# Patient Record
Sex: MALE | Race: WHITE | NOT HISPANIC OR LATINO | Employment: OTHER | URBAN - METROPOLITAN AREA
[De-identification: names, ages, dates, MRNs, and addresses within clinical notes are randomized per-mention and may not be internally consistent; named-entity substitution may affect disease eponyms.]

---

## 2017-03-13 ENCOUNTER — HOSPITAL ENCOUNTER (OUTPATIENT)
Dept: ULTRASOUND IMAGING | Facility: CLINIC | Age: 77
End: 2017-03-13
Attending: RADIOLOGY
Payer: MEDICARE

## 2017-03-13 ENCOUNTER — HOSPITAL ENCOUNTER (OUTPATIENT)
Dept: ULTRASOUND IMAGING | Facility: CLINIC | Age: 77
Discharge: HOME | End: 2017-03-13
Attending: RADIOLOGY | Admitting: RADIOLOGY
Payer: MEDICARE

## 2017-03-13 ENCOUNTER — OFFICE VISIT (OUTPATIENT)
Dept: OTHER | Facility: CLINIC | Age: 77
End: 2017-03-13
Attending: RADIOLOGY
Payer: MEDICARE

## 2017-03-13 VITALS — SYSTOLIC BLOOD PRESSURE: 152 MMHG | DIASTOLIC BLOOD PRESSURE: 79 MMHG

## 2017-03-13 DIAGNOSIS — I73.9 PAD (PERIPHERAL ARTERY DISEASE) (H): Primary | ICD-10-CM

## 2017-03-13 DIAGNOSIS — I73.9 PAD (PERIPHERAL ARTERY DISEASE) (H): ICD-10-CM

## 2017-03-13 PROCEDURE — 93926 LOWER EXTREMITY STUDY: CPT | Mod: RT

## 2017-03-13 PROCEDURE — 93924 LWR XTR VASC STDY BILAT: CPT

## 2017-03-13 PROCEDURE — 99211 OFF/OP EST MAY X REQ PHY/QHP: CPT

## 2017-03-13 RX ORDER — CYANOCOBALAMIN (VITAMIN B-12) 500 MCG
400 TABLET ORAL
Status: ON HOLD | COMMUNITY
Start: 2016-04-22 | End: 2018-07-21

## 2017-03-13 RX ORDER — MULTIVITAMIN,THER AND MINERALS
TABLET ORAL
Status: ON HOLD | COMMUNITY
End: 2018-07-21

## 2017-03-13 RX ORDER — PREDNISONE 5 MG/1
TABLET ORAL
Status: ON HOLD | COMMUNITY
Start: 2017-01-05 | End: 2018-07-21

## 2017-03-13 RX ORDER — TAMSULOSIN HYDROCHLORIDE 0.4 MG/1
CAPSULE ORAL
COMMUNITY
Start: 2017-01-25 | End: 2022-07-21

## 2017-03-13 NOTE — MR AVS SNAPSHOT
After Visit Summary   3/13/2017    Shamir Concepcion    MRN: [id number]           Patient Information     Date Of Birth          1940        Visit Information        Provider Department      3/13/2017 4:30 PM Rambo Benites MD [facility]      Today's Diagnoses     PAD (peripheral artery disease) (H)    -  1      Care Instructions    Will repeat imaging and see patient in 2 years.  Contact us sooner if questions or concerns.        Follow-ups after your visit        Follow-up notes from your care team     Return in about 2 years (around 3/11/2019).      Your next 10 appointments already scheduled     Apr 04, 2017   Procedure with Stevie Espinal MD   [facility] (--)    [address]  [facility] [address] [phone]            May 02, 2017   Procedure with Stevie Espinal MD   [facility] (--)    [address]  [facility] [address] [phone]              Who to contact     If you have questions or need follow up information about today's clinic visit or your schedule please contact [facility] directly at [phone].  Normal or non-critical lab and imaging results will be communicated to you by MyChart, letter or phone within 4 business days after the clinic has received the results. If you do not hear from us within 7 days, please contact the clinic through MyChart or phone. If you have a critical or abnormal lab result, we will notify you by phone as soon as possible.  Submit refill requests through Mochila or call your pharmacy and they will forward the refill request to us. Please allow 3 business days for your refill to be completed.          Additional Information About Your Visit        Plaxicahart Information     Mochila lets you send messages to your doctor, view your test results, renew your prescriptions,  "schedule appointments and more. To sign up, go to [email or web address] . Click on \"Log in\" on the left side of the screen, which will take you to the Welcome page. Then click on \"Sign up Now\" on the right side of the page.     You will be asked to enter the access code listed below, as well as some personal information. Please follow the directions to create your username and password.     Your access code is: [email or web address]  Expires: 2017  9:07 AM     Your access code will  in 90 days. If you need help or a new code, please call your [place] clinic or [phone].        Care EveryWhere ID     This is your Care EveryWhere ID. This could be used by other organizations to access your [place] medical records  [id number]         Blood Pressure from Last 3 Encounters:   17 152/79   12/14/15 148/70   14 142/74    Weight from Last 3 Encounters:   13 166 lb 11.2 oz (75.6 kg)   11 173 lb 3.2 oz (78.6 kg)               Primary Care Provider Office Phone # Fax #    Anthony Harding [phone]       [place] [address]        Thank you!     Thank you for choosing [facility] [place]  for your care. Our goal is always to provide you with excellent care. Hearing back from our patients is one way we can continue to improve our services. Please take a few minutes to complete the written survey that you may receive in the mail after your visit with us. Thank you!             Your Updated Medication List - Protect others around you: Learn how to safely use, store and throw away your medicines at www.disposemymeds.org.          This list is accurate as of: 3/13/17 11:59 PM.  Always use your most recent med list.                   Brand Name Dispense Instructions for use    aspirin 81 MG tablet      Take by mouth daily       clopidogrel 75 MG tablet    PLAVIX    90 tablet    Take 1 tablet (75 mg) by mouth daily Start " taking medication the day after the procedure. Take for six months total then stop.       folic acid 0.8 MG Caps      Take 800 mcg by mouth       * methotrexate 2.5 MG tablet CHEMO      Take 12.5 mg by mouth       * methotrexate 2.5 MG tablet CHEMO      Take 6 tablets by mouth once a week.       predniSONE 5 MG tablet    DELTASONE     Take one to two tablets once daily. Use for FLARE.       sildenafil 100 MG cap/tab    REVATIO/VIAGRA    5 tablet    Take 1 tablet by mouth as needed for erectile dysfunction. Take 30 min to 4 hours before intercourse.  Never use with nitroglycerin, terazosin or doxazosin.       simvastatin 40 MG tablet    ZOCOR    90 tablet    Take 1 tablet (40 mg) by mouth At Bedtime       tamsulosin 0.4 MG capsule    FLOMAX     1 tab twice daily       vitamin  s/Minerals Tabs          * Notice:  This list has 2 medication(s) that are the same as other medications prescribed for you. Read the directions carefully, and ask your doctor or other care provider to review them with you.

## 2017-03-13 NOTE — NURSING NOTE
"Chief Complaint   Patient presents with     Consult     (3:00 FSH, 4:30 BFD) History of left distal superficial femoral/above knee popliteal stent; 1 year follow up to 12/14/15 appointment with Dr. El       Initial /79 (BP Location: Left arm, Patient Position: Chair, Cuff Size: Adult Large) Estimated body mass index is 24.62 kg/(m^2) as calculated from the following:    Height as of 9/11/13: 5' 9\" (1.753 m).    Weight as of 9/11/13: 166 lb 11.2 oz (75.6 kg).  Medication Reconciliation: complete     Face to face nursing time: 8 minutes    Chiara Marlow MA     "

## 2017-03-14 NOTE — PROGRESS NOTES
March 3, 2017            Anthony Harding MD   [facility]    [address] [address]      Dear Dr. Harding:      I had the pleasure of seeing your patient in the clinic this afternoon.  As you recall, Mr. Shamir Concepcion has history of angiogram, dating back to 09/11/2013.  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  The patient has done well since that time and continues to do well.  The patient had an BHAVANI examination on today's visit demonstrating resting BHAVANI in the right leg at 1.04 and 1.0 in the left leg.  Post-exercise values of 1.04 on the right and 0.93 on the left.  Distal waveforms are triphasic/biphasic.  Overall, the patient has no claudication symptoms.  The patient does describe some hamstring tightness during exercise, though this is not felt to be vascular related.      PHYSICAL EXAMINATION:  The patient appears healthy and in no distress.  The patient has 2+ pulses in both femoral arteries, 1+ pulses in both posterior tibial and 2+ pulses in both dorsalis pedis arteries.  Both feet are normal in color and warm.  No open wounds.  The patient has blood pressure of 152/79 and a heart rate of 64.  The patient is currently taking aspirin.      Overall, the patient is doing quite well and has done quite well after right lower extremity intervention in 2013.  Therefore, our plan will be to give followup exam in 2 years with repeat BHAVANI examination with exercise and duplex of the right SFA/popliteal arterial stent.      I appreciate the opportunity in helping to care for your patient.  Please do not hesitate to contact me with any questions or concerns.      Please note that a total of 25 minutes was spent in face-to-face communication with the patient, greater than 50% of which was spent in counseling and coordination of care.      Sincerely,         NAYAN BURLESON MD              D: 2017 17:24   T: 2017 00:21   MT: YON      Name:     KATTY GRUBER   MRN:      [id number]        Account:      [id number]   :      1940           Visit Date:   2017      Document: [id number]       cc: Anthony Harding MD

## 2017-04-03 RX ORDER — FLUTICASONE PROPIONATE 50 MCG
2 SPRAY, SUSPENSION (ML) NASAL DAILY
COMMUNITY

## 2017-04-04 ENCOUNTER — ANESTHESIA (OUTPATIENT)
Dept: SURGERY | Facility: CLINIC | Age: 77
End: 2017-04-04
Payer: MEDICARE

## 2017-04-04 ENCOUNTER — HOSPITAL ENCOUNTER (OUTPATIENT)
Facility: CLINIC | Age: 77
Discharge: HOME | End: 2017-04-04
Attending: OPHTHALMOLOGY | Admitting: OPHTHALMOLOGY
Payer: MEDICARE

## 2017-04-04 ENCOUNTER — ANESTHESIA EVENT (OUTPATIENT)
Dept: SURGERY | Facility: CLINIC | Age: 77
End: 2017-04-04
Payer: MEDICARE

## 2017-04-04 ENCOUNTER — SURGERY (OUTPATIENT)
Age: 77
End: 2017-04-04

## 2017-04-04 VITALS
BODY MASS INDEX: 24.73 KG/M2 | TEMPERATURE: 96.9 F | OXYGEN SATURATION: 96 % | SYSTOLIC BLOOD PRESSURE: 160 MMHG | RESPIRATION RATE: 16 BRPM | WEIGHT: 167 LBS | DIASTOLIC BLOOD PRESSURE: 76 MMHG | HEIGHT: 69 IN

## 2017-04-04 PROCEDURE — 71000028 ZZH EYE RECOVERY PHASE 2 EACH 15 MINS: Performed by: OPHTHALMOLOGY

## 2017-04-04 PROCEDURE — 40000170 ZZH STATISTIC PRE-PROCEDURE ASSESSMENT II: Performed by: OPHTHALMOLOGY

## 2017-04-04 PROCEDURE — 25000125 ZZHC RX 250: Performed by: OPHTHALMOLOGY

## 2017-04-04 PROCEDURE — 25000125 ZZHC RX 250: Performed by: NURSE ANESTHETIST, CERTIFIED REGISTERED

## 2017-04-04 PROCEDURE — 25000132 ZZH RX MED GY IP 250 OP 250 PS 637: Mod: GY | Performed by: OPHTHALMOLOGY

## 2017-04-04 PROCEDURE — V2632 POST CHMBR INTRAOCULAR LENS: HCPCS | Performed by: OPHTHALMOLOGY

## 2017-04-04 PROCEDURE — 25000128 H RX IP 250 OP 636: Performed by: NURSE ANESTHETIST, CERTIFIED REGISTERED

## 2017-04-04 PROCEDURE — 25800025 ZZH RX 258: Performed by: ANESTHESIOLOGY

## 2017-04-04 PROCEDURE — 36000101 ZZH EYE SURGERY LEVEL 3 1ST 30 MIN: Performed by: OPHTHALMOLOGY

## 2017-04-04 PROCEDURE — 27210794 ZZH OR GENERAL SUPPLY STERILE: Performed by: OPHTHALMOLOGY

## 2017-04-04 PROCEDURE — 25000125 ZZHC RX 250: Performed by: ANESTHESIOLOGY

## 2017-04-04 PROCEDURE — 37000008 ZZH ANESTHESIA TECHNICAL FEE, 1ST 30 MIN: Performed by: OPHTHALMOLOGY

## 2017-04-04 PROCEDURE — 25000128 H RX IP 250 OP 636: Performed by: OPHTHALMOLOGY

## 2017-04-04 DEVICE — EYE IMP IOL AMO PCL TECNIS ZCB00 20.0: Type: IMPLANTABLE DEVICE | Site: EYE | Status: FUNCTIONAL

## 2017-04-04 RX ORDER — DICLOFENAC SODIUM 1 MG/ML
1 SOLUTION/ DROPS OPHTHALMIC
Status: COMPLETED | OUTPATIENT
Start: 2017-04-04 | End: 2017-04-04

## 2017-04-04 RX ORDER — LIDOCAINE HYDROCHLORIDE 10 MG/ML
INJECTION, SOLUTION EPIDURAL; INFILTRATION; INTRACAUDAL; PERINEURAL PRN
Status: DISCONTINUED | OUTPATIENT
Start: 2017-04-04 | End: 2017-04-04 | Stop reason: HOSPADM

## 2017-04-04 RX ORDER — EPHEDRINE SULFATE 50 MG/ML
INJECTION, SOLUTION INTRAMUSCULAR; INTRAVENOUS; SUBCUTANEOUS PRN
Status: DISCONTINUED | OUTPATIENT
Start: 2017-04-04 | End: 2017-04-04

## 2017-04-04 RX ORDER — MOXIFLOXACIN 5 MG/ML
1 SOLUTION/ DROPS OPHTHALMIC
Status: COMPLETED | OUTPATIENT
Start: 2017-04-04 | End: 2017-04-04

## 2017-04-04 RX ORDER — PREDNISOLONE ACETATE 1 %
SUSPENSION, DROPS(FINAL DOSAGE FORM)(ML) OPHTHALMIC (EYE) PRN
Status: DISCONTINUED | OUTPATIENT
Start: 2017-04-04 | End: 2017-04-04 | Stop reason: HOSPADM

## 2017-04-04 RX ORDER — TROPICAMIDE 10 MG/ML
1 SOLUTION/ DROPS OPHTHALMIC
Status: COMPLETED | OUTPATIENT
Start: 2017-04-04 | End: 2017-04-04

## 2017-04-04 RX ORDER — BALANCED SALT SOLUTION 6.4; .75; .48; .3; 3.9; 1.7 MG/ML; MG/ML; MG/ML; MG/ML; MG/ML; MG/ML
SOLUTION OPHTHALMIC PRN
Status: DISCONTINUED | OUTPATIENT
Start: 2017-04-04 | End: 2017-04-04 | Stop reason: HOSPADM

## 2017-04-04 RX ORDER — SODIUM CHLORIDE, SODIUM LACTATE, POTASSIUM CHLORIDE, CALCIUM CHLORIDE 600; 310; 30; 20 MG/100ML; MG/100ML; MG/100ML; MG/100ML
500 INJECTION, SOLUTION INTRAVENOUS CONTINUOUS
Status: DISCONTINUED | OUTPATIENT
Start: 2017-04-04 | End: 2017-04-04 | Stop reason: HOSPADM

## 2017-04-04 RX ORDER — PHENYLEPHRINE HYDROCHLORIDE 25 MG/ML
1 SOLUTION/ DROPS OPHTHALMIC
Status: COMPLETED | OUTPATIENT
Start: 2017-04-04 | End: 2017-04-04

## 2017-04-04 RX ADMIN — PHENYLEPHRINE HYDROCHLORIDE 1 DROP: 2.5 SOLUTION/ DROPS OPHTHALMIC at 06:47

## 2017-04-04 RX ADMIN — EPINEPHRINE 500 ML: 1 INJECTION, SOLUTION, CONCENTRATE INTRAVENOUS at 08:05

## 2017-04-04 RX ADMIN — MOXIFLOXACIN HYDROCHLORIDE 1 DROP: 5 SOLUTION/ DROPS OPHTHALMIC at 06:56

## 2017-04-04 RX ADMIN — DEXMEDETOMIDINE 8 MCG: 100 INJECTION, SOLUTION, CONCENTRATE INTRAVENOUS at 07:57

## 2017-04-04 RX ADMIN — TROPICAMIDE 1 DROP: 10 SOLUTION/ DROPS OPHTHALMIC at 06:56

## 2017-04-04 RX ADMIN — LIDOCAINE HYDROCHLORIDE 1 ML: 10 INJECTION, SOLUTION EPIDURAL; INFILTRATION; INTRACAUDAL; PERINEURAL at 06:56

## 2017-04-04 RX ADMIN — TROPICAMIDE 1 DROP: 10 SOLUTION/ DROPS OPHTHALMIC at 06:41

## 2017-04-04 RX ADMIN — BALANCED SALT SOLUTION 15 ML: 6.4; .75; .48; .3; 3.9; 1.7 SOLUTION OPHTHALMIC at 08:05

## 2017-04-04 RX ADMIN — SODIUM CHLORIDE, POTASSIUM CHLORIDE, SODIUM LACTATE AND CALCIUM CHLORIDE 500 ML: 600; 310; 30; 20 INJECTION, SOLUTION INTRAVENOUS at 06:57

## 2017-04-04 RX ADMIN — DICLOFENAC SODIUM 1 DROP: 1 SOLUTION/ DROPS OPHTHALMIC at 06:41

## 2017-04-04 RX ADMIN — TROPICAMIDE 1 DROP: 10 SOLUTION/ DROPS OPHTHALMIC at 06:47

## 2017-04-04 RX ADMIN — MIDAZOLAM HYDROCHLORIDE 2 MG: 1 INJECTION, SOLUTION INTRAMUSCULAR; INTRAVENOUS at 07:57

## 2017-04-04 RX ADMIN — Medication 5 MG: at 08:01

## 2017-04-04 RX ADMIN — Medication 5 MG: at 07:59

## 2017-04-04 RX ADMIN — MOXIFLOXACIN HYDROCHLORIDE 1 DROP: 5 SOLUTION/ DROPS OPHTHALMIC at 06:41

## 2017-04-04 RX ADMIN — PHENYLEPHRINE HYDROCHLORIDE 1 DROP: 2.5 SOLUTION/ DROPS OPHTHALMIC at 06:56

## 2017-04-04 RX ADMIN — LIDOCAINE HYDROCHLORIDE 0.5 ML: 35 GEL OPHTHALMIC at 08:06

## 2017-04-04 RX ADMIN — MOXIFLOXACIN HYDROCHLORIDE 1 DROP: 5 SOLUTION/ DROPS OPHTHALMIC at 06:47

## 2017-04-04 RX ADMIN — SODIUM CHONDROITIN SULFATE / SODIUM HYALURONATE 1 ML: 0.55-0.5 INJECTION INTRAOCULAR at 08:06

## 2017-04-04 RX ADMIN — Medication 1 DROP: at 08:15

## 2017-04-04 RX ADMIN — DICLOFENAC SODIUM 1 DROP: 1 SOLUTION/ DROPS OPHTHALMIC at 06:56

## 2017-04-04 RX ADMIN — DICLOFENAC SODIUM 1 DROP: 1 SOLUTION/ DROPS OPHTHALMIC at 06:47

## 2017-04-04 RX ADMIN — LIDOCAINE HYDROCHLORIDE 1 ML: 10 INJECTION, SOLUTION EPIDURAL; INFILTRATION; INTRACAUDAL; PERINEURAL at 08:06

## 2017-04-04 RX ADMIN — PHENYLEPHRINE HYDROCHLORIDE 1 DROP: 2.5 SOLUTION/ DROPS OPHTHALMIC at 06:41

## 2017-04-04 ASSESSMENT — LIFESTYLE VARIABLES: TOBACCO_USE: 0

## 2017-04-04 ASSESSMENT — ENCOUNTER SYMPTOMS
SEIZURES: 0
DYSRHYTHMIAS: 0

## 2017-04-04 ASSESSMENT — COPD QUESTIONNAIRES: COPD: 0

## 2017-04-04 NOTE — IP AVS SNAPSHOT
MRN:[id number]                      After Visit Summary   4/4/2017    Shamir Concepcion    MRN: [id number]           Thank you!     Thank you for choosing [facility] for your care. Our goal is always to provide you with excellent care. Hearing back from our patients is one way we can continue to improve our services. Please take a few minutes to complete the written survey that you may receive in the mail after you visit with us. Thank you!        Patient Information     Date Of Birth          1940        About your hospital stay     You were admitted on:  April 4, 2017 You last received care in the:  [facility]    You were discharged on:  April 4, 2017       Who to Call     For medical emergencies, please call 911.  For non-urgent questions about your medical care, please call your primary care provider or clinic, [phone]  For questions related to your surgery, please call your surgery clinic        Attending Provider     Provider Specialty    Stevie Espinal MD Ophthalmology       Primary Care Provider Office Phone # Fax #    Anthony Harding [phone]       [address] [address]        Your next 10 appointments already scheduled     May 02, 2017   Procedure with Stevie Espinal MD   [facility] (--)    [address]  [facility] [address] [phone]              Further instructions from your care team       [facility] Discharge  Instructions  Anesthesia ([facility])   Adult Discharge Instructions    For 24 hours after surgery    1. Get plenty of rest.  Make arrangements to have a responsible adult stay with you for at least 6 hours after you leave the hospital.  2. Do not drive or use heavy equipment for 24 hours.    3. Do not drink alcohol for 24 hours.  4. Do not sign legal documents or make important decisions for 24  "hours.  5. Avoid strenuous or risky activities. You may feel lightheaded.  If so, sit for a few minutes before standing.  Have someone help you get up.   6. Conscious sedation patients may resume a regular diet..  7. Any questions of medical nature, call your physician.    [facility]  Cataract Surgery Discharge Instructions  [facility] MD RENNY Delatorre MD J. Hasan, MD C. Nichols, MD S. Schaefer, MD J. Stephens, MD J. O'Neill, MD      Start using drops when you arrive at home today    Vigamox (Tan top) - one drop in surgical eye 3 times per day until gone.    Prednisolone acetate 1.0% (pink or white top) - one drop in surgical eye 3 times per day until gone.    Ketorolac (Grey top) - one drop in surgical eye 3 times per day until gone.      Place shield over surgical eye at bedtime for 3 nights.      The eye will feel itchy, scratchy, and vision will be blurred, you may take Tylenol for the scratchy feeling if this is bothersome.      No eye rubbing or swimming for I week.      You may resume all prescription medications as directed by your primary doctor.      Call if increasing pain, progressively worsening vision or worsening redness of surgical eye.    On-call doctor can be reached at [phone].    Pending Results     No orders found from 4/2/2017 to 4/5/2017.            Admission Information     Date & Time Provider Department Dept. Phone    4/4/2017 Stevie Espinal MD [facility] [phone]      Your Vitals Were     Blood Pressure Temperature Respirations Height Weight Pulse Oximetry    143/76 96.9  F (36.1  C) (Temporal) 16 1.753 m (5' 9\") 75.8 kg (167 lb) 97%    BMI (Body Mass Index)                   24.66 kg/m2           MyChart Information     ZQGamehart lets you send messages to your doctor, view your test results, renew your prescriptions, schedule appointments and more. To sign up, go to " "[email or web address] . Click on \"Log in\" on the left side of the screen, which will take you to the Welcome page. Then click on \"Sign up Now\" on the right side of the page.     You will be asked to enter the access code listed below, as well as some personal information. Please follow the directions to create your username and password.     Your access code is: [email or web address]  Expires: 2017  9:07 AM     Your access code will  in 90 days. If you need help or a new code, please call your [place] clinic or [phone].        Care EveryWhere ID     This is your Care EveryWhere ID. This could be used by other organizations to access your [place] medical records  [id number]           Review of your medicines      UNREVIEWED medicines. Ask your doctor about these medicines        Dose / Directions    clopidogrel 75 MG tablet   Commonly known as:  PLAVIX   Used for:  PAD (peripheral artery disease) (H)        Dose:  75 mg   Take 1 tablet (75 mg) by mouth daily Start taking medication the day after the procedure. Take for six months total then stop.   Quantity:  90 tablet   Refills:  1       ECOTRIN LOW STRENGTH PO        Dose:  1 tablet   Take 1 tablet by mouth daily   Refills:  0       FLONASE ALLERGY RELIEF 50 MCG/ACT spray   Generic drug:  fluticasone        Dose:  1 spray   Spray 1 spray into both nostrils daily   Refills:  0       folic acid 0.8 MG Caps        Dose:  400 mcg   Take 400 mcg by mouth   Refills:  0       * methotrexate 2.5 MG tablet CHEMO        Dose:  12.5 mg   Take 12.5 mg by mouth   Refills:  0       * methotrexate 2.5 MG tablet CHEMO        Dose:  6 tablet   Take 6 tablets by mouth once a week.   Refills:  0       predniSONE 5 MG tablet   Commonly known as:  DELTASONE        Take one to two tablets once daily. Use for FLARE.   Refills:  0       PROSCAR PO        Dose:  5 mg   Take 5 mg by mouth daily   Refills:  0       sildenafil 100 MG cap/tab   Commonly known as:  REVATIO/VIAGRA "   Used for:  Erectile dysfunction        Dose:  100 mg   Take 1 tablet by mouth as needed for erectile dysfunction. Take 30 min to 4 hours before intercourse.  Never use with nitroglycerin, terazosin or doxazosin.   Quantity:  5 tablet   Refills:  2       simvastatin 40 MG tablet   Commonly known as:  ZOCOR   Used for:  Hyperlipidemia LDL goal < 70        Dose:  40 mg   Take 1 tablet (40 mg) by mouth At Bedtime   Quantity:  90 tablet   Refills:  3       tamsulosin 0.4 MG capsule   Commonly known as:  FLOMAX        1 tab twice daily   Refills:  0       vitamin  s/Minerals Tabs        Refills:  0       * Notice:  This list has 2 medication(s) that are the same as other medications prescribed for you. Read the directions carefully, and ask your doctor or other care provider to review them with you.             Protect others around you: Learn how to safely use, store and throw away your medicines at www.disposemymeds.org.             Medication List: This is a list of all your medications and when to take them. Check marks below indicate your daily home schedule. Keep this list as a reference.      Medications           Morning Afternoon Evening Bedtime As Needed    clopidogrel 75 MG tablet   Commonly known as:  PLAVIX   Take 1 tablet (75 mg) by mouth daily Start taking medication the day after the procedure. Take for six months total then stop.                                ECOTRIN LOW STRENGTH PO   Take 1 tablet by mouth daily                                FLONASE ALLERGY RELIEF 50 MCG/ACT spray   Spray 1 spray into both nostrils daily   Generic drug:  fluticasone                                folic acid 0.8 MG Caps   Take 400 mcg by mouth                                * methotrexate 2.5 MG tablet CHEMO   Take 12.5 mg by mouth                                * methotrexate 2.5 MG tablet CHEMO   Take 6 tablets by mouth once a week.                                predniSONE 5 MG tablet   Commonly known as:   DELTASONE   Take one to two tablets once daily. Use for FLARE.                                PROSCAR PO   Take 5 mg by mouth daily                                sildenafil 100 MG cap/tab   Commonly known as:  REVATIO/VIAGRA   Take 1 tablet by mouth as needed for erectile dysfunction. Take 30 min to 4 hours before intercourse.  Never use with nitroglycerin, terazosin or doxazosin.                                simvastatin 40 MG tablet   Commonly known as:  ZOCOR   Take 1 tablet (40 mg) by mouth At Bedtime                                tamsulosin 0.4 MG capsule   Commonly known as:  FLOMAX   1 tab twice daily                                vitamin  s/Minerals Tabs                                * Notice:  This list has 2 medication(s) that are the same as other medications prescribed for you. Read the directions carefully, and ask your doctor or other care provider to review them with you.

## 2017-04-04 NOTE — ANESTHESIA PREPROCEDURE EVALUATION
Procedure: Procedure(s):  PHACOEMULSIFICATION CLEAR CORNEA WITH STANDARD INTRAOCULAR LENS IMPLANT  Preop diagnosis: LEFT EYE NUCLEAR CATARACT    No Known Allergies  Past Medical History:   Diagnosis Date     BPH (benign prostatic hyperplasia)      ED (erectile dysfunction)      Family history of colon cancer      PAD (peripheral artery disease) (H)      RA (rheumatoid arthritis) (H)      Past Surgical History:   Procedure Laterality Date     ANGIOGRAM       C MRA UPPER EXTREMITY WO&W CONT  stenting right SFA-AK pop     COLONOSCOPY      polyps     ENT SURGERY      T&A     LAMINECT/DISCECTOMY, LUMBAR       SINUS SURGERY       VASCULAR SURGERY  R SFA stenting  2012     Prior to Admission medications    Medication Sig Start Date End Date Taking? Authorizing Provider   Aspirin (ECOTRIN LOW STRENGTH PO) Take 1 tablet by mouth daily   Yes Reported, Patient   Finasteride (PROSCAR PO) Take 5 mg by mouth daily   Yes Reported, Patient   fluticasone (FLONASE ALLERGY RELIEF) 50 MCG/ACT spray Spray 1 spray into both nostrils daily   Yes Reported, Patient   tamsulosin (FLOMAX) 0.4 MG capsule 1 tab twice daily 1/25/17  Yes Reported, Patient   predniSONE (DELTASONE) 5 MG tablet Take one to two tablets once daily. Use for FLARE. 1/5/17  Yes Reported, Patient   vitamin  s/Minerals TABS    Yes Reported, Patient   folic acid 0.8 MG CAPS Take 400 mcg by mouth  4/22/16  Yes Reported, Patient   clopidogrel (PLAVIX) 75 MG tablet Take 1 tablet (75 mg) by mouth daily Start taking medication the day after the procedure. Take for six months total then stop. 9/11/13  Yes Tonny Michelle MD   METHOTREXate 2.5 MG tablet Take 6 tablets by mouth once a week.   Yes Reported, Patient   methotrexate 2.5 MG tablet CHEMO Take 12.5 mg by mouth 7/2/15   Reported, Patient   simvastatin (ZOCOR) 40 MG tablet Take 1 tablet (40 mg) by mouth At Bedtime  Patient not taking: Reported on 3/13/2017 9/11/13   Tonny Michelle MD   sildenafil  (VIAGRA) 100 MG tablet Take 1 tablet by mouth as needed for erectile dysfunction. Take 30 min to 4 hours before intercourse.  Never use with nitroglycerin, terazosin or doxazosin.  Patient not taking: Reported on 3/13/2017 12/5/11   Anthony Harding MD     Current Facility-Administered Medications Ordered in Epic   Medication Dose Route Frequency Last Rate Last Dose     lidocaine (AKTEN) ophthalmic gel 0.5 mL  0.5 mL Ophthalmic Once         povidone-iodine 5 % ophthalmic solution 1 drop  1 drop Ophthalmic Once         lidocaine 1 % 1 mL  1 mL Other Q1H PRN   1 mL at 04/04/17 0656     lactated ringers infusion  500 mL Intravenous Continuous 25 mL/hr at 04/04/17 0657 500 mL at 04/04/17 0657     No current [facility]-ordered outpatient prescriptions on file.     Wt Readings from Last 1 Encounters:   04/03/17 75.8 kg (167 lb)     Temp Readings from Last 1 Encounters:   04/04/17 36.1  C (96.9  F) (Temporal)     BP Readings from Last 6 Encounters:   04/04/17 153/74   03/13/17 152/79   12/14/15 148/70   12/08/14 142/74   03/03/14 149/74   09/11/13 176/86     Pulse Readings from Last 4 Encounters:   12/14/15 70   12/08/14 64   03/03/14 58   09/11/13 54     Resp Readings from Last 1 Encounters:   04/04/17 16     SpO2 Readings from Last 1 Encounters:   04/04/17 95%     Recent Labs   Lab Test  09/11/13   0759   CR  1.15     Recent Labs   Lab Test  09/11/13   0759  12/14/11   0838   WBC  5.7  5.2   HGB  13.9  14.0   PLT  252  340     Recent Labs   Lab Test  09/11/13   0759   INR  0.94        Anesthesia Evaluation     .             ROS/MED HX    ENT/Pulmonary:      (-) tobacco use, asthma, COPD and sleep apnea   Neurologic:      (-) seizures, CVA and migraines   Cardiovascular:     (+) Dyslipidemia, -Peripheral Vascular Disease---. : . . . :. .      (-) hypertension, [place], arrhythmias and valvular problems/murmurs   METS/Exercise Tolerance:  >4 METS   Hematologic:        (-) history of blood clots, anemia and other hematologic  disorder   Musculoskeletal: Comment: Rheumatoid arthritis  (+) arthritis, , , -       GI/Hepatic:        (-) GERD and liver disease   Renal/Genitourinary:     (+) BPH,    (-) renal disease   Endo:      (-) Type I DM, Type II DM and thyroid disease   Psychiatric:         Infectious Disease:        (-) Recent Fever   Malignancy:         Other:                     Physical Exam  Normal systems: cardiovascular, pulmonary and dental    Airway   Mallampati: III  TM distance: >3 FB  Neck ROM: full    Dental   (+) caps    Cardiovascular   Rhythm and rate: regular and normal  (-) no murmur    Pulmonary    breath sounds clear to auscultation                    Anesthesia Plan      History & Physical Review      ASA Status:  2 .    NPO Status:  > 8 hours    Plan for MAC Reason for MAC:  Deep or markedly invasive procedure (G8)  PONV prophylaxis:  Ondansetron (or other 5HT-3)  Fentanyl, precedex as needed.       Postoperative Care  Postoperative pain management:  IV analgesics and Oral pain medications.      Consents  Anesthetic plan, risks, benefits and alternatives discussed with:  Patient..                          .

## 2017-04-04 NOTE — ANESTHESIA CARE TRANSFER NOTE
Patient: Shamir WOODALL Concepcion    Procedure(s):  LEFT EYE PHACOEMULSIFICATION CLEAR CORNEA WITH STANDARD INTRAOCULAR LENS IMPLANT  - Wound Class: I-Clean    Diagnosis: LEFT EYE NUCLEAR CATARACT  Diagnosis Additional Information: No value filed.    Anesthesia Type:   MAC     Note:  Airway :Room Air  Patient transferred to:PACU  Comments: VSS      Vitals: (Last set prior to Anesthesia Care Transfer)    CRNA VITALS  4/4/2017 0751 - 4/4/2017 0825      4/4/2017             Pulse: 59    SpO2: 99 %    Resp Rate (set): 10                Electronically Signed By: CHANDRIKA Calzada CRNA  April 4, 2017  8:25 AM

## 2017-04-04 NOTE — OP NOTE
[facility]  Ophthalmology Operative Note    PREOPERATIVE DIAGNOSIS: Cataract, Left eye.   POSTOPERATIVE DIAGNOSIS: Cataract, Left eye.   OPERATION: Cataract extraction with placement of posterior chamber intraocular lens in the Left eye.   ANESTHESIA: Topical   INDICATIONS FOR PROCEDURE: Shamir Concepcion was seen in the [facility] for decreased visual acuity in the Left eye. The patient was found to have a cataract in the Left eye. The risks, benefits, alternatives and goals of cataract extraction were discussed with the patient, and after adequate discussion the patient understood and agreed to these, and a signed informed consent was obtained prior to the procedure.   DESCRIPTION OF PROCEDURE: After proper patient identification, topical anesthesia was applied to the Left eye. The patient was brought to the operating room and the Left eye was prepped and draped in the usual manner for intraocular surgery. A lid speculum was placed in the Left eye. A paracentesis was then created and the anterior chamber was filled with 1% non-preserved lidocaine followed by Viscoat. A clear corneal incision was then created, tunneling 2 mm into the cornea before entering the anterior chamber. A continuous curvilinear capsulorrhexis was then created using a cystotome and Utrata forceps. Hydrodissection was carried out with BSS on a blunt-tip cannula and the lens rotated freely within the capsular bag. Phacoemulsification was then carried out using the stop and chop technique. Residual cortical material was removed using the I&A handpiece. The capsular bag was then filled with Provisc and an injectable lens was injected into the capsular bag. The lens showed good centration and stability. Residual viscoelastic was removed using the I&A handpiece. The wound was then hydrated and the anterior chamber reformed through the paracentesis. The wound was checked and found to be sealed. Topical  drops of Vigamox and a Econopred were placed in the patient's Left eye followed by a Kennedy shield over the top of this. The patient tolerated the procedure well without complications and was told to follow up in the clinic in the next postoperative day.       Implant Name Type Inv. Item Serial No.  Lot No. LRB No. Used   EYE IMP IOL ANA MARIA PCL TECNIS ZCB00 20.0 Lens/Eye Implant EYE IMP IOL ANA MARIA PCL TECNIS ZCB00 20.0 [id number] [employer]   Left 1        NAIF SOUTH MD

## 2017-04-04 NOTE — DISCHARGE INSTRUCTIONS
[facility] Anesthesia Eye Care Center Discharge  Instructions  Anesthesia (Eye Care Center)   Adult Discharge Instructions    For 24 hours after surgery    1. Get plenty of rest.  Make arrangements to have a responsible adult stay with you for at least 6 hours after you leave the hospital.  2. Do not drive or use heavy equipment for 24 hours.    3. Do not drink alcohol for 24 hours.  4. Do not sign legal documents or make important decisions for 24 hours.  5. Avoid strenuous or risky activities. You may feel lightheaded.  If so, sit for a few minutes before standing.  Have someone help you get up.   6. Conscious sedation patients may resume a regular diet..  7. Any questions of medical nature, call your physician.    [facility]  Cataract Surgery Discharge Instructions  [facility] MD RENNY Delatorre MD J. Hasan, MD C. Nichols, MD S. Schaefer, MD J. Stephens, MD J. O'Neill, MD      Start using drops when you arrive at home today    Vigamox (Tan top) - one drop in surgical eye 3 times per day until gone.    Prednisolone acetate 1.0% (pink or white top) - one drop in surgical eye 3 times per day until gone.    Ketorolac (Grey top) - one drop in surgical eye 3 times per day until gone.      Place shield over surgical eye at bedtime for 3 nights.      The eye will feel itchy, scratchy, and vision will be blurred, you may take Tylenol for the scratchy feeling if this is bothersome.      No eye rubbing or swimming for I week.      You may resume all prescription medications as directed by your primary doctor.      Call if increasing pain, progressively worsening vision or worsening redness of surgical eye.    On-call doctor can be reached at [phone].

## 2017-04-04 NOTE — IP AVS SNAPSHOT
[facility]    [address]    [place] [address]    Phone:  [phone]    Fax:  [phone]                                       After Visit Summary   4/4/2017    Shamir Concepcion    MRN: [id number]           After Visit Summary Signature Page     I have received my discharge instructions, and my questions have been answered. I have discussed any challenges I see with this plan with the nurse or doctor.    ..........................................................................................................................................  Patient/Patient Representative Signature      ..........................................................................................................................................  Patient Representative Print Name and Relationship to Patient    ..................................................               ................................................  Date                                            Time    ..........................................................................................................................................  Reviewed by Signature/Title    ...................................................              ..............................................  Date                                                            Time

## 2017-05-02 ENCOUNTER — ANESTHESIA (OUTPATIENT)
Dept: SURGERY | Facility: CLINIC | Age: 77
End: 2017-05-02
Payer: MEDICARE

## 2017-05-02 ENCOUNTER — ANESTHESIA EVENT (OUTPATIENT)
Dept: SURGERY | Facility: CLINIC | Age: 77
End: 2017-05-02
Payer: MEDICARE

## 2017-05-02 ENCOUNTER — SURGERY (OUTPATIENT)
Age: 77
End: 2017-05-02

## 2017-05-02 ENCOUNTER — HOSPITAL ENCOUNTER (OUTPATIENT)
Facility: CLINIC | Age: 77
Discharge: HOME | End: 2017-05-02
Attending: OPHTHALMOLOGY | Admitting: OPHTHALMOLOGY
Payer: MEDICARE

## 2017-05-02 VITALS
OXYGEN SATURATION: 98 % | HEART RATE: 57 BPM | HEIGHT: 69 IN | RESPIRATION RATE: 16 BRPM | WEIGHT: 167.77 LBS | SYSTOLIC BLOOD PRESSURE: 125 MMHG | TEMPERATURE: 96.7 F | DIASTOLIC BLOOD PRESSURE: 71 MMHG | BODY MASS INDEX: 24.85 KG/M2

## 2017-05-02 PROCEDURE — 25000132 ZZH RX MED GY IP 250 OP 250 PS 637: Mod: GY | Performed by: OPHTHALMOLOGY

## 2017-05-02 PROCEDURE — V2632 POST CHMBR INTRAOCULAR LENS: HCPCS | Performed by: OPHTHALMOLOGY

## 2017-05-02 PROCEDURE — 25000128 H RX IP 250 OP 636: Performed by: OPHTHALMOLOGY

## 2017-05-02 PROCEDURE — 25000125 ZZHC RX 250: Performed by: OPHTHALMOLOGY

## 2017-05-02 PROCEDURE — 37000008 ZZH ANESTHESIA TECHNICAL FEE, 1ST 30 MIN: Performed by: OPHTHALMOLOGY

## 2017-05-02 PROCEDURE — 25000128 H RX IP 250 OP 636: Performed by: NURSE ANESTHETIST, CERTIFIED REGISTERED

## 2017-05-02 PROCEDURE — 40000170 ZZH STATISTIC PRE-PROCEDURE ASSESSMENT II: Performed by: OPHTHALMOLOGY

## 2017-05-02 PROCEDURE — 36000101 ZZH EYE SURGERY LEVEL 3 1ST 30 MIN: Performed by: OPHTHALMOLOGY

## 2017-05-02 PROCEDURE — 25000125 ZZHC RX 250: Performed by: ANESTHESIOLOGY

## 2017-05-02 PROCEDURE — 25000125 ZZHC RX 250: Performed by: NURSE ANESTHETIST, CERTIFIED REGISTERED

## 2017-05-02 PROCEDURE — 71000028 ZZH EYE RECOVERY PHASE 2 EACH 15 MINS: Performed by: OPHTHALMOLOGY

## 2017-05-02 PROCEDURE — 27210794 ZZH OR GENERAL SUPPLY STERILE: Performed by: OPHTHALMOLOGY

## 2017-05-02 PROCEDURE — 25800025 ZZH RX 258: Performed by: ANESTHESIOLOGY

## 2017-05-02 DEVICE — EYE IMP IOL AMO PCL TECNIS ZCB00 18.5: Type: IMPLANTABLE DEVICE | Site: EYE | Status: FUNCTIONAL

## 2017-05-02 RX ORDER — LIDOCAINE HYDROCHLORIDE 10 MG/ML
INJECTION, SOLUTION EPIDURAL; INFILTRATION; INTRACAUDAL; PERINEURAL PRN
Status: DISCONTINUED | OUTPATIENT
Start: 2017-05-02 | End: 2017-05-02 | Stop reason: HOSPADM

## 2017-05-02 RX ORDER — MOXIFLOXACIN 5 MG/ML
1 SOLUTION/ DROPS OPHTHALMIC
Status: COMPLETED | OUTPATIENT
Start: 2017-05-02 | End: 2017-05-02

## 2017-05-02 RX ORDER — PHENYLEPHRINE HYDROCHLORIDE 25 MG/ML
1 SOLUTION/ DROPS OPHTHALMIC
Status: COMPLETED | OUTPATIENT
Start: 2017-05-02 | End: 2017-05-02

## 2017-05-02 RX ORDER — BALANCED SALT SOLUTION 6.4; .75; .48; .3; 3.9; 1.7 MG/ML; MG/ML; MG/ML; MG/ML; MG/ML; MG/ML
SOLUTION OPHTHALMIC PRN
Status: DISCONTINUED | OUTPATIENT
Start: 2017-05-02 | End: 2017-05-02 | Stop reason: HOSPADM

## 2017-05-02 RX ORDER — ONDANSETRON 2 MG/ML
INJECTION INTRAMUSCULAR; INTRAVENOUS PRN
Status: DISCONTINUED | OUTPATIENT
Start: 2017-05-02 | End: 2017-05-02

## 2017-05-02 RX ORDER — PREDNISOLONE ACETATE 1 %
SUSPENSION, DROPS(FINAL DOSAGE FORM)(ML) OPHTHALMIC (EYE) PRN
Status: DISCONTINUED | OUTPATIENT
Start: 2017-05-02 | End: 2017-05-02 | Stop reason: HOSPADM

## 2017-05-02 RX ORDER — DICLOFENAC SODIUM 1 MG/ML
1 SOLUTION/ DROPS OPHTHALMIC
Status: COMPLETED | OUTPATIENT
Start: 2017-05-02 | End: 2017-05-02

## 2017-05-02 RX ORDER — TROPICAMIDE 10 MG/ML
1 SOLUTION/ DROPS OPHTHALMIC
Status: COMPLETED | OUTPATIENT
Start: 2017-05-02 | End: 2017-05-02

## 2017-05-02 RX ORDER — SODIUM CHLORIDE, SODIUM LACTATE, POTASSIUM CHLORIDE, CALCIUM CHLORIDE 600; 310; 30; 20 MG/100ML; MG/100ML; MG/100ML; MG/100ML
500 INJECTION, SOLUTION INTRAVENOUS CONTINUOUS
Status: DISCONTINUED | OUTPATIENT
Start: 2017-05-02 | End: 2017-05-02 | Stop reason: HOSPADM

## 2017-05-02 RX ADMIN — PHENYLEPHRINE HYDROCHLORIDE 1 DROP: 2.5 SOLUTION/ DROPS OPHTHALMIC at 07:19

## 2017-05-02 RX ADMIN — LIDOCAINE HYDROCHLORIDE 0.3 ML: 10 INJECTION, SOLUTION EPIDURAL; INFILTRATION; INTRACAUDAL; PERINEURAL at 07:27

## 2017-05-02 RX ADMIN — SODIUM CHONDROITIN SULFATE / SODIUM HYALURONATE 1 ML: 0.55-0.5 INJECTION INTRAOCULAR at 08:46

## 2017-05-02 RX ADMIN — DICLOFENAC SODIUM 1 DROP: 1 SOLUTION/ DROPS OPHTHALMIC at 07:18

## 2017-05-02 RX ADMIN — MOXIFLOXACIN HYDROCHLORIDE 1 DROP: 5 SOLUTION/ DROPS OPHTHALMIC at 07:09

## 2017-05-02 RX ADMIN — DICLOFENAC SODIUM 1 DROP: 1 SOLUTION/ DROPS OPHTHALMIC at 07:27

## 2017-05-02 RX ADMIN — TROPICAMIDE 1 DROP: 10 SOLUTION/ DROPS OPHTHALMIC at 07:27

## 2017-05-02 RX ADMIN — Medication 1 DROP: at 08:54

## 2017-05-02 RX ADMIN — EPINEPHRINE 500 ML: 1 INJECTION, SOLUTION, CONCENTRATE INTRAVENOUS at 08:45

## 2017-05-02 RX ADMIN — LIDOCAINE HYDROCHLORIDE 2 DROP: 35 GEL OPHTHALMIC at 08:46

## 2017-05-02 RX ADMIN — MOXIFLOXACIN HYDROCHLORIDE 1 DROP: 5 SOLUTION/ DROPS OPHTHALMIC at 07:19

## 2017-05-02 RX ADMIN — MIDAZOLAM HYDROCHLORIDE 0.5 MG: 1 INJECTION, SOLUTION INTRAMUSCULAR; INTRAVENOUS at 08:36

## 2017-05-02 RX ADMIN — DEXMEDETOMIDINE HYDROCHLORIDE 12 MCG: 100 INJECTION, SOLUTION INTRAVENOUS at 08:36

## 2017-05-02 RX ADMIN — LIDOCAINE HYDROCHLORIDE 1 ML: 10 INJECTION, SOLUTION EPIDURAL; INFILTRATION; INTRACAUDAL; PERINEURAL at 08:46

## 2017-05-02 RX ADMIN — BALANCED SALT SOLUTION 15 ML: 6.4; .75; .48; .3; 3.9; 1.7 SOLUTION OPHTHALMIC at 08:45

## 2017-05-02 RX ADMIN — ONDANSETRON 4 MG: 2 INJECTION INTRAMUSCULAR; INTRAVENOUS at 08:41

## 2017-05-02 RX ADMIN — PHENYLEPHRINE HYDROCHLORIDE 1 DROP: 2.5 SOLUTION/ DROPS OPHTHALMIC at 07:09

## 2017-05-02 RX ADMIN — PHENYLEPHRINE HYDROCHLORIDE 1 DROP: 2.5 SOLUTION/ DROPS OPHTHALMIC at 07:27

## 2017-05-02 RX ADMIN — DICLOFENAC SODIUM 1 DROP: 1 SOLUTION/ DROPS OPHTHALMIC at 07:09

## 2017-05-02 RX ADMIN — TROPICAMIDE 1 DROP: 10 SOLUTION/ DROPS OPHTHALMIC at 07:09

## 2017-05-02 RX ADMIN — SODIUM CHLORIDE, POTASSIUM CHLORIDE, SODIUM LACTATE AND CALCIUM CHLORIDE 500 ML: 600; 310; 30; 20 INJECTION, SOLUTION INTRAVENOUS at 07:27

## 2017-05-02 RX ADMIN — MOXIFLOXACIN HYDROCHLORIDE 1 DROP: 5 SOLUTION/ DROPS OPHTHALMIC at 07:27

## 2017-05-02 RX ADMIN — TROPICAMIDE 1 DROP: 10 SOLUTION/ DROPS OPHTHALMIC at 07:19

## 2017-05-02 ASSESSMENT — COPD QUESTIONNAIRES: COPD: 0

## 2017-05-02 ASSESSMENT — LIFESTYLE VARIABLES: TOBACCO_USE: 0

## 2017-05-02 ASSESSMENT — ENCOUNTER SYMPTOMS
DYSRHYTHMIAS: 0
SEIZURES: 0

## 2017-05-02 NOTE — IP AVS SNAPSHOT
[facility]    [address]    [place] [address]    Phone:  [phone]    Fax:  [phone]                                       After Visit Summary   5/2/2017    Shamir Concepcion    MRN: [id number]           After Visit Summary Signature Page     I have received my discharge instructions, and my questions have been answered. I have discussed any challenges I see with this plan with the nurse or doctor.    ..........................................................................................................................................  Patient/Patient Representative Signature      ..........................................................................................................................................  Patient Representative Print Name and Relationship to Patient    ..................................................               ................................................  Date                                            Time    ..........................................................................................................................................  Reviewed by Signature/Title    ...................................................              ..............................................  Date                                                            Time

## 2017-05-02 NOTE — ANESTHESIA CARE TRANSFER NOTE
Patient: Shamir Concepcion    Procedure(s):  RIGHT EYE PHACOEMULSIFICATION CLEAR CORNEA WITH STANDARD INTRAOCULAR LENS IMPLANT - Wound Class: I-Clean    Diagnosis: RIGHT EYE NUCLEAR CATARACT  Diagnosis Additional Information: No value filed.    Anesthesia Type:   MAC     Note:  Airway :Room Air  Patient transferred to:Phase II  Comments: Patient alert and following commands. VSS. Report given to RN.      Vitals: (Last set prior to Anesthesia Care Transfer)    CRNA VITALS  5/2/2017 0829 - 5/2/2017 0901      5/2/2017             Pulse: 53    Ht Rate: (!)  49    SpO2: 100 %    Resp Rate (set): 10                Electronically Signed By: CHANDRIKA Blackwood CRNA  May 2, 2017  9:01 AM

## 2017-05-02 NOTE — IP AVS SNAPSHOT
MRN:[id number]                      After Visit Summary   5/2/2017    Shamir Concepcion    MRN: [id number]           Thank you!     Thank you for choosing [facility] for your care. Our goal is always to provide you with excellent care. Hearing back from our patients is one way we can continue to improve our services. Please take a few minutes to complete the written survey that you may receive in the mail after you visit with us. Thank you!        Patient Information     Date Of Birth          1940        About your hospital stay     You were admitted on:  May 2, 2017 You last received care in the:  [facility]    You were discharged on:  May 2, 2017       Who to Call     For medical emergencies, please call 911.  For non-urgent questions about your medical care, please call your primary care provider or clinic, [phone]  For questions related to your surgery, please call your surgery clinic        Attending Provider     Provider Specialty    Stevie Espinal MD Ophthalmology       Primary Care Provider Office Phone # Fax #    Anthony Harding [phone]       [place] [address]        Further instructions from your care team       [facility]  Cataract Surgery Discharge Instructions  [facility] MD RENNY Delatorre MD J. Hasan, MD C. Nichols, MD S. Schaefer, MD J. Stephens, MD J. O'Neill, MD      Start using drops when you arrive at home today    Ofloxacin (Tan top) - one drop in surgical eye 3 times per day until gone.    Prednisolone acetate 1.0% (pink or white top) - one drop in surgical eye 3 times per day until gone.    Ketorolac (Grey top) - one drop in surgical eye 3 times per day until gone.      Place shield over surgical eye at bedtime for 3 nights.      The eye will feel itchy, scratchy, and vision will be blurred, you may take  "Tylenol for the scratchy feeling if this is bothersome.      No eye rubbing or swimming for I week.      You may resume all prescription medications as directed by your primary doctor.      Call if increasing pain, progressively worsening vision or worsening redness of surgical eye.    On-call doctor can be reached at [phone].    [facility] Anesthesia Eye Care Center Discharge  Instructions  Anesthesia (Eye Care Center)   Adult Discharge Instructions    For 24 hours after surgery    1. Get plenty of rest.  Make arrangements to have a responsible adult stay with you for at least 6 hours after you leave the hospital.  2. Do not drive or use heavy equipment for 24 hours.    3. Do not drink alcohol for 24 hours.  4. Do not sign legal documents or make important decisions for 24 hours.  5. Avoid strenuous or risky activities. You may feel lightheaded.  If so, sit for a few minutes before standing.  Have someone help you get up.   6. Conscious sedation patients may resume a regular diet..  7. Any questions of medical nature, call your physician.    Pending Results     No orders found from 4/30/2017 to 5/3/2017.            Admission Information     Date & Time Provider Department Dept. Phone    5/2/2017 Stevie Espinal MD [facility] [phone]      Your Vitals Were     Blood Pressure Pulse Temperature Respirations Height Weight    125/71 57 96.7  F (35.9  C) (Temporal) 16 1.753 m (5' 9\") 76.1 kg (167 lb 12.3 oz)    Pulse Oximetry BMI (Body Mass Index)                98% 24.78 kg/m2          [employer] Information     [employer] lets you send messages to your doctor, view your test results, renew your prescriptions, schedule appointments and more. To sign up, go to [email or web address] . Click on \"Log in\" on the left side of the screen, which will take you to the Welcome page. Then click on \"Sign up Now\" on the right side of the page.     You will be asked to enter the access code listed " below, as well as some personal information. Please follow the directions to create your username and password.     Your access code is: [email or web address]  Expires: 2017  9:07 AM     Your access code will  in 90 days. If you need help or a new code, please call your [facility] or [phone].        Care EveryWhere ID     This is your Care EveryWhere ID. This could be used by other organizations to access your [facility] medical records  [id number]           Review of your medicines      UNREVIEWED medicines. Ask your doctor about these medicines        Dose / Directions    ECOTRIN LOW STRENGTH PO        Dose:  1 tablet   Take 1 tablet by mouth daily 81 MG   Refills:  0       FLONASE ALLERGY RELIEF 50 MCG/ACT spray   Generic drug:  fluticasone        Dose:  1 spray   Spray 1 spray into both nostrils daily   Refills:  0       folic acid 0.8 MG Caps        Dose:  400 mcg   Take 400 mcg by mouth   Refills:  0       * methotrexate 2.5 MG tablet CHEMO        Dose:  12.5 mg   Take 12.5 mg by mouth   Refills:  0       * methotrexate 2.5 MG tablet CHEMO        Dose:  6 tablet   Take 6 tablets by mouth once a week.   Refills:  0       predniSONE 5 MG tablet   Commonly known as:  DELTASONE        Take one to two tablets once daily. Use for FLARE.   Refills:  0       PROSCAR PO        Dose:  5 mg   Take 5 mg by mouth daily   Refills:  0       tamsulosin 0.4 MG capsule   Commonly known as:  FLOMAX        1 tab twice daily   Refills:  0       vitamin  s/Minerals Tabs        Refills:  0       * Notice:  This list has 2 medication(s) that are the same as other medications prescribed for you. Read the directions carefully, and ask your doctor or other care provider to review them with you.             Protect others around you: Learn how to safely use, store and throw away your medicines at www.disposemymeds.org.             Medication List: This is a list of all your medications and when to take them. Check marks below  indicate your daily home schedule. Keep this list as a reference.      Medications           Morning Afternoon Evening Bedtime As Needed    ECOTRIN LOW STRENGTH PO   Take 1 tablet by mouth daily 81 MG                                FLONASE ALLERGY RELIEF 50 MCG/ACT spray   Spray 1 spray into both nostrils daily   Generic drug:  fluticasone                                folic acid 0.8 MG Caps   Take 400 mcg by mouth                                * methotrexate 2.5 MG tablet CHEMO   Take 12.5 mg by mouth                                * methotrexate 2.5 MG tablet CHEMO   Take 6 tablets by mouth once a week.                                predniSONE 5 MG tablet   Commonly known as:  DELTASONE   Take one to two tablets once daily. Use for FLARE.                                PROSCAR PO   Take 5 mg by mouth daily                                tamsulosin 0.4 MG capsule   Commonly known as:  FLOMAX   1 tab twice daily                                vitamin  s/Minerals Tabs                                * Notice:  This list has 2 medication(s) that are the same as other medications prescribed for you. Read the directions carefully, and ask your doctor or other care provider to review them with you.

## 2017-05-02 NOTE — ANESTHESIA PREPROCEDURE EVALUATION
Procedure: Procedure(s):  PHACOEMULSIFICATION CLEAR CORNEA WITH STANDARD INTRAOCULAR LENS IMPLANT  Preop diagnosis: LEFT EYE NUCLEAR CATARACT    No Known Allergies  Past Medical History:   Diagnosis Date     Allergic rhinitis      BPH (benign prostatic hyperplasia)      Colon polyps      ED (erectile dysfunction)      Family history of colon cancer      PAD (peripheral artery disease) (H)      RA (rheumatoid arthritis) (H)      Seronegative rheumatoid arthritis (H)      Past Surgical History:   Procedure Laterality Date     ANGIOGRAM       C MRA UPPER EXTREMITY WO&W CONT  stenting right SFA-AK pop     COLONOSCOPY      polyps     ENT SURGERY      T&A     LAMINECT/DISCECTOMY, LUMBAR       ORTHOPEDIC SURGERY       PHACOEMULSIFICATION CLEAR CORNEA WITH STANDARD INTRAOCULAR LENS IMPLANT Left 4/4/2017    Procedure: PHACOEMULSIFICATION CLEAR CORNEA WITH STANDARD INTRAOCULAR LENS IMPLANT;  Surgeon: Stevie Espinal MD;  Location: [facility]     SINUS SURGERY       VASCULAR SURGERY  R SFA stenting  2012     Prior to Admission medications    Medication Sig Start Date End Date Taking? Authorizing Provider   Aspirin (ECOTRIN LOW STRENGTH PO) Take 1 tablet by mouth daily   Yes Reported, Patient   Finasteride (PROSCAR PO) Take 5 mg by mouth daily   Yes Reported, Patient   fluticasone (FLONASE ALLERGY RELIEF) 50 MCG/ACT spray Spray 1 spray into both nostrils daily   Yes Reported, Patient   tamsulosin (FLOMAX) 0.4 MG capsule 1 tab twice daily 1/25/17  Yes Reported, Patient   predniSONE (DELTASONE) 5 MG tablet Take one to two tablets once daily. Use for FLARE. 1/5/17  Yes Reported, Patient   vitamin  s/Minerals TABS    Yes Reported, Patient   folic acid 0.8 MG CAPS Take 400 mcg by mouth  4/22/16  Yes Reported, Patient   clopidogrel (PLAVIX) 75 MG tablet Take 1 tablet (75 mg) by mouth daily Start taking medication the day after the procedure. Take for six months total then stop. 9/11/13  Yes Tonny Michelle MD    METHOTREXate 2.5 MG tablet Take 6 tablets by mouth once a week.   Yes Reported, Patient   methotrexate 2.5 MG tablet CHEMO Take 12.5 mg by mouth 7/2/15   Reported, Patient   simvastatin (ZOCOR) 40 MG tablet Take 1 tablet (40 mg) by mouth At Bedtime  Patient not taking: Reported on 3/13/2017 9/11/13   Tonny Michelle MD   sildenafil (VIAGRA) 100 MG tablet Take 1 tablet by mouth as needed for erectile dysfunction. Take 30 min to 4 hours before intercourse.  Never use with nitroglycerin, terazosin or doxazosin.  Patient not taking: Reported on 3/13/2017 12/5/11   Anthony Harding MD     Current Facility-Administered Medications Ordered in Epic   Medication Dose Route Frequency Last Rate Last Dose     lidocaine (AKTEN) ophthalmic gel 0.5 mL  0.5 mL Ophthalmic Once         povidone-iodine 5 % ophthalmic solution 1 drop  1 drop Ophthalmic Once         lidocaine 1 % 1 mL  1 mL Other Q1H PRN   0.3 mL at 05/02/17 0727     lactated ringers infusion  500 mL Intravenous Continuous 25 mL/hr at 05/02/17 0727 500 mL at 05/02/17 0727     No current [facility]-ordered outpatient prescriptions on file.     Wt Readings from Last 1 Encounters:   05/02/17 76.1 kg (167 lb 12.3 oz)     Temp Readings from Last 1 Encounters:   05/02/17 35.9  C (96.7  F) (Temporal)     BP Readings from Last 6 Encounters:   05/02/17 163/85   04/04/17 160/76   03/13/17 152/79   12/14/15 148/70   12/08/14 142/74   03/03/14 149/74     Pulse Readings from Last 4 Encounters:   05/02/17 57   12/14/15 70   12/08/14 64   03/03/14 58     Resp Readings from Last 1 Encounters:   05/02/17 16     SpO2 Readings from Last 1 Encounters:   05/02/17 99%     Recent Labs   Lab Test  09/11/13   0759   CR  1.15     Recent Labs   Lab Test  09/11/13   0759  12/14/11   0838   WBC  5.7  5.2   HGB  13.9  14.0   PLT  252  340     Recent Labs   Lab Test  09/11/13   0759   INR  0.94        Anesthesia Evaluation     . Pt has had prior anesthetic.     No history of anesthetic  complications          ROS/MED HX    ENT/Pulmonary:      (-) tobacco use, asthma, COPD and sleep apnea   Neurologic:      (-) seizures, CVA and migraines   Cardiovascular:     (+) Dyslipidemia, -Peripheral Vascular Disease---. : . . . :. .      (-) hypertension, [place], syncope, arrhythmias and valvular problems/murmurs   METS/Exercise Tolerance:  >4 METS   Hematologic:        (-) history of blood clots, anemia and other hematologic disorder   Musculoskeletal: Comment: Rheumatoid arthritis  (+) arthritis, , , -       GI/Hepatic:        (-) GERD and liver disease   Renal/Genitourinary:     (+) BPH,    (-) renal disease   Endo:      (-) Type I DM, Type II DM and thyroid disease   Psychiatric:         Infectious Disease:        (-) Recent Fever   Malignancy:         Other:                     Physical Exam  Normal systems: cardiovascular, pulmonary and dental    Airway   Mallampati: III  TM distance: >3 FB  Neck ROM: full    Dental   (+) caps    Cardiovascular   Rhythm and rate: regular and normal  (-) no murmur    Pulmonary    breath sounds clear to auscultation                        Anesthesia Plan      History & Physical Review  History and physical reviewed and following examination; no interval change.    ASA Status:  2 .    NPO Status:  > 8 hours    Plan for MAC   PONV prophylaxis:  Ondansetron (or other 5HT-3)       Postoperative Care  Postoperative pain management:  IV analgesics.      Consents  Anesthetic plan, risks, benefits and alternatives discussed with:  Patient..

## 2017-05-02 NOTE — OP NOTE
[facility]  Ophthalmology Operative Note    PREOPERATIVE DIAGNOSIS: Cataract, Right eye.   POSTOPERATIVE DIAGNOSIS: Cataract, Right eye.   OPERATION: Cataract extraction with placement of posterior chamber intraocular lens in the Right eye.   ANESTHESIA: Topical   INDICATIONS FOR PROCEDURE: Shamir Concepcion was seen in the [facility] for decreased visual acuity in the Right eye. The patient was found to have a cataract in the Right eye. The risks, benefits, alternatives and goals of cataract extraction were discussed with the patient, and after adequate discussion the patient understood and agreed to these, and a signed informed consent was obtained prior to the procedure.   DESCRIPTION OF PROCEDURE: After proper patient identification, topical anesthesia was applied to the Right eye. The patient was brought to the operating room and the Right eye was prepped and draped in the usual manner for intraocular surgery. A lid speculum was placed in the Right eye. A paracentesis was then created and the anterior chamber was filled with 1% non-preserved lidocaine followed by Viscoat. A clear corneal incision was then created, tunneling 2 mm into the cornea before entering the anterior chamber. A continuous curvilinear capsulorrhexis was then created using a cystotome and Utrata forceps. Hydrodissection was carried out with BSS on a blunt-tip cannula and the lens rotated freely within the capsular bag. Phacoemulsification was then carried out using the stop and chop technique. Residual cortical material was removed using the I&A handpiece. The capsular bag was then filled with Provisc and an injectable lens was injected into the capsular bag. The lens showed good centration and stability. Residual viscoelastic was removed using the I&A handpiece. The wound was then hydrated and the anterior chamber reformed through the paracentesis. The wound was checked and found to be sealed.  Topical drops of Vigamox and a Econopred were placed in the patient's Right eye followed by a Kennedy shield over the top of this. The patient tolerated the procedure well without complications and was told to follow up in the clinic in the next postoperative day.       Implant Name Type Inv. Item Serial No.  Lot No. LRB No. Used   EYE IMP IOL ANA MARIA PCL TECNIS ZCB00 18.5 Lens/Eye Implant EYE IMP IOL ANA MARIA PCL TECNIS ZCB00 18.5 [id number] [employer]   Right 1        NAIF SOUTH MD

## 2017-05-02 NOTE — ANESTHESIA POSTPROCEDURE EVALUATION
Patient: Shamir Concepcion    Procedure(s):  RIGHT EYE PHACOEMULSIFICATION CLEAR CORNEA WITH STANDARD INTRAOCULAR LENS IMPLANT - Wound Class: I-Clean    Diagnosis:RIGHT EYE NUCLEAR CATARACT  Diagnosis Additional Information: No value filed.    Anesthesia Type:  MAC    Note:  Anesthesia Post Evaluation    Patient location during evaluation: PACU  Patient participation: Able to fully participate in evaluation  Level of consciousness: awake  Pain management: adequate  Airway patency: patent  Cardiovascular status: acceptable  Respiratory status: acceptable  Hydration status: acceptable  PONV: none     Anesthetic complications: None          Last vitals:  Vitals:    05/02/17 0723 05/02/17 0900   BP: 163/85 125/71   Pulse: 57    Resp: 16 16   Temp: 35.9  C (96.7  F)    SpO2: 99% 98%         Electronically Signed By: Colton Mejia MD  May 2, 2017  3:51 PM

## 2017-05-02 NOTE — DISCHARGE INSTRUCTIONS
[facility]  Cataract Surgery Discharge Instructions  [facility] MD RENNY Delatorre MD J. Hasan, MD C. Nichols, MD S. Schaefer, MD J. Stephens, MD J. O'Neill, MD      Start using drops when you arrive at home today    Ofloxacin (Tan top) - one drop in surgical eye 3 times per day until gone.    Prednisolone acetate 1.0% (pink or white top) - one drop in surgical eye 3 times per day until gone.    Ketorolac (Grey top) - one drop in surgical eye 3 times per day until gone.      Place shield over surgical eye at bedtime for 3 nights.      The eye will feel itchy, scratchy, and vision will be blurred, you may take Tylenol for the scratchy feeling if this is bothersome.      No eye rubbing or swimming for I week.      You may resume all prescription medications as directed by your primary doctor.      Call if increasing pain, progressively worsening vision or worsening redness of surgical eye.    On-call doctor can be reached at [phone].    [facility] Discharge  Instructions  Anesthesia (Eye Care Center)   Adult Discharge Instructions    For 24 hours after surgery    1. Get plenty of rest.  Make arrangements to have a responsible adult stay with you for at least 6 hours after you leave the hospital.  2. Do not drive or use heavy equipment for 24 hours.    3. Do not drink alcohol for 24 hours.  4. Do not sign legal documents or make important decisions for 24 hours.  5. Avoid strenuous or risky activities. You may feel lightheaded.  If so, sit for a few minutes before standing.  Have someone help you get up.   6. Conscious sedation patients may resume a regular diet..  7. Any questions of medical nature, call your physician.

## 2018-07-02 ENCOUNTER — TRANSFERRED RECORDS (OUTPATIENT)
Dept: HEALTH INFORMATION MANAGEMENT | Facility: CLINIC | Age: 78
End: 2018-07-02

## 2018-07-20 ENCOUNTER — APPOINTMENT (OUTPATIENT)
Dept: CT IMAGING | Facility: CLINIC | Age: 78
DRG: 064 | End: 2018-07-20
Attending: EMERGENCY MEDICINE
Payer: COMMERCIAL

## 2018-07-20 ENCOUNTER — HOSPITAL ENCOUNTER (INPATIENT)
Facility: CLINIC | Age: 78
LOS: 7 days | Discharge: NURSING, REHAB OR LONG-TERM CARE | DRG: 064 | End: 2018-07-28
Attending: EMERGENCY MEDICINE | Admitting: INTERNAL MEDICINE
Payer: COMMERCIAL

## 2018-07-20 DIAGNOSIS — I10 BENIGN ESSENTIAL HYPERTENSION: ICD-10-CM

## 2018-07-20 DIAGNOSIS — K59.00 CONSTIPATION, UNSPECIFIED CONSTIPATION TYPE: ICD-10-CM

## 2018-07-20 DIAGNOSIS — N39.0 URINARY TRACT INFECTION WITHOUT HEMATURIA, SITE UNSPECIFIED: ICD-10-CM

## 2018-07-20 DIAGNOSIS — I63.9 ACUTE ISCHEMIC STROKE (H): Primary | ICD-10-CM

## 2018-07-20 DIAGNOSIS — I63.9 CEREBROVASCULAR ACCIDENT (CVA), UNSPECIFIED MECHANISM (H): ICD-10-CM

## 2018-07-20 DIAGNOSIS — M06.9 RHEUMATOID ARTHRITIS, INVOLVING UNSPECIFIED SITE, UNSPECIFIED RHEUMATOID FACTOR PRESENCE: ICD-10-CM

## 2018-07-20 LAB
ALBUMIN SERPL-MCNC: 3.3 G/DL (ref 3.4–5)
ALP SERPL-CCNC: 105 U/L (ref 40–150)
ALT SERPL W P-5'-P-CCNC: 15 U/L (ref 0–70)
ANION GAP SERPL CALCULATED.3IONS-SCNC: 8 MMOL/L (ref 3–14)
AST SERPL W P-5'-P-CCNC: 19 U/L (ref 0–45)
BASOPHILS # BLD AUTO: 0 10E9/L (ref 0–0.2)
BASOPHILS NFR BLD AUTO: 0.1 %
BILIRUB SERPL-MCNC: 0.8 MG/DL (ref 0.2–1.3)
BUN SERPL-MCNC: 14 MG/DL (ref 7–30)
CALCIUM SERPL-MCNC: 8.5 MG/DL (ref 8.5–10.1)
CHLORIDE SERPL-SCNC: 107 MMOL/L (ref 94–109)
CO2 SERPL-SCNC: 25 MMOL/L (ref 20–32)
CREAT SERPL-MCNC: 0.93 MG/DL (ref 0.66–1.25)
DIFFERENTIAL METHOD BLD: ABNORMAL
EOSINOPHIL # BLD AUTO: 0 10E9/L (ref 0–0.7)
EOSINOPHIL NFR BLD AUTO: 0 %
ERYTHROCYTE [DISTWIDTH] IN BLOOD BY AUTOMATED COUNT: 14.3 % (ref 10–15)
GFR SERPL CREATININE-BSD FRML MDRD: 79 ML/MIN/1.7M2
GLUCOSE SERPL-MCNC: 126 MG/DL (ref 70–99)
HCT VFR BLD AUTO: 38.8 % (ref 40–53)
HGB BLD-MCNC: 13.2 G/DL (ref 13.3–17.7)
IMM GRANULOCYTES # BLD: 0 10E9/L (ref 0–0.4)
IMM GRANULOCYTES NFR BLD: 0.1 %
INTERPRETATION ECG - MUSE: NORMAL
LIPASE SERPL-CCNC: 105 U/L (ref 73–393)
LYMPHOCYTES # BLD AUTO: 0.6 10E9/L (ref 0.8–5.3)
LYMPHOCYTES NFR BLD AUTO: 4.2 %
MCH RBC QN AUTO: 31.7 PG (ref 26.5–33)
MCHC RBC AUTO-ENTMCNC: 34 G/DL (ref 31.5–36.5)
MCV RBC AUTO: 93 FL (ref 78–100)
MONOCYTES # BLD AUTO: 0.6 10E9/L (ref 0–1.3)
MONOCYTES NFR BLD AUTO: 4.7 %
NEUTROPHILS # BLD AUTO: 12.4 10E9/L (ref 1.6–8.3)
NEUTROPHILS NFR BLD AUTO: 90.9 %
NRBC # BLD AUTO: 0 10*3/UL
NRBC BLD AUTO-RTO: 0 /100
PLATELET # BLD AUTO: 349 10E9/L (ref 150–450)
POTASSIUM SERPL-SCNC: 3.9 MMOL/L (ref 3.4–5.3)
PROT SERPL-MCNC: 7.3 G/DL (ref 6.8–8.8)
RBC # BLD AUTO: 4.16 10E12/L (ref 4.4–5.9)
SODIUM SERPL-SCNC: 140 MMOL/L (ref 133–144)
TROPONIN I SERPL-MCNC: <0.015 UG/L (ref 0–0.04)
WBC # BLD AUTO: 13.6 10E9/L (ref 4–11)

## 2018-07-20 PROCEDURE — 70498 CT ANGIOGRAPHY NECK: CPT

## 2018-07-20 PROCEDURE — 51798 US URINE CAPACITY MEASURE: CPT

## 2018-07-20 PROCEDURE — 70450 CT HEAD/BRAIN W/O DYE: CPT

## 2018-07-20 PROCEDURE — 25000128 H RX IP 250 OP 636: Performed by: EMERGENCY MEDICINE

## 2018-07-20 PROCEDURE — 85610 PROTHROMBIN TIME: CPT | Performed by: EMERGENCY MEDICINE

## 2018-07-20 PROCEDURE — 85025 COMPLETE CBC W/AUTO DIFF WBC: CPT | Performed by: EMERGENCY MEDICINE

## 2018-07-20 PROCEDURE — 96375 TX/PRO/DX INJ NEW DRUG ADDON: CPT

## 2018-07-20 PROCEDURE — 83690 ASSAY OF LIPASE: CPT | Performed by: EMERGENCY MEDICINE

## 2018-07-20 PROCEDURE — 96361 HYDRATE IV INFUSION ADD-ON: CPT

## 2018-07-20 PROCEDURE — 93005 ELECTROCARDIOGRAM TRACING: CPT

## 2018-07-20 PROCEDURE — 99285 EMERGENCY DEPT VISIT HI MDM: CPT | Mod: 25

## 2018-07-20 PROCEDURE — 85730 THROMBOPLASTIN TIME PARTIAL: CPT | Performed by: EMERGENCY MEDICINE

## 2018-07-20 PROCEDURE — 84484 ASSAY OF TROPONIN QUANT: CPT | Performed by: EMERGENCY MEDICINE

## 2018-07-20 PROCEDURE — 80053 COMPREHEN METABOLIC PANEL: CPT | Performed by: EMERGENCY MEDICINE

## 2018-07-20 RX ORDER — IOPAMIDOL 755 MG/ML
70 INJECTION, SOLUTION INTRAVASCULAR ONCE
Status: COMPLETED | OUTPATIENT
Start: 2018-07-20 | End: 2018-07-20

## 2018-07-20 RX ORDER — ONDANSETRON 2 MG/ML
4 INJECTION INTRAMUSCULAR; INTRAVENOUS ONCE
Status: COMPLETED | OUTPATIENT
Start: 2018-07-20 | End: 2018-07-20

## 2018-07-20 RX ADMIN — ONDANSETRON 4 MG: 2 INJECTION, SOLUTION INTRAMUSCULAR; INTRAVENOUS at 23:01

## 2018-07-20 RX ADMIN — SODIUM CHLORIDE 1000 ML: 9 INJECTION, SOLUTION INTRAVENOUS at 22:53

## 2018-07-20 RX ADMIN — IOPAMIDOL 70 ML: 755 INJECTION, SOLUTION INTRAVENOUS at 23:42

## 2018-07-20 ASSESSMENT — ENCOUNTER SYMPTOMS
CHEST TIGHTNESS: 0
VOMITING: 1
FEVER: 0
DIZZINESS: 1
COUGH: 0
CHILLS: 0
NAUSEA: 1
SHORTNESS OF BREATH: 0
FREQUENCY: 1
HEMATURIA: 0
APPETITE CHANGE: 1
FLANK PAIN: 0

## 2018-07-20 NOTE — IP AVS SNAPSHOT
"` `     [facility]: [phone]                                              INTERAGENCY TRANSFER FORM - NURSING   2018                    Hospital Admission Date: 2018  KATTY GRUBER   : 1940  Sex: Male        Attending Provider: Oumou Guthrie MD     Allergies:  No Known Allergies    Infection:  None   Service:  HOSPITALIST    Ht:  1.753 m (5' 9\")   Wt:  71 kg (156 lb 8.4 oz)   Admission Wt:  71.7 kg (158 lb 1.1 oz)    BMI:  23.11 kg/m 2   BSA:  1.86 m 2            Patient PCP Information     Provider PCP Type    Physician No Ref-Primary General      Current Code Status     Date Active Code Status Order ID Comments User Context       Prior      Code Status History     Date Active Date Inactive Code Status Order ID Comments User Context    2018  9:49 AM  DNR [id number]  Elijah Villatoro MD Outpatient    2018  6:53 PM 2018  9:49 AM DNR [id number]  Uma Golden RN Inpatient    2018  4:05 AM 2018  6:53 PM DNR/DNI [id number]  Oumou Guthrie MD Inpatient      Advance Directives        Scanned docmt in ACP Activity?           Yes, scanned ACP docmt        Hospital Problems as of 2018              Priority Class Noted POA    Acute ischemic stroke (H) Medium  2018 Yes      Non-Hospital Problems as of 2018              Priority Class Noted    Pure hypercholesterolemia Medium  2011    BPH (benign prostatic hyperplasia) Medium  2011    Rheumatoid arthritis (H) Medium  2011      Immunizations     Name Date      Zoster vaccine, live 01/12/10          END      ASSESSMENT     Discharge Profile Flowsheet     EXPECTED DISCHARGE     Full except areas not inspected   Scapula, left;Scapula, right;Spine;Buttock, left;Buttock, right;Sacrum;Coccyx 18    Expected Discharge Date  18 (aru) 18 09   Inspection under devices  Full 18    DISCHARGE NEEDS ASSESSMENT     Skin WDL  " "ex;characteristics 07/28/18 1216    Equipment Currently Used at Home  none 07/23/18 1422   Skin Integrity  bruise(s);skin tear(s);wound(s) 07/27/18 2059    GASTROINTESTINAL (ADULT,PEDIATRIC,OB)     Skin Color/Characteristics  bruised (ecchymotic) 07/28/18 1216    GI WDL  WDL 07/28/18 1216   Skin Temperature  warm 07/28/18 1216    Last Bowel Movement  07/25/18 07/27/18 2059   Skin Moisture  dry 07/28/18 1216    GI Signs/Symptoms  nausea 07/24/18 1514   Skin Elasticity  quick return to original state 07/28/18 0959    Passing flatus  yes 07/27/18 2059   SAFETY      COMMUNICATION ASSESSMENT     Safety WDL  WDL 07/28/18 1216    Patient's communication style  spoken language (English or Bilingual) 07/20/18 2153   All Alarms  alarm(s) activated and audible 07/28/18 1216    SKIN     Safety Equipment  oxygen flowmeter;suction equipment 07/28/18 1216    Inspection of bony prominences  Full 07/28/18 1216                      Assessment WDL (Within Defined Limits) Definitions           Safety WDL     Effective: 09/28/15    Row Information: <b>WDL Definition:</b> Bed in low position, wheels locked; call light in reach; upper side rails up x 2; ID band on<br> <font color=\"gray\"><i>Item=AS safety wdl>>List=AS safety wdl>>Version=F14</i></font>      Skin WDL     Effective: 09/28/15    Row Information: <b>WDL Definition:</b> Warm; dry; intact; elastic; without discoloration; pressure points without redness<br> <font color=\"gray\"><i>Item=AS skin wdl>>List=AS skin wdl>>Version=F14</i></font>      Vitals     Vital Signs Flowsheet     COMMENTS     Height Method  Stated 07/26/18 1445    Comments  MRI 07/21/18 2034   Weight  71 kg (156 lb 8.4 oz) 07/25/18 0715    VITAL SIGNS     Weight Method  Bed scale 07/25/18 0715    Temp  97.6  F (36.4  C) 07/28/18 1108   TERE COMA SCALE      Temp src  Oral 07/28/18 1108   Best Eye Response  4-->(E4) spontaneous 07/28/18 0959    Resp  16 07/28/18 1108   Best Motor Response  6-->(M6) obeys commands " "07/28/18 0959    Pulse  56 07/27/18 0409   Best Verbal Response  4-->(V4) confused 07/28/18 0959    Heart Rate  67 07/28/18 1108   Daniel Coma Scale Score  14 07/28/18 0959    Pulse/Heart Rate Source  Monitor 07/28/18 1108   DAILY CARE      BP  122/66 07/28/18 1108   Activity Management  up in chair 07/28/18 1216    BP Location  Left arm 07/28/18 1108   Activity Assistance Provided  assistance, 2 people 07/28/18 1216    OXYGEN THERAPY     Assistive Device Utilized  gait belt;walker 07/28/18 1216    SpO2  95 % 07/28/18 1108   Additional Documentation  Activity Device Assistance (Row) 07/23/18 0901    O2 Device  None (Room air) 07/28/18 1108   POSITIONING      PAIN/COMFORT     Body Position  turned;side-lying, right 07/28/18 1037    Patient Currently in Pain  denies 07/28/18 0404   Head of Bed (HOB)  HOB at 30-45 degrees 07/28/18 1037    Preferred Pain Scale  number (Numeric Rating Pain Scale) 07/24/18 0137   Positioning/Transfer Devices  pillows;in use 07/28/18 1216    Patient's Stated Pain Goal  No pain 07/24/18 0938   Chair  Upright in chair 07/28/18 1216    0-10 Pain Scale  0 07/24/18 0938   ECG      Pain Intervention(s)  Repositioned;Rest (dtr at bedside) 07/24/18 1448   ECG Rhythm  Sinus bradycardia 07/25/18 0933    ANALGESIA SIDE EFFECTS MONITORING     Ectopy  None 07/25/18 0933    Side Effects Monitoring: Respiratory Quality  R 07/27/18 0409   Ectopy Frequency  Occasional 07/25/18 0933    Side Effects Monitoring: Respiratory Depth  N 07/27/18 0409   Lead Monitored  Lead II;V 1 07/25/18 0933    Side Effects Monitoring: Sedation Level  2 07/27/18 0409   POINT OF CARE TESTING      HEIGHT AND WEIGHT     Puncture Site  fingertip 07/22/18 0823    Height  1.753 m (5' 9\") 07/26/18 1445   Bedside Glucose (mg/dl )   95 mg/dl 07/22/18 0823            Patient Lines/Drains/Airways Status    Active LINES/DRAINS/AIRWAYS     Name: Placement date: Placement time: Site: Days: Last dressing change:    Wound 07/21/18 " Left;Posterior Hand Abrasion(s) 07/21/18   1700   Hand   6     Wound 07/25/18 Midline;Upper Coccyx Other (comment) pink nonblanchable erythema with white spot 07/25/18   1500   Coccyx   2     Incision/Surgical Site 05/02/17 Right Eye 05/02/17   0845    452             Patient Lines/Drains/Airways Status    Active PICC/CVC     None            Intake/Output Detail Report     Date Intake     Output   Net    Shift P.O. I.V. IV Piggyback Total Urine Emesis/NG output Total       Noc 07/26/18 2300 - 07/27/18 0659 50 764 -- 814 280 -- 280 534    Day 07/27/18 0700 - 07/27/18 1459 1440 526.67 -- 1966.67 375 -- 375 1591.67    Елена 07/27/18 1500 - 07/27/18 2259 -- -- -- -- 275 -- 275 -275    Noc 07/27/18 2300 - 07/28/18 0659 300 -- -- 300 200 -- 200 100    Day 07/28/18 0700 - 07/28/18 1459 660 -- -- 660 -- -- -- 660      Last Void/BM       Most Recent Value    Urine Occurrence 1 at 07/28/2018 0129    Stool Occurrence       Case Management/Discharge Planning     Case Management/Discharge Planning Flowsheet     REFERRAL INFORMATION     FINAL RESOURCES      Reason For Consult  discharge planning 07/28/18 1022   Equipment Currently Used at Home  none 07/23/18 1422    Record Reviewed  medical record 07/28/18 1022   ABUSE RISK SCREEN      CTS Assigned to Case  Elton Perez 07/28/18 1022   QUESTION TO PATIENT:  Has a member of your family or a partner(now or in the past) intimidated, hurt, manipulated, or controlled you in any way?  patient declined to answer or is unable to answer 07/20/18 2204    LIVING ENVIRONMENT     QUESTION TO PATIENT: Do you feel safe going back to the place where you are living?  patient declined to answer or is unable to answer 07/20/18 2204    Lives With  alone 07/23/18 1422   OBSERVATION: Is there reason to believe there has been maltreatment of a vulnerable adult (ie. Physical/Sexual/Emotional abuse, self neglect, lack of adequate food, shelter, medical care, or financial exploitation)?  no 07/20/18  2204    Living Arrangements  apartment 18 1422   OTHER      COPING/STRESS     Are you depressed or being treated for depression?  No 18 0502    Major Change/Loss/Stressor  death of a loved one;hospitalization (wife  about 9mon ago') 18 0505   HOMICIDE RISK      EXPECTED DISCHARGE     Feels Like Hurting Others  no 18 220    Expected Discharge Date  18 (aru) 18 0985

## 2018-07-20 NOTE — IP AVS SNAPSHOT
[facility]    [address]    [place] [address]    Phone:  [phone]                                       After Visit Summary   7/20/2018    Shamir Concepcion    MRN: [id number]           After Visit Summary Signature Page     I have received my discharge instructions, and my questions have been answered. I have discussed any challenges I see with this plan with the nurse or doctor.    ..........................................................................................................................................  Patient/Patient Representative Signature      ..........................................................................................................................................  Patient Representative Print Name and Relationship to Patient    ..................................................               ................................................  Date                                            Time    ..........................................................................................................................................  Reviewed by Signature/Title    ...................................................              ..............................................  Date                                                            Time

## 2018-07-20 NOTE — IP AVS SNAPSHOT
MRN:[id number]                      After Visit Summary   7/20/2018    Shamir Concepcion    MRN: [id number]           Thank you!     Thank you for choosing [facility] for your care. Our goal is always to provide you with excellent care. Hearing back from our patients is one way we can continue to improve our services. Please take a few minutes to complete the written survey that you may receive in the mail after you visit with us. Thank you!        Patient Information     Date Of Birth          1940        Designated Caregiver       Most Recent Value    Caregiver    Will someone help with your care after discharge? yes    Name of designated caregiver Daughters,  see contact information.    Phone number of caregiver See contact information.    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                  Who to Call     For medical emergencies, please call 911.  For non-urgent questions about your medical care, please call your primary care provider or clinic, None          Attending Provider     Provider Specialty    Snehal Estrella MD Emergency Medicine    Ganesh, Tahir Cordoba MD Emergency Medicine    uOmou Guthrie MD Internal Medicine       Primary Care Provider Fax #    Physician No Ref-Primary [phone]      After Care Instructions     Activity - Up with assistive device           Advance Diet as Tolerated       Follow this diet upon discharge: Orders Placed This Encounter      Room Service      Combination Diet Regular Diet Adult; Thin Liquids (water, ice chips, juice, milk, gelatin, ice cream, etc)            Bladder scan       X 2 for post void residual            Fall  precautions           General info for SNF       Length of Stay Estimate: Short Term Care: Estimated # of Days <30  Condition at Discharge: Improving  Level of care:skilled   Rehabilitation Potential: Good  Admission H&P remains valid and up-to-date: Yes  Recent Chemotherapy: N/A  Use Nursing Home Standing Orders: Yes            Mantoux instructions       Give two-step Mantoux (PPD) Per Facility Policy Yes                  Follow-up Appointments     Follow Up and recommended labs and tests       Follow up with retirement physician.  The following labs/tests are recommended: INR and Creatinine on 7/30/2018, result to [facility]CU MD.            Follow-up and recommended labs and tests        [facility] in 4-6 weeks with head CT prior.  Please call the clinic at [phone] to schedule your appointment with Sofy Clifford CNP or Enma Gentile CNP.                  Additional Services     Occupational Therapy Adult Consult       Evaluate and treat as clinically indicated.    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            Physical Therapy Adult Consult       Evaluate and treat as clinically indicated.    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            Speech Language Path Adult Consult       Evaluate and treat as clinically indicated.    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                  Future tests that were ordered for you     CT Head w/o Contrast                 Further instructions from your care team       Pt to discharge to [facility]   [address] [place] [address] [place] [address] [phone] at 1300 via family.     Your risk factors for stroke or TIA (transient ischemic attack):    Your Risk Factors Your Results Normal Ranges   High blood pressure BP Readings from Last 1 Encounters:   07/28/18 122/66    Less than  120/80   Cholesterol              Total Lab Results   Component Value Date    CHOL 143 07/21/2018      Less than 150    Triglycerides   Lab Results   Component Value Date    TRIG 63 07/21/2018    Less than 150   LDL Lab Results   Component Value Date    LDL 88 07/21/2018       Less than 70   HDL Lab Results   Component Value Date    HDL 42 07/21/2018            Greater than 40 (men)  Greater than 50 (women)   Diabetes   Recent Labs  Lab 07/26/18  1113       Fasting blood glucose    Smoking/tobacco use  Quit smoking and tobacco   Overweight  Lose 1-2 pounds a week   Lack of exercise  30 minutes moderate activity each day   Other risk factors include carotid (neck) artery disease, atrial fibrillation and stress. You may be on new medicine to treat high blood pressure, cholesterol, diabetes or atrial fibrillation.    Understanding Stroke Booklet given to patient. Please refer to booklet for further information.    Stroke warning signs and symptoms - CALL 911 right away for:  - Sudden numbness or weakness in the face, arm or leg (often on one side of the body).  - Sudden confusion or trouble understanding what is going on.  - Sudden blurred or decreased vision in one or both eyes.  - Sudden trouble speaking, loss of balance, dizziness or problems with coordination.  - Sudden, severe headache for no reason.  - Fainting or seizures.  - Symptoms may go away then come back suddenly.      Warfarin Instruction     You have started taking a medicine called warfarin. This is a blood-thinning medicine (anticoagulant). It helps prevent and treat blood clots.      Before leaving the hospital, make sure you know how much to take and how long to take it.      You will need regular blood tests to make sure your blood is clotting safely. It is very important to see your doctor for regular blood tests.    Talk to your doctor before taking any new medicine (this includes over-the-counter drugs and herbal products). Many  "medicines can interact with warfarin. This may cause more bleeding or too much clotting.     Eating a lot of vitamin K--found in green, leafy vegetables--can change the way warfarin works in your body. Do NOT avoid these foods. Instead, try to eat the same amount each day.     Bleeding is the most common side-effect of warfarin. You may notice bleeding gums, a bloody nose, bruises and bleeding longer when you cut yourself. See a doctor at once if:   o You cough up blood  o You find blood in your stool (poop)  o You have a deep cut, or a cut that bleeds longer than 10 minutes   o You have a bad cut, hard fall, accident or hit your head (go to urgent care or the emergency room).    For women who can get pregnant: This medicine can harm an unborn baby. Be very careful not to get pregnant while taking this medicine. If you think you might be pregnant, call your doctor right away.    For more information, read \"Guide to Warfarin Therapy,  the booklet you received in the hospital.        Pending Results     Date and Time Order Name Status Description    7/21/2018 2243 Sodium In process             Statement of Approval     Ordered          07/28/18 1004  I have reviewed and agree with all the recommendations and orders detailed in this document.  EFFECTIVE NOW     Approved and electronically signed by:  Elijah Villatoro MD             Admission Information     Date & Time Provider Department Dept. Phone    7/20/2018 Oumou Guthrie MD [address] [facility] [phone]      Your Vitals Were     Blood Pressure Pulse Temperature Respirations Height Weight    122/66 (BP Location: Left arm) 56 97.6  F (36.4  C) (Oral) 16 1.753 m (5' 9\") 71 kg (156 lb 8.4 oz)    Pulse Oximetry BMI (Body Mass Index)                95% 23.11 kg/m2          MyChart Information     [employer] lets you send messages to your doctor, view your test results, renew your prescriptions, schedule appointments and more. To sign up, go " "to [email or web address] . Click on \"Log in\" on the left side of the screen, which will take you to the Welcome page. Then click on \"Sign up Now\" on the right side of the page.     You will be asked to enter the access code listed below, as well as some personal information. Please follow the directions to create your username and password.     Your access code is: [email or web address]  Expires: 10/26/2018 11:32 AM     Your access code will  in 90 days. If you need help or a new code, please call your [place] clinic or [phone].        Care EveryWhere ID     This is your Care EveryWhere ID. This could be used by other organizations to access your [facility] medical records  [id number]        Equal Access to Services     JESSICA MENCHACA : Mai Bonilla, debra huggins, eliana harrington, burke polanco. So [facility] [phone].    ATENCIÓN: Si habla español, tiene a woods disposición servicios gratuitos de asistencia lingüística. Baltazar al [phone].    We comply with applicable federal civil rights laws and [place] laws. We do not discriminate on the basis of race, color, national origin, age, disability, sex, sexual orientation, or gender identity.               Review of your medicines      START taking        Dose / Directions    amLODIPine 5 MG tablet   Commonly known as:  NORVASC   Used for:  Benign essential hypertension        Dose:  10 mg   Start taking on:  2018   Take 2 tablets (10 mg) by mouth daily   Quantity:  30 tablet   Refills:  0       bisacodyl 10 MG Suppository   Commonly known as:  DULCOLAX   Used for:  Constipation, unspecified constipation type        Dose:  10 mg   Place 1 suppository (10 mg) rectally daily as needed for constipation   Quantity:  30 suppository   Refills:  0       enoxaparin 80 MG/0.8ML injection   Commonly known as:  LOVENOX        Dose:  70 mg   Inject 0.7 mLs (70 mg) Subcutaneous every 12 hours   Quantity:  14 Syringe "   Refills:  0       losartan 25 MG tablet   Commonly known as:  COZAAR   Used for:  Benign essential hypertension        Dose:  25 mg   Start taking on:  7/29/2018   Take 1 tablet (25 mg) by mouth daily   Quantity:  30 tablet   Refills:  0       magnesium hydroxide 400 MG/5ML suspension   Commonly known as:  MILK OF MAGNESIA   Used for:  Constipation, unspecified constipation type        Dose:  30 mL   Take 30 mLs by mouth daily as needed for constipation   Refills:  0       predniSONE 1 MG EC tablet   Commonly known as:  OMAIRA   Used for:  Rheumatoid arthritis, involving unspecified site, unspecified rheumatoid factor presence (H)   Replaces:  PREDNISONE PO        Dose:  5 mg   Take 5 tablets (5 mg) by mouth daily   Refills:  0       rosuvastatin 20 MG tablet   Commonly known as:  CRESTOR        Dose:  20 mg   Start taking on:  7/29/2018   1 tablet (20 mg) by Oral or NG Tube route daily   Quantity:  30 tablet   Refills:  0       senna-docusate 8.6-50 MG per tablet   Commonly known as:  SENOKOT-S;PERICOLACE   Used for:  Constipation, unspecified constipation type        Dose:  2 tablet   Take 2 tablets by mouth 2 times daily as needed for constipation   Refills:  0       warfarin 5 MG tablet   Commonly known as:  COUMADIN        7.5 mg PO daily on 7/28 and 7/29, check INR 7/30 and subsequent doses from 7/30 per ROBERTA FAJARDO.   Refills:  0         CONTINUE these medicines which have NOT CHANGED        Dose / Directions    FLONASE ALLERGY RELIEF 50 MCG/ACT spray   Generic drug:  fluticasone        Dose:  1 spray   Spray 1 spray into both nostrils daily   Refills:  0       FOLIC ACID PO        Dose:  400 mcg   Take 400 mcg by mouth daily Patient gets this medication OTC.   Refills:  0       methotrexate 2.5 MG tablet CHEMO   Used for:  Rheumatoid arthritis, involving unspecified site, unspecified rheumatoid factor presence (H)        Dose:  10 mg   Start taking on:  8/4/2018   Take 4 tablets (10 mg) by mouth every 7 days  Patient takes on Saturdays. His instructions are to take 5 tablets per dose, but he normally only takes 4 tablets per dose.   Refills:  0       PROSCAR PO        Dose:  5 mg   Take 5 mg by mouth daily   Refills:  0       tamsulosin 0.4 MG capsule   Commonly known as:  FLOMAX        1 tab twice daily   Refills:  0         STOP taking     ECOTRIN LOW STRENGTH PO           PREDNISONE PO   Replaced by:  predniSONE 1 MG EC tablet                Where to get your medicines      Some of these will need a paper prescription and others can be bought over the counter. Ask your nurse if you have questions.     You don't need a prescription for these medications     amLODIPine 5 MG tablet    bisacodyl 10 MG Suppository    enoxaparin 80 MG/0.8ML injection    losartan 25 MG tablet    magnesium hydroxide 400 MG/5ML suspension    methotrexate 2.5 MG tablet CHEMO    predniSONE 1 MG EC tablet    rosuvastatin 20 MG tablet    senna-docusate 8.6-50 MG per tablet    warfarin 5 MG tablet                Protect others around you: Learn how to safely use, store and throw away your medicines at www.disposemymeds.org.             Medication List: This is a list of all your medications and when to take them. Check marks below indicate your daily home schedule. Keep this list as a reference.      Medications           Morning Afternoon Evening Bedtime As Needed    amLODIPine 5 MG tablet   Commonly known as:  NORVASC   Take 2 tablets (10 mg) by mouth daily   Start taking on:  7/29/2018   Last time this was given:  5 mg on 7/28/2018  8:37 AM                                   bisacodyl 10 MG Suppository   Commonly known as:  DULCOLAX   Place 1 suppository (10 mg) rectally daily as needed for constipation                                   enoxaparin 80 MG/0.8ML injection   Commonly known as:  LOVENOX   Inject 0.7 mLs (70 mg) Subcutaneous every 12 hours   Last time this was given:  70 mg on 7/28/2018  8:36 AM                                       FLONASE ALLERGY RELIEF 50 MCG/ACT spray   Spray 1 spray into both nostrils daily   Last time this was given:  1 spray on 7/28/2018 10:47 AM   Generic drug:  fluticasone                                   FOLIC ACID PO   Take 400 mcg by mouth daily Patient gets this medication OTC.                                   losartan 25 MG tablet   Commonly known as:  COZAAR   Take 1 tablet (25 mg) by mouth daily   Start taking on:  7/29/2018   Last time this was given:  25 mg on 7/28/2018  8:37 AM                                   magnesium hydroxide 400 MG/5ML suspension   Commonly known as:  MILK OF MAGNESIA   Take 30 mLs by mouth daily as needed for constipation                                   methotrexate 2.5 MG tablet CHEMO   Take 4 tablets (10 mg) by mouth every 7 days Patient takes on Saturdays. His instructions are to take 5 tablets per dose, but he normally only takes 4 tablets per dose.   Start taking on:  8/4/2018                                   predniSONE 1 MG EC tablet   Commonly known as:  OMAIRA   Take 5 tablets (5 mg) by mouth daily                                   PROSCAR PO   Take 5 mg by mouth daily   Last time this was given:  5 mg on 7/28/2018  8:37 AM                                   rosuvastatin 20 MG tablet   Commonly known as:  CRESTOR   1 tablet (20 mg) by Oral or NG Tube route daily   Start taking on:  7/29/2018   Last time this was given:  20 mg on 7/28/2018  8:37 AM                                   senna-docusate 8.6-50 MG per tablet   Commonly known as:  SENOKOT-S;PERICOLACE   Take 2 tablets by mouth 2 times daily as needed for constipation   Last time this was given:  1 tablet on 7/28/2018 12:27 PM                                   tamsulosin 0.4 MG capsule   Commonly known as:  FLOMAX   1 tab twice daily   Last time this was given:  0.4 mg on 7/28/2018  8:37 AM                                   warfarin 5 MG tablet   Commonly known as:  COUMADIN   7.5 mg PO daily on 7/28 and 7/29, check  INR 7/30 and subsequent doses from 7/30 per ROBERTA FAJARDO.   Last time this was given:  6 mg on 7/27/2018  6:41 PM                                             More Information        Warfarin tablets  Brand Names: Coumadin, Jantoven  What is this medicine?  WARFARIN (WAR far in) is an anticoagulant. It is used to treat or prevent clots in the veins, arteries, lungs, or heart.  How should I use this medicine?  Take this medicine by mouth with a glass of water. Follow the directions on the prescription label. You can take this medicine with or without food. Take your medicine at the same time each day. Do not take it more often than directed. Do not stop taking except on your doctor's advice. Stopping this medicine may increase your risk of a blood clot. Be sure to refill your prescription before you run out of medicine.  If your doctor or healthcare professional calls to change your dose, write down the dose and any other instructions. Always read the dose and instructions back to him or her to make sure you understand them. Tell your doctor or healthcare professional what strength of tablets you have on hand. Ask how many tablets you should take to equal your new dose. Write the date on the new instructions and keep them near your medicine. If you are told to stop taking your medicine until your next blood test, call your doctor or healthcare professional if you do not hear anything within 24 hours of the test to find out your new dose or when to restart your prior dose.  A special MedGuide will be given to you by the pharmacist with each prescription and refill. Be sure to read this information carefully each time.  Talk to your pediatrician regarding the use of this medicine in children. Special care may be needed.  What side effects may I notice from receiving this medicine?  Side effects that you should report to your doctor or health care professional as soon as possible:    allergic reactions like skin rash, itching  or hives, swelling of the face, lips, or tongue    breathing problems    chest pain    dizziness    headache    heavy menstrual bleeding or vaginal bleeding    pain in the lower back or side    painful, blue or purple toes    painful skin ulcers that do not go away    signs and symptoms of bleeding such as bloody or black, tarry stools; red or dark-brown urine; spitting up blood or brown material that looks like coffee grounds; red spots on the skin; unusual bruising or bleeding from the eye, gums, or nose    stomach pain    unusually weak or tired  Side effects that usually do not require medical attention (report to your doctor or health care professional if they continue or are bothersome):    diarrhea    hair loss  What may interact with this medicine?  Do not take this medicine with any of the following medications:    agents that prevent or dissolve blood clots    aspirin or other salicylates    danshen    dextrothyroxine    mifepristone    [place]'s Wort    red yeast rice  This medicine may also interact with the following medications:    acetaminophen    agents that lower cholesterol    alcohol    allopurinol    amiodarone    antibiotics or medicines for treating bacterial, fungal or viral infections    azathioprine    barbiturate medicines for inducing sleep or treating seizures    certain medicines for diabetes    certain medicines for heart rhythm problems    certain medicines for hepatitis C virus infections like daclatasvir, dasabuvir; ombitasvir; paritaprevir; ritonavir, elbasvir; grazoprevir, ledipasvir; sofosbuvir, simeprevir, sofosbuvir, sofosbuvir; velpatasvir, sofosbuvir; velpatasvir; voxilaprevir    certain medicines for high blood pressure    chloral hydrate    cisapride    disulfiram    female hormones, including contraceptive or birth control pills    general anesthetics    herbal or dietary products like garlic, ginkgo, ginseng, green tea, or kava kava    influenza virus vaccine    male  hormones    medicines for mental depression or psychosis    medicines for some types of cancer    medicines for stomach problems    methylphenidate    NSAIDs, medicines for pain and inflammation, like ibuprofen or naproxen    propoxyphene    quinidine, quinine    raloxifene    seizure or epilepsy medicine like carbamazepine, phenytoin, and valproic acid    steroids like cortisone and prednisone    tamoxifen    thyroid medicine    tramadol    vitamin c, vitamin e, and vitamin K    zafirlukast    zileuton  What if I miss a dose?  It is important not to miss a dose. If you miss a dose, call your healthcare provider. Take the dose as soon as possible on the same day. If it is almost time for your next dose, take only that dose. Do not take double or extra doses to make up for a missed dose.  Where should I keep my medicine?  Keep out of the reach of children.  Store at room temperature between 15 and 30 degrees C (59 and 86 degrees F). Protect from light. Throw away any unused medicine after the expiration date. Do not flush down the toilet.  What should I tell my health care provider before I take this medicine?  They need to know if you have any of these conditions:    alcoholism    anemia    bleeding disorders    cancer    diabetes    heart disease    high blood pressure    history of bleeding in the gastrointestinal tract    history of stroke or other brain injury or disease    kidney or liver disease    protein C deficiency    protein S deficiency    psychosis or dementia    recent injury, recent or planned surgery or procedure    an unusual or allergic reaction to warfarin, other medicines, foods, dyes, or preservatives    pregnant or trying to get pregnant    breast-feeding  What should I watch for while using this medicine?  Visit your doctor or health care professional for regular checks on your progress. You will need to have a blood test called a PT/INR regularly. The PT/INR blood test is done to make sure  you are getting the right dose of this medicine. It is important to not miss your appointment for the blood tests. When you first start taking this medicine, these tests are done often. Once the correct dose is determined and you take your medicine properly, these tests can be done less often.  Notify your doctor or health care professional and seek emergency treatment if you develop breathing problems; changes in vision; chest pain; severe, sudden headache; pain, swelling, warmth in the leg; trouble speaking; sudden numbness or weakness of the face, arm or leg. These can be signs that your condition has gotten worse.  While you are taking this medicine, carry an identification card with your name, the name and dose of medicine(s) being used, and the name and phone number of your doctor or health care professional or person to contact in an emergency.  Do not start taking or stop taking any medicines or over-the-counter medicines except on the advice of your doctor or health care professional.  You should discuss your diet with your doctor or health care professional. Do not make major changes in your diet. Vitamin K can affect how well this medicine works. Many foods contain vitamin K. It is important to eat a consistent amount of foods with vitamin K. Other foods with vitamin K that you should eat in consistent amounts are asparagus, basil, black eyed peas, broccoli, brussel sprouts, cabbage, green onions, green tea, parsley, green leafy vegetables like beet greens, marion greens, kale, spinach, turnip greens, or certain lettuces like green leaf or pina.  This medicine can cause birth defects or bleeding in an unborn child. Women of childbearing age should use effective birth control while taking this medicine. If a woman becomes pregnant while taking this medicine, she should discuss the potential risks and her options with her health care professional.  Avoid sports and activities that might cause injury  while you are using this medicine. Severe falls or injuries can cause unseen bleeding. Be careful when using sharp tools or knives. Consider using an electric razor. Take special care brushing or flossing your teeth. Report any injuries, bruising, or red spots on the skin to your doctor or health care professional.  If you have an illness that causes vomiting, diarrhea, or fever for more than a few days, contact your doctor. Also check with your doctor if you are unable to eat for several days. These problems can change the effect of this medicine.  Even after you stop taking this medicine, it takes several days before your body recovers its normal ability to clot blood. Ask your doctor or health care professional how long you need to be careful. If you are going to have surgery or dental work, tell your doctor or health care professional that you have been taking this medicine.  NOTE:This sheet is a summary. It may not cover all possible information. If you have questions about this medicine, talk to your doctor, pharmacist, or health care provider. Copyright  2018 [employer]              Diet Guidelines for Patients Taking Warfarin (Coumadin)  The foods you eat and drink can affect how your medicine works. Here is what you should know about warfarin and your diet.  Vitamin K in foods  While you are on warfarin, your intake of vitamin K should stay the same from day to day. Your warfarin dose is based on your daily intake of vitamin K foods. So it is important to get the same amount of vitamin K each day.  Warfarin helps to thin your blood. Vitamin K helps to clot your blood. When there is a sudden increase or decrease in vitamin K intake, the warfarin may not work as well.  If you ate vitamin K foods before starting warfarin, you should continue to eat them. If you plan to eat more or less of the vitamin K foods, you must see your doctor. Your doctor will change your dose to match your vitamin K intake.   Dietary  supplements  Some dietary supplements have vitamin K. We do not know how these interact with warfarin. To be safe, don't take dietary supplements (including your daily multi-vitamin) unless your doctor approves.  Vitamin E and fish oil are often taken by people with heart problems. Both of these have blood-thinning effects. If you take these, be sure to tell your doctor.  These foods are high in vitamin K   (serving size is   cup) These foods are medium-high in Vitamin K  (serving size is 1 cup, except brussels sprouts =   cup)   Collards (cooked) [place] sprouts (cooked)   Kale (cooked) Broccoli   Mustard greens (cooked) Endive (raw)   Parsley (raw) Green leaf lettuce   Spinach (cooked) Maikel lettuce   Swiss chard (cooked) Spinach (raw)   Turnip greens (cooked) Turnip greens (raw)   Alcohol   If you choose to drink, have no more than 1 to 2 drinks in 24 hours. One drink equals:     5 ounces of wine    12 ounces of beer    1 1/2 ounces of hard liquor  Drinking too much alcohol will increase your risk for bleeding. Ask your doctor how much alcohol is safe for you. Some doctors advise no alcohol while taking warfarin.  Cranberry juice  You may wish to limit how much cranberry juice you drink each day.  The makers of warfarin state that cranberry juice may increase your risk of bleeding. Studies do not support this. If you have questions about cranberry juice, please speak with your doctor or pharmacist.  Green tea  Green tea is often found on lists of foods that are high in Vitamin K. The tea leaves themselves are high in Vitamin K, but the tea provides only a small amount. You can drink a couple cups of green tea--just do not drink gallons of it.  Resources  American Dietetic Association. ADA Nutrition Care Manual. Available at http://nutritioncaremanual.org. Accessed 2005.  [employer] Patient's Guide to Using Coumadin, [place], [place]: 2003.  [employer]. Drug-nutrient interactions:  Coumadin (warfarin) and vitamin K. Last updated: December 2003. Accessed October 2006.  [employer] Human Nutrition Information Service. Provisional table on the vitamin K content of foods. HNIS/PT-104. Revised 1994.  For informational purposes only. Not to replace the advice of your health care provider.   Copyright   2006 [facility]. All rights reserved. [employer] 235817 - REV 09/15.            Using Blood Thinners (Anticoagulants)  Blood thinners or anticoagulants are medicines that help prevent blood clots from forming. They include warfarin, heparin, dabigatran, rivaroxaban, apixaban, and edoxaban. Your healthcare provider will help you decide which medicine is best for you.    Taking an anticoagulant safely  When you are taking a blood thinner, you will need to take certain steps to stay safe. Too much blood thinner puts you at risk for bleeding. Too little puts you at risk for stroke. Follow these guidelines. Also follow any others that your healthcare provider gives you.    You may be told you need regular lab testing while taking these medicines. Warfarin requires routine testing while the other medicines do not.    Tell your doctor about all medicines you take. This includes over-the-counter medicines, supplements, or herbal remedies. Don't take any medicines (including ones you buy over-the-counter) that your doctor doesn t know about. Some medicines can interact with blood thinners and cause serious problems.    Tell any healthcare provider that you see for care (such as doctors, dentists, chiropractors, home health nurses) that you take a blood thinner.    Carry a medical ID card or wear a medical-alert bracelet that says you take an anticoagulant.    Before taking aspirin, check with your doctor. Aspirin can significantly increase your risk of bleeding.    This medicine makes bleeding harder to stop. To protect yourself:  ? Don't do any activities that may cause injury. If you fall or are  injured, contact your healthcare provider right away. Blood thinners prevent clotting, so you could be bleeding inside without realizing it.  ? Use a soft-bristle toothbrush and waxed dental floss. Shave with an electric razor rather than a blade.  ? Don t go barefoot. Don t trim corns or calluses yourself.  Warfarin: Other important information  Several precautions are especially important when you are taking warfarin. Always keep these points in mind:    Be sure to follow your healthcare provider's instructions for taking warfarin.    Take this medicine at the same time each day. Take it with a full glass of water, with or without food. If you miss a dose, contact your doctor to find out how much to take. Don't take a double dose.    Warfarin is an effective medicine, but it can be dangerous if not taken correctly. It makes your blood less likely to form clots. If you take too much, it can cause serious internal or external bleeding.    You will need to have regular monitoring while you are taking warfarin. This includes blood tests to check your international normalized ratio (INR) and prothrombin time (PT). These tests show how quickly your blood clots. You will also have a complete blood count (CBC) once in a while. This looks at your blood and platelet levels. Both of these need to be followed while you're on warfarin. Talk with your healthcare provider about whether you need to visit the clinic every week, or if services are available for monitoring in your home.    Certain medicines can affect your INR and PT levels. Tell your healthcare provider if there are any changes in your medicines. This includes any over-the-counter medicines, supplements like vitamin K, or herbal remedies.    Your diet can also affect your INR and PT levels. Because of this, it's important to eat a consistent diet. It is especially important to eat a consistent amount of foods that are high in vitamin K. Talk with your healthcare  provider before making any big changes in your diet.    Remember that warfarin increases your risk of bleeding. Be careful not to injure yourself. If you have a significant injury, contact your healthcare provider right away. It's important to alert your provider if you've fallen or hurt yourself, even if you don't break your skin. You could be bleeding inside your body without realizing it.     Warfarin: Watch your INR/PT blood levels  Two tests are used to find out how your blood is clotting. One is prothrombin time (PT), the other is the international normalized ratio (INR).    Go for your blood tests (INR/PT) as often as directed. Your diet and the other medicines you take can affect your INR/PT levels.    My next INR/PT blood draw is due on _____________ (date) at ___________ (time) by ___________ (name of doctor or clinic).    The name of the doctor who is monitoring my anticoagulation therapy is _____________________ and the phone number is _________________.    Follow up with your doctor or as advised by his or her staff. It usually takes a few hours for your doctor to get the results of your clotting tests. Call to get your lab results to find out if your doctor needs to make further changes to your warfarin dose.    If your blood is drawn for these tests at a location other than your doctor's office, tell your doctor as soon as you get your lab results.   Warfarin: Watch what you eat  Vitamin K helps your blood clot. So you have to watch how much you eat of foods that contain vitamin K. These foods can affect the way warfarin works. They don't affect the other non-warfarin blood thinners. Here are some specific tips:    Try to keep your diet about the same each day. If you change your diet for any reason, such as for illness or to lose weight, tell your doctor.    Each day, eat the same amount of foods that are high in vitamin K. These include asparagus, avocado, broccoli, cabbage, kale, spinach, and some  other leafy green vegetables. Oils, such as soybean, canola, and olive oils, are also high in vitamin K.    Limit fats to 2 to 4 tablespoons a day.    Ask your healthcare provider if you should not drink alcohol while you are taking a blood thinner.    Avoid teas that contain sweet clover, sweet jagjit, or tonka beans. These can affect how your medicine works.    Talk with your healthcare provider and pharmacist about specific foods or special diets that can affect anticoagulant levels. These include grapefruit juice, cranberries and cranberry juice, fish oil supplements, garlic, rodger, licorice, turmeric, and herbal teas and supplements.  Talk with your healthcare provider if you have concerns about these or other food products and their effects on warfarin.     When to call your healthcare provider  Call your provider right away if you have any of these:    Bleeding that doesn t stop in 10 minutes    A heavier-than-normal menstrual period or bleeding between periods    Coughing or throwing up blood    Bloody diarrhea or bleeding hemorrhoids     Dark-colored urine or black stools    Red or black-and-blue marks on the skin that get larger    Dizziness or fatigue    Chest pain or trouble breathing  Allergic reactions:    Rash    Itching    Swelling    Trouble swallowing or breathing   Medical conditions and anticoagulants  Before starting a blood thinner, tell your healthcare provider if you have any of these conditions:    Stomach ulcer now or in the past    Vomited blood or had bloody stools (black or red color)    Aneurysm, pericarditis, or pericardial effusion    Blood disorder    Recent surgery, stroke, mini-stroke, or spinal puncture    Kidney or liver disease, uncontrolled high blood pressure, diabetes, vasculitis, heart failure, lupus, or other collagen-vascular disease, or high cholesterol    Pregnancy or breastfeeding    Younger than 18 years old    Recent or planned dental procedure  Medicine  interactions and anticoagulants  Many medicines interfere with the effect of blood thinners. Before starting these medicines, tell your healthcare provider about any prescription, over-the-counter, or herbal supplements you take. In particular, tell your provider about:    Antibiotics    Heart medicines    Cimetidine    Aspirin or other anti-inflammatory drugs such as ibuprofen, naproxen, ketoprofen, or other arthritis medicines    Medicines for depression, cancer, HIV (protease inhibitors), diabetes, seizures, gout, high cholesterol, or thyroid replacement    Vitamins containing vitamin K or herbal products such as ginkgo, Co-Q10, garlic, or Rad's wort     Note: This information topic may not include all directions, precautions, medical conditions, medicine/food interactions, and warnings for these medicines. Check with your doctor, nurse, or pharmacist for any questions you have.    Date Last Reviewed: 7/1/2017 2000-2017 [employer]. [address] [place] [address]. All rights reserved. This information is not intended as a substitute for professional medical care. Always follow your healthcare professional's instructions.                Effects of a Stroke on the Brain and Body  When blood supply is cut off from the brain, cells start to die from lack of oxygen. Within minutes, skills such as reasoning, speech, and some degree of arm, leg, or facial movement may be lost. The type of skills and the amount of loss depend on which part of the brain was affected, and how much tissue was damaged.      The brain is the body s control center, which handles communication, motor, sensory, and processing functions.    The cerebellum controls the body s coordination and balance.    The brain stem links the brain and the spinal cord. It also handles basic body functions.    The spinal cord carries messages between the brain and the body.  A stroke causes lost skills  Each part of the brain has  a role related to a function in the body. Damage to any part of the brain limits its ability to carry out its role. This results in lost skills or changes in function, and even in personality. Some parts of the brain and its correlating functions that may be affected by stroke are as follows:    The front of the brain houses reasoning and the ability to control emotions. Personality resides here.    The left side of the brain controls the right side of the body. It also handles speech, language, reading, and writing.    The right side of the brain controls the left side of the body.    The back of the brain controls vision.    The brain stem handles breathing and swallowing.  Date Last Reviewed: 6/1/2017 2000-2017 [email or web address]. [address] [place] [address], [place] [address]. All rights reserved. This information is not intended as a substitute for professional medical care. Always follow your healthcare professional's instructions.

## 2018-07-21 ENCOUNTER — APPOINTMENT (OUTPATIENT)
Dept: PHYSICAL THERAPY | Facility: CLINIC | Age: 78
DRG: 064 | End: 2018-07-21
Attending: INTERNAL MEDICINE
Payer: COMMERCIAL

## 2018-07-21 ENCOUNTER — APPOINTMENT (OUTPATIENT)
Dept: MRI IMAGING | Facility: CLINIC | Age: 78
DRG: 064 | End: 2018-07-21
Attending: PSYCHIATRY & NEUROLOGY
Payer: COMMERCIAL

## 2018-07-21 ENCOUNTER — APPOINTMENT (OUTPATIENT)
Dept: GENERAL RADIOLOGY | Facility: CLINIC | Age: 78
DRG: 064 | End: 2018-07-21
Attending: SURGERY
Payer: COMMERCIAL

## 2018-07-21 ENCOUNTER — APPOINTMENT (OUTPATIENT)
Dept: CT IMAGING | Facility: CLINIC | Age: 78
DRG: 064 | End: 2018-07-21
Attending: PSYCHIATRY & NEUROLOGY
Payer: COMMERCIAL

## 2018-07-21 ENCOUNTER — APPOINTMENT (OUTPATIENT)
Dept: CARDIOLOGY | Facility: CLINIC | Age: 78
DRG: 064 | End: 2018-07-21
Attending: INTERNAL MEDICINE
Payer: COMMERCIAL

## 2018-07-21 ENCOUNTER — APPOINTMENT (OUTPATIENT)
Dept: MRI IMAGING | Facility: CLINIC | Age: 78
DRG: 064 | End: 2018-07-21
Attending: INTERNAL MEDICINE
Payer: COMMERCIAL

## 2018-07-21 PROBLEM — I63.9 ACUTE ISCHEMIC STROKE (H): Status: ACTIVE | Noted: 2018-07-21

## 2018-07-21 LAB
ALBUMIN SERPL-MCNC: 3.2 G/DL (ref 3.4–5)
ALBUMIN UR-MCNC: 10 MG/DL
ALP SERPL-CCNC: 101 U/L (ref 40–150)
ALT SERPL W P-5'-P-CCNC: 13 U/L (ref 0–70)
APPEARANCE UR: CLEAR
APTT PPP: 30 SEC (ref 22–37)
AST SERPL W P-5'-P-CCNC: 21 U/L (ref 0–45)
BILIRUB DIRECT SERPL-MCNC: 0.1 MG/DL (ref 0–0.2)
BILIRUB SERPL-MCNC: 0.7 MG/DL (ref 0.2–1.3)
BILIRUB UR QL STRIP: NEGATIVE
CHOLEST SERPL-MCNC: 143 MG/DL
COLOR UR AUTO: YELLOW
ERYTHROCYTE [DISTWIDTH] IN BLOOD BY AUTOMATED COUNT: 14.6 % (ref 10–15)
GLUCOSE BLDC GLUCOMTR-MCNC: 104 MG/DL (ref 70–99)
GLUCOSE UR STRIP-MCNC: NEGATIVE MG/DL
HBA1C MFR BLD: 5.3 % (ref 0–5.6)
HCT VFR BLD AUTO: 39.8 % (ref 40–53)
HDLC SERPL-MCNC: 42 MG/DL
HGB BLD-MCNC: 13.6 G/DL (ref 13.3–17.7)
HGB UR QL STRIP: ABNORMAL
INR PPP: 1.11 (ref 0.86–1.14)
KETONES UR STRIP-MCNC: 10 MG/DL
LDLC SERPL CALC-MCNC: 88 MG/DL
LEUKOCYTE ESTERASE UR QL STRIP: ABNORMAL
MCH RBC QN AUTO: 32.2 PG (ref 26.5–33)
MCHC RBC AUTO-ENTMCNC: 34.2 G/DL (ref 31.5–36.5)
MCV RBC AUTO: 94 FL (ref 78–100)
MUCOUS THREADS #/AREA URNS LPF: PRESENT /LPF
NITRATE UR QL: NEGATIVE
NONHDLC SERPL-MCNC: 101 MG/DL
OSMOLALITY SERPL: 295 MMOL/KG (ref 280–301)
PH UR STRIP: 6.5 PH (ref 5–7)
PLATELET # BLD AUTO: 355 10E9/L (ref 150–450)
PLATELET # BLD AUTO: 359 10E9/L (ref 150–450)
PROT SERPL-MCNC: 6.9 G/DL (ref 6.8–8.8)
RADIOLOGIST FLAGS: ABNORMAL
RBC # BLD AUTO: 4.22 10E12/L (ref 4.4–5.9)
RBC #/AREA URNS AUTO: 82 /HPF (ref 0–2)
SODIUM SERPL-SCNC: 141 MMOL/L (ref 133–144)
SOURCE: ABNORMAL
SP GR UR STRIP: 1.03 (ref 1–1.03)
SQUAMOUS #/AREA URNS AUTO: <1 /HPF (ref 0–1)
TRIGL SERPL-MCNC: 63 MG/DL
UROBILINOGEN UR STRIP-MCNC: NORMAL MG/DL (ref 0–2)
WBC # BLD AUTO: 15.4 10E9/L (ref 4–11)
WBC #/AREA URNS AUTO: 33 /HPF (ref 0–5)

## 2018-07-21 PROCEDURE — 36556 INSERT NON-TUNNEL CV CATH: CPT | Performed by: SURGERY

## 2018-07-21 PROCEDURE — 93306 TTE W/DOPPLER COMPLETE: CPT | Mod: 26 | Performed by: INTERNAL MEDICINE

## 2018-07-21 PROCEDURE — 25000132 ZZH RX MED GY IP 250 OP 250 PS 637: Performed by: INTERNAL MEDICINE

## 2018-07-21 PROCEDURE — 93306 TTE W/DOPPLER COMPLETE: CPT

## 2018-07-21 PROCEDURE — 87086 URINE CULTURE/COLONY COUNT: CPT | Performed by: EMERGENCY MEDICINE

## 2018-07-21 PROCEDURE — 70553 MRI BRAIN STEM W/O & W/DYE: CPT

## 2018-07-21 PROCEDURE — 83930 ASSAY OF BLOOD OSMOLALITY: CPT | Performed by: PSYCHIATRY & NEUROLOGY

## 2018-07-21 PROCEDURE — 25000125 ZZHC RX 250: Performed by: EMERGENCY MEDICINE

## 2018-07-21 PROCEDURE — 25000132 ZZH RX MED GY IP 250 OP 250 PS 637: Performed by: EMERGENCY MEDICINE

## 2018-07-21 PROCEDURE — 80061 LIPID PANEL: CPT | Performed by: INTERNAL MEDICINE

## 2018-07-21 PROCEDURE — 85049 AUTOMATED PLATELET COUNT: CPT | Performed by: INTERNAL MEDICINE

## 2018-07-21 PROCEDURE — 00000146 ZZHCL STATISTIC GLUCOSE BY METER IP

## 2018-07-21 PROCEDURE — 84295 ASSAY OF SERUM SODIUM: CPT | Performed by: PSYCHIATRY & NEUROLOGY

## 2018-07-21 PROCEDURE — 20000003 ZZH R&B ICU

## 2018-07-21 PROCEDURE — 83036 HEMOGLOBIN GLYCOSYLATED A1C: CPT | Performed by: INTERNAL MEDICINE

## 2018-07-21 PROCEDURE — 70551 MRI BRAIN STEM W/O DYE: CPT

## 2018-07-21 PROCEDURE — 25000128 H RX IP 250 OP 636

## 2018-07-21 PROCEDURE — 25000128 H RX IP 250 OP 636: Performed by: INTERNAL MEDICINE

## 2018-07-21 PROCEDURE — 70496 CT ANGIOGRAPHY HEAD: CPT

## 2018-07-21 PROCEDURE — 99223 1ST HOSP IP/OBS HIGH 75: CPT | Mod: AI | Performed by: INTERNAL MEDICINE

## 2018-07-21 PROCEDURE — 96365 THER/PROPH/DIAG IV INF INIT: CPT | Mod: 59

## 2018-07-21 PROCEDURE — 25000128 H RX IP 250 OP 636: Performed by: EMERGENCY MEDICINE

## 2018-07-21 PROCEDURE — 40000193 ZZH STATISTIC PT WARD VISIT

## 2018-07-21 PROCEDURE — A9585 GADOBUTROL INJECTION: HCPCS | Performed by: INTERNAL MEDICINE

## 2018-07-21 PROCEDURE — 80076 HEPATIC FUNCTION PANEL: CPT | Performed by: INTERNAL MEDICINE

## 2018-07-21 PROCEDURE — 25000125 ZZHC RX 250: Performed by: INTERNAL MEDICINE

## 2018-07-21 PROCEDURE — 40000986 XR CHEST PORT 1 VW

## 2018-07-21 PROCEDURE — 97161 PT EVAL LOW COMPLEX 20 MIN: CPT | Mod: GP

## 2018-07-21 PROCEDURE — 36415 COLL VENOUS BLD VENIPUNCTURE: CPT | Performed by: INTERNAL MEDICINE

## 2018-07-21 PROCEDURE — 70450 CT HEAD/BRAIN W/O DYE: CPT

## 2018-07-21 PROCEDURE — 25000125 ZZHC RX 250: Performed by: PSYCHIATRY & NEUROLOGY

## 2018-07-21 PROCEDURE — 25000128 H RX IP 250 OP 636: Performed by: PSYCHIATRY & NEUROLOGY

## 2018-07-21 PROCEDURE — 36415 COLL VENOUS BLD VENIPUNCTURE: CPT | Performed by: PSYCHIATRY & NEUROLOGY

## 2018-07-21 PROCEDURE — 25000132 ZZH RX MED GY IP 250 OP 250 PS 637: Performed by: PSYCHIATRY & NEUROLOGY

## 2018-07-21 PROCEDURE — 85027 COMPLETE CBC AUTOMATED: CPT | Performed by: PSYCHIATRY & NEUROLOGY

## 2018-07-21 PROCEDURE — 25000128 H RX IP 250 OP 636: Performed by: SURGERY

## 2018-07-21 PROCEDURE — 81001 URINALYSIS AUTO W/SCOPE: CPT | Performed by: EMERGENCY MEDICINE

## 2018-07-21 RX ORDER — NALOXONE HYDROCHLORIDE 0.4 MG/ML
.1-.4 INJECTION, SOLUTION INTRAMUSCULAR; INTRAVENOUS; SUBCUTANEOUS
Status: DISCONTINUED | OUTPATIENT
Start: 2018-07-21 | End: 2018-07-28 | Stop reason: HOSPADM

## 2018-07-21 RX ORDER — ROSUVASTATIN CALCIUM 20 MG/1
20 TABLET, COATED ORAL DAILY
Status: DISCONTINUED | OUTPATIENT
Start: 2018-07-21 | End: 2018-07-28 | Stop reason: HOSPADM

## 2018-07-21 RX ORDER — POTASSIUM CHLORIDE 7.45 MG/ML
10 INJECTION INTRAVENOUS
Status: DISCONTINUED | OUTPATIENT
Start: 2018-07-21 | End: 2018-07-28 | Stop reason: HOSPADM

## 2018-07-21 RX ORDER — CEFTRIAXONE 1 G/1
1 INJECTION, POWDER, FOR SOLUTION INTRAMUSCULAR; INTRAVENOUS ONCE
Status: COMPLETED | OUTPATIENT
Start: 2018-07-21 | End: 2018-07-21

## 2018-07-21 RX ORDER — SODIUM CHLORIDE 234 MG/ML
30 SOLUTION, CONCENTRATE INTRAVENOUS ONCE
Status: DISCONTINUED | OUTPATIENT
Start: 2018-07-21 | End: 2018-07-21

## 2018-07-21 RX ORDER — CLOPIDOGREL 300 MG/1
300 TABLET, FILM COATED ORAL ONCE
Status: COMPLETED | OUTPATIENT
Start: 2018-07-21 | End: 2018-07-21

## 2018-07-21 RX ORDER — POTASSIUM CHLORIDE 29.8 MG/ML
20 INJECTION INTRAVENOUS
Status: DISCONTINUED | OUTPATIENT
Start: 2018-07-21 | End: 2018-07-28 | Stop reason: HOSPADM

## 2018-07-21 RX ORDER — LABETALOL HYDROCHLORIDE 5 MG/ML
10-40 INJECTION, SOLUTION INTRAVENOUS EVERY 10 MIN PRN
Status: DISCONTINUED | OUTPATIENT
Start: 2018-07-21 | End: 2018-07-28 | Stop reason: HOSPADM

## 2018-07-21 RX ORDER — PROCHLORPERAZINE 25 MG
12.5 SUPPOSITORY, RECTAL RECTAL EVERY 12 HOURS PRN
Status: DISCONTINUED | OUTPATIENT
Start: 2018-07-21 | End: 2018-07-28 | Stop reason: HOSPADM

## 2018-07-21 RX ORDER — POTASSIUM CL/LIDO/0.9 % NACL 10MEQ/0.1L
10 INTRAVENOUS SOLUTION, PIGGYBACK (ML) INTRAVENOUS
Status: DISCONTINUED | OUTPATIENT
Start: 2018-07-21 | End: 2018-07-28 | Stop reason: HOSPADM

## 2018-07-21 RX ORDER — IOPAMIDOL 755 MG/ML
70 INJECTION, SOLUTION INTRAVASCULAR ONCE
Status: COMPLETED | OUTPATIENT
Start: 2018-07-21 | End: 2018-07-21

## 2018-07-21 RX ORDER — PREDNISONE 5 MG/1
5 TABLET ORAL DAILY
Status: DISCONTINUED | OUTPATIENT
Start: 2018-07-21 | End: 2018-07-28 | Stop reason: HOSPADM

## 2018-07-21 RX ORDER — ACETAMINOPHEN 325 MG/1
650 TABLET ORAL EVERY 4 HOURS PRN
Status: DISCONTINUED | OUTPATIENT
Start: 2018-07-21 | End: 2018-07-28 | Stop reason: HOSPADM

## 2018-07-21 RX ORDER — FLUTICASONE PROPIONATE 50 MCG
1 SPRAY, SUSPENSION (ML) NASAL DAILY
Status: DISCONTINUED | OUTPATIENT
Start: 2018-07-21 | End: 2018-07-28 | Stop reason: HOSPADM

## 2018-07-21 RX ORDER — SODIUM CHLORIDE 9 MG/ML
INJECTION, SOLUTION INTRAVENOUS CONTINUOUS
Status: DISCONTINUED | OUTPATIENT
Start: 2018-07-21 | End: 2018-07-21

## 2018-07-21 RX ORDER — 3% SODIUM CHLORIDE 3 G/100ML
500 INJECTION, SOLUTION INTRAVENOUS CONTINUOUS
Status: DISCONTINUED | OUTPATIENT
Start: 2018-07-21 | End: 2018-07-24

## 2018-07-21 RX ORDER — GADOBUTROL 604.72 MG/ML
7 INJECTION INTRAVENOUS ONCE
Status: COMPLETED | OUTPATIENT
Start: 2018-07-21 | End: 2018-07-21

## 2018-07-21 RX ORDER — BISACODYL 10 MG
10 SUPPOSITORY, RECTAL RECTAL DAILY PRN
Status: DISCONTINUED | OUTPATIENT
Start: 2018-07-21 | End: 2018-07-28 | Stop reason: HOSPADM

## 2018-07-21 RX ORDER — POTASSIUM CHLORIDE 1500 MG/1
20-40 TABLET, EXTENDED RELEASE ORAL
Status: DISCONTINUED | OUTPATIENT
Start: 2018-07-21 | End: 2018-07-28 | Stop reason: HOSPADM

## 2018-07-21 RX ORDER — POTASSIUM CHLORIDE 1.5 G/1.58G
20-40 POWDER, FOR SOLUTION ORAL
Status: DISCONTINUED | OUTPATIENT
Start: 2018-07-21 | End: 2018-07-28 | Stop reason: HOSPADM

## 2018-07-21 RX ORDER — TAMSULOSIN HYDROCHLORIDE 0.4 MG/1
0.4 CAPSULE ORAL 2 TIMES DAILY
Status: DISCONTINUED | OUTPATIENT
Start: 2018-07-21 | End: 2018-07-28 | Stop reason: HOSPADM

## 2018-07-21 RX ORDER — AMOXICILLIN 250 MG
2 CAPSULE ORAL 2 TIMES DAILY PRN
Status: DISCONTINUED | OUTPATIENT
Start: 2018-07-21 | End: 2018-07-28 | Stop reason: HOSPADM

## 2018-07-21 RX ORDER — ONDANSETRON 4 MG/1
4 TABLET, ORALLY DISINTEGRATING ORAL EVERY 6 HOURS PRN
Status: DISCONTINUED | OUTPATIENT
Start: 2018-07-21 | End: 2018-07-28 | Stop reason: HOSPADM

## 2018-07-21 RX ORDER — AMOXICILLIN 250 MG
1 CAPSULE ORAL 2 TIMES DAILY PRN
Status: DISCONTINUED | OUTPATIENT
Start: 2018-07-21 | End: 2018-07-28 | Stop reason: HOSPADM

## 2018-07-21 RX ORDER — CLOPIDOGREL BISULFATE 75 MG/1
75 TABLET ORAL DAILY
Status: DISCONTINUED | OUTPATIENT
Start: 2018-07-21 | End: 2018-07-21

## 2018-07-21 RX ORDER — ACETAMINOPHEN 650 MG/1
650 SUPPOSITORY RECTAL EVERY 4 HOURS PRN
Status: DISCONTINUED | OUTPATIENT
Start: 2018-07-21 | End: 2018-07-28 | Stop reason: HOSPADM

## 2018-07-21 RX ORDER — FINASTERIDE 5 MG/1
5 TABLET, FILM COATED ORAL DAILY
Status: DISCONTINUED | OUTPATIENT
Start: 2018-07-21 | End: 2018-07-28 | Stop reason: HOSPADM

## 2018-07-21 RX ORDER — CEFTRIAXONE 1 G/1
1 INJECTION, POWDER, FOR SOLUTION INTRAMUSCULAR; INTRAVENOUS EVERY 24 HOURS
Status: DISCONTINUED | OUTPATIENT
Start: 2018-07-22 | End: 2018-07-26

## 2018-07-21 RX ORDER — HEPARIN SODIUM 5000 [USP'U]/.5ML
5000 INJECTION, SOLUTION INTRAVENOUS; SUBCUTANEOUS EVERY 12 HOURS
Status: DISCONTINUED | OUTPATIENT
Start: 2018-07-21 | End: 2018-07-21

## 2018-07-21 RX ORDER — ASPIRIN 300 MG/1
300 SUPPOSITORY RECTAL DAILY
Status: DISCONTINUED | OUTPATIENT
Start: 2018-07-21 | End: 2018-07-21

## 2018-07-21 RX ORDER — HYDRALAZINE HYDROCHLORIDE 20 MG/ML
10-20 INJECTION INTRAMUSCULAR; INTRAVENOUS
Status: DISCONTINUED | OUTPATIENT
Start: 2018-07-21 | End: 2018-07-28 | Stop reason: HOSPADM

## 2018-07-21 RX ORDER — ASPIRIN 81 MG/1
324 TABLET, CHEWABLE ORAL ONCE
Status: COMPLETED | OUTPATIENT
Start: 2018-07-21 | End: 2018-07-21

## 2018-07-21 RX ORDER — PROCHLORPERAZINE MALEATE 5 MG
5 TABLET ORAL EVERY 6 HOURS PRN
Status: DISCONTINUED | OUTPATIENT
Start: 2018-07-21 | End: 2018-07-28 | Stop reason: HOSPADM

## 2018-07-21 RX ORDER — ONDANSETRON 2 MG/ML
4 INJECTION INTRAMUSCULAR; INTRAVENOUS EVERY 6 HOURS PRN
Status: DISCONTINUED | OUTPATIENT
Start: 2018-07-21 | End: 2018-07-28 | Stop reason: HOSPADM

## 2018-07-21 RX ORDER — PROPOFOL 10 MG/ML
5-75 INJECTION, EMULSION INTRAVENOUS CONTINUOUS
Status: DISCONTINUED | OUTPATIENT
Start: 2018-07-21 | End: 2018-07-22 | Stop reason: CLARIF

## 2018-07-21 RX ADMIN — CEFTRIAXONE SODIUM 1 G: 1 INJECTION, POWDER, FOR SOLUTION INTRAMUSCULAR; INTRAVENOUS at 01:27

## 2018-07-21 RX ADMIN — TAMSULOSIN HYDROCHLORIDE 0.4 MG: 0.4 CAPSULE ORAL at 11:18

## 2018-07-21 RX ADMIN — IOPAMIDOL 70 ML: 755 INJECTION, SOLUTION INTRAVENOUS at 16:47

## 2018-07-21 RX ADMIN — HYDRALAZINE HYDROCHLORIDE 20 MG: 20 INJECTION INTRAMUSCULAR; INTRAVENOUS at 17:21

## 2018-07-21 RX ADMIN — FINASTERIDE 5 MG: 5 TABLET, FILM COATED ORAL at 11:17

## 2018-07-21 RX ADMIN — SODIUM CHLORIDE: 9 INJECTION, SOLUTION INTRAVENOUS at 04:40

## 2018-07-21 RX ADMIN — PREDNISONE 5 MG: 5 TABLET ORAL at 11:18

## 2018-07-21 RX ADMIN — SODIUM CHLORIDE 100 ML: 900 INJECTION, SOLUTION INTRAVENOUS at 16:47

## 2018-07-21 RX ADMIN — POTASSIUM CHLORIDE 40 MEQ: 1500 TABLET, EXTENDED RELEASE ORAL at 13:08

## 2018-07-21 RX ADMIN — HEPARIN SODIUM 850 UNITS/HR: 10000 INJECTION, SOLUTION INTRAVENOUS at 22:23

## 2018-07-21 RX ADMIN — ROSUVASTATIN CALCIUM 20 MG: 20 TABLET, FILM COATED ORAL at 11:17

## 2018-07-21 RX ADMIN — PROPOFOL 5 MCG/KG/MIN: 10 INJECTION, EMULSION INTRAVENOUS at 21:45

## 2018-07-21 RX ADMIN — ONDANSETRON 4 MG: 4 TABLET, ORALLY DISINTEGRATING ORAL at 14:45

## 2018-07-21 RX ADMIN — SODIUM CHLORIDE 500 ML: 3 INJECTION, SOLUTION INTRAVENOUS at 22:38

## 2018-07-21 RX ADMIN — SODIUM CHLORIDE 90 ML: 9 INJECTION, SOLUTION INTRAVENOUS at 00:27

## 2018-07-21 RX ADMIN — PROCHLORPERAZINE EDISYLATE 5 MG: 5 INJECTION INTRAMUSCULAR; INTRAVENOUS at 17:21

## 2018-07-21 RX ADMIN — ASPIRIN 325 MG: 325 TABLET, DELAYED RELEASE ORAL at 11:18

## 2018-07-21 RX ADMIN — CLOPIDOGREL 75 MG: 75 TABLET, FILM COATED ORAL at 13:08

## 2018-07-21 RX ADMIN — CLOPIDOGREL BISULFATE 300 MG: 300 TABLET, FILM COATED ORAL at 02:05

## 2018-07-21 RX ADMIN — SODIUM CHLORIDE: 9 INJECTION, SOLUTION INTRAVENOUS at 17:37

## 2018-07-21 RX ADMIN — HEPARIN SODIUM 5000 UNITS: 5000 INJECTION, SOLUTION INTRAVENOUS; SUBCUTANEOUS at 17:31

## 2018-07-21 RX ADMIN — ASPIRIN 81 MG 324 MG: 81 TABLET ORAL at 02:05

## 2018-07-21 RX ADMIN — GADOBUTROL 7 ML: 604.72 INJECTION INTRAVENOUS at 09:39

## 2018-07-21 ASSESSMENT — ACTIVITIES OF DAILY LIVING (ADL)
ADLS_ACUITY_SCORE: 10
ADLS_ACUITY_SCORE: 12
ADLS_ACUITY_SCORE: 12
ADLS_ACUITY_SCORE: 11

## 2018-07-21 ASSESSMENT — VISUAL ACUITY
OU: DOUBLE VISION/DIPLOPIA
OU: NORMAL ACUITY
OU: NORMAL ACUITY
OU: DOUBLE VISION/DIPLOPIA
OU: NORMAL ACUITY
OU: DOUBLE VISION/DIPLOPIA

## 2018-07-21 NOTE — ED PROVIDER NOTES
History     Chief Complaint:  Dizziness     HPI   Shamir Concepcion is a 78 year old male who presents with dizziness. The patient states that he fell and hit his nose when he fell earlier today. He denies any loss of consciousness. The patient states that he had a bout of emesis this morning and has had nausea all day. The patient has not been able to eat or drink much since he vomited. The patient notes he is thirsty. The patient also notes that he feels dizziness that he describes as spinning. The daughter states that the patient appears to be a little confused and slower than normal. The patient has no ringing in his ears. The patient has not been sick recently or had any recent falls. The patient denies any abdominal pain, chest pain or discomfort, change in breathing, change in vision, change in talking, no fevers, chills, cough, or body aches. The patient does note that he has an enlarged prostate that he had a recent operation for. He notes that he has increased frequency today, but no flank pain or hematuria. Of: note the patient notes that his right eye is normally asymmetric.     Allergies:  No Known Allergies     Medications:    Asprin   Finasteride  Flonase   Methotrexate  Prednisone   Flomax    Past Medical History:    Allergic rhinitis   benign prostatic hyperplasia   Colon polyps   erectile dysfunction    Peripheral artery disease    RA  Seronegative rheumatoid arthritis   Hypercholesteremia    Past Surgical History:    Upper extremity stent right   Tonsillectomy   Adenoidectomy   Laminectomy/diskectomy lumbar   Orthopedic surgery   Phacoemulsification clear cornea with standard intraocular lens implant, bilateral   Sinus Surgery   Vascular Surgery     Family History:    Mother: cancer   Father: cerebrovascular disease     Social History:  The patient was accompanied to the ED by daughter.  Smoking Status: Former Smoker  Smokeless Tobacco: Never Used  Alcohol Use: Positive  Marital Status:        Review of Systems   Constitutional: Positive for appetite change. Negative for chills and fever.   Eyes: Negative for visual disturbance.        No ear ringing    Respiratory: Negative for cough, chest tightness and shortness of breath.    Cardiovascular: Negative for chest pain.   Gastrointestinal: Positive for nausea and vomiting.   Genitourinary: Positive for frequency. Negative for flank pain and hematuria.   Neurological: Positive for dizziness.        No loss of consciousness   Psychiatric/Behavioral:        Noted confusion and slowness   All other systems reviewed and are negative.      Physical Exam     Patient Vitals for the past 24 hrs:   BP Temp Temp src Pulse Heart Rate Resp SpO2   07/21/18 0333 156/82 - - 60 - 18 97 %   07/21/18 0330 156/82 - Oral - 60 16 97 %   07/21/18 0130 170/78 - - - - - -   07/21/18 0127 - - - - 66 - 98 %   07/21/18 0100 159/73 - - - 70 19 97 %   07/21/18 0050 - - - - 68 - 95 %   07/21/18 0030 167/69 - - - - - -   07/20/18 2330 170/80 - - - 64 - 98 %   07/20/18 2153 182/81 97.8  F (36.6  C) Oral - 58 18 94 %       Physical Exam  Constitutional:  Appears well-developed and well-nourished. Cooperative. Answers questions appropriately but slowly  HENT:   Head:    Superficial and abrasion to his nose. Otherwise Atraumatic.   Mouth/Throat:   Oropharynx is without erythema or exudate and mucous     membranes are dry.  Eyes:    Conjunctivae normal and EOM are normal. Disconjugate gaze with mild right sided esotropia     Pupils are equal, round, and reactive to light.   Neck:    Normal range of motion. Neck supple. No midline tenderness or step off neck or back.   Cardiovascular:  Normal rate, regular rhythm, normal heart sounds and radial and dorsalis pedis pulses are 2+ and symmetric.    Pulmonary/Chest:  Effort normal and breath sounds normal.   Abdominal:   Soft. Bowel sounds are normal. Pelvis and chest wall stable and non tender.      No splenomegaly or hepatomegaly. No  tenderness. No rebound.   Musculoskeletal:  Normal range of motion. No edema and no tenderness.   Neurological:  Alert. Normal strength. No cranial nerve deficit, aside from gaze. GCS 15.  Skin:    Skin is warm and dry. Bruising and skin tear to left dorsal lateral hand  Psychiatric:   Normal mood and affect.     Emergency Department Course     ECG:  ECG taken at 2223, ECG read at 2230  Sinus bradycardia  Otherwise normal ECG  Rate 58 bpm. NY interval 204 ms. QRS duration 86 ms. QT/QTc 446/437 ms. P-R-T axes 55 -12 -18.    Imaging:  Radiology findings were communicated with the patient who voiced understanding of the findings.    Head CT w/o contrast  Abnormal  1. Moderate low-density changes in the left cerebellum are  indeterminate and may be due to evolving ischemic changes or possibly  neoplasm. Correlate clinically.  2. MRI may be helpful in further evaluation.    [Critical Result: Left cerebellar abnormality which may be evolving  ischemic changes or possibly neoplasm.]  Finding was identified on 7/20/2018 11:17 PM.     Dr. Andersen was contacted by me on 7/20/2018 11:19 PM and verbalized  understanding of the critical result.  JAYLA PACE MD  Reading per radiology    Head CT  1. Left vertebral artery is dominant being larger than the right vertebral artery, but both vertebral arteries connect up to the basilar artery. The fourth segment of the vertebral artery and the basilar artery appear within normal limits of density.   2. There does appear to be a vessel cutoff approximately a centimeter of the origin of the left posterior inferior cerebellar artery off of the distal left vertebral artery. This does extend to region of early infarct visualized involving the inferior left cerebellar hemisphere.   3. No other discrete aneurysm, vascular malformation, or intracranial stenosis noted involving the arteries at the base of the brain.     Neck CT  1. Normal configuration of the great vessels off the aortic  arch.   2. 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   3. Left vertebral artery most likely dominant vessel and has normal filling of 3rd and 4th segments. No contrast seen in it from its origin to mid neck region. There is prominent calcification at its origin. This is suggestive of dissection or occlusion with reflux of contrast.   4. Approximately 40% stenosis involving the origin of the right internal carotid artery by NASCET criteria.   5. Approximately 20% stenosis involving the origin of the left internal carotid artery by NASCET criteria.     Laboratory:  Laboratory findings were communicated with the patient who voiced understanding of the findings.    UA with Microscopic: ketone 10 (A), blood moderate (A), protein albumin 10 (A), leukocyte esterase moderate (A), WBC/HPF 33 (H), RBC/HPF 82 (H), mucous present (A) o/w WNL  Urine culture aerobic bacterial: pending   CBC: WBC 13.6(H), HGB 13.2(L),   CMP: glucose 126(H), albumin 3.3(L) o/w WNL (Creatinine 0.93)  Lipase: 105  Troponin (Collected 2312): <0.015  INR: 1.11  Partial thromboplastin time: 30    Interventions:  2253 NS 1000 mL IV  2301 Zofran 4 mg IV  2342 Iopamidol 70 mL IV  0027 Saline flush 90 mL IV  0127 Rocephin 1 g IV  0205 Asprin chewable 324 mg Oral   0205 Plavix 300 mg Oral      Emergency Department Course:    2156 Nursing notes and vitals reviewed.    2205 I performed an exam of the patient as documented above.     2205 IV was inserted and blood was drawn for laboratory testing, results above.    2312 The patient was sent for a Head CT while in the emergency department, results above.     2319  I spoke with Dr. West of the Radiology service from [facility] regarding patient's presentation, findings, and plan of care.    2319 A code stroke was called for the patient after speaking to Radiology.     2326 I spoke with Dr. Herrera of the Neurology service from [facility] regarding  patient's presentation, findings, and plan of care.    2330 I returned to update the patient.     0026 The patient was sent for a CTA Head Neck with Contrast while in the emergency department, results above.     0030 The patient provided a urine sample here in the emergency department. This was sent for laboratory testing, findings above.    0144  I spoke with Dr. Guthrie of the Hospitalist service from [facility] regarding patient's presentation, findings, and plan of care.    0147 I returned to update the patient and preformed a bedside swallow evaluation as Dr. Herrera requested that the patient take Plavix and Asprin.     0314 I personally reviewed the laboratory and imaging results with the patient and answered all related questions prior to admittance.    Impression & Plan      Medical Decision Making:  Shamir Concepcion is a 78 year old male who presents to the emergency department today for evaluation of nausea, dizziness, and generalized weakness he woke with this morning. This evening he had a fall and struck his nose which prompted a call to his family.     The patient's initial neurologic exam is non focal aside from being ataxic. His EKG shows sinus rhythm without ischemic looking changes. He remained in sinus rhythm on the monitor. White cell count is elevated at 13.6 and hemoglobin is stable at 13.2. Platelets look unremarkable. The patient has a left shift. He complained of dysuria, decreased urgency, and frequency. He reports he has a prostate problem. Urinalysis has red and white cells concerning for infection. He was given a does of Rocephin. Comprehensive metabolic panel and coags are normal.     With the patient's fall and nasal contusion along with his weakness and dizziness, I sent him for a CT scan of the brain. He was already outside the window for TPA, and I was concerned for traumatic injury. CT shows abnormality in his left cerebellum that was concerning for possible stroke. I  discussed with Dr. Herrera on call for Neurology, and he requested a CT angiogram of the Head and Neck, and this showed a cut off of the left posterior inferior cerebellar artery off of the distal left vertebral artery. The radiologist also noted arthrosclerotic change in the left vertebral artery with prominent calcification. She thought this could represent dissection or occlusion with reflux of contrast. This was reviewed by Dr. Herrera, and he thought the patient's stroke was most likely due to a rupture of the plaque in the area of the vertebral artery. He did not think there was a role for interventional neuro-radiology at this point. He also thought this was unlikely due to dissection. He recommended loading with oral Asprin and Plavix. After a bedside swallow exam completed by myself, the patient was given a loding dose.     Throughout the stay, I kept the patient and his daughters apprised of ongoing evaluation,. We discussed the CT Angiogram report and the plan for hospitalization and further evaluation and treatment of his stroke. They voiced understanding. Dr. Guthrie from the hospitalist service accepted for admission.     Diagnosis:    ICD-10-CM    1. Cerebrovascular accident (CVA), unspecified mechanism (H) I63.9 Urine Culture Aerobic Bacterial   2. Urinary tract infection without hematuria, site unspecified N39.0      Disposition:   The patient is admitted into the care of Dr. Guthrie.    CMS Diagnoses: The patient has stroke symptoms:           ED Stroke specific documentation           NIHSS PDF          Protocol PDF     Patient last known well time: bedtime 7/19  ED Provider first to bedside at: 2205  CT Results received at: 2319  Patient was not treated with TPA due to the following reason(s):  Out of the treatment time window, CVA already visible on plain head CT    National Institutes of Health Stroke Scale (Baseline)  Time Performed: 2205      Score    Level of consciousness: (0)   Alert, keenly  responsive    LOC questions: (0)   Answers both questions correctly    LOC commands: (0)   Performs both tasks correctly    Best gaze: (0)   Normal    Visual: (0)   No visual loss    Facial palsy: (0)   Normal symmetrical movements    Motor arm (left): (0)   No drift    Motor arm (right): (0)   No drift    Motor leg (left): (0)   No drift    Motor leg (right): (0)   No drift    Limb ataxia: (1)   Present in one limb    Sensory: (0)   Normal- no sensory loss    Best language: (0)   Normal- no aphasia    Dysarthria: (0)   Normal    Extinction and inattention: (0)   No abnormality        Total Score:  1        Stroke Mimics were considered (including migraine headache, seizure disorder, hypoglycemia (or hyperglycemia), head or spinal trauma, CNS infection, Toxin ingestion and shock state (e.g. sepsis) .        Scribe Disclosure:  I, Anushapema Torres, am serving as a scribe at 10:04 PM on 7/20/2018 to document services personally performed by Tahir Andersen MD based on my observations and the provider's statements to me.   EMERGENCY DEPARTMENT       Tahir Andersen MD  07/21/18 0616

## 2018-07-21 NOTE — PLAN OF CARE
Problem: Patient Care Overview  Goal: Plan of Care/Patient Progress Review  Outcome: No Change  Alert, disoriented to time. Neuros intact, except gait/balance issues upon standing. VSS. Tele SR w/ PVCs. Pt passed beside nurse swallow eval, advanced to Regular diet. Up with A2, but not ambulating until further work w/ therapies. Denies pain. K+ replaced for level of 3.2, first dose given at 1300, next dose due at 1500. Plan for continued stroke work up and now Q2hr neuros.

## 2018-07-21 NOTE — ED NOTES
Bed: ED01  Expected date:   Expected time:   Means of arrival:   Comments:  Sumaya 1 Dizzy 78 male

## 2018-07-21 NOTE — PROVIDER NOTIFICATION
"Paged Dr. Pretty, \"714 RP  Pt on Q2hr neuros, change in neuros: L facial droop, double vision, and nausea/vomiting.   5700 Kylee\"    ADDENDUM: Dr. Pretty called back, wants STAT head CT and pt to tx to ICU for Q1hr neuro checks  "

## 2018-07-21 NOTE — H&P
Admitted:     07/20/2018      PRIMARY CARE PHYSICIAN:  Lisbeth Biswas MD      CHIEF COMPLAINT:  Dizziness.      HISTORY OF PRESENT ILLNESS:  Mr. Shamir Concepcion is a delightful 78-year-old  gentleman, with a past medical history notable for peripheral artery disease, rheumatoid arthritis, osteoarthritis, BPH, allergic rhinitis and erectile dysfunction, who has presented to the Emergency Department for evaluation after a fall.  The patient states he woke up around 7:00 in the morning feeling dizzy.  While he was going to the kitchen, he felt nauseous, unsteady and had a bout of emesis, resulting in a fall.  He continued to have nausea as well as dizziness all day.  On direct questioning, he described dizziness as spinning.  He reports no headache, double vision, blurry vision or focal weakness.  He did not notify his family until 8 p.m. when the daughter decided to check on the patient and subsequently called the ambulance.  On direct questioning, he reports dysuria for 1 day.  He denies fever, chills, cough, sputum, chest pain, palpitation or other complaints.      In the Emergency Department, the patient has been evaluated by Dr. Tahir Andersen, the ER attending physician.  The electrocardiogram reveals normal sinus rhythm with a rate of 58 beats per minute.  There is no significant abnormality on chemistry profile.  Hematology profile shows elevated white count of 13.6, normal hemoglobin 13.2 and platelet count of 349,000.  Coagulation profile is essentially unremarkable.  Urinalysis shows clear urine, moderate blood, moderate leukocyte esterase, and 33 WBCs.  CT head without contrast reveals moderate low-intensity changes in left cerebellum, likely due to evolving ischemic changes or less likely neoplasm.  CT angiography of the head and neck reveals left vertebral artery cut off from its origin to mid neck region with prominent calcification at its origin suggestive of dissection or occlusion with reflux of  contrast.  There is also 40% stenosis of the origin of the right internal carotid artery and 20% stenosis of the origin of the left internal carotid artery.  The case has been communicated to on-call Neurologist and there is no concern for dissection.  He is being admitted to the hospital under inpatient status.  In the ER, the patient has been treated with aspirin 324 mg x1, and Plavix 300 mg p.o. x1.      PAST MEDICAL HISTORY:   1.  Peripheral artery disease, status post stent to R SFA in 2013.   2.  Colon polyps.   3.  Seronegative rheumatoid arthritis.   4.  Osteoarthritis.   5.  Benign prostatic hypertrophy.   6.  Allergic rhinitis.   7.  Erectile dysfunction.      PAST SURGICAL HISTORY:   1.  Tonsillectomy.   2.  Adenoidectomy.   3.  Lumber laminectomy.   4.  Cataract surgery.   5.  Sinus surgery.   6.  Vascular surgery.      FAMILY HISTORY:  Mother  of CVA at the age of  70.  Father  of myocardial infarction at the age of 74.      SOCIAL HISTORY:  The patient is  and lives alone is his condo.  He ambulates without any assistive devices.  He has a remote history of smoking, but quit approximately 25 years ago.  Prior to that he had smoked 1 pack a day for approximately 15 years.  He drinks a glass of wine daily.      MEDICATIONS:    Prior to Admission Medications   Prescriptions Last Dose Informant Patient Reported? Taking?   Aspirin (ECOTRIN LOW STRENGTH PO)   Yes No   Sig: Take 1 tablet by mouth daily 81 MG   Finasteride (PROSCAR PO)   Yes No   Sig: Take 5 mg by mouth daily   METHOTREXate 2.5 MG tablet   Yes No   Sig: Take 6 tablets by mouth once a week.   fluticasone (FLONASE ALLERGY RELIEF) 50 MCG/ACT spray   Yes No   Sig: Spray 1 spray into both nostrils daily   folic acid 0.8 MG CAPS   Yes No   Sig: Take 400 mcg by mouth    methotrexate 2.5 MG tablet CHEMO   Yes No   Sig: Take 12.5 mg by mouth   predniSONE (DELTASONE) 5 MG tablet   Yes No   Sig: Take one to two tablets once daily. Use  for FLARE.   tamsulosin (FLOMAX) 0.4 MG capsule   Yes No   Si tab twice daily   vitamin  s/Minerals TABS   Yes No      Facility-Administered Medications: None        ALLERGIES AND INTOLERANCES:  None.      REVIEW OF SYSTEMS:  A 10-point review of system was performed thoroughly and was negative except as stated in the history of present illness.      PHYSICAL EXAMINATION:   GENERAL:  This patient is a very pleasant elderly gentleman who is in no acute distress.   VITAL SIGNS:  Blood pressure 182/81, heart rate 58, respiration rate 18, temperature 97.8 degrees Fahrenheit, oxygen saturation 94% on room air.   HEENT:  The pupils are round, equal and reactive to light bilaterally.  There is no conjunctival pallor.  Sclerae are anicteric.   NECK:  Supple.  There is no elevated JVP.   LUNGS:  Clear to auscultation bilaterally.  No crackles, wheezing or rhonchi.   CARDIOVASCULAR:  There is normal S1 and S2 with regular rate and rhythm.   ABDOMEN:  Soft, nontender, nondistended.  Bowel sounds are present.   EXTREMITIES:  There is no calf tenderness or lower extremity edema.   SKIN:  There is no jaundice, cyanosis or acute rash.   NEUROLOGIC:  He is awake, alert and appears to be fully oriented.  On examination of cranial nerves II-XII, his speech is normal.  There is right eye medial gaze paralysis.  The patient also has right upper extremity dysmetria.  The gait was not tested.      LABORATORY DATA:   Chemistry profile:  Sodium 140, potassium 3.9, BUN 14, creatinine 0.93, calcium 8.5, total protein 7.3, total bilirubin 0.8, ALT 15, AST 19, lipase 105.  Troponin I less than 0.015.  Glucose 126.      Hematology profile:  White count 13.6, hemoglobin 13.2, platelet count 349,000.        CT angio of the head and neck and CT head were reviewed in history of present illness.      ASSESSMENT AND PLAN:  Mr. Shamir Concepcion is a delightful 78-year-old  gentleman, with a past medical history notable for peripheral  artery disease, rheumatoid arthritis, BPH, allergic rhinitis and colon polyps, who has presented with acute onset of dizziness, nausea and fall.  The workup in the Emergency Department has revealed acute left cerebellar stroke.   1.  Acute left cerebellar ischemic stroke: The patient has presented with acute onset of nausea, dizziness and unsteady gait.  CT scan of the head has revealed moderate low-intensity changes in the left cerebellum, likely due to evolving ischemic changes or possibly neoplasm.  CT angiography of the head and neck has revealed left vertebral artery cut off from its origin to mid neck region with prominent calcification at the origin of vertebral artery, suggestive of dissection or occlusion.  The case has been communicated to on-call Neurology, Dr. Herrera, and he is not concerned for dissection.  Electrocardiogram reveals sinus bradycardia with a heart rate of 58 and no atrial fibrillation.  He has normal hemoglobin of 13.2.  In the ER, the patient has been treated with 324 mg of aspirin and 300 mg of Plavix.  He will be admitted to Neuro Unit.  I will activate the standard acute stroke protocol for neuro check every 4 hours, n.p.o. until completion of swallow evaluation, PT, OT, echo with Doppler and MRI of the brain.  I will place a formal consult to Neurology.  He will continue with aspirin.  I will start Crestor 20 mg p.o. daily.  I will check a fasting lipid profile.  I will keep the patient hydrated with normal saline.   2.  Urinary tract infection:  The patient reports dysuria for 1 day.  Urinalysis is abnormal with moderate leukocyte esterase and 33 WBCs.  In the Emergency Department, the patient has been treated with 1 dose of ceftriaxone.  This will be continued pending results of the urine culture.   3.  Hyperglycemia:  Blood glucose is 126, slightly elevated, which is a nonfasting level.  I will check hemoglobin A1c to rule out the possibility of prediabetes.   4.  Hypertension:   The patient has no prior history of hypertension.  His blood pressure is 182/81 which is likely reactive to acute stroke.  We will pursue permissive hypertension with p.r.n. hydralazine and labetalol available per acute stroke protocol.  His blood pressure will be closely monitored.  If the blood pressure remains persistently elevated, will start the patient on oral antihypertensive regimen.   5.  BPH.  He will continue on prior to admission finasteride and tamsulosin.   6.  Rheumatoid arthritis:  He is on weekly methotrexate plus prednisone 5 mg p.o. daily.  I will hold his prior to admission methotrexate and continue his prednisone.   7.  Allergic rhinitis:  He will continue with prior to admission Flonase.   8.  Deep venous thrombosis prophylaxis:  Pneumatic compression device.   9.  CODE STATUS:  IT WAS DISCUSSED, AND THE PATIENT REQUESTED TO BE DNR/DNI, WHICH WAS CONFIRMED BY HIS DAUGHTERS.    10.  Disposition:  I anticipate at least 48 hours of hospital stay.         CANDACE TREJO MD             D: 2018   T: 2018   MT: MELI      Name:     KATTY GRUBER   MRN:      [id number]        Account:      [id number]   :      1940        Admitted:     2018                   Document: [id number]

## 2018-07-21 NOTE — PHARMACY-ADMISSION MEDICATION HISTORY
"Admission medication history interview status for the 7/20/2018  admission is complete. See EPIC admission navigator for prior to admission medications     Medication history source reliability:Moderate    Actions taken by pharmacist (provider contacted, etc):None     Additional medication history information not noted on PTA med list : Patient said the strength of his folic acid was \"around 5.\" Recorded strength on list as 400 mcg daily. Patient gets folic acid OTC.    Medication reconciliation/reorder completed by provider prior to medication history? Yes    Time spent in this activity: 20 minutes    Prior to Admission medications    Medication Sig Last Dose Taking? Auth Provider   Aspirin (ECOTRIN LOW STRENGTH PO) Take 1 tablet by mouth daily 81 MG 7/20/2018 at Unknown time Yes Reported, Patient   Finasteride (PROSCAR PO) Take 5 mg by mouth daily 7/20/2018 at Unknown time Yes Reported, Patient   fluticasone (FLONASE ALLERGY RELIEF) 50 MCG/ACT spray Spray 1 spray into both nostrils daily 7/20/2018 at Unknown time Yes Reported, Patient   FOLIC ACID PO Take 400 mcg by mouth daily Patient gets this medication OTC. 7/20/2018 at Unknown time Yes Unknown, Entered By History   methotrexate 2.5 MG tablet CHEMO Take 10 mg by mouth every 7 days Patient takes on Saturdays. His instructions are to take 5 tablets per dose, but he normally only takes 4 tablets per dose. 7/14/2018 Yes Reported, Patient   PREDNISONE PO Take 5-10 mg by mouth daily as needed For flares  at prn Yes Unknown, Entered By History   tamsulosin (FLOMAX) 0.4 MG capsule 1 tab twice daily 7/20/2018 at x1 Yes Reported, Patient       "

## 2018-07-21 NOTE — CONSULTS
Neurosurgery Brief Consult      Shamir Concepcion is a 78 year old male that presents to [facility] with an acute left cerebellar stroke. Pt has requested to be DNR/DNI and does not want any extreme life-prolonging measures since his wife recently passed. Apparently this was verified by his family.     CT head - left cerebellar ischemia  CTA - left PICA occlusion distal to the origin, occlusion of the left vertebral artery and left cerebellar infarct   MRI brain - left cerebellar acute infarct, small right pontine infarct and left occipital lobe    Rec:  Pt wishes to be DNR/DNI and have no extreme life-prolonging measures. Therefore, there is no role for neurosurgical intervention and I recommend medical management per Neurocritical care

## 2018-07-21 NOTE — PROVIDER NOTIFICATION
Discussed with Dr. Pretty - discontinue sodium chloride 23.4% one time bolus as patient does not have central line.  Continue with NaCl 2% infusion as ordered, I recommended every 6 hour Na level checks.    19:30 clarified with Dr. Pretty - Pharmacy consult heparin infusion per stroke protocol, goal Xa 0.15-0.35. No initial bolus or throughout. Received heparin subcutaneous 17:30. Continue Aspirin, discontinue plavix.

## 2018-07-21 NOTE — CONSULTS
[facility]      Neurology Stroke Consult    Patient Name: Shamir Concepcion  : 1940 MRN#: [id number]    STROKE DATA    Stroke Code:  Stroke code not activated.  Time patient seen:  2018 1245  Last known normal (pt's baseline):   evening    TPA treatment:  Not given due to outside the time window.     National Institutes of Health Stroke Scale (at presentation)  NIHSS done at:  time patient seen      Score    Level of consciousness:  (0)   Alert, keenly responsive     LOC questions:  (2)   Answers neither question correctly    LOC commands:  (0)   Performs both tasks correctly    Best gaze:  (0)   Normal    Visual:  (0)   No visual loss    Facial palsy:  (1)   Minor paralysis (flat nasolabial fold, smile asymmetry)    Motor arm (left):  (0)   No drift    Motor arm (right):  (1)   Drift    Motor leg (left):  (0)   No drift    Motor leg (right):  (0)   No drift    Limb ataxia:  (2)   Present in two limbs    Sensory:  (0)   Normal- no sensory loss    Best language:  (0)   Normal- no aphasia    Dysarthria:  (1)   Mild to moderate dysarthria    Extinction and inattention:  (0)   No abnormality        NIHSS Total Score:  7        Dysphagia Screen  Time of screenin2018 1245  Screening results: Dysarthria present - maintain NPO, consult SLP     ASSESSMENT & RECOMMENDATIONS     Impression:   Acute ischemic stroke, L cerebellum, R post nataly, L occipital lobe  At risk of brainstem compression from edema: will consult NSG so they are aware, he may need a decompression surgery. In discussion with the patient, he is not interested in extreme life-prolonging measures as his wife just recently passed away of cancer. We did discuss that the edema would be temporary, and the surgery is not a certainty. Would like NSG to at least be aware of the patient, should things progress quickly.     Recommendations:  Acute Ischemic Stroke (without tPA) Plan  - head CT tomorrow morning or sooner if  changes to exam   - Neurochecks q2hr  - Long-term BP goal <140/90, ok to give antihypertensives  - Euthermia, Euglycemia  - Daily aspirin for secondary stroke prevention and plavix  - Statin  - MRI Stroke Protocol done  - TTE with Bubble Study  - Telemetry, EKG  - Bedside Glucose Monitoring  - A1c, Lipid Panel, Troponin x 3  - PT/OT/SLP  - PM&R  - Stroke Education  - Depression Screen  - Apnea Screen    Update: the patient had changes in his exam and is now in the ICU. NSG has been consulted.    Prophylaxis          For VTE Prevention:  - heparin SQ     The patient will be managed by the hospitalist team and  followed by the Stroke Consult service.    HPI  Shamir Concepcion is a 78 year old male with w hx BPH, arthritis (on methotrexate), presented 2018 to the ED after waking up with nausea and vertigo. Fell at home. He was last normal in the evening on . CT and subsequent MRI reveal left cerebellar infarct with some extension on the left dorsal nataly and left PCA distribution as well. He noted dysarthria and had not eaten since onset of symptoms, so hadn't noticed dysphagia. He has no history of HTN, HLD, stroke, DM2, or heart disease. He frequents the [employer] daily. His wife recently  of cancer. He was on daily aspirin 81 mg prior to admission.     Pertinent Past Medical/Surgical History  Past Medical History:   Diagnosis Date     Allergic rhinitis      BPH (benign prostatic hyperplasia)      Colon polyps      ED (erectile dysfunction)      Family history of colon cancer      PAD (peripheral artery disease) (H)      RA (rheumatoid arthritis) (H)      Seronegative rheumatoid arthritis (H)        Past Surgical History:   Procedure Laterality Date     ANGIOGRAM       C MRA UPPER EXTREMITY WO&W CONT  stenting right SFA-AK pop     COLONOSCOPY      polyps     ENT SURGERY      T&A     LAMINECT/DISCECTOMY, LUMBAR       ORTHOPEDIC SURGERY       PHACOEMULSIFICATION CLEAR CORNEA WITH STANDARD INTRAOCULAR LENS  IMPLANT Left 4/4/2017    Procedure: PHACOEMULSIFICATION CLEAR CORNEA WITH STANDARD INTRAOCULAR LENS IMPLANT;  Surgeon: Stevie Espinal MD;  Location: [facility]     PHACOEMULSIFICATION CLEAR CORNEA WITH STANDARD INTRAOCULAR LENS IMPLANT Right 5/2/2017    Procedure: PHACOEMULSIFICATION CLEAR CORNEA WITH STANDARD INTRAOCULAR LENS IMPLANT;  RIGHT EYE PHACOEMULSIFICATION CLEAR CORNEA WITH STANDARD INTRAOCULAR LENS IMPLANT;  Surgeon: Stevie Espinal MD;  Location: [facility]     SINUS SURGERY       VASCULAR SURGERY  R SFA stenting  2012       Medications:   Prescriptions Prior to Admission   Medication Sig Dispense Refill Last Dose     Aspirin (ECOTRIN LOW STRENGTH PO) Take 1 tablet by mouth daily 81 MG   7/20/2018 at Unknown time     Finasteride (PROSCAR PO) Take 5 mg by mouth daily   7/20/2018 at Unknown time     fluticasone (FLONASE ALLERGY RELIEF) 50 MCG/ACT spray Spray 1 spray into both nostrils daily   7/20/2018 at Unknown time     FOLIC ACID PO Take 400 mcg by mouth daily Patient gets this medication OTC.   7/20/2018 at Unknown time     methotrexate 2.5 MG tablet CHEMO Take 10 mg by mouth every 7 days Patient takes on Saturdays. His instructions are to take 5 tablets per dose, but he normally only takes 4 tablets per dose.   7/14/2018     PREDNISONE PO Take 5-10 mg by mouth daily as needed For flares    at prn     tamsulosin (FLOMAX) 0.4 MG capsule 1 tab twice daily   7/20/2018 at x1   .    Allergies: No Known Allergies.    Family History:   Family History   Problem Relation Age of Onset     Cancer Mother      Cerebrovascular Disease Father    .  The patient has a family history significant for stroke.    Social History: [place] [facility] [place]. His wife recently passed away of cancer. He does have two daughters in the state who visit him weekly. He goes to the  almost every day. He does not smoke, smoked a long time ago.     ROS:  The 10 point Review of Systems is negative other than noted in  the HPI or here. Unremarkable.     PHYSICAL EXAMINATION  Vital Signs:  B/P: 146/65,  T: 97.6,  P: 60,  R: 18    General:  patient lying in bed without any acute distress    HEENT:  normocephalic/atraumatic  Cardio:  RRR  Pulmonary:  no respiratory distress  Abdomen:  non-distended  Extremities:  no edema  Skin:  intact     Neurologic  Mental Status:  Awake, alert. Answering questions. Not oriented to month (August) or age. Difficulty answering some questions. Follows simple commands.   Cranial Nerves:  Gaze slightly dysconjugate, left eye appears adducted. Hypometric  Saccades aleksandr w rightward gaze. Mild left facial droop. Hearing grossly intact to loud conversation. Sensation equal.   Motor:  Mild right pronator drift. Leg strength is without drift, strong dorsi and plantar flexion of the foot. Normal bulk.   Sensory:  Intact to LT, symmetric, no extinction  Coordination:  Significant dysmetria on FNF on the left and mild difficulty with the right. Heel-shin w mild difficulty w the left leg.       Labs  Labs and Imaging reviewed and used in developing the plan; pertinent results included.     Lab Results   Component Value Date     (H) 07/20/2018     LDL Cholesterol Calculated   Date Value Ref Range Status   07/21/2018 88 <100 mg/dL Final     Comment:     Desirable:       <100 mg/dl      Lab Results   Component Value Date    A1C 5.3 07/21/2018    A1C 5.3 09/11/2013        Lab Results   Component Value Date    WBC 13.6 07/20/2018     Lab Results   Component Value Date    RBC 4.16 07/20/2018     Lab Results   Component Value Date    HGB 13.2 07/20/2018     Lab Results   Component Value Date    HCT 38.8 07/20/2018     No components found for: MCT  Lab Results   Component Value Date    MCV 93 07/20/2018     Lab Results   Component Value Date    MCH 31.7 07/20/2018     Lab Results   Component Value Date    MCHC 34.0 07/20/2018     Lab Results   Component Value Date    RDW 14.3 07/20/2018     Lab Results   Component  Value Date     07/21/2018      Last Basic Metabolic Panel:  Lab Results   Component Value Date     07/20/2018      Lab Results   Component Value Date    POTASSIUM 3.9 07/20/2018     Lab Results   Component Value Date    CHLORIDE 107 07/20/2018     Lab Results   Component Value Date    RANJAN 8.5 07/20/2018     Lab Results   Component Value Date    CO2 25 07/20/2018     Lab Results   Component Value Date    BUN 14 07/20/2018     Lab Results   Component Value Date    CR 0.93 07/20/2018     Lab Results   Component Value Date     07/20/2018            Kim Gregory MD   Stroke neurology fellow

## 2018-07-21 NOTE — PROGRESS NOTES
RECEIVING UNIT ED HANDOFF REVIEW    ED Nurse Handoff Report was reviewed by: Elvie King on July 21, 2018 at 3:05 AM

## 2018-07-21 NOTE — PLAN OF CARE
Problem: Patient Care Overview  Goal: Plan of Care/Patient Progress Review  OT attempted.  Patient sleeping with light off, when aroused asked therapy to come back later, as he'd been with providers all morning.  Nursing agreed that he would benefit from some rest.

## 2018-07-21 NOTE — PROGRESS NOTES
No charge note.  Admitted this AM by Dr. Guthrie with acute L cerebellar stroke.  Seen by PT and speech.  Awaiting neurology eval.  Continue plan as outlined in Dr. Guthrie's note.  Valentina Wilde MD

## 2018-07-21 NOTE — ED NOTES
[facility]  ED Nurse Handoff Report    ED Chief complaint: Dizziness (dizziness today with a fall. hit face, no loc. also had n/v today)      ED Diagnosis:   Final diagnoses:   Cerebrovascular accident (CVA), unspecified mechanism (H)   Urinary tract infection without hematuria, site unspecified       Code Status: Full Code    Allergies: No Known Allergies    Activity level - Baseline/Home:  Independent    Activity Level - Current:   Needs 1 strong assist to stand as pt leans heavily to left while standing.     Needed?: No    Isolation: No  Infection: Not Applicable  Bariatric?: No    Vital Signs:   Vitals:    07/21/18 0050 07/21/18 0100 07/21/18 0127 07/21/18 0130   BP:  159/73  170/78   Resp:  19     Temp:       TempSrc:       SpO2: 95% 97% 98%        Cardiac Rhythm: ,        Pain level: 0-10 Pain Scale: 0    Is this patient confused?: No   [place] - Suicide Severity Rating Scale Completed?  Yes  If yes, what color did the patient score?  White    Patient Report: Initial Complaint: Pt presents with dizziness resulting in a fall 7/20, hit face, did not lose consciousness. Pt also complains of being nauseous today. Daughter reports he seems more confused. Pt struggles to urinate, and complains of burning on urination.  Focused Assessment: Pt alert & oriented X4, no complaints of headache. Neuro WDL except ataxic, and patient leans significantly to the left when standing- creating fall risk. Pt experiences urinary frequency. Some bruising on face, small laceration to left hand.   Tests Performed: Labs, UA, Bladder scan, EKG, CT, CTA   Abnormal Results: Head CT scan, UA WBC and RBC, Serum WBC  Treatments provided: ceftriaxone, aspirin, clopidogrel, fluids, zofran    Family Comments: Two daughters present.    OBS brochure/video discussed/provided to patient: N/A    ED Medications:   Medications   cefTRIAXone (ROCEPHIN) 1 g vial to attach to  mL bag for ADULTS or NS 50 mL bag for PEDS  (1 g Intravenous New Bag 7/21/18 0127)   aspirin chewable tablet 324 mg (not administered)   clopidogrel (PLAVIX) tablet 300 mg (not administered)   0.9% sodium chloride BOLUS (0 mLs Intravenous Stopped 7/21/18 0114)   ondansetron (ZOFRAN) injection 4 mg (4 mg Intravenous Given 7/20/18 2301)   iopamidol (ISOVUE-370) solution 70 mL (70 mLs Intravenous Given 7/20/18 2342)   Saline flush (90 mLs Intravenous Given 7/21/18 0027)       Drips infusing?:  No    For the majority of the shift this patient was Green.   Interventions performed were Quiet environment.    Severe Sepsis OR Septic Shock Diagnosis Present: No      ED NURSE PHONE NUMBER: [phone]

## 2018-07-21 NOTE — PROGRESS NOTES
[place]-Suicide Severity Rating scale completed; negative.  Screening tool for decision making/ guardianship completed; patient makes his own decisions.

## 2018-07-21 NOTE — PROGRESS NOTES
07/21/18 1100   Quick Adds   Type of Visit Initial PT Evaluation   Living Environment   Lives With alone   Living Arrangements apartment  (2nd floor apt, elevator access)   Home Accessibility no concerns   Number of Stairs to Enter Home 0   Number of Stairs Within Home 0   Stair Railings at Home none   Self-Care   Usual Activity Tolerance good   Current Activity Tolerance poor   Equipment Currently Used at Home none   Activity/Exercise/Self-Care Comment Patient states he previously lived at home independently, did not use an AD   Functional Level Prior   Ambulation 0-->independent   Transferring 0-->independent   Toileting 0-->independent   Bathing 0-->independent   Dressing 0-->independent   Eating 0-->independent   Communication 0-->understands/communicates without difficulty   Swallowing 0-->swallows foods/liquids without difficulty   Cognition 0 - no cognition issues reported   Fall history within last six months no   Number of times patient has fallen within last six months 0   Which of the above functional risks had a recent onset or change? ambulation;transferring   General Information   Onset of Illness/Injury or Date of Surgery - Date 07/20/18   Referring Physician Oumou Guthrie MD   Patient/Family Goals Statement Not stated   Pertinent History of Current Problem (include personal factors and/or comorbidities that impact the POC) Patient admitted on 7/20/18 for evaluation of nauseous, unsteadiness, and emesis resulting in a fall while at home. CT imaging showing moderate low-intensity changes in the left cerebellum, likely due to evolving ischemic changes or possibly neoplasm. Patient with PMH of peripheral artery disease, rheumatoid arthritis, and osteoarthritis.    Precautions/Limitations fall precautions   Weight-Bearing Status - LUE full weight-bearing   Weight-Bearing Status - RUE full weight-bearing   Weight-Bearing Status - LLE full weight-bearing   General Observations Patient sidelying in  bed upon arrival of therapist, RN present for beginning of session. Agreeable to working with therapy, but tired and wishing to rest after.   Cognitive Status Examination   Orientation person;place   Level of Consciousness alert;lethargic/somnolent  (sleepy at beginning, more alert once sitting EOB)   Follows Commands and Answers Questions able to follow multistep instructions;100% of the time   Personal Safety and Judgment intact   Pain Assessment   Patient Currently in Pain No   Integumentary/Edema   Integumentary/Edema no deficits were identifed   Posture    Posture Forward head position;Protracted shoulders   Range of Motion (ROM)   ROM Quick Adds No deficits were identified   Strength   Strength Comments With formal strength testing B LE 4+/5 throughout with B dorsiflexion being 4/5. No strength deficits noted compared side to side.    Bed Mobility   Bed Mobility Comments Patient able to complete supine to sit transfer with CGA and use of bed rails. Patient noting dizziness upon sitting at EOB, but states it decreases as he sits a little longer. Patient returned to supine in bed at end of session with bed alarm on and all needs within reach.   Transfer Skills   Transfer Comments Patient able to complete sit<>stand transfer with use of FWW and Min A. Patient with increased lean to the L upon standing requiring Min A to help correct.    Gait   Gait Comments Patient able to take a few side steps towards the HOB with Min to Mod A. Patient instructed to take side steps, but initially just leaning to the L; further instruction provided. Patient then able to take a few steps towards the L with use of FWW and Min to Mod A. Patient with increased L lean during side steps requiring Mod A to help correct before sitting down. Patient sleepy throughout session, so did not initiate gait further than gait at EOB this date    Balance   Balance Comments Patient able to sit at the EOB for approximately 3 minutes with varying UE  "support of double to no UE support. Patient with L trunk lean while sitting at EOB. Patient able to self correct at times, but requiring cues 50% of the time to correct. Patient with increased L lean during standing requiring mod A to help correct with use of FWW.    Coordination   Coordination Comments No R and L UE ataxia noted with finger to nose with RN assessment during session, able to complete alternating toe taps quickly with no deficits noted   Muscle Tone   Muscle Tone no deficits were identified   General Therapy Interventions   Planned Therapy Interventions balance training;bed mobility training;gait training;strengthening;transfer training   Clinical Impression   Criteria for Skilled Therapeutic Intervention yes, treatment indicated   PT Diagnosis Impaired mobility and gait   Influenced by the following impairments weakness, decreased balance, dizziness   Functional limitations due to impairments bed mobility, transfers, gait   Clinical Presentation Stable/Uncomplicated   Clinical Presentation Rationale Based on PMH, current presentation, and social support   Clinical Decision Making (Complexity) Low complexity   Therapy Frequency` daily   Predicted Duration of Therapy Intervention (days/wks) 4 days   Anticipated Equipment Needs at Discharge (FWW??)   Anticipated Discharge Disposition Acute Rehabilitation Facility   Risk & Benefits of therapy have been explained Yes   Patient, Family & other staff in agreement with plan of care Yes   Clinical Impression Comments Patient sleepy at end of session and wanting to rest, upon exiting room discussed d/c recommendations with daughter and spouse.    [facility] [employer] TM \"6 Clicks\"   2016, Trustees of [facility], under license to [employer].  All rights reserved.   6 Clicks Short Forms Basic Mobility Inpatient Short Form   [facility] AM-PAC  \"6 Clicks\" V.2 Basic Mobility Inpatient Short Form   1. Turning from your back to your side while in " a flat bed without using bedrails? 3 - A Little   2. Moving from lying on your back to sitting on the side of a flat bed without using bedrails? 3 - A Little   3. Moving to and from a bed to a chair (including a wheelchair)? 2 - A Lot   4. Standing up from a chair using your arms (e.g., wheelchair, or bedside chair)? 3 - A Little   5. To walk in hospital room? 2 - A Lot   6. Climbing 3-5 steps with a railing? 1 - Total   Basic Mobility Raw Score (Score out of 24.Lower scores equate to lower levels of function) 14   Total Evaluation Time   Total Evaluation Time (Minutes) 25

## 2018-07-21 NOTE — PLAN OF CARE
Problem: Patient Care Overview  Goal: Plan of Care/Patient Progress Review  SLP: Attempted to see pt x3 to complete swallow eval, however pt busy with testing upon first two attempts. Third attempt, pt declined participation d/t fatigue. RN reports pt passed nursing 3oz water screen and tolerated pills with water without difficulty. Will follow up tomorrow to complete swallow eval, if indicated.

## 2018-07-21 NOTE — PLAN OF CARE
Problem: Patient Care Overview  Goal: Plan of Care/Patient Progress Review  PT:  Discharge Planner PT   Patient plan for discharge: Not stated  Current status: Orders received, chart reviewed, and initial physical therapy evaluation completed. Patient admitted for evaluation of nausea, unsteadiness, and emesis resulting in a fall. CT imaging showing moderate low intensity changes in the L cerebellum, likely due to evolving ischemic changes or possible neoplasm. Patient previously resided in a 2nd floor apartment by himself with elevator access; patient states he did not use any assistive device previously. Patient seen for physical therapy evaluation this morning and sleepy upon arrival of therapist but agreeable to working with therapy; RN present for beginning of session. Patient able to complete bed mobility to sit at EOB with CGA and use of bed rail. Patient able to sit at EOB for approximately 5 minutes with varying UE support between no and double UE support. Increased L trunk lean noted with sitting, patient able to correct 50% of the time and requiring cues to correct the rest of the time. Patient able to complete sit to stand transfer with use of FWW and Min A. Patient with increased L lean upon standing, Min A required to correct. Patient then able to take a few steps towards the HOB with use of FWW; increased L lean noted requiring Min to Mod A to correct. Patient sleepy towards end of session so further gait not initiated this session. Patient supine in bed at end of session with bed alarm on and needs within reach. Able to discuss discharge recommendations with daughter as exiting room.   Barriers to return to prior living situation: current level of assistance, decreased balance   Recommendations for discharge: ARU  Rationale for recommendations: Patient was previously independent at baseline and is currently requiring and increased level of assistance for mobility and ambulation. Patient would benefit  from continued intensive therapy to further address functional limitations and improve strength and independence with mobility and ambulation.        Entered by: Alondra Ha 07/21/2018 12:07 PM

## 2018-07-21 NOTE — PROGRESS NOTES
Neurology Critical care Attending note:    I was called for Mr Concepcion that he is having worsening neurological symptoms of nausea/vomiting and double vision, in-addition to the left face weakness, left hemiparesis, dysarthria, left hemibody ataxia and dysphagia. A stat CT and CTA was done which showed that his Left cerebellar stroke edema is much larger with shift of middle posterior fossa structure and worsening compression of the 4th ventricle without hydrocephalus.  Patient was seen by Neurosurgery this afternoon and patient expressed his wishes to be DNI/DNR. He also expressed the same wishes with me in the morning. He doesn't want surgery and what us to let him go if his condition worsen. I discussed the surgery details and prognosis of the cerebellar infarct in length with him but he still choose to avoid any sort of surgery. He just wants us to let him go if he gets worse.     I discussed with the family the options of medical therapy. They understand that this is only temporary measures to prevent strokes and reduced the edema but edema might get worse and if he developed brainstem compression and acute hydrocephalus it will not be helpful. Initially the patient and family accepted to start medical therapy alone with hypertonic saline alone without heparin.     At around 18:45 the patient changed his mind and he told me that he accepts the surgery if required and wants to be a candidate. He wants to be intubated and have a breathing tube if he stops breathing. I contacted Dr. Bella from Neurosurgery and updated him and updated the neurocritical care attending covering over night.    Plan:  1) Start 2% hypertonic saline at 100cc/hr  2) He needs central line to start 3% hypertonic (I will contact ICU attending)  3) Start heparin drip due to new stroke symptoms (patient understand risks of bleeding)  4) q1h Neurochecks  5) Neurosurge to be notified with any change in exam  6) Reimage head if new neurological  symptoms  7) Stat brain MRI    Shania Pretty MD  Neuro critical care

## 2018-07-21 NOTE — PLAN OF CARE
Problem: Patient Care Overview  Goal: Plan of Care/Patient Progress Review  CR/Smoking cessation: Orders for smoking cessation received, per chart review pt with remote history of smoking, quit over 25 years ago. Smoking cessation orders completed at this time.

## 2018-07-21 NOTE — PROGRESS NOTES
Pt on cart and transferred to ER CT for stat scan. Pt will transfer with flying squad to ICU. Report called and given to Uma ICU RN. Writer updated MD. Dr. Pretty regarding pt status.

## 2018-07-21 NOTE — PLAN OF CARE
Problem: Patient Care Overview  Goal: Plan of Care/Patient Progress Review  A&O x3, disoriented to time. Lean to left side when ambulating, ataxia to RUE. Appeared to have nystagmus in both eyes. Other neuros intact. VSS. Passed bedside swallow test. Up with ext. Assist x2, pivot transfer from chair to bed. Denies pain. Plan for MRI this AM. Discharge plan pending, continue monitoring.

## 2018-07-22 ENCOUNTER — APPOINTMENT (OUTPATIENT)
Dept: OCCUPATIONAL THERAPY | Facility: CLINIC | Age: 78
DRG: 064 | End: 2018-07-22
Attending: PSYCHIATRY & NEUROLOGY
Payer: COMMERCIAL

## 2018-07-22 ENCOUNTER — APPOINTMENT (OUTPATIENT)
Dept: SPEECH THERAPY | Facility: CLINIC | Age: 78
DRG: 064 | End: 2018-07-22
Attending: INTERNAL MEDICINE
Payer: COMMERCIAL

## 2018-07-22 LAB
ANION GAP SERPL CALCULATED.3IONS-SCNC: 7 MMOL/L (ref 3–14)
BACTERIA SPEC CULT: NO GROWTH
BUN SERPL-MCNC: 12 MG/DL (ref 7–30)
CALCIUM SERPL-MCNC: 7.9 MG/DL (ref 8.5–10.1)
CHLORIDE SERPL-SCNC: 111 MMOL/L (ref 94–109)
CO2 SERPL-SCNC: 24 MMOL/L (ref 20–32)
CREAT SERPL-MCNC: 0.94 MG/DL (ref 0.66–1.25)
ERYTHROCYTE [DISTWIDTH] IN BLOOD BY AUTOMATED COUNT: 14.5 % (ref 10–15)
GFR SERPL CREATININE-BSD FRML MDRD: 77 ML/MIN/1.7M2
GLUCOSE BLDC GLUCOMTR-MCNC: 108 MG/DL (ref 70–99)
GLUCOSE BLDC GLUCOMTR-MCNC: 118 MG/DL (ref 70–99)
GLUCOSE BLDC GLUCOMTR-MCNC: 121 MG/DL (ref 70–99)
GLUCOSE BLDC GLUCOMTR-MCNC: 95 MG/DL (ref 70–99)
GLUCOSE SERPL-MCNC: 103 MG/DL (ref 70–99)
HCT VFR BLD AUTO: 38.7 % (ref 40–53)
HGB BLD-MCNC: 12.9 G/DL (ref 13.3–17.7)
LMWH PPP CHRO-ACNC: 0.26 IU/ML
LMWH PPP CHRO-ACNC: 0.32 IU/ML
Lab: NORMAL
MCH RBC QN AUTO: 31.5 PG (ref 26.5–33)
MCHC RBC AUTO-ENTMCNC: 33.3 G/DL (ref 31.5–36.5)
MCV RBC AUTO: 95 FL (ref 78–100)
OSMOLALITY SERPL: 297 MMOL/KG (ref 280–301)
OSMOLALITY SERPL: 297 MMOL/KG (ref 280–301)
OSMOLALITY SERPL: 301 MMOL/KG (ref 280–301)
OSMOLALITY SERPL: 304 MMOL/KG (ref 280–301)
PLATELET # BLD AUTO: 343 10E9/L (ref 150–450)
POTASSIUM SERPL-SCNC: 3.7 MMOL/L (ref 3.4–5.3)
RBC # BLD AUTO: 4.09 10E12/L (ref 4.4–5.9)
SODIUM SERPL-SCNC: 142 MMOL/L (ref 133–144)
SODIUM SERPL-SCNC: 143 MMOL/L (ref 133–144)
SODIUM SERPL-SCNC: 144 MMOL/L (ref 133–144)
SODIUM SERPL-SCNC: 146 MMOL/L (ref 133–144)
SPECIMEN SOURCE: NORMAL
WBC # BLD AUTO: 13.9 10E9/L (ref 4–11)

## 2018-07-22 PROCEDURE — 84295 ASSAY OF SERUM SODIUM: CPT | Performed by: PSYCHIATRY & NEUROLOGY

## 2018-07-22 PROCEDURE — 97166 OT EVAL MOD COMPLEX 45 MIN: CPT | Mod: GO | Performed by: OCCUPATIONAL THERAPIST

## 2018-07-22 PROCEDURE — 00000146 ZZHCL STATISTIC GLUCOSE BY METER IP

## 2018-07-22 PROCEDURE — 99232 SBSQ HOSP IP/OBS MODERATE 35: CPT | Performed by: INTERNAL MEDICINE

## 2018-07-22 PROCEDURE — 25000128 H RX IP 250 OP 636: Performed by: INTERNAL MEDICINE

## 2018-07-22 PROCEDURE — 92610 EVALUATE SWALLOWING FUNCTION: CPT | Mod: GN

## 2018-07-22 PROCEDURE — 97535 SELF CARE MNGMENT TRAINING: CPT | Mod: GO | Performed by: OCCUPATIONAL THERAPIST

## 2018-07-22 PROCEDURE — 85520 HEPARIN ASSAY: CPT | Performed by: PSYCHIATRY & NEUROLOGY

## 2018-07-22 PROCEDURE — 25000132 ZZH RX MED GY IP 250 OP 250 PS 637: Performed by: INTERNAL MEDICINE

## 2018-07-22 PROCEDURE — 40000225 ZZH STATISTIC SLP WARD VISIT

## 2018-07-22 PROCEDURE — 80048 BASIC METABOLIC PNL TOTAL CA: CPT | Performed by: PSYCHIATRY & NEUROLOGY

## 2018-07-22 PROCEDURE — 25000125 ZZHC RX 250: Performed by: INTERNAL MEDICINE

## 2018-07-22 PROCEDURE — 92526 ORAL FUNCTION THERAPY: CPT | Mod: GN

## 2018-07-22 PROCEDURE — 20000003 ZZH R&B ICU

## 2018-07-22 PROCEDURE — 25000125 ZZHC RX 250: Performed by: PSYCHIATRY & NEUROLOGY

## 2018-07-22 PROCEDURE — 40000133 ZZH STATISTIC OT WARD VISIT: Performed by: OCCUPATIONAL THERAPIST

## 2018-07-22 PROCEDURE — 83930 ASSAY OF BLOOD OSMOLALITY: CPT | Performed by: PSYCHIATRY & NEUROLOGY

## 2018-07-22 RX ADMIN — CEFTRIAXONE 1 G: 1 INJECTION, POWDER, FOR SOLUTION INTRAMUSCULAR; INTRAVENOUS at 02:32

## 2018-07-22 RX ADMIN — PREDNISONE 5 MG: 5 TABLET ORAL at 08:24

## 2018-07-22 RX ADMIN — TAMSULOSIN HYDROCHLORIDE 0.4 MG: 0.4 CAPSULE ORAL at 21:38

## 2018-07-22 RX ADMIN — SODIUM CHLORIDE 500 ML: 3 INJECTION, SOLUTION INTRAVENOUS at 19:32

## 2018-07-22 RX ADMIN — FINASTERIDE 5 MG: 5 TABLET, FILM COATED ORAL at 08:24

## 2018-07-22 RX ADMIN — SODIUM CHLORIDE 500 ML: 3 INJECTION, SOLUTION INTRAVENOUS at 08:30

## 2018-07-22 RX ADMIN — TAMSULOSIN HYDROCHLORIDE 0.4 MG: 0.4 CAPSULE ORAL at 08:24

## 2018-07-22 RX ADMIN — FLUTICASONE PROPIONATE 1 SPRAY: 50 SPRAY, METERED NASAL at 08:24

## 2018-07-22 RX ADMIN — ROSUVASTATIN CALCIUM 20 MG: 20 TABLET, FILM COATED ORAL at 08:24

## 2018-07-22 ASSESSMENT — VISUAL ACUITY
OU: NORMAL ACUITY

## 2018-07-22 ASSESSMENT — ACTIVITIES OF DAILY LIVING (ADL)
ADLS_ACUITY_SCORE: 13
ADLS_ACUITY_SCORE: 11
PREVIOUS_RESPONSIBILITIES: MEAL PREP;HOUSEKEEPING;LAUNDRY;MEDICATION MANAGEMENT;DRIVING
ADLS_ACUITY_SCORE: 11
ADLS_ACUITY_SCORE: 11
ADLS_ACUITY_SCORE: 10
ADLS_ACUITY_SCORE: 13

## 2018-07-22 NOTE — PROGRESS NOTES
07/22/18 0853   General Information   Onset Date 07/20/18   Start of Care Date 07/22/18   Referring Physician Shania Pretty MD   Patient Profile Review/OT: Additional Occupational Profile Info See Profile for full history and prior level of function   Patient/Family Goals Statement Pt did not state   Swallowing Evaluation Bedside swallow evaluation   Behaviorial Observations WFL (within functional limits)   Mode of current nutrition NPO   Respiratory Status Room air   Comments Mr. Shamir Concepcion is a delightful 78-year-old  gentleman, with a past medical history notable for peripheral artery disease, rheumatoid arthritis, BPH, allergic rhinitis and colon polyps, who has presented with acute onset of dizziness, nausea and fall.  The workup in the Emergency Department has revealed acute left cerebellar stroke. Pt initially passed nursing swallow screen and with no speech and language deficits. However, yesterday afternoon pt with change in neuro status with dysarthria and L sided facial droop noted. Per RN, pt with improved neuros and only minimal L sided weakness this date. Pts    Clinical Swallow Evaluation   Oral Musculature other (see comments)  (mild L sided facial droop)   Structural Abnormalities none present   Dentition present and adequate   Mucosal Quality adequate   Mandibular Strength and Mobility intact   Oral Labial Strength and Mobility WFL   Lingual Strength and Mobility WFL   Velar Elevation intact   Buccal Strength and Mobility intact   Laryngeal Function Cough;Throat clear;Swallow;Voicing initiated   Oral Musculature Comments Mild L sided droop    Additional Documentation Yes   Clinical Swallow Eval: Thin Liquid Texture Trial   Mode of Presentation, Thin Liquids cup;self-fed;spoon   Volume of Liquid or Food Presented 6 oz   Oral Phase of Swallow WFL   Pharyngeal Phase of Swallow intact   Diagnostic Statement Pt tolerated thin liquids via spoon and cup with no overt s/sx of  aspiration   Clinical Swallow Eval: Puree Solid Texture Trial   Mode of Presentation, Puree spoon;self-fed   Volume of Puree Presented 6 tbsp   Oral Phase, Puree WFL   Oral Residue, Puree left lip drooling   Pharyngeal Phase, Puree intact   Diagnostic Statement Pureed textures resulted in no overt s/sx of aspiration. Pt with mild L sided anterior spillage x1   Clinical Swallow Eval: Solid Food Texture Trial   Mode of Presentation, Solid self-fed   Volume of Solid Food Presented 4 tbsp   Oral Phase, Solid other (see comments)  (mildly prolonged but functional time for mastication)   Pharyngeal Phase, Solid intact   Diagnostic Statement Pt tolerated regular solid textures with no overt s/sx of aspiration. Pt required mildly prolonged but functional time for mastication   VFSS Evaluation   VFSS Additional Documentation No   FEES Evaluation   Additional Documentation No   Swallow Compensations   Swallow Compensations Alternate viscosity of consistencies;Effortful swallow;Pacing;Reduce amounts;Multiple swallow   Results Oral difficulties only   General Therapy Interventions   Planned Therapy Interventions Dysphagia Treatment   Dysphagia treatment Compensatory strategies for swallowing;Instruction of safe swallow strategies   Swallow Eval: Clinical Impressions   Skilled Criteria for Therapy Intervention Skilled criteria met.  Treatment indicated.   Functional Assessment Scale (FAS) 5   Treatment Diagnosis mild oropharyngeal dysphaiga   Diet texture recommendations Regular diet;Thin liquids   Recommended Feeding/Eating Techniques alternate between small bites and sips of food/liquid;check mouth frequently for oral residue/pocketing;hard swallow w/ each bite or sip;maintain upright posture during/after eating for 30 mins;small sips/bites   Demonstrates Need for Referral to Another Service occupational therapy;physical therapy   Therapy Frequency 5 times/wk   Predicted Duration of Therapy Intervention (days/wks) 1 week    Anticipated Discharge Disposition extended care facility   Risks and Benefits of Treatment have been explained. Yes   Patient, family and/or staff in agreement with Plan of Care Yes   Clinical Impression Comments SLP: Clinical swallow eval completed per MD orders. Pt presents with mild oropharyngeal dysphagia and  L sided facial droop. Pt tolerated thin liquids via spoon and cup, pureed textures, and regular solid textures with no overt s/sx of aspiration. Regular solid textures resulted in mildly prolonged but functional time for mastication. Pt with L sided anterior spillage x1 on pureed textures. Pt noted to slightly delayed to respond in conversation. Recommend pt initiate regular textures and thin liquids. Pt should be fully upright for all PO, take small single sips/bites, slow pacing, and alternate between consistencies. ST to continue to follow for diet tolerance and address speech/lang deficits as indicated. Anticipate pt will meet goals prior to discharge.    Total Evaluation Time   Total Evaluation Time (Minutes) 8

## 2018-07-22 NOTE — PLAN OF CARE
Problem: Patient Care Overview  Goal: Plan of Care/Patient Progress Review  Discharge Planner SLP   Patient plan for discharge: none stated  Current status: SLP: Clinical swallow eval completed per MD orders. Pt presents with mild oropharyngeal dysphagia and  L sided facial droop. Pt tolerated thin liquids via spoon and cup, pureed textures, and regular solid textures with no overt s/sx of aspiration. Regular solid textures resulted in mildly prolonged but functional time for mastication. Pt with L sided anterior spillage x1 on pureed textures. Pt noted to slightly delayed to respond in conversation. Recommend pt initiate regular textures and thin liquids. Pt should be fully upright for all PO, take small single sips/bites, slow pacing, and alternate between consistencies. ST to continue to follow for diet tolerance and address speech/lang deficits as indicated. Anticipate pt will meet goals prior to discharge.   Barriers to return to prior living situation: dysphagia  Recommendations for discharge: anticipate pt will meet goals prior to discharge  Rationale for recommendations: pt would benefit from ST follow up to assess diet tolerance and train safe swallow strategies       Entered by: Jenni King 07/22/2018 9:33 AM

## 2018-07-22 NOTE — PROGRESS NOTES
07/22/18 1200   Quick Adds   Type of Visit Initial Occupational Therapy Evaluation   Living Environment   Lives With alone   Living Arrangements apartment   Home Accessibility grab bars present (toilet);other (see comments)  (2nd floor, elevator access;has shower bench available)   Number of Stairs to Enter Home 0   Number of Stairs Within Home 0   Living Environment Comment Pt. reports standard toilets at home, possible grab bar(dtr. does not think there is one, pt. reports there is);has both a walk-in shower and a tub/shower combo.   Self-Care   Dominant Hand right   Usual Activity Tolerance good   Current Activity Tolerance fair   Regular Exercise yes   Activity/Exercise Type (exercises at [facility] daily, enjoys pickleball)   Activity/Exercise/Self-Care Comment Pt. previously indep. w/ I/ADL's, drives, manages meds., cooks(usually microwave), does own laundry and cleaning;Per dtr., pt. very independent   Functional Level Prior   Ambulation 0-->independent   Transferring 0-->independent   Toileting 0-->independent   Bathing 0-->independent   Dressing 0-->independent   Eating 0-->independent   Communication 0-->understands/communicates without difficulty   Swallowing 0-->swallows foods/liquids without difficulty   Cognition 0 - no cognition issues reported   Fall history within last six months yes   Number of times patient has fallen within last six months 1   Which of the above functional risks had a recent onset or change? ambulation;transferring;toileting;bathing;dressing   General Information   Onset of Illness/Injury or Date of Surgery - Date 07/20/18   Referring Physician    Patient/Family Goals Statement None stated   Additional Occupational Profile Info/Pertinent History of Current Problem Per H & P:Mr. Shamir Concepcion is a delightful 78-year-old  gentleman, with a past medical history notable for peripheral artery disease, rheumatoid arthritis, osteoarthritis, BPH, allergic rhinitis and  erectile dysfunction, who has presented to the Emergency Department for evaluation after a fall.  The patient states he woke up around 7:00 in the morning feeling dizzy.  While he was going to the kitchen, he felt nauseous, unsteady and had a bout of emesis, resulting in a fall. Dx.=L cerebellar stroke;Pt. had change in symptoms yesterday, pt. w/worsening neurological symptoms of nausea/vomiting and double vision, in-addition to the left face weakness, left hemiparesis, dysarthria, left hemibody ataxia and dysphagia. A stat CT and CTA was done which showed that his Left cerebellar stroke edema is much larger with shift of middle posterior fossa structure and worsening compression of the 4th ventricle without hydrocephalus.Per RN, pt with improved neuros and only minimal L sided weakness this date.    Precautions/Limitations fall precautions   General Observations Pt. sleeping, easily awakened, dtr.present;Pt. appeared mildly confused, following commands, inconsistent at times.   General Info Comments Pt. resides alone, very active, indep. at baseline   Cognitive Status Examination   Orientation orientation to person, place and time   Level of Consciousness alert   Able to Follow Commands mild impairment   Personal Safety (Cognitive) mild impairment;moderate impairment;at risk behaviors demonstrated;decreased awareness, need for assist;decreased awareness, need for safety;decreased insight to deficits;impulsive   Memory impaired  (STM recall 1/3 words after short delay)   Cognitive Comment Will plan to complete further cognitive testing;slow processing noted.   Visual Perception   Visual Perception (wears glasses for distance)   Visual Perception Comments some difficulty following directions for tracking, mainly to L, also difficulty w/ R upper quadrants  (will monitor vision,complete further testing as indicated)   Sensory Examination   Sensory Comments No numbness/tingling reported, light touch=intact   Pain  Assessment   Patient Currently in Pain No   Range of Motion (ROM)   ROM Comment WNL   Strength   Strength Comments RUE=5/5 LUE:4/5   Hand Strength   Hand Strength Comments WNL/WFL   Coordination   Upper Extremity Coordination Left UE impaired   Gross Motor Coordination impaired LUE GMC   Fine Motor Coordination (WNL--able to demo.finger-t-thumb opposition)   Coordination Comments impaired LUE   Mobility   Bed Mobility Comments SBA supine-sit   Transfer Skill: Sit to Stand   Level of [place]: Sit/Stand moderate assist (50% patients effort)   Physical Assist/Nonphysical Assist: Sit/Stand verbal cues;1 person assist   Toilet Transfer   Toilet Transfer Comments standard toilet, possible grab bar   Tub/Shower Transfer   Tub/Shower Transfer Comments both walk-in shower + tub/shower combo.   Balance   Balance Comments sitting balance=SBA-CGA;static standing =min. A   Lower Body Dressing   Level of [place]: Dress Lower Body contact guard   Toileting   Level of [place]: Toilet minimum assist (75% patients effort)   Instrumental Activities of Daily Living (IADL)   Previous Responsibilities meal prep;housekeeping;laundry;medication management;driving   Activities of Daily Living Analysis   Impairments Contributing to Impaired Activities of Daily Living balance impaired;cognition impaired;coordination impaired;strength decreased  (?vision impairment)   General Therapy Interventions   Planned Therapy Interventions ADL retraining;cognition;motor coordination training;neuromuscular re-education;strengthening;visual perception   Clinical Impression   Criteria for Skilled Therapeutic Interventions Met yes, treatment indicated   OT Diagnosis Decline in ADL performance   Influenced by the following impairments Weakness, L-sided ataxia, impaired cognition/safety, impaired balance   Assessment of Occupational Performance 3-5 Performance Deficits   Identified Performance Deficits Currently below baseline w/ ADL's,  "including dressing,bathing, grooming,toileting, fx. transfers   Clinical Decision Making (Complexity) Moderate complexity   Therapy Frequency daily  (increase to 2X/day as indicated)   Predicted Duration of Therapy Intervention (days/wks) 5-7 days   Anticipated Discharge Disposition Acute Rehabilitation Facility   Risks and Benefits of Treatment have been explained. Yes   Patient, Family & other staff in agreement with plan of care Yes   [facility] TM \"6 Clicks\"   2016, Trustees of [facility], under license to [employer].  All rights reserved.   6 Clicks Short Forms Daily Activity Inpatient Short Form   [facility]  \"6 Clicks\" Daily Activity Inpatient Short Form   1. Putting on and taking off regular lower body clothing? 3 - A Little   2. Bathing (including washing, rinsing, drying)? 2 - A Lot   3. Toileting, which includes using toilet, bedpan or urinal? 2 - A Lot   4. Putting on and taking off regular upper body clothing? 3 - A Little   5. Taking care of personal grooming such as brushing teeth? 3 - A Little   6. Eating meals? 4 - None   Daily Activity Raw Score (Score out of 24.Lower scores equate to lower levels of function) 17   Total Evaluation Time   Total Evaluation Time (Minutes) 15     "

## 2018-07-22 NOTE — PROGRESS NOTES
TeleICU Note    Mr. Concepcion is a 78 year old man with a left cerebellar stroke and symptoms from the edema.  He was admitted yesterday as a DNR/DNI and has been receiving supportive cares.  This evening, I was informed by nursing staff that there is a change in the management of Mr. Concepcion, to include hypertonic saline and later, potentially, decompressing craniectomy.  I have not heard from the neurologist who is facilitating this change and will wait for an update before placing a central venous line.  I spoke with Dr. Bella who had not planned on surgery, at least for now.    1945 - I spoke with Dr. Pretty, and agreed to place a CVL.  The plan is to try hypertonic saline in the hopes that this will reduce edema enough to avoid surgery.  I conveyed this to the family, and they understand the approach.  Surgery is still an option if indicated and agreed upon.  More to come.    Tra Vanegas MD, FACS  [facility], Surgical Critical Care  [phone] pager

## 2018-07-22 NOTE — PROGRESS NOTES
Stroke/Neurocritical care progress note    Interim:   Pt reported diplopia and n/v yesterday afternoon. Repeat CT and CTA revealed increased cerebellar edema w some compression of the 4th ventricle. He was transferred to the ICU. Now clinically stable.     Exam:  Gen: awake, laying in hospital bed   MS: awake, alert, interacting appropriately  CN: gaze somewhat dysconjugate. Interrupted saccades. Limited upward gaze. Mild R facial droop. Dysarthric, mild.   Motor: Minimal right pronator drift, left arm drifts upwards. No leg drift.   Sensory: intact to LT  Coordination: minor difficulties on the right and ataxic on the left with both FNF and heel-shin.    CT/CTA 7/21 pm  IMPRESSION:  1. Persistent left posterior inferior cerebellar artery occlusion with  occlusion now occurring slightly more proximally, nearly at the  origin.  2. Remainder of intracranial vessels are patent.   IMPRESSION:  1. Evolving left posterior inferior cerebellar artery territory  infarct with increasing mass effect but no hydrocephalus at this time.  2. Cerebral atrophy with nonspecific white matter changes again noted.    MRI 7/21 pm  IMPRESSION:  1. Multiple acute ischemic infarcts without significant change since  prior exam. There is distortion of the fourth ventricle but no  definite hydrocephalus at this time.  2. No evidence for any hemorrhagic transformation.  3. Cerebral atrophy with some chronic white matter changes.          cefTRIAXone  1 g Intravenous Q24H     finasteride (PROSCAR) tablet 5 mg  5 mg Oral Daily     fluticasone  1 spray Both Nostrils Daily     predniSONE  5 mg Oral Daily     rosuvastatin  20 mg Oral or NG Tube Daily     tamsulosin  0.4 mg Oral BID    acetaminophen, acetaminophen, bisacodyl, hydrALAZINE, labetalol, magnesium hydroxide, - MEDICATION INSTRUCTIONS -, naloxone, ondansetron **OR** ondansetron, potassium chloride, potassium chloride with lidocaine, potassium chloride, potassium chloride, potassium  chloride, prochlorperazine **OR** prochlorperazine **OR** prochlorperazine, senna-docusate **OR** senna-docusate       HEParin 850 Units/hr (07/22/18 0819)     - MEDICATION INSTRUCTIONS -       sodium chloride 3% 500 mL (07/22/18 0830)      Sodium 142 -->143      :  Impression:   Acute ischemic stroke, L cerebellum, R post nataly, L occipital lobe, as well as R cerebellum (minor). Admitted 7/20/2018. He is now 3rd day and has edema w some midline shift, as well as pressure on 4th V, but no hydrocephalus. He was started on hypertonic, stopped asa/plavix, and started on heparin gtt last night after changes in his exam. He is now clinically stable. We will continue to monitor him closely, as edema can continue to worsen up to 5-7 days post-stroke.      Recommendations:  Acute Ischemic Stroke (without tPA) Plan  - repeat CT tomorrow morning  - OK to continue 3% NaCl at 30 ml/hr, Na level is trending up, goal 145-150.  - neuro checks q1hr  - Long-term BP goal <140/90, ok to give antihypertensives prn. BP cap 180.  - Euthermia, Euglycemia  - heparin gtt, stop asa/plavix, until we are sure he will not need decompressive surgery and he does not have worsening symptoms.   - Statin  - Telemetry  - Bedside Glucose Monitoring  - PT/OT/SLP  - PM&R  - Stroke Education  - Depression Screen  - Apnea Screen      Kim Gregory MD  Stroke neurology fellow    Seen/discussed with Dr. Pretty

## 2018-07-22 NOTE — PROGRESS NOTES
SPIRITUAL HEALTH SERVICES Progress Note  [facility] ICU    Visited pt per request. Pt was sleeping and pt's daughter Clarice was at the bedside. Clarice stated that things are better today but yesterday was tough. Pt's wife  of cancer just last fall and was a pt here at [facility]. Clarice stated her death was difficult for everyone but that pt has been coping with support from his [facility], family, and friends. Pt's other daughter was going to inform pt's  of this recent hospitalization. Clarice requested that SH return tomorrow if available to pray with pt while pt is awake.    SH will follow up tomorrow as available.      Emilia Mackey  Chaplain Resident  Pager: [phone]  Office: [phone]

## 2018-07-22 NOTE — PROGRESS NOTES
Discharge Planner OT      OT:Evaluation completed and treatment initiated. Pt. resides alone in an apartment, very independent/active at baseline, drives, manages, medications(minimal), exercises daily.  Patient plan for discharge: None stated  Current status: Pt. easily awakened, able to follow commands, inconsistent at times;pt. has difficulty w/visual tracking, especially noted in upper quadrants, L>R, will monitor, complete further testing a indicated;Pt. alert and oriented, presents w/ impaired safety/insight, impulsiveness;Pt. had difficulty w/ STM recall(1/3), will plan to complete further cognitive testing. Pt. able to come supine-sit w/ SBA,moves quickly;pt. able to sit EOB for approx. 7 min. to participate in UE/ADL tasks, good sitting balance overall, mild lean to L, SBA-CGA for sitting balance;Pt. able to akil/doff sock, CGA for sitting balance only;Pt. Able to use urinal, seated, min. A for set-up/hyg.;Pt. completed sit-stand w/ mod. A, moderate lean to L, stand -sit w/ min. A;completed 2nd sit-stand w/ min. A X 2;pt. able to take 2 small side-steps up HOB w/ min. A X 2, stand-sit,min. A X 2, sit-supine w/ SBA. Pt. fatigued after activity;Ed. Dtr. in current DC recs.:ARU(versus TCU).  Barriers to return to prior living situation: Current level of assist, weakness,incoordination, impaired balance/falls risk, impaired cognition/safety/insight/impulsive, lives alone.  Recommendations for discharge: ARU  Rationale for recommendations: Pt. would benefit from intensive skilled OT intervention to assist pt. in achieving maximal safety/indep. w/ I/ADl's;pt. was very independent/active at baseline, motivated to participate, good family support.       Entered by: Katiuska Pitts 07/22/2018 3:10 PM

## 2018-07-22 NOTE — PLAN OF CARE
Problem: Patient Care Overview  Goal: Plan of Care/Patient Progress Review  Outcome: No Change  Pt is confused and impulsive at times. Follows commands and BURKETT. Ataxic in upper extremities. BP elevated. Tele SR with PAC's and PVC's. Central line placed for hypertonic saline infusion. Monitoring Na and Osmo q6. Heparin gtt started. External catheter placed for urinary frequency/urgency and incontinence. Neuro to assess pt this am for possible surgery, if indicated. Family at bedside and updated on plan of care.

## 2018-07-22 NOTE — PROCEDURES
[facility] CRITICAL CARE  Procedure Note      Pre-operative diagnosis: Cerebellar edema, plan to administer hypertonic saline   Post-operative diagnosis: Same   Procedure: Placement of Right internal jugular CVL   Surgeon: Tra Vanegas MD   Assistant(s): None   Estimated blood loss: Minimal   Specimens: None   Findings: Good placement and no pneumothorax per CXR    Procedure:  After sterile prep/drape/checklist review (including exp date of lidocaine) US guidance demonstrated right internal jugular, and the needle venotomy was made under US guidance, followed by guidewire, dilator and then the triple lumen catheter itself, all after first pass of the needle.  The lumens were flushed with NS.  Suture fixation and sterile dressing followed.  No complications.         Tra Vanegas MD, FACS  P, Surgical Critical Care  [phone] pager

## 2018-07-22 NOTE — PROGRESS NOTES
[facility]    Hospitalist Progress Note    Assessment & Plan   Mr. Shamir Concepcion is a delightful 78-year-old  gentleman, with a past medical history notable for peripheral artery disease, rheumatoid arthritis, BPH, allergic rhinitis and colon polyps, who has presented with acute onset of dizziness, nausea and fall.  The workup in the Emergency Department has revealed acute left cerebellar stroke.    Acute left cerebellar ischemic stroke:   Admitted 7/21, developed new diplopia that evening prompting reimaging showing progression of stroke with edema, prompting transfer to ICU for close monitoring and initiation of 3% saline.  Per neurology, further discussion regarding possible need for decompressive surgery should he deteriorate.    --No surgery currently planned, though patient would consider it should the need arise.  --Hypertonic saline per NCC  --  Int Jug central line placed by intensivist 7/21  --repeat head Ct 7/23 AM     Urinary tract infection:  The patient reports dysuria for 1 day.  Urinalysis is abnormal with moderate leukocyte esterase and 33 WBCs.    --Ceftriaxone started on admission. Cultures negative to date.     Hypertension:  No prior history  --prn hydralazine available   BPH.  Finasteride/tamsulosin    Rheumatoid arthritis:  He is on weekly methotrexate plus prednisone 5 mg p.o. daily.    --Methotrexate on hold while hospitalized, can take prednisone as able        DVT Prophylaxis: Pneumatic Compression Devices  Code Status: DNR    Disposition: Expected discharge TBD, 2+ days.    Valentina iWlde MD  Text Page (7am to 6pm)    Interval History   Patient sleeping, easily roused.      -Data reviewed today: I reviewed all new labs and imaging results over the last 24 hours. I personally reviewed the brain MRI image(s) showing multiple infarcts similar to previous study.    Physical Exam   Temp: 98.2  F (36.8  C) Temp src: Oral BP: 167/83   Heart Rate: 69 Resp: 17 SpO2:  100 % O2 Device: None (Room air)    Vitals:    07/21/18 1705 07/22/18 0400   Weight: 71.7 kg (158 lb 1.1 oz) 71 kg (156 lb 8.4 oz)     Vital Signs with Ranges  Temp:  [97.6  F (36.4  C)-98.5  F (36.9  C)] 98.2  F (36.8  C)  Heart Rate:  [60-90] 69  Resp:  [9-31] 17  BP: (146-182)/() 167/83  SpO2:  [87 %-100 %] 100 %  I/O last 3 completed shifts:  In: 1868.38 [I.V.:1868.38]  Out: 1270 [Urine:1170; Emesis/NG output:100]    Constitutional: Sleeping, easily roused  Respiratory: cta, no wheezing  Cardiovascular: rrr  GI:  soft, non-distended, non-tender  Skin/Integumen: , no edema  Other:  equal grp strength, PERRL, finger to nose intact.    Medications     HEParin 850 Units/hr (07/21/18 2223)     - MEDICATION INSTRUCTIONS -       sodium chloride 3% 500 mL (07/22/18 0436)       cefTRIAXone  1 g Intravenous Q24H     finasteride (PROSCAR) tablet 5 mg  5 mg Oral Daily     fluticasone  1 spray Both Nostrils Daily     predniSONE  5 mg Oral Daily     rosuvastatin  20 mg Oral or NG Tube Daily     tamsulosin  0.4 mg Oral BID       Data     Recent Labs  Lab 07/22/18  0354 07/21/18  1955 07/21/18  1133 07/20/18  2205   WBC 13.9* 15.4*  --  13.6*   HGB 12.9* 13.6  --  13.2*   MCV 95 94  --  93    355 359 349   INR  --   --   --  1.11    141  --  140   POTASSIUM 3.7  --   --  3.9   CHLORIDE 111*  --   --  107   CO2 24  --   --  25   BUN 12  --   --  14   CR 0.94  --   --  0.93   ANIONGAP 7  --   --  8   RANJAN 7.9*  --   --  8.5   *  --   --  126*   ALBUMIN  --   --  3.2* 3.3*   PROTTOTAL  --   --  6.9 7.3   BILITOTAL  --   --  0.7 0.8   ALKPHOS  --   --  101 105   ALT  --   --  13 15   AST  --   --  21 19   LIPASE  --   --   --  105   TROPI  --   --   --  <0.015       Imaging:   Recent Results (from the past 24 hour(s))   MRI Brain w & w/o contrast    Narrative    MRI BRAIN WITHOUT AND WITH CONTRAST  7/21/2018 9:46 AM    HISTORY:  Stroke with SWI Sequence. IF patient requires sedation for  the procedure,  please notify the Stroke Neurologist PRIOR TO sedation  to discuss delaying of MRI..;      TECHNIQUE:  Multiplanar, multisequence MRI of the brain without and  with 7 mL Gadavist    COMPARISON: CT scan dated 7/20/2018.    FINDINGS:  There is generalized atrophy of the brain.  White matter  changes are present in the cerebral hemispheres that are consistent  with small vessel ischemic disease in this age patient.  Diffusion-weighted images reveal an area of restricted diffusion in  the left inferior cerebellum. This corresponds to the area of  low-density seen on the CT scan. This area of restricted diffusion is  consistent with an acute infarct in the distribution of the left  posterior inferior cerebellar artery. It measures approximately 5.1 cm  in AP dimension by 5.3 cm in transverse dimension. There are also  areas of restricted diffusion in the medial aspect of the left  occipital lobe. A small area of restricted diffusion is also seen in  the posterior aspect of the right nataly. All of these areas of  restricted diffusion are consistent with recent infarcts.  There is no  evidence for any hemorrhage. No mass effect.  There are no gadolinium  enhancing lesions.   Mucosal thickening is present in right maxillary  sinus. The arteries at the base of the brain and the dural venous  sinuses appear patent.       Impression    IMPRESSION:  Areas of restricted diffusion in the left inferior  cerebellum, right posterior nataly, left occipital lobe. All of these  lesions are consistent with recent infarcts. No bleed or mass effect.      PRISCILA LOMELI MD   CT Head w/o Contrast    Narrative    CT SCAN OF THE HEAD WITHOUT CONTRAST   7/21/2018 4:55 PM     HISTORY: Cerebellar stroke, rule out hydrocephalus or new stroke or  hemorrhage.     TECHNIQUE:  Axial images of the head and coronal reformations without  IV contrast material.  Radiation dose for this scan was reduced using  automated exposure control, adjustment of the mA  and/or kV according  to patient size, or iterative reconstruction technique.    COMPARISON: 7/20/2018.     FINDINGS: The left posterior inferior cerebellar artery territory  acute ischemic infarct has become slightly more hypodense and there is  more associated soft tissue swelling and edema with this infarct.  There is slight compression of the fourth ventricle but there is no  evidence to suggest any hydrocephalus at this time. Moderate cerebral  atrophy is present. Patchy white matter changes are seen in both  hemispheres. There is no evidence for any new infarcts. There is no  evidence for any hemorrhagic transformation or any intracranial  hemorrhage. Osseous structures appear intact. There is some mucosal  thickening in several paranasal sinuses.      Impression    IMPRESSION:  1. Evolving left posterior inferior cerebellar artery territory  infarct with increasing mass effect but no hydrocephalus at this time.  2. Cerebral atrophy with nonspecific white matter changes again noted.       MACEY ALMARAZ MD   CTA Head with Contrast    Narrative    CTA HEAD WITH CONTRAST 7/21/2018 4:55 PM     HISTORY: Change in neuro exam: double vision and nausea and vomiting.       TECHNIQUE: Axial images were obtained through the head without with  contrast. 70 mL of Isovue-370 was given. Multiplanar reconstructions  were performed. 3-D reconstructions off a remote workstation for CT  angiography were also acquired. Radiation dose for this scan was  reduced using automated exposure control, adjustment of the mA and/or  kV according to patient size, or iterative reconstruction technique.    COMPARISON: 7/21/2018.    FINDINGS: There is a left posterior inferior cerebellar artery  occlusion which now begins just immediately after the takeoff. The  distal right vertebral artery remains patent. The basilar artery,  distal internal carotid arteries, and proximal anterior, middle, and  posterior cerebral arteries are patent. There is  no evidence for  aneurysm. Postcontrast images through the brain do not show any  abnormal areas of enhancement. Mucosal thickening is noted in several  paranasal sinuses and extensive vascular calcifications are noted in  the carotid siphon regions bilaterally but there is no stenosis.      Impression    IMPRESSION:  1. Persistent left posterior inferior cerebellar artery occlusion with  occlusion now occurring slightly more proximally, nearly at the  origin.  2. Remainder of intracranial vessels are patent.     MACEY ALMARAZ MD   MR Brain w/o Contrast    Narrative    MRI BRAIN WITHOUT CONTRAST  7/21/2018 8:51 PM    HISTORY:  New brainstem deficits, assess for new stroke.      TECHNIQUE:  Multiplanar, multisequence MRI of the brain without  gadolinium IV contrast material.    COMPARISON: 7/21/2018 at 0919.    FINDINGS: Diffusion-weighted images again show an acute ischemic  infarct primarily in the left posterior inferior cerebellar artery  distribution. There is also a tiny acute infarct in the right superior  cerebellar peduncle and some smaller acute tiny infarcts in the right  cerebellum. There are also small acute infarcts in the medial left  occipital lobe. The pattern has not appreciably changed with the  exception that the right cerebellar tiny infarcts are slightly more  prominent. There is some localized mass effect in the left cerebellum  with some distortion of the fourth ventricle but there is no  hydrocephalus. Again seen is a tiny amount of magnetic susceptibility  artifact along the right lateral nataly similar to that seen on the  prior exam which presumably is related to a small calcification seen  in this region on CT. No definite acute hemorrhage. There is some  motion artifact on this exam. Moderate cerebral atrophy is present.  Minimal patchy white matter changes are present. Vascular structures  are patent at the skull base.      Impression    IMPRESSION:  1. Multiple acute ischemic infarcts  without significant change since  prior exam. There is distortion of the fourth ventricle but no  definite hydrocephalus at this time.  2. No evidence for any hemorrhagic transformation.  3. Cerebral atrophy with some chronic white matter changes.      MACEY ALMARAZ MD   XR Chest Port 1 View    Narrative    CHEST ONE VIEW PORTABLE   7/21/2018 10:10 PM     HISTORY: Central line placement.    COMPARISON: None.      Impression    IMPRESSION: There is a right jugular central venous line with tip is  in the superior vena cava. No evidence for pneumothorax. Lungs appear  clear. Heart size is mildly prominent but this is portable technique.     MACEY ALMARAZ MD

## 2018-07-22 NOTE — CONSULTS
[facility]    Neurosurgery Consultation     Date of Admission:  7/20/2018  Date of Consult (When I saw the patient): 07/22/18    Assessment & Plan   Shamir Concepcion is a 78 year old male who was admitted on 7/20/2018. I was asked to see the patient for left cerebellar infarct.    Active Problems:    Acute ischemic stroke (H)    Assessment: Left cerebellar ischemia    Plan: Not recommending surgery per Dr. Bella.  -Hypertonic saline started in hopes to reduce edema.  -Management per NCC  -Will continue to monitor  -Patient DNR but NOT DNI      I have discussed the following assessment and plan with Dr. Bella who is in agreement with initial plan and will follow up with further consultation recommendations.    [facility]  [facility]  [place] [address]    Tel [phone]  Pager [phone]        Code Status    DNR    Reason for Consult   Reason for consult: I was asked by Dr. Gregory to evaluate this patient for left cerebellar infarct.    Primary Care Physician   Physician No Ref-Primary    Chief Complaint   Dizziness    History is obtained from the patient and patient's EMR.    History of Present Illness   Shamir Concepcion is a 78 year old male who presented to [facility] ED with dizziness on 7/20/18.  Apparently he fell yesterday and hit his nose, denies LOC.  He experienced emesis x1 following the fall and then ongoing nausea.  Per his daughter yesterday he seemed confused.  CT of head showed left cerebellar ischemia.  CTA showed left PICA occlusion distal to origin, occlusion of left vertebral artery and left cerebellar infarct.  MRI of brain with left cerebellar acute infarct, small right pontine infarct and left occipital lobe. Repeat MRI of brain last evening without significant changes and no definite hydrocephalus. Initially on arrival patient had wanted to be DNR/DNI, however, according to the RN that was  "changed after the patient discussed further with his daughter; currently pt DNR, but NOT DNI.  Patient denies pain this morning.  He denies nausea, \"I'm hungry.\" He denies HA, dizziness while in bed, chest pain, SOB.  He is able to perform neuro and muscle strength exam with really no signs of neuro-focal deficits other than mild left facial drop.  Per RN his exam this am was strong with minimal deficits noted as well.      Past Medical History   I have reviewed this patient's medical history and updated it with pertinent information if needed.   Past Medical History:   Diagnosis Date     Allergic rhinitis      BPH (benign prostatic hyperplasia)      Colon polyps      ED (erectile dysfunction)      Family history of colon cancer      PAD (peripheral artery disease) (H)      RA (rheumatoid arthritis) (H)      Seronegative rheumatoid arthritis (H)        Past Surgical History   I have reviewed this patient's surgical history and updated it with pertinent information if needed.  Past Surgical History:   Procedure Laterality Date     ANGIOGRAM       C MRA UPPER EXTREMITY WO&W CONT  stenting right SFA-AK pop     COLONOSCOPY      polyps     ENT SURGERY      T&A     LAMINECT/DISCECTOMY, LUMBAR       ORTHOPEDIC SURGERY       PHACOEMULSIFICATION CLEAR CORNEA WITH STANDARD INTRAOCULAR LENS IMPLANT Left 4/4/2017    Procedure: PHACOEMULSIFICATION CLEAR CORNEA WITH STANDARD INTRAOCULAR LENS IMPLANT;  Surgeon: Stevie Espinal MD;  Location: [facility]     PHACOEMULSIFICATION CLEAR CORNEA WITH STANDARD INTRAOCULAR LENS IMPLANT Right 5/2/2017    Procedure: PHACOEMULSIFICATION CLEAR CORNEA WITH STANDARD INTRAOCULAR LENS IMPLANT;  RIGHT EYE PHACOEMULSIFICATION CLEAR CORNEA WITH STANDARD INTRAOCULAR LENS IMPLANT;  Surgeon: Stevie Espinal MD;  Location: [facility]     SINUS SURGERY       VASCULAR SURGERY  R SFA stenting  2012       Prior to Admission Medications   Prior to Admission Medications   Prescriptions Last Dose Informant " Patient Reported? Taking?   Aspirin (ECOTRIN LOW STRENGTH PO) 2018 at Unknown time Self Yes Yes   Sig: Take 1 tablet by mouth daily 81 MG   FOLIC ACID PO 2018 at Unknown time Self Yes Yes   Sig: Take 400 mcg by mouth daily Patient gets this medication OTC.   Finasteride (PROSCAR PO) 2018 at Unknown time Self Yes Yes   Sig: Take 5 mg by mouth daily   PREDNISONE PO  at prn Self Yes Yes   Sig: Take 5-10 mg by mouth daily as needed For flares   fluticasone (FLONASE ALLERGY RELIEF) 50 MCG/ACT spray 2018 at Unknown time Self Yes Yes   Sig: Spray 1 spray into both nostrils daily   methotrexate 2.5 MG tablet CHEMO 2018 Self Yes Yes   Sig: Take 10 mg by mouth every 7 days Patient takes on . His instructions are to take 5 tablets per dose, but he normally only takes 4 tablets per dose.   tamsulosin (FLOMAX) 0.4 MG capsule 2018 at x1 Self Yes Yes   Si tab twice daily      Facility-Administered Medications: None     Allergies   No Known Allergies    Social History   I have reviewed this patient's social history and updated it with pertinent information if needed. Shamir Concepcion  reports that he quit smoking about 19 years ago. His smoking use included Cigarettes. He has a 15.00 pack-year smoking history. He has never used smokeless tobacco. He reports that he drinks alcohol. He reports that he does not use illicit drugs.    Family History   I have reviewed this patient's family history and updated it with pertinent information if needed.   Family History   Problem Relation Age of Onset     Cancer Mother      Cerebrovascular Disease Father        Review of Systems   Positives noted in HPI    Physical Exam   Temp: 98  F (36.7  C) Temp src: Oral BP: 167/83   Heart Rate: 69 Resp: 17 SpO2: 100 % O2 Device: None (Room air)    Vital Signs with Ranges  Temp:  [97.6  F (36.4  C)-98.5  F (36.9  C)] 98  F (36.7  C)  Heart Rate:  [60-90] 69  Resp:  [9-31] 17  BP: (146-182)/()  167/83  SpO2:  [87 %-100 %] 100 %  156 lbs 8.43 oz    Heart Rate: 69, Blood pressure 167/83, pulse 60, temperature 98  F (36.7  C), temperature source Oral, resp. rate 17, weight 156 lb 8.4 oz (71 kg), SpO2 100 %.  156 lbs 8.43 oz  HEENT:  Normocephalic, atraumatic.  PERRLA.  EOM s intact.   Neck:  Supple, non-tender, without lymphadenopathy.  Heart:  No peripheral edema  Lungs:  No SOB  Abdomen:  Soft, non-tender, non-distended.   Skin:  Warm and dry, good capillary refill.  Extremities:  Good radial and dorsalis pedis pulses bilaterally, no edema, cyanosis or clubbing.    NEUROLOGICAL EXAMINATION:     Mental status:  Awake and alert, speech is fluent. Answers most questions appropriately.   Cranial nerves:  II-XII intact. Mild left sided facial droop.  Motor:  Strength is 5/5 throughout the upper and lower extremities  Shoulder Abduction:  Right:  5/5   Left:  5/5  Biceps:                      Right:  5/5   Left:  5/5  Triceps:                     Right:  5/5   Left:  5/5  Wrist Extensors:       Right:  5/5   Left:  5/5  Wrist Flexors:           Right:  5/5   Left:  5/5  interosseus :            Right:  5/5   Left:  5/5  Hip Flexor:                Right: 5/5  Left:  5/5  Hip Adductor:             Right:  5/5  Left:  5/5  Hip Abductor:             Right:  5/5  Left:  5/5  Gastroc Soleus:        Right:  5/5  Left:  5/5  Tib/Ant:                      Right:  5/5  Left:  5/5  EHL:                     Right:  5/5  Left:  5/5  Sensation:  Intact  Reflexes:   Negative Babinski.  Negative Clonus.    Coordination:  Smooth finger to nose testing.   Negative pronator drift.    Gait:  Deferred, sitting up in bed    Data   CBC RESULTS:   Recent Labs   Lab Test  07/22/18   0354   WBC  13.9*   RBC  4.09*   HGB  12.9*   HCT  38.7*   MCV  95   MCH  31.5   MCHC  33.3   RDW  14.5   PLT  343     Basic Metabolic Panel:  Lab Results   Component Value Date     07/22/2018      Lab Results   Component Value Date    POTASSIUM 3.7  07/22/2018     Lab Results   Component Value Date    CHLORIDE 111 07/22/2018     Lab Results   Component Value Date    RANJAN 7.9 07/22/2018     Lab Results   Component Value Date    CO2 24 07/22/2018     Lab Results   Component Value Date    BUN 12 07/22/2018     Lab Results   Component Value Date    CR 0.94 07/22/2018     Lab Results   Component Value Date     07/22/2018     INR:  Lab Results   Component Value Date    INR 1.11 07/20/2018    INR 0.94 09/11/2013

## 2018-07-23 ENCOUNTER — APPOINTMENT (OUTPATIENT)
Dept: PHYSICAL THERAPY | Facility: CLINIC | Age: 78
DRG: 064 | End: 2018-07-23
Payer: COMMERCIAL

## 2018-07-23 ENCOUNTER — APPOINTMENT (OUTPATIENT)
Dept: SPEECH THERAPY | Facility: CLINIC | Age: 78
DRG: 064 | End: 2018-07-23
Payer: COMMERCIAL

## 2018-07-23 ENCOUNTER — APPOINTMENT (OUTPATIENT)
Dept: CT IMAGING | Facility: CLINIC | Age: 78
DRG: 064 | End: 2018-07-23
Payer: COMMERCIAL

## 2018-07-23 LAB
ANION GAP SERPL CALCULATED.3IONS-SCNC: 6 MMOL/L (ref 3–14)
BUN SERPL-MCNC: 10 MG/DL (ref 7–30)
CALCIUM SERPL-MCNC: 7.7 MG/DL (ref 8.5–10.1)
CHLORIDE SERPL-SCNC: 113 MMOL/L (ref 94–109)
CO2 SERPL-SCNC: 25 MMOL/L (ref 20–32)
CREAT SERPL-MCNC: 0.98 MG/DL (ref 0.66–1.25)
ERYTHROCYTE [DISTWIDTH] IN BLOOD BY AUTOMATED COUNT: 14.6 % (ref 10–15)
GFR SERPL CREATININE-BSD FRML MDRD: 74 ML/MIN/1.7M2
GLUCOSE SERPL-MCNC: 119 MG/DL (ref 70–99)
HCT VFR BLD AUTO: 35.8 % (ref 40–53)
HGB BLD-MCNC: 11.9 G/DL (ref 13.3–17.7)
LMWH PPP CHRO-ACNC: 0.3 IU/ML
MCH RBC QN AUTO: 31.6 PG (ref 26.5–33)
MCHC RBC AUTO-ENTMCNC: 33.2 G/DL (ref 31.5–36.5)
MCV RBC AUTO: 95 FL (ref 78–100)
OSMOLALITY SERPL: 300 MMOL/KG (ref 280–301)
OSMOLALITY SERPL: 301 MMOL/KG (ref 280–301)
OSMOLALITY SERPL: 307 MMOL/KG (ref 280–301)
PLATELET # BLD AUTO: 294 10E9/L (ref 150–450)
POTASSIUM SERPL-SCNC: 3.5 MMOL/L (ref 3.4–5.3)
RBC # BLD AUTO: 3.76 10E12/L (ref 4.4–5.9)
SODIUM SERPL-SCNC: 144 MMOL/L (ref 133–144)
SODIUM SERPL-SCNC: 144 MMOL/L (ref 133–144)
SODIUM SERPL-SCNC: 145 MMOL/L (ref 133–144)
WBC # BLD AUTO: 12.3 10E9/L (ref 4–11)

## 2018-07-23 PROCEDURE — 20000003 ZZH R&B ICU

## 2018-07-23 PROCEDURE — 99232 SBSQ HOSP IP/OBS MODERATE 35: CPT | Performed by: INTERNAL MEDICINE

## 2018-07-23 PROCEDURE — 85027 COMPLETE CBC AUTOMATED: CPT | Performed by: INTERNAL MEDICINE

## 2018-07-23 PROCEDURE — 80048 BASIC METABOLIC PNL TOTAL CA: CPT | Performed by: INTERNAL MEDICINE

## 2018-07-23 PROCEDURE — 97116 GAIT TRAINING THERAPY: CPT | Mod: GP | Performed by: PHYSICAL THERAPIST

## 2018-07-23 PROCEDURE — 70450 CT HEAD/BRAIN W/O DYE: CPT

## 2018-07-23 PROCEDURE — 85520 HEPARIN ASSAY: CPT | Performed by: INTERNAL MEDICINE

## 2018-07-23 PROCEDURE — 83930 ASSAY OF BLOOD OSMOLALITY: CPT | Performed by: INTERNAL MEDICINE

## 2018-07-23 PROCEDURE — 25000132 ZZH RX MED GY IP 250 OP 250 PS 637: Performed by: INTERNAL MEDICINE

## 2018-07-23 PROCEDURE — 97530 THERAPEUTIC ACTIVITIES: CPT | Mod: GP | Performed by: PHYSICAL THERAPIST

## 2018-07-23 PROCEDURE — 99291 CRITICAL CARE FIRST HOUR: CPT | Performed by: PSYCHIATRY & NEUROLOGY

## 2018-07-23 PROCEDURE — 83930 ASSAY OF BLOOD OSMOLALITY: CPT | Performed by: PSYCHIATRY & NEUROLOGY

## 2018-07-23 PROCEDURE — 40000915 ZZH STATISTIC SITTER, EVENING HOURS

## 2018-07-23 PROCEDURE — 40000193 ZZH STATISTIC PT WARD VISIT: Performed by: PHYSICAL THERAPIST

## 2018-07-23 PROCEDURE — 25000125 ZZHC RX 250: Performed by: PSYCHIATRY & NEUROLOGY

## 2018-07-23 PROCEDURE — 40000225 ZZH STATISTIC SLP WARD VISIT: Performed by: SPEECH-LANGUAGE PATHOLOGIST

## 2018-07-23 PROCEDURE — 84295 ASSAY OF SERUM SODIUM: CPT | Performed by: PSYCHIATRY & NEUROLOGY

## 2018-07-23 PROCEDURE — 25000125 ZZHC RX 250: Performed by: INTERNAL MEDICINE

## 2018-07-23 PROCEDURE — 25000128 H RX IP 250 OP 636: Performed by: INTERNAL MEDICINE

## 2018-07-23 PROCEDURE — 99232 SBSQ HOSP IP/OBS MODERATE 35: CPT | Performed by: PSYCHIATRY & NEUROLOGY

## 2018-07-23 PROCEDURE — 92526 ORAL FUNCTION THERAPY: CPT | Mod: GN | Performed by: SPEECH-LANGUAGE PATHOLOGIST

## 2018-07-23 RX ORDER — LOSARTAN POTASSIUM 25 MG/1
25 TABLET ORAL DAILY
Status: DISCONTINUED | OUTPATIENT
Start: 2018-07-23 | End: 2018-07-24

## 2018-07-23 RX ADMIN — HEPARIN SODIUM 850 UNITS/HR: 10000 INJECTION, SOLUTION INTRAVENOUS at 01:42

## 2018-07-23 RX ADMIN — CEFTRIAXONE 1 G: 1 INJECTION, POWDER, FOR SOLUTION INTRAMUSCULAR; INTRAVENOUS at 01:53

## 2018-07-23 RX ADMIN — FLUTICASONE PROPIONATE 1 SPRAY: 50 SPRAY, METERED NASAL at 08:21

## 2018-07-23 RX ADMIN — SODIUM CHLORIDE 500 ML: 3 INJECTION, SOLUTION INTRAVENOUS at 22:07

## 2018-07-23 RX ADMIN — TAMSULOSIN HYDROCHLORIDE 0.4 MG: 0.4 CAPSULE ORAL at 08:21

## 2018-07-23 RX ADMIN — SODIUM CHLORIDE 500 ML: 3 INJECTION, SOLUTION INTRAVENOUS at 09:22

## 2018-07-23 RX ADMIN — FINASTERIDE 5 MG: 5 TABLET, FILM COATED ORAL at 08:21

## 2018-07-23 RX ADMIN — ROSUVASTATIN CALCIUM 20 MG: 20 TABLET, FILM COATED ORAL at 08:21

## 2018-07-23 RX ADMIN — PREDNISONE 5 MG: 5 TABLET ORAL at 08:21

## 2018-07-23 RX ADMIN — LOSARTAN POTASSIUM 25 MG: 25 TABLET ORAL at 10:54

## 2018-07-23 RX ADMIN — TAMSULOSIN HYDROCHLORIDE 0.4 MG: 0.4 CAPSULE ORAL at 22:08

## 2018-07-23 ASSESSMENT — VISUAL ACUITY
OU: NORMAL ACUITY
OU: NORMAL ACUITY
OU: GLASSES
OU: NORMAL ACUITY
OU: GLASSES
OU: NORMAL ACUITY
OU: GLASSES
OU: GLASSES
OU: NORMAL ACUITY
OU: NORMAL ACUITY
OU: GLASSES
OU: NORMAL ACUITY
OU: GLASSES
OU: GLASSES

## 2018-07-23 ASSESSMENT — ACTIVITIES OF DAILY LIVING (ADL)
ADLS_ACUITY_SCORE: 15
ADLS_ACUITY_SCORE: 11
ADLS_ACUITY_SCORE: 14
ADLS_ACUITY_SCORE: 11
ADLS_ACUITY_SCORE: 11
ADLS_ACUITY_SCORE: 14

## 2018-07-23 NOTE — PROGRESS NOTES
[facility]    Hospitalist Progress Note    Assessment & Plan   Mr. Shamir Concepcion is a delightful 78-year-old  gentleman, with a past medical history notable for peripheral artery disease, rheumatoid arthritis, BPH, allergic rhinitis and colon polyps, who has presented with acute onset of dizziness, nausea and fall.  The workup in the Emergency Department has revealed acute left cerebellar stroke.    Acute left cerebellar ischemic stroke:   Admitted 7/21, developed new diplopia that evening prompting reimaging showing progression of stroke with edema, prompting transfer to ICU for close monitoring and initiation of 3% saline.  Per neurology, further discussion regarding possible need for decompressive surgery should he deteriorate.    --No surgery currently planned, though patient would consider it should the need arise.  --Hypertonic saline per [facility]  --  Int Jug central line placed by intensivist 7/21  --repeat head Ct this AM  --On heparin per [facility]     Urinary tract infection:  The patient reports dysuria for 1 day.  Urinalysis is abnormal with moderate leukocyte esterase and 33 WBCs.    --Ceftriaxone started on admission. Cultures negative to date.     Hypertension:  No prior history  --prn hydralazine available  --Will add valsartan (avoid B-blocker due to low HR)   BPH.  Finasteride/tamsulosin    Rheumatoid arthritis:  He is on weekly methotrexate plus prednisone 5 mg p.o. daily.    --Methotrexate on hold while hospitalized, can take prednisone as able        DVT Prophylaxis: Pneumatic Compression Devices  Code Status: DNR    Disposition: Probably transfer to floor today.    Valentina Wilde MD  Text Page (7am to 6pm)    Interval History   Uneventful night.  Alert, oriented x 3.  Denies pain, SOB, f/c.    -Data reviewed today: I reviewed all new labs and imaging results over the last 24 hours. I personally reviewed the brain MRI image(s) showing multiple infarcts similar to previous  study.    Physical Exam   Temp: 97.9  F (36.6  C) Temp src: Oral BP: 168/85   Heart Rate: 49 Resp: 11 SpO2: 99 % O2 Device: None (Room air)    Vitals:    07/21/18 1705 07/22/18 0400 07/23/18 0300   Weight: 71.7 kg (158 lb 1.1 oz) 71 kg (156 lb 8.4 oz) 72.1 kg (158 lb 15.2 oz)     Vital Signs with Ranges  Temp:  [97.5  F (36.4  C)-98.1  F (36.7  C)] 97.9  F (36.6  C)  Heart Rate:  [44-75] 49  Resp:  [9-32] 11  BP: (137-190)/(60-98) 168/85  SpO2:  [92 %-100 %] 99 %  I/O last 3 completed shifts:  In: 2062.41 [P.O.:690; I.V.:1372.41]  Out: 845 [Urine:845]    Constitutional: Sleeping, easily roused  Respiratory: cta, no wheezing  Cardiovascular: rrr  GI:  soft, non-distended, non-tender  Skin/Integumen: , no edema  Other:  equal grp strength, PERRL, finger to nose intact.    Medications     HEParin 850 Units/hr (07/23/18 0142)     - MEDICATION INSTRUCTIONS -       sodium chloride 3% 500 mL (07/23/18 0428)       cefTRIAXone  1 g Intravenous Q24H     finasteride (PROSCAR) tablet 5 mg  5 mg Oral Daily     fluticasone  1 spray Both Nostrils Daily     predniSONE  5 mg Oral Daily     rosuvastatin  20 mg Oral or NG Tube Daily     tamsulosin  0.4 mg Oral BID       Data     Recent Labs  Lab 07/23/18  0345 07/22/18  2203 07/22/18  1625  07/22/18  0354 07/21/18  1955 07/21/18  1133 07/20/18  2205   WBC 12.3*  --   --   --  13.9* 15.4*  --  13.6*   HGB 11.9*  --   --   --  12.9* 13.6  --  13.2*   MCV 95  --   --   --  95 94  --  93     --   --   --  343 355 359 349   INR  --   --   --   --   --   --   --  1.11    146* 144  < > 142 141  --  140   POTASSIUM 3.5  --   --   --  3.7  --   --  3.9   CHLORIDE 113*  --   --   --  111*  --   --  107   CO2 25  --   --   --  24  --   --  25   BUN 10  --   --   --  12  --   --  14   CR 0.98  --   --   --  0.94  --   --  0.93   ANIONGAP 6  --   --   --  7  --   --  8   RANJAN 7.7*  --   --   --  7.9*  --   --  8.5   *  --   --   --  103*  --   --  126*   ALBUMIN  --   --   --    --   --   --  3.2* 3.3*   PROTTOTAL  --   --   --   --   --   --  6.9 7.3   BILITOTAL  --   --   --   --   --   --  0.7 0.8   ALKPHOS  --   --   --   --   --   --  101 105   ALT  --   --   --   --   --   --  13 15   AST  --   --   --   --   --   --  21 19   LIPASE  --   --   --   --   --   --   --  105   TROPI  --   --   --   --   --   --   --  <0.015   < > = values in this interval not displayed.    Imaging:   Recent Results (from the past 24 hour(s))   CT Head w/o Contrast    Narrative    CT SCAN OF THE HEAD WITHOUT CONTRAST   7/23/2018 5:52 AM     HISTORY: cerebellar edema after stroke;     TECHNIQUE:  Axial images of the head and coronal reformations without  IV contrast material. Radiation dose for this scan was reduced using  automated exposure control, adjustment of the mA and/or kV according  to patient size, or iterative reconstruction technique.    COMPARISON: MR scan dated 7/21/2018    FINDINGS:  Evolving cerebellar infarcts are seen. There is increased  edema and mass effect associated with the areas of infarction. The  infarct in the right superior cerebellum appears larger than on the MR  scan of 7/21/2018. There is mild mass effect on the left side of the  fourth ventricle. No hydrocephalus. The right posterior pontine  infarct is faintly visible on these images. There is no mass effect or  hemorrhage. The left occipital infarct is also only faintly seen. No  hydrocephalus. Diffuse atrophy. No hemorrhage associated with the  cerebellar infarcts. The visualized portions of the sinuses and  mastoids appear normal. There is no evidence of trauma.      Impression    IMPRESSION: Evolving cerebellar, right dorsal pontine, and left  occipital infarcts with increased mass effect on the fourth ventricle.  No hemorrhage. No hydrocephalus.

## 2018-07-23 NOTE — PLAN OF CARE
Problem: Patient Care Overview  Goal: Plan of Care/Patient Progress Review  Outcome: No Change  Patient alert and oriented, can be confused when woken up. Disoriented to time x1 overnight. PERRL. Neuro's in tact except slight facial droop. Lung sounds diminished. Tele: Sinus oh with PACs and PVCs. Frequent urination, incontinent at times. No BM this shift. CT done. Dr. Cai saw patient this morning, considering another type of MRI to view vessels better. Impulsive with toileting. Heparin and 3% sodium infusing. Next sodium/osmolality check at 10:00. Will continue to monitor.

## 2018-07-23 NOTE — PROGRESS NOTES
[facility]  Neuroscience Intensive Care Progress Note  2018    Shamir Concepcion is a 78 year old year old male admitted on 2018 with acute onset of dizziness.  He was found to have a left PICA infarct with cerebral edema and brain compression.  He has been in the intensive care unit having hypertonic saline in hopes of mitigating the cerebral edema, not currently a surgical candidate.    Problem List  1.  Left PICA infarct  2.  Seronegative rheumatoid arthritis  3.  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    24 hour events:  No acute events overnight    24 Hour Vital Signs Summary:  Temperatures:  Current - Temp: 97  F (36.1  C); Max - Temp  Av.6  F (36.4  C)  Min: 96.7  F (35.9  C)  Max: 98.1  F (36.7  C)  Respiration range: Resp  Av.9  Min: 9  Max: 32  Pulse range: No Data Recorded  Blood pressure range: Systolic (24hrs), Av , Min:143 , Max:190   ; Diastolic (24hrs), Av, Min:70, Max:95    Pulse oximetry range: SpO2  Av.3 %  Min: 92 %  Max: 99 %    Ventilator Settings  Resp: 17      Intake/Output Summary (Last 24 hours) at 18 1204  Last data filed at 18 1133   Gross per 24 hour   Intake          1981.16 ml   Output              995 ml   Net           986.16 ml       Current Medications:    cefTRIAXone  1 g Intravenous Q24H     finasteride (PROSCAR) tablet 5 mg  5 mg Oral Daily     fluticasone  1 spray Both Nostrils Daily     losartan  25 mg Oral Daily     predniSONE  5 mg Oral Daily     rosuvastatin  20 mg Oral or NG Tube Daily     tamsulosin  0.4 mg Oral BID       PRN Medications:  acetaminophen, acetaminophen, bisacodyl, hydrALAZINE, labetalol, magnesium hydroxide, - MEDICATION INSTRUCTIONS -, naloxone, ondansetron **OR** ondansetron, potassium chloride, potassium chloride with lidocaine, potassium chloride, potassium chloride, potassium chloride, prochlorperazine **OR** prochlorperazine **OR** prochlorperazine, senna-docusate **OR**  senna-docusate    Infusions:    HEParin 850 Units/hr (07/23/18 9499)     - MEDICATION INSTRUCTIONS -       sodium chloride 3% 500 mL (07/23/18 0922)       No Known Allergies    Physical Examination:  /79  Pulse 60  Temp 97  F (36.1  C) (Oral)  Resp 17  Wt 72.1 kg (158 lb 15.2 oz)  SpO2 97%  BMI 23.47 kg/m2  GENERAL APPEARANCE ADULT: Alert, in no acute distress    Neurological Examination  MENTAL STATE:  Awake, alert, oriented     CRANIAL NERVES:  Cranial nerves were normal.  CN 2         Visual fields full to confrontation.  CN 3, 4, 6  Pupils round, 4 mm in diameter, equally reactive to light.  Lids symmetric; no ptosis.  CN 5  Facial sensation intact bilaterally.  CN 7  Normal and symmetric facial strength.  CN 8  Hearing intact to finger rub.  CN 9  Palate elevates symmetrically.  CN 11  Normal strength of shoulder shrug and neck turning.  CN 12  Tongue midline, with normal bulk and strength.    MOTOR:  Motor exam was normal.  Muscle bulk and tone were normal in both upper and lower extremities.  Muscle strength was 5/5 in distal and proximal muscles in both upper and lower extremities.    SENSORY:  Sensory exam was normal. In both upper and lower extremities, sensation was intact to light touch.     COORDINATION:   Coordination exam was abnormal.  Impaired finger to nose and heel to shin on the left, mild ataxia noted in right upper extremity     GAIT:  Gait was deferred.     HENT: Mouth and posterior oropharynx normal, moist mucous membranes  RESP: lungs clear to auscultation   CV: Regular rhythm, rate-normal  ABDOMEN: bowel sounds normal  MSK: 5/5 strength in upper and lower extremities bilaterally  SKIN: no suspicious lesions or rashes    Labs/Studies:  CMP  Recent Labs  Lab 07/23/18  0957 07/23/18  0345 07/22/18  2203 07/22/18  1625  07/22/18  0354  07/21/18  1133 07/20/18  2205    144 146* 144  < > 142  < >  --  140   POTASSIUM  --  3.5  --   --   --  3.7  --   --  3.9   CHLORIDE  --  113*   --   --   --  111*  --   --  107   CO2  --  25  --   --   --  24  --   --  25   ANIONGAP  --  6  --   --   --  7  --   --  8   GLC  --  119*  --   --   --  103*  --   --  126*   BUN  --  10  --   --   --  12  --   --  14   CR  --  0.98  --   --   --  0.94  --   --  0.93   GFRESTIMATED  --  74  --   --   --  77  --   --  79   GFRESTBLACK  --  89  --   --   --  >90  --   --  >90   RANJAN  --  7.7*  --   --   --  7.9*  --   --  8.5   PROTTOTAL  --   --   --   --   --   --   --  6.9 7.3   ALBUMIN  --   --   --   --   --   --   --  3.2* 3.3*   BILITOTAL  --   --   --   --   --   --   --  0.7 0.8   ALKPHOS  --   --   --   --   --   --   --  101 105   AST  --   --   --   --   --   --   --  21 19   ALT  --   --   --   --   --   --   --  13 15   < > = values in this interval not displayed.  CBC  Recent Labs  Lab 07/23/18  0345 07/22/18  0354 07/21/18  1955 07/21/18  1133 07/20/18 2205   WBC 12.3* 13.9* 15.4*  --  13.6*   RBC 3.76* 4.09* 4.22*  --  4.16*   HGB 11.9* 12.9* 13.6  --  13.2*   HCT 35.8* 38.7* 39.8*  --  38.8*   MCV 95 95 94  --  93   MCH 31.6 31.5 32.2  --  31.7   MCHC 33.2 33.3 34.2  --  34.0   RDW 14.6 14.5 14.6  --  14.3    343 355 359 349     INR  Recent Labs  Lab 07/20/18  2205   INR 1.11     Arterial Blood GasNo lab results found in last 7 days.    Imaging:   CT head from this morning with no acute increase in cerebral edema    Assessment/Plan  Neuro:  Left PICA infarct-in discussion with neuroradiology today regarding the left vertebral artery the possibility of dissection was brought up.  Due to features of the distal portion of the occlusion it is felt that this is likely a dissection of the left vertebral artery.  Currently on a heparin drip for this.  Would transition him to Coumadin for secondary stroke prevention on discharge, but would not start Coumadin at this point in time given he is in the window for possible deterioration secondary to increasing cerebral edema that may necessitate a  posterior decompression.    Cerebral edema with brain compression-continue with hypertonic saline for now, increasing goal sodium to 150-155 and osmolalities consistently above 300    CV:    Hypertension-could consider starting some oral antihypertensives to work to decrease blood pressure, would start low and proceed slowly with this decrease following his exam    ID:  Persistent leukocytosis-currently downtrending, afebrile, continue to follow    Code: DNR      Raffaele Cai MD  Vascular Neurology/Neurocritical Care  Text Page - [phone]      Neurocritical care time:  I spent 35 minutes bedside and on the inpatient unit today managing the neurocritical care of Shamir Concepcion in relation to the issues listed in this note. Patient is critically ill / has very high risk of deterioration

## 2018-07-23 NOTE — PROGRESS NOTES
[facility]  Spiritual Assessment Progress Note  [facility] ICU   had a brief conversation with pt, as pt stated that he was tired.  Pt requested a prayer.     provided prayer.      SH will follow up later in the week.                                                                                                                                             Suki Garcia M.Div., [facility]  Staff    Pager [phone]

## 2018-07-23 NOTE — PLAN OF CARE
Problem: Patient Care Overview  Goal: Plan of Care/Patient Progress Review  Discharge Planner PT   Patient plan for discharge: None stated.  Current status: Pt tx to ICU over the weekend for every day neuro checks as pt with vision changes, found to have had worsening cerebral edema (per notes). Pt presents today with continued impulsiveness, ataxic movements with gross motor tasks such as reaching and stepping.  L sided lean (able to correct with verbal and tactile cues). Mod A x 2 sit<>sand and Max to Mod A x 2 for bed.chair secondary to difficulty advancing the R LE, poor weight shift to the L and L sided lean. Responds well to short simple commands during mobility. BP remains 160s systolic pre/post activity; however diastolic number dropped a bit from 90 to 70 with activity. Other VSS. Up in chair upon PT exit, chair alarm on sitter and daughter at bedside.  Barriers to return to prior living situation: Fall risk, impulsive, decreased safety awareness, weakness, ataxia  Recommendations for discharge: Acute Rehab  Rationale for recommendations: Pt will benefit from acute rehab at discharge to continue to progress functional mobility. Pt is eager to return to independence. Has a good support system.        Entered by: Taryn Moran 07/23/2018 2:39 PM

## 2018-07-23 NOTE — PLAN OF CARE
Problem: Patient Care Overview  Goal: Plan of Care/Patient Progress Review  OT: Attempted however sitter reports patient fatigued from PT session earlier, now asleep.

## 2018-07-23 NOTE — PLAN OF CARE
Problem: Patient Care Overview  Goal: Plan of Care/Patient Progress Review      Discharge Planner SLP   Patient plan for discharge: Did not state  Current status: Swallow Tx was provided this pm.  Patient was sleeping initially, but did wake with verbal and tactile cues.  Patient demonstrated decreased oral coordination and awareness, impulsivity, oral residue, delayed swallows, and need for double swallows at times.  No overt signs of aspiration were observed with mod-max cues to use strategies/precautions.  Recommend 1:1 supervision/cues to use precautions with soft food selection from a regular diet and thin liquids.  Precautions include: sit up at 90 degrees, only when alert, slow rate, small bites/sips, alternate textures, hold po if alertness is poor and/or aspiration signs are noted.  Plan to continue Tx for 1-2 sessions to assess diet tolerance and train strategies.  Barriers to return to prior living situation: Level of assist, fall risk, weakness  Recommendations for discharge: ARU with SLP  Rationale for recommendations: SLP swallow Tx to maximize safety for a regular diet and maximize cognitive-linguistic function for daily needs       Entered by: Elana Viera 07/23/2018 4:44 PM

## 2018-07-23 NOTE — PROGRESS NOTES
[facility]    Neurosurgery Progress Note    Date of Service (when I saw the patient): 07/23/2018     Assessment & Plan   Shamir Concepcion is a 78 year old male who was admitted on 7/20/2018 with dizziness and a fall. Head CT revealed left cerebellar ischemia. CTA with PICA occlusion. MRI of brain with left cerebellar acute infarct and small right pontine infarct and left occipital lobe. Repeat MRI without significant changes and no definite hydrocephalus. Today, he is sitting semi upright in bed getting ready to work with therapy. States he is feeling well. Denies headache. Follows commands.     Active Problems:    Acute ischemic stroke (H)    Assessment: left cerebellar ischemia. MRI reviewed by Dr. Bella    Plan:   -Per Dr. Bella, not currently recommending surgery  -Continue hypertonic saline  -Management per NCC  -Will continue to monitor      I have discussed the following assessment and plan with Dr. Bella who is in agreement with initial plan and will follow up with further consultation recommendations.      Esther Strange PA-C  [facility]  [address] [address]    Tel [phone]  Pager [phone]      Interval History   Stable.    Physical Exam   Temp: 97  F (36.1  C) Temp src: Oral BP: (!) 179/92   Heart Rate: 56 Resp: 17 SpO2: 95 % O2 Device: None (Room air)    Vitals:    07/21/18 1705 07/22/18 0400 07/23/18 0300   Weight: 158 lb 1.1 oz (71.7 kg) 156 lb 8.4 oz (71 kg) 158 lb 15.2 oz (72.1 kg)     Vital Signs with Ranges  Temp:  [96.7  F (35.9  C)-98.1  F (36.7  C)] 97  F (36.1  C)  Heart Rate:  [44-73] 56  Resp:  [9-32] 17  BP: (143-190)/(70-95) 179/92  SpO2:  [92 %-99 %] 95 %  I/O last 3 completed shifts:  In: 2062.41 [P.O.:690; I.V.:1372.41]  Out: 845 [Urine:845]    Heart Rate: 56, Blood pressure (!) 179/92, pulse 60, temperature 97  F (36.1  C), temperature source Oral, resp. rate 17, weight 158 lb 15.2 oz (72.1  kg), SpO2 95 %.  158 lbs 15.23 oz  HEENT:  Normocephalic, atraumatic.  PERRL.  EOM s intact.    Neck:  Supple, non-tender, without lymphadenopathy.  Heart:  No peripheral edema  Lungs:  No SOB  Skin:  Warm and dry.  Extremities:  No edema, cyanosis or clubbing.    NEUROLOGICAL EXAMINATION:   Mental status:  Alert and Oriented x 3, speech is fluent.  Cranial nerves:  II-XII intact. Mild left facial droop.  Motor:  Strength is 5/5 throughout the upper and lower extremities  Sensation:  intact  Coordination:  Performs FNF, but mildly ataxic on left.   Negative pronator drift.       Medications     HEParin 850 Units/hr (07/23/18 0828)     - MEDICATION INSTRUCTIONS -       sodium chloride 3% 500 mL (07/23/18 0922)       cefTRIAXone  1 g Intravenous Q24H     finasteride (PROSCAR) tablet 5 mg  5 mg Oral Daily     fluticasone  1 spray Both Nostrils Daily     losartan  25 mg Oral Daily     predniSONE  5 mg Oral Daily     rosuvastatin  20 mg Oral or NG Tube Daily     tamsulosin  0.4 mg Oral BID       Data   CBC RESULTS:   Recent Labs   Lab Test  07/23/18   0345   WBC  12.3*   RBC  3.76*   HGB  11.9*   HCT  35.8*   MCV  95   MCH  31.6   MCHC  33.2   RDW  14.6   PLT  294     Basic Metabolic Panel:  Lab Results   Component Value Date     07/23/2018      Lab Results   Component Value Date    POTASSIUM 3.5 07/23/2018     Lab Results   Component Value Date    CHLORIDE 113 07/23/2018     Lab Results   Component Value Date    RANJAN 7.7 07/23/2018     Lab Results   Component Value Date    CO2 25 07/23/2018     Lab Results   Component Value Date    BUN 10 07/23/2018     Lab Results   Component Value Date    CR 0.98 07/23/2018     Lab Results   Component Value Date     07/23/2018     INR:  Lab Results   Component Value Date    INR 1.11 07/20/2018    INR 0.94 09/11/2013

## 2018-07-23 NOTE — PLAN OF CARE
Problem: Patient Care Overview  Goal: Plan of Care/Patient Progress Review  Outcome: No Change  No change in patient status.     Neuro: Alert, confused (disorientated to time and situation).  Very impulsive (a sitter was initiated on this shift).  Up to chair with 2 assist and gait belt.  Patient is unsteady and slumps on the left side.  However, with neuro assessment patient remains absent of ataxia.  Remains with left facial droop.  Purposeful and strong movements in all extremities (only changes with ambulation).    CV: SR with PVC and PACs.  K 3.5 and per protocol does not need replacement.  SBP goals met at less than 220 and DBP less than 110.  Good pulses.  PCDs on.  Heparin gtt remains at 850 units/hr with Xa check in AM.  Good CMS. Denies neuropathy.    Pulm: Clear lung sounds.  Remains on RA with O2Sats >95%.      GI: On Regular diet with poor appetite.  Some nausea that was resolved with cold washcloth and dimming lights.      : UTI.  On abx. Afebrile.  Frequency, urgency, and dribbling.    Skin: Bruising and scab on bridge of nose.      IV: Right internal jugular.  On 3% NA gtt with Na/Osmolality checks every 6hrs.      Code: DNR.  Living will on file and in chart.     Education: Medications, safety and fall precautions, activity goals.

## 2018-07-24 ENCOUNTER — APPOINTMENT (OUTPATIENT)
Dept: OCCUPATIONAL THERAPY | Facility: CLINIC | Age: 78
DRG: 064 | End: 2018-07-24
Payer: COMMERCIAL

## 2018-07-24 ENCOUNTER — APPOINTMENT (OUTPATIENT)
Dept: SPEECH THERAPY | Facility: CLINIC | Age: 78
DRG: 064 | End: 2018-07-24
Payer: COMMERCIAL

## 2018-07-24 ENCOUNTER — APPOINTMENT (OUTPATIENT)
Dept: PHYSICAL THERAPY | Facility: CLINIC | Age: 78
DRG: 064 | End: 2018-07-24
Payer: COMMERCIAL

## 2018-07-24 LAB
ANION GAP SERPL CALCULATED.3IONS-SCNC: 8 MMOL/L (ref 3–14)
BUN SERPL-MCNC: 10 MG/DL (ref 7–30)
CALCIUM SERPL-MCNC: 7.6 MG/DL (ref 8.5–10.1)
CHLORIDE SERPL-SCNC: 111 MMOL/L (ref 94–109)
CO2 SERPL-SCNC: 25 MMOL/L (ref 20–32)
CREAT SERPL-MCNC: 0.84 MG/DL (ref 0.66–1.25)
ERYTHROCYTE [DISTWIDTH] IN BLOOD BY AUTOMATED COUNT: 14.3 % (ref 10–15)
GFR SERPL CREATININE-BSD FRML MDRD: 88 ML/MIN/1.7M2
GLUCOSE SERPL-MCNC: 107 MG/DL (ref 70–99)
HCT VFR BLD AUTO: 35.7 % (ref 40–53)
HGB BLD-MCNC: 12.1 G/DL (ref 13.3–17.7)
LMWH PPP CHRO-ACNC: 0.18 IU/ML
MCH RBC QN AUTO: 31.8 PG (ref 26.5–33)
MCHC RBC AUTO-ENTMCNC: 33.9 G/DL (ref 31.5–36.5)
MCV RBC AUTO: 94 FL (ref 78–100)
OSMOLALITY SERPL: 289 MMOL/KG (ref 280–301)
OSMOLALITY SERPL: 294 MMOL/KG (ref 280–301)
OSMOLALITY SERPL: 294 MMOL/KG (ref 280–301)
OSMOLALITY SERPL: 296 MMOL/KG (ref 280–301)
OSMOLALITY SERPL: 296 MMOL/KG (ref 280–301)
PLATELET # BLD AUTO: 314 10E9/L (ref 150–450)
POTASSIUM SERPL-SCNC: 3.2 MMOL/L (ref 3.4–5.3)
POTASSIUM SERPL-SCNC: 3.8 MMOL/L (ref 3.4–5.3)
RBC # BLD AUTO: 3.81 10E12/L (ref 4.4–5.9)
SODIUM SERPL-SCNC: 142 MMOL/L (ref 133–144)
SODIUM SERPL-SCNC: 142 MMOL/L (ref 133–144)
SODIUM SERPL-SCNC: 143 MMOL/L (ref 133–144)
SODIUM SERPL-SCNC: 143 MMOL/L (ref 133–144)
SODIUM SERPL-SCNC: 144 MMOL/L (ref 133–144)
WBC # BLD AUTO: 9.5 10E9/L (ref 4–11)

## 2018-07-24 PROCEDURE — 20000003 ZZH R&B ICU

## 2018-07-24 PROCEDURE — 25000125 ZZHC RX 250: Performed by: INTERNAL MEDICINE

## 2018-07-24 PROCEDURE — 25000125 ZZHC RX 250: Performed by: PSYCHIATRY & NEUROLOGY

## 2018-07-24 PROCEDURE — 83930 ASSAY OF BLOOD OSMOLALITY: CPT | Performed by: PSYCHIATRY & NEUROLOGY

## 2018-07-24 PROCEDURE — 85520 HEPARIN ASSAY: CPT | Performed by: INTERNAL MEDICINE

## 2018-07-24 PROCEDURE — 97161 PT EVAL LOW COMPLEX 20 MIN: CPT | Mod: GP

## 2018-07-24 PROCEDURE — 80048 BASIC METABOLIC PNL TOTAL CA: CPT | Performed by: INTERNAL MEDICINE

## 2018-07-24 PROCEDURE — 25000132 ZZH RX MED GY IP 250 OP 250 PS 637: Performed by: INTERNAL MEDICINE

## 2018-07-24 PROCEDURE — 97535 SELF CARE MNGMENT TRAINING: CPT | Mod: GO | Performed by: OCCUPATIONAL THERAPIST

## 2018-07-24 PROCEDURE — 97530 THERAPEUTIC ACTIVITIES: CPT | Mod: GP

## 2018-07-24 PROCEDURE — 40000133 ZZH STATISTIC OT WARD VISIT: Performed by: OCCUPATIONAL THERAPIST

## 2018-07-24 PROCEDURE — 99232 SBSQ HOSP IP/OBS MODERATE 35: CPT | Performed by: PHYSICIAN ASSISTANT

## 2018-07-24 PROCEDURE — 85049 AUTOMATED PLATELET COUNT: CPT | Performed by: PSYCHIATRY & NEUROLOGY

## 2018-07-24 PROCEDURE — 97112 NEUROMUSCULAR REEDUCATION: CPT | Mod: GO | Performed by: OCCUPATIONAL THERAPIST

## 2018-07-24 PROCEDURE — 40000225 ZZH STATISTIC SLP WARD VISIT: Performed by: SPEECH-LANGUAGE PATHOLOGIST

## 2018-07-24 PROCEDURE — 84295 ASSAY OF SERUM SODIUM: CPT | Performed by: PSYCHIATRY & NEUROLOGY

## 2018-07-24 PROCEDURE — 84132 ASSAY OF SERUM POTASSIUM: CPT | Performed by: PSYCHIATRY & NEUROLOGY

## 2018-07-24 PROCEDURE — 40000916 ZZH STATISTIC SITTER, NIGHT HOURS

## 2018-07-24 PROCEDURE — 40000193 ZZH STATISTIC PT WARD VISIT

## 2018-07-24 PROCEDURE — 83930 ASSAY OF BLOOD OSMOLALITY: CPT | Performed by: INTERNAL MEDICINE

## 2018-07-24 PROCEDURE — 25000128 H RX IP 250 OP 636: Performed by: INTERNAL MEDICINE

## 2018-07-24 PROCEDURE — 97112 NEUROMUSCULAR REEDUCATION: CPT | Mod: GP

## 2018-07-24 PROCEDURE — 85027 COMPLETE CBC AUTOMATED: CPT | Performed by: INTERNAL MEDICINE

## 2018-07-24 PROCEDURE — 92526 ORAL FUNCTION THERAPY: CPT | Mod: GN | Performed by: SPEECH-LANGUAGE PATHOLOGIST

## 2018-07-24 PROCEDURE — 99232 SBSQ HOSP IP/OBS MODERATE 35: CPT | Performed by: INTERNAL MEDICINE

## 2018-07-24 PROCEDURE — 99291 CRITICAL CARE FIRST HOUR: CPT | Performed by: PSYCHIATRY & NEUROLOGY

## 2018-07-24 RX ORDER — AMLODIPINE BESYLATE 5 MG/1
5 TABLET ORAL DAILY
Status: DISCONTINUED | OUTPATIENT
Start: 2018-07-24 | End: 2018-07-26

## 2018-07-24 RX ORDER — LOSARTAN POTASSIUM 50 MG/1
50 TABLET ORAL DAILY
Status: DISCONTINUED | OUTPATIENT
Start: 2018-07-24 | End: 2018-07-26

## 2018-07-24 RX ADMIN — PREDNISONE 5 MG: 5 TABLET ORAL at 08:53

## 2018-07-24 RX ADMIN — LOSARTAN POTASSIUM 50 MG: 25 TABLET ORAL at 08:50

## 2018-07-24 RX ADMIN — SODIUM CHLORIDE 500 ML: 3 INJECTION, SOLUTION INTRAVENOUS at 10:23

## 2018-07-24 RX ADMIN — POTASSIUM CHLORIDE 40 MEQ: 1500 TABLET, EXTENDED RELEASE ORAL at 08:50

## 2018-07-24 RX ADMIN — CEFTRIAXONE 1 G: 1 INJECTION, POWDER, FOR SOLUTION INTRAMUSCULAR; INTRAVENOUS at 03:07

## 2018-07-24 RX ADMIN — FLUTICASONE PROPIONATE 1 SPRAY: 50 SPRAY, METERED NASAL at 08:54

## 2018-07-24 RX ADMIN — ONDANSETRON 4 MG: 2 INJECTION INTRAMUSCULAR; INTRAVENOUS at 12:55

## 2018-07-24 RX ADMIN — TAMSULOSIN HYDROCHLORIDE 0.4 MG: 0.4 CAPSULE ORAL at 22:12

## 2018-07-24 RX ADMIN — POTASSIUM CHLORIDE 20 MEQ: 1500 TABLET, EXTENDED RELEASE ORAL at 11:12

## 2018-07-24 RX ADMIN — HEPARIN SODIUM 850 UNITS/HR: 10000 INJECTION, SOLUTION INTRAVENOUS at 09:07

## 2018-07-24 RX ADMIN — ROSUVASTATIN CALCIUM 20 MG: 20 TABLET, FILM COATED ORAL at 08:53

## 2018-07-24 RX ADMIN — FINASTERIDE 5 MG: 5 TABLET, FILM COATED ORAL at 08:53

## 2018-07-24 RX ADMIN — TAMSULOSIN HYDROCHLORIDE 0.4 MG: 0.4 CAPSULE ORAL at 08:53

## 2018-07-24 RX ADMIN — AMLODIPINE BESYLATE 5 MG: 5 TABLET ORAL at 08:50

## 2018-07-24 ASSESSMENT — ACTIVITIES OF DAILY LIVING (ADL)
ADLS_ACUITY_SCORE: 16
ADLS_ACUITY_SCORE: 16
ADLS_ACUITY_SCORE: 11

## 2018-07-24 ASSESSMENT — VISUAL ACUITY
OU: GLASSES

## 2018-07-24 NOTE — PROGRESS NOTES
Discharge Planner OT      Patient plan for discharge: None stated  Current status:Pt. needed overall SBA-CGA for supine-sit, vc's to slow down, impulsive;Once seated. leaning to L, able to come to midline w/ vc's;Pt. w/ moderate lean to L w/attempts at scooting forward to get feet on floor, needing mod. A, completed X 3;;pt. sat for approx. 18 min., good sitting balance overall SBA, needs vc's for hand placment/safety at times;Pt. w/ mild lean to L w/sit, able to correct w/ vc's;Pt. able to wash face w/ min. A + vc's needed for impulsiveness/safety w/ lines;Pt. declined teeth-brushing;Worked on dynamic sitting/reaching for target --able to cross midline B'ly, min-mod. difficulty overall, needed vc's to look to L to find target;Ed. on safe sit-stand transfer--able to completed w/ min. A X 2, moderate lean to L w/initial stand, able to stand upright w/vc's, needing overall CGA-min. A for static standing balance;Compelted 2 standing trials X 1 min. each;Completed 3rd sit-stand w/ min. A X 2, able to take 3 small steps to chair w/ overall mod. A X 2/vc's, + vc for reaching for armrest for safe sit;QM=232/87, O2 sats=96%, HR=76.   Barriers to return to prior living situation: Current level of assist, weakness,incoordination, impaired balance/falls risk, impaired cognition/safety/insight/impulsive, ? Vision impairment, lives alone.  Recommendations for discharge: ARU  Rationale for recommendations: Pt. would benefit from intensive skilled OT intervention to assist pt. in achieving maximal safety/indep. w/ I/ADl's;pt. was very independent/active at baseline, motivated to participate, good family support.       Entered by: Katiuska Pitts 07/24/2018 10:57 AM

## 2018-07-24 NOTE — PROGRESS NOTES
[facility]  Neuroscience Intensive Care Progress Note  2018    Shamir Concepcion is a 78 year old year old male admitted on 2018 with acute onset of dizziness.  He was found to have a left PICA infarct with cerebral edema and brain compression.  He has been in the intensive care unit having hypertonic saline in hopes of mitigating the cerebral edema, not currently a surgical candidate.     Problem List  1.  Left PICA infarct  2.  Seronegative rheumatoid arthritis  3.  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    24 hour events:  []No acute issues overnight    24 Hour Vital Signs Summary:  Temperatures:  Current - Temp: 97.8  F (36.6  C); Max - Temp  Av.8  F (36.6  C)  Min: 97  F (36.1  C)  Max: 98.3  F (36.8  C)  Respiration range: Resp  Av.8  Min: 7  Max: 28  Pulse range: No Data Recorded  Blood pressure range: Systolic (24hrs), Av , Min:151 , Max:191   ; Diastolic (24hrs), Av, Min:77, Max:100    Pulse oximetry range: SpO2  Av.9 %  Min: 90 %  Max: 100 %    Ventilator Settings  Resp: 18      Intake/Output Summary (Last 24 hours) at 18 1029  Last data filed at 18 0900   Gross per 24 hour   Intake          1626.92 ml   Output             1075 ml   Net           551.92 ml       Current Medications:    amLODIPine  5 mg Oral Daily     cefTRIAXone  1 g Intravenous Q24H     finasteride (PROSCAR) tablet 5 mg  5 mg Oral Daily     fluticasone  1 spray Both Nostrils Daily     losartan  50 mg Oral Daily     predniSONE  5 mg Oral Daily     rosuvastatin  20 mg Oral or NG Tube Daily     tamsulosin  0.4 mg Oral BID       PRN Medications:  acetaminophen, acetaminophen, bisacodyl, hydrALAZINE, labetalol, magnesium hydroxide, - MEDICATION INSTRUCTIONS -, naloxone, ondansetron **OR** ondansetron, potassium chloride, potassium chloride with lidocaine, potassium chloride, potassium chloride, potassium chloride, prochlorperazine **OR** prochlorperazine **OR**  prochlorperazine, senna-docusate **OR** senna-docusate    Infusions:    HEParin 850 Units/hr (07/24/18 0907)     - MEDICATION INSTRUCTIONS -       sodium chloride 3% 500 mL (07/24/18 1023)       No Known Allergies    Physical Examination:  /85  Pulse 60  Temp 97.8  F (36.6  C) (Oral)  Resp 18  Wt 71.2 kg (156 lb 15.5 oz)  SpO2 96%  BMI 23.18 kg/m2     GENERAL APPEARANCE ADULT: Alert, in no acute distress     Neurological Examination  MENTAL STATE:  Awake, alert, oriented      CRANIAL NERVES:  Cranial nerves were normal.  CN 2         Visual fields full to confrontation.  CN 3, 4, 6  Pupils round, 4 mm in diameter, equally reactive to light.  Lids symmetric; no ptosis.  CN 5  Facial sensation intact bilaterally.  CN 7  Normal and symmetric facial strength.  CN 8  Hearing intact to finger rub.  CN 9  Palate elevates symmetrically.  CN 11  Normal strength of shoulder shrug and neck turning.  CN 12  Tongue midline, with normal bulk and strength.     MOTOR:  Motor exam was normal.  Muscle bulk and tone were normal in both upper and lower extremities.  Muscle strength was 5/5 in distal and proximal muscles in both upper and lower extremities.     SENSORY:  Sensory exam was normal. In both upper and lower extremities, sensation was intact to light touch.      COORDINATION:   Coordination exam was abnormal.  Impaired finger to nose and heel to shin on the left, mild ataxia noted in right upper extremity      GAIT:  Gait was deferred.      HENT: Mouth and posterior oropharynx normal, moist mucous membranes  RESP: lungs clear to auscultation   CV: Regular rhythm, rate-normal  ABDOMEN: bowel sounds normal    Labs/Studies:  CMP  Recent Labs  Lab 07/24/18  0530 07/23/18  2345 07/23/18  1559 07/23/18  0957 07/23/18  0345  07/22/18  0354  07/21/18  1133 07/20/18  2205    143 145* 144 144  < > 142  < >  --  140   POTASSIUM 3.2*  --   --   --  3.5  --  3.7  --   --  3.9   CHLORIDE 111*  --   --   --  113*  --  111*   --   --  107   CO2 25  --   --   --  25  --  24  --   --  25   ANIONGAP 8  --   --   --  6  --  7  --   --  8   *  --   --   --  119*  --  103*  --   --  126*   BUN 10  --   --   --  10  --  12  --   --  14   CR 0.84  --   --   --  0.98  --  0.94  --   --  0.93   GFRESTIMATED 88  --   --   --  74  --  77  --   --  79   GFRESTBLACK >90  --   --   --  89  --  >90  --   --  >90   RANJAN 7.6*  --   --   --  7.7*  --  7.9*  --   --  8.5   PROTTOTAL  --   --   --   --   --   --   --   --  6.9 7.3   ALBUMIN  --   --   --   --   --   --   --   --  3.2* 3.3*   BILITOTAL  --   --   --   --   --   --   --   --  0.7 0.8   ALKPHOS  --   --   --   --   --   --   --   --  101 105   AST  --   --   --   --   --   --   --   --  21 19   ALT  --   --   --   --   --   --   --   --  13 15   < > = values in this interval not displayed.  CBC  Recent Labs  Lab 07/24/18  0530 07/23/18  0345 07/22/18  0354 07/21/18  1955   WBC 9.5 12.3* 13.9* 15.4*   RBC 3.81* 3.76* 4.09* 4.22*   HGB 12.1* 11.9* 12.9* 13.6   HCT 35.7* 35.8* 38.7* 39.8*   MCV 94 95 95 94   MCH 31.8 31.6 31.5 32.2   MCHC 33.9 33.2 33.3 34.2   RDW 14.3 14.6 14.5 14.6    294 343 355     INR  Recent Labs  Lab 07/20/18  2205   INR 1.11     Arterial Blood GasNo lab results found in last 7 days.      Assessment/Plan  Neuro:  Left PICA infarct- likely secondary to dissection of the left vertebral artery.  Currently on a heparin drip for this. Will transition him to Coumadin for secondary stroke prevention on discharge, holding at this point in time given he is in the window for possible deterioration secondary to increasing cerebral edema that may necessitate a posterior decompression.     Cerebral edema with brain compression- will stop the hypertonic saline today and follow the sodium and examination for possible deterioration, keep in ICU overnight and address transfer to floor in the AM     CV:    Hypertension- oral antihypertensives being titrated by hospitalist, goal  130/80 to be achieved slowly     ID:  Persistent leukocytosis- resolved     Code: DNR    Raffaele Cai MD  Vascular Neurology/Neurocritical Care  Text Page - [phone]      Neurocritical care time:  I spent 35 minutes bedside and on the inpatient unit today managing the neurocritical care of Shamir TALISHA Jamey in relation to the issues listed in this note. Patient is critically ill / has very high risk of deterioration

## 2018-07-24 NOTE — PROGRESS NOTES
ICU Multi-Disciplinary Note  Patient condition reviewed and discussed while on multidisciplinary rounds today.  Had been in ICU for left PICA infarct requiring hypertonic saline and close neuro monitoring.  Now off 3% saline. Will stay for monitoring overnight. Neurologically stable. Anticipate transfer tomorrow   The Critical Care service will continue to follow peripherally while patient is within the ICU. We are readily available should issues arise.  Please feel free to contact us for critical care issues with which we may be of assistance. For all other concerns, please contact primary service first.     Kristine Pan

## 2018-07-24 NOTE — PROGRESS NOTES
[facility]    Neurosurgery Progress Note    Date of Service (when I saw the patient): 07/24/2018     Assessment & Plan   Shamir Concepcion is a 78 year old male who was admitted on 7/20/2018 with dizziness and a fall. Head CT revealed left cerebellar ischemia. CTA with PICA occlusion. MRI of brain with left cerebellar acute infarct and small right pontine infarct and left occipital lobe. Repeat MRI without significant changes and no definite hydrocephalus. Today, he is sitting upright in bed eating. States he is feeling well and denies headache. Remains on 3% per NCC. Neuro status remains unchanged. NSG will sign off at this time, please contact if further needed.      Active Problems:    Acute ischemic stroke (H)    Assessment: left cerebellar ischemia    Plan:   -Management per NCC  -NSG will sign off at this time, please contact if further needed. Follow up in 4-6 weeks with head CT prior.      I have discussed the following assessment and plan with Dr. Hogan who is in agreement with initial plan and will follow up with further consultation recommendations.      Esther Strange PA-C  [facility]  [place] [address]    Tel [phone]  Pager [phone]      Interval History   Stable.    Physical Exam   Temp: 97.8  F (36.6  C) Temp src: Oral BP: 181/85   Heart Rate: 53 Resp: 18 SpO2: 96 % O2 Device: None (Room air)    Vitals:    07/22/18 0400 07/23/18 0300 07/24/18 0600   Weight: 156 lb 8.4 oz (71 kg) 158 lb 15.2 oz (72.1 kg) 156 lb 15.5 oz (71.2 kg)     Vital Signs with Ranges  Temp:  [97  F (36.1  C)-98.3  F (36.8  C)] 97.8  F (36.6  C)  Heart Rate:  [46-80] 53  Resp:  [7-28] 18  BP: (146-191)/() 181/85  SpO2:  [90 %-100 %] 96 %  I/O last 3 completed shifts:  In: 1694.17 [P.O.:630; I.V.:1064.17]  Out: 925 [Urine:925]    Heart Rate: 53, Blood pressure 181/85, pulse 60, temperature 97.8  F (36.6  C), temperature source  Oral, resp. rate 18, weight 156 lb 15.5 oz (71.2 kg), SpO2 96 %.  156 lbs 15.48 oz  HEENT:  Normocephalic, atraumatic.  PERRL.  EOM s intact.    Heart:  No peripheral edema  Lungs:  No SOB  Skin:  Warm and dry.  Extremities:  Good radial and dorsalis pedis pulses bilaterally, no edema, cyanosis or clubbing.    NEUROLOGICAL EXAMINATION:   Mental status:  Awake and alert, mild confusion.  Cranial nerves:  II-XII intact.   Motor:  Strength is 5/5 throughout the upper and lower extremities  Sensation:  intact      Medications     HEParin 850 Units/hr (07/24/18 0907)     - MEDICATION INSTRUCTIONS -       sodium chloride 3% 500 mL (07/24/18 0100)       amLODIPine  5 mg Oral Daily     cefTRIAXone  1 g Intravenous Q24H     finasteride (PROSCAR) tablet 5 mg  5 mg Oral Daily     fluticasone  1 spray Both Nostrils Daily     losartan  50 mg Oral Daily     predniSONE  5 mg Oral Daily     rosuvastatin  20 mg Oral or NG Tube Daily     tamsulosin  0.4 mg Oral BID       Data   CBC RESULTS:   Recent Labs   Lab Test  07/24/18   0530   WBC  9.5   RBC  3.81*   HGB  12.1*   HCT  35.7*   MCV  94   MCH  31.8   MCHC  33.9   RDW  14.3   PLT  314     Basic Metabolic Panel:  Lab Results   Component Value Date     07/24/2018      Lab Results   Component Value Date    POTASSIUM 3.2 07/24/2018     Lab Results   Component Value Date    CHLORIDE 111 07/24/2018     Lab Results   Component Value Date    RANJAN 7.6 07/24/2018     Lab Results   Component Value Date    CO2 25 07/24/2018     Lab Results   Component Value Date    BUN 10 07/24/2018     Lab Results   Component Value Date    CR 0.84 07/24/2018     Lab Results   Component Value Date     07/24/2018     INR:  Lab Results   Component Value Date    INR 1.11 07/20/2018    INR 0.94 09/11/2013

## 2018-07-24 NOTE — PLAN OF CARE
Problem: Patient Care Overview  Goal: Plan of Care/Patient Progress Review  PT:  Discharge Planner PT   Patient plan for discharge: not stated  Current status: Pt able to transfer supine to EOB with Shamir, transfer to chair with modA of 2. Standing weight shifting and marching performed with modA of 2 and cuing for lean.   Barriers to return to prior living situation: falls risk  Recommendations for discharge: ARU  Rationale for recommendations: Pt will continue to benefit from skilled therapy services to improve strength, activity tolerance, functional mobility with transfers and ambulation, and safety with all mobility.        Entered by: Alanis Aly 07/24/2018 4:57 PM

## 2018-07-24 NOTE — PROGRESS NOTES
[facility]    Hospitalist Progress Note    Assessment & Plan   Mr. Shamir Concepcion is a delightful 78-year-old  gentleman, with a past medical history notable for peripheral artery disease, rheumatoid arthritis, BPH, allergic rhinitis and colon polyps, who has presented with acute onset of dizziness, nausea and fall.  The workup in the Emergency Department has revealed acute left cerebellar stroke.    Acute left cerebellar ischemic stroke:   Admitted 7/21, developed new diplopia that evening prompting reimaging showing progression of stroke with edema, prompting transfer to ICU for close monitoring and initiation of 3% saline.  Per neurology, further discussion regarding possible need for decompressive surgery should he deteriorate.    --No surgery currently planned, though patient would consider it should the need arise.  --Hypertonic saline per [facility]  --  Int Jug central line placed by intensivist 7/21  --Thought to have left vertebral artery occlusion with 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 On heparin per [facility], warfarin not yet started in case of deterioration requiring decompression.  Defer starting warfarin to [facility].     Urinary tract infection:  The patient reports dysuria for 1 day.  Urinalysis is abnormal with moderate leukocyte esterase and 33 WBCs.    --Ceftriaxone started on admission. Cultures negative to date. Day 4/7     Hypertension:  No prior history  --prn hydralazine available  -- cozaar added 7/23, will increase dose today, add norvasc 5mg daily  (Avoid B-blocker due to low HR.)   BPH.  Finasteride/tamsulosin    Rheumatoid arthritis:  He is on weekly methotrexate plus prednisone 5 mg p.o. daily.    --Methotrexate on hold while hospitalized, can take prednisone as able        DVT Prophylaxis: Pneumatic Compression Devices  Code Status: DNR    Disposition: Ok to transfer to neuro floor from my perspective.    Valentina Wilde MD  Text Page (7am to 6pm)    Interval History   Uneventful night.   Complains of frequent urination. (is currently on 3% saline)  No pain, sob, n/v, f/c.    -Data reviewed today: I reviewed all new labs and imaging results over the last 24 hours. I personally reviewed no images or EKG's today.    Physical Exam   Temp: 98.3  F (36.8  C) Temp src: Oral BP: 181/79   Heart Rate: 57 Resp: 20 SpO2: 97 % O2 Device: None (Room air)    Vitals:    07/21/18 1705 07/22/18 0400 07/23/18 0300   Weight: 71.7 kg (158 lb 1.1 oz) 71 kg (156 lb 8.4 oz) 72.1 kg (158 lb 15.2 oz)     Vital Signs with Ranges  Temp:  [96.7  F (35.9  C)-98.3  F (36.8  C)] 98.3  F (36.8  C)  Heart Rate:  [46-80] 57  Resp:  [7-28] 20  BP: (146-191)/() 181/79  SpO2:  [90 %-100 %] 97 %  I/O last 3 completed shifts:  In: 1506.67 [P.O.:580; I.V.:926.67]  Out: 925 [Urine:925]    Constitutional: Alert, oriented x 2.5 (took 2 tries to get the day)  Respiratory: cta, no wheezing  Cardiovascular: rrr  GI:  soft, non-distended, non-tender  Skin/Integumen: , no edema  Other:  equal grp strength, PERRL, finger to nose intact. Dorsiflexion 5/5 b/l    Medications     HEParin 850 Units/hr (07/23/18 0828)     - MEDICATION INSTRUCTIONS -       sodium chloride 3% 500 mL (07/24/18 0100)       cefTRIAXone  1 g Intravenous Q24H     finasteride (PROSCAR) tablet 5 mg  5 mg Oral Daily     fluticasone  1 spray Both Nostrils Daily     losartan  25 mg Oral Daily     predniSONE  5 mg Oral Daily     rosuvastatin  20 mg Oral or NG Tube Daily     tamsulosin  0.4 mg Oral BID       Data     Recent Labs  Lab 07/24/18  0530 07/23/18  2345 07/23/18  1559  07/23/18  0345  07/22/18  0354  07/21/18  1133 07/20/18  2205   WBC 9.5  --   --   --  12.3*  --  13.9*  < >  --  13.6*   HGB 12.1*  --   --   --  11.9*  --  12.9*  < >  --  13.2*   MCV 94  --   --   --  95  --  95  < >  --  93     --   --   --  294  --  343  < > 359 349   INR  --   --   --   --   --   --   --   --   --  1.11    143 145*  < > 144  < > 142  < >  --  140   POTASSIUM 3.2*  --    --   --  3.5  --  3.7  --   --  3.9   CHLORIDE 111*  --   --   --  113*  --  111*  --   --  107   CO2 25  --   --   --  25  --  24  --   --  25   BUN 10  --   --   --  10  --  12  --   --  14   CR 0.84  --   --   --  0.98  --  0.94  --   --  0.93   ANIONGAP 8  --   --   --  6  --  7  --   --  8   RANJAN 7.6*  --   --   --  7.7*  --  7.9*  --   --  8.5   *  --   --   --  119*  --  103*  --   --  126*   ALBUMIN  --   --   --   --   --   --   --   --  3.2* 3.3*   PROTTOTAL  --   --   --   --   --   --   --   --  6.9 7.3   BILITOTAL  --   --   --   --   --   --   --   --  0.7 0.8   ALKPHOS  --   --   --   --   --   --   --   --  101 105   ALT  --   --   --   --   --   --   --   --  13 15   AST  --   --   --   --   --   --   --   --  21 19   LIPASE  --   --   --   --   --   --   --   --   --  105   TROPI  --   --   --   --   --   --   --   --   --  <0.015   < > = values in this interval not displayed.    Imaging:   No results found for this or any previous visit (from the past 24 hour(s)).

## 2018-07-24 NOTE — PLAN OF CARE
Problem: Patient Care Overview  Goal: Plan of Care/Patient Progress Review    Discharge Planner SLP   Patient plan for discharge: Did not state  Current status: Swallow Tx was provided this pm.  Patient was alert, but demonstrated decreased awareness of wet vocal quality.  Patient used swallow strategies/precautions with min-mod cues during a snack observation.  No overt coughing was noted during trials; however, wet voice and delayed throat clear were observed in conversation after trials.  Gradual improvement toward goal noted. Recommend continued 1:1 supervision/assist, use of precautions, and monitoring of regular diet textures and thin liquids.  Hold po if increased aspiration signs are observed. Plan to provide additional swallow Tx to assess diet tolerance.  Decreased accuracy of orientation question responses were also noted today.  Will compete a cognitive-linguistic eval as IP vs defer to ARU pending timing of discharge.  Barriers to return to prior living situation: Level of assist, cognitive-linguistic deficits  Recommendations for discharge: ARU with SLP  Rationale for recommendations: SLP to maximize safety for a regular diet and maximize cognitive-linguistic function for daily needs       Entered by: Elana Viera 07/24/2018 4:00 PM

## 2018-07-24 NOTE — PLAN OF CARE
Problem: Patient Care Overview  Goal: Plan of Care/Patient Progress Review  Pt rested for longer periods of time during the night then previous night, 3% saline remains was increased to 50cc/hr for a Na of 140 and remains at 50cc/hr for Na of 144 this am. Pt a little less inpulsive this shift only attempted to get out of bed to go to the BR X2 though conts with large amt of urine out conts to dribble have frequency and incontinent in brief was able to be aware when he needed to urinate at times. Neuro status remains the same, pt only gets confused of the day of the week occasionally

## 2018-07-25 ENCOUNTER — APPOINTMENT (OUTPATIENT)
Dept: OCCUPATIONAL THERAPY | Facility: CLINIC | Age: 78
DRG: 064 | End: 2018-07-25
Payer: COMMERCIAL

## 2018-07-25 ENCOUNTER — APPOINTMENT (OUTPATIENT)
Dept: PHYSICAL THERAPY | Facility: CLINIC | Age: 78
DRG: 064 | End: 2018-07-25
Payer: COMMERCIAL

## 2018-07-25 LAB
ANION GAP SERPL CALCULATED.3IONS-SCNC: 7 MMOL/L (ref 3–14)
BUN SERPL-MCNC: 11 MG/DL (ref 7–30)
CALCIUM SERPL-MCNC: 8 MG/DL (ref 8.5–10.1)
CHLORIDE SERPL-SCNC: 107 MMOL/L (ref 94–109)
CO2 SERPL-SCNC: 27 MMOL/L (ref 20–32)
CREAT SERPL-MCNC: 0.99 MG/DL (ref 0.66–1.25)
ERYTHROCYTE [DISTWIDTH] IN BLOOD BY AUTOMATED COUNT: 14.3 % (ref 10–15)
GFR SERPL CREATININE-BSD FRML MDRD: 73 ML/MIN/1.7M2
GLUCOSE SERPL-MCNC: 107 MG/DL (ref 70–99)
HCT VFR BLD AUTO: 36.9 % (ref 40–53)
HGB BLD-MCNC: 12.6 G/DL (ref 13.3–17.7)
LMWH PPP CHRO-ACNC: 0.16 IU/ML
MCH RBC QN AUTO: 31.9 PG (ref 26.5–33)
MCHC RBC AUTO-ENTMCNC: 34.1 G/DL (ref 31.5–36.5)
MCV RBC AUTO: 93 FL (ref 78–100)
PLATELET # BLD AUTO: 326 10E9/L (ref 150–450)
POTASSIUM SERPL-SCNC: 3.5 MMOL/L (ref 3.4–5.3)
RBC # BLD AUTO: 3.95 10E12/L (ref 4.4–5.9)
SODIUM SERPL-SCNC: 141 MMOL/L (ref 133–144)
WBC # BLD AUTO: 10.3 10E9/L (ref 4–11)

## 2018-07-25 PROCEDURE — 40000133 ZZH STATISTIC OT WARD VISIT

## 2018-07-25 PROCEDURE — 99232 SBSQ HOSP IP/OBS MODERATE 35: CPT | Performed by: HOSPITALIST

## 2018-07-25 PROCEDURE — 12000007 ZZH R&B INTERMEDIATE

## 2018-07-25 PROCEDURE — 97530 THERAPEUTIC ACTIVITIES: CPT | Mod: GO

## 2018-07-25 PROCEDURE — 97530 THERAPEUTIC ACTIVITIES: CPT | Mod: GP

## 2018-07-25 PROCEDURE — 25000125 ZZHC RX 250: Performed by: INTERNAL MEDICINE

## 2018-07-25 PROCEDURE — 99232 SBSQ HOSP IP/OBS MODERATE 35: CPT | Performed by: PSYCHIATRY & NEUROLOGY

## 2018-07-25 PROCEDURE — 25000132 ZZH RX MED GY IP 250 OP 250 PS 637: Performed by: INTERNAL MEDICINE

## 2018-07-25 PROCEDURE — 80048 BASIC METABOLIC PNL TOTAL CA: CPT | Performed by: INTERNAL MEDICINE

## 2018-07-25 PROCEDURE — 85520 HEPARIN ASSAY: CPT | Performed by: INTERNAL MEDICINE

## 2018-07-25 PROCEDURE — 97112 NEUROMUSCULAR REEDUCATION: CPT | Mod: GP

## 2018-07-25 PROCEDURE — 40000193 ZZH STATISTIC PT WARD VISIT

## 2018-07-25 PROCEDURE — 25000128 H RX IP 250 OP 636: Performed by: INTERNAL MEDICINE

## 2018-07-25 PROCEDURE — 85027 COMPLETE CBC AUTOMATED: CPT | Performed by: INTERNAL MEDICINE

## 2018-07-25 PROCEDURE — 97535 SELF CARE MNGMENT TRAINING: CPT | Mod: GO

## 2018-07-25 RX ADMIN — FINASTERIDE 5 MG: 5 TABLET, FILM COATED ORAL at 09:01

## 2018-07-25 RX ADMIN — HEPARIN SODIUM 850 UNITS/HR: 10000 INJECTION, SOLUTION INTRAVENOUS at 16:53

## 2018-07-25 RX ADMIN — FLUTICASONE PROPIONATE 1 SPRAY: 50 SPRAY, METERED NASAL at 09:04

## 2018-07-25 RX ADMIN — ROSUVASTATIN CALCIUM 20 MG: 20 TABLET, FILM COATED ORAL at 09:01

## 2018-07-25 RX ADMIN — TAMSULOSIN HYDROCHLORIDE 0.4 MG: 0.4 CAPSULE ORAL at 09:01

## 2018-07-25 RX ADMIN — PREDNISONE 5 MG: 5 TABLET ORAL at 09:01

## 2018-07-25 RX ADMIN — CEFTRIAXONE 1 G: 1 INJECTION, POWDER, FOR SOLUTION INTRAMUSCULAR; INTRAVENOUS at 02:00

## 2018-07-25 RX ADMIN — TAMSULOSIN HYDROCHLORIDE 0.4 MG: 0.4 CAPSULE ORAL at 20:44

## 2018-07-25 RX ADMIN — AMLODIPINE BESYLATE 5 MG: 5 TABLET ORAL at 09:01

## 2018-07-25 RX ADMIN — LOSARTAN POTASSIUM 50 MG: 25 TABLET ORAL at 09:01

## 2018-07-25 ASSESSMENT — VISUAL ACUITY
OU: GLASSES

## 2018-07-25 ASSESSMENT — ACTIVITIES OF DAILY LIVING (ADL)
ADLS_ACUITY_SCORE: 15
ADLS_ACUITY_SCORE: 15
ADLS_ACUITY_SCORE: 16
ADLS_ACUITY_SCORE: 15
ADLS_ACUITY_SCORE: 15
ADLS_ACUITY_SCORE: 16

## 2018-07-25 NOTE — PLAN OF CARE
Problem: Patient Care Overview  Goal: Plan of Care/Patient Progress Review  Outcome: Improving  A&Ox 3-4, d/o time, forgetful. Neuros intact ex slight R facial droop, sluggish pupils, ataxic BUE- R worse than L, difficulty tracking finger to L . VSS ex bradycardia. Tele SR PAC. regular diet. Up with A2 pivot. Denies pain.  Heparin drip 8.5 units/hr through IJ. Skin- R hip bruise, skin tear L hand CDI, red nonblanchable upper midline cocyx wound CDI. Incontinent of urine. Plan for potential coumadin tomorrow pending CT results

## 2018-07-25 NOTE — PLAN OF CARE
Problem: Patient Care Overview  Goal: Plan of Care/Patient Progress Review  Outcome: Improving  Neuro:  Alert and oriented, see neurology note for full neuro assessment.   RUE ataxia noted, with some tendency to lean to left side.    Pulm:  WDL  Cardiac: SR with occasional PAC's, BP within ordered perameters   GI: Regular diet with thin liquids ordered, poor appetite.   : Voiding in urinal and occasionally incontinent.   Skin: Bruise on R hip, scab on bridge of nose r/t fall during stroke event.  Skin tear on L hand also.      Report called to RN on neuro floor, patient transferred from ICU at 1445.

## 2018-07-25 NOTE — PLAN OF CARE
Problem: Patient Care Overview  Goal: Plan of Care/Patient Progress Review  Discharge Planner OT   Patient plan for discharge: None stated today.  Current status: Able to sit EOB with MIN A-CG A for grooming tasks. Cues to attend to L side throughout.   Barriers to return to prior living situation: A needed with ADL and mobility  Recommendations for discharge: ARC  Rationale for recommendations: Patient participatory in therapy, making small gains daily.        Entered by: Gabrielle Garcia 07/25/2018 10:48 AM

## 2018-07-25 NOTE — PLAN OF CARE
Problem: Patient Care Overview  Goal: Plan of Care/Patient Progress Review  Outcome: Improving  Pt is alert and oriented x 4. Pt is forgetful at times and intermittently impulsive. Bed alarm/chair alarms on and audible. Neuro checks q2.. Left sided weakness noted.   3% saline d/nolan continue to monitor sodium and osmols Q6  VSS. Tele sinus oh  Pt remains on heparin gtt. Next 10a in AM    Tolerated regular diet today. Up in chair x 2 with max 2 person assist.   Family at bedside and updated.

## 2018-07-25 NOTE — PLAN OF CARE
Problem: Patient Care Overview  Goal: Plan of Care/Patient Progress Review  PT:  Discharge Planner PT   Patient plan for discharge: none stated  Current status: Pt able to complete bed mobility with SBA-Shamir. Pt stood and completed balance and pre-gait activities with modA x2.   Barriers to return to prior living situation: falls risk  Recommendations for discharge: ARU  Rationale for recommendations: Pt will continue to benefit from skilled therapy services to improve strength, activity tolerance, balance, and safety with ambulation and transfers.        Entered by: Alanis Aly 07/25/2018 4:01 PM

## 2018-07-25 NOTE — PLAN OF CARE
Problem: Patient Care Overview  Goal: Plan of Care/Patient Progress Review  SLP: Pt alert in bed, however, reported lack of appetite and did not want to eat/drink at this time. RN also reported limited intake and plan to continue to encourage pt throughout the day. Will check back at a meal.     Re-attempted to see pt for dysphagia f/u, however pt busy transferring to station 73 and working with another discipline upon x2 attempts this afternoon. Will follow up tomorrow as appropriate.

## 2018-07-25 NOTE — PLAN OF CARE
Problem: Stroke (Ischemic) (Adult)  Goal: Signs and Symptoms of Listed Potential Problems Will be Absent, Minimized or Managed (Stroke)  Signs and symptoms of listed potential problems will be absent, minimized or managed by discharge/transition of care (reference Stroke (Ischemic) (Adult) CPG).   Pt rested and sleep much better through the night, more oriented then the previous night, was able to remember the day of the week better. Pt no longer impulsive or attempting to get out of bed to urine but would use the urinal though still conts to incontinent of urine also. Neuro check remain the same

## 2018-07-25 NOTE — PROGRESS NOTES
[facility]    Hospitalist Progress Note  When I evaluated patient: 07/25/2018     Assessment & Plan   Mr. Shamir Concepcion is a delightful 78-year-old  gentleman, with a past medical history notable for peripheral artery disease, rheumatoid arthritis, BPH, allergic rhinitis and colon polyps, who presented with acute onset of dizziness, nausea and fall.  The workup in the Emergency Department revealed acute left cerebellar stroke.     Acute left cerebellar ischemic stroke:   Admitted 7/21, developed new diplopia that evening prompting reimaging showing progression of stroke with edema, prompting transfer to ICU for close monitoring and initiation of 3% saline. Per neurology, further discussion regarding possible need for decompressive surgery should he deteriorate.  --Evaluated by neurosurgery, no surgery currently planned, though patient would consider it should the need arise.  --IJ central line placed by intensivist 7/21, started on hypertonic saline per [facility], off since 7/24.  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 On heparin per [facility], warfarin not yet started in case of deterioration requiring decompression. Defer starting warfarin to [facility].  --Evaluated by PT, OT, speech, on regular diet.  --Blood pressure at goal, eventually needs strict blood pressure control with a target less than 130/80  --Continue statin.  --Continue to monitor on telemetry.    UTI: The patient reported dysuria. Urinalysis is abnormal with moderate leukocyte esterase and 33 WBCs.    --Ceftriaxone started on admission. Culture negative. Will discontinue after today's dose.     Hypertension:  No prior history  -- prn hydralazine available  -- cozaar added 7/23, dose increased, and norvasc 5mg daily added 7/24 (Avoid B-blocker due to low HR.)     BPH.  Finasteride/tamsulosin      Rheumatoid arthritis:  He is on weekly methotrexate plus prednisone 5 mg p.o. daily.    --Methotrexate on  hold while hospitalized,   --PTA prednisone 5 mg p.o. daily resumed    DVT Prophylaxis: Pneumatic Compression Devices  Code Status: DNR    Disposition: Ok to transfer to neuro floor per neurology.  Would like to discontinue central line in the next 24 hours,discussed with patient and his brother and daughter in the room.  Anticipate discharge in 2-3 days, possibly to [facility] based on follow-up therapy evaluation and recommendations.    Elijah Villatoro MD  Hospitalist    Interval History    Patient was seen and evaluated, family including his brother and daughter at bedside, no acute events overnight, denies headache blurry vision diplopia or focal weakness.  Denies dysphagia.  -No chest pain or shortness of breath  -No nausea, abdominal pain or diarrhea.  -Afebrile, did not verbalize any acute symptoms.     -Data reviewed today: I reviewed all new labs results over the last 24 hours. I personally reviewed the EKG tracing showing Sinus rhythm, rate controlled on telemetry.    Physical Exam   Temp: 97.9  F (36.6  C) Temp src: Oral BP: 152/84   Heart Rate: 55 Resp: 15 SpO2: 97 % O2 Device: Nasal cannula    Vitals:    07/23/18 0300 07/24/18 0600 07/25/18 0600   Weight: 72.1 kg (158 lb 15.2 oz) 71.2 kg (156 lb 15.5 oz) 71 kg (156 lb 8.4 oz)     Vital Signs with Ranges  Temp:  [97.8  F (36.6  C)-98.8  F (37.1  C)] 97.9  F (36.6  C)  Heart Rate:  [50-76] 55  Resp:  [0-29] 15  BP: (116-180)/(65-99) 152/84  SpO2:  [92 %-100 %] 97 %  I/O last 3 completed shifts:  In: 1290.5 [P.O.:890; I.V.:400.5]  Out: 950 [Urine:950]    Constitutional: Alert and awake, oriented to place, self, recognizes family, says it's July 3rd 2018  HEENT: LESLIE EOMI.  Right IJ line in place.  Respiratory: Bilateral equal air entry, clear to auscultation, normal work of breathing, remains on room air.  Cardiovascular: Regular S1-S2, no murmur, no tachycardia.  GI:  soft, non-distended, non-tender  Skin/Integumen: , no edema  Neuro: Alert, oriented to  self, place and date partially, radial nerves II through XII intact, motor strength normal and symmetrical, slight dysmetria on right finger-to-nose test.    Medications     HEParin 850 Units/hr (07/25/18 4437)     - MEDICATION INSTRUCTIONS -         amLODIPine  5 mg Oral Daily     cefTRIAXone  1 g Intravenous Q24H     finasteride (PROSCAR) tablet 5 mg  5 mg Oral Daily     fluticasone  1 spray Both Nostrils Daily     losartan  50 mg Oral Daily     predniSONE  5 mg Oral Daily     rosuvastatin  20 mg Oral or NG Tube Daily     tamsulosin  0.4 mg Oral BID       Data     Recent Labs  Lab 07/25/18  0635 07/24/18  2220 07/24/18  1620 07/24/18  1434  07/24/18  0530  07/23/18  0345  07/21/18  1133 07/20/18  2205   WBC 10.3  --   --   --   --  9.5  --  12.3*  < >  --  13.6*   HGB 12.6*  --   --   --   --  12.1*  --  11.9*  < >  --  13.2*   MCV 93  --   --   --   --  94  --  95  < >  --  93     --   --   --   --  314  --  294  < > 359 349   INR  --   --   --   --   --   --   --   --   --   --  1.11    142 142  --   < > 144  < > 144  < >  --  140   POTASSIUM 3.5  --   --  3.8  --  3.2*  --  3.5  < >  --  3.9   CHLORIDE 107  --   --   --   --  111*  --  113*  < >  --  107   CO2 27  --   --   --   --  25  --  25  < >  --  25   BUN 11  --   --   --   --  10  --  10  < >  --  14   CR 0.99  --   --   --   --  0.84  --  0.98  < >  --  0.93   ANIONGAP 7  --   --   --   --  8  --  6  < >  --  8   RANJAN 8.0*  --   --   --   --  7.6*  --  7.7*  < >  --  8.5   *  --   --   --   --  107*  --  119*  < >  --  126*   ALBUMIN  --   --   --   --   --   --   --   --   --  3.2* 3.3*   PROTTOTAL  --   --   --   --   --   --   --   --   --  6.9 7.3   BILITOTAL  --   --   --   --   --   --   --   --   --  0.7 0.8   ALKPHOS  --   --   --   --   --   --   --   --   --  101 105   ALT  --   --   --   --   --   --   --   --   --  13 15   AST  --   --   --   --   --   --   --   --   --  21 19   LIPASE  --   --   --   --   --   --   --    --   --   --  105   TROPI  --   --   --   --   --   --   --   --   --   --  <0.015   < > = values in this interval not displayed.    Imaging:   No results found for this or any previous visit (from the past 24 hour(s)).

## 2018-07-25 NOTE — PROGRESS NOTES
[facility]  Neuroscience Intensive Care Progress Note  2018    Shamir Concepcion is a 78 year old year old male admitted on 2018 with acute onset of dizziness.  He was found to have a left PICA infarct with cerebral edema and brain compression.  He has been in the intensive care unit having hypertonic saline in hopes of mitigating the cerebral edema, not currently a surgical candidate.      Problem List  1.  Left PICA infarct  2.  Seronegative rheumatoid arthritis  3.  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 Hypertension    24 hour events:  No acute events overnight    24 Hour Vital Signs Summary:  Temperatures:  Current - Temp: 97.8  F (36.6  C); Max - Temp  Av.2  F (36.8  C)  Min: 97.8  F (36.6  C)  Max: 98.8  F (37.1  C)  Respiration range: Resp  Av.3  Min: 0  Max: 29  Pulse range: No Data Recorded  Blood pressure range: Systolic (24hrs), Av , Min:116 , Max:180   ; Diastolic (24hrs), Av, Min:65, Max:99    Pulse oximetry range: SpO2  Av.3 %  Min: 92 %  Max: 100 %    Ventilator Settings  Resp: 16      Intake/Output Summary (Last 24 hours) at 18 1249  Last data filed at 18 1200   Gross per 24 hour   Intake              854 ml   Output              500 ml   Net              354 ml       Current Medications:    amLODIPine  5 mg Oral Daily     cefTRIAXone  1 g Intravenous Q24H     finasteride (PROSCAR) tablet 5 mg  5 mg Oral Daily     fluticasone  1 spray Both Nostrils Daily     losartan  50 mg Oral Daily     predniSONE  5 mg Oral Daily     rosuvastatin  20 mg Oral or NG Tube Daily     tamsulosin  0.4 mg Oral BID       PRN Medications:  acetaminophen, acetaminophen, bisacodyl, hydrALAZINE, labetalol, magnesium hydroxide, - MEDICATION INSTRUCTIONS -, naloxone, ondansetron **OR** ondansetron, potassium chloride, potassium chloride with lidocaine, potassium chloride, potassium chloride, potassium chloride, prochlorperazine **OR**  prochlorperazine **OR** prochlorperazine, senna-docusate **OR** senna-docusate    Infusions:    HEParin 850 Units/hr (07/25/18 0756)     - MEDICATION INSTRUCTIONS -         No Known Allergies    Physical Examination:  /80  Pulse 60  Temp 97.8  F (36.6  C) (Oral)  Resp 16  Wt 71 kg (156 lb 8.4 oz)  SpO2 96%  BMI 23.11 kg/m2  GENERAL APPEARANCE ADULT: Alert, in no acute distress    Neurological Examination  MENTAL STATE:  Awake, alert, oriented      CRANIAL NERVES:  Cranial nerves were normal.  CN 2         Visual fields full to confrontation.  CN 3, 4, 6  Pupils round, 4 mm in diameter, equally reactive to light.  Lids symmetric; no ptosis.  CN 5  Facial sensation intact bilaterally.  CN 7  Normal and symmetric facial strength.  CN 8  Hearing intact to finger rub.  CN 9  Palate elevates symmetrically.  CN 11  Normal strength of shoulder shrug and neck turning.  CN 12  Tongue midline, with normal bulk and strength.      MOTOR:  Motor exam was normal.  Muscle bulk and tone were normal in both upper and lower extremities.  Muscle strength was 5/5 in distal and proximal muscles in both upper and lower extremities.      SENSORY:  Sensory exam was normal. In both upper and lower extremities, sensation was intact to light touch.       COORDINATION:   Coordination exam was abnormal.  Impaired finger to nose and heel to shin on the left, ataxia noted in right upper extremity       GAIT:  Gait was deferred due to need for multiple people to assist with movement.     HENT: Mouth and posterior oropharynx normal, moist mucous membranes  RESP: lungs clear to auscultation   CV: regular rate and rhythm, no murmur, rub, or gallop  ABDOMEN: bowel sounds normal, soft, non-tender  MSK: 5/5 strength in upper and lower extremities bilaterally  SKIN: no suspicious lesions or rashes    Labs/Studies:  CMP  Recent Labs  Lab 07/25/18  0635 07/24/18  2220 07/24/18  1620 07/24/18  1434 07/24/18  1222 07/24/18  0530  07/23/18  0345   07/22/18  0354  07/21/18  1133 07/20/18  2205    142 142  --  143 144  < > 144  < > 142  < >  --  140   POTASSIUM 3.5  --   --  3.8  --  3.2*  --  3.5  --  3.7  --   --  3.9   CHLORIDE 107  --   --   --   --  111*  --  113*  --  111*  --   --  107   CO2 27  --   --   --   --  25  --  25  --  24  --   --  25   ANIONGAP 7  --   --   --   --  8  --  6  --  7  --   --  8   *  --   --   --   --  107*  --  119*  --  103*  --   --  126*   BUN 11  --   --   --   --  10  --  10  --  12  --   --  14   CR 0.99  --   --   --   --  0.84  --  0.98  --  0.94  --   --  0.93   GFRESTIMATED 73  --   --   --   --  88  --  74  --  77  --   --  79   GFRESTBLACK 89  --   --   --   --  >90  --  89  --  >90  --   --  >90   RANJAN 8.0*  --   --   --   --  7.6*  --  7.7*  --  7.9*  --   --  8.5   PROTTOTAL  --   --   --   --   --   --   --   --   --   --   --  6.9 7.3   ALBUMIN  --   --   --   --   --   --   --   --   --   --   --  3.2* 3.3*   BILITOTAL  --   --   --   --   --   --   --   --   --   --   --  0.7 0.8   ALKPHOS  --   --   --   --   --   --   --   --   --   --   --  101 105   AST  --   --   --   --   --   --   --   --   --   --   --  21 19   ALT  --   --   --   --   --   --   --   --   --   --   --  13 15   < > = values in this interval not displayed.  CBC  Recent Labs  Lab 07/25/18  0635 07/24/18  0527 07/23/18  0345 07/22/18  0354   WBC 10.3 9.5 12.3* 13.9*   RBC 3.95* 3.81* 3.76* 4.09*   HGB 12.6* 12.1* 11.9* 12.9*   HCT 36.9* 35.7* 35.8* 38.7*   MCV 93 94 95 95   MCH 31.9 31.8 31.6 31.5   MCHC 34.1 33.9 33.2 33.3   RDW 14.3 14.3 14.6 14.5    314 294 343       Assessment/Plan  Neuro:  Left PICA infarct- likely secondary to dissection of the left vertebral artery.  Currently on a heparin drip for this. Will transition him to Coumadin for secondary stroke prevention tomorrow if repeat head CT is stable      Cerebral edema with brain compression-off of hypertonic saline, remains stable, will order head CT to be  performed in the morning.      CV:    Hypertension- oral antihypertensives being titrated by hospitalist, goal 130/80 to be achieved slowly     Code: DNR    Raffaele Cai MD  Vascular Neurology/Neurocritical Care  Text Page - [phone]    35 minutes spent in counseling and coordination of care regarding his future plans and probabilities of rehabilitation

## 2018-07-26 ENCOUNTER — APPOINTMENT (OUTPATIENT)
Dept: SPEECH THERAPY | Facility: CLINIC | Age: 78
DRG: 064 | End: 2018-07-26
Payer: COMMERCIAL

## 2018-07-26 ENCOUNTER — APPOINTMENT (OUTPATIENT)
Dept: CT IMAGING | Facility: CLINIC | Age: 78
DRG: 064 | End: 2018-07-26
Attending: PSYCHIATRY & NEUROLOGY
Payer: COMMERCIAL

## 2018-07-26 ENCOUNTER — APPOINTMENT (OUTPATIENT)
Dept: PHYSICAL THERAPY | Facility: CLINIC | Age: 78
DRG: 064 | End: 2018-07-26
Payer: COMMERCIAL

## 2018-07-26 ENCOUNTER — APPOINTMENT (OUTPATIENT)
Dept: OCCUPATIONAL THERAPY | Facility: CLINIC | Age: 78
DRG: 064 | End: 2018-07-26
Payer: COMMERCIAL

## 2018-07-26 LAB
ANION GAP SERPL CALCULATED.3IONS-SCNC: 10 MMOL/L (ref 3–14)
BUN SERPL-MCNC: 19 MG/DL (ref 7–30)
CALCIUM SERPL-MCNC: 8.2 MG/DL (ref 8.5–10.1)
CHLORIDE SERPL-SCNC: 103 MMOL/L (ref 94–109)
CO2 SERPL-SCNC: 26 MMOL/L (ref 20–32)
CREAT SERPL-MCNC: 1.21 MG/DL (ref 0.66–1.25)
GFR SERPL CREATININE-BSD FRML MDRD: 58 ML/MIN/1.7M2
GLUCOSE SERPL-MCNC: 138 MG/DL (ref 70–99)
INR PPP: 1.04 (ref 0.86–1.14)
LMWH PPP CHRO-ACNC: 0.26 IU/ML
POTASSIUM SERPL-SCNC: 3.2 MMOL/L (ref 3.4–5.3)
POTASSIUM SERPL-SCNC: 4 MMOL/L (ref 3.4–5.3)
POTASSIUM SERPL-SCNC: NORMAL MMOL/L (ref 3.4–5.3)
SODIUM SERPL-SCNC: 139 MMOL/L (ref 133–144)

## 2018-07-26 PROCEDURE — G0463 HOSPITAL OUTPT CLINIC VISIT: HCPCS

## 2018-07-26 PROCEDURE — 36415 COLL VENOUS BLD VENIPUNCTURE: CPT | Performed by: INTERNAL MEDICINE

## 2018-07-26 PROCEDURE — 40000257 ZZH STATISTIC CONSULT NO CHARGE VASC ACCESS

## 2018-07-26 PROCEDURE — 85520 HEPARIN ASSAY: CPT | Performed by: HOSPITALIST

## 2018-07-26 PROCEDURE — 25000128 H RX IP 250 OP 636

## 2018-07-26 PROCEDURE — 85610 PROTHROMBIN TIME: CPT | Performed by: INTERNAL MEDICINE

## 2018-07-26 PROCEDURE — 97530 THERAPEUTIC ACTIVITIES: CPT | Mod: GP | Performed by: PHYSICAL THERAPIST

## 2018-07-26 PROCEDURE — 99232 SBSQ HOSP IP/OBS MODERATE 35: CPT | Performed by: PSYCHIATRY & NEUROLOGY

## 2018-07-26 PROCEDURE — 85610 PROTHROMBIN TIME: CPT | Performed by: HOSPITALIST

## 2018-07-26 PROCEDURE — 92526 ORAL FUNCTION THERAPY: CPT | Mod: GN | Performed by: SPEECH-LANGUAGE PATHOLOGIST

## 2018-07-26 PROCEDURE — 25000132 ZZH RX MED GY IP 250 OP 250 PS 637: Performed by: INTERNAL MEDICINE

## 2018-07-26 PROCEDURE — 40000193 ZZH STATISTIC PT WARD VISIT: Performed by: PHYSICAL THERAPIST

## 2018-07-26 PROCEDURE — 97112 NEUROMUSCULAR REEDUCATION: CPT | Mod: GP | Performed by: PHYSICAL THERAPIST

## 2018-07-26 PROCEDURE — 70450 CT HEAD/BRAIN W/O DYE: CPT

## 2018-07-26 PROCEDURE — 97535 SELF CARE MNGMENT TRAINING: CPT | Mod: GO

## 2018-07-26 PROCEDURE — 40000225 ZZH STATISTIC SLP WARD VISIT: Performed by: SPEECH-LANGUAGE PATHOLOGIST

## 2018-07-26 PROCEDURE — 36415 COLL VENOUS BLD VENIPUNCTURE: CPT | Performed by: HOSPITALIST

## 2018-07-26 PROCEDURE — 12000000 ZZH R&B MED SURG/OB

## 2018-07-26 PROCEDURE — 25000132 ZZH RX MED GY IP 250 OP 250 PS 637

## 2018-07-26 PROCEDURE — 84132 ASSAY OF SERUM POTASSIUM: CPT | Performed by: INTERNAL MEDICINE

## 2018-07-26 PROCEDURE — 25000128 H RX IP 250 OP 636: Performed by: INTERNAL MEDICINE

## 2018-07-26 PROCEDURE — 25000125 ZZHC RX 250: Performed by: INTERNAL MEDICINE

## 2018-07-26 PROCEDURE — 25000128 H RX IP 250 OP 636: Performed by: HOSPITALIST

## 2018-07-26 PROCEDURE — 99232 SBSQ HOSP IP/OBS MODERATE 35: CPT | Performed by: HOSPITALIST

## 2018-07-26 PROCEDURE — 97116 GAIT TRAINING THERAPY: CPT | Mod: GP | Performed by: PHYSICAL THERAPIST

## 2018-07-26 PROCEDURE — 40000133 ZZH STATISTIC OT WARD VISIT

## 2018-07-26 PROCEDURE — 80048 BASIC METABOLIC PNL TOTAL CA: CPT | Performed by: HOSPITALIST

## 2018-07-26 PROCEDURE — 97530 THERAPEUTIC ACTIVITIES: CPT | Mod: GO

## 2018-07-26 RX ORDER — SODIUM CHLORIDE 9 MG/ML
INJECTION, SOLUTION INTRAVENOUS CONTINUOUS
Status: DISCONTINUED | OUTPATIENT
Start: 2018-07-26 | End: 2018-07-27

## 2018-07-26 RX ORDER — AMLODIPINE BESYLATE 10 MG/1
10 TABLET ORAL DAILY
Status: DISCONTINUED | OUTPATIENT
Start: 2018-07-27 | End: 2018-07-26

## 2018-07-26 RX ORDER — WARFARIN SODIUM 6 MG/1
6 TABLET ORAL
Status: COMPLETED | OUTPATIENT
Start: 2018-07-26 | End: 2018-07-26

## 2018-07-26 RX ORDER — AMLODIPINE BESYLATE 5 MG/1
5 TABLET ORAL DAILY
Status: DISCONTINUED | OUTPATIENT
Start: 2018-07-27 | End: 2018-07-28 | Stop reason: HOSPADM

## 2018-07-26 RX ADMIN — LOSARTAN POTASSIUM 50 MG: 25 TABLET ORAL at 08:40

## 2018-07-26 RX ADMIN — WARFARIN SODIUM 6 MG: 6 TABLET ORAL at 18:15

## 2018-07-26 RX ADMIN — TAMSULOSIN HYDROCHLORIDE 0.4 MG: 0.4 CAPSULE ORAL at 08:45

## 2018-07-26 RX ADMIN — FINASTERIDE 5 MG: 5 TABLET, FILM COATED ORAL at 08:44

## 2018-07-26 RX ADMIN — FLUTICASONE PROPIONATE 1 SPRAY: 50 SPRAY, METERED NASAL at 08:45

## 2018-07-26 RX ADMIN — POTASSIUM CHLORIDE 40 MEQ: 1500 TABLET, EXTENDED RELEASE ORAL at 14:20

## 2018-07-26 RX ADMIN — ENOXAPARIN SODIUM 70 MG: 80 INJECTION SUBCUTANEOUS at 20:13

## 2018-07-26 RX ADMIN — AMLODIPINE BESYLATE 5 MG: 5 TABLET ORAL at 08:41

## 2018-07-26 RX ADMIN — SENNOSIDES AND DOCUSATE SODIUM 1 TABLET: 8.6; 5 TABLET ORAL at 14:18

## 2018-07-26 RX ADMIN — PREDNISONE 5 MG: 5 TABLET ORAL at 08:41

## 2018-07-26 RX ADMIN — CEFTRIAXONE 1 G: 1 INJECTION, POWDER, FOR SOLUTION INTRAMUSCULAR; INTRAVENOUS at 02:04

## 2018-07-26 RX ADMIN — TAMSULOSIN HYDROCHLORIDE 0.4 MG: 0.4 CAPSULE ORAL at 20:11

## 2018-07-26 RX ADMIN — SODIUM CHLORIDE: 9 INJECTION, SOLUTION INTRAVENOUS at 21:58

## 2018-07-26 RX ADMIN — ROSUVASTATIN CALCIUM 20 MG: 20 TABLET, FILM COATED ORAL at 08:40

## 2018-07-26 RX ADMIN — POTASSIUM CHLORIDE 20 MEQ: 1500 TABLET, EXTENDED RELEASE ORAL at 16:16

## 2018-07-26 ASSESSMENT — VISUAL ACUITY
OU: GLASSES

## 2018-07-26 ASSESSMENT — ACTIVITIES OF DAILY LIVING (ADL)
ADLS_ACUITY_SCORE: 15
ADLS_ACUITY_SCORE: 14
ADLS_ACUITY_SCORE: 14

## 2018-07-26 NOTE — PLAN OF CARE
Problem: Patient Care Overview  Goal: Plan of Care/Patient Progress Review  Outcome: Improving  A&Ox3-4, d/o to time. Neuros intact ex L droop, ataxic with movement . VSS ex bradycardia. Tele SR with PAC. regular diet. incontinent of urine. Up with A2 GB/W. Denies pain. Heparin stopped at 1900. Coumadin started. Possible discharge tomorrow to ARU.

## 2018-07-26 NOTE — PROGRESS NOTES
[facility]  Neuroscience Intensive Care Progress Note  2018    Shamir Concepcion is a 78 year old year old male admitted on 2018 with acute onset of dizziness.  He was found to have a left PICA infarct with cerebral edema and brain compression.  He has been in the intensive care unit having hypertonic saline in hopes of mitigating the cerebral edema, not currently a surgical candidate.      Problem List  1.  Left PICA infarct  2.  Seronegative rheumatoid arthritis  3.  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 Hypertension    24 hour events:  []No acute events overnight, moved up to 73    24 Hour Vital Signs Summary:  Temperatures:  Current - Temp: 97.5  F (36.4  C); Max - Temp  Av  F (36.7  C)  Min: 97.5  F (36.4  C)  Max: 98.4  F (36.9  C)  Respiration range: Resp  Av.8  Min: 16  Max: 22  Pulse range: Pulse  Av  Min: 60  Max: 60  Blood pressure range: Systolic (24hrs), Av , Min:121 , Max:156   ; Diastolic (24hrs), Av, Min:59, Max:85    Pulse oximetry range: SpO2  Av.4 %  Min: 94 %  Max: 99 %    Ventilator Settings  Resp: 16      Intake/Output Summary (Last 24 hours) at 18 1026  Last data filed at 18 0211   Gross per 24 hour   Intake              457 ml   Output              130 ml   Net              327 ml       Current Medications:    amLODIPine  5 mg Oral Daily     cefTRIAXone  1 g Intravenous Q24H     finasteride (PROSCAR) tablet 5 mg  5 mg Oral Daily     fluticasone  1 spray Both Nostrils Daily     losartan  50 mg Oral Daily     predniSONE  5 mg Oral Daily     rosuvastatin  20 mg Oral or NG Tube Daily     tamsulosin  0.4 mg Oral BID       PRN Medications:  acetaminophen, acetaminophen, bisacodyl, hydrALAZINE, labetalol, magnesium hydroxide, - MEDICATION INSTRUCTIONS -, naloxone, ondansetron **OR** ondansetron, potassium chloride, potassium chloride with lidocaine, potassium chloride, potassium chloride, potassium chloride,  prochlorperazine **OR** prochlorperazine **OR** prochlorperazine, senna-docusate **OR** senna-docusate    Infusions:    HEParin 850 Units/hr (07/26/18 0846)     - MEDICATION INSTRUCTIONS -         No Known Allergies    Physical Examination:  /72 (BP Location: Left arm)  Pulse 60  Temp 97.5  F (36.4  C) (Oral)  Resp 16  Wt 71 kg (156 lb 8.4 oz)  SpO2 97%  BMI 23.11 kg/m2   GENERAL APPEARANCE ADULT: Alert, in no acute distress, up to a chair  MENTAL STATE:  Awake, alert, oriented      CRANIAL NERVES:  Cranial nerves were normal.  CN 2         Visual fields full to confrontation.  CN 3, 4, 6  Pupils round, 4 mm in diameter, equally reactive to light.  Lids symmetric; no ptosis.  CN 5  Facial sensation intact bilaterally.  CN 7  Normal and symmetric facial strength.  CN 8  Hearing intact to finger rub.  CN 9  Palate elevates symmetrically.  CN 11  Normal strength of shoulder shrug and neck turning.  CN 12  Tongue midline, with normal bulk and strength.      MOTOR:  Motor exam was normal.  Muscle bulk and tone were normal in both upper and lower extremities.  Muscle strength was 5/5 in distal and proximal muscles in both upper and lower extremities.      SENSORY:  Sensory exam was normal. In both upper and lower extremities, sensation was intact to light touch.       COORDINATION:   Coordination exam was abnormal.  Impaired finger to nose and heel to shin on the left, ataxia noted in right upper extremity       GAIT:  Gait was deferred due to need for multiple people to assist with movement.      HENT: Mouth and posterior oropharynx normal, moist mucous membranes  RESP: lungs clear to auscultation   CV: regular rate and rhythm, no murmur, rub, or gallop  ABDOMEN: bowel sounds normal, soft, non-tender  MSK: 5/5 strength in upper and lower extremities bilaterally  SKIN: no suspicious lesions or rashes    Labs/Studies:  CMP  Recent Labs  Lab 07/25/18  0635 07/24/18  2220 07/24/18  1620 07/24/18  1434  07/24/18  1222 07/24/18  0530  07/23/18  0345  07/22/18  0354  07/21/18  1133 07/20/18  2205    142 142  --  143 144  < > 144  < > 142  < >  --  140   POTASSIUM 3.5  --   --  3.8  --  3.2*  --  3.5  --  3.7  --   --  3.9   CHLORIDE 107  --   --   --   --  111*  --  113*  --  111*  --   --  107   CO2 27  --   --   --   --  25  --  25  --  24  --   --  25   ANIONGAP 7  --   --   --   --  8  --  6  --  7  --   --  8   *  --   --   --   --  107*  --  119*  --  103*  --   --  126*   BUN 11  --   --   --   --  10  --  10  --  12  --   --  14   CR 0.99  --   --   --   --  0.84  --  0.98  --  0.94  --   --  0.93   GFRESTIMATED 73  --   --   --   --  88  --  74  --  77  --   --  79   GFRESTBLACK 89  --   --   --   --  >90  --  89  --  >90  --   --  >90   RANJAN 8.0*  --   --   --   --  7.6*  --  7.7*  --  7.9*  --   --  8.5   PROTTOTAL  --   --   --   --   --   --   --   --   --   --   --  6.9 7.3   ALBUMIN  --   --   --   --   --   --   --   --   --   --   --  3.2* 3.3*   BILITOTAL  --   --   --   --   --   --   --   --   --   --   --  0.7 0.8   ALKPHOS  --   --   --   --   --   --   --   --   --   --   --  101 105   AST  --   --   --   --   --   --   --   --   --   --   --  21 19   ALT  --   --   --   --   --   --   --   --   --   --   --  13 15   < > = values in this interval not displayed.  CBC  Recent Labs  Lab 07/25/18  0635 07/24/18  0530 07/23/18  0345 07/22/18  0354   WBC 10.3 9.5 12.3* 13.9*   RBC 3.95* 3.81* 3.76* 4.09*   HGB 12.6* 12.1* 11.9* 12.9*   HCT 36.9* 35.7* 35.8* 38.7*   MCV 93 94 95 95   MCH 31.9 31.8 31.6 31.5   MCHC 34.1 33.9 33.2 33.3   RDW 14.3 14.3 14.6 14.5    314 294 343       Assessment/Plan  Neuro:  Left PICA infarct- likely secondary to dissection of the left vertebral artery.  Currently on a heparin drip for this.  May transition him to Coumadin for secondary stroke prevention today as his repeat head CT is stable.  He will need a Lovenox bridge until he is therapeutic should  he be discharged.      Cerebral edema with brain compression-off of hypertonic saline, remains stable, repeat head CT this morning stable with no increased mass-effect or development of hydrocephalus.    Will need a repeat CT angiogram in 6 months to evaluate the vasculature within his neck. Follow up with stroke neurology after having the scan to determine continuation or discontinuation of his anticoagulation.  I will arrange for this follow-up appointment, schedulers will be in contact to arrange this with him.      CV:    Hypertension- oral antihypertensives being titrated by hospitalist, goal 130/80 to be achieved slowly      Code: DNR    Raffaele Cai MD  Vascular Neurology/Neurocritical Care  Text Page - [phone]    No further neurologic interventions or investigations from our perspective at this time. Neurology will sign off. Please call with any further question.

## 2018-07-26 NOTE — PLAN OF CARE
Problem: Patient Care Overview  Goal: Plan of Care/Patient Progress Review  Outcome: Improving  A&O X4. Slight R droop, RUE ataxia. VSS. Tele NSR.  Regular diet. Assist of 2, Pivot to commode. Denies pain. Repositioned Q2. Incontinent of urine at times. Heparin drip infusing @ 8.5U/hr. CT ordered for this AM. Will CTM.

## 2018-07-26 NOTE — PROGRESS NOTES
[facility]    Hospitalist Progress Note  When I evaluated patient: 07/26/2018     Assessment & Plan   Mr. Shamir Concepcion is a delightful 78-year-old  gentleman, with a past medical history notable for peripheral artery disease, rheumatoid arthritis, BPH, allergic rhinitis and colon polyps, who presented with acute onset of dizziness, nausea and fall. Workup in the ED revealed acute left cerebellar stroke and occlusion of left PICA.     Acute left cerebellar ischemic stroke:   Admitted 7/21, developed new diplopia that evening prompting reimaging showing progression of stroke with edema, prompting transfer to ICU for close monitoring and initiation of 3% saline and to evaluate for possible need for decompressive surgery should he deteriorate.  Followed up with subsequent imaging studies including MRI brain and CT head, revealed evolving cerebellar infarcts but no complications including hydrocephalus or hemorrhagic transformation.  --Evaluated by neurosurgery, no surgery indicated. IJ central line placed by intensivist 7/21, started on hypertonic saline per neuro critical care, off since 7/24, CT head 7/26 AM without any complication.  --Was maintained on heparin, transitioning to warfarin p.o. With bridging therapeutic Lovenox.  --Evaluated by PT, OT, speech, on regular diet.  Plan is to discharge to acute rehab.  --Blood pressure at goal, eventually needs strict blood pressure control with a target less than 130/80.  --Continue statin.  --DC central line and telemetry.    UTI: The patient reported dysuria. Urinalysis is abnormal with moderate leukocyte esterase and 33 WBCs.    --Ceftriaxone started on admission. Culture negative. Completed 5 days.     Hypertension: No prior history  --prn hydralazine available  --Cozaar added 7/23, dose increased, and norvasc 5mg daily added 7/24 (Avoid B-blocker due to low HR.)  --Blood pressure better controlled.  Increase amlodipine if trend up.      BPH:  Finasteride/tamsulosin      Rheumatoid arthritis:  He is on weekly methotrexate plus prednisone 5 mg p.o. daily.    --Methotrexate on hold while hospitalized,   --PTA prednisone 5 mg p.o. daily resumed    DVT Prophylaxis: Pneumatic Compression Devices  Code Status: DNR    Disposition: Ok to transfer to acute rehab when bed available.  Discussed with patient and his wife in the room.  Discussed with RN and .    Elijah Villatoro MD  Hospitalist    Interval History    No acute issues, denies headache diplopia or blurry vision or focal weakness.  No dizziness.    -Data reviewed today: I reviewed all new labs results over the last 24 hours. I personally reviewed the EKG tracing showing Sinus rhythm, rate controlled on telemetry.    Physical Exam   Temp: 97.8  F (36.6  C) Temp src: Oral BP: 106/47 Pulse: 60 Heart Rate: 62 Resp: 16 SpO2: 96 % O2 Device: None (Room air)    Vitals:    07/23/18 0300 07/24/18 0600 07/25/18 0600   Weight: 72.1 kg (158 lb 15.2 oz) 71.2 kg (156 lb 15.5 oz) 71 kg (156 lb 8.4 oz)     Vital Signs with Ranges  Temp:  [97.5  F (36.4  C)-98.4  F (36.9  C)] 97.8  F (36.6  C)  Pulse:  [60] 60  Heart Rate:  [51-65] 62  Resp:  [16-22] 16  BP: (106-155)/(47-85) 106/47  SpO2:  [95 %-99 %] 96 %  I/O last 3 completed shifts:  In: 608 [P.O.:540; I.V.:68]  Out: 130 [Urine:130]    Constitutional: Alert and awake, oriented to place, self, recognizes family, says it's July 3rd 2018  HEENT: CHICHI NELSON.  Right IJ line in place.  Respiratory: Bilateral equal air entry, clear to auscultation, normal work of breathing, remains on room air.  Cardiovascular: Regular S1-S2, no murmur, no tachycardia.  GI:  soft, non-distended, non-tender  Skin/Integumen: , no edema  Neuro: Alert, oriented to self, place and date, cranial nerves II through XII intact, motor strength normal and symmetrical, slight dysmetria on right finger-to-nose test persists.    Medications     HEParin 850 Units/hr (07/26/18 0846)      - MEDICATION INSTRUCTIONS -       Warfarin Therapy Reminder         [START ON 7/27/2018] amLODIPine  5 mg Oral Daily     enoxaparin  70 mg Subcutaneous Q12H     finasteride (PROSCAR) tablet 5 mg  5 mg Oral Daily     fluticasone  1 spray Both Nostrils Daily     losartan  50 mg Oral Daily     predniSONE  5 mg Oral Daily     rosuvastatin  20 mg Oral or NG Tube Daily     tamsulosin  0.4 mg Oral BID     warfarin  6 mg Oral ONCE at 18:00       Data     Recent Labs  Lab 07/26/18  1113 07/25/18  0635 07/24/18  2220  07/24/18  1434  07/24/18  0530  07/23/18  0345  07/21/18  1133 07/20/18  2205   WBC  --  10.3  --   --   --   --  9.5  --  12.3*  < >  --  13.6*   HGB  --  12.6*  --   --   --   --  12.1*  --  11.9*  < >  --  13.2*   MCV  --  93  --   --   --   --  94  --  95  < >  --  93   PLT  --  326  --   --   --   --  314  --  294  < > 359 349   INR 1.04  --   --   --   --   --   --   --   --   --   --  1.11    141 142  < >  --   < > 144  < > 144  < >  --  140   POTASSIUM 3.2* 3.5  --   --  3.8  --  3.2*  --  3.5  < >  --  3.9   CHLORIDE 103 107  --   --   --   --  111*  --  113*  < >  --  107   CO2 26 27  --   --   --   --  25  --  25  < >  --  25   BUN 19 11  --   --   --   --  10  --  10  < >  --  14   CR 1.21 0.99  --   --   --   --  0.84  --  0.98  < >  --  0.93   ANIONGAP 10 7  --   --   --   --  8  --  6  < >  --  8   RANJAN 8.2* 8.0*  --   --   --   --  7.6*  --  7.7*  < >  --  8.5    107*  --   --   --   --  107*  --  119*  < >  --  126*   ALBUMIN  --   --   --   --   --   --   --   --   --   --  3.2* 3.3*   PROTTOTAL  --   --   --   --   --   --   --   --   --   --  6.9 7.3   BILITOTAL  --   --   --   --   --   --   --   --   --   --  0.7 0.8   ALKPHOS  --   --   --   --   --   --   --   --   --   --  101 105   ALT  --   --   --   --   --   --   --   --   --   --  13 15   AST  --   --   --   --   --   --   --   --   --   --  21 19   LIPASE  --   --   --   --   --   --   --   --   --   --   --  105    TROPI  --   --   --   --   --   --   --   --   --   --   --  <0.015   < > = values in this interval not displayed.    Imaging:   Recent Results (from the past 24 hour(s))   CT Head w/o Contrast    Narrative    CT SCAN OF THE HEAD WITHOUT CONTRAST   7/26/2018 7:58 AM     HISTORY:  Evaluation of ischemic stroke for edema progression.     TECHNIQUE:  Axial images of the head and coronal reformations without  IV contrast material.  Radiation dose for this scan was reduced using  automated exposure control, adjustment of the mA and/or kV according  to patient size, or iterative reconstruction technique.    COMPARISON: 7/23/2018.    FINDINGS: Moderate cerebral atrophy is present. There are evolving  infarcts in the left cerebellum and right cerebellar vermis. The size  of these infarcts has not appreciably changed. There is some minimal  compression of the fourth ventricle, but there is no evidence for any  developing hydrocephalus. There is no evidence for hemorrhagic  transformation. Minimal nonspecific white matter changes are present.  There is no evidence for any new infarct, midline shift, or skull  fracture. Mucosal thickening is noted in the right maxillary sinus.  Vascular calcifications are also noted.      Impression    IMPRESSION: Evolving cerebellar infarcts with minimal distortion of  the fourth ventricle but no hydrocephalus. There is no evidence for  any hemorrhagic transformation.    MACEY ALMARAZ MD

## 2018-07-26 NOTE — PROGRESS NOTES
[facility] Nurse Inpatient Adult Pressure Injury (PI) Assessment     Initial Assessment of PI(s) on pt's:   Midline coccyx    Data:   Patient History:      per MD note(s): 78 year old year old male admitted on 2018 with acute onset of dizziness.  He was found to have a left PICA infarct with cerebral edema and brain compression.  He has been in the intensive care unit having hypertonic saline in hopes of mitigating the cerebral edema, not currently a surgical candidate.  Problem List  1.  Left PICA infarct  2.  Seronegative rheumatoid arthritis  3.  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 Hypertension     Tee Assessment and sub scores:   Tee Score  Av.3  Min: 13  Max: 18    Positioning: Pillows,     Mattress:  Standard , Atmos Air mattress    Moisture Management:  Diaper    Current Diet / Nutrition:           Active Diet Order      Combination Diet Regular Diet Adult; Thin Liquids (water, ice chips, juice, milk, gelatin, ice cream, etc)    Labs:   Recent Labs   Lab Test  18   0635   18   1133  18   2205   ALBUMIN   --    --   3.2*  3.3*   HGB  12.6*   < >   --   13.2*   INR   --    --    --   1.11   WBC  10.3   < >   --   13.6*   A1C   --    --   5.3   --     < > = values in this interval not displayed.                                                                                                                          Pressure Injury Assessment  (location):   Midline coccyx    Wound History:   Pt with decreased activity in the hospital, he states at home he is normally independent with his own cares and mobilty    Midline coccyx with intact epidermis, very base, distal aspect, of coccyx is moist white maceration, I was able to scrape of a loose flap of white, macerated tissue, intact epidermis beneath this.  Just superior to this, along midline is a non blancable line of intact epidermis, non blanchable pink-red erythema, 1.4cm x 0.3cm x  "0.0cm.  No pain, no drainage, good tissue turgor.            Intervention:     Patient's chart evaluated.      Tee Interventions:  Current Tee Interventions and Care Plan reviewed and updated, appropriate at this time.    Coccyx assessed with RN as noted above    Orders  Written    Supplies  reviewed, gathered, placed at the bedside, discussed with RN, discussed with family and discussed with patient    Discussed plan of care with Patient, Family and Nurse    All patient / family questions answered:  YES           Assessment:     Pressure Injury (PI) located on midline coccyx: 1.  Will recommend PIP measures including use of chair cushion and repositioning measures- avoid direct, supine positioning, etc, see plan below and use of Mepilex Sacral Dressings         Plan:     Nursing to notify the Provider(s) and re-consult the [facility] Nurse if wound(s) deteriorate(s)or if the wound care plan needs reevaluation.    If pt is refusing to turn or reposition they must be educated on the  potential injury from not off loading pressure.  Then this \"educated refusal\" needs to be documented as an \"educated refusal to turn/ reposition\" and document if alert, etc.    Plan for wound care to wound located on midline coccyx: every 3 days and prn  1. Clean wound with MicroKlenz Spray, pat dry  2. Paint with No Sting Skin Prep (#[id number]) and allow to dry thoroughly  3. Press a Mepilex  Sacral Dressing (PS#[id number])  to the area, making sure to conform nicely to skin curvatures (begin placing the Mepilex at the most distal aspect first, smooth into place in an upward direction, then smooth side to side)   4. Time and date dressing change and kesha with a \"T\" for treatment of a wound    5. Reposition pt every 1 to 2 hours when in bed and hourly when up to the chair to relieve pressure and promote perfusion to tissue    -use a chair cushion when up to the chair- send home with pt    Pressure INJURY Prevention Measures (PIP):  If pt is " "refusing to turn or reposition they must be educated on the  potential injury from not off loading pressure.  Then this \"educated refusal\" needs to be documented as an \"educated refusal to turn/ reposition\" and document if alert, etc.  1. Repositioning:  Pt must be repositioned for good skin health.  If pt refusing or this is not being done then the charge nurse, nurse manager and md need to be notified to help intervene and to know that there is an issue    Bed:  Reposition pt MINIMALLY every 1-2 hours in bed to relieve pressure and promote perfusion to tissue. Also try to position to get airflow to pt s backside, etc. If necessary consider use of Fluidized Positioner Pads ((182038 small/ [id number] med/ [id number] large) to help maintain pt in good offloading-position.               Use pillows in the lower back and upper thighs to support postioning but keep pillows off butt to minimize any pressure.    Chair:  When up to chair pt should not sit for longer than one hour total before either standing or returning to bed for at least 10 minutes, again to relieve pressure and promote perfusion to tissue.     o Pt should also sit on a chair cushion (#[id number]) when up to the chair.  o Do NOT use a donut to sit on.  This concentrates pressure in a smaller area and creates a higher potential for injury where the cushion is.   o When pt returns to bed he/she should be positioned on side  o Do not sit on a Z-Flow Pad.  This is not a chair cushion, it is for positioning  If pt is refusing to turn or reposition they must be educated on the  potential injury from not off loading pressure.  Then this \"educated refusal\" needs to be documented as an \"educated refusal to turn/ reposition\" and document if alert, etc.  2.  Patient's skin must be kept clean and dry- the areas are only clean when you see the base of the skin fold, especially the perineal area.  Moist, macerated tissue is more susceptible to injury.           Follow " Incontinence Protocol found in [employer].  Dust with baby powder BID to help with chaffing, decrease warmth from friction and increase comfort.         NO BRIEFS WITH CATHETERS  3.  Follow the bed algorithm to consider a specialty mattress  4.  If able keep head of bed below 30 degrees.   5.  Follow Tee Risk recommendations  6.  Be aware of the bony prominences!    Elevate heels at all times, consider using heel lift boots, Marcel or Rooke    Apply skin prep to bony prominences such as elbows, heels, hips- and consider wearing long sleeves and/or pants at al times  7.  Remember to perform full skin inspection 2 x / day- unless ordered NOT to order an area skin must be assessed.  If not able to do a full skin inspection then document full inspection along with the exception of what was not assessed.         WOC Nurse will return: weekly and prn

## 2018-07-26 NOTE — PLAN OF CARE
Problem: Patient Care Overview  Goal: Plan of Care/Patient Progress Review  Discharge Planner SLP   Patient plan for discharge: not stated  Current status: Pt on regular diet.  Pt observed sitting in chair with breakfast tray.  Quizzed pt when his voice was clear or moist after swallowing, pt able to identify moistness with 70% acc today.  When asked what he should do he clears his throat and he is no longer moist sounding.  Recommend continue with regular/thin liquid diet with supervision.  If any other s/s aspiration hold tray.  Cue pt to clear throat after swallows.  SLP to follow to train strategy of throat clearing.  Barriers to return to prior living situation: Level of assistance  Recommendations for discharge: ARU  Rationale for recommendations: SLP to maximize safety for a regular diet and maximize cognitive-linguistic function for daily needs       Entered by: Ayana Corral 07/26/2018 9:40 AM

## 2018-07-26 NOTE — PHARMACY-ANTICOAGULATION SERVICE
Clinical Pharmacy - Warfarin Dosing Consult     Pharmacy has been consulted to manage this patient s warfarin therapy.  Indication: Other - specify in comments  Provider/Team:  Lt vertebral artery occlusion and stroke.  OP Anticoag Clinic:  Lt vertebral artery occlusion and stroke.  Warfarin Prior to Admission: No  Recent documented change in oral intake/nutrition: No    INR   Date Value Ref Range Status   07/26/2018 1.04 0.86 - 1.14 Final   07/20/2018 1.11 0.86 - 1.14 Final       Recommend warfarin 6 mg today.  Pharmacy will monitor Shamir Concepcion daily and order warfarin doses to achieve specified goal.      Please contact pharmacy as soon as possible if the warfarin needs to be held for a procedure or if the warfarin goals change.

## 2018-07-26 NOTE — PROGRESS NOTES
CM    I:  SW received update therapies are recommending ARC.  Per protocol, SW placed referral thru DOD.    P:  Continue to assist as needed.    KATY Stoll    UPDATE@[id number]: SW attempted to meet with patient to discuss discharge plan, however, staff was working with patient. SW will plan to meet w/patient tomorrow.

## 2018-07-26 NOTE — PLAN OF CARE
Problem: Patient Care Overview  Goal: Plan of Care/Patient Progress Review  Discharge Planner PT   Patient plan for discharge: none stated  Current status: Pt with decent strength in B LE but impaired coordination/motor planning, min A for bed mob, able to maintain sitting balance with UE support and cues, tends to lean ant, able to move LE in sitting with cues with min balance disturbance, transfer sit to stand with mod A x 1 and FWW, amb requires mod A x 2 with axatic movements, leans to L but correct with cues but reverts quickly, can lift R LE with high knee flexion and hip flexion when asked but when attempting to amb keeps R LE stiff and drags, has difficulty taking swing through step on R. Pt amb about 14 ' total around foot of bed and back. Pt needing constant cues for upright balance and A for navigation in very tight space in room with A to manage FWW for pt and attempt to clear furniture and equipment.  Room situation with 2 pt and all furnishings is not a safe environment to perform therapy, for pt or therapist,  would also not be easy to get WC in room to bring pt out as he is in bed 2.  Rec pt be in private room setting.    Barriers to return to prior living situation: level of A for all mob, significant fall risk, decreased cata and distance for functional mob/amb  Recommendations for discharge: ARU  Rationale for recommendations: PT previously I, motivated to participate, requires multiple discipline intensive therapy to improve strength, balance and act to progress functional mob I and safety.       Entered by: ERAN SAINI 07/26/2018 12:34 PM

## 2018-07-26 NOTE — PLAN OF CARE
Problem: Patient Care Overview  Goal: Plan of Care/Patient Progress Review  Discharge Planner OT   Patient plan for discharge: Not stated   Current status: Pt seen in PM for treatment. Seated in chair upon arrival, SLUMs was administered and pt scoring 19/30, scoring in dementia range (1-20) with cog impairment. To perform sit<>stand with FWW needing Max X2 and VCs for walker safety and demonstrating ataxic movements and leaning towards L side. Able to complete sit<>supine with SBA. OT frequency changed to twice daily.  Barriers to return to prior living situation: Decreased balance, decreased cognition, decreased coordination, level of A   Recommendations for discharge: ARC  Rationale for recommendations: Pt not at baseline. Would benefit from further skilled therapy to work on independence in ADLS/IADLS.        Entered by: Sophy Baez 07/26/2018 4:18 PM

## 2018-07-27 ENCOUNTER — APPOINTMENT (OUTPATIENT)
Dept: OCCUPATIONAL THERAPY | Facility: CLINIC | Age: 78
DRG: 064 | End: 2018-07-27
Payer: COMMERCIAL

## 2018-07-27 ENCOUNTER — APPOINTMENT (OUTPATIENT)
Dept: PHYSICAL THERAPY | Facility: CLINIC | Age: 78
DRG: 064 | End: 2018-07-27
Payer: COMMERCIAL

## 2018-07-27 ENCOUNTER — APPOINTMENT (OUTPATIENT)
Dept: SPEECH THERAPY | Facility: CLINIC | Age: 78
DRG: 064 | End: 2018-07-27
Payer: COMMERCIAL

## 2018-07-27 LAB
CREAT SERPL-MCNC: 0.97 MG/DL (ref 0.66–1.25)
GFR SERPL CREATININE-BSD FRML MDRD: 74 ML/MIN/1.7M2
INR PPP: 1.15 (ref 0.86–1.14)
PLATELET # BLD AUTO: 327 10E9/L (ref 150–450)
POTASSIUM SERPL-SCNC: 3.5 MMOL/L (ref 3.4–5.3)

## 2018-07-27 PROCEDURE — 82565 ASSAY OF CREATININE: CPT | Performed by: HOSPITALIST

## 2018-07-27 PROCEDURE — 25000132 ZZH RX MED GY IP 250 OP 250 PS 637: Performed by: INTERNAL MEDICINE

## 2018-07-27 PROCEDURE — 97530 THERAPEUTIC ACTIVITIES: CPT | Mod: GO | Performed by: OCCUPATIONAL THERAPY ASSISTANT

## 2018-07-27 PROCEDURE — 40000225 ZZH STATISTIC SLP WARD VISIT: Performed by: SPEECH-LANGUAGE PATHOLOGIST

## 2018-07-27 PROCEDURE — 99231 SBSQ HOSP IP/OBS SF/LOW 25: CPT | Performed by: HOSPITALIST

## 2018-07-27 PROCEDURE — 25000132 ZZH RX MED GY IP 250 OP 250 PS 637

## 2018-07-27 PROCEDURE — 40000133 ZZH STATISTIC OT WARD VISIT: Performed by: OCCUPATIONAL THERAPY ASSISTANT

## 2018-07-27 PROCEDURE — 84132 ASSAY OF SERUM POTASSIUM: CPT | Performed by: HOSPITALIST

## 2018-07-27 PROCEDURE — 92526 ORAL FUNCTION THERAPY: CPT | Mod: GN | Performed by: SPEECH-LANGUAGE PATHOLOGIST

## 2018-07-27 PROCEDURE — 97530 THERAPEUTIC ACTIVITIES: CPT | Mod: GP

## 2018-07-27 PROCEDURE — 36415 COLL VENOUS BLD VENIPUNCTURE: CPT | Performed by: HOSPITALIST

## 2018-07-27 PROCEDURE — 25000128 H RX IP 250 OP 636

## 2018-07-27 PROCEDURE — 25000132 ZZH RX MED GY IP 250 OP 250 PS 637: Performed by: HOSPITALIST

## 2018-07-27 PROCEDURE — 97116 GAIT TRAINING THERAPY: CPT | Mod: GP

## 2018-07-27 PROCEDURE — 97535 SELF CARE MNGMENT TRAINING: CPT | Mod: GO | Performed by: OCCUPATIONAL THERAPY ASSISTANT

## 2018-07-27 PROCEDURE — 25000125 ZZHC RX 250: Performed by: INTERNAL MEDICINE

## 2018-07-27 PROCEDURE — 40000193 ZZH STATISTIC PT WARD VISIT

## 2018-07-27 PROCEDURE — 12000000 ZZH R&B MED SURG/OB

## 2018-07-27 PROCEDURE — 85049 AUTOMATED PLATELET COUNT: CPT | Performed by: HOSPITALIST

## 2018-07-27 PROCEDURE — 85610 PROTHROMBIN TIME: CPT | Performed by: HOSPITALIST

## 2018-07-27 RX ORDER — LOSARTAN POTASSIUM 25 MG/1
25 TABLET ORAL DAILY
Status: DISCONTINUED | OUTPATIENT
Start: 2018-07-27 | End: 2018-07-28 | Stop reason: HOSPADM

## 2018-07-27 RX ORDER — WARFARIN SODIUM 6 MG/1
6 TABLET ORAL
Status: COMPLETED | OUTPATIENT
Start: 2018-07-27 | End: 2018-07-27

## 2018-07-27 RX ADMIN — FINASTERIDE 5 MG: 5 TABLET, FILM COATED ORAL at 08:16

## 2018-07-27 RX ADMIN — TAMSULOSIN HYDROCHLORIDE 0.4 MG: 0.4 CAPSULE ORAL at 20:29

## 2018-07-27 RX ADMIN — FLUTICASONE PROPIONATE 1 SPRAY: 50 SPRAY, METERED NASAL at 11:05

## 2018-07-27 RX ADMIN — LOSARTAN POTASSIUM 25 MG: 25 TABLET ORAL at 11:05

## 2018-07-27 RX ADMIN — ENOXAPARIN SODIUM 70 MG: 80 INJECTION SUBCUTANEOUS at 08:10

## 2018-07-27 RX ADMIN — WARFARIN SODIUM 6 MG: 6 TABLET ORAL at 18:41

## 2018-07-27 RX ADMIN — AMLODIPINE BESYLATE 5 MG: 5 TABLET ORAL at 08:16

## 2018-07-27 RX ADMIN — TAMSULOSIN HYDROCHLORIDE 0.4 MG: 0.4 CAPSULE ORAL at 08:17

## 2018-07-27 RX ADMIN — ROSUVASTATIN CALCIUM 20 MG: 20 TABLET, FILM COATED ORAL at 08:17

## 2018-07-27 RX ADMIN — PREDNISONE 5 MG: 5 TABLET ORAL at 08:17

## 2018-07-27 RX ADMIN — ENOXAPARIN SODIUM 70 MG: 80 INJECTION SUBCUTANEOUS at 20:29

## 2018-07-27 ASSESSMENT — VISUAL ACUITY
OU: GLASSES

## 2018-07-27 ASSESSMENT — ACTIVITIES OF DAILY LIVING (ADL)
ADLS_ACUITY_SCORE: 15

## 2018-07-27 NOTE — PLAN OF CARE
Problem: Patient Care Overview  Goal: Plan of Care/Patient Progress Review  Outcome: No Change  A&OX4.  R facial droop. Slow rate of speech.  RUE ataxia. Denies N/T. VSS, within stroke parameters. Reg diet with thin liquids, good appetite. Incontinent. Up with A1/GB. Denies pain. Bowel sounds active in all quadrants. Turn and repo q2h. Sacral erythema, prophylactic mepilex changed, CDI. Skin tear L hand, mepilex changed, CDI. Plan dc to ARU pending progress.      Addendum: No changes noted from [id number], plan to dc pending open bed at ARU.

## 2018-07-27 NOTE — PLAN OF CARE
Problem: Patient Care Overview  Goal: Plan of Care/Patient Progress Review  PT: Attempted PT session. Pt was in bed upon PT arrival and had an episode of incontinence requiring nursing cares.

## 2018-07-27 NOTE — PLAN OF CARE
Problem: Patient Care Overview  Goal: Plan of Care/Patient Progress Review  Speech Language Therapy Discharge Summary    Reason for therapy discharge:    All goals and outcomes met, no further needs identified.    Progress towards therapy goal(s). See goals on Care Plan in [facility] electronic health record for goal details.  Goals met    Therapy recommendation(s):    SLP: Pt finishing breakfast as SLP arrived. Vocal quality clear and pt recalled throat clear discussed yesterday. Pt noted to be reclined in bed and pt reported feeling it was difficult to eat breakfast reclined. Pt shown how to adjust the bed or call the nurse for assistance. No overt s/sx of aspiration with thin liquids with pt assisted to upright position. Pt verbalized agreement to be upright in a chair for all meals with continued regular diet/thin liquids. No further IP SLP needs identified. Will defer full cognitive-linguistic evaluation to the next level of care.

## 2018-07-27 NOTE — PROGRESS NOTES
CM  I:  JERRICA received update from [facility] liaison they anticipated having an available bed tomorrow for patient.  Liaison states they have put in for authorization thru [facility].  JERRICA was asked to set up a noon ride for Saturday, if auth is received.  SW will meet with patient to discuss transport options.    P:  Continue to assist as needed.    KATY Stoll    UPDATE@1526:  [facility] Liaison reports they have received authorization for patient but are not sure if they will be able to admit patient tomorrow.  JERRICA is to call the [facility] office ([place]) directly on 7/28 after 9 AM, for an update on potential admission Saturday or Sunday.

## 2018-07-27 NOTE — PLAN OF CARE
Problem: Patient Care Overview  Goal: Plan of Care/Patient Progress Review  Outcome: Improving  A&O X3-4. Neuros intact ex R droop, R sided ataxia. VSS ex bradycardia. Tele discontinued per order. Regular diet. Up with assist of 2, gb, walker. Denies pain. Incontinent of urine at times t/out night. Uses call light appropriately. Reposition Q2. Plan for ARU at discharge.

## 2018-07-27 NOTE — PLAN OF CARE
Problem: Patient Care Overview  Goal: Plan of Care/Patient Progress Review  Discharge Planner PT   Patient plan for discharge: rehab  Current status: Pt requires Shamir for supine>sit and sit<>stand from EOB to FWW with cues for technique and midline posture once standing. Gait training with FWW and modA for balance and walker management followed closely by a w/c. Continues to lean to his L and demonstrates LE ataxia with gait but improved gait distance when able to ambulate along a straight path.  Barriers to return to prior living situation: level of assist required, fall risk, ataxia, impaired balance  Recommendations for discharge: ARU  Rationale for recommendations: Pt would benefit from intensive PT at ARU to progress balance and mobility so he can return home       Entered by: Shaunna Gtuierrez 07/27/2018 5:19 PM

## 2018-07-27 NOTE — PROGRESS NOTES
"BRIEF NUTRITION ASSESSMENT      REASON FOR ASSESSMENT:  LOS    CURRENT DIET AND INTAKE:  Diet:  Regular with thin liquids  Per flow sheets, has been eating mostly 100%.                 ANTHROPOMETRICS:  Height: 5' 9\"  Weight: 71 kg  BMI: 23 kg/m2  IBW:  72.7 kg +/- 10%  Weight Status: Normal BMI  %IBW: 98%  Weight History:  No recent wt hx.  Wt Readings from Last 10 Encounters:   07/25/18 71 kg (156 lb 8.4 oz)   05/02/17 76.1 kg (167 lb 12.3 oz)   04/03/17 75.8 kg (167 lb)   09/11/13 75.6 kg (166 lb 11.2 oz)   12/21/11 78.6 kg (173 lb 3.2 oz)         LABS:  Labs noted      NUTRITION INTERVENTION:  Nutrition Diagnosis:  No nutrition diagnosis at this time.    Implementation:  Nutrition Education:  Per Provider order if indicated    FOLLOW UP/MONITORING:   Will re-evaluate in 7 - 10 days, or sooner, if re-consulted.    Angie Frost RD  Pager [phone] (M-F)            [phone] (W/E & Hol)            "

## 2018-07-27 NOTE — PROGRESS NOTES
[facility]    Hospitalist Progress Note  When I evaluated patient: 07/27/2018     Assessment & Plan   Mr. Shamir Concepcion is a delightful 78-year-old  gentleman, with a past medical history notable for peripheral artery disease, rheumatoid arthritis, BPH, allergic rhinitis and colon polyps, who presented with acute onset of dizziness, nausea and fall. Workup in the ED revealed acute left cerebellar stroke and occlusion of left PICA.     Acute left cerebellar ischemic stroke due to Lt PICA occlusion/dissection.   Admitted 7/21, developed new diplopia that evening prompting reimaging showing progression of stroke with edema, prompting transfer to ICU for close monitoring and initiation of 3% saline and to evaluate for possible need for decompressive surgery should he deteriorate. Followed up with subsequent imaging studies including MRI brain and CT head, revealed evolving cerebellar infarcts but no complications including hydrocephalus or hemorrhagic transformation.  --Evaluated by neurosurgery, no surgery indicated. IJ central line placed by intensivist 7/21, started on hypertonic saline per neuro critical care which was discontinued 7/24, CT head 7/26 AM without any complication.  --Was maintained on heparin, transitioned to warfarin p.o. with bridging therapeutic Lovenox.  --Evaluated by PT, OT, speech, on regular diet.  Plan is to discharge to acute rehab.  --Blood pressure at goal, eventually needs strict blood pressure control with a target less than 130/80.  --Continue statin.    UTI: The patient reported dysuria. Urinalysis is abnormal with moderate leukocyte esterase and 33 WBCs.    --Ceftriaxone started on admission. Culture negative. Completed 5 days.     Hypertension: No prior history  --prn hydralazine available  --Cozaar added 7/23, dose increased but had slightly increased Cr so now on 25 mg daily with norvasc 5mg daily  7/24 (Avoid B-blocker due to low HR.)  --Blood pressure  better controlled.  Increase amlodipine if trend up. Long term goal <130/80.     BPH:  Finasteride/tamsulosin      Rheumatoid arthritis:  He is on weekly methotrexate plus prednisone 5 mg p.o. daily.    --Methotrexate on hold while hospitalized  --PTA prednisone 5 mg p.o. daily resumed    Mild TAYLOR: Baseline Cr 0.8-0.9, was upto 1.2  - Improved with IVF, 0.97 today, needs repeat BMP in 4-5 days while in ARB.    DVT Prophylaxis: Pneumatic Compression Devices    Code Status: DNR    Disposition: Ok to transfer to acute rehab when bed available.  Discussed with patient. Discussed with RN and .    Elijah Villatoro MD  Hospitalist    Interval History    Denies any complaints including headache, diplopia or blurry vision or focal weakness, dizziness, palpitation.  Constipation (+), no abd discomfort or nausea.    -Data reviewed today: I reviewed all new labs results over the last 24 hours. I personally reviewed no images or EKG's today    Physical Exam   Temp: 98.1  F (36.7  C) Temp src: Oral BP: 134/78 Pulse: 56 Heart Rate: 72 Resp: 18 SpO2: 96 % O2 Device: None (Room air)    Vitals:    07/23/18 0300 07/24/18 0600 07/25/18 0600   Weight: 72.1 kg (158 lb 15.2 oz) 71.2 kg (156 lb 15.5 oz) 71 kg (156 lb 8.4 oz)     Vital Signs with Ranges  Temp:  [97.4  F (36.3  C)-98.5  F (36.9  C)] 98.1  F (36.7  C)  Pulse:  [56] 56  Heart Rate:  [57-72] 72  Resp:  [18-20] 18  BP: (128-161)/(64-78) 134/78  SpO2:  [93 %-96 %] 96 %  I/O last 3 completed shifts:  In: 1264 [P.O.:500; I.V.:764]  Out: 480 [Urine:480]    Constitutional: Alert and awake, oriented to place, self, recognizes family, says it's July 3rd 2018  HEENT: LESLIE EOMI. Dressing Rt neck at Rt IJ site.   Respiratory: Bilateral equal air entry, clear to auscultation, normal work of breathing, remains on room air.  Cardiovascular: Regular S1-S2, no murmur, no tachycardia.  GI:  soft, non-distended, non-tender  Skin/Integumen: no edema  Neuro: Alert, oriented to  self, place and date, cranial nerves II through XII intact, motor strength normal and symmetrical, slight dysmetria on right finger-to-nose test persists.    Medications     - MEDICATION INSTRUCTIONS -       Warfarin Therapy Reminder         amLODIPine  5 mg Oral Daily     enoxaparin  70 mg Subcutaneous Q12H     finasteride (PROSCAR) tablet 5 mg  5 mg Oral Daily     fluticasone  1 spray Both Nostrils Daily     losartan  25 mg Oral Daily     predniSONE  5 mg Oral Daily     rosuvastatin  20 mg Oral or NG Tube Daily     tamsulosin  0.4 mg Oral BID     warfarin  6 mg Oral ONCE at 18:00       Data     Recent Labs  Lab 07/27/18  0830 07/26/18  2130 07/26/18  2035 07/26/18  1113 07/25/18  0635 07/24/18  2220  07/24/18  0530  07/23/18  0345  07/21/18  1133 07/20/18  2205   WBC  --   --   --   --  10.3  --   --  9.5  --  12.3*  < >  --  13.6*   HGB  --   --   --   --  12.6*  --   --  12.1*  --  11.9*  < >  --  13.2*   MCV  --   --   --   --  93  --   --  94  --  95  < >  --  93     --   --   --  326  --   --  314  --  294  < > 359 349   INR 1.15*  --   --  1.04  --   --   --   --   --   --   --   --  1.11   NA  --   --   --  139 141 142  < > 144  < > 144  < >  --  140   POTASSIUM 3.5 4.0 Canceled, Test credited 3.2* 3.5  --   < > 3.2*  --  3.5  < >  --  3.9   CHLORIDE  --   --   --  103 107  --   --  111*  --  113*  < >  --  107   CO2  --   --   --  26 27  --   --  25  --  25  < >  --  25   BUN  --   --   --  19 11  --   --  10  --  10  < >  --  14   CR 0.97  --   --  1.21 0.99  --   --  0.84  --  0.98  < >  --  0.93   ANIONGAP  --   --   --  10 7  --   --  8  --  6  < >  --  8   RANJAN  --   --   --  8.2* 8.0*  --   --  7.6*  --  7.7*  < >  --  8.5   GLC  --   --   --  138 107*  --   --  107*  --  119*  < >  --  126*   ALBUMIN  --   --   --   --   --   --   --   --   --   --   --  3.2* 3.3*   PROTTOTAL  --   --   --   --   --   --   --   --   --   --   --  6.9 7.3   BILITOTAL  --   --   --   --   --   --   --   --   --    --   --  0.7 0.8   ALKPHOS  --   --   --   --   --   --   --   --   --   --   --  101 105   ALT  --   --   --   --   --   --   --   --   --   --   --  13 15   AST  --   --   --   --   --   --   --   --   --   --   --  21 19   LIPASE  --   --   --   --   --   --   --   --   --   --   --   --  105   TROPI  --   --   --   --   --   --   --   --   --   --   --   --  <0.015   < > = values in this interval not displayed.    Imaging:   No results found for this or any previous visit (from the past 24 hour(s)).

## 2018-07-27 NOTE — PLAN OF CARE
Problem: Patient Care Overview  Goal: Plan of Care/Patient Progress Review  Discharge Planner OT   Patient plan for discharge: Not stated   Current status:  pt completed supine to sit EOB SBA, Shamir of 2 sit to stand, with use of FWW pt completed amb forward ~ 5' Shamir of 2, pt stood x ~ 5 mins while looking outside the window to enjoy the view. Pt required cues to keep walker on floor and push FWW vs picking up to advance. Pt min/mod A to turn and amb ~ 2' toward EOB in prep for mobility to bathroom, pt c/o increase fatigue and LE weakness, max A to guide pt to sit safely at EOB, after rest pt completed sit to stand and amb over to chair with FWW min A of 2. /71  Barriers to return to prior living situation: Decreased balance, decreased cognition, decreased coordination, level of A   Recommendations for discharge: ARC per plan established by the Occupational Therapist  Rationale for recommendations: Pt not at baseline. Would benefit from further skilled therapy to work on independence in ADLS/IADLS.        Entered by: Lana Beckford 07/27/2018 11:36 AM

## 2018-07-28 ENCOUNTER — HOSPITAL ENCOUNTER (INPATIENT)
Facility: CLINIC | Age: 78
LOS: 12 days | Discharge: NURSING, REHAB OR LONG-TERM CARE | DRG: 057 | End: 2018-08-09
Attending: PHYSICAL MEDICINE & REHABILITATION | Admitting: PHYSICAL MEDICINE & REHABILITATION
Payer: COMMERCIAL

## 2018-07-28 VITALS
DIASTOLIC BLOOD PRESSURE: 66 MMHG | RESPIRATION RATE: 16 BRPM | HEART RATE: 56 BPM | SYSTOLIC BLOOD PRESSURE: 122 MMHG | WEIGHT: 156.53 LBS | OXYGEN SATURATION: 95 % | HEIGHT: 69 IN | BODY MASS INDEX: 23.18 KG/M2 | TEMPERATURE: 97.6 F

## 2018-07-28 DIAGNOSIS — M06.9 RHEUMATOID ARTHRITIS, INVOLVING UNSPECIFIED SITE, UNSPECIFIED RHEUMATOID FACTOR PRESENCE: ICD-10-CM

## 2018-07-28 DIAGNOSIS — M06.9 RHEUMATOID ARTHRITIS INVOLVING BOTH HANDS, UNSPECIFIED RHEUMATOID FACTOR PRESENCE: ICD-10-CM

## 2018-07-28 DIAGNOSIS — B35.6 FUNGAL INFECTION OF THE GROIN: ICD-10-CM

## 2018-07-28 DIAGNOSIS — R52 PAIN: Primary | ICD-10-CM

## 2018-07-28 DIAGNOSIS — I63.9 ACUTE ISCHEMIC STROKE (H): ICD-10-CM

## 2018-07-28 DIAGNOSIS — I10 BENIGN ESSENTIAL HYPERTENSION: ICD-10-CM

## 2018-07-28 DIAGNOSIS — I77.74 VERTEBRAL ARTERY DISSECTION (H): ICD-10-CM

## 2018-07-28 DIAGNOSIS — I63.219 CEREBROVASCULAR ACCIDENT (CVA) DUE TO OCCLUSION OF VERTEBRAL ARTERY, UNSPECIFIED BLOOD VESSEL LATERALITY (H): ICD-10-CM

## 2018-07-28 LAB — INR PPP: 1.15 (ref 0.86–1.14)

## 2018-07-28 PROCEDURE — 25000132 ZZH RX MED GY IP 250 OP 250 PS 637: Performed by: PHYSICAL MEDICINE & REHABILITATION

## 2018-07-28 PROCEDURE — 25000132 ZZH RX MED GY IP 250 OP 250 PS 637: Performed by: INTERNAL MEDICINE

## 2018-07-28 PROCEDURE — 25000128 H RX IP 250 OP 636

## 2018-07-28 PROCEDURE — 12800006 ZZH R&B REHAB

## 2018-07-28 PROCEDURE — 85610 PROTHROMBIN TIME: CPT | Performed by: HOSPITALIST

## 2018-07-28 PROCEDURE — 36415 COLL VENOUS BLD VENIPUNCTURE: CPT | Performed by: HOSPITALIST

## 2018-07-28 PROCEDURE — 25000125 ZZHC RX 250: Performed by: INTERNAL MEDICINE

## 2018-07-28 PROCEDURE — 25000128 H RX IP 250 OP 636: Performed by: PHYSICAL MEDICINE & REHABILITATION

## 2018-07-28 PROCEDURE — 25000132 ZZH RX MED GY IP 250 OP 250 PS 637: Performed by: HOSPITALIST

## 2018-07-28 PROCEDURE — 25000132 ZZH RX MED GY IP 250 OP 250 PS 637

## 2018-07-28 PROCEDURE — 99239 HOSP IP/OBS DSCHRG MGMT >30: CPT | Performed by: HOSPITALIST

## 2018-07-28 RX ORDER — LOSARTAN POTASSIUM 25 MG/1
25 TABLET ORAL DAILY
Qty: 30 TABLET | Status: ON HOLD | DISCHARGE
Start: 2018-07-29 | End: 2018-08-09

## 2018-07-28 RX ORDER — WARFARIN SODIUM 5 MG/1
TABLET ORAL
Status: ON HOLD | DISCHARGE
Start: 2018-07-28 | End: 2018-08-09

## 2018-07-28 RX ORDER — AMLODIPINE BESYLATE 10 MG/1
10 TABLET ORAL DAILY
Status: DISCONTINUED | OUTPATIENT
Start: 2018-07-29 | End: 2018-07-31

## 2018-07-28 RX ORDER — AMLODIPINE BESYLATE 5 MG/1
10 TABLET ORAL DAILY
Qty: 30 TABLET | Status: ON HOLD | DISCHARGE
Start: 2018-07-29 | End: 2018-08-09

## 2018-07-28 RX ORDER — BISACODYL 10 MG
10 SUPPOSITORY, RECTAL RECTAL DAILY PRN
Qty: 30 SUPPOSITORY | Status: ON HOLD | DISCHARGE
Start: 2018-07-28 | End: 2018-08-09

## 2018-07-28 RX ORDER — FINASTERIDE 5 MG/1
5 TABLET, FILM COATED ORAL DAILY
Status: DISCONTINUED | OUTPATIENT
Start: 2018-07-29 | End: 2018-08-09 | Stop reason: HOSPADM

## 2018-07-28 RX ORDER — ROSUVASTATIN CALCIUM 20 MG/1
20 TABLET, COATED ORAL DAILY
Qty: 30 TABLET | Status: ON HOLD | DISCHARGE
Start: 2018-07-29 | End: 2018-08-09

## 2018-07-28 RX ORDER — LOSARTAN POTASSIUM 25 MG/1
25 TABLET ORAL DAILY
Status: DISCONTINUED | OUTPATIENT
Start: 2018-07-29 | End: 2018-07-31

## 2018-07-28 RX ORDER — ROSUVASTATIN CALCIUM 20 MG/1
20 TABLET, COATED ORAL DAILY
Status: DISCONTINUED | OUTPATIENT
Start: 2018-07-29 | End: 2018-08-09 | Stop reason: HOSPADM

## 2018-07-28 RX ORDER — AMOXICILLIN 250 MG
2 CAPSULE ORAL 2 TIMES DAILY PRN
DISCHARGE
Start: 2018-07-28 | End: 2022-07-21

## 2018-07-28 RX ORDER — AMOXICILLIN 250 MG
2 CAPSULE ORAL 2 TIMES DAILY PRN
Status: DISCONTINUED | OUTPATIENT
Start: 2018-07-28 | End: 2018-08-09 | Stop reason: HOSPADM

## 2018-07-28 RX ORDER — PANTOPRAZOLE SODIUM 40 MG/1
40 TABLET, DELAYED RELEASE ORAL
Status: DISCONTINUED | OUTPATIENT
Start: 2018-07-29 | End: 2018-08-09 | Stop reason: HOSPADM

## 2018-07-28 RX ORDER — POLYETHYLENE GLYCOL 3350 17 G/17G
17 POWDER, FOR SOLUTION ORAL DAILY
Status: DISCONTINUED | OUTPATIENT
Start: 2018-07-29 | End: 2018-08-03

## 2018-07-28 RX ORDER — WARFARIN SODIUM 7.5 MG/1
7.5 TABLET ORAL
Status: DISCONTINUED | OUTPATIENT
Start: 2018-07-28 | End: 2018-07-28 | Stop reason: HOSPADM

## 2018-07-28 RX ORDER — PREDNISONE 5 MG/1
5 TABLET ORAL DAILY
Status: DISCONTINUED | OUTPATIENT
Start: 2018-07-29 | End: 2018-08-09 | Stop reason: HOSPADM

## 2018-07-28 RX ORDER — WARFARIN SODIUM 7.5 MG/1
7.5 TABLET ORAL
Status: COMPLETED | OUTPATIENT
Start: 2018-07-28 | End: 2018-07-28

## 2018-07-28 RX ORDER — FLUTICASONE PROPIONATE 50 MCG
1 SPRAY, SUSPENSION (ML) NASAL DAILY
Status: DISCONTINUED | OUTPATIENT
Start: 2018-07-29 | End: 2018-08-09 | Stop reason: HOSPADM

## 2018-07-28 RX ORDER — ACETAMINOPHEN 325 MG/1
325-975 TABLET ORAL EVERY 6 HOURS PRN
Status: DISCONTINUED | OUTPATIENT
Start: 2018-07-28 | End: 2018-08-09 | Stop reason: HOSPADM

## 2018-07-28 RX ORDER — TAMSULOSIN HYDROCHLORIDE 0.4 MG/1
0.4 CAPSULE ORAL 2 TIMES DAILY
Status: DISCONTINUED | OUTPATIENT
Start: 2018-07-28 | End: 2018-08-01

## 2018-07-28 RX ORDER — BISACODYL 10 MG
10 SUPPOSITORY, RECTAL RECTAL DAILY PRN
Status: DISCONTINUED | OUTPATIENT
Start: 2018-07-28 | End: 2018-08-09 | Stop reason: HOSPADM

## 2018-07-28 RX ADMIN — ENOXAPARIN SODIUM 70 MG: 80 INJECTION SUBCUTANEOUS at 08:36

## 2018-07-28 RX ADMIN — FINASTERIDE 5 MG: 5 TABLET, FILM COATED ORAL at 08:37

## 2018-07-28 RX ADMIN — TAMSULOSIN HYDROCHLORIDE 0.4 MG: 0.4 CAPSULE ORAL at 20:49

## 2018-07-28 RX ADMIN — ACETAMINOPHEN 650 MG: 325 TABLET, FILM COATED ORAL at 12:27

## 2018-07-28 RX ADMIN — FLUTICASONE PROPIONATE 1 SPRAY: 50 SPRAY, METERED NASAL at 10:47

## 2018-07-28 RX ADMIN — SENNOSIDES AND DOCUSATE SODIUM 1 TABLET: 8.6; 5 TABLET ORAL at 12:27

## 2018-07-28 RX ADMIN — PREDNISONE 5 MG: 5 TABLET ORAL at 08:37

## 2018-07-28 RX ADMIN — WARFARIN SODIUM 7.5 MG: 7.5 TABLET ORAL at 17:34

## 2018-07-28 RX ADMIN — ENOXAPARIN SODIUM 70 MG: 80 INJECTION SUBCUTANEOUS at 20:49

## 2018-07-28 RX ADMIN — LOSARTAN POTASSIUM 25 MG: 25 TABLET ORAL at 08:37

## 2018-07-28 RX ADMIN — ROSUVASTATIN CALCIUM 20 MG: 20 TABLET, FILM COATED ORAL at 08:37

## 2018-07-28 RX ADMIN — AMLODIPINE BESYLATE 5 MG: 5 TABLET ORAL at 08:37

## 2018-07-28 RX ADMIN — TAMSULOSIN HYDROCHLORIDE 0.4 MG: 0.4 CAPSULE ORAL at 08:37

## 2018-07-28 ASSESSMENT — ACTIVITIES OF DAILY LIVING (ADL)
EATING: 0 - INDEPENDENT
AMBULATION: 3 - ASSISTIVE EQUIPMENT AND PERSON
COGNITION: 1 - ATTENTION OR MEMORY DEFICITS
TOILETING: 3 - ASSISTIVE EQUIPMENT AND PERSON
CHANGE_IN_FUNCTIONAL_STATUS_SINCE_ONSET_OF_CURRENT_ILLNESS/INJURY: YES
ADLS_ACUITY_SCORE: 15
COMMUNICATION: 0 - UNDERSTANDS/COMMUNICATES WITHOUT DIFFICULTY
TRANSFERRING: 3 - ASSISTIVE EQUIPMENT AND PERSON
ADLS_ACUITY_SCORE: 15
ADLS_ACUITY_SCORE: 15
BATHING: 3 - ASSISTIVE EQUIPMENT AND PERSON
DRESS: 3 - ASSISTIVE EQUIPMENT AND PERSON
ADLS_ACUITY_SCORE: 16
SWALLOWING: 0 - SWALLOWS FOODS/LIQUIDS WITHOUT DIFFICULTY

## 2018-07-28 ASSESSMENT — VISUAL ACUITY
OU: GLASSES

## 2018-07-28 NOTE — IP AVS SNAPSHOT
` ` Patient Information     Patient Name Sex     Shamir Concepcion ([id number]) Male 1940       Room Bed    R503 R503-01      Patient Demographics     Address Phone E-mail Address    [address]  [place] [address] [phone] (Home)  [phone] (Mobile) *Preferred* [email or web address]      Patient Ethnicity & Race     Ethnic Group Patient Race    American White      PCP and Center    Primary Care Provider  Phone Center     No Ref-Primary, Physician None         Emergency Contact(s)     Name Relation Home Work Mobile    Dorcas Rutledge Daughter [phone]    Clarice Belcher Daughter [phone]      Documents on File        Status Date Received Description       Documents for the Patient    Consent for Services - CIM Received () 11     Privacy Notice - CIM Received 11     Insurance Card Received 11 MEDICA    Consent for Services - Hospital/Clinic  ()      Consent for EHR Access Received 13     Privacy Notice - [place] Received 13     External Medication Information Consent Accepted 12/14/15     Patient ID  13     [facility] Specified Other       Insurance Card Received 13 Medica    Consent for Services - Hospital/Clinic Received () 13     Consent for Services - Hospital/Clinic  ()      Insurance Card Received 14 Medica -     Consent to Communicate Received 14     Patient ID Received 14 DL - 2016    Consent for Services - Hospital/Clinic Received () 14     Consent for Services - Hospital/Clinic   Page 1 only    Consent for Services - Geriatrics Received 11/03/15     Insurance Card Received 12/14/15 Medica-     Consent for Services - Hospital/Clinic Received () 12/14/15     Consent for Services/Privacy Notice - Hospital/Clinic Received () 17     Insurance Card Received 17 BCBS - Plat Blue     Patient ID Received 17 DL -      Consent for Services - Hospital and Clinic Received 07/20/18     HIE Auth Received 07/20/18     Insurance Card Received 07/20/18     Patient ID Received 07/20/18     Advance Directives and Living Will Not Received  Validation of AD 03/13/2014    Advance Directives and Living Will Received 07/25/18 Health Care Directive 03/13/2014    Insurance Card Received (Deleted) 12/14/15 Delete       Documents for the Encounter    CMS IM for Patient Signature       Assessment/Questionnaire   Preadmission Screen      Admission Information     Attending Provider Admitting Provider Admission Type Admission Date/Time    Champ Kapadia MD Salehi, Parisa, MD Urgent 07/28/18  1356    Discharge Date Hospital Service Auth/Cert Status Service Area     Acute IP Rehab Incomplete [facility] SERVICES    Unit Room/Bed Admission Status       [facility] R503/R503-01 Admission (Confirmed)       Admission     Complaint    01.1 Left body, right brainstroke; Acute left cerebellar ischemic stroke, Stroke (cerebrum) (H)      Hospital Account     Name Acct ID Class Status Primary Coverage    Shamir Concepcion [id number] Acute Inpatient Rehab Open UCARE - UCARE SENIORS NON FPA            Guarantor Account (for Hospital Account #[id number])     Name Relation to Pt Service Area Active? Acct Type    Shamir Concepcion Self FCS Yes Personal/Family    Address Phone          [address] [place] [address] [phone](H)  [phone](O)              Coverage Information (for Hospital Account #[id number])     F/O Payor/Plan Precert #    UCARE/UCARE SENIORS NON FPA     Subscriber Subscriber #    Shamir Concepcion [id number]    Address Phone    [address]  [place] [address] [phone]

## 2018-07-28 NOTE — PLAN OF CARE
Problem: Patient Care Overview  Goal: Plan of Care/Patient Progress Review  Outcome: Adequate for Discharge Date Met: 07/28/18  Disoriented to place. R facial droop at rest. RUE/LUE ataxia. [place]. Glasses on. NIH score 2. VSS. Reg diet with thin liquids, good appetite. Up with A2/GB/W pivot to commode. Incontinent at times, uses urinal. Denies pain. Giving senna due to pt. Not having BM in 4 days and tylenol for the transition of settings. Plan DC to [facility] at 1300 via w/c with daughter. IV removed. Education given and all questions answered.

## 2018-07-28 NOTE — PLAN OF CARE
Problem: Patient Care Overview  Goal: Plan of Care/Patient Progress Review  Occupational Therapy Discharge Summary    Reason for therapy discharge:    Discharged to acute rehabilitation facility.    Progress towards therapy goal(s). See goals on Care Plan in [facility] electronic health record for goal details.  Goals partially met.  Barriers to achieving goals:   discharge from facility.    Therapy recommendation(s):    Continued therapy is recommended.  Rationale/Recommendations:  Pt to be d/c to acute rehab at 1pm.  MD d/c note in chart.  Will defer tx to Acute rehab..

## 2018-07-28 NOTE — PLAN OF CARE
Problem: Patient Care Overview  Goal: Plan of Care/Patient Progress Review  PT: Noting discharge orders in for pt with noted plans to leave with dtr at 1300 to [facility]. Will defer further PT to ARU at this time.    Physical Therapy Discharge Summary    Reason for therapy discharge:    Discharged to acute rehabilitation facility.    Progress towards therapy goal(s). See goals on Care Plan in [facility] electronic health record for goal details.  Goals not met.  Barriers to achieving goals:   discharge from facility.    Therapy recommendation(s):    Continued therapy is recommended.  Rationale/Recommendations:  Pt would benefit from intensive skilled PT at ARU to progress balance, safety and IND with functional mobility..

## 2018-07-28 NOTE — IP AVS SNAPSHOT
"` `           [facility]: [id number]                                              INTERAGENCY TRANSFER FORM - NURSING   2018                    Hospital Admission Date: 2018  KATTY GRUBER   : 1940  Sex: Male        Attending Provider: Champ Kapadia MD     Allergies:  No Known Allergies    Infection:  None   Service:  ACUTE IP ANTONIO    Ht:  1.651 m (5' 5\")   Wt:  51.9 kg (114 lb 8 oz)   Admission Wt:  75.3 kg (166 lb)    BMI:  19.05 kg/m 2   BSA:  1.54 m 2            Patient PCP Information     Provider PCP Type    Physician No Ref-Primary General      Current Code Status     Date Active Code Status Order ID Comments User Context       Prior      Code Status History     Date Active Date Inactive Code Status Order ID Comments User Context    2018 11:45 AM  DNR/DNI [id number]  Champ Kapadia MD Outpatient    2018  3:17 PM 2018 11:45 AM DNR [id number]  Lana Pavon MD Inpatient    2018  2:57 PM 2018  3:17 PM Full Code [id number]  Lana Pavon MD Inpatient    2018  9:49 AM 2018  2:57 PM DNR [id number]  Elijah Villatoro MD Outpatient    2018  6:53 PM 2018  9:49 AM DNR [id number]  Uma Golden RN Inpatient    2018  4:05 AM 2018  6:53 PM DNR/DNI [id number]  Oumou Guthrie MD Inpatient      Advance Directives        Scanned docmt in ACP Activity?           Yes, scanned ACP docmt        Hospital Problems as of 2018              Priority Class Noted POA    Stroke (cerebrum) (H) Medium  2018 Yes      Non-Hospital Problems as of 2018              Priority Class Noted    Pure hypercholesterolemia Medium  2011    BPH (benign prostatic hyperplasia) Medium  2011    Rheumatoid arthritis (H) Medium  2011    Acute ischemic stroke (H) Medium  2018      Immunizations     Name Date      Zoster vaccine, live 01/12/10          END      ASSESSMENT     Discharge Profile Flowsheet     EXPECTED DISCHARGE     " GASTROINTESTINAL (ADULT,PEDIATRIC,OB)      Expected Discharge Date  08/10/18 (approx, TCU referrals starting) 08/08/18 1029   GI WDL  WDL 08/09/18 0944    DISCHARGE NEEDS ASSESSMENT     Last Bowel Movement  08/09/18 08/09/18 0944    Patient/family verbalizes understanding of discharge plan recommendations?  Yes 07/30/18 0937   GI Signs/Symptoms  fecal incontinence (on dayshift no incontinence on evening shift) 08/04/18 0055    Equipment Currently Used at Home  none 07/29/18 0942   COMMUNICATION ASSESSMENT      Transportation Available  car;family or friend will provide 07/30/18 0937   Patient's communication style  spoken language (English or Bilingual) 07/28/18 1416    FUNCTIONAL LEVEL CURRENT     SKIN      Ambulation  3 - assistive equipment and person 08/04/18 1134   Inspection of bony prominences  Full 08/09/18 0944    Transferring  3 - assistive equipment and person 08/04/18 1134   Full except areas not inspected   Occiput;Scapula, left;Scapula, right;Spine;Hip, left;Buttock, left;Hip, right;Buttock, right;Sacrum;Coccyx;Knee, left;Knee, right;Ankle, left;Ankle, right;Foot, left;Heel, right;Heel, left;Foot, right 08/09/18 0341    Toileting  3 - assistive equipment and person 08/04/18 1134   Inspection under devices  Full 08/08/18 1026    Bathing  3 - assistive equipment and person 08/04/18 1134   Skin WDL  ex 08/09/18 0944    Dressing  3 - assistive equipment and person 08/04/18 1134   Skin Integrity  rash(es);scab(s) 08/09/18 0944    Eating  0 - independent 08/04/18 1134   SAFETY      Communication  0 - understands/communicates without difficulty 08/04/18 1134   Safety WDL  WDL 08/09/18 0944    Swallowing  0 - swallows foods/liquids without difficulty 08/04/18 1134   All Alarms  alarm(s) activated and audible 08/09/18 0944    Change in Functional Status Since Onset of Current Illness/Injury  yes 07/28/18 1430                      Assessment WDL (Within Defined Limits) Definitions           Safety WDL      "Effective: 09/28/15    Row Information: <b>WDL Definition:</b> Bed in low position, wheels locked; call light in reach; upper side rails up x 2; ID band on<br> <font color=\"gray\"><i>Item=AS safety wdl>>List=AS safety wdl>>Version=F14</i></font>      Skin WDL     Effective: 09/28/15    Row Information: <b>WDL Definition:</b> Warm; dry; intact; elastic; without discoloration; pressure points without redness<br> <font color=\"gray\"><i>Item=AS skin wdl>>List=AS skin wdl>>Version=F14</i></font>      Vitals     Vital Signs Flowsheet     VITAL SIGNS     Weight  51.9 kg (114 lb 8 oz) 08/07/18 1430    Temp  96.9  F (36.1  C) 08/09/18 0704   Weight Method  Standing scale 08/04/18 0627    Temp src  Oral 08/09/18 0704   BSA (Calculated - sq m)  1.54 08/07/18 1430    Resp  16 08/09/18 0704   BMI (Calculated)  19.09 08/07/18 1430    Pulse  67 08/09/18 1241   POSITIONING      Pulse/Heart Rate Source  Monitor;Brachial 08/09/18 1241   Body Position  independently positioning 08/09/18 0944    BP  122/57 08/09/18 1241   Head of Bed (HOB)  HOB at 20-30 degrees 08/09/18 0658    BP Location  Right arm 08/09/18 1241   Positioning/Transfer Devices  pillows;in use 08/09/18 0658    OXYGEN THERAPY     Chair  Upright in chair 08/09/18 0944    SpO2  97 % 08/09/18 0704   DAILY CARE      O2 Device  None (Room air) 08/09/18 0704   Activity Management  activity adjusted per tolerance;up in chair 08/09/18 0944    PAIN/COMFORT     Activity Assistance Provided  assistance, 1 person 08/09/18 0944    Patient Currently in Pain  denies 08/09/18 1241   Elimination Assistance  up to toilet 08/09/18 0944    Preferred Pain Scale  CAPA (Clinically Aligned Pain Assessment) ([facility], [facility] Adults Only) 08/07/18 1708   Assistive Device Utilized  gait belt;walker 08/09/18 0944    HEIGHT AND WEIGHT     Symptoms Noted During/After Activity  none 08/09/18 0944    Height  1.651 m (5' 5\") 08/07/18 1430   COMMENTS      Height Method  Stated 08/07/18 1430   " Comments  pt w/ c/o dizziness 08/01/18 1138            Patient Lines/Drains/Airways Status    Active LINES/DRAINS/AIRWAYS     Name: Placement date: Placement time: Site: Days: Last dressing change:    Wound 07/21/18 Left;Posterior Hand Abrasion(s) 07/21/18   1700   Hand   18     Wound 07/25/18 Midline;Upper Coccyx Other (comment) pink nonblanchable erythema with white spot 07/25/18   1500   Coccyx   14     Rash 07/29/18 2112 Bilateral groin macular;papule 07/29/18   2112    10     Incision/Surgical Site 05/02/17 Right Eye 05/02/17   0845    464             Patient Lines/Drains/Airways Status    Active PICC/CVC     None            Intake/Output Detail Report     Date Intake Output Net    Shift P.O. Total Urine Total       Noc 08/07/18 2300 - 08/08/18 0659 -- -- -- -- 0    Day 08/08/18 0700 - 08/08/18 1459 200 200 200 200 0    Елена 08/08/18 1500 - 08/08/18 2259 240 240 -- -- 240    Noc 08/08/18 2300 - 08/09/18 0659 -- -- -- -- 0    Day 08/09/18 0700 - 08/09/18 1459 -- -- -- -- 0      Last Void/BM       Most Recent Value    Urine Occurrence 1 at 08/09/2018 0654    Stool Occurrence 1 at 08/08/2018 1915      Case Management/Discharge Planning     Case Management/Discharge Planning Flowsheet     LIVING ENVIRONMENT     Transportation Available  car;family or friend will provide 07/30/18 0937    Lives With  alone 07/30/18 0937   FINAL RESOURCES      Living Arrangements  house 07/30/18 0937   Equipment Currently Used at Home  none 07/29/18 0942    Able to Return to Prior Living Arrangements  yes 07/30/18 0937   ABUSE RISK SCREEN      HOME SAFETY     QUESTION TO PATIENT:  Has a member of your family or a partner(now or in the past) intimidated, hurt, manipulated, or controlled you in any way?  no 07/30/18 0937    Patient Feels Safe Living in Home?  yes 07/30/18 0937   QUESTION TO PATIENT: Do you feel safe going back to the place where you are living?  yes 07/30/18 0937    COPING/STRESS     OBSERVATION: Is there reason to  believe there has been maltreatment of a vulnerable adult (ie. Physical/Sexual/Emotional abuse, self neglect, lack of adequate food, shelter, medical care, or financial exploitation)?  no 07/30/18 0937    Major Change/Loss/Stressor  death (wife) 07/28/18 1429   OTHER      EXPECTED DISCHARGE     Are you depressed or being treated for depression?  No 07/28/18 1431    Expected Discharge Date  08/10/18 (approx, TCU referrals starting) 08/08/18 1029   HOMICIDE RISK      DISCHARGE PLANNING     Feels Like Hurting Others  no 07/30/18 0937    Patient/family verbalizes understanding of discharge plan recommendations?  Yes 07/30/18 0978

## 2018-07-28 NOTE — IP AVS SNAPSHOT
"          [facility]: [id number]                                              INTERAGENCY TRANSFER FORM - LAB / IMAGING / EKG / EMG RESULTS   2018                    Hospital Admission Date: 2018  KATTY GRUBER   : 1940  Sex: Male        Attending Provider: Champ Kapadia MD     Allergies:  No Known Allergies    Infection:  None   Service:  ACUTE IP ANTONIO    Ht:  1.651 m (5' 5\")   Wt:  51.9 kg (114 lb 8 oz)   Admission Wt:  75.3 kg (166 lb)    BMI:  19.05 kg/m 2   BSA:  1.54 m 2            Patient PCP Information     Provider PCP Type    Physician No Ref-Primary General         Lab Results - 3 Days      INR [[id number]] (Abnormal)  Resulted: 18 0631, Result status: Final result    Ordering provider: Lana Pavon MD  18 0000 Resulting lab: [facility]    Specimen Information    Type Source Collected On   Blood  18 0615          Components       Value Reference Range Flag Lab   INR 2.57 0.86 - 1.14 H 13            Basic metabolic panel [[id number]] (Abnormal)  Resulted: 18 0614, Result status: Final result    Ordering provider: May Celeste PA  18 0000 Resulting lab: [facility]    Specimen Information    Type Source Collected On   Blood  18 0538          Components       Value Reference Range Flag Lab   Sodium 143 133 - 144 mmol/L  13   Potassium 4.0 3.4 - 5.3 mmol/L  13   Chloride 107 94 - 109 mmol/L  13   Carbon Dioxide 26 20 - 32 mmol/L  13   Anion Gap 10 3 - 14 mmol/L  13   Glucose 91 70 - 99 mg/dL  13   Urea Nitrogen 22 7 - 30 mg/dL  13   Creatinine 1.19 0.66 - 1.25 mg/dL  13   GFR Estimate 59 >60 mL/min/1.7m2 L 13   Comment:  Non  GFR Calc   GFR Estimate If Black 71 >60 mL/min/1.7m2  13   Comment:  African American GFR Calc   Calcium 8.0 8.5 - 10.1 mg/dL L 13            INR [[id number]] (Abnormal)  Resulted: 18 0605, Result status: Final result    " Ordering provider: Lana Pavon MD  08/08/18 0000 Resulting lab: [facility]    Specimen Information    Type Source Collected On   Blood  08/08/18 0538          Components       Value Reference Range Flag Lab   INR 2.36 0.86 - 1.14 H 13            CBC with platelets [[id number]] (Abnormal)  Resulted: 08/08/18 0556, Result status: Final result    Ordering provider: May Celeste PA  08/08/18 0000 Resulting lab: [facility]    Specimen Information    Type Source Collected On   Blood  08/08/18 0538          Components       Value Reference Range Flag Lab   WBC 8.9 4.0 - 11.0 10e9/L  13   RBC Count 4.32 4.4 - 5.9 10e12/L L 13   Hemoglobin 13.4 13.3 - 17.7 g/dL  13   Hematocrit 41.9 40.0 - 53.0 %  13   MCV 97 78 - 100 fl  13   MCH 31.0 26.5 - 33.0 pg  13   MCHC 32.0 31.5 - 36.5 g/dL  13   RDW 14.1 10.0 - 15.0 %  13   Platelet Count 331 150 - 450 10e9/L  13            INR [[id number]] (Abnormal)  Resulted: 08/07/18 0752, Result status: Final result    Ordering provider: Lana Pavon MD  08/07/18 0000 Resulting lab: [facility]    Specimen Information    Type Source Collected On   Blood  08/07/18 0731          Components       Value Reference Range Flag Lab   INR 2.34 0.86 - 1.14 H 153            INR [[id number]] (Abnormal)  Resulted: 08/06/18 0622, Result status: Final result    Ordering provider: Lana Pavon MD  08/06/18 0000 Resulting lab: [facility]    Specimen Information    Type Source Collected On   Blood  08/06/18 0540          Components       Value Reference Range Flag Lab   INR 2.84 0.86 - 1.14 H 13            Testing Performed By     Lab - Abbreviation Name Director Address Valid Date Range    13 - Unknown [facility] Unknown [id number] [facility] [id number] 01/15/15 0916 - Present    153 - Unknown [facility] Unknown  Unknown 04/28/11 1033 - Present            Unresulted Labs (24h ago through future)    Start       Ordered    07/29/18 0600  INR  (warfarin (COUMADIN) Pharmacy Consult-INITIAL ORDER)  DAILY,   Routine      07/28/18 1457      Encounter-Level Documents:     There are no encounter-level documents.      Order-Level Documents:     There are no order-level documents.

## 2018-07-28 NOTE — DISCHARGE INSTRUCTIONS
Pt to discharge to [facility]   [facility] [address] [place] [address]  [phone] at 1300 via family.     Your risk factors for stroke or TIA (transient ischemic attack):    Your Risk Factors Your Results Normal Ranges   High blood pressure BP Readings from Last 1 Encounters:   07/28/18 122/66    Less than 120/80   Cholesterol              Total Lab Results   Component Value Date    CHOL 143 07/21/2018      Less than 150    Triglycerides   Lab Results   Component Value Date    TRIG 63 07/21/2018    Less than 150   LDL Lab Results   Component Value Date    LDL 88 07/21/2018       Less than 70   HDL Lab Results   Component Value Date    HDL 42 07/21/2018            Greater than 40 (men)  Greater than 50 (women)   Diabetes   Recent Labs  Lab 07/26/18  1113       Fasting blood glucose    Smoking/tobacco use  Quit smoking and tobacco   Overweight  Lose 1-2 pounds a week   Lack of exercise  30 minutes moderate activity each day   Other risk factors include carotid (neck) artery disease, atrial fibrillation and stress. You may be on new medicine to treat high blood pressure, cholesterol, diabetes or atrial fibrillation.    Understanding Stroke Booklet given to patient. Please refer to booklet for further information.    Stroke warning signs and symptoms - CALL 911 right away for:  - Sudden numbness or weakness in the face, arm or leg (often on one side of the body).  - Sudden confusion or trouble understanding what is going on.  - Sudden blurred or decreased vision in one or both eyes.  - Sudden trouble speaking, loss of balance, dizziness or problems with coordination.  - Sudden, severe headache for no reason.  - Fainting or seizures.  - Symptoms may go away then come back suddenly.

## 2018-07-28 NOTE — DISCHARGE SUMMARY
[facility]    Discharge Summary  Hospitalist    Date of Admission:  7/20/2018  Date of Discharge:  7/28/2018  Discharging Provider: Elijah Villatoro MD  Date of Service (when I saw the patient): 07/28/18    Discharge Diagnoses     Acute left cerebellar ischemic stroke due to Lt PICA occlusion/dissection    Urinary tract infection, culture negative    Benign essential hypertension    BPH    Rheumatoid arthritis    Mild acute kidney injury    History of Present Illness   Mr. Shamir Concepcion is a delightful 78-year-old  gentleman, with a past medical history notable for peripheral artery disease, rheumatoid arthritis, BPH, allergic rhinitis and colon polyps, who presented with acute onset of dizziness, nausea and fall. Workup in the ED revealed acute left cerebellar stroke and occlusion of left PICA.  He was admitted for the further management.     Hospital Course   Shamir Concepcion was admitted on 7/20/2018.  The following problems were addressed during his hospitalization:    Acute left cerebellar ischemic stroke due to Lt PICA occlusion/dissection.   Admitted 7/21, developed new diplopia that evening prompting reimaging showing progression of stroke with edema, prompting transfer to ICU for close monitoring and initiation of 3% saline and to evaluate for possible need for decompressive surgery should he deteriorate. Followed up with subsequent imaging studies including MRI brain and CT head, revealed evolving cerebellar infarcts but no complications including hydrocephalus or hemorrhagic transformation.  --Evaluated by neurosurgery, no surgery indicated. IJ central line placed by intensivist 7/21, started on hypertonic saline per neuro critical care which was discontinued 7/24, CT head 7/26 AM without any complication.  --Was maintained on heparin, transitioned to warfarin p.o. with bridging therapeutic Lovenox.  Patient will continue therapeutic Lovenox at acute rehab until INR is  greater than 2.  Warfarin 7.5 mg p.o. today and tomorrow, to check INR on 7/30/2018, further warfarin dosing per ARU MD.  --Evaluated by PT, OT, speech, on regular diet and tolerating.  Plan is to discharge to acute rehab.  --Blood pressure at goal, eventually needs strict blood pressure control with a target less than 130/80.  --Started on rosuvastatin, continue.    Per neurology, repeat CT angiogram in 6 months to evaluate the vasculature within his neck, follow up with the stroke neurology after having the CT scan to determine duration of his anticoagulation.  Neurology arranging this follow-up.     UTI: The patient reported dysuria. Urinalysis is abnormal with moderate leukocyte esterase and 33 WBCs.    --Ceftriaxone started on admission. Culture negative. Completed 5 days.     Hypertension: No prior history.   --Cozaar added 7/23, dose increased to 50 mg p.o. daily but had slightly increased Cr up to 1.21, so now on 25 mg daily.  Also norvasc 5mg daily added and is being discharged on 10 mg p.o. daily.  (Avoid B-blocker due to low HR.)  --Blood pressure better controlled. Long term goal <130/80.      BPH:  Finasteride/tamsulosin.      Rheumatoid arthritis:  He is on weekly methotrexate plus prednisone 5 mg p.o. daily.    --Methotrexate on hold while hospitalized  --PTA prednisone 5 mg p.o. daily resumed, continue at discharge.     Mild TAYLOR: Baseline Cr 0.8-0.9, was upto 1.2  - Improved with IVF, 0.97, needs repeat BMP in 4-5 days while in ARB.    Elijah Villatoro MD  Hospitalist    Significant Results and Procedures   Multiple imaging studies including MRI brain, serial CT head, and other labs report attached  2D echo    Pending Results   These results will be followed up by none.      Code Status   DNR       Primary Care Physician   Physician No Ref-Primary    Constitutional:   Alert and awake, oriented to place, self, recognizes family, says it's July 3rd 2018  HEENT: CHICHI NELSON. Dressing Rt neck at Rt IJ  site.   Respiratory: Bilateral equal air entry, clear to auscultation, normal work of breathing, remains on room air.  Cardiovascular: Regular S1-S2, no murmur, no tachycardia.  GI:  soft, non-distended, non-tender  Skin/Integumen: no edema  Neuro:  Alert, oriented to self, place and date, cranial nerves II through XII intact, motor strength normal and symmetrical, slight dysmetria on right finger-to-nose test persists.    Discharge Disposition   Discharged to acute inpatient rehab  Condition at discharge: Stable    Consultations This Hospital Stay   NEUROLOGY IP CONSULT  PHYSICAL THERAPY ADULT IP CONSULT  OCCUPATIONAL THERAPY ADULT IP CONSULT  SPEECH LANGUAGE PATH ADULT IP CONSULT  SWALLOW EVAL SPEECH PATH AT BEDSIDE IP CONSULT  SMOKING CESSATION PROGRAM IP CONSULT  NEUROSURGERY IP CONSULT  PHARMACY TO DOSE HEPARIN  PHARMACY TO DOSE HEPARIN  WOUND OSTOMY CONTINENCE NURSE  IP CONSULT  PHARMACY IP CONSULT  PHARMACY TO DOSE WARFARIN  PHYSICAL THERAPY ADULT IP CONSULT  OCCUPATIONAL THERAPY ADULT IP CONSULT  SPEECH LANGUAGE PATH ADULT IP CONSULT    Time Spent on this Encounter   IElijah, personally saw the patient today and spent greater than 30 minutes discharging this patient.    Discharge Orders     CT Head w/o Contrast     Follow-up and recommended labs and tests    [facility] in 4-6 weeks with head CT prior.  Please call the clinic at [phone] to schedule your appointment with Sofy Clifford CNP or Enma Gentile CNP.     General info for SNF   Length of Stay Estimate: Short Term Care: Estimated # of Days <30  Condition at Discharge: Improving  Level of care:skilled   Rehabilitation Potential: Good  Admission H&P remains valid and up-to-date: Yes  Recent Chemotherapy: N/A  Use Nursing Home Standing Orders: Yes     Mantoux instructions   Give two-step Mantoux (PPD) Per Facility Policy Yes     Reason for your hospital stay   Acute left cerebellar ischemic stroke due  to Lt PICA occlusion/dissection.     Bladder scan   X 2 for post void residual     Activity - Up with assistive device     Follow Up and recommended labs and tests   Follow up with custodial physician.  The following labs/tests are recommended: INR and Creatinine on 7/30/2018, result to TCU MD.     DNR (Do Not Resuscitate)     Physical Therapy Adult Consult   Evaluate and treat as clinically indicated.    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     Occupational Therapy Adult Consult   Evaluate and treat as clinically indicated.    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     Speech Language Path Adult Consult   Evaluate and treat as clinically indicated.    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     Fall precautions     Advance Diet as Tolerated   Follow this diet upon discharge: Orders Placed This Encounter     Room Service     Combination Diet Regular Diet Adult; Thin Liquids (water, ice chips, juice, milk, gelatin, ice cream, etc)       Discharge Medications   Current Discharge Medication List      START taking these medications    Details   amLODIPine (NORVASC) 5 MG tablet Take 2 tablets (10 mg) by mouth daily  Qty: 30 tablet    Associated Diagnoses: Acute ischemic stroke (H); Benign essential hypertension      bisacodyl (DULCOLAX) 10 MG Suppository Place 1 suppository (10 mg) rectally daily as needed for constipation  Qty: 30 suppository    Associated Diagnoses: Constipation, unspecified constipation type      enoxaparin (LOVENOX) 80 MG/0.8ML injection Inject 0.7 mLs (70 mg) Subcutaneous every 12 hours  Qty: 14 Syringe    Comments: Until INR>2.  Associated Diagnoses: Acute ischemic stroke (H)      losartan (COZAAR) 25 MG tablet Take 1 tablet (25 mg) by mouth daily  Qty: 30 tablet    Associated Diagnoses: Benign essential hypertension      magnesium hydroxide (MILK OF MAGNESIA) 400  MG/5ML suspension Take 30 mLs by mouth daily as needed for constipation    Associated Diagnoses: Constipation, unspecified constipation type      rosuvastatin (CRESTOR) 20 MG tablet 1 tablet (20 mg) by Oral or NG Tube route daily  Qty: 30 tablet    Associated Diagnoses: Acute ischemic stroke (H)      senna-docusate (SENOKOT-S;PERICOLACE) 8.6-50 MG per tablet Take 2 tablets by mouth 2 times daily as needed for constipation    Associated Diagnoses: Constipation, unspecified constipation type      warfarin (COUMADIN) 5 MG tablet 7.5 mg PO daily on 7/28 and 7/29, check INR 7/30 and subsequent doses from 7/30 per ARU MD.    Associated Diagnoses: Acute ischemic stroke (H)         CONTINUE these medications which have CHANGED    Details   methotrexate 2.5 MG tablet CHEMO Take 4 tablets (10 mg) by mouth every 7 days Patient takes on Saturdays. His instructions are to take 5 tablets per dose, but he normally only takes 4 tablets per dose.    Associated Diagnoses: Rheumatoid arthritis, involving unspecified site, unspecified rheumatoid factor presence (H)      predniSONE (OMAIRA) 1 MG EC tablet Take 5 tablets (5 mg) by mouth daily    Associated Diagnoses: Rheumatoid arthritis, involving unspecified site, unspecified rheumatoid factor presence (H)         CONTINUE these medications which have NOT CHANGED    Details   Finasteride (PROSCAR PO) Take 5 mg by mouth daily      fluticasone (FLONASE ALLERGY RELIEF) 50 MCG/ACT spray Spray 1 spray into both nostrils daily      FOLIC ACID PO Take 400 mcg by mouth daily Patient gets this medication OTC.      tamsulosin (FLOMAX) 0.4 MG capsule 1 tab twice daily         STOP taking these medications       Aspirin (ECOTRIN LOW STRENGTH PO) Comments:   Reason for Stopping:         predniSONE (DELTASONE) 5 MG tablet Comments:   Reason for Stopping:             Allergies   No Known Allergies  Data   Most Recent 3 CBC's:  Recent Labs   Lab Test  07/27/18   0830  07/25/18   0635  07/24/18   0530   07/23/18   0345   WBC   --   10.3  9.5  12.3*   HGB   --   12.6*  12.1*  11.9*   MCV   --   93  94  95   PLT  327  326  314  294      Most Recent 3 BMP's:  Recent Labs   Lab Test  07/27/18   0830  07/26/18   2130  07/26/18   2035  07/26/18   1113  07/25/18   0635  07/24/18   2220   07/24/18   0530   NA   --    --    --   139  141  142   < >  144   POTASSIUM  3.5  4.0  Canceled, Test credited  3.2*  3.5   --    < >  3.2*   CHLORIDE   --    --    --   103  107   --    --   111*   CO2   --    --    --   26  27   --    --   25   BUN   --    --    --   19  11   --    --   10   CR  0.97   --    --   1.21  0.99   --    --   0.84   ANIONGAP   --    --    --   10  7   --    --   8   RANJAN   --    --    --   8.2*  8.0*   --    --   7.6*   GLC   --    --    --   138  107*   --    --   107*    < > = values in this interval not displayed.     Most Recent 2 LFT's:  Recent Labs   Lab Test  07/21/18   1133  07/20/18   2205   AST  21  19   ALT  13  15   ALKPHOS  101  105   BILITOTAL  0.7  0.8     Most Recent INR's and Anticoagulation Dosing History:  Anticoagulation Dose History     Recent Dosing and Labs Latest Ref Rng & Units 9/11/2013 7/20/2018 7/26/2018 7/27/2018 7/28/2018    Warfarin 6 mg - - - 6 mg 6 mg -    INR 0.86 - 1.14 0.94 1.11 1.04 1.15(H) 1.15(H)        Most Recent 3 Troponin's:  Recent Labs   Lab Test  07/20/18   2205   TROPI  <0.015     Most Recent Cholesterol Panel:  Recent Labs   Lab Test  07/21/18   1133   CHOL  143   LDL  88   HDL  42   TRIG  63     Most Recent 6 Bacteria Isolates From Any Culture (See EPIC Reports for Culture Details):  Recent Labs   Lab Test  07/21/18   0030   CULT  No growth     Most Recent TSH, T4 and A1c Labs:  Recent Labs   Lab Test  07/21/18   1133   A1C  5.3     Results for orders placed or performed during the hospital encounter of 07/20/18   Head CT w/o contrast     Value    Radiologist flags Left cerebellar abnormality which may be evolving (AA)    Narrative    CT HEAD W/O CONTRAST   7/20/2018 11:12 PM     HISTORY: Fall, AMS.    TECHNIQUE: Axial images of the head and coronal reformations without  IV contrast material. Radiation dose for this scan was reduced using  automated exposure control, adjustment of the mA and/or kV according  to patient size, or iterative reconstruction technique.    COMPARISON: None.    FINDINGS: No intracranial hemorrhage or mass. Vague low attenuation  throughout much of the left cerebellum which may represent evolving  subacute ischemic changes. Underlying neoplasm could not be excluded.  Correlate clinically. MRI may be helpful in further evaluation.  Cortical atrophy. No shift of midline structures. Moderate mucosal  thickening in paranasal sinuses, most prominently in the ethmoid air  cells and right maxillary sinus.      Impression    IMPRESSION:  1. Moderate low-density changes in the left cerebellum are  indeterminant and may be due to evolving ischemic changes or possibly  neoplasm. Correlate clinically.  2. MRI may be helpful in further evaluation.    [Critical Result: Left cerebellar abnormality which may be evolving  ischemic changes or possibly neoplasm.]    Finding was identified on 7/20/2018 11:17 PM.     Dr. Andersen was contacted by me on 7/20/2018 11:19 PM and verbalized  understanding of the critical result.    JAYLA PACE MD   CTA Head Neck with Contrast    Narrative    CTA HEAD NECK WITH CONTRAST  7/21/2018 12:26 AM     HISTORY: left cerebellar abnormality, likely infarct on CT head,  dizziness confusion;     TECHNIQUE:  Precontrast localizing scans were followed by CT  angiography with an injection of 70mL Isovue-370 IV contrast with  scans through the head and neck.  Images were transferred to a  separate 3-D workstation where multiplanar reformations and 3-D images  were created.  Estimates of carotid stenoses are made relative to the  distal internal carotid artery diameters except as noted. Radiation  dose for this scan was reduced using  automated exposure control,  adjustment of the mA and/or kV according to patient size, or iterative  reconstruction technique.    COMPARISON: CT head dated 7/20/2018.    FINDINGS: Estimates of stenosis at the carotid bifurcations are  relative to the distal internal carotids.    Arch: [ Normal Anatomy]    Neck:  Right Carotid:  The right common carotid artery is normal.  Calcified  atherosclerotic plaque is seen at the right carotid bifurcation. The  stenosis was calculated at 24%.  The right internal carotid artery is  negative.     Left Carotid:  The left common carotid artery is unremarkable.   Atherosclerotic plaque is seen at the left carotid bifurcation. The  stenosis was calculated at 32%.  The left internal carotid is  negative.      Vertebrals:  Atherosclerotic plaque is seen at the origin of the left  vertebral artery. There is occlusion of the V1 segment of the left  vertebral artery. This extends up to the C5 level. There is collateral  filling of the the V2, V3, and V4 segments of the left vertebral  artery..   The right vertebral artery is a small vessel with segmental  areas of stenosis in the the V1 and V2 segments of the right vertebral  artery. The vessel is patent to the level of the basilar artery. There  is occlusion of the left posterior inferior cerebellar artery  approximately 1 cm distal to the origin off the V4 segment of the left  vertebral. This corresponds to the area of infarction seen on the  recent CT scan.    Head:  Right Carotid:No occluded vessels are seen. .    Left Carotid:  No occluded vessels are identified. .    Basilar:  The basilar artery and its branches appear normal.     Miscellaneous: Emphysematous changes are seen in both lungs.. Ethmoid  and right maxillary sinus disease.      Impression    IMPRESSION:   1. There is occlusion of the left posterior inferior cerebellar artery  approximately 1 cm distal to its origin off the left vertebral artery.  This corresponds to the  area of infarction seen on the recent head CT.  2. Occlusion of the V1 segment of the left vertebral artery.  Collateral filling of the more distal left vertebral artery. The  occlusion is likely secondary to atherosclerotic disease. There is a  dense calcification at the origin of the left vertebral artery.  3. Small right vertebral artery with several segmental areas of  stenosis. The right vertebral artery is patent up to the level of the  basilar artery.  4. Anterior circulation is unremarkable.  5. No significant stenosis at either carotid bifurcation.    I agree with the preliminary report that was communicated to the  referring physician.    RPISCILA LOMELI MD   MRI Brain w & w/o contrast    Narrative    MRI BRAIN WITHOUT AND WITH CONTRAST  7/21/2018 9:46 AM    HISTORY:  Stroke with SWI Sequence. IF patient requires sedation for  the procedure, please notify the Stroke Neurologist PRIOR TO sedation  to discuss delaying of MRI..;      TECHNIQUE:  Multiplanar, multisequence MRI of the brain without and  with 7 mL Gadavist    COMPARISON: CT scan dated 7/20/2018.    FINDINGS:  There is generalized atrophy of the brain.  White matter  changes are present in the cerebral hemispheres that are consistent  with small vessel ischemic disease in this age patient.  Diffusion-weighted images reveal an area of restricted diffusion in  the left inferior cerebellum. This corresponds to the area of  low-density seen on the CT scan. This area of restricted diffusion is  consistent with an acute infarct in the distribution of the left  posterior inferior cerebellar artery. It measures approximately 5.1 cm  in AP dimension by 5.3 cm in transverse dimension. There are also  areas of restricted diffusion in the medial aspect of the left  occipital lobe. A small area of restricted diffusion is also seen in  the posterior aspect of the right nataly. All of these areas of  restricted diffusion are consistent with recent infarcts.  There is  no  evidence for any hemorrhage. No mass effect.  There are no gadolinium  enhancing lesions.   Mucosal thickening is present in right maxillary  sinus. The arteries at the base of the brain and the dural venous  sinuses appear patent.       Impression    IMPRESSION:  Areas of restricted diffusion in the left inferior  cerebellum, right posterior nataly, left occipital lobe. All of these  lesions are consistent with recent infarcts. No bleed or mass effect.      PRISCILA LOMELI MD   CT Head w/o Contrast    Narrative    CT SCAN OF THE HEAD WITHOUT CONTRAST   7/21/2018 4:55 PM     HISTORY: Cerebellar stroke, rule out hydrocephalus or new stroke or  hemorrhage.     TECHNIQUE:  Axial images of the head and coronal reformations without  IV contrast material.  Radiation dose for this scan was reduced using  automated exposure control, adjustment of the mA and/or kV according  to patient size, or iterative reconstruction technique.    COMPARISON: 7/20/2018.     FINDINGS: The left posterior inferior cerebellar artery territory  acute ischemic infarct has become slightly more hypodense and there is  more associated soft tissue swelling and edema with this infarct.  There is slight compression of the fourth ventricle but there is no  evidence to suggest any hydrocephalus at this time. Moderate cerebral  atrophy is present. Patchy white matter changes are seen in both  hemispheres. There is no evidence for any new infarcts. There is no  evidence for any hemorrhagic transformation or any intracranial  hemorrhage. Osseous structures appear intact. There is some mucosal  thickening in several paranasal sinuses.      Impression    IMPRESSION:  1. Evolving left posterior inferior cerebellar artery territory  infarct with increasing mass effect but no hydrocephalus at this time.  2. Cerebral atrophy with nonspecific white matter changes again noted.       MACEY ALMARAZ MD   CTA Head with Contrast    Narrative    CTA HEAD WITH CONTRAST  7/21/2018 4:55 PM     HISTORY: Change in neuro exam: double vision and nausea and vomiting.       TECHNIQUE: Axial images were obtained through the head without with  contrast. 70 mL of Isovue-370 was given. Multiplanar reconstructions  were performed. 3-D reconstructions off a remote workstation for CT  angiography were also acquired. Radiation dose for this scan was  reduced using automated exposure control, adjustment of the mA and/or  kV according to patient size, or iterative reconstruction technique.    COMPARISON: 7/21/2018.    FINDINGS: There is a left posterior inferior cerebellar artery  occlusion which now begins just immediately after the takeoff. The  distal right vertebral artery remains patent. The basilar artery,  distal internal carotid arteries, and proximal anterior, middle, and  posterior cerebral arteries are patent. There is no evidence for  aneurysm. Postcontrast images through the brain do not show any  abnormal areas of enhancement. Mucosal thickening is noted in several  paranasal sinuses and extensive vascular calcifications are noted in  the carotid siphon regions bilaterally but there is no stenosis.      Impression    IMPRESSION:  1. Persistent left posterior inferior cerebellar artery occlusion with  occlusion now occurring slightly more proximally, nearly at the  origin.  2. Remainder of intracranial vessels are patent.     MACEY ALMARAZ MD   MR Brain w/o Contrast    Narrative    MRI BRAIN WITHOUT CONTRAST  7/21/2018 8:51 PM    HISTORY:  New brainstem deficits, assess for new stroke.      TECHNIQUE:  Multiplanar, multisequence MRI of the brain without  gadolinium IV contrast material.    COMPARISON: 7/21/2018 at 0919.    FINDINGS: Diffusion-weighted images again show an acute ischemic  infarct primarily in the left posterior inferior cerebellar artery  distribution. There is also a tiny acute infarct in the right superior  cerebellar peduncle and some smaller acute tiny infarcts in the  right  cerebellum. There are also small acute infarcts in the medial left  occipital lobe. The pattern has not appreciably changed with the  exception that the right cerebellar tiny infarcts are slightly more  prominent. There is some localized mass effect in the left cerebellum  with some distortion of the fourth ventricle but there is no  hydrocephalus. Again seen is a tiny amount of magnetic susceptibility  artifact along the right lateral nataly similar to that seen on the  prior exam which presumably is related to a small calcification seen  in this region on CT. No definite acute hemorrhage. There is some  motion artifact on this exam. Moderate cerebral atrophy is present.  Minimal patchy white matter changes are present. Vascular structures  are patent at the skull base.      Impression    IMPRESSION:  1. Multiple acute ischemic infarcts without significant change since  prior exam. There is distortion of the fourth ventricle but no  definite hydrocephalus at this time.  2. No evidence for any hemorrhagic transformation.  3. Cerebral atrophy with some chronic white matter changes.      MACEY ALMARAZ MD   XR Chest Port 1 View    Narrative    CHEST ONE VIEW PORTABLE   7/21/2018 10:10 PM     HISTORY: Central line placement.    COMPARISON: None.      Impression    IMPRESSION: There is a right jugular central venous line with tip is  in the superior vena cava. No evidence for pneumothorax. Lungs appear  clear. Heart size is mildly prominent but this is portable technique.     MACEY ALMARAZ MD   CT Head w/o Contrast    Narrative    CT SCAN OF THE HEAD WITHOUT CONTRAST   7/23/2018 5:52 AM     HISTORY: cerebellar edema after stroke;     TECHNIQUE:  Axial images of the head and coronal reformations without  IV contrast material. Radiation dose for this scan was reduced using  automated exposure control, adjustment of the mA and/or kV according  to patient size, or iterative reconstruction technique.    COMPARISON: MR scan  dated 7/21/2018    FINDINGS:  Evolving cerebellar infarcts are seen. There is increased  edema and mass effect associated with the areas of infarction. The  infarct in the right superior cerebellum appears larger than on the MR  scan of 7/21/2018. There is mild mass effect on the left side of the  fourth ventricle. No hydrocephalus. The right posterior pontine  infarct is faintly visible on these images. There is no mass effect or  hemorrhage. The left occipital infarct is also only faintly seen. No  hydrocephalus. Diffuse atrophy. No hemorrhage associated with the  cerebellar infarcts. The visualized portions of the sinuses and  mastoids appear normal. There is no evidence of trauma.      Impression    IMPRESSION: Evolving cerebellar, right dorsal pontine, and left  occipital infarcts with increased mass effect on the fourth ventricle.  No hemorrhage. No hydrocephalus.      PRISCILA LOMEIL MD   CT Head w/o Contrast    Narrative    CT SCAN OF THE HEAD WITHOUT CONTRAST   7/26/2018 7:58 AM     HISTORY:  Evaluation of ischemic stroke for edema progression.     TECHNIQUE:  Axial images of the head and coronal reformations without  IV contrast material.  Radiation dose for this scan was reduced using  automated exposure control, adjustment of the mA and/or kV according  to patient size, or iterative reconstruction technique.    COMPARISON: 7/23/2018.    FINDINGS: Moderate cerebral atrophy is present. There are evolving  infarcts in the left cerebellum and right cerebellar vermis. The size  of these infarcts has not appreciably changed. There is some minimal  compression of the fourth ventricle, but there is no evidence for any  developing hydrocephalus. There is no evidence for hemorrhagic  transformation. Minimal nonspecific white matter changes are present.  There is no evidence for any new infarct, midline shift, or skull  fracture. Mucosal thickening is noted in the right maxillary sinus.  Vascular calcifications are also  noted.      Impression    IMPRESSION: Evolving cerebellar infarcts with minimal distortion of  the fourth ventricle but no hydrocephalus. There is no evidence for  any hemorrhagic transformation.    MACEY ALMARAZ MD

## 2018-07-28 NOTE — IP AVS SNAPSHOT
` `     [facility]: [phone]            Medication Administration Report for Shamir Concepcion as of 08/09/18 1315   Legend:    Given Hold Not Given Due Canceled Entry Other Actions    Time Time (Time) Time  Time-Action       Inactive    Active    Linked        Medications 08/03/18 08/04/18 08/05/18 08/06/18 08/07/18 08/08/18 08/09/18    acetaminophen (TYLENOL) tablet 325-975 mg  Dose: 325-975 mg  Freq: EVERY 6 HOURS PRN Route: PO  PRN Reasons: mild pain,fever  PRN Comment: > 101 F  Start: 07/28/18 1830   Admin Instructions: Maximum acetaminophen dose from all sources = 75 mg/kg/day not to exceed 4 grams/day.    Admin. Amount: 1-3 tablet (1-3 × 325 mg tablet)  Dispense Loc: [facility]               bisacodyl (DULCOLAX) Suppository 10 mg  Dose: 10 mg  Freq: DAILY PRN Route: RE  PRN Reason: constipation  Start: 07/28/18 1457   Admin. Amount: 1 suppository (1 × 10 mg suppository)  Dispense Loc: [facility]               finasteride (PROSCAR) tablet 5 mg  Dose: 5 mg  Freq: DAILY Route: PO  Start: 07/29/18 0800   Admin Instructions: *Do not handle tablets if you are pregnant*    Admin. Amount: 1 tablet (1 × 5 mg tablet)  Last Admin: 08/09/18 0808  Dispense Loc: [facility]     0754 (5 mg)-Given        0734 (5 mg)-Given        0758 (5 mg)-Given        0754 (5 mg)-Given        0814 (5 mg)-Given        0827 (5 mg)-Given        0808 (5 mg)-Given           fluticasone (FLONASE) 50 MCG/ACT spray 1 spray  Dose: 1 spray  Freq: DAILY Route: BOTH NOSTRIL  Start: 07/29/18 0800   Admin. Amount: 1 spray  Last Admin: 08/09/18 0809  Dispense Loc: [facility]     0804 (1 spray)-Given        0737 (1 spray)-Given        0759 (1 spray)-Given        0750 (1 spray)-Given        0816 (1 spray)-Given        0827 (1 spray)-Given        0809 (1 spray)-Given           losartan (COZAAR) tablet 50 mg  Dose: 50 mg  Freq: DAILY Route: PO  Start: 08/01/18 0800   Admin Instructions: Please hold for SBP  < 110    Admin. Amount: 1 tablet (1 × 50 mg tablet)  Last Admin: 08/09/18 0808  Dispense Loc: [facility]     0758 (50 mg)-Given        0734 (50 mg)-Given        0757 (50 mg)-Given        0750 (50 mg)-Given        0815 (50 mg)-Given        0826 (50 mg)-Given        0808 (50 mg)-Given           miconazole (MICATIN; MICRO GUARD) 2 % powder  Freq: 2 TIMES DAILY Route: Top  Start: 07/30/18 1145   Admin Instructions: Apply to right groin region    Last Admin: 08/09/18 0809  Dispense Loc: [facility]     0758 ( )-Given       2037 ( )-Given        0737 ( )-Given       2052 ( )-Given        0759 ( )-Given       (2101)-Not Given        0751 ( )-Given       (2131)-Not Given        0815 ( )-Given       (2203)-Not Given        0827 ( )-Given       2016 ( )-Given        0809 ( )-Given       [ ] 2100           pantoprazole (PROTONIX) EC tablet 40 mg  Dose: 40 mg  Freq: EVERY MORNING BEFORE BREAKFAST Route: PO  Start: 07/29/18 0700   Admin Instructions: DO NOT CRUSH.    Admin. Amount: 1 tablet (1 × 40 mg tablet)  Last Admin: 08/09/18 0625  Dispense Loc: [facility]     0615 (40 mg)-Given        0623 (40 mg)-Given        0615 (40 mg)-Given        0612 (40 mg)-Given        0638 (40 mg)-Given        0613 (40 mg)-Given        0625 (40 mg)-Given           Patient is already receiving anticoagulation with heparin, enoxaparin (LOVENOX), warfarin (COUMADIN)  or other anticoagulant medication  Freq: CONTINUOUS PRN Route: XX  Start: 07/28/18 1457   Dispense Loc: [facility]               predniSONE (DELTASONE) tablet 5 mg  Dose: 5 mg  Freq: DAILY Route: PO  Start: 07/29/18 0800   Admin Instructions: Take with food    Admin. Amount: 1 tablet (1 × 5 mg tablet)  Last Admin: 08/09/18 0808  Dispense Loc: [facility]     0754 (5 mg)-Given        0734 (5 mg)-Given        0758 (5 mg)-Given        0750 (5 mg)-Given        0815 (5 mg)-Given        0827 (5 mg)-Given        0808 (5 mg)-Given            rosuvastatin (CRESTOR) tablet 20 mg  Dose: 20 mg  Freq: DAILY Route: ORAL OR NG T  Start: 07/29/18 0800   Admin. Amount: 1 tablet (1 × 20 mg tablet)  Last Admin: 08/09/18 0809  Dispense Loc: [facility]     0754 (20 mg)-Given        0734 (20 mg)-Given        0757 (20 mg)-Given        0750 (20 mg)-Given        0814 (20 mg)-Given        0827 (20 mg)-Given        0809 (20 mg)-Given           senna-docusate (SENOKOT-S;PERICOLACE) 8.6-50 MG per tablet 2 tablet  Dose: 2 tablet  Freq: 2 TIMES DAILY PRN Route: PO  PRN Reason: constipation  Start: 07/28/18 1457   Admin Instructions: If the patient has multiple PO bowel stimulant agents ordered PRN, offer in the following order per policy.  Move to the next available step if the earlier step is ineffective.  Step 1 - senna; Step 2 - bisacodyl; Step 3 - milk of magnesia; Step 4 - polyethylene glycol; Step 5 - magnesium citrate.    Admin. Amount: 2 tablet  Last Admin: 07/29/18 0804  Dispense Loc: [facility]               tamsulosin (FLOMAX) capsule 0.4 mg  Dose: 0.4 mg  Freq: DAILY Route: PO  Start: 08/02/18 0800   Admin Instructions: Administer 30 minutes after the same meal each day.  Capsules should be swallowed whole; do not crush chew or open.    Admin. Amount: 1 capsule (1 × 0.4 mg capsule)  Last Admin: 08/09/18 0808  Dispense Loc: [facility]     0754 (0.4 mg)-Given        0734 (0.4 mg)-Given        0757 (0.4 mg)-Given        0750 (0.4 mg)-Given        0814 (0.4 mg)-Given        0826 (0.4 mg)-Given        0808 (0.4 mg)-Given           Warfarin Therapy Reminder (Check START DATE - warfarin may be starting in the FUTURE)  Dose: 1 each  Freq: CONTINUOUS PRN Route: XX  Start: 07/28/18 1457   Admin Instructions: *Note to reorder warfarin daily*  Pharmacy Warfarin Dosing Service  Patient is on Warfarin Therapy - check for daily order    Admin. Amount: 1 each  Dispense Loc: [facility]              Future Medications  Medications 08/03/18  18       warfarin (COUMADIN) tablet 4 mg  Dose: 4 mg  Freq: ONCE AT 6PM Route: PO  Start: 18 1800   Admin. Amount: 1 tablet (1 × 4 mg tablet)  Dispense Loc: [facility]  Administrations Remainin           [ ] 1800          Completed Medications  Medications 18         Dose: 2 mg  Freq: ONCE AT 6PM Route: PO  Indications Comment: inr=2.84  Start: 18 1800   End: 18   Admin. Amount: 2 tablet (2 × 1 mg tablet)  Last Admin: 18  Dispense Loc: [facility]  Administrations Remainin        1821 (2 mg)-Given                Dose: 4 mg  Freq: ONCE AT 6PM Route: PO  Start: 18 1800   End: 18 174   Admin. Amount: 1 tablet (1 × 4 mg tablet)  Last Admin: 18  Dispense Loc: [facility]  Administrations Remainin          1744 (4 mg)-Given              Dose: 4 mg  Freq: ONCE AT 6PM Route: PO  Start: 18 1800   End: 18   Admin. Amount: 1 tablet (1 × 4 mg tablet)  Last Admin: 18  Dispense Loc: [facility]  Administrations Remainin         1817 (4 mg)-Given            Discontinued Medications  Medications 18         Freq: SEE ADMIN INSTRUCTIONS Route: XX  Start: 1834   End: 18   Admin Instructions: Patient is on the Rehab Services Medication Assessment Program - See policy/procedure for details.    Dispense Loc: [facility]          2221-Med Discontinued          Dose: 10 mL  Freq: ONCE PRN Route: UR  PRN Reason: moderate pain  PRN Comment: Pain with cath  Start: 18   End: 18 1047   Admin Instructions: Use prior to catheterization    Admin. Amount: 10 mL  Dispense Loc: [facility]  Administrations Remainin  Volume: 10 mL           1047-Med Discontinued         Dose: 30 mL  Freq:  DAILY PRN Route: PO  PRN Reason: constipation  Start: 07/28/18 1457   End: 08/09/18 1047   Admin Instructions: Shake well.  If the patient has multiple PO bowel stimulant agents ordered PRN, offer in the following order per policy.  Move to the next available step if the earlier step is ineffective.  Step 1 - senna; Step 2 - bisacodyl; Step 3 - milk of magnesia; Step 4 - polyethylene glycol; Step 5 - magnesium citrate.    Admin. Amount: 30 mL  Dispense Loc: [facility]  Volume: 30 mL           1047-Med Discontinued         Dose: 17 g  Freq: DAILY PRN Route: PO  PRN Reason: constipation  Start: 08/03/18 1415   End: 08/09/18 1047   Admin Instructions: If the patient has multiple PO bowel stimulant agents ordered PRN, offer in the following order per policy.  Move to the next available step if the earlier step is ineffective.  Step 1 - senna; Step 2 - bisacodyl; Step 3 - milk of magnesia; Step 4 - polyethylene glycol; Step 5 - magnesium citrate.  1 Packet = 17 grams. Mixed prescribed dose in 8 ounces of water. Follow with 8 oz. of water.    Admin. Amount: 17 g  Dispense Loc: [facility]           1047-Med Discontinued         Dose: 1 packet  Freq: DAILY Route: PO  Start: 08/03/18 1230   End: 08/09/18 1047   Admin Instructions: Powder should be diluted with fluid before given.    Admin. Amount: 1 packet  Dispense Loc: [facility]     (1416)-Not Given        (0735)-Not Given        (0759)-Not Given        (0750)-Not Given        (0818)-Not Given        (0827)-Not Given        (0808)-Not Given       1047-Med Discontinued    Medications 08/03/18 08/04/18 08/05/18 08/06/18 08/07/18 08/08/18 08/09/18

## 2018-07-28 NOTE — PLAN OF CARE
Problem: Patient Care Overview  Goal: Plan of Care/Patient Progress Review  Outcome: No Change  A&Ox4, forgetful, [place]. Neuros slow speech, R droop, RUE ataxia. VSS. Regular diet. Up with assist x2, pivot to commode. Denies pain. Turn and Repo q2. Incontinent of urine.Plan DC to ARU if bed available.

## 2018-07-28 NOTE — IP AVS SNAPSHOT
MRN:[id number]                      After Visit Summary   7/28/2018    Shamir Concepcion    MRN: [id number]           Thank you!     Thank you for choosing [facility] for your care. Our goal is always to provide you with excellent care. Hearing back from our patients is one way we can continue to improve our services. Please take a few minutes to complete the written survey that you may receive in the mail after you visit with us. Thank you!        Patient Information     Date Of Birth          1940        Designated Caregiver       Most Recent Value    Caregiver    Will someone help with your care after discharge? yes    Name of designated caregiver Dorcas daughter    Phone number of caregiver [phone]    Caregiver address NA      About your hospital stay     You were admitted on:  July 28, 2018 You last received care in the:   [facility]    You were discharged on:  August 9, 2018        Reason for your hospital stay       Ray admitted to inpatient rehab following stroke and initial hospitalization at [facility].  Has shown some definite functional improvements however still needing assistance and longer course of recovery therefore discharging to a transitional care unit for ongoing therapies and nursing care.                  Who to Call     For medical emergencies, please call 911.  For non-urgent questions about your medical care, please call your primary care provider or clinic, None          Attending Provider     Provider Specialty    Lana Pavon MD Physical Medicine and Rehab    Champ Kapadia MD Physical Medicine and Rehab       Primary Care Provider Fax #    Physician No Ref-Primary [phone]      After Care Instructions     Activity - Up with nursing assistance           Advance Diet as Tolerated       Follow this diet upon discharge: Regular Diet Adult, Thin Liquids            Fall precautions           General info for SNF       Length of Stay Estimate:  Short Term Care: Estimated # of Days <30  Condition at Discharge: Improving  Level of care:skilled   Rehabilitation Potential: Good  Admission H&P remains valid and up-to-date: Yes  Recent Chemotherapy: N/A  Use Nursing Home Standing Orders: Yes            Mantoux instructions       Give two-step Mantoux (PPD) Per Facility Policy Yes                  Your next 10 appointments already scheduled     Aug 23, 2018  2:00 PM CDT   CT HEAD W/O CONTRAST with SHCT1   [facility] ([facility])    [address] [address] [phone]           Please bring any scans or X-rays taken at other hospitals, if similar tests were done. Also bring a list of your medicines, including vitamins, minerals and over-the-counter drugs. It is safest to leave personal items at home.  Be sure to tell your doctor:   If you have any allergies.   If there s any chance you are pregnant.   If you are breastfeeding.  You do not need to do anything special to prepare for this exam.  Please wear loose clothing, such as a sweat suit or jogging clothes. Avoid snaps, zippers and other metal. We may ask you to undress and put on a hospital gown.            Aug 23, 2018  2:30 PM CDT   New Visit with Saman Lucas PA-C   [facility] ([facility])    [address] [id number] [phone]              Additional Services     Occupational Therapy Adult Consult       Evaluate and treat as clinically indicated.            Physical Therapy Adult Consult       Evaluate and treat as clinically indicated.            Speech Language Path Adult Consult       Evaluate and treat as clinically indicated.                  Further instructions from your care team       Follow up appointments:     -- [facility] as scheduled on August 23rd with head CT prior.      -- While at TCU, providers there will manage your medical  "issues. Should see your primary provider Dr. Anthony Harding within a few weeks after TCU discharge.    -- Rheumotologist reportedly scheduled 10/31/2018  Champ Noonan MD    [address] [place] [address]    [phone] (Fax)      To reduce the risk of subsequent stroke there are several important factors including optimal management of anticoagulants, blood pressure, cholesterol, diabetes and smoking abstinence.    Anticoagulation:  You are on Warfarin (Coumadin) Target INR 2-3. Previously on aspirin 81 mg but that's held now that you are on warfarin.    Blood Pressure:  Keeping your blood pressures less than 130/80 has been shown to reduce risk of recurrent stroke. Recording your blood pressure and heart rate once daily in a log book can help you and your providers make decisions on optimal management. You are encouraged to bring your log book with you to your primary physician and/or cardiology doctor visits.    You are currently started on losartan to help control your blood pressure. Several lifestyle modifications have been associated with blood pressure reduction and are an important part of a comprehensive plan. These include: weight loss (if over-weight); a diet low in salt and cholesterol and rich in fruits and vegetables; regular aerobic physical activity and limited alcohol consumption.    Diabetes:  You do not have diabetes though it is important to continue monitoring for this in the future with your primary provider.    Cholesterol:  Traditional target levels for LDL cholesterol or \"bad cholesterol\" is less than 130 however once you have had a stroke, your target LDL level is now less than 70. Additional recommendations such as increasing your HDL or \"good\" cholesterol and lowering your triglyceride level can also be important.    Your most recent lipid panel was as below. Started on rosuvastatin.     Lab Results   Component Value Date    CHOL 143 07/21/2018 "     Lab Results   Component Value Date    HDL 42 07/21/2018     Lab Results   Component Value Date    LDL 88 07/21/2018     Lab Results   Component Value Date    TRIG 63 07/21/2018     Lab Results   Component Value Date    EDWIN 5.1 09/11/2013       This should be followed up in 2-3 months with your primary provider.    Smoking:  You quit long ago. Don't start back now Ray! Finally one of the most important modifiable risk factors is to not smoke. This includes cigarettes, pipes, cigars, chewing tobacco and second hand smoke. Support through counseling, nicotine replacement, and oral smoking-cessation medications may all be helpful. Often people have been able to quit during their hospitalization but once returning to their familiar environment, the urges can be stronger. If this is the case, we encourage you to get support. There are numerous options, start by talking with your doctor.        Warfarin Instruction     You have started taking a medicine called warfarin. This is a blood-thinning medicine (anticoagulant). It helps prevent and treat blood clots.      Before leaving the hospital, make sure you know how much to take and how long to take it.      You will need regular blood tests to make sure your blood is clotting safely. It is very important to see your doctor for regular blood tests.    Talk to your doctor before taking any new medicine (this includes over-the-counter drugs and herbal products). Many medicines can interact with warfarin. This may cause more bleeding or too much clotting.     Eating a lot of vitamin K--found in green, leafy vegetables--can change the way warfarin works in your body. Do NOT avoid these foods. Instead, try to eat the same amount each day.     Bleeding is the most common side-effect of warfarin. You may notice bleeding gums, a bloody nose, bruises and bleeding longer when you cut yourself. See a doctor at once if:   o You cough up blood  o You find blood in your  "stool (poop)  o You have a deep cut, or a cut that bleeds longer than 10 minutes   o You have a bad cut, hard fall, accident or hit your head (go to urgent care or the emergency room).    For women who can get pregnant: This medicine can harm an unborn baby. Be very careful not to get pregnant while taking this medicine. If you think you might be pregnant, call your doctor right away.    For more information, read \"Guide to Warfarin Therapy,  the booklet you received in the hospital.        Pending Results     No orders found from 2018 to 2018.            Statement of Approval     Ordered          18 1250  I have reviewed and agree with all the recommendations and orders detailed in this document.  EFFECTIVE NOW     Approved and electronically signed by:  Champ Kapadia MD             Admission Information     Date & Time Provider Department Dept. Phone    2018 Champ Kapadia MD  [facility] -[phone]      Your Vitals Were     Blood Pressure Pulse Temperature Respirations Height Weight    122/57 (BP Location: Right arm) 67 96.9  F (36.1  C) (Oral) 16 1.651 m (5' 5\") 51.9 kg (114 lb 8 oz)    Pulse Oximetry BMI (Body Mass Index)                97% 19.05 kg/m2          [employer] Information     [employer] lets you send messages to your doctor, view your test results, renew your prescriptions, schedule appointments and more. To sign up, go to [email or web address] . Click on \"Log in\" on the left side of the screen, which will take you to the Welcome page. Then click on \"Sign up Now\" on the right side of the page.     You will be asked to enter the access code listed below, as well as some personal information. Please follow the directions to create your username and password.     Your access code is: [email or web address]  Expires: 10/26/2018 11:32 AM     Your access code will  in 90 days. If you need help or a new code, please call your [facility] [place] clinic or [phone].        Care EveryWhere " ID     This is your Care EveryWhere ID. This could be used by other organizations to access your [place] medical records  [id number]        Equal Access to Services     JESSICA MENCHACA : Mai Bonilla, debra huggins, gracealy richsherry harrington, burke berniein hayaan jorge arenetta [facility] [phone].    ATENCIÓN: Si habla español, tiene a woods disposición servicios gratuitos de asistencia lingüística. Llame al [phone].    We comply with applicable federal civil rights laws and [place] laws. We do not discriminate on the basis of race, color, national origin, age, disability, sex, sexual orientation, or gender identity.               Review of your medicines      START taking        Dose / Directions    acetaminophen 325 MG tablet   Commonly known as:  TYLENOL   Used for:  Pain, Rheumatoid arthritis involving both hands, unspecified rheumatoid factor presence (H)        Dose:  650 mg   Take 2 tablets (650 mg) by mouth every 4 hours as needed for mild pain   Refills:  0       miconazole 2 % powder   Commonly known as:  MICATIN; MICRO GUARD   Used for:  Fungal infection of the groin        Apply topically 2 times daily as needed (groin rash)   Refills:  0       predniSONE 5 MG tablet   Commonly known as:  DELTASONE   Used for:  Rheumatoid arthritis involving both hands, unspecified rheumatoid factor presence (H)   Replaces:  predniSONE 1 MG EC tablet        Dose:  5 mg   Take 1 tablet (5 mg) by mouth daily as needed for rheumatoid arthritis flare.   Refills:  0         CONTINUE these medicines which may have CHANGED, or have new prescriptions. If we are uncertain of the size of tablets/capsules you have at home, strength may be listed as something that might have changed.        Dose / Directions    folic acid 800 MCG Tabs   This may have changed:    - medication strength  - how much to take  - additional instructions   Used for:  Rheumatoid arthritis involving both hands, unspecified  rheumatoid factor presence (H)        Dose:  800 mcg   Take 800 mcg by mouth daily   Refills:  0       losartan 50 MG tablet   Commonly known as:  COZAAR   This may have changed:    - medication strength  - how much to take   Used for:  Benign essential hypertension        Dose:  50 mg   Take 1 tablet (50 mg) by mouth daily   Refills:  0       methotrexate 2.5 MG tablet CHEMO   This may have changed:    - how much to take  - additional instructions   Used for:  Rheumatoid arthritis involving both hands, unspecified rheumatoid factor presence (H)        Dose:  12.5 mg   Start taking on:  8/11/2018   Take 5 tablets (12.5 mg) by mouth every 7 days Saturdays   Refills:  0       rosuvastatin 20 MG tablet   Commonly known as:  CRESTOR   This may have changed:  how to take this   Used for:  Acute ischemic stroke (H)        Dose:  20 mg   Take 1 tablet (20 mg) by mouth daily   Refills:  0       warfarin 2 MG tablet   Commonly known as:  COUMADIN   This may have changed:    - medication strength  - additional instructions   Used for:  Vertebral artery dissection (H)        Adjust for target INR 2-3. 4 mg 8/9, 2 mg 8/10, 4 mg on 8/11 and 8/12/18 - check INR 8/13 or sooner   Refills:  0         CONTINUE these medicines which have NOT CHANGED        Dose / Directions    FLONASE ALLERGY RELIEF 50 MCG/ACT spray   Generic drug:  fluticasone        Dose:  1 spray   Spray 1 spray into both nostrils daily   Refills:  0       PROSCAR PO        Dose:  5 mg   Take 5 mg by mouth daily   Refills:  0       senna-docusate 8.6-50 MG per tablet   Commonly known as:  SENOKOT-S;PERICOLACE   Used for:  Constipation, unspecified constipation type        Dose:  2 tablet   Take 2 tablets by mouth 2 times daily as needed for constipation   Refills:  0       tamsulosin 0.4 MG capsule   Commonly known as:  FLOMAX        1 tab twice daily   Refills:  0         STOP taking     amLODIPine 5 MG tablet   Commonly known as:  NORVASC           bisacodyl 10  MG Suppository   Commonly known as:  DULCOLAX           enoxaparin 80 MG/0.8ML injection   Commonly known as:  LOVENOX           magnesium hydroxide 400 MG/5ML suspension   Commonly known as:  MILK OF MAGNESIA           predniSONE 1 MG EC tablet   Commonly known as:  OMAIRA   Replaced by:  predniSONE 5 MG tablet                Where to get your medicines      Some of these will need a paper prescription and others can be bought over the counter. Ask your nurse if you have questions.     You don't need a prescription for these medications     acetaminophen 325 MG tablet    folic acid 800 MCG Tabs    losartan 50 MG tablet    methotrexate 2.5 MG tablet CHEMO    miconazole 2 % powder    predniSONE 5 MG tablet    rosuvastatin 20 MG tablet    warfarin 2 MG tablet                Protect others around you: Learn how to safely use, store and throw away your medicines at www.disposemymeds.org.             Medication List: This is a list of all your medications and when to take them. Check marks below indicate your daily home schedule. Keep this list as a reference.      Medications           Morning Afternoon Evening Bedtime As Needed    acetaminophen 325 MG tablet   Commonly known as:  TYLENOL   Take 2 tablets (650 mg) by mouth every 4 hours as needed for mild pain                                FLONASE ALLERGY RELIEF 50 MCG/ACT spray   Spray 1 spray into both nostrils daily   Last time this was given:  1 spray on 8/9/2018  8:09 AM   Generic drug:  fluticasone                                folic acid 800 MCG Tabs   Take 800 mcg by mouth daily                                losartan 50 MG tablet   Commonly known as:  COZAAR   Take 1 tablet (50 mg) by mouth daily   Last time this was given:  50 mg on 8/9/2018  8:08 AM                                methotrexate 2.5 MG tablet CHEMO   Take 5 tablets (12.5 mg) by mouth every 7 days Saturdays   Start taking on:  8/11/2018                                miconazole 2 % powder    Commonly known as:  MICATIN; MICRO GUARD   Apply topically 2 times daily as needed (groin rash)   Last time this was given:  8/9/2018  8:09 AM                                predniSONE 5 MG tablet   Commonly known as:  DELTASONE   Take 1 tablet (5 mg) by mouth daily as needed for rheumatoid arthritis flare.   Last time this was given:  5 mg on 8/9/2018  8:08 AM                                PROSCAR PO   Take 5 mg by mouth daily   Last time this was given:  5 mg on 8/9/2018  8:08 AM                                rosuvastatin 20 MG tablet   Commonly known as:  CRESTOR   Take 1 tablet (20 mg) by mouth daily   Last time this was given:  20 mg on 8/9/2018  8:09 AM                                senna-docusate 8.6-50 MG per tablet   Commonly known as:  SENOKOT-S;PERICOLACE   Take 2 tablets by mouth 2 times daily as needed for constipation   Last time this was given:  2 tablets on 7/29/2018  8:04 AM                                tamsulosin 0.4 MG capsule   Commonly known as:  FLOMAX   1 tab twice daily   Last time this was given:  0.4 mg on 8/9/2018  8:08 AM                                warfarin 2 MG tablet   Commonly known as:  COUMADIN   Adjust for target INR 2-3. 4 mg 8/9, 2 mg 8/10, 4 mg on 8/11 and 8/12/18 - check INR 8/13 or sooner   Last time this was given:  4 mg on 8/8/2018  5:44 PM

## 2018-07-28 NOTE — IP AVS SNAPSHOT
` `            ACUTE REHAB CTR: [phone]                 INTERAGENCY TRANSFER FORM - NOTES (H&P, Discharge Summary, Consults, Procedures, Therapies)   2018                    Hospital Admission Date: 2018  KATTY GRUBER   : 1940  Sex: Male        Patient PCP Information     Provider PCP Type    Physician No Ref-Primary General         History & Physicals      H&P by Lana Pavon MD at 2018  3:45 PM     Author:  Lana Pavon MD Service:  Physical Medicine and Rehabilitation Author Type:  Physician    Filed:  2018 11:04 AM Date of Service:  2018  3:45 PM Creation Time:  2018 12:42 PM    Status:  Addendum :  Lana Pavon MD (Physician)             [facility]   Acute Rehabilitation Unit  Admission History and Physical    chief complaint[PS1.1]   Stroke[PS1.2]     History of Present illness  Katty Gruber is a 78 year old[PS1.1] right[PS1.2] hand dominant male[PS1.1] who was admitted on  after a fall[PS1.2] associated with nausea, dizziness and imbalance[PS1.3]. Work-up showed left cerebellar ischemic stroke.[PS1.2] He received aspirin 324 mg x1, and Plavix 300 mg p.o. x1. Then was started on warfarin for secondary prevention. Hospita[PS1.3]l course was complicated by cerebral edema requiring ICU stay and hypertonic saline, TAYLOR, UTI and coccygeal pressure injury.[PS1.2]     During his acute hospitalization, patient was seen and evaluated by PT, OT, SLP and PM&R consult service.  All specialties collectively recommended that patient would benefit from ongoing therapies in the acute inpatient rehabilitation setting.      In review of the therapy notes,[PS1.1]   PT: Pt requires Shamir for supine>sit and sit<>stand from EOB to FWW with cues for technique and midline posture once standing. Gait training with FWW and modA for balance and walker management followed closely by a w/c. Continues to lean to his L and  demonstrates LE ataxia with gait but improved gait distance when able to ambulate along a straight path.    OT: pt completed supine to sit EOB SBA, Shamir of 2 sit to stand, with use of FWW pt completed amb forward ~ 5' Shamir of 2, pt stood x ~ 5 mins while looking outside the window to enjoy the view. Pt required cues to keep walker on floor and push FWW vs picking up to advance. Pt min/mod A to turn and amb ~ 2' toward EOB in prep for mobility to bathroom, pt c/o increase fatigue and LE weakness, max A to guide pt to sit safely at EOB, after rest pt completed sit to stand and amb over to chair with FWW min A of 2.     SLP: Vocal quality clear and pt recalled throat clear discussed yesterday. Pt noted to be reclined in bed and pt reported feeling it was difficult to eat breakfast reclined. Pt shown how to adjust the bed or call the nurse for assistance. No overt s/sx of aspiration with thin liquids with pt assisted to upright position. Pt verbalized agreement to be upright in a chair for all meals with continued regular diet/thin liquids. No further IP SLP needs identified. Will defer full cognitive-linguistic evaluation to the next level of care.[PS1.3]     Currently, the patient is medically appropriate and is assessed to have needs and will benefit from an inpatient acute rehabilitation comprehensive program working with PT, OT and SLP, and will also benefit from supervision and management of Rehab Nursing and Rehab MD.       PAST Medical History  Past Medical History:   Diagnosis Date     Allergic rhinitis      BPH (benign prostatic hyperplasia)      Colon polyps      ED (erectile dysfunction)      Family history of colon cancer      PAD (peripheral artery disease) (H)      RA (rheumatoid arthritis) (H)      Seronegative rheumatoid arthritis (H)        Surgical History  Past Surgical History:   Procedure Laterality Date     ANGIOGRAM       C MRA UPPER EXTREMITY WO&W CONT  stenting right SFA-AK pop     COLONOSCOPY       polyps     ENT SURGERY      T&A     LAMINECT/DISCECTOMY, LUMBAR       ORTHOPEDIC SURGERY       PHACOEMULSIFICATION CLEAR CORNEA WITH STANDARD INTRAOCULAR LENS IMPLANT Left 4/4/2017    Procedure: PHACOEMULSIFICATION CLEAR CORNEA WITH STANDARD INTRAOCULAR LENS IMPLANT;  Surgeon: Stevie Espinal MD;  Location: [facility]     PHACOEMULSIFICATION CLEAR CORNEA WITH STANDARD INTRAOCULAR LENS IMPLANT Right 5/2/2017    Procedure: PHACOEMULSIFICATION CLEAR CORNEA WITH STANDARD INTRAOCULAR LENS IMPLANT;  RIGHT EYE PHACOEMULSIFICATION CLEAR CORNEA WITH STANDARD INTRAOCULAR LENS IMPLANT;  Surgeon: Stevie Espinal MD;  Location: [facility]     SINUS SURGERY       VASCULAR SURGERY  R SFA stenting  2012       SOCIAL HISTORY  Marital Status:[PS1.1] [PS1.3]   Living situation:[PS1.1] lives in an apartment with elevator access[PS1.3]   Family support:[PS1.1] two daughters live close and are supportive[PS1.3]   Vocational History:[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 Managed meds and finances all.[PS1.3]     Medications  Prescriptions Prior to Admission   Medication Sig Dispense Refill Last Dose     Finasteride (PROSCAR PO) Take 5 mg by mouth daily   7/20/2018 at Unknown time     fluticasone (FLONASE ALLERGY RELIEF) 50 MCG/ACT spray Spray 1 spray into both nostrils daily   7/20/2018 at Unknown time     FOLIC ACID PO Take 400 mcg by mouth daily Patient gets this medication OTC.   7/20/2018 at Unknown time     tamsulosin (FLOMAX) 0.4 MG capsule 1 tab twice daily   7/20/2018 at x1     [DISCONTINUED] Aspirin (ECOTRIN LOW STRENGTH PO) Take 1 tablet by mouth  "daily 81 MG   7/20/2018 at Unknown time       ALLERGIES   No Known Allergies      Review of Systems  A 10 point ROS was performed and negative unless otherwise noted in HPI.        Physical Exam  VITAL SIGNS:  There were no vitals taken for this visit.  BMI:  Estimated body mass index is 23.11 kg/(m^2) as calculated from the following:    Height as of 5/2/17: 1.753 m (5' 9\").    Weight as of 7/25/18: 71 kg (156 lb 8.4 oz).     General: NAD, pleasant and cooperative   HEENT: ATNC, oropharynx clear with MMM, EOMI, PERRL[PS1.1] -- dressing on right internal jugular IV access[PS1.3]   Pulmonary: clear breath sounds b/l, no rales/wheezing    Cardiovascular: RRR, no murmur   Abdominal: soft, non-tender, non-distended   Extremities: warm, well perfused, no edema in bilateral lower extremities, no tenderness in calves   MSK/neuro:   Mental Status:  alert and oriented x3    Cranial Nerves: grossly normal    Sensory: Normal to light touch in bilateral upper and lower extremities[PS1.1]; no sensory neglect[PS1.3]    Strength: 5/5 in all muscle groups of the upper and lower extremities    Reflexes:[PS1.1] 3+ at b/l biceps and brachioradialis. 2+ at b/l patellar and 1+ Achilles[PS1.3]   Abnormal movements: None    Coordination:[PS1.1] obvious[PS1.3] dysmetria on finger to nose[PS1.1] and HTS b/l[PS1.3]   Speech:[PS1.1] slightly dysarthric, communicates well[PS1.3]    Cognition:[PS1.1] able to recall his meds but not completely able to given detailed medical history[PS1.3]       Labs  Pertinent labs     Lab Results   Component Value Date    WBC 10.3 07/25/2018    HGB 12.6 (L) 07/25/2018    HCT 36.9 (L) 07/25/2018    MCV 93 07/25/2018     07/27/2018     Lab Results   Component Value Date     07/26/2018    POTASSIUM 3.5 07/27/2018    CHLORIDE 103 07/26/2018    CO2 26 07/26/2018     07/26/2018     Lab Results   Component Value Date    GFRESTIMATED 74 07/27/2018    GFRESTBLACK 90 07/27/2018     Lab Results "   Component Value Date    AST 21 07/21/2018    ALT 13 07/21/2018    ALKPHOS 101 07/21/2018    BILITOTAL 0.7 07/21/2018     Lab Results   Component Value Date    INR 1.15 (H) 07/28/2018     Lab Results   Component Value Date    BUN 19 07/26/2018    CR 0.97 07/27/2018         ASSESSMENT/PLAN:  Shamir Concepcion is a 78 year old[PS1.1] right[PS1.2] hand dominant male[PS1.1] who was admitted on 7/20/18 with 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  Hospital course was complicated by cerebral edema requiring ICU stay and hypertonic saline, TAYLOR, UTI and coccygeal pressure injury.[PS1.2] PMH significant for PAD s/p stenting, RA, OA, and BPH: HTN is a new diagnosis.[PS1.3]       Admission to acute inpatient rehab[PS1.1] 07/28/18[PS1.4] .    Impairment group code:[PS1.1] 01.1[PS1.2]      1. PT, OT and SLP[PS1.1] 60[PS1.3] minutes of each on a daily basis, in addition to rehab nursing and close management of physiatrist.      2. Impairment of ADL's: OT for[PS1.1] 60[PS1.3] minutes daily to work on ADL re-training such as grooming, self cares and bathing.      3. Impairment of mobility:  PT for[PS1.1] 60[PS1.3] minutes daily to work on neuromuscular re-education focusing on strength, balance, coordination, and endurance.      4. Impairment of cognition/language:  SLP for[PS1.1] 60[PS1.3] minutes daily to work on cognitive and linguistic deficits.    5. Rehab RN for med administration,[PS1.1] VS monitoring,[PS1.3] bowel regimen,[PS1.1] patient education.[PS1.3]    6. Medical Conditions[PS1.1]  # Acute left cerebellar ischemic stroke due to left PICA occlusion/dissection: functional deficits include ataxia, incoordination and imbalance.[PS1.2] Dysphagia improved. Possible cognitive deficits.[PS1.3]     --continue warfarin with lovenox bridge; INR goal 2-3  --continue HTN and HLD control as below for secondary prevention  --f/u in neurology clinic as outpatient with repeat CT angiogram in 6  months to evaluate the vasculature within his neck    # HTN: new diagnosis per his report. Currently on amlodipine 10 and losartan 25 daily. Will monitor and adjust accordingly; consider to increase losartan and sop amlodipine to simplify his regimen. Will monitor Cr as he had slight increase in Cr with higher dose of ACEi. Long term goal <130/80    # HLD: LDL 88. Continue crestor.     # Seronegative RA: well controlled and is followed by a rheumatologist. On methotrexate and prednisone prior to admission. Methotrexate on hold; continue prednisone 5mg daily.     # H/o BPH s/p TURP 6/2018: nocturia and frequency during the day. Currently on flomax 0.4 bid and finasteride.     # Allergic rhinitis: on Flonase prior to admission which was continued.[PS1.2]     # H/o peripheral artery disease, status post stent to R SFA in 09/2013[PS1.3]    # Pressure Injury (PI) located on midline coccyx:[PS1.2] was seen by [facility] who r[PS1.3]ecommend[PS1.2]ed[PS1.3] PIP measures including use of chair cushion and repositioning measures- avoid direct, supine positioning, etc[PS1.2]. Continues to[PS1.3] use Mepilex Sacral Dressings[PS1.2]. Healed upon admission to ARU.[PS1.5]      7. FEN:[PS1.1] regular diet[PS1.2]   8. Bowel:[PS1.1] constipated upon admission; prn and scheduled bowel meds and supp/[PS1.2]   9. Bladder:[PS1.1] continent/incontinenet; was treated for UTI in the acute hospital (completed 5 day course of ceftriaxone). Will check PVRs and start timed toileting.[PS1.2]  10. DVT Prophylaxis:[PS1.1] on warfarin[PS1.2]   11. GI Prophylaxis:[PS1.1] will add protonix given his age and warfarin[PS1.2]   12. Code:[PS1.1] DNR[PS1.2]  13. Disposition:[PS1.1] home[PS1.2]   14. ELOS:[PS1.1]  10-14 days[PS1.2] .  15. Follow up Appointments on Discharge:[PS1.1] PCP, neurology, and PM&R.[PS1.2]    Lana Pavon MD  Physical Medicine & Rehabilitation[PS1.1]    95[PS1.2] minutes spent with admission, more than 50% in direct patient  interaction and education, the remainder in coordination of care.  [PS1.1]         Revision History        User Key Date/Time User Provider Type Action    > PS1.5 2018 11:04 AM Lana Pavon MD Physician Addend     [N/A] 2018  3:45 PM Lana Pavon MD Physician Addend     [N/A] 2018  3:45 PM Lana Pavon MD Physician Addend     PS1.3 2018  3:45 PM Lana Pavon MD Physician Sign     PS1.4 2018  3:16 PM Lana Pavon MD Physician      PS1.2 2018  2:56 PM Lana Pavon MD Physician      PS1.1 2018 12:42 PM Lana Pavon MD Physician             H&P by Lana Pavon MD at 2018  3:45 PM     Author:  Lana Pavon MD Service:  Physical Medicine and Rehabilitation Author Type:  Physician    Filed:  2018  3:48 PM Date of Service:  2018  3:45 PM Creation Time:  2018  3:45 PM    Status:  Signed :  Lana Pavon MD (Physician)         Post Admission Physician Evaluation:    I have compared Shamir Concepcion's condition on admission to acute rehabilitation to that outlined in the preadmission screen. History and physical exam performed by me. No significant differences are identified and the patient remains appropriate for an inpatient rehabilitation facility level of care to manage medical issues and address functional impairments due to (rehabilitation diagnosis) left PICA ischemic stroke.    Comorbid medical conditions being managed: HTN, HLD, RA, h/o BPH    Prior functional level: I with all aspects of his life. Drives.     Present function: min to mod A with transfers and mobility. Full cognitive assessment if pending. Passed swallowing goals.    Anticipated rehabilitation course: improvement to mod I level for mobility and ADLs. No driving and will also help with other iADLs like med management and finances.     Will benefit from intensive rehabilitation includin minutes each of PT, OT and SLP  Rehabilitation nursing  Close management  by physiatry    Prognosis: good medical and rehab prognosis     Estimated length of stay: 10-14 days     Lana Pavon MD  Physical Medicine & Rehabilitation[PS1.1]        Revision History        User Key Date/Time User Provider Type Action    > PS1.1 7/28/2018  3:48 PM Lana Pavon MD Physician Sign                     Discharge Summaries      Discharge Summaries by Champ Kapadia MD at 8/9/2018 11:47 AM     Author:  Champ Kapadia MD Service:  Physical Medicine and Rehabilitation Author Type:  Physician    Filed:  8/9/2018 12:54 PM Date of Service:  8/9/2018 11:47 AM Creation Time:  8/9/2018 11:47 AM    Status:  Signed :  Champ Kapadia MD (Physician)         [facility]   Discharge Summary     Date Admitted: 7/28/2018  Date Discharge: 8/9/2018  Discharge Disposition: [facility]    Discharge Diagnosis:   1. 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 Impaired mobility, activities of daily living and cognition  3. Hypertension  4. Benign prostatic hypertrophy  5. Rheumatoid arthritis    Brief History of Presenting Illness and Hospital Course:  This is a 78 year old male admitted initially to [facility] 7/20 with dizziness nausea and imbalance.  Was identified with left cerebellar artery occlusion felt possibly linked with vertebral artery dissection.  Started on anticoagulation heparin bridge to warfarin target INR 2-3.  After initial medical workup and stabilization, he transferred to acute rehabilitation unit on 7/28/2018 for comprehensive cares.  Ray has shown some definite functional improvements however he does persist with some deficits both with balance as well as cognition with some impulsivity and slight impaired insight and judgment.  Because of this he is needing a bit longer course for recovery especially in the context that she was living home alone recently (wife passed away in  November with cancer).  Supportive daughters but not able to provide the 24-hour support he is currently needing.  He is therefore discharging to transitional care unit for ongoing therapies and nursing care.  Medically, Ray has remained stable while on acute rehab.  His INR is now therapeutic and bridging enoxaparin was discontinued.  For hypertension, we were to simplify his medications, discontinue amlodipine and increased his Cozaar to 50 mg.  Renal function remains stable.[SC1.1]  Rheumatoid arthritis has remained quiet.  Prior to admission was on methotrexate 12.5 mg every Saturday.  That medication was held at [facility], I have confirmed with his outpatient rheumatologist and we will resume methotrexate now starting 8/11.  Historically used prednisone as needed for RA flares though not on the ongoing basis.  While in acute rehab, he has been maintained on 5 mg prednisone daily understanding that was his routine W clarification we will shift that back to just as needed.  His prior to admission tamsulosin and finasteride are continued for BPH.  Had some bladder scans with mild elevations but not at the threshold of needing catheterization.  For stroke risk reduction, as above he is on warfarin.  Started on rosuvastatin for cholesterol lowering and losartan for blood pressure.  He is a non-smoker.  Does not have diabetes.[SC1.2]  Upon discharge it is recommended to continue with PT / OT / SLP[SC1.1] at TCU.[SC1.2]    Discharge Medications:[SC1.1]   Shamir Concepcoin   Home Medication Instructions DARNELL:[id number]    Printed on:08/09/18 7571   Medication Information                      acetaminophen (TYLENOL) 325 MG tablet  Take 2 tablets (650 mg) by mouth every 4 hours as needed for mild pain             Finasteride (PROSCAR PO)  Take 5 mg by mouth daily             fluticasone (FLONASE ALLERGY RELIEF) 50 MCG/ACT spray  Spray 1 spray into both nostrils daily             folic acid 800 MCG TABS  Take 800 mcg by  "mouth daily             losartan (COZAAR) 50 MG tablet  Take 1 tablet (50 mg) by mouth daily             methotrexate 2.5 MG tablet CHEMO  Take 5 tablets (12.5 mg) by mouth every 7 days Saturdays             miconazole (MICATIN; MICRO GUARD) 2 % powder  Apply topically 2 times daily as needed (groin rash)             predniSONE (DELTASONE) 5 MG tablet  Take 1 tablet (5 mg) by mouth daily as needed for rheumatoid arthritis flare.             rosuvastatin (CRESTOR) 20 MG tablet  Take 1 tablet (20 mg) by mouth daily             senna-docusate (SENOKOT-S;PERICOLACE) 8.6-50 MG per tablet  Take 2 tablets by mouth 2 times daily as needed for constipation             tamsulosin (FLOMAX) 0.4 MG capsule  1 tab twice daily             warfarin (COUMADIN) 2 MG tablet  Adjust for target INR 2-3. 4 mg 8/9, 2 mg 8/10, 4 mg on 8/11 and 8/12/18 - check INR 8/13 or sooner[SC1.3]               Physical Examination:   /60 (BP Location: Right arm)  Pulse 64  Temp 96.9  F (36.1  C) (Oral)  Resp 16  Ht 1.651 m (5' 5\")  Wt 51.9 kg (114 lb 8 oz)  SpO2 97%  BMI 19.05 kg/m2[SC1.1]  Alert pleasant, bright affect.  Fluent speech and language though impaired memory noting that he is asking some questions that we have reviewed in the past.  Even driving which we have reviewed several times in the past.  Heart regular rate and rhythm though slightly distant.  Lungs are clear bilaterally.  Abdomen soft  Extremities warm well perfused, some arthritis in his hands though not severe RA pattern and no active tenosynovitis.  Strength exam actually fairly well-preserved 5/5 and symmetric.  There remains some mild coordination deficits especially finger-nose-finger will clearly improved from rehab admission.  Balance still has some unsteadiness and slight ataxic pattern with walking still with a walker and contact-guard assistance.[SC1.4]    Discharge Instructions:    Active Diet Order      Combination Diet Regular Diet Adult; Thin Liquids " (water, ice chips, juice, milk, gelatin, ice cream, etc)      Advance Diet as Tolerated   Activity:[SC1.1] Up with a walker and assistance only[SC1.4]    Follow up Appointments:[SC1.1]  With transitional care unit provider while there then with his primary provider Anthony Harding.  [facility] scheduled August 23 head CT 2 PM then appointment with physician assistant Saman Lucas.  Rheumatologist Dr. Champ Noonan October 31 through [facility].    Total Discharge time spent is > 30 minutes.[SC1.4]      Revision History        User Key Date/Time User Provider Type Action    > SC1.4 8/9/2018 12:54 PM Champ Kapadia MD Physician Sign     SC1.3 8/9/2018 12:47 PM Champ Kapadia MD Physician      SC1.2 8/9/2018 12:40 PM Champ Kapadia MD Physician      SC1.1 8/9/2018 11:47 AM Champ Kapadia MD Physician                      Consult Notes      Consults signed by Marti Sam, PhD LP at 8/3/2018 11:47 AM      Author:  Marti Sam, PhD LP Service:  Health Psychology Author Type:  Psychologist    Filed:  8/3/2018 11:47 AM Date of Service:  8/1/2018 11:10 AM Creation Time:  8/1/2018 11:40 AM    Status:  Signed :  Marti Sam, PhD LP (Psychologist)     Consult Orders:    1. Psychology Adult IP Consult [[id number]] ordered by Lana Pavon MD at 07/29/18 1523                  Health Psychology                  Clinic    Department of Medicine  Marti Sam, Ph.D., L.P. [phone]                          Clinics and Surgery Center  [facility] Syeda Cyr, Ph.D.,  L.P. [phone]                 3rd Floor  [place] [address]   Savanna Novoa, Ph.D., L.P. [phone] [address] Tosha Oconnor, Ph.D., L.P. [phone]            [place]   [address]  [place] [id number]          Bogdan Hutchinson, Ph.D., A.B.P.P., L.P. [phone]      Diana De Leon, Ph.D., L.P. [phone]      [facility]  Psychology Consultation*    DOS:  08/01/2018      REFERRAL SOURCE:  Lana Pavon MD, Acute Rehabilitation Center, [facility], [place].      REASON FOR REFERRAL:  Shamir Concepcion is a 78-year-old man currently in the Acute Rehabilitation Center.  Mr. Concepcion experienced a cerebellar ischemic stroke on 07/20.  He has expressed some statements concerning for depression.  This evaluation was requested to assess his current emotional status and to make treatment recommendations.      HISTORY OF PRESENT ILLNESS:  Mr. Concepcion reports no significant history of depression, anxiety, or other emotional issues that have significantly affected his functioning.  He does note that following his wife's death in the fall of 2017 after a short illness with cancer, that he did see a grief counselor and found that helpful.  He acknowledges that he has made statements while in the ARC reflecting his belief that following his death he would be reunited with his wife, and that he looks forward to that.  However, he adamantly denies any suicidal ideation or desire to hasten his death.  He states that he experiences pleasure and shashank in his life because he has daughters and grandchildren that he loves.  He is also very involved in his [employer], [employer] in [place], and volunteers through some programs there.  Mr. Concepcion also has a group of male friends with whom he typically has breakfast on a daily basis, and started that habit prior to his wife's illness and has continued meeting with them daily following her death.      Mr. Concepcion tells me that he is sleeping more than usual while here in the ARC, and we talked about the physical changes related to stroke that may underlie the need for more sleep.  In addition, he does acknowledge that he is aware that he is using a great deal of energy on a daily basis in rehabilitation therapies.  At the same time, he notes that he was typically very  "physically active, and worked out at the  near [facility] several times a week.  He does observe the small steps of positive progress that he is making in rehabilitation therapies, but does get sometimes frustrated that it is not going more quickly.  However, in conversation today he was able to gain perspective on the fact that physical change and growth at many stages of life, including most learning processes, is gradual and progressive.  He is able to clearly state functional abilities that he observes himself improving on compared to even several days ago.      Mr. Concepcion also tells me that he believes he is eating more while hospitalized than he has been at home.  He acknowledges that he has not been focused on feeding himself appropriately since his wife became ill and , stating that \"She did all the cooking.\"  He tells me that his daughters bring him food, but he is concerned that this may be putting a burden on them as they both work full-time and have families.      Mr. Concepcion was appreciative of this contact with Health Psychology.  However, he does not believe that he requires any additional emotional support, and focuses on the excellent support he gets from his family as well as his friends, one of whom is visiting later today.      SOCIAL HISTORY:  Mr. Concepcion grew up in [place] and graduated from [employer].  He has 1 brother who now lives in [place].  Mr. Concepcion now lives in [place], in a condo that he shared with his wife until her death.  He is a retired salesman.  As noted above, he is very involved in the [place] [employer] community, and his tami is important in his life.  He is close with his 2 daughters and 4 grandchildren, 2 of whom have graduated from college and 2 of whom are currently college students.  He has a good supportive friendship [place] of other men whom he sees frequently.      PAST MEDICAL HISTORY:  Please see Mr. Concepcion's [facility] " electronic medical record for more complete information on his medical history, current admission and status, and all medications.      PSYCHIATRIC HISTORY:  As above in the HPI.  Mr. Concepcion did see a grief counselor following the death of his wife, and found that helpful.  He does not feel that he needs ongoing emotional or psychological support.  No other additional information from a psychological perspective was available at the time of this evaluation.      BEHAVIORAL IMPRESSIONS:  Mr. Concepcion presented for this evaluation sitting up in a chair in his room in the [facility] between scheduled rehabilitation therapies.  He was alert and oriented.  He reported his mood as somewhat frustrated because he is so used to being independent and doing everything for himself.  He acknowledged some ongoing grieving related to his wife's recent death.  He exhibited an affect that was euthymic to bright.  Speech was clear, coherent, and goal oriented.  Insight and judgment appear to be intact.  He exhibited no evidence of distortions of thought or perception.      IMPRESSIONS AND PLAN:  Shamir Concepcion is a 78-year-old man who experienced a left cerebellar ischemic stroke on 07/20.  Mr. Concepcion is doing well in terms of recovery of function within his rehabilitation therapies.  He is typically an active man who attended the  several days a week to work out.  He notes that he finds rehabilitation therapies easy to motivate himself for because it is similar to his typical habits of working out regularly.  He looks forward to regaining a greater level of independence and being able to return home and to his usual activities.  Although he continues to experience some grief reactions related to his wife's death last fall, including decreased focus and interest in eating, he appears to be generally doing well from an emotional perspective.  I will speak with our [facility] , Sue Scott, about the possibility of speaking  with Mr. Gruber and his family about a home delivery meal service.       DIAGNOSES:   1.  Death of a family member/bereavement (Z63.4).   2.  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         BISHNU SAM, PHD, LP           *In accordance with the Rules of the [place] Board of [employer], it is noted that psychological descriptions and scientific procedures underlying psychological evaluations have limitations.  Absolute predictions cannot be made based on information in this report.     D: 2018   T: 2018   MT: CG      Name:     KATTY GRUBER   MRN:      [id number]        Account:       [id number]   :      1940           Consult Date:  2018      Document: [id number][DB1.1]         Revision History        User Key Date/Time User Provider Type Action    > DB1.1 8/3/2018 11:47 AM Bishnu Sam, PhD LILI Psychologist Sign     [N/A] 2018 11:40 AM Bishnu Sam, PhD LP Psychologist Edit                     Progress Notes - Physician (Notes from 18 through 18)      Progress Notes by Kely Brown PT at 2018  9:12 AM     Author:  Kely Brown PT Service:  (none) Author Type:  Physical Therapist    Filed:  2018  9:14 AM Date of Service:  2018  9:12 AM Creation Time:  2018  9:12 AM    Status:  Signed :  Kely Brown PT (Physical Therapist)         Postural Assessment Scale for Stroke    Maintaining a posture  1. Sitting without support: 3  2. Standing with support: 3  3. Standing without support: 2  4. Standing on nonparetic le  5. Standing on paretic le    Changing posture  6. Supine to affected side lateral: 3  7. Supine to nonaffected side lateral: 3  8. Supine to sitting up on the edge of the table: 3  9. Sitting on the edge of the table to supine: 3  10. Sitting to standing up: 3  11. Standing up to sitting down:3  12. Standing, picking up a pencil from the floor: 1    Total score:    28  /36  Statistical improvement from 22/36 on 7/29/18[KT1.1]         Revision History        User Key Date/Time User Provider Type Action    > KT1.1 8/9/2018  9:14 AM Kely Brown, PT Physical Therapist Sign            Progress Notes by Champ Kapadia MD at 8/8/2018 10:15 AM     Author:  Champ Kapadia MD Service:  Physical Medicine and Rehabilitation Author Type:  Physician    Filed:  8/8/2018 10:30 AM Date of Service:  8/8/2018 10:15 AM Creation Time:  8/8/2018 10:15 AM    Status:  Signed :  Champ Kapadia MD (Physician)         PM&R Daily Note:    78 with 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  After workup and stabilization he transferred to acute rehabilitation 7/28.    Interval history:  Formal care conference held today. please see separate document in Plan of Care tab for full details. Briefly Ray has made some functional progress though persists with deficits especially in the cognitive domain. Some difficulties with reading, attention to details, memory and problem solving. Balance with walker and still needing cues for safety and some contact to min assistance still though able to walk hallway distances for this. Looking at needing initial 24 hour support. Rate of progress on slower side. For sure has further rehab / recovery potential but will need bit longer course so will start referrals to TCU setting. Will begin with referrals.     Medically:  Blood pressure is reasonably well-controlled, continue with losartan 50 mg daily.  Anticoagulation with warfarin, . today, target 2-3.  Benign prostatic hypertrophy, continues with finasteride and tamsulosin.  Post void residuals occasionally have been over 200 but usually low enough and not requiring intermittent catheterization.  We can discontinue bladder scans unless his symptoms change.      Vitals:    08/07/18 0810 08/07/18 1330 08/07/18 1531 08/08/18 0741   BP: 117/59 115/49  "117/63 125/52   BP Location: Right arm Left arm Right arm Right arm   Pulse: 61 103 98 61   Resp:  16 14 16   Temp:  97  F (36.1  C) 98.1  F (36.7  C) 97  F (36.1  C)   TempSrc:  Oral Oral Oral   SpO2:  98% 92% 98%   Weight:  51.9 kg (114 lb 8 oz)     Height:  1.651 m (5' 5\")       Fully alert pleasant  Fluent speech and language at simple conversational level  Affect is euthymic even in some discussion with his wife who passed away with lung cancer last November  Heart regular  Lungs clear      - Medication Assessment Program - Rehab Services   Does not apply See Admin Instructions     finasteride (PROSCAR) tablet 5 mg  5 mg Oral Daily     fluticasone  1 spray Both Nostrils Daily     losartan  50 mg Oral Daily     miconazole   Topical BID     pantoprazole  40 mg Oral QAM AC     predniSONE  5 mg Oral Daily     psyllium  1 packet Oral Daily     rosuvastatin  20 mg Oral or NG Tube Daily     tamsulosin  0.4 mg Oral Daily[SC1.1]     40 minutes spent today, 30 in direct patient interaction with care conference, remainder in coordination of care.[SC1.2]     Revision History        User Key Date/Time User Provider Type Action    > SC1.2 8/8/2018 10:30 AM Champ Kapadia MD Physician Sign     SC1.1 8/8/2018 10:15 AM Champ Kapadia MD Physician             Progress Notes by Champ Kapadia MD at 8/6/2018  5:26 PM     Author:  Champ Kapadia MD Service:  Physical Medicine and Rehabilitation Author Type:  Physician    Filed:  8/8/2018 10:20 AM Date of Service:  8/6/2018  5:26 PM Creation Time:  8/6/2018  5:26 PM    Status:  Addendum :  Champ Kapadia MD (Physician)         PM&R Daily Note:    78 with left PICA stroke associated with[SC1.1] vertebral[SC1.2] artery occlusion[SC1.1] admitted to [facility] 7/21.  After workup and stabilization he transferred to acute rehabilitation 7/28.    Interval history:  Ray continues to participate well showing some nice functional improvements day by day.  Cognitively and " it simple conversation well he is doing fairly well.  Needing slight increase time for reading and looking for details and attention.  Mild difficulties with memory as well.  Do anticipate he will need some additional support, specifics to be determined.  Balance continues to demonstrate leftward lean.  He needs some cues and reminders for walker proximity.  Definitely ambulation with assistance only still.    Medically:  Blood pressure is reasonably well-controlled, continue with losartan 50 mg daily.  Anticoagulation with warfarin, INR 2.84 today, target 2-3.  Benign prostatic hypertrophy, continues with finasteride and tamsulosin.  Post void residuals occasionally have been over 200 but usually low enough and not requiring intermittent catheterization.  We can discontinue bladder scans unless his symptoms change.[SC1.3]      Vitals:    08/05/18 1747 08/06/18 0749 08/06/18 1152 08/06/18 1524   BP: 151/63 124/60 118/57 120/59   BP Location: Right arm Right arm Right arm Right arm   Pulse: 76 65 68 65   Resp: 16 18  16   Temp: 97.4  F (36.3  C) 98  F (36.7  C)  96.4  F (35.8  C)   TempSrc: Oral Oral  Oral   SpO2: 96% 97%  99%   Weight:       Height:[SC1.1]         Fully alert pleasant  Fluent speech and language at simple conversational level  Affect is euthymic even in some discussion with his wife who passed away with lung cancer last November  Heart regular  Lungs clear[SC1.3]      finasteride (PROSCAR) tablet 5 mg  5 mg Oral Daily     fluticasone  1 spray Both Nostrils Daily     losartan  50 mg Oral Daily     miconazole   Topical BID     pantoprazole  40 mg Oral QAM AC     predniSONE  5 mg Oral Daily     psyllium  1 packet Oral Daily     rosuvastatin  20 mg Oral or NG Tube Daily     tamsulosin  0.4 mg Oral Daily     warfarin  2 mg Oral ONCE at 18:00[SC1.4]          Revision History        User Key Date/Time User Provider Type Action    > [N/A] 8/8/2018 10:20 AM Champ Kapadia MD Physician Addend     SC1.2  "8/8/2018 10:20 AM Champ Kapadia MD Physician Incomplete Revision     SC1.4 8/6/2018  6:36 PM Champ Kapadia MD Physician Sign     SC1.3 8/6/2018  6:29 PM Champ Kapadia MD Physician      SC1.1 8/6/2018  5:26 PM Champ Kapadia MD Physician             Progress Notes by May Celeste PA at 8/7/2018 11:51 AM     Author:  May Celeste PA Service:  Acute IP Rehab Author Type:  Physician Assistant    Filed:  8/7/2018 12:52 PM Date of Service:  8/7/2018 11:51 AM Creation Time:  8/7/2018 11:51 AM    Status:  Signed :  May Celeste PA (Physician Assistant)           [facility]   Acute Rehabilitation Unit  Daily progress note    interval history  Shamir Concepcion \"Ray\" seen resting in bed, reports he is physically doing well.  Denies n/v/d, sob, fevers, headaches, and dizziness.  Appetite is not great but is eating ok.  Discussed cognitive impairments \"My hands wont write the words in my head\", impaired balance and coordination discussed current level of assistance he is needing, showing more insight states he may need to hire help, and able to list things he currently would need assist with, dressing, showering, etc.  Ray was asked about help from daughters, he states they are both coming to care conference tomorrow, but feels he is \"independent and private\" and not sure he wants to ask for their help.  Also discussed stroke prevention and current anticoagulation, Ray was able to ask appropriate questions about the medication.     SLP: Progressively improving balance and consistently places R hand back for sit<>stand, but needs reminders for walker proximity, especially when turning. Will continue to work on L heel strike during gait and leftward lean in standing, ambulating.  Pt responds well to 1-2 cues at a time, especially visual cues and appreciates learning reasons for interventions.    medications  Scheduled meds    - Medication " "Assessment Program - Rehab Services   Does not apply See Admin Instructions     finasteride (PROSCAR) tablet 5 mg  5 mg Oral Daily     fluticasone  1 spray Both Nostrils Daily     losartan  50 mg Oral Daily     miconazole   Topical BID     pantoprazole  40 mg Oral QAM AC     predniSONE  5 mg Oral Daily     psyllium  1 packet Oral Daily     rosuvastatin  20 mg Oral or NG Tube Daily     tamsulosin  0.4 mg Oral Daily     warfarin  4 mg Oral ONCE at 18:00       PRN meds:  acetaminophen, bisacodyl, lidocaine 2 %, magnesium hydroxide, - MEDICATION INSTRUCTIONS -, polyethylene glycol, senna-docusate, Warfarin Therapy Reminder      physical exam  /59 (BP Location: Right arm)  Pulse 61  Temp 97.7  F (36.5  C) (Oral)  Resp 16  Ht 1.753 m (5' 9\")  Wt 71 kg (156 lb 9.6 oz)  SpO2 95%  BMI 23.13 kg/m2     Gen: NAD sitting up edge of bed.   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  Hospital course was complicated by cerebral edema requiring ICU stay and hypertonic saline, TAYLOR, UTI and coccygeal pressure injury. PMH significant for PAD s/p stenting, RA, OA, and BPH: HTN is a new diagnosis. Admission to acute inpatient rehab 07/28/18.       Rehabilitation - continue comprehensive acute inpatient rehabilitation program with multidisciplinary approach including therapies, rehab nursing, and physiatry following. See interval history for updates.        Medical Conditions  # Acute left cerebellar ischemic stroke due to left PICA occlusion/dissection: functional deficits include ataxia, incoordination and " imbalance. Dysphagia improved.  cognitive deficits.      --continue warfarin; INR goal 2-3.  --continue HTN and HLD control as below for secondary prevention  --f/u in neurology clinic as outpatient with repeat CT angiogram in 6 months to evaluate the vasculature within his neck     # HTN: new diagnosis per his report.  Long term goal <130/80. -130s  - Continue losartan 50 mg  Daily, flomax g daily.      # HLD: LDL 88. Continue crestor.      # Seronegative RA: well controlled and is followed by a rheumatologist. On methotrexate and prednisone prior to admission. Methotrexate on hold; -continue prednisone 5mg daily.      # H/o BPH s/p Rezum 6/29/2018:   - now doing well without straight cath or hematuria  -continue flomax and finasteride     # Allergic rhinitis: on Flonase prior to admission which was continued.      # H/o peripheral artery disease, status post stent to R SFA in 09/2013     # Pressure Injury (PI) located on midline coccyx: was seen by [facility] who recommended PIP measures including use of chair cushion and repositioning measures- avoid direct, supine positioning, etc. Continues to use Mepilex Sacral Dressings       FEN: regular diet   Bowel: prn and scheduled bowel meds and supp/   Bladder: continent   DVT Prophylaxis: on warfarin   GI Prophylaxis: PPI  Code: DNR  Disposition: home   ELOS:  goal ~8/14  Follow up Appointments on Discharge: PCP, neurology, and PM&R.    May Celeste PA-C  Physical Medicine & Rehabilitation    Case discussed with Dr. Kapadia.[LB1.1]               Revision History        User Key Date/Time User Provider Type Action    > LB1.1 8/7/2018 12:52 PM May Celeste PA Physician Assistant Sign            Progress Notes by Omayra Bassett RD at 8/6/2018  2:56 PM     Author:  Omayra Bassett RD Service:  Nutrition Author Type:  Registered Dietitian    Filed:  8/6/2018  4:20 PM Date of Service:  8/6/2018  2:56 PM Creation Time:  8/6/2018  2:56 PM    Status:   "Signed :  Omayra Bassett RD (Registered Dietitian)         CLINICAL NUTRITION SERVICES - ASSESSMENT NOTE     Nutrition Prescription    RECOMMENDATIONS FOR MDs/PROVIDERS TO ORDER:[KR1.1]  -Consider daily MVI with minerals to ensure micronutrient needs met.[KR1.2]    Malnutrition Status:[KR1.1]    Pt does not meet 2 criteria.[KR1.3]    Recommendations already ordered by Registered Dietitian (RD):[KR1.1]  None today[KR1.3]    Future/Additional Recommendations:[KR1.1]  -Monitor po intakes and weight trends.[KR1.2]   -If po intakes decline and/or weight loss consider addition of snacks or supplements.  -Consider review of  heart healthy diet guidelines.[KR1.3]       REASON FOR ASSESSMENT  Shamir Concepcion is a/an 78 year old male assessed by the dietitian for[KR1.1] LOS[KR1.2]    NUTRITION HISTORY[KR1.1]  Per chart review, pt was assessed by hospital RD for LOS on 7/27.  Noted to be on a Regular diet and eating mostly 100%.[KR1.4]    Pt was seen by WOCN on 7/26:  \"Pressure Injury (PI) located on midline coccyx:\"[KR1.5]     Per pt he usually eats 3 meals per day at home-  Breakfast: oatmeal and sometimes an English muffin with peanut butter  Lunch:  fast food hamburger  Supper:  frozen meal  He denies any recent changes in appetite or weight.  Not a big eater at baseline.    CURRENT NUTRITION ORDERS  Diet:[KR1.3] Regular with Thin Liquids  Room Service with Assist[KR1.4]    Intake/Tolerance:[KR1.1] PO intakes have been % of recorded meals.  Per review of selections in [facility], pt is ordering[KR1.4] 3 smaller meals per day (yesterday only 2 meals).[KR1.2]   He receives set up assistance at meals.[KR1.6]  Per pt he occasionally gets some outside food brought in.[KR1.3]      LABS  Labs reviewed[KR1.1]  (7/30) Vitamin D deficiency screen 21 (low end normal range)[KR1.7]    MEDICATIONS  Medications reviewed[KR1.1]  Prednisone daily[KR1.7]    ANTHROPOMETRICS  Height: 175.3 cm (5' 9\")  Most Recent Weight: " 71 kg (156 lb 9.6 oz)    [facility] Admission Weight (7/28)  75.3 kg/166 lbs  IBW: 160 lbs  BMI: Normal BMI  Weight History:  Per chart review, hospital admission weight[KR1.1] (7/21)  71.7 kg/158 lbs 1.1 oz which is consistent with last available weight.  ? Accuracy of [facility] admission weight.  UBW:[KR1.4]  160-165 lbs per pt.[KR1.3]   Current weight trends appear decreased about 10 lbs compared to 15 months ago per hx below.[KR1.2]    Wt Readings from Last 10 Encounters:   08/04/18 71 kg (156 lb 9.6 oz)   07/25/18 71 kg (156 lb 8.4 oz)   05/02/17 76.1 kg (167 lb 12.3 oz)   04/03/17 75.8 kg (167 lb)   09/11/13 75.6 kg (166 lb 11.2 oz)   12/21/11 78.6 kg (173 lb 3.2 oz)[KR1.8]       Dosing Weight:[KR1.1] 71[KR1.4] kg    ASSESSED NUTRITION NEEDS  Estimated Energy Needs:[KR1.1] 8378-7466[KR1.2] kcals/day ([KR1.1]25 - 30 kcals/kg[KR1.4])  Justification:[KR1.1] Maintenance[KR1.4]  Estimated Protein Needs:[KR1.1] >/=85[KR1.2] grams protein/day ([KR1.1] >/=[KR1.2]1.2 grams of pro/kg[KR1.4])  Justification:[KR1.1] Maintenance[KR1.4],[KR1.2] skin integrity[KR1.9]  Estimated Fluid Needs: ([KR1.1]1 mL/kcal[KR1.4])   Justification:[KR1.1] Per provider pending fluid status[KR1.4]    PHYSICAL FINDINGS  See malnutrition section below.[KR1.1]  Per RN note on admission to [facility] (7/28):  Mepilex on coccyx for protection, skin intact and no pinkness noted. Mepilex on L hand covering skin tear.[KR1.9]     MALNUTRITION  % Intake:[KR1.1] Decreased intake does not meet criteria[KR1.2]  % Weight Loss:[KR1.1] Weight loss does not meet criteria[KR1.3]  Subcutaneous Fat Loss:[KR1.1] None observed[KR1.3]  Muscle Loss:[KR1.1] None observed[KR1.3]  Fluid Accumulation/Edema:[KR1.1] None noted[KR1.3]  Malnutrition Diagnosis:[KR1.1] Patient does not meet two of the above criteria necessary for diagnosing malnutrition[KR1.3]    NUTRITION DIAGNOSIS[KR1.1]  Predicted suboptimal nutrient intakes[KR1.3] related to[KR1.1] decreased appetite and LOS[KR1.3] as  evidenced by[KR1.1] ordering smaller meals.[KR1.3]       INTERVENTIONS  Implementation  Nutrition Education:[KR1.1]  Encouraged intakes of tid meals.  Briefly discussed sodium content of fast food and frozen meals.[KR1.3]       Goals[KR1.1]  Patient to consume % of nutritionally adequate meal trays TID, or the equivalent with supplements/snacks.[KR1.3]     Monitoring/Evaluation  Progress toward goals will be monitored and evaluated per protocol.[KR1.1]    Omayra Bassett RD, LD[KR1.4]     Revision History        User Key Date/Time User Provider Type Action    > KR1.3 2018  4:20 PM Omayra Bassett RD Registered Dietitian Sign     KR1.9 2018  3:38 PM Omayra Bassett, JASPER Registered Dietitian      KR1.2 2018  3:26 PM Omayra Bassett, JASPER Registered Dietitian      KR1.6 2018  3:21 PM Omayra Bassett, JASPER Registered Dietitian      KR1.5 2018  3:19 PM Omayra Bassett, RD Registered Dietitian      KR1.7 2018  3:08 PM Omayra Bassett, JASPER Registered Dietitian      KR1.4 2018  3:03 PM Omayra Bassett, RD Registered Dietitian      KR1.8 2018  2:59 PM Omayra Bassett, RD Registered Dietitian      KR1.1 2018  2:56 PM Omayra Bassett, RD Registered Dietitian                   Procedure Notes     No notes of this type exist for this encounter.         Progress Notes - Therapies (Notes from 18 through 18)      Progress Notes by Kely Brown PT at 2018  9:12 AM     Author:  Kely Brown PT Service:  (none) Author Type:  Physical Therapist    Filed:  2018  9:14 AM Date of Service:  2018  9:12 AM Creation Time:  2018  9:12 AM    Status:  Signed :  Kely Brown PT (Physical Therapist)         Postural Assessment Scale for Stroke    Maintaining a posture  1. Sitting without support: 3  2. Standing with support: 3  3. Standing without support: 2  4. Standing on nonparetic le  5. Standing on paretic  le    Changing posture  6. Supine to affected side lateral: 3  7. Supine to nonaffected side lateral: 3  8. Supine to sitting up on the edge of the table: 3  9. Sitting on the edge of the table to supine: 3  10. Sitting to standing up: 3  11. Standing up to sitting down:3  12. Standing, picking up a pencil from the floor: 1    Total score:      Statistical improvement from  on 18[KT1.1]         Revision History        User Key Date/Time User Provider Type Action    > KT1.1 2018  9:14 AM Kely Brown, PT Physical Therapist Sign

## 2018-07-28 NOTE — PROGRESS NOTES
"SPIRITUAL HEALTH SERVICES  SPIRITUAL ASSESSMENT Progress Note  [facility] [place]   ON-CALL VISIT    REFERRAL SOURCE: Hospital  request by patient/family at time of admission.     Patient Ray just transferred from [facility], his daughter Dorcas was in the room helping him get settled. Ray said that he is looking forward to returning home but that he'll \"be here for a while, I guess\". He spoke about his support from his [employer] community at [facility] and from his two daughters.    PLAN: Notify unit  for follow up visits.    Ruchi Osborne   Intern  Pager [phone]    "

## 2018-07-28 NOTE — PROGRESS NOTES
Social Work Progress Note  Pt chart reviewed. Pt discussed in interdisciplinary rounds.     Intervention: Writer spoke to Sofy at [facility], and she confirmed that there is a bed available for pt today. Sofy requested a 1300 transport time. Writer met with pt and she shared that family will provide him transportation at 1300.     Team members notified: Bedside RN, CC, & HUC     Plan:  Anticipated Discharge Placement: [facility]   [address] [place] [address]  [phone]  Barriers: None identified at this time.   Follow-up plan:  No further plan to follow. Sw available should a need arise.     IVORY Long, [facility]  [phone]  10:20 AM

## 2018-07-28 NOTE — PHARMACY-ANTICOAGULATION SERVICE
Clinical Pharmacy - Warfarin Dosing Consult     Pharmacy has been consulted to manage this patient s warfarin therapy.  Indication: Other - specify in comments (Lt vertebral artery occlusion and stroke)  Therapy Goal: INR 2-3  Warfarin Prior to Admission: No, however, received 2 doses of warfarin 6 mg at [facility].   Significant drug interactions: Lovenox-Increased risk of bleeding.     INR   Date Value Ref Range Status   07/28/2018 1.15 (H) 0.86 - 1.14 Final   07/27/2018 1.15 (H) 0.86 - 1.14 Final       Recommend warfarin 7.5 mg today.  Pharmacy will monitor Shamir Concepcion daily and order warfarin doses to achieve specified goal.      Please contact pharmacy as soon as possible if the warfarin needs to be held for a procedure or if the warfarin goals change.      Shukri Hill, PharmD, BCPS

## 2018-07-28 NOTE — PLAN OF CARE
Problem: General Rehab Plan of Care  Goal: Rehab Nursing Goal  The patient and/or their representative will achieve their patient-specific goals related to the plan of care.  The patient-specific goals include:   FOCUS/GOAL  Bladder management    ASSESSMENT, INTERVENTIONS AND CONTINUING PLAN FOR GOAL:  Admitted to ARU via w/c et daughter. Orientation to room et call l;ight system. Able to demonstrate call light use. Transfers with two staff. Denies pain.

## 2018-07-28 NOTE — H&P
[facility]   Acute Rehabilitation Unit  Admission History and Physical    chief complaint   Stroke     History of Present illness  Shamir Concepcion is a 78 year old right hand dominant male who was admitted on 7/20 after a fall associated with nausea, dizziness and imbalance. Work-up showed left cerebellar ischemic stroke. He received aspirin 324 mg x1, and Plavix 300 mg p.o. x1. Then was started on warfarin for secondary prevention. Hospital course was complicated by cerebral edema requiring ICU stay and hypertonic saline, TAYLOR, UTI and coccygeal pressure injury.     During his acute hospitalization, patient was seen and evaluated by PT, OT, SLP and PM&R consult service.  All specialties collectively recommended that patient would benefit from ongoing therapies in the acute inpatient rehabilitation setting.      In review of the therapy notes,   PT: Pt requires Shamir for supine>sit and sit<>stand from EOB to FWW with cues for technique and midline posture once standing. Gait training with FWW and modA for balance and walker management followed closely by a w/c. Continues to lean to his L and demonstrates LE ataxia with gait but improved gait distance when able to ambulate along a straight path.    OT: pt completed supine to sit EOB SBA, Shamir of 2 sit to stand, with use of FWW pt completed amb forward ~ 5' Shamir of 2, pt stood x ~ 5 mins while looking outside the window to enjoy the view. Pt required cues to keep walker on floor and push FWW vs picking up to advance. Pt min/mod A to turn and amb ~ 2' toward EOB in prep for mobility to bathroom, pt c/o increase fatigue and LE weakness, max A to guide pt to sit safely at EOB, after rest pt completed sit to stand and amb over to chair with FWW min A of 2.     SLP: Vocal quality clear and pt recalled throat clear discussed yesterday. Pt noted to be reclined in bed and pt reported feeling it was difficult to eat breakfast reclined.  Pt shown how to adjust the bed or call the nurse for assistance. No overt s/sx of aspiration with thin liquids with pt assisted to upright position. Pt verbalized agreement to be upright in a chair for all meals with continued regular diet/thin liquids. No further IP SLP needs identified. Will defer full cognitive-linguistic evaluation to the next level of care.     Currently, the patient is medically appropriate and is assessed to have needs and will benefit from an inpatient acute rehabilitation comprehensive program working with PT, OT and SLP, and will also benefit from supervision and management of Rehab Nursing and Rehab MD.       PAST Medical History  Past Medical History:   Diagnosis Date     Allergic rhinitis      BPH (benign prostatic hyperplasia)      Colon polyps      ED (erectile dysfunction)      Family history of colon cancer      PAD (peripheral artery disease) (H)      RA (rheumatoid arthritis) (H)      Seronegative rheumatoid arthritis (H)        Surgical History  Past Surgical History:   Procedure Laterality Date     ANGIOGRAM       C MRA UPPER EXTREMITY WO&W CONT  stenting right SFA-AK pop     COLONOSCOPY      polyps     ENT SURGERY      T&A     LAMINECT/DISCECTOMY, LUMBAR       ORTHOPEDIC SURGERY       PHACOEMULSIFICATION CLEAR CORNEA WITH STANDARD INTRAOCULAR LENS IMPLANT Left 4/4/2017    Procedure: PHACOEMULSIFICATION CLEAR CORNEA WITH STANDARD INTRAOCULAR LENS IMPLANT;  Surgeon: Stevie Espinal MD;  Location: [facility]     PHACOEMULSIFICATION CLEAR CORNEA WITH STANDARD INTRAOCULAR LENS IMPLANT Right 5/2/2017    Procedure: PHACOEMULSIFICATION CLEAR CORNEA WITH STANDARD INTRAOCULAR LENS IMPLANT;  RIGHT EYE PHACOEMULSIFICATION CLEAR CORNEA WITH STANDARD INTRAOCULAR LENS IMPLANT;  Surgeon: Stevie Espinal MD;  Location:  [facility]     SINUS SURGERY       VASCULAR SURGERY  R SFA stenting  2012       SOCIAL HISTORY  Marital Status:    Living situation: lives in an apartment with elevator  "access   Family support: two daughters live close and are supportive   Vocational History: retired; worked in sales   Tobacco use: quit 1999  Alcohol use: wine  Illicit drug use: none       FAMILY HISTORY  Family History   Problem Relation Age of Onset     Cancer Mother      Cerebrovascular Disease Father          Prior Functional history   Pt was independent with all ADLs/IADLs, transfers, mobility and gait. Right hand-dominant; drives. Managed meds and finances all.     Medications  Prescriptions Prior to Admission   Medication Sig Dispense Refill Last Dose     Finasteride (PROSCAR PO) Take 5 mg by mouth daily   7/20/2018 at Unknown time     fluticasone (FLONASE ALLERGY RELIEF) 50 MCG/ACT spray Spray 1 spray into both nostrils daily   7/20/2018 at Unknown time     FOLIC ACID PO Take 400 mcg by mouth daily Patient gets this medication OTC.   7/20/2018 at Unknown time     tamsulosin (FLOMAX) 0.4 MG capsule 1 tab twice daily   7/20/2018 at x1     [DISCONTINUED] Aspirin (ECOTRIN LOW STRENGTH PO) Take 1 tablet by mouth daily 81 MG   7/20/2018 at Unknown time       ALLERGIES   No Known Allergies      Review of Systems  A 10 point ROS was performed and negative unless otherwise noted in HPI.        Physical Exam  VITAL SIGNS:  There were no vitals taken for this visit.  BMI:  Estimated body mass index is 23.11 kg/(m^2) as calculated from the following:    Height as of 5/2/17: 1.753 m (5' 9\").    Weight as of 7/25/18: 71 kg (156 lb 8.4 oz).     General: NAD, pleasant and cooperative   HEENT: ATNC, oropharynx clear with MMM, EOMI, PERRL -- dressing on right internal jugular IV access   Pulmonary: clear breath sounds b/l, no rales/wheezing    Cardiovascular: RRR, no murmur   Abdominal: soft, non-tender, non-distended   Extremities: warm, well perfused, no edema in bilateral lower extremities, no tenderness in calves   MSK/neuro:   Mental Status:  alert and oriented x3    Cranial Nerves: grossly normal    Sensory: Normal to " light touch in bilateral upper and lower extremities; no sensory neglect    Strength: 5/5 in all muscle groups of the upper and lower extremities    Reflexes: 3+ at b/l biceps and brachioradialis. 2+ at b/l patellar and 1+ Achilles   Abnormal movements: None    Coordination: obvious dysmetria on finger to nose and HTS b/l   Speech: slightly dysarthric, communicates well    Cognition: able to recall his meds but not completely able to given detailed medical history       Labs  Pertinent labs     Lab Results   Component Value Date    WBC 10.3 07/25/2018    HGB 12.6 (L) 07/25/2018    HCT 36.9 (L) 07/25/2018    MCV 93 07/25/2018     07/27/2018     Lab Results   Component Value Date     07/26/2018    POTASSIUM 3.5 07/27/2018    CHLORIDE 103 07/26/2018    CO2 26 07/26/2018     07/26/2018     Lab Results   Component Value Date    GFRESTIMATED 74 07/27/2018    GFRESTBLACK 90 07/27/2018     Lab Results   Component Value Date    AST 21 07/21/2018    ALT 13 07/21/2018    ALKPHOS 101 07/21/2018    BILITOTAL 0.7 07/21/2018     Lab Results   Component Value Date    INR 1.15 (H) 07/28/2018     Lab Results   Component Value Date    BUN 19 07/26/2018    CR 0.97 07/27/2018         ASSESSMENT/PLAN:  Shamir Concepcion is a 78 year old right hand dominant male who was admitted on 7/20/18 with 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  Hospital course was complicated by cerebral edema requiring ICU stay and hypertonic saline, TAYLOR, UTI and coccygeal pressure injury. PMH significant for PAD s/p stenting, RA, OA, and BPH: HTN is a new diagnosis.       Admission to acute inpatient rehab 07/28/18 .    Impairment group code: 01.1      1. PT, OT and SLP 60 minutes of each on a daily basis, in addition to rehab nursing and close management of physiatrist.      2. Impairment of ADL's: OT for 60 minutes daily to work on ADL re-training such as grooming, self cares and bathing.       3. Impairment of mobility:  PT for 60 minutes daily to work on neuromuscular re-education focusing on strength, balance, coordination, and endurance.      4. Impairment of cognition/language:  SLP for 60 minutes daily to work on cognitive and linguistic deficits.    5. Rehab RN for med administration, VS monitoring, bowel regimen, patient education.    6. 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 Dysphagia improved. Possible cognitive deficits.     --continue warfarin with lovenox bridge; INR goal 2-3  --continue HTN and HLD control as below for secondary prevention  --f/u in neurology clinic as outpatient with repeat CT angiogram in 6 months to evaluate the vasculature within his neck    # HTN: new diagnosis per his report. Currently on amlodipine 10 and losartan 25 daily. Will monitor and adjust accordingly; consider to increase losartan and sop amlodipine to simplify his regimen. Will monitor Cr as he had slight increase in Cr with higher dose of ACEi. Long term goal <130/80    # HLD: LDL 88. Continue crestor.     # Seronegative RA: well controlled and is followed by a rheumatologist. On methotrexate and prednisone prior to admission. Methotrexate on hold; continue prednisone 5mg daily.     # H/o BPH s/p TURP 6/2018: nocturia and frequency during the day. Currently on flomax 0.4 bid and finasteride.     # Allergic rhinitis: on Flonase prior to admission which was continued.     # H/o peripheral artery disease, status post stent to R SFA in 09/2013    # Pressure Injury (PI) located on midline coccyx: was seen by WOCN who recommended PIP measures including use of chair cushion and repositioning measures- avoid direct, supine positioning, etc. Continues to use Mepilex Sacral Dressings. Healed upon admission to ARU.      7. FEN: regular diet   8. Bowel: constipated upon admission; prn and  scheduled bowel meds and supp/   9. Bladder: continent/incontinenet; was treated for UTI in the acute hospital (completed 5 day course of ceftriaxone). Will check PVRs and start timed toileting.  10. DVT Prophylaxis: on warfarin   11. GI Prophylaxis: will add protonix given his age and warfarin   12. Code: DNR  13. Disposition: home   14. ELOS:  10-14 days .  15. Follow up Appointments on Discharge: PCP, neurology, and PM&R.    Lana Pavon MD  Physical Medicine & Rehabilitation    95 minutes spent with admission, more than 50% in direct patient interaction and education, the remainder in coordination of care.

## 2018-07-28 NOTE — PROVIDER NOTIFICATION
MD Notification    Notified Person: MD    Notified Person Name: Shauna    Notification Date/Time: 7/28/18 9:28 a.m.    Notification Interaction: FYI page per SW we have an ARU bed available for this patient today if medically stable    Purpose of Notification: Bed available for today asking for discharge orders with discharge summary as ARU would like the patient to leave [facility] by ~13:00  Orders Received: MD to work on discharge orders updated SW    Comments:

## 2018-07-28 NOTE — H&P
Post Admission Physician Evaluation:    I have compared Shamir Concepcion's condition on admission to acute rehabilitation to that outlined in the preadmission screen. History and physical exam performed by me. No significant differences are identified and the patient remains appropriate for an inpatient rehabilitation facility level of care to manage medical issues and address functional impairments due to (rehabilitation diagnosis) left PICA ischemic stroke.    Comorbid medical conditions being managed: HTN, HLD, RA, h/o BPH    Prior functional level: I with all aspects of his life. Drives.     Present function: min to mod A with transfers and mobility. Full cognitive assessment if pending. Passed swallowing goals.    Anticipated rehabilitation course: improvement to mod I level for mobility and ADLs. No driving and will also help with other iADLs like med management and finances.     Will benefit from intensive rehabilitation includin minutes each of PT, OT and SLP  Rehabilitation nursing  Close management by physiatry    Prognosis: good medical and rehab prognosis     Estimated length of stay: 10-14 days     Lana Pavon MD  Physical Medicine & Rehabilitation

## 2018-07-29 LAB — INR PPP: 1.4 (ref 0.86–1.14)

## 2018-07-29 PROCEDURE — 25000132 ZZH RX MED GY IP 250 OP 250 PS 637

## 2018-07-29 PROCEDURE — 97535 SELF CARE MNGMENT TRAINING: CPT | Mod: GO | Performed by: OCCUPATIONAL THERAPIST

## 2018-07-29 PROCEDURE — 97166 OT EVAL MOD COMPLEX 45 MIN: CPT | Mod: GO | Performed by: OCCUPATIONAL THERAPIST

## 2018-07-29 PROCEDURE — 12800006 ZZH R&B REHAB

## 2018-07-29 PROCEDURE — 97112 NEUROMUSCULAR REEDUCATION: CPT | Mod: GP | Performed by: PHYSICAL THERAPIST

## 2018-07-29 PROCEDURE — 25000128 H RX IP 250 OP 636: Performed by: PHYSICAL MEDICINE & REHABILITATION

## 2018-07-29 PROCEDURE — 40000133 ZZH STATISTIC OT WARD VISIT: Performed by: OCCUPATIONAL THERAPIST

## 2018-07-29 PROCEDURE — 85610 PROTHROMBIN TIME: CPT | Performed by: PHYSICAL MEDICINE & REHABILITATION

## 2018-07-29 PROCEDURE — 40000193 ZZH STATISTIC PT WARD VISIT: Performed by: PHYSICAL THERAPIST

## 2018-07-29 PROCEDURE — 97530 THERAPEUTIC ACTIVITIES: CPT | Mod: GP | Performed by: PHYSICAL THERAPIST

## 2018-07-29 PROCEDURE — 40000225 ZZH STATISTIC SLP WARD VISIT: Performed by: SPEECH-LANGUAGE PATHOLOGIST

## 2018-07-29 PROCEDURE — 97116 GAIT TRAINING THERAPY: CPT | Mod: GP | Performed by: PHYSICAL THERAPIST

## 2018-07-29 PROCEDURE — 25000125 ZZHC RX 250: Performed by: PHYSICAL MEDICINE & REHABILITATION

## 2018-07-29 PROCEDURE — 92523 SPEECH SOUND LANG COMPREHEN: CPT | Mod: GN | Performed by: SPEECH-LANGUAGE PATHOLOGIST

## 2018-07-29 PROCEDURE — 25000132 ZZH RX MED GY IP 250 OP 250 PS 637: Performed by: PHYSICAL MEDICINE & REHABILITATION

## 2018-07-29 PROCEDURE — 97162 PT EVAL MOD COMPLEX 30 MIN: CPT | Mod: GP | Performed by: PHYSICAL THERAPIST

## 2018-07-29 PROCEDURE — 36415 COLL VENOUS BLD VENIPUNCTURE: CPT | Performed by: PHYSICAL MEDICINE & REHABILITATION

## 2018-07-29 RX ORDER — WARFARIN SODIUM 7.5 MG/1
7.5 TABLET ORAL
Status: COMPLETED | OUTPATIENT
Start: 2018-07-29 | End: 2018-07-29

## 2018-07-29 RX ADMIN — ROSUVASTATIN CALCIUM 20 MG: 20 TABLET ORAL at 08:03

## 2018-07-29 RX ADMIN — ENOXAPARIN SODIUM 70 MG: 80 INJECTION SUBCUTANEOUS at 07:49

## 2018-07-29 RX ADMIN — TAMSULOSIN HYDROCHLORIDE 0.4 MG: 0.4 CAPSULE ORAL at 20:21

## 2018-07-29 RX ADMIN — FLUTICASONE PROPIONATE 1 SPRAY: 50 SPRAY, METERED NASAL at 07:52

## 2018-07-29 RX ADMIN — WARFARIN SODIUM 7.5 MG: 7.5 TABLET ORAL at 17:42

## 2018-07-29 RX ADMIN — FINASTERIDE 5 MG: 5 TABLET, FILM COATED ORAL at 07:51

## 2018-07-29 RX ADMIN — PREDNISONE 5 MG: 5 TABLET ORAL at 08:03

## 2018-07-29 RX ADMIN — PANTOPRAZOLE SODIUM 40 MG: 40 TABLET, DELAYED RELEASE ORAL at 06:26

## 2018-07-29 RX ADMIN — AMLODIPINE BESYLATE 10 MG: 10 TABLET ORAL at 07:50

## 2018-07-29 RX ADMIN — TAMSULOSIN HYDROCHLORIDE 0.4 MG: 0.4 CAPSULE ORAL at 08:03

## 2018-07-29 RX ADMIN — SENNOSIDES AND DOCUSATE SODIUM 2 TABLET: 8.6; 5 TABLET ORAL at 08:04

## 2018-07-29 RX ADMIN — POLYETHYLENE GLYCOL 3350 17 G: 17 POWDER, FOR SOLUTION ORAL at 08:03

## 2018-07-29 RX ADMIN — LOSARTAN POTASSIUM 25 MG: 25 TABLET, FILM COATED ORAL at 07:49

## 2018-07-29 RX ADMIN — ENOXAPARIN SODIUM 70 MG: 80 INJECTION SUBCUTANEOUS at 20:22

## 2018-07-29 ASSESSMENT — ACTIVITIES OF DAILY LIVING (ADL): PREVIOUS_RESPONSIBILITIES: MEAL PREP;HOUSEKEEPING;LAUNDRY;SHOPPING;MEDICATION MANAGEMENT;FINANCES;DRIVING

## 2018-07-29 NOTE — PLAN OF CARE
Problem: Goal/Outcome  Goal: Goal Outcome Summary  Outcome: Therapy, progress toward functional goals as expected  OT evaluation completed with limits to independence with ADL due to decreased balance, changes in cognition, and visual attention/field deficits.  Pt will benefit from skilled OT to DC home in approximately 7 days with support for all IADL including cleaning, meal prep, household management, and transportation and further OT.    OF note, pt also making frequent depressive statements regarding wishing he was dead with his wife though when questioned reports no plans of suicide etc.  Request a health psych evaluation.

## 2018-07-29 NOTE — PROGRESS NOTES
07/29/18 0819   Quick Adds   Type of Visit Initial Occupational Therapy Evaluation   Living Environment   Lives With alone   Living Arrangements condominium   Home Accessibility no concerns   Number of Stairs to Enter Home 0   Number of Stairs Within Home 0   Transportation Available car   Living Environment Comment Has both walk in shower and tub/shower available and shower chair.  Pt was however only doing showers at the [facility] daily after exercise prior to this.  [place]   Self-Care   Dominant Hand right   Regular Exercise yes   Activity/Exercise Type biking;walking;strength training   Exercise Amount/Frequency 1 hr;daily   Activity/Exercise/Self-Care Comment Pt went daily to the [facility] using a variety of the machine available, bike, eliptical, treadmill, and sometimes weights.   Functional Level Prior   Ambulation 0-->independent   Transferring 0-->independent   Toileting 0-->independent   Bathing 0-->independent   Dressing 0-->independent   Eating 0-->independent   Communication 0-->understands/communicates without difficulty   Swallowing 0-->swallows foods/liquids without difficulty   Cognition 0 - no cognition issues reported   Fall history within last six months yes   Number of times patient has fallen within last six months 1   Which of the above functional risks had a recent onset or change? ambulation;transferring;toileting;bathing;dressing;cognition;fall history   General Information   Referring Physician Savanah   Patient/Family Goals Statement TO get back home taking care of myself   Additional Occupational Profile Info/Pertinent History of Current Problem OT: Pt with left cerebellar ischemic stroke  (PICA d/t left vertebral artery occlusion) and resulting fall.  Pt's hospital course was complicated by cerebral edema, TAYLOR, UTI, an dcoccygeal pressure injury.  Prior to this hospitalization pt was daily going to the [facility], active in [employer], and caring for himself and  his condo indepenently.  Pt currently limited by continued dizziness, incoordination, cognition changes, and possible visual changes.  Pt will benefit from skilled OT to address deficits for return home with likely IADL support from family.   Precautions/Limitations fall precautions  (coccygeal pressure ulcer)   Cognitive Status Examination   Orientation orientation to person, place and time   Level of Consciousness alert   Personal Safety (Cognitive) mild impairment;decreased awareness, need for safety;decreased insight to deficits   Cognitive Comment 19/30 SLUMS   Visual Perception   Visual Perception Wears glasses   Visual Field upper right quardrant deficits   Visual Perception Comments Pt demonstrates upper right quadrant errors with red dot confrontation test but more diffuse errors with crowded word search.  Plan to further assess functionally and with BiVABA   Range of Motion (ROM)   ROM Comment AROM intact throughout UE   Strength   Strength Comments Great strength throughout left and right though right is less than left which likely represents a weakness for him.   Hand Strength   Left hand  (pounds) 55 pounds   Right hand  (pounds) 50 pounds   Coordination   Left hand, nine hole peg test (seconds) 48   Right hand, nine hole peg test (seconds) 65   ARC Assessment Only   Acute Rehab FIM See FIM scores for Mobility/ADL Assessment   Instrumental Activities of Daily Living (IADL)   Previous Responsibilities meal prep;housekeeping;laundry;shopping;medication management;finances;driving   IADL Comments OT: Pt was fully independent and active   Activities of Daily Living Analysis   Impairments Contributing to Impaired Activities of Daily Living balance impaired;cognition impaired;coordination impaired;fear and anxiety   General Therapy Interventions   Planned Therapy Interventions ADL retraining;IADL retraining;balance training;cognition;fine motor coordination training;groups;motor coordination  training;neuromuscular re-education;transfer training;visual perception;home program guidelines;progressive activity/exercise   Clinical Impression   Criteria for Skilled Therapeutic Interventions Met yes, treatment indicated   OT Diagnosis below baseline ADL function   Influenced by the following impairments decreased coordination, dizziness, change in cogition, possible visual changes.   Assessment of Occupational Performance 3-5 Performance Deficits   Identified Performance Deficits Pt requires assistance with all fucntional mobility. dressing, toileting, bathing, and all IADL   Clinical Decision Making (Complexity) Moderate complexity   Therapy Frequency daily   Predicted Duration of Therapy Intervention (days/wks) 7 days   Anticipated Equipment Needs at Discharge bathing equipment   Anticipated Discharge Disposition Home with Assist;Home with Home Therapy   Risks and Benefits of Treatment have been explained. Yes   Patient, Family & other staff in agreement with plan of care Yes   Total Evaluation Time   Total Evaluation Time (Minutes) 30

## 2018-07-29 NOTE — PLAN OF CARE
Problem: General Rehab Plan of Care  Goal: Rehab Nursing Goal  The patient and/or their representative will achieve their patient-specific goals related to the plan of care.  The patient-specific goals include: Voiding Pattern.  Patient will be able to void spontaneously, and have Post Void Residuals of <100ml.    Outcome: No Change  Patient had required straight-cathing during night shift.  This morning, following breakfast and first therapy session, he tried to empty his bladder at nursing request.  He was unable, (he did not feel that he needed to go).  Bladder aaun=586. Attempted straight cath with #16 Coude.  He was uncomfortable during insertion, and so attempt was stopped.  Planned to attempt with #14, but up with PT before RN returned to room. He did void during his session, per therapist.  PVR after return from DT=913 ml.  Will plan to have him dangle bedside next time he needs to void, or attempt void.  If that is not successful, will have him stand at bedside with walker and assist to attempt void.     1530:  Patient was able to void in PT following above note.  He has since been able to spontaneously void standing at bedside with walker, urinal, and assist of one. Have reviewed safety with him, and that he needs to call staff to assist him when he needs to void, to have assist getting to standing position.He verbalized understanding needing to use the call light.  Bed Alarm is on.   He has also been up to commode for soft BM.

## 2018-07-29 NOTE — PLAN OF CARE
Problem: Goal/Outcome  Goal: Goal Outcome Summary  Postural Assessment Scale for Stroke    Maintaining a posture  1. Sitting without support:3  2. Standing with support:3  3. Standing without support:0  4. Standing on nonparetic le  5. Standing on paretic le    Changing posture  6. Supine to affected side lateral:3  7. Supine to nonaffected side lateral:3  8. Supine to sitting up on the edge of the table:3  9. Sitting on the edge of the table to supine:3  10. Sitting to standing up:2  11. Standing up to sitting down:2  12. Standing, picking up a pencil from the floor:0    Total score:     /36

## 2018-07-29 NOTE — PROGRESS NOTES
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  Hospital course was complicated by cerebral edema requiring ICU stay and hypertonic saline, TAYLOR, UTI and coccygeal pressure injury. PMH significant for PAD s/p stenting, RA, OA, and BPH: HTN is a new diagnosis. \"   Precautions/Limitations fall precautions   General Observations alert and oriented   General Info Comments Pt seen for language evaluation s/p stroke per MD order.  Pt was evaluated by SLT for swallowing on 7/22/18 and cleared with regular diet and thin liquids.  Cognitive linguistic recommendations to follow.    Speech   Deficits in Speech Respiration None   Deficits in Articulation None   Language: Auditory Comprehension (understanding of spoken language)   Tests were administered at the following levels Moderate (routine daily activities);Complex (vocation/community/social activities)   Yes/No Sentence and Simple Paragraph; Herrick Diagnostic Aphasia Exam 3 short (out of 6 total) 6   Paragraph; Discourse Comprehension Test (out of 8 total; less than 7 is below mean) 8   Functional Assessment Scale (Auditory Comprehension) Minimal Impairment   Comments (Auditory Comprehension) Pt did well with formal tasks however had difficulty following specific directions on other tasks particulalry verbal expression.     Language: Verbal Expression (use of spoken language to express information)   Tests were administered at the following levels Moderate (routine daily activities);Complex (vocation/community/social activities)   Generate Sentences; [place] Test " "for Differential Diagnosis Of Aphasia (out of 6 total) 6   Generative Naming Score; Cognitive Linguistic Quick Test 2   Generative Naming; Cognitive Linguistic Quick Test Result Below mean   Functional Assessment Scale (Verbal Expression) Moderate Impairment   Comments (Verbal Expression) Pt conversational which may hide more significant word-finding deficits.  Pt generated complete sentences with accurate grammar however tended to perseverate on the word \"Coat\" usng the word or reference in all subsequent sentences.  Pt generate 7 animals and 1 \"m\" word during generative naming tasks.  Pt generated more proper noun \"m\" words not fully understanding initial instructions.     Reading Comprehension (understanding of written language)   Tests were administered at the following levels Complex (vocation/community/social activities)   Practical Reading (out of 7 total) 3   Functional Assessment Scale (Reading Comprehension) Moderate to Severe Impairment   Comments (Reading Comprehension) Significant difficulty reading and interpreting information on medicine bottle.  Unable to distinguish between pt identified on the bottle and physician.  Visual deficits are suspected although pt denies stating that all words were clear.  Will investigate further during informal cognitive assesment     Written Expression (use of writing to express information)   Tests were administered at the following levels Moderate (routine daily activities)   Comments (Written Expression) Writing assessed during reading comprehension task. Deficits in both handwriting and generating written thoughts were noted.     Pragmatics (the social or functional use of a language)   Deficits noted in Nonverbal None   Cognitive Status Examination   Attention impaired   Behavioral Observations distractible   Orientation time, date, place   Visual Impairments Include visual tracking;localization of objects in the visual field;visual scanning  (pt denies)   Short " Term Memory impaired   Long Term Memory impaired   Reasoning impaired   Additional cognitive-linguistic evaluation indicated  yes;recommend;RBANS   General Therapy Interventions   Planned Therapy Interventions Cognitive Treatment;Language   Cognitive treatment Internal memory strategy training;External memory strategy training;Progressive attention training   Language Reading comprehension;Verbal expression;Written expression   Clinical Impression, SLP Eval   Criteria for Skilled Therapeutic Interventions Met Yes;Treatment indicated   SLP Diagnosis moderate cognitive linguitic deficits   Influenced by the following factors/impairments stroke   Functional limitations due to impairments ADLs   Rehab Potential Good, to achieve stated therapy goals   Rehab potential affected by ongoing medical issues   Therapy Frequency Daily   Predicted Duration of Therapy Intervention (days/wks) 7 days   Anticipated Discharge Disposition Home with Outpatient Therapy   Risks and Benefits of Treatment have been explained. Yes   Patient, Family & other staff in agreement with plan of care Yes   Clinical Impression Comments Pt presents with moderate level cognitive linguistic deficits in areas of language (reading, writing, speaking) as well as suspected cognitive areas of attention, memory, and problem solving.  Additional cognitive testing is approrpiate following language tx.  Visual deficits also suspected although pt denies difficulty.  Further investigation is needed in this regard.  Collaboration with OT is recommended.     Total Evaluation Time      Total Evaluation Time (Minutes) 60

## 2018-07-29 NOTE — PLAN OF CARE
Problem: Goal/Outcome  Goal: Goal Outcome Summary  Outcome: No Change  FOCUS/GOAL  Bowel management, Bladder management, Nutrition/Feeding/Swallowing precautions, Medication management and Pain management    ASSESSMENT, INTERVENTIONS AND CONTINUING PLAN FOR GOAL:  Pt a&ox4. Denied pain. Ao1 with bed mobility. Uses urinal ind in bed and staff empty. Per report pt is ao1-2 transfer with a walker. Pt remained in bed this shift Continent of urine using the urinal ind. Pt reports eating 50% of dinner, took medications whole with water. Mepilex on coccyx for protection, skin intact and no pinkness noted. Mepilex on L hand covering skin tear. Alarms on for safety.

## 2018-07-29 NOTE — PROGRESS NOTES
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  Hospital course was complicated by cerebral edema requiring ICU stay and hypertonic saline, TAYLOR, UTI and coccygeal pressure injury. PMH significant for PAD s/p stenting, RA, OA, and BPH: HTN is a new diagnosis. Admission to acute inpatient rehab 07/28/18 .     --Vitals stable. INR 1.4    --Continue ongoing medical management. Discussed with his daughter to get contact information for urology group where he had the procedure done in June. Should confirm the plan regarding his current urinary retention; SIC vs yeh. For now will continue bladder program with time toileting, double voiding and SIC as needed.     --Continue therapies and plan of care. Started his course today and doing well.              Interval history:   Doing well. Slept ok. Denies fever, chills, CP, SOB, N/V, abdominal pain, new pain or weakness/numbness/tingling. Unable to give detailed history regarding his urinary symptoms after his surgery and before hospitalization for stroke.             Physical Exam:     Vitals:    07/28/18 1544 07/28/18 2049 07/28/18 2326 07/29/18 0713   BP: 119/58 133/63 116/48 153/61   BP Location: Right arm Right arm Left arm Right arm   Pulse: 60 77 62 59   Resp: 16 16 18 16   Temp: 96.2  F (35.7  C) 98.1  F (36.7  C) 97.8  F (36.6  C) 97.8  F (36.6  C)   TempSrc: Oral Oral Oral Oral   SpO2: 96% 94% 93% 95%   Weight:       Height:         Gen: NAD, sitting in chair   Lungs: non-labored in room air   Abd: soft and non-tender  Ext: wwp, no edema in BLE, no tenderness in calves  MSK/neuro: alert and partially oriented. speech slow but intelligible. moves BUE and BLE volitionally.          Data:   Scheduled meds     amLODIPine  10 mg Oral Daily     enoxaparin  70 mg Subcutaneous Q12H     finasteride (PROSCAR) tablet 5 mg  5 mg Oral Daily     fluticasone  1 spray Both Nostrils Daily     losartan  25 mg Oral Daily     pantoprazole  40 mg Oral QAM AC     polyethylene glycol  17 g Oral Daily     predniSONE  5 mg Oral Daily     rosuvastatin  20 mg Oral or NG Tube Daily     tamsulosin  0.4 mg Oral BID     warfarin  7.5 mg Oral ONCE at 18:00       PRN meds:  acetaminophen, bisacodyl, magnesium hydroxide, - MEDICATION INSTRUCTIONS -, senna-docusate, Warfarin Therapy Reminder      Lana Pavon MD  Physical Medicine and Rehabilitation

## 2018-07-29 NOTE — PLAN OF CARE
Problem: Patient Care Overview  Goal: Plan of Care/Patient Progress Review  Pt's chart reviewed, pt evaluated and POC established.  Pt presents to ARU following L cerebellar stroke limiting overall balance and coordination.  Pt has mid-line shift to L requiring increased A to prevent fall.  Pt does demonstrate good strength and ROM.    Home:  ELS is 10 days with DC to home with OP PT.  Pt lives alone, but does have 2 daughters nearby for assist.    Barrier:  Balance and mid-line position    DME:  FWW or appropriate AD

## 2018-07-29 NOTE — PLAN OF CARE
Problem: Goal/Outcome  Goal: Goal Outcome Summary  Outcome: Improving  FOCUS/GOAL  Medical management    ASSESSMENT, INTERVENTIONS AND CONTINUING PLAN FOR GOAL:  Patient was admitted yesterday, he is alert and oriented but disoriented to time.  He used call light around 0423 and asked what time it was. Saw him 10 minutes later, he was awake, asking if it was time to eat. He was reoriented. Bed alarm is on. No c/o pain.   At 0200, offered him a urinal, he was unable to void bladder scan for 411. At 0215, he was incontinent of a small amount of urine bladder scan for  468 ml and straight cath for 450 ml. Par evening staff, patient transfers with assist of 1 and a walker.

## 2018-07-29 NOTE — PROGRESS NOTES
07/29/18 0937   Quick Adds   Type of Visit Initial PT Evaluation   Living Environment   Lives With alone   Living Arrangements condominium   Home Accessibility no concerns   Number of Stairs to Enter Home 0   Number of Stairs Within Home 0   Stair Railings at Home none   Transportation Available family or friend will provide;car   Living Environment Comment PT:  2 daughters in area for support   Self-Care   Dominant Hand right   Usual Activity Tolerance good   Current Activity Tolerance poor   Regular Exercise yes   Activity/Exercise Type biking;walking;strength training   Exercise Amount/Frequency 1 hr;daily   Equipment Currently Used at Home none   Activity/Exercise/Self-Care Comment PT:  Pt went to the [facility] daily with 30 mins of treadmill amb and weights for strength training.   Functional Level Prior   Ambulation 0-->independent   Transferring 0-->independent   Toileting 0-->independent   Fall history within last six months yes   Number of times patient has fallen within last six months 1   Which of the above functional risks had a recent onset or change? ambulation;transferring;toileting;fall history   Prior Functional Level Comment PT:  Pt Ind with all aspects of ADLs prior to admission.  1 fall 1 week ago falling fwd when bending over.   General Information   Onset of Illness/Injury or Date of Surgery - Date 07/20/18   Referring Physician Lana Pavon MD   Patient/Family Goals Statement PT:  Get back to where I was at befor all of this happened.   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  Hospital course was complicated by cerebral edema requiring ICU stay and hypertonic saline, TAYLOR, UTI and coccygeal pressure injury. PMH significant for PAD s/p stenting, RA, OA, and BPH: HTN is a new diagnosis.   Precautions/Limitations fall precautions    General Observations PT:  Pt very pleasent and motivated to work in therapy.   Cognitive Status Examination   Orientation orientation to person, place and time   Level of Consciousness alert   Follows Commands and Answers Questions 100% of the time;able to follow single-step instructions;75% of the time;able to follow multistep instructions   Personal Safety and Judgment intact   Memory intact   Cognitive Comment PT:  Pt is grossly cognitivly intact, but does have mild deficits underlaying.  PT will defer to OT and SLP for further exploration.   Pain Assessment   Patient Currently in Pain No   Integumentary/Edema   Integumentary/Edema no deficits were identifed   Posture    Posture Comments PT:  Pt stands with slight flexed posture w/ anterior lean.  Pt has mid-line shift to L sided that increases with fatigue.   Range of Motion (ROM)   ROM Comment PT:  Pt has very mild tightness equal in BLE, but WFL for all activities.   Strength   Strength Comments PT:  Pt demonstrtaes 5/5 strength equal on both sides.  Pt does demonstrate decreased functional endurance and eccentric control during mobility tasks.   ARC Assessment Only   Acute Rehab FIM See FIM scores for Mobility/ADL Assessment   Balance   Balance Comments PT:  Pt able to demonstrate fair sitting balance both static and dynamic.  Pt able to demonstrate fair static standing balance with min A.  Pt needed mod A for any dynamic standing balance due to mid-line shift.   Sensory Examination   Sensory Perception no deficits were identified   Coordination   Coordination Comments PT:  Pt has mild coordination deficits for both UE and LE equal B.   Muscle Tone   Muscle Tone no deficits were identified   Modality Interventions   Planned Modality Interventions TENS;Cryotherapy   Planned Modality Interventions Comments PRN for pain management   General Therapy Interventions   Planned Therapy Interventions balance training;bed mobility training;gait training;manual  therapy;neuromuscular re-education;ROM;strengthening;stretching;transfer training;wheelchair management/propulsion training;risk factor education;visual perception;home program guidelines;progressive activity/exercise   Clinical Impression   Criteria for Skilled Therapeutic Intervention yes, treatment indicated   PT Diagnosis Abnormality of gait and mobility   Influenced by the following impairments Decreased coordination, mid-line shift, decreased balance, decreased functional endurance, decreased eccentric control   Functional limitations due to impairments Decreased bed mobility, decreased transfers, decreased gait, decreased stairs   Clinical Presentation Evolving/Changing   Clinical Presentation Rationale Pt will continue to porgress as brain heals needing continued evaluation to mobility and skills.   Clinical Decision Making (Complexity) Moderate complexity   Therapy Frequency` 2 times/day   Predicted Duration of Therapy Intervention (days/wks) 10 days   Anticipated Equipment Needs at Discharge front wheeled walker   Anticipated Discharge Disposition Home with Outpatient Therapy   Risk & Benefits of therapy have been explained Yes   Patient, Family & other staff in agreement with plan of care Yes   Clinical Impression Comments Pt would benefit from skilled PT intervention in order to address aforementioned deficits.   Total Evaluation Time   Total Evaluation Time (Minutes) 35

## 2018-07-29 NOTE — PLAN OF CARE
Problem: Goal/Outcome  Goal: Goal Outcome Summary  SLP--Pt seen for cognitive linguistic testing.  Pt presents with moderate level cognitive linguistic deficits in areas of language (reading, writing, speaking) as well as suspected cognitive areas of attention, memory, and problem solving.  Additional cognitive testing is approrpiate following language tx.  Visual deficits also suspected although pt denies difficulty.  Further investigation is needed in this regard.  Collaboration with OT is recommended.      Pt cleared for regular diet with thin liquid at hospital (swallow evaluation completed 7/22/18).  SLT will not address at this time however will monitor should medical status change.    Pt was educated as to the role of the SLP and POC.  Pt verbally agreed with plan.  Pt will likely need re-education in this regard.

## 2018-07-30 LAB
ALBUMIN SERPL-MCNC: 2.8 G/DL (ref 3.4–5)
ANION GAP SERPL CALCULATED.3IONS-SCNC: 5 MMOL/L (ref 3–14)
BUN SERPL-MCNC: 12 MG/DL (ref 7–30)
CALCIUM SERPL-MCNC: 8.1 MG/DL (ref 8.5–10.1)
CHLORIDE SERPL-SCNC: 106 MMOL/L (ref 94–109)
CO2 SERPL-SCNC: 29 MMOL/L (ref 20–32)
CREAT SERPL-MCNC: 1 MG/DL (ref 0.66–1.25)
DEPRECATED CALCIDIOL+CALCIFEROL SERPL-MC: 21 UG/L (ref 20–75)
ERYTHROCYTE [DISTWIDTH] IN BLOOD BY AUTOMATED COUNT: 14.4 % (ref 10–15)
GFR SERPL CREATININE-BSD FRML MDRD: 72 ML/MIN/1.7M2
GLUCOSE SERPL-MCNC: 88 MG/DL (ref 70–99)
HCT VFR BLD AUTO: 38.9 % (ref 40–53)
HGB BLD-MCNC: 12.8 G/DL (ref 13.3–17.7)
INR PPP: 1.78 (ref 0.86–1.14)
MCH RBC QN AUTO: 31.1 PG (ref 26.5–33)
MCHC RBC AUTO-ENTMCNC: 32.9 G/DL (ref 31.5–36.5)
MCV RBC AUTO: 95 FL (ref 78–100)
PLATELET # BLD AUTO: 341 10E9/L (ref 150–450)
POTASSIUM SERPL-SCNC: 3.7 MMOL/L (ref 3.4–5.3)
RBC # BLD AUTO: 4.11 10E12/L (ref 4.4–5.9)
SODIUM SERPL-SCNC: 140 MMOL/L (ref 133–144)
WBC # BLD AUTO: 9.6 10E9/L (ref 4–11)

## 2018-07-30 PROCEDURE — 25000132 ZZH RX MED GY IP 250 OP 250 PS 637

## 2018-07-30 PROCEDURE — 25000132 ZZH RX MED GY IP 250 OP 250 PS 637: Performed by: PHYSICAL MEDICINE & REHABILITATION

## 2018-07-30 PROCEDURE — 40000193 ZZH STATISTIC PT WARD VISIT

## 2018-07-30 PROCEDURE — 82040 ASSAY OF SERUM ALBUMIN: CPT | Performed by: PHYSICAL MEDICINE & REHABILITATION

## 2018-07-30 PROCEDURE — 85610 PROTHROMBIN TIME: CPT | Performed by: PHYSICAL MEDICINE & REHABILITATION

## 2018-07-30 PROCEDURE — 97112 NEUROMUSCULAR REEDUCATION: CPT | Mod: GP | Performed by: STUDENT IN AN ORGANIZED HEALTH CARE EDUCATION/TRAINING PROGRAM

## 2018-07-30 PROCEDURE — 40000225 ZZH STATISTIC SLP WARD VISIT: Performed by: SPEECH-LANGUAGE PATHOLOGIST

## 2018-07-30 PROCEDURE — 80048 BASIC METABOLIC PNL TOTAL CA: CPT | Performed by: PHYSICAL MEDICINE & REHABILITATION

## 2018-07-30 PROCEDURE — 92507 TX SP LANG VOICE COMM INDIV: CPT | Mod: GN | Performed by: SPEECH-LANGUAGE PATHOLOGIST

## 2018-07-30 PROCEDURE — 12800006 ZZH R&B REHAB

## 2018-07-30 PROCEDURE — 82306 VITAMIN D 25 HYDROXY: CPT | Performed by: PHYSICAL MEDICINE & REHABILITATION

## 2018-07-30 PROCEDURE — 25000125 ZZHC RX 250: Performed by: PHYSICAL MEDICINE & REHABILITATION

## 2018-07-30 PROCEDURE — 97116 GAIT TRAINING THERAPY: CPT | Mod: GP

## 2018-07-30 PROCEDURE — 40000133 ZZH STATISTIC OT WARD VISIT: Performed by: OCCUPATIONAL THERAPIST

## 2018-07-30 PROCEDURE — 97112 NEUROMUSCULAR REEDUCATION: CPT | Mod: GP

## 2018-07-30 PROCEDURE — 36415 COLL VENOUS BLD VENIPUNCTURE: CPT | Performed by: PHYSICAL MEDICINE & REHABILITATION

## 2018-07-30 PROCEDURE — 85027 COMPLETE CBC AUTOMATED: CPT | Performed by: PHYSICAL MEDICINE & REHABILITATION

## 2018-07-30 PROCEDURE — 97530 THERAPEUTIC ACTIVITIES: CPT | Mod: GP

## 2018-07-30 PROCEDURE — 97535 SELF CARE MNGMENT TRAINING: CPT | Mod: GO | Performed by: OCCUPATIONAL THERAPIST

## 2018-07-30 PROCEDURE — 40000193 ZZH STATISTIC PT WARD VISIT: Performed by: STUDENT IN AN ORGANIZED HEALTH CARE EDUCATION/TRAINING PROGRAM

## 2018-07-30 PROCEDURE — 97530 THERAPEUTIC ACTIVITIES: CPT | Mod: GP | Performed by: STUDENT IN AN ORGANIZED HEALTH CARE EDUCATION/TRAINING PROGRAM

## 2018-07-30 PROCEDURE — 97110 THERAPEUTIC EXERCISES: CPT | Mod: GP | Performed by: STUDENT IN AN ORGANIZED HEALTH CARE EDUCATION/TRAINING PROGRAM

## 2018-07-30 PROCEDURE — 25000128 H RX IP 250 OP 636: Performed by: PHYSICAL MEDICINE & REHABILITATION

## 2018-07-30 RX ORDER — WARFARIN SODIUM 5 MG/1
5 TABLET ORAL
Status: COMPLETED | OUTPATIENT
Start: 2018-07-30 | End: 2018-07-30

## 2018-07-30 RX ADMIN — FLUTICASONE PROPIONATE 1 SPRAY: 50 SPRAY, METERED NASAL at 08:38

## 2018-07-30 RX ADMIN — ROSUVASTATIN CALCIUM 20 MG: 20 TABLET ORAL at 08:38

## 2018-07-30 RX ADMIN — MICONAZOLE NITRATE: 2 POWDER TOPICAL at 20:05

## 2018-07-30 RX ADMIN — PANTOPRAZOLE SODIUM 40 MG: 40 TABLET, DELAYED RELEASE ORAL at 06:19

## 2018-07-30 RX ADMIN — FINASTERIDE 5 MG: 5 TABLET, FILM COATED ORAL at 08:37

## 2018-07-30 RX ADMIN — TAMSULOSIN HYDROCHLORIDE 0.4 MG: 0.4 CAPSULE ORAL at 20:05

## 2018-07-30 RX ADMIN — TAMSULOSIN HYDROCHLORIDE 0.4 MG: 0.4 CAPSULE ORAL at 08:43

## 2018-07-30 RX ADMIN — POLYETHYLENE GLYCOL 3350 17 G: 17 POWDER, FOR SOLUTION ORAL at 08:37

## 2018-07-30 RX ADMIN — WARFARIN SODIUM 5 MG: 5 TABLET ORAL at 17:43

## 2018-07-30 RX ADMIN — ENOXAPARIN SODIUM 70 MG: 80 INJECTION SUBCUTANEOUS at 08:36

## 2018-07-30 RX ADMIN — MICONAZOLE NITRATE: 2 POWDER TOPICAL at 14:33

## 2018-07-30 RX ADMIN — ENOXAPARIN SODIUM 70 MG: 80 INJECTION SUBCUTANEOUS at 20:11

## 2018-07-30 RX ADMIN — PREDNISONE 5 MG: 5 TABLET ORAL at 08:36

## 2018-07-30 NOTE — PLAN OF CARE
Problem: Patient Care Overview  Goal: Plan of Care/Patient Progress Review  SLP present for language assessment. Pt completed a writing task in which he was required to reorder out of order numbers from smallest to largest and then write them down independently with ~70% accuracy. Given min verbal cues to order from smallest to largest, increased to 100% accuracy. SLP gave cue to move hand further down the pencil, which increased overall legibility. In two tasks in which the pt was required to read descriptions and then match them to described items, he did so independently with 85% accuracy. Increased to 100% given min cues for re-reading. SLP provided clinical counseling re grief, depression, skill level changes. Provided education re neuro plasticity.

## 2018-07-30 NOTE — PLAN OF CARE
Problem: Patient Care Overview  Goal: Plan of Care/Patient Progress Review  OT: Completed full shower and ADLs. Pt req'd min A with transferring on/off extended tub bench with FWW. Pt req'd assistance to wash/rinse/dry 3/10 body parts while standing with grab bars and SBA while seated on extended tub bench. Pt req'd CGA with toilet transfer with FWW. Pt continues to demonstrated Right inattention/neglect and leaning to the left while ambulating with FWW.

## 2018-07-30 NOTE — PLAN OF CARE
Problem: Goal/Outcome  Goal: Goal Outcome Summary  Patient is alert and oriented x 3. Denied any pain or discomfort. Mod assist of 1 for tranfers and ambulation with walker and gaitbelt. Still with high pvrs, ( not needed to be straight cathed this shift ) voiding utilizing a urinal standing at edge of bed with assistance. Very  dark urine, encouraged fluids. Nystatin powder to groin. Offers no care concerns at this time.

## 2018-07-30 NOTE — PLAN OF CARE
Problem: Individualization  Goal: Patient Individualization  Outcome: Improving  Alert and oriented to self, place and time. Incontinent of bladder contained in his brif x 1, bladder scanned for 175. At 0500 stood with assist and voided in the urinal 175, bladder scanned for 202. Patient slept most of the night, will continue POC

## 2018-07-30 NOTE — PLAN OF CARE
Problem: Patient Care Overview  Goal: Plan of Care/Patient Progress Review  Pt with significant L lean during transfers and especially gait. R neglect and some pushing as well.     For consistency during sit<>stands Ray is to use R hand to push up to stand, and reach back to sit. L hand on FWW

## 2018-07-30 NOTE — PROGRESS NOTES
Social Work: Initial Assessment with Discharge Plan    Patient Name: Shamir Concepcion  : 1940  Age: 78 year old  MRN: [id number]  Completed assessment with: Pt  Admitted to ARU: 2018    Presenting Information   Date of SW assessment: 2018  Health Care Directive: Copy in Chart  Primary Health Care Agent: Diana- doesn't list who this family member is.   Secondary Health Care Agent: Judith PERALTA   Living Situation: Pt lives alone in [facility]  Previous Functional Status: Pt was independent   DME available: See therapy notes   Patient and family understanding of hospitalization: Pt stated that he is here due to having a stroke. Pt stated that he is now walking with a walker and is hopeful rehab will make him better.   Cultural/Language/Spiritual Considerations: English speaking.       Physical Health  Reason for admission: Stroke (cerebrum) (H)    Provider Information   Primary Care Physician:Gia Ref-Primary, Physician- Pt's doctor retired and he was going to an [facility] clinic previously.   : JORDAN    Mental Health/Chemical Dependency:   Diagnosis: No concerns presented to writer   Alcohol/Tobacco/Narcotis: No concerns presented   Support/Services in Place: NA  Services Needed/Recommended: NA  Sexuality/Intimacy: Not discussed     Support System  Marital Status:    Family support: Pt has the support of his two DTR's Clarice and Dorcas.   Other support available: Friends in the community for verbal support.     Community Resources  Current in home services: NA  Previous services: NA    Financial/Employment/Education  Employment Status: Retired   Income Source: SSI  Education: Unknown   Financial Concerns:  No concerns presented   Insurance:  [employer].       Discharge Plan   Patient and family discharge goal: Pt's goal is to return home with continued support.   Provided Education on discharge plan: YES  Patient agreeable to discharge plan:  YES  Provided education and attained  signature for Medicare IM and IRF Patient Rights and Privacy Information provided to patient : YES  Provided patient with [place] [facility] [place] Resources: YES  Barriers to discharge: medical clearance, safe discharge plan, complete therapy goals.     Discharge Recommendations   Disposition: TBD based on progress  Transportation Needs: Family will provide.   Name of Transportation Company and Phone: NA    Additional comments   Pt is a 78 year old male, admitted to ARU following a stroke. Pt has good support with his DTR's Dorcas and Clarice. Pt's goal is to return home, and team working towards a safe discharge plan.     Please invite to Care Conference:  Dorcas DTR: [phone]      KATY Lee  Covering Acute Rehab  Phone: [phone]  Pager: [phone]       07/30/18 0900   Living Arrangements   Lives With alone   Living Arrangements house   Able to Return to Prior Living Arrangements yes   Home Safety   Patient Feels Safe Living in Home? yes   Discharge Planning   Expected Discharge Date (TBD)   Anticipated Discharge Disposition home with home health   Discharge Needs Assessment   Patient/family verbalizes understanding of discharge plan recommendations? Yes   Transportation Available car;family or friend will provide   Abuse Risk Screen   QUESTION TO PATIENT:  Has a member of your family or a partner(now or in the past) intimidated, hurt, manipulated, or controlled you in any way? no   QUESTION TO PATIENT: Do you feel safe going back to the place where you are living? yes   OBSERVATION: Is there reason to believe there has been maltreatment of a vulnerable adult (ie. Physical/Sexual/Emotional abuse, self neglect, lack of adequate food, shelter, medical care, or financial exploitation)? no   Mental Health Suicide Risk   Have you ever thought about hurting yourself now or in the past? no   Homicide Risk   Feels Like Hurting Others no

## 2018-07-30 NOTE — PLAN OF CARE
Problem: Goal/Outcome  Goal: Goal Outcome Summary  Pt a&ox3-some intermittent forgetfulness noted. Ao1 with a walker-does well with cuing. Pt has bladder retention, incontinent at times. Pt was unable to void and bladder scan was >500mL, when set up to st cath pt started voiding and was incontinent of urine and needed a full bed change then was able to void 150 in urinal. PVR at this time was 246mL. Will continue to monitor the bladder. Encouraging double voiding standing at bedside to use urinal. Pt had BM on day shift and declined PRN Senna tonight.

## 2018-07-30 NOTE — PROGRESS NOTES
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  Hospital course was complicated by cerebral edema requiring ICU stay and hypertonic saline, TAYLOR, UTI and coccygeal pressure injury. PMH significant for PAD s/p stenting, RA, OA, and BPH: HTN is a new diagnosis. Admission to acute inpatient rehab 07/28/18 .     --Vitals stable. INR 1.78    --Added miconazole twice daily for rash noted of the right anterior groin region.    --Continue therapies and plan of care.           Interval history:   Patient seen at bedside today.  Denies any pain.  Denies any problems sleeping.  Notes that his last bowel movement was on 7/29/2018.  Denies any urinary problems.  Denies any fevers or chills.  Denies any redness or warmth of the right hip.  Denies a rash of the right groin region.            Physical Exam:     Vitals:    07/29/18 0713 07/29/18 1622 07/30/18 0630 07/30/18 0834   BP: 153/61 132/64 151/65 101/55   BP Location: Right arm Right arm Right arm Right arm   Pulse: 59 71 56 61   Resp: 16 16 16    Temp: 97.8  F (36.6  C) 97.9  F (36.6  C) 96  F (35.6  C)    TempSrc: Oral Oral Oral    SpO2: 95% 96% 97%    Weight:       Height:         Gen: NAD, sitting in bid   Lungs: non-labored in room air   Skin; mildly erythematous rash noted of the anterior/inferior portion of the right groin         Data:   Scheduled meds    amLODIPine  10 mg Oral Daily     enoxaparin  70 mg Subcutaneous Q12H     finasteride (PROSCAR) tablet 5 mg  5 mg Oral Daily     fluticasone  1 spray Both Nostrils Daily     losartan  25 mg Oral Daily     pantoprazole  40 mg Oral QAM AC     polyethylene glycol  17 g Oral Daily     predniSONE  5 mg Oral Daily     rosuvastatin  20 mg Oral or NG Tube Daily      tamsulosin  0.4 mg Oral BID       PRN meds:  acetaminophen, bisacodyl, magnesium hydroxide, - MEDICATION INSTRUCTIONS -, senna-docusate, Warfarin Therapy Reminder    Colton Cruz DO, CAQSM    Department of Rehabilitation Medicine    I spent a total of 15 minutes bedside and on the inpatient unit today managing the care of the above patient.  Over 50% of my time on the unit was spent counseling the patient and/or coordinating care.  See note for details.

## 2018-07-31 LAB
ALBUMIN UR-MCNC: 30 MG/DL
APPEARANCE UR: CLEAR
BACTERIA #/AREA URNS HPF: ABNORMAL /HPF
BASOPHILS # BLD AUTO: 0.1 10E9/L (ref 0–0.2)
BASOPHILS NFR BLD AUTO: 0.9 %
BILIRUB UR QL STRIP: NEGATIVE
COLOR UR AUTO: YELLOW
DIFFERENTIAL METHOD BLD: ABNORMAL
EOSINOPHIL # BLD AUTO: 0 10E9/L (ref 0–0.7)
EOSINOPHIL NFR BLD AUTO: 0 %
ERYTHROCYTE [DISTWIDTH] IN BLOOD BY AUTOMATED COUNT: 14.3 % (ref 10–15)
GLUCOSE UR STRIP-MCNC: NEGATIVE MG/DL
HCT VFR BLD AUTO: 39.9 % (ref 40–53)
HGB BLD-MCNC: 13.2 G/DL (ref 13.3–17.7)
HGB UR QL STRIP: ABNORMAL
IMM GRANULOCYTES # BLD: 0.1 10E9/L (ref 0–0.4)
IMM GRANULOCYTES NFR BLD: 0.5 %
INR PPP: 1.98 (ref 0.86–1.14)
KETONES UR STRIP-MCNC: NEGATIVE MG/DL
LEUKOCYTE ESTERASE UR QL STRIP: NEGATIVE
LYMPHOCYTES # BLD AUTO: 1.9 10E9/L (ref 0.8–5.3)
LYMPHOCYTES NFR BLD AUTO: 20.5 %
MCH RBC QN AUTO: 31.4 PG (ref 26.5–33)
MCHC RBC AUTO-ENTMCNC: 33.1 G/DL (ref 31.5–36.5)
MCV RBC AUTO: 95 FL (ref 78–100)
MONOCYTES # BLD AUTO: 0.8 10E9/L (ref 0–1.3)
MONOCYTES NFR BLD AUTO: 8.8 %
NEUTROPHILS # BLD AUTO: 6.3 10E9/L (ref 1.6–8.3)
NEUTROPHILS NFR BLD AUTO: 69.3 %
NITRATE UR QL: NEGATIVE
NRBC # BLD AUTO: 0 10*3/UL
NRBC BLD AUTO-RTO: 0 /100
PH UR STRIP: 6 PH (ref 5–7)
PLATELET # BLD AUTO: 401 10E9/L (ref 150–450)
RBC # BLD AUTO: 4.21 10E12/L (ref 4.4–5.9)
RBC #/AREA URNS AUTO: >182 /HPF (ref 0–2)
SOURCE: ABNORMAL
SP GR UR STRIP: 1.01 (ref 1–1.03)
UROBILINOGEN UR STRIP-MCNC: NORMAL MG/DL (ref 0–2)
WBC # BLD AUTO: 9.2 10E9/L (ref 4–11)
WBC #/AREA URNS AUTO: 65 /HPF (ref 0–5)

## 2018-07-31 PROCEDURE — 25000128 H RX IP 250 OP 636: Performed by: PHYSICAL MEDICINE & REHABILITATION

## 2018-07-31 PROCEDURE — 97127 ZZHC SP THERAPEUTIC INTERVENTION W/FOCUS ON COGNITIVE FUNCTION,EA 15 MIN: CPT | Mod: GN | Performed by: SPEECH-LANGUAGE PATHOLOGIST

## 2018-07-31 PROCEDURE — 12800006 ZZH R&B REHAB

## 2018-07-31 PROCEDURE — 97530 THERAPEUTIC ACTIVITIES: CPT | Mod: GP

## 2018-07-31 PROCEDURE — 40000133 ZZH STATISTIC OT WARD VISIT: Performed by: OCCUPATIONAL THERAPIST

## 2018-07-31 PROCEDURE — 85610 PROTHROMBIN TIME: CPT | Performed by: PHYSICAL MEDICINE & REHABILITATION

## 2018-07-31 PROCEDURE — 81001 URINALYSIS AUTO W/SCOPE: CPT | Performed by: STUDENT IN AN ORGANIZED HEALTH CARE EDUCATION/TRAINING PROGRAM

## 2018-07-31 PROCEDURE — 87086 URINE CULTURE/COLONY COUNT: CPT | Performed by: PHYSICAL MEDICINE & REHABILITATION

## 2018-07-31 PROCEDURE — 97535 SELF CARE MNGMENT TRAINING: CPT | Mod: GO | Performed by: OCCUPATIONAL THERAPIST

## 2018-07-31 PROCEDURE — 25000132 ZZH RX MED GY IP 250 OP 250 PS 637: Performed by: PHYSICAL MEDICINE & REHABILITATION

## 2018-07-31 PROCEDURE — 40000193 ZZH STATISTIC PT WARD VISIT

## 2018-07-31 PROCEDURE — 97127 ZZHC SP THERAPEUTIC INTERVENTION W/FOCUS ON COGNITIVE FUNCTION,EA 15 MIN: CPT | Mod: GN

## 2018-07-31 PROCEDURE — 40000225 ZZH STATISTIC SLP WARD VISIT

## 2018-07-31 PROCEDURE — 36415 COLL VENOUS BLD VENIPUNCTURE: CPT | Performed by: PHYSICAL MEDICINE & REHABILITATION

## 2018-07-31 PROCEDURE — 97112 NEUROMUSCULAR REEDUCATION: CPT | Mod: GP

## 2018-07-31 PROCEDURE — 25000132 ZZH RX MED GY IP 250 OP 250 PS 637

## 2018-07-31 PROCEDURE — 85025 COMPLETE CBC W/AUTO DIFF WBC: CPT | Performed by: PHYSICAL MEDICINE & REHABILITATION

## 2018-07-31 PROCEDURE — 40000225 ZZH STATISTIC SLP WARD VISIT: Performed by: SPEECH-LANGUAGE PATHOLOGIST

## 2018-07-31 PROCEDURE — 97530 THERAPEUTIC ACTIVITIES: CPT | Mod: GO | Performed by: OCCUPATIONAL THERAPIST

## 2018-07-31 PROCEDURE — 25000125 ZZHC RX 250: Performed by: PHYSICAL MEDICINE & REHABILITATION

## 2018-07-31 RX ORDER — WARFARIN SODIUM 5 MG/1
5 TABLET ORAL
Status: COMPLETED | OUTPATIENT
Start: 2018-07-31 | End: 2018-07-31

## 2018-07-31 RX ORDER — LOSARTAN POTASSIUM 50 MG/1
50 TABLET ORAL DAILY
Status: DISCONTINUED | OUTPATIENT
Start: 2018-08-01 | End: 2018-08-09 | Stop reason: HOSPADM

## 2018-07-31 RX ADMIN — FLUTICASONE PROPIONATE 1 SPRAY: 50 SPRAY, METERED NASAL at 08:14

## 2018-07-31 RX ADMIN — MICONAZOLE NITRATE: 2 POWDER TOPICAL at 08:14

## 2018-07-31 RX ADMIN — ENOXAPARIN SODIUM 70 MG: 80 INJECTION SUBCUTANEOUS at 20:41

## 2018-07-31 RX ADMIN — FINASTERIDE 5 MG: 5 TABLET, FILM COATED ORAL at 08:14

## 2018-07-31 RX ADMIN — ENOXAPARIN SODIUM 70 MG: 80 INJECTION SUBCUTANEOUS at 08:14

## 2018-07-31 RX ADMIN — TAMSULOSIN HYDROCHLORIDE 0.4 MG: 0.4 CAPSULE ORAL at 08:13

## 2018-07-31 RX ADMIN — PREDNISONE 5 MG: 5 TABLET ORAL at 08:13

## 2018-07-31 RX ADMIN — PANTOPRAZOLE SODIUM 40 MG: 40 TABLET, DELAYED RELEASE ORAL at 06:25

## 2018-07-31 RX ADMIN — LOSARTAN POTASSIUM 25 MG: 25 TABLET, FILM COATED ORAL at 08:13

## 2018-07-31 RX ADMIN — WARFARIN SODIUM 5 MG: 5 TABLET ORAL at 17:41

## 2018-07-31 RX ADMIN — POLYETHYLENE GLYCOL 3350 17 G: 17 POWDER, FOR SOLUTION ORAL at 08:13

## 2018-07-31 RX ADMIN — ROSUVASTATIN CALCIUM 20 MG: 20 TABLET ORAL at 08:13

## 2018-07-31 RX ADMIN — TAMSULOSIN HYDROCHLORIDE 0.4 MG: 0.4 CAPSULE ORAL at 20:41

## 2018-07-31 RX ADMIN — AMLODIPINE BESYLATE 10 MG: 10 TABLET ORAL at 08:13

## 2018-07-31 NOTE — PLAN OF CARE
Problem: Goal/Outcome  Goal: Goal Outcome Summary  Patient is alert and oriented x 3 with forgetfulness. Denied any pain or discomfort. Moderate assist of 1 for tranfers and ambulation with walker and gaitbelt.Voided a couple times. First time, urine was very dark, but next time writer observed while pt voided ai bathroom and urine only bloody at start of stream, then cleared as he continued to void, encouraged fluids. Orders to insert a yeh catheter, patient declining to have it put in at this time. Last pvr at 1300 was 116 ml. Miconazole powder to groin rash. Offers no care concerns at this time.

## 2018-07-31 NOTE — PROGRESS NOTES
Paged at [phone] regarding patient with PVR of 426 and unsuccessful double void and unsuccessful SIC. Patient as per notes and nursing has unknown Urologic procedure within past year and chart review still without answer. Patient had some blood upon removal of SIC attempt and light red urine throughout day. Last INR today 1.78 and being bridged to warfarin with lovenox. Hgb stable. Will re-check Hgb in AM, check UA when possible. Instructed nursing to have patient attempt void in about 3 hours as patient not uncomfortable when no SIC being attempted. Check PVR after void attempts and if still needing cath suggest trying to pass smallest catheter yeh or SIC. Suggest using PRN uro-jet prior to cath attempt as pain may be making cath harder.

## 2018-07-31 NOTE — PLAN OF CARE
Problem: Goal/Outcome  Goal: Goal Outcome Summary  FOCUS/GOAL  Bladder management, Medication management and Medical management    ASSESSMENT, INTERVENTIONS AND CONTINUING PLAN FOR GOAL:  Patient is alert and oriented x3 with occasional forgetfulness . Modified assist of 1 transfesr with walker and gait belt.  Fluids were encouraged.  Pt had light red urine throughout the shift. high PVR at the end of the shift - results in flow sheet. Pt was unsucessful to void. Straight cath was performed- no urine released. Some blood upon catheter removal. PMR was notified. Waiting on standby from MD for next steps. One BM during this shift. Nystatin was applied to redness site of groin. Pt denies any pain or discomfort at this momment. call light within reach. Will continue to monitor bladder.

## 2018-07-31 NOTE — PROGRESS NOTES
07/31/18 0858   Signing Clinician's Name / Credentials   Signing clinician's name / credentials Diana Cai Ms/CCC-SLP   Quick Adds   Rehab Discipline SLP   Additional Documentation   SLP Plan SLP; d/c verbalexpression goal- pt met goal; addded goals formemory and complex reasoning - also continue with reading comp and written expression   Total Session Time   Total Session Time (minutes) 40 minutes   ARC or TCU Only   What unit is patient on? Acute Rehab   SLP - Acute Rehab Center Time   Individual Time (minutes) - enter zero if not applicable - SLP 40  (40 cognitive linguistic skills)   Group Time (minutes) - enter zero if not applicable  - SLP 0   Concurrent Time (minutes) - enter zero if not applicable  - SLP 0   Co-Treatment Time (minutes) - enter zero if not applicable  - SLP 0   ARC Total Session Time (minutes) - SLP 40   Comprehension (FIM)   Functional Performance Complete independence comprehending complex/abstract ideas   FIM Score 7- Complete independence   Expression (FIM)   Functional Performance Complete independence expressing complex/abstract ideas   Expression Comment Completed a complex verbal expression task- naming similarities and differences between 2 words- pt at 19/20; also engaged in conversation- not noting word fidning difficulties in conversation- pt states that he does not feel he has difficulty with this   FIM Score 7- Complete independence   Social Interaction (FIM)   Functional Performance Complete independence-socially appropriate in all situations   FIM Score 7- Complete independence   Problem Solving (FIM)   Functional Performance Needs increased time to make decisions and solve complex problems   Problem Solving Comment Completed WJ-R Verbal Analogies- socred in the 72nd percentile , raw score of 16- WFL but noting increased time to descern the relationship between words and also some difficulty with flexible thinking   FIM Score 6- Modified independence   Memory (FIM)    Functional Performance Moderate prompting-recognizes and remembers 50-74% of the time   Memory Comment Completed Wj-R Visual auditory learning- pt scored a raw score of 27 and this equates to a percdentile rank of 31-- this is in the low average range -- pt states he feels his memory is worse since the stroke   FIM Score 3- Moderate assistance: patient expends between 50 and 74% of effort

## 2018-07-31 NOTE — PROGRESS NOTES
FOCUS/GOAL  Bladder management and Pain management    ASSESSMENT, INTERVENTIONS AND CONTINUING PLAN FOR GOAL:  Pt alert with some confusion noted during this shift, pt was able to void a few times in the urinal, small amounts, continues to have high PVRs, UA/UC collected, urine is red/bloody in color, pt denies pain or discomfort. Uses the call light approprietly, NSG to continue monitoring.

## 2018-07-31 NOTE — PROGRESS NOTES
"  [facility]   Acute Rehabilitation Unit  Daily progress note    interval history  Shamir Concepcion was seen and examined at bedside. Doing well. Reported mild dysuria with voiding. No fever, chills and suprapubic pain.     INR 1.98.    BP in good range; increased losartan to 50 and stopped amlodipine to simplify his regimen.    Has had hematuria and bleeding with SIC. Spoke with Dr. Dave from urology associate; faxed information about his procedue (zu 6/29). Discussed with urology team at the ; recommended to place a yeh catheter for 4-5 days until his hematuria resolves. Placed the order but Ray refused. Per nursing report, voided spontaneously and urine was clear at the end with PVR in 100ml range. UA unremarkable for UTI. Will continue bladder program and SIC as needed.     Making depressive statements; spoke with Dr. Sam and will see him tomorrow.     Ataxic gait; working well in therapies. Ambulating with FWW but needing cues as well as CGA/min A for safety, and sequencing his steps.      medications  Scheduled meds    amLODIPine  10 mg Oral Daily     enoxaparin  70 mg Subcutaneous Q12H     finasteride (PROSCAR) tablet 5 mg  5 mg Oral Daily     fluticasone  1 spray Both Nostrils Daily     losartan  25 mg Oral Daily     miconazole   Topical BID     pantoprazole  40 mg Oral QAM AC     polyethylene glycol  17 g Oral Daily     predniSONE  5 mg Oral Daily     rosuvastatin  20 mg Oral or NG Tube Daily     tamsulosin  0.4 mg Oral BID       PRN meds:  acetaminophen, bisacodyl, lidocaine 2 %, magnesium hydroxide, - MEDICATION INSTRUCTIONS -, senna-docusate, Warfarin Therapy Reminder      physical exam  /51 (BP Location: Right arm)  Pulse 73  Temp 97.3  F (36.3  C) (Oral)  Resp 16  Ht 1.753 m (5' 9\")  Wt 75.3 kg (166 lb)  SpO2 96%  BMI 24.51 kg/m2  Gen: NAD, sitting in chair   HEENT: has glasses, scab on nose has improved   Pulm: non-labored in room air "   Abd: soft and non-tender   Ext: wwp, no edema in bilateral lower extremities    Neuro/MSK: alert and oriented, speech soft, but clear, no focal weakness    labs  Reviewed     assessment and plan    Shamir Concepcion is a 78 year old right hand dominant male who was admitted on 7/20/18 with 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  Hospital course was complicated by cerebral edema requiring ICU stay and hypertonic saline, TAYLOR, UTI and coccygeal pressure injury. PMH significant for PAD s/p stenting, RA, OA, and BPH: HTN is a new diagnosis. Admission to acute inpatient rehab 07/28/18.       Rehabilitation - continue comprehensive acute inpatient rehabilitation program with multidisciplinary approach including therapies, rehab nursing, and physiatry following. See interval history for updates.        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 Dysphagia improved. Possible cognitive deficits.      --continue warfarin with lovenox bridge; INR goal 2-3  --continue HTN and HLD control as below for secondary prevention  --f/u in neurology clinic as outpatient with repeat CT angiogram in 6 months to evaluate the vasculature within his neck     # HTN: new diagnosis per his report. Currently on amlodipine 10 and losartan 25 daily. Will monitor and adjust accordingly; consider to increase losartan and stop amlodipine to simplify his regimen. Will monitor Cr as he had slight increase in Cr with higher dose of ACEi. Long term goal <130/80. 07/31/18 BP in good range; increased losartan to 50 and stopped amlodipine to simplify his regimen.      # HLD: LDL 88. Continue crestor.      # Seronegative RA: well controlled and is followed by a rheumatologist. On methotrexate and prednisone prior to admission. Methotrexate on hold; continue prednisone 5mg daily.      # H/o BPH s/p  Anamariam 6/29/2018: nocturia and frequency during the day. Currently on flomax 0.4 bid and finasteride. 07/31/18 Has had hematuria and bleeding with SIC. Spoke with Dr. Dave from urology associate; faxed information about his procedue (Rezum 6/29). Discussed with urology team at the ; recommended to place a yeh catheter for 4-5 days until his hematuria resolves. Placed the order but Ray refused. Per nursing report, voided spontaneously and urine was clear at the end with PVR in 100ml range. UA unremarkable for UTI. Will continue bladder program and SIC as needed.      # Allergic rhinitis: on Flonase prior to admission which was continued.      # H/o peripheral artery disease, status post stent to R SFA in 09/2013     # Pressure Injury (PI) located on midline coccyx: was seen by WOCN who recommended PIP measures including use of chair cushion and repositioning measures- avoid direct, supine positioning, etc. Continues to use Mepilex Sacral Dressings       FEN: regular diet   Bowel: constipated upon admission; prn and scheduled bowel meds and supp/   Bladder: continent/incontinenet; was treated for UTI in the acute hospital (completed 5 day course of ceftriaxone). Will check PVRs and start timed toileting. See above.  DVT Prophylaxis: on warfarin   GI Prophylaxis: will add protonix given his age and warfarin   Code: DNR  Disposition: home   ELOS:  10-14 days .  Follow up Appointments on Discharge: PCP, neurology, and PM&R.    Lana Pavon MD  Physical Medicine & Rehabilitation    I spent a total of 35 minutes face-to-face or managing the care of Shamir Concepcion. Over 50% of my time on the unit was spent counseling the patient and coordinating care. See note for details.

## 2018-08-01 LAB
BACTERIA SPEC CULT: NORMAL
INR PPP: 2.07 (ref 0.86–1.14)
Lab: NORMAL
SPECIMEN SOURCE: NORMAL

## 2018-08-01 PROCEDURE — 40000193 ZZH STATISTIC PT WARD VISIT: Performed by: STUDENT IN AN ORGANIZED HEALTH CARE EDUCATION/TRAINING PROGRAM

## 2018-08-01 PROCEDURE — 97535 SELF CARE MNGMENT TRAINING: CPT | Mod: GO | Performed by: OCCUPATIONAL THERAPIST

## 2018-08-01 PROCEDURE — 97530 THERAPEUTIC ACTIVITIES: CPT | Mod: GO | Performed by: OCCUPATIONAL THERAPIST

## 2018-08-01 PROCEDURE — 97110 THERAPEUTIC EXERCISES: CPT | Mod: GO | Performed by: OCCUPATIONAL THERAPIST

## 2018-08-01 PROCEDURE — 40000133 ZZH STATISTIC OT WARD VISIT: Performed by: OCCUPATIONAL THERAPIST

## 2018-08-01 PROCEDURE — 97110 THERAPEUTIC EXERCISES: CPT | Mod: GP | Performed by: STUDENT IN AN ORGANIZED HEALTH CARE EDUCATION/TRAINING PROGRAM

## 2018-08-01 PROCEDURE — 85610 PROTHROMBIN TIME: CPT | Performed by: PHYSICAL MEDICINE & REHABILITATION

## 2018-08-01 PROCEDURE — 25000132 ZZH RX MED GY IP 250 OP 250 PS 637

## 2018-08-01 PROCEDURE — 36415 COLL VENOUS BLD VENIPUNCTURE: CPT | Performed by: PHYSICAL MEDICINE & REHABILITATION

## 2018-08-01 PROCEDURE — 25000132 ZZH RX MED GY IP 250 OP 250 PS 637: Performed by: PHYSICAL MEDICINE & REHABILITATION

## 2018-08-01 PROCEDURE — 12800006 ZZH R&B REHAB

## 2018-08-01 PROCEDURE — 97116 GAIT TRAINING THERAPY: CPT | Mod: GP | Performed by: STUDENT IN AN ORGANIZED HEALTH CARE EDUCATION/TRAINING PROGRAM

## 2018-08-01 PROCEDURE — 97127 ZZHC SP THERAPEUTIC INTERVENTION W/FOCUS ON COGNITIVE FUNCTION,EA 15 MIN: CPT | Mod: GN | Performed by: SPEECH-LANGUAGE PATHOLOGIST

## 2018-08-01 PROCEDURE — 25000125 ZZHC RX 250: Performed by: PHYSICAL MEDICINE & REHABILITATION

## 2018-08-01 PROCEDURE — 40000225 ZZH STATISTIC SLP WARD VISIT: Performed by: SPEECH-LANGUAGE PATHOLOGIST

## 2018-08-01 PROCEDURE — 97112 NEUROMUSCULAR REEDUCATION: CPT | Mod: GP | Performed by: STUDENT IN AN ORGANIZED HEALTH CARE EDUCATION/TRAINING PROGRAM

## 2018-08-01 PROCEDURE — 97530 THERAPEUTIC ACTIVITIES: CPT | Mod: GP | Performed by: STUDENT IN AN ORGANIZED HEALTH CARE EDUCATION/TRAINING PROGRAM

## 2018-08-01 RX ORDER — TAMSULOSIN HYDROCHLORIDE 0.4 MG/1
0.4 CAPSULE ORAL DAILY
Status: DISCONTINUED | OUTPATIENT
Start: 2018-08-02 | End: 2018-08-09 | Stop reason: HOSPADM

## 2018-08-01 RX ORDER — WARFARIN SODIUM 5 MG/1
5 TABLET ORAL
Status: COMPLETED | OUTPATIENT
Start: 2018-08-01 | End: 2018-08-01

## 2018-08-01 RX ADMIN — LOSARTAN POTASSIUM 50 MG: 50 TABLET ORAL at 08:22

## 2018-08-01 RX ADMIN — FLUTICASONE PROPIONATE 1 SPRAY: 50 SPRAY, METERED NASAL at 08:22

## 2018-08-01 RX ADMIN — MICONAZOLE NITRATE: 2 POWDER TOPICAL at 08:22

## 2018-08-01 RX ADMIN — ROSUVASTATIN CALCIUM 20 MG: 20 TABLET ORAL at 08:22

## 2018-08-01 RX ADMIN — FINASTERIDE 5 MG: 5 TABLET, FILM COATED ORAL at 08:22

## 2018-08-01 RX ADMIN — POLYETHYLENE GLYCOL 3350 17 G: 17 POWDER, FOR SOLUTION ORAL at 08:22

## 2018-08-01 RX ADMIN — WARFARIN SODIUM 5 MG: 5 TABLET ORAL at 17:25

## 2018-08-01 RX ADMIN — PANTOPRAZOLE SODIUM 40 MG: 40 TABLET, DELAYED RELEASE ORAL at 06:31

## 2018-08-01 RX ADMIN — PREDNISONE 5 MG: 5 TABLET ORAL at 08:22

## 2018-08-01 RX ADMIN — TAMSULOSIN HYDROCHLORIDE 0.4 MG: 0.4 CAPSULE ORAL at 08:22

## 2018-08-01 NOTE — PLAN OF CARE
"Problem: Goal/Outcome  Goal: Goal Outcome Summary  Patient is alert and oriented x 3 with forgetfulness. Denied any pain or discomfort. Miconazole powder applied to groin rash. Moderate assist of 1 for tranfers and ambulation with walker and gaitbelt.Voided a couple times, one time he spill urine while trying to use urinal. Again today stream of urine clears after a couple seconds into voiding. Does not want to have any more bladder scans because \" the dr told me it is improved, so I don't need that \" Will continue with poc.           "

## 2018-08-01 NOTE — PROGRESS NOTES
"  [facility]   Acute Rehabilitation Unit  Daily progress note    interval history  Shamir Concepcion was seen and examined at bedside. Doing well; sleeping ok. Was orthostatic today; encouraged fluids. Daughter was visiting today; reviewed the plan with her as outlined below.     --INR 2.07 today; will stop lovenox in the setting of ongoing hematuria  --hematuria has improved; urinary retention has improved as well. No other associated s/s. Will continue bladder program with SIC as needed. Will check Hgb tomorrow.  --Orthostatic today; encouraged fluids and changed flomax from bid to daily dosing.       Participating well in therapies;     PT: Focused on gait, standing balance, chair<>chair transfers, and endurance. Pt continues to need multiple reminders for sit<>stand transfer sequence. Provided handout enumerating stand>sit steps. Pt experienced orthostatic hypotension this PM upon standing up from chair, reported significant dizziness. Educated pt in drinking more fluids. Will continue to work on functional transfers and keeping COM over THUY.        medications  Scheduled meds    finasteride (PROSCAR) tablet 5 mg  5 mg Oral Daily     fluticasone  1 spray Both Nostrils Daily     losartan  50 mg Oral Daily     miconazole   Topical BID     pantoprazole  40 mg Oral QAM AC     polyethylene glycol  17 g Oral Daily     predniSONE  5 mg Oral Daily     rosuvastatin  20 mg Oral or NG Tube Daily     tamsulosin  0.4 mg Oral BID     warfarin  5 mg Oral ONCE at 18:00       PRN meds:  acetaminophen, bisacodyl, lidocaine 2 %, magnesium hydroxide, - MEDICATION INSTRUCTIONS -, senna-docusate, Warfarin Therapy Reminder      physical exam  /49  Pulse 64  Temp 96.2  F (35.7  C) (Oral)  Resp 17  Ht 1.753 m (5' 9\")  Wt 75.3 kg (166 lb)  SpO2 96%  BMI 24.51 kg/m2     Gen: NAD, sitting in chair   HEENT: has glasses, scab on nose has improved   Pulm: non-labored in room air   Abd: soft " and non-tender   Ext: wwp, no edema in bilateral lower extremities    Neuro/MSK: alert and oriented, speech soft, but clear, no focal weakness, ataxic with RUE and RLE movements     labs  Reviewed     assessment and plan    Shamir Concepcion is a 78 year old right hand dominant male who was admitted on 7/20/18 with 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  Hospital course was complicated by cerebral edema requiring ICU stay and hypertonic saline, TAYLOR, UTI and coccygeal pressure injury. PMH significant for PAD s/p stenting, RA, OA, and BPH: HTN is a new diagnosis. Admission to acute inpatient rehab 07/28/18.       Rehabilitation - continue comprehensive acute inpatient rehabilitation program with multidisciplinary approach including therapies, rehab nursing, and physiatry following. See interval history for updates.        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 Dysphagia improved. Possible cognitive deficits.      --continue warfarin with lovenox bridge; INR goal 2-3. 08/01/18 INR 2.07 today; will stop lovenox in the setting of ongoing hematuria  --continue HTN and HLD control as below for secondary prevention  --f/u in neurology clinic as outpatient with repeat CT angiogram in 6 months to evaluate the vasculature within his neck     # HTN: new diagnosis per his report. Currently on amlodipine 10 and losartan 25 daily. Will monitor and adjust accordingly; consider to increase losartan and stop amlodipine to simplify his regimen. Will monitor Cr as he had slight increase in Cr with higher dose of ACEi. Long term goal <130/80. 07/31/18 BP in good range; increased losartan to 50 and stopped amlodipine to simplify his regimen. 08/01/18 Orthostatic today; encouraged fluids and changed flomax from bid to daily dosing.         # HLD: LDL 88. Continue crestor.       # Seronegative RA: well controlled and is followed by a rheumatologist. On methotrexate and prednisone prior to admission. Methotrexate on hold; continue prednisone 5mg daily.      # H/o BPH s/p Rezum 6/29/2018: nocturia and frequency during the day. Currently on flomax 0.4 bid and finasteride. 07/31/18 Has had hematuria and bleeding with SIC. Spoke with Dr. Dave from urology associate; faxed information about his procedue (Rezum 6/29). Discussed with urology team at the ; recommended to place a yeh catheter for 4-5 days until his hematuria resolves. Placed the order but Ray refused. Per nursing report, voided spontaneously and urine was clear at the end with PVR in 100ml range. UA unremarkable for UTI. Will continue bladder program and SIC as needed.     08/01/18 Orthostatic today; encouraged fluids and changed flomax from bid to daily dosing.        # Allergic rhinitis: on Flonase prior to admission which was continued.      # H/o peripheral artery disease, status post stent to R SFA in 09/2013     # Pressure Injury (PI) located on midline coccyx: was seen by WOCN who recommended PIP measures including use of chair cushion and repositioning measures- avoid direct, supine positioning, etc. Continues to use Mepilex Sacral Dressings       FEN: regular diet   Bowel: constipated upon admission; prn and scheduled bowel meds and supp/   Bladder: continent/incontinenet; was treated for UTI in the acute hospital (completed 5 day course of ceftriaxone). Will check PVRs and start timed toileting. See above.  DVT Prophylaxis: on warfarin   GI Prophylaxis: will add protonix given his age and warfarin   Code: DNR  Disposition: home   ELOS:  10-14 days .  Follow up Appointments on Discharge: PCP, neurology, and PM&R.    Lana Pavon MD  Physical Medicine & Rehabilitation    I spent a total of 30 minutes face-to-face or managing the care of Shamir Concepcion. Over 50% of my time on the unit was spent counseling the  patient and coordinating care. See note for details.

## 2018-08-01 NOTE — PROGRESS NOTES
FOCUS/GOAL  Bladder management and Pain management    ASSESSMENT, INTERVENTIONS AND CONTINUING PLAN FOR GOAL:  Pt slept well all NOC, denies pain or discomfort. Alarms on during the NOC, no impulsive episodes. Pt is voiding small amounts spontaneously. Urine continues to be a dark red color and some blood noted inside his brief. Pt uses urinal at bedside, staff assist with emptying. NSG to continue with POC.

## 2018-08-01 NOTE — PLAN OF CARE
Pt up with assist,bed alarm on.Alert and orient.VSS.Urine still pink tinged.Pt voiding without any problem..Pt continent of B and B.Continue with POC.

## 2018-08-01 NOTE — PLAN OF CARE
Problem: Patient Care Overview  Goal: Plan of Care/Patient Progress Review  SLP: pt demonstrating difficulty with verbal reasoning - very concrete thinking, needing cues for determining how 3 words related/describing .  Worked on memory task (using strategies to recall list words - associations, writing, rehearsing) able to recall 75% after short delay

## 2018-08-01 NOTE — PLAN OF CARE
Problem: Patient Care Overview  Goal: Plan of Care/Patient Progress Review  Focused on gait, standing balance, chair<>chair transfers, and endurance. Pt continues to need multiple reminders for sit<>stand transfer sequence. Provided handout enumerating stand>sit steps. Pt experienced orthostatic hypotension this PM upon standing up from chair, reported significant dizziness. Educated pt in drinking more fluids. Will continue to work on functional transfers and keeping COM over THUY.

## 2018-08-01 NOTE — CONSULTS
Health Psychology                  Clinic    Department of Medicine  Marti Sam, Ph.D., L.P. [phone]                          Clinics and [facility] Center  [facility] Syeda Cyr, Ph.D.,  L.P. [phone]                 3rd Floor  [place] Mail Code [address]89   Savanna Novoa, Ph.D., L.P. [phone]     [address] Tosha Oconnor, Ph.D., L.P. [phone]            [place] [address]          Bogdan Hutchinson, Ph.D., A.SERAFIN.BRITTANY., L.P. [phone]      Diana De Leon, Ph.D., L.P. [phone]      Inpatient Health Psychology Consultation*    DOS:  08/01/2018      REFERRAL SOURCE:  Lana Pavon MD, [facility], [facility].      REASON FOR REFERRAL:  Shamir Concepcion is a 78-year-old man currently in the [facility].  Mr. Concepcion experienced a cerebellar ischemic stroke on 07/20.  He has expressed some statements concerning for depression.  This evaluation was requested to assess his current emotional status and to make treatment recommendations.      HISTORY OF PRESENT ILLNESS:  Mr. Concepcion reports no significant history of depression, anxiety, or other emotional issues that have significantly affected his functioning.  He does note that following his wife's death in the fall of 2017 after a short illness with cancer, that he did see a grief counselor and found that helpful.  He acknowledges that he has made statements while in the [facility] reflecting his belief that following his death he would be reunited with his wife, and that he looks forward to that.  However, he adamantly denies any suicidal ideation or desire to hasten his death.  He states that he experiences pleasure and shashank in his life because he has daughters and grandchildren that he loves.  He is also very involved in his [employer], [employer] in [place], and volunteers through some programs  "there.  Mr. Concepcion also has a group of male friends with whom he typically has breakfast on a daily basis, and started that habit prior to his wife's illness and has continued meeting with them daily following her death.      Mr. Concepcion tells me that he is sleeping more than usual while here in the [facility], and we talked about the physical changes related to stroke that may underlie the need for more sleep.  In addition, he does acknowledge that he is aware that he is using a great deal of energy on a daily basis in rehabilitation therapies.  At the same time, he notes that he was typically very physically active, and worked out at the  near [facility] several times a week.  He does observe the small steps of positive progress that he is making in rehabilitation therapies, but does get sometimes frustrated that it is not going more quickly.  However, in conversation today he was able to gain perspective on the fact that physical change and growth at many stages of life, including most learning processes, is gradual and progressive.  He is able to clearly state functional abilities that he observes himself improving on compared to even several days ago.      Mr. Concepcion also tells me that he believes he is eating more while hospitalized than he has been at home.  He acknowledges that he has not been focused on feeding himself appropriately since his wife became ill and , stating that \"She did all the cooking.\"  He tells me that his daughters bring him food, but he is concerned that this may be putting a burden on them as they both work full-time and have families.      Mr. Concepcion was appreciative of this contact with Health Psychology.  However, he does not believe that he requires any additional emotional support, and focuses on the excellent support he gets from his family as well as his friends, one of whom is visiting later today.      SOCIAL HISTORY:  Mr. Concepcion grew up in [place] and " graduated from [employer].  He has 1 brother who now lives in [place].  Mr. Concepcion now lives in [place], in a condo that he shared with his wife until her death.  He is a retired salesman.  As noted above, he is very involved in the [employer] community, and his tami is important in his life.  He is close with his 2 daughters and 4 grandchildren, 2 of whom have graduated from college and 2 of whom are currently college students.  He has a good supportive friendship [place] of other men whom he sees frequently.      PAST MEDICAL HISTORY:  Please see Mr. Concepcion's [facility] electronic medical record for more complete information on his medical history, current admission and status, and all medications.      PSYCHIATRIC HISTORY:  As above in the HPI.  Mr. Concepcion did see a grief counselor following the death of his wife, and found that helpful.  He does not feel that he needs ongoing emotional or psychological support.  No other additional information from a psychological perspective was available at the time of this evaluation.      BEHAVIORAL IMPRESSIONS:  Mr. Concepcion presented for this evaluation sitting up in a chair in his room in the [facility] between scheduled rehabilitation therapies.  He was alert and oriented.  He reported his mood as somewhat frustrated because he is so used to being independent and doing everything for himself.  He acknowledged some ongoing grieving related to his wife's recent death.  He exhibited an affect that was euthymic to bright.  Speech was clear, coherent, and goal oriented.  Insight and judgment appear to be intact.  He exhibited no evidence of distortions of thought or perception.      IMPRESSIONS AND PLAN:  Shamir Concepcion is a 78-year-old man who experienced a left cerebellar ischemic stroke on 07/20.  Mr. Concepcion is doing well in terms of recovery of function within his rehabilitation therapies.  He is typically an active man who attended the  several  days a week to work out.  He notes that he finds rehabilitation therapies easy to motivate himself for because it is similar to his typical habits of working out regularly.  He looks forward to regaining a greater level of independence and being able to return home and to his usual activities.  Although he continues to experience some grief reactions related to his wife's death last fall, including decreased focus and interest in eating, he appears to be generally doing well from an emotional perspective.  I will speak with our [facility] , Sue Scott, about the possibility of speaking with Mr. Gruber and his family about a home delivery meal service.       DIAGNOSES:   1.  Death of a family member/bereavement (Z63.4).   2.  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         BISHNU CHRISTIE, PHD, LP           *In accordance with the Rules of the [place] Board of [employer], it is noted that psychological descriptions and scientific procedures underlying psychological evaluations have limitations.  Absolute predictions cannot be made based on information in this report.     D: 2018   T: 2018   MT: CG      Name:     KATTY GRUBER   MRN:      [id number]        Account:       [id number]   :      1940           Consult Date:  2018      Document: [id number]

## 2018-08-02 LAB
HGB BLD-MCNC: 13.5 G/DL (ref 13.3–17.7)
INR PPP: 1.88 (ref 0.86–1.14)

## 2018-08-02 PROCEDURE — 36415 COLL VENOUS BLD VENIPUNCTURE: CPT | Performed by: PHYSICAL MEDICINE & REHABILITATION

## 2018-08-02 PROCEDURE — 97535 SELF CARE MNGMENT TRAINING: CPT | Mod: GO | Performed by: OCCUPATIONAL THERAPIST

## 2018-08-02 PROCEDURE — 97530 THERAPEUTIC ACTIVITIES: CPT | Mod: GO | Performed by: OCCUPATIONAL THERAPIST

## 2018-08-02 PROCEDURE — 12800006 ZZH R&B REHAB

## 2018-08-02 PROCEDURE — 85018 HEMOGLOBIN: CPT | Performed by: PHYSICAL MEDICINE & REHABILITATION

## 2018-08-02 PROCEDURE — 97112 NEUROMUSCULAR REEDUCATION: CPT | Mod: GP | Performed by: STUDENT IN AN ORGANIZED HEALTH CARE EDUCATION/TRAINING PROGRAM

## 2018-08-02 PROCEDURE — 97116 GAIT TRAINING THERAPY: CPT | Mod: GP | Performed by: STUDENT IN AN ORGANIZED HEALTH CARE EDUCATION/TRAINING PROGRAM

## 2018-08-02 PROCEDURE — 40000225 ZZH STATISTIC SLP WARD VISIT: Performed by: SPEECH-LANGUAGE PATHOLOGIST

## 2018-08-02 PROCEDURE — 97530 THERAPEUTIC ACTIVITIES: CPT | Mod: GP | Performed by: STUDENT IN AN ORGANIZED HEALTH CARE EDUCATION/TRAINING PROGRAM

## 2018-08-02 PROCEDURE — 40000133 ZZH STATISTIC OT WARD VISIT: Performed by: OCCUPATIONAL THERAPIST

## 2018-08-02 PROCEDURE — 97127 ZZHC SP THERAPEUTIC INTERVENTION W/FOCUS ON COGNITIVE FUNCTION,EA 15 MIN: CPT | Mod: GN | Performed by: SPEECH-LANGUAGE PATHOLOGIST

## 2018-08-02 PROCEDURE — 97110 THERAPEUTIC EXERCISES: CPT | Mod: GP | Performed by: STUDENT IN AN ORGANIZED HEALTH CARE EDUCATION/TRAINING PROGRAM

## 2018-08-02 PROCEDURE — 25000125 ZZHC RX 250: Performed by: PHYSICAL MEDICINE & REHABILITATION

## 2018-08-02 PROCEDURE — 25000132 ZZH RX MED GY IP 250 OP 250 PS 637: Performed by: PHYSICAL MEDICINE & REHABILITATION

## 2018-08-02 PROCEDURE — 85610 PROTHROMBIN TIME: CPT | Performed by: PHYSICAL MEDICINE & REHABILITATION

## 2018-08-02 PROCEDURE — 40000193 ZZH STATISTIC PT WARD VISIT: Performed by: STUDENT IN AN ORGANIZED HEALTH CARE EDUCATION/TRAINING PROGRAM

## 2018-08-02 RX ORDER — WARFARIN SODIUM 7.5 MG/1
7.5 TABLET ORAL
Status: COMPLETED | OUTPATIENT
Start: 2018-08-02 | End: 2018-08-02

## 2018-08-02 RX ADMIN — PREDNISONE 5 MG: 5 TABLET ORAL at 08:09

## 2018-08-02 RX ADMIN — FLUTICASONE PROPIONATE 1 SPRAY: 50 SPRAY, METERED NASAL at 08:09

## 2018-08-02 RX ADMIN — TAMSULOSIN HYDROCHLORIDE 0.4 MG: 0.4 CAPSULE ORAL at 08:09

## 2018-08-02 RX ADMIN — ROSUVASTATIN CALCIUM 20 MG: 20 TABLET ORAL at 08:09

## 2018-08-02 RX ADMIN — POLYETHYLENE GLYCOL 3350 17 G: 17 POWDER, FOR SOLUTION ORAL at 08:09

## 2018-08-02 RX ADMIN — MICONAZOLE NITRATE: 2 POWDER TOPICAL at 18:02

## 2018-08-02 RX ADMIN — MICONAZOLE NITRATE: 2 POWDER TOPICAL at 08:08

## 2018-08-02 RX ADMIN — WARFARIN SODIUM 7.5 MG: 7.5 TABLET ORAL at 18:01

## 2018-08-02 RX ADMIN — PANTOPRAZOLE SODIUM 40 MG: 40 TABLET, DELAYED RELEASE ORAL at 06:47

## 2018-08-02 RX ADMIN — FINASTERIDE 5 MG: 5 TABLET, FILM COATED ORAL at 08:09

## 2018-08-02 NOTE — PLAN OF CARE
Acute Rehab Care Conference/Team Rounds      Type: Team Rounds    Present: Dr Lana Pavon, May Celeste PA, Sue Scott LICSW, Xi Judge OT, Kely Brown PT, Sophy Stewart SLP, Raiza Lozano RN, Snehal Alex Dietician, Katiuska Bai , Bere Lindquist      Discharge Barriers/Treatment/Education    Rehab Diagnosis: stroke     Active Medical Co-morbidities/Prognosis: HTN, HLD, RA, BPH s/p Rezum, PAD    Safety: Ax1/walker alert and oriented/ intermediate confusion    Pain: Denies pain     Medications, Skin, Tubes/Lines: Assess for medication education need, Skin ok, no lines or tubes    Swallowing/Nutrition: on regular diet     Bowel/Bladder: Continent of bowel and bladder LBM 7/31/18    Psychosocial: Pt resides alone. Pt reported additional support from adult children. Goal to return home with continued support.     ADLs/IADLs: Pt is motivated and is making steady progress with basic self-cares. Pt continues to demonstrate leaning to left side while seated and/or standing and difficulty with approach to surfaces with FWW. Pt requires set-up with UBD seated and SBA with LBD tasks. Pt requires close SBA with G/H tasks standing at EOS with FWW. Pt requires up to min A with toilet transfers and toilet hygiene d/t impaired safety awareness with approach to toilet and impaired standing balance. Pt requires min A with shower transfers with extended tub bench with FWW, and CGA to wash/rinse/dry 3/10 body parts while standing with grab bar. Anticipate pt to meet POC goals ~2 weeks with HC OT vs OP OT pending progress. Pt will likely need assistance with IADLs upon discharge d/t cognitive deficits and impaired vision (i.e.right visual field cut).     Mobility: Pt is motivated, participates well. Pt continues to demonstrate leaning to left side while seated and/or standing and difficulty with approach to surfaces with FWW. Gait with CGA-MOD A at times d/t lean lean. Anticipate  pt to meet POC goals ~2 weeks with HC PT vs TCU pending progress and family support. Pt will likely need assistance with IADLs upon discharge d/t cognitive deficits and impaired vision (i.e.right visual field cut).     Cognition/Language:SLP: pt demonstrates mild difficulty with reading comprehension and writing (reduced legibility with dominant hand). Noting moderate deficits for cognitive linguistic skills for flexible attention, memory, and reasoning.  At this time insight is at least mildly reduced.  Anticipate pt will need some assistance with more complex IADLS (ie medications, finances) at discharge and probable further SLP tx,outpatient. Level of supervision yet to be determined    Community Re-Entry: not yet ready     Transportation: will need assist, transfer training    Decision maker: self    Plan of Care and goals reviewed and updated.    Discharge Plan/Recommendations    Fall Precautions: continue    Overall plan for the patient: he had significant functional deficits along with poor safety awareness and memory impairment. Might need 24/7 supervision after discharge due to above. Making gains by therapies but has limited carry over of techniques. Will plan for a care conference next week and discuss with his family regarding the level of support.       Utilization Review and Continued Stay Justification    Medical Necessity Criteria:    For any criteria that is not met, please document reason and plan for discharge, transfer, or modification of plan of care to address.    Requires intensive rehabilitation program to treat functional deficits?: Yes    Requires 3x per week or greater involvement of rehabilitation physician to oversee rehabilitation program?: Yes    Requires rehabilitation nursing interventions?: Yes    Patient is making functional progress?: Yes    There is a potential for additional functional progress? Yes    Patient is participating in therapy 3 hours per day a minimum of 5 days per  week or 15 hours per week in 7 day period?:Yes    Has discharge needs that require coordinated discharge planning approach?:Yes      Final Physician Sign off    Statement of Approval: I agree with all the recommendations detailed in this document.      Patient Goals     1. Pt will dc home/community setting upon completion of therapy/RN goals.  2. Pt and family will be involved in dc planning and made aware of services available to meet pts needs      OT goal: hygiene/grooming: independent, while standing  OT goal: upper body dressing: Independent, including set-up/clothing retrieval  OT goal: lower body dressing: Independent, including set-up/clothing retrieval  OT goal: upper body bathing: Modified independent, Supervision/stand-by assist  OT goal: lower body bathing: Modified independent, Supervision/stand-by assist  OT goal: bed mobility: Independent  OT Goal: transfer: Modified independent, Independent  OT goal: toilet transfer/toileting: Independent, cleaning and garment management, toilet transfer  OT goal: meal preparation: Independent, with simple meal preparation, ambulatory level  OT goal: home management: Independent, with light demand household tasks, ambulatory level  OT goal: cognitive: Patient/caregiver will verbalize understanding of cognitive assessment results/recommendations as needed for safe discharge planning    PT goal: bed mobility: Independent  PT goal: transfers: Modified independent, Assistive device  PT goal: gait: Modified independent, 150 feet, Rolling walker  PT goal: stairs: Independent, Greater than 10 stairs, Rail on left (For FIM as pt has no stairs at home)  PT goal 1: Pt will be able to perform static stance in mid-line at Ind level for >60 secs to perform functional daily tasks  PT Goal 2: Pt will demonstrate gait speed >.8m/s in order to demonstrate decreased falls risk at home  PT Goal 3: Pt will attend falls group and identify 3 ways to decrease risk of a fall in home  PT  Goal 4: Pt will demonstrate car transfer at SBA with fww for safe community mobility  PT Goal 5: Pt  will be Ind with HEP for coordination and balance for progress of ability between therapies.    SLP Frequency: Daily for 60 minutes per day  SLP goal 1: goalmet 7/31/18  SLP goal 2: Pt will complete reading comprehension tasks with 90% accuracy in order to support both ADLs and medical intervention  SLP goal 3: Pt will complete visual scanning and basic writing tasks with 90% accuracy and minimal prompts and cues.   SLP goal 4: Pt will complete moderately complex tasks of attention, problem solving and reasoning with 90% accuracy and minimal prompts and cues.   SLP goal 5: Pt will utilize external memory strategies to recall 3 bits of information after 5 minutes lapsed time with minimal prompts and cues.      Nursing goals   Patient/Family Goal: Bowel: PT will regain bowel function to what it was prior to admission by discharge.      Patient/Family Goal: Medication Management: Pt will participate in MAP prior to dc to show understanding of medication regimen if applicable.      Goal: Skin Integrity: Pt will show understanding of repositioning and prevent further skin break down throughout stay.      Individualized Goal 1: Pt will participate in a stroke PLC class to learn the s/s and risk factors of a stroke prior to dc.

## 2018-08-02 NOTE — PLAN OF CARE
Problem: Goal/Outcome  Goal: Goal Outcome Summary  Patient is alert and oriented x 3 with forgetfulness. Denied any pain or discomfort. Miconazole powder applied to groin rash. Moderate assist of 1 for tranfers and ambulation with walker and gaitbelt. Appetite is good. Voiding utilizing a urinal, independently. No hematuria noted. Will continue with plan of care.

## 2018-08-02 NOTE — PLAN OF CARE
Pt alert and orient,VSS.Voiding without problem.No blood in urine reported to Nurse.Pt states has bee 2 days since a BM.Ate well.Up with stand pivot,gaitbelt and walker.Left hand skin tear healing and scabbing.had shower this brien.Continue with POC.

## 2018-08-02 NOTE — PROGRESS NOTES
"  [facility]   Acute Rehabilitation Unit  Daily progress note    interval history  Shamir Concepcion \"Ray\" was seen resting in bed reports he is very tired today, asked if he slept poorly last night states \"well I must have\", denies headache, dizziness, pain, n/v/d, sob, and bowel and bladder concerns.  Feels he is eating ok.  Denies other concerns.     See rounds note by Dr. Pavon for more details, ELOS another 10+ days, given impairments and anticipated supervision needed at [facility] will have care conference next week.         medications  Scheduled meds    finasteride (PROSCAR) tablet 5 mg  5 mg Oral Daily     fluticasone  1 spray Both Nostrils Daily     losartan  50 mg Oral Daily     miconazole   Topical BID     pantoprazole  40 mg Oral QAM AC     polyethylene glycol  17 g Oral Daily     predniSONE  5 mg Oral Daily     rosuvastatin  20 mg Oral or NG Tube Daily     tamsulosin  0.4 mg Oral Daily     warfarin  7.5 mg Oral ONCE at 18:00       PRN meds:  acetaminophen, bisacodyl, lidocaine 2 %, magnesium hydroxide, - MEDICATION INSTRUCTIONS -, senna-docusate, Warfarin Therapy Reminder      physical exam  /49 (BP Location: Left arm)  Pulse 67  Temp 97  F (36.1  C) (Oral)  Resp 16  Ht 1.753 m (5' 9\")  Wt 75.3 kg (166 lb)  SpO2 94%  BMI 24.51 kg/m2     Gen: NAD, fatigued resting in bed.   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  Hospital course was complicated by cerebral edema requiring ICU stay and hypertonic saline, TAYLOR, UTI and " coccygeal pressure injury. PMH significant for PAD s/p stenting, RA, OA, and BPH: HTN is a new diagnosis. Admission to acute inpatient rehab 07/28/18.       Rehabilitation - continue comprehensive acute inpatient rehabilitation program with multidisciplinary approach including therapies, rehab nursing, and physiatry following. See interval history for updates.        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 Dysphagia improved.  cognitive deficits.      --continue warfarin; INR goal 2-3. 08/01/18 INR 1.88- will hold on bridge follow up INR 8/3.   --continue HTN and HLD control as below for secondary prevention  --f/u in neurology clinic as outpatient with repeat CT angiogram in 6 months to evaluate the vasculature within his neck     # HTN: new diagnosis per his report.  Long term goal <130/80. -130s  - Continue losartan 50 mg  Daily, flomax 04. Mg daily.      # HLD: LDL 88. Continue crestor.      # Seronegative RA: well controlled and is followed by a rheumatologist. On methotrexate and prednisone prior to admission. Methotrexate on hold; -continue prednisone 5mg daily.      # H/o BPH s/p Rezum 6/29/2018: nocturia and frequency during the day. Currently on flomax 0.4 bid and finasteride. 07/31/18  hematuria discussed with Dr. Dave from urology associate;  urology teamrecommended to place a yeh catheter for 4-5 days until his hematuria resolves. Ray declined Per nursing report, voiding spontaneously and urine clear   - continue bladder program and SIC as needed.        # Allergic rhinitis: on Flonase prior to admission which was continued.      # H/o peripheral artery disease, status post stent to R SFA in 09/2013     # Pressure Injury (PI) located on midline coccyx: was seen by WOCN who recommended PIP measures including use of chair cushion and repositioning measures- avoid direct,  supine positioning, etc. Continues to use Mepilex Sacral Dressings       FEN: regular diet   Bowel: prn and scheduled bowel meds and supp/   Bladder: continent   DVT Prophylaxis: on warfarin   GI Prophylaxis: PPI  Code: DNR  Disposition: home   ELOS:  goal ~8/14  Follow up Appointments on Discharge: PCP, neurology, and PM&R.    May Celeste PA-C  Physical Medicine & Rehabilitation

## 2018-08-02 NOTE — PLAN OF CARE
Problem: Patient Care Overview  Goal: Plan of Care/Patient Progress Review  SLP: see rounds note.  Pt appeared sleepy am session.  Noting some decreased insight into cognitive challenges, but highly participatory.  Difficulty recalling details from information read to him (short delay), 60% accuracy.  Mild difficulty with verbal reasoning task, but improved accuracy with cues.

## 2018-08-03 LAB — INR PPP: 2.19 (ref 0.86–1.14)

## 2018-08-03 PROCEDURE — 40000225 ZZH STATISTIC SLP WARD VISIT: Performed by: SPEECH-LANGUAGE PATHOLOGIST

## 2018-08-03 PROCEDURE — 25000132 ZZH RX MED GY IP 250 OP 250 PS 637: Performed by: PHYSICAL MEDICINE & REHABILITATION

## 2018-08-03 PROCEDURE — 97116 GAIT TRAINING THERAPY: CPT | Mod: GP | Performed by: STUDENT IN AN ORGANIZED HEALTH CARE EDUCATION/TRAINING PROGRAM

## 2018-08-03 PROCEDURE — 36415 COLL VENOUS BLD VENIPUNCTURE: CPT | Performed by: PHYSICAL MEDICINE & REHABILITATION

## 2018-08-03 PROCEDURE — 97530 THERAPEUTIC ACTIVITIES: CPT | Mod: GP | Performed by: STUDENT IN AN ORGANIZED HEALTH CARE EDUCATION/TRAINING PROGRAM

## 2018-08-03 PROCEDURE — 97110 THERAPEUTIC EXERCISES: CPT | Mod: GP | Performed by: STUDENT IN AN ORGANIZED HEALTH CARE EDUCATION/TRAINING PROGRAM

## 2018-08-03 PROCEDURE — 25000125 ZZHC RX 250: Performed by: PHYSICAL MEDICINE & REHABILITATION

## 2018-08-03 PROCEDURE — 92507 TX SP LANG VOICE COMM INDIV: CPT | Mod: GN | Performed by: SPEECH-LANGUAGE PATHOLOGIST

## 2018-08-03 PROCEDURE — 97535 SELF CARE MNGMENT TRAINING: CPT | Mod: GO

## 2018-08-03 PROCEDURE — 85610 PROTHROMBIN TIME: CPT | Performed by: PHYSICAL MEDICINE & REHABILITATION

## 2018-08-03 PROCEDURE — 97112 NEUROMUSCULAR REEDUCATION: CPT | Mod: GP | Performed by: STUDENT IN AN ORGANIZED HEALTH CARE EDUCATION/TRAINING PROGRAM

## 2018-08-03 PROCEDURE — 40000133 ZZH STATISTIC OT WARD VISIT

## 2018-08-03 PROCEDURE — 97127 ZZHC SP THERAPEUTIC INTERVENTION W/FOCUS ON COGNITIVE FUNCTION,EA 15 MIN: CPT | Mod: GN | Performed by: SPEECH-LANGUAGE PATHOLOGIST

## 2018-08-03 PROCEDURE — 12800006 ZZH R&B REHAB

## 2018-08-03 PROCEDURE — 40000193 ZZH STATISTIC PT WARD VISIT: Performed by: STUDENT IN AN ORGANIZED HEALTH CARE EDUCATION/TRAINING PROGRAM

## 2018-08-03 RX ORDER — WARFARIN SODIUM 7.5 MG/1
7.5 TABLET ORAL
Status: DISCONTINUED | OUTPATIENT
Start: 2018-08-03 | End: 2018-08-03

## 2018-08-03 RX ORDER — POLYETHYLENE GLYCOL 3350 17 G/17G
17 POWDER, FOR SOLUTION ORAL DAILY PRN
Status: DISCONTINUED | OUTPATIENT
Start: 2018-08-03 | End: 2018-08-09

## 2018-08-03 RX ORDER — WARFARIN SODIUM 7.5 MG/1
7.5 TABLET ORAL
Status: COMPLETED | OUTPATIENT
Start: 2018-08-03 | End: 2018-08-03

## 2018-08-03 RX ADMIN — ROSUVASTATIN CALCIUM 20 MG: 20 TABLET ORAL at 07:54

## 2018-08-03 RX ADMIN — FINASTERIDE 5 MG: 5 TABLET, FILM COATED ORAL at 07:54

## 2018-08-03 RX ADMIN — PREDNISONE 5 MG: 5 TABLET ORAL at 07:54

## 2018-08-03 RX ADMIN — WARFARIN SODIUM 7.5 MG: 7.5 TABLET ORAL at 19:00

## 2018-08-03 RX ADMIN — FLUTICASONE PROPIONATE 1 SPRAY: 50 SPRAY, METERED NASAL at 08:04

## 2018-08-03 RX ADMIN — PANTOPRAZOLE SODIUM 40 MG: 40 TABLET, DELAYED RELEASE ORAL at 06:15

## 2018-08-03 RX ADMIN — MICONAZOLE NITRATE: 2 POWDER TOPICAL at 07:58

## 2018-08-03 RX ADMIN — TAMSULOSIN HYDROCHLORIDE 0.4 MG: 0.4 CAPSULE ORAL at 07:54

## 2018-08-03 RX ADMIN — POLYETHYLENE GLYCOL 3350 17 G: 17 POWDER, FOR SOLUTION ORAL at 07:56

## 2018-08-03 RX ADMIN — LOSARTAN POTASSIUM 50 MG: 50 TABLET ORAL at 07:58

## 2018-08-03 RX ADMIN — MICONAZOLE NITRATE: 2 POWDER TOPICAL at 20:37

## 2018-08-03 NOTE — PLAN OF CARE
Problem: Patient Care Overview  Goal: Plan of Care/Patient Progress Review  Focused on chair<>chair transfers, continues to need frequent reminders to have one hand on chair to stand, sit.  Performed 1 round of TUG (sit<>stand with 3m walk) in 1min, 1s. (>14s indicates increased risk of falls.) Will continue to drill transfers, focus on reducing L lean.

## 2018-08-03 NOTE — PLAN OF CARE
FOCUS/GOAL  Bowel management, Bladder management, Mobility and Safety management    ASSESSMENT, INTERVENTIONS AND CONTINUING PLAN FOR GOAL:  Transfer with CGA using walker from bed to w/c , used w/c for mobility going to the toilet need CGA pivot transfer from w/c to toilet pt hold on to the grab bar had 2 continent bms this evening , pt isable to pull up and down pull up with steadying from staff but needed assist of 1 for pericare aft having a BM  PVR was checked after he voided in the toilet result was 205 ,

## 2018-08-03 NOTE — PLAN OF CARE
Problem: Goal/Outcome  Goal: Goal Outcome Summary  Outcome: No Change  FOCUS/GOAL  Bowel management, Bladder management, Medical management, Mobility and Psychosocial needs    ASSESSMENT, INTERVENTIONS AND CONTINUING PLAN FOR GOAL:  Pt's vitals stable. Pt needs SBA to CGA with transfers using the walker. Pt continent of bladder and voiding using the urinal and also the toilet this morning. Pt incontinent of loose bm this morning. On scheduled miralax. Pt doesn't want to take it anymore. TRAE Celeste had discontinued miralax and ordered metamucil daily.  Pt expressed that he feels down because he has lost his independent and now he is so dependent on people to help him after the stroke. He doesn't think he is making improvement. Asked if he is feeling suicidal or has any plans for suicide and he denied it. He said he just feels discouraged. Encouraged to look at the small progress that he is making daily and on this the pt said, that he thinks his progress is too slow. TRAE Celeste notified on this. Pt's toenails very long so trimmed this morning.

## 2018-08-03 NOTE — PROGRESS NOTES
"  [facility]   Acute Rehabilitation Unit  Daily progress note    interval history  Shamir Concepcion \"Ray\" seen sitting up edge of bed, reports he is feeling ok, notes impaired balance and memory.  Denies n/v/d, sob, fevers, headaches, and dizziness, slept ok.  Eating ok, though would like to order his own food.  Offers no additional complaints.     OT: Pt participates well in therapy today.  Distressed by loose BM and incontinence of stool in brief.  Pt also states, \"would I have  from my stroke?  I think I would have preferred it that way.  Then I wouldn't have to deal with this.\"  Informed RN, psych consult already in place.  Pt continues with L lean when ambulating/standing, intermittently corrects.     SLP:  Noting some decreased insight into cognitive challenges, but highly participatory.  Difficulty recalling details from information read to him (short delay), 60% accuracy.  Mild difficulty with verbal reasoning task, but improved accuracy with cues.    medications  Scheduled meds    finasteride (PROSCAR) tablet 5 mg  5 mg Oral Daily     fluticasone  1 spray Both Nostrils Daily     losartan  50 mg Oral Daily     miconazole   Topical BID     pantoprazole  40 mg Oral QAM AC     polyethylene glycol  17 g Oral Daily     predniSONE  5 mg Oral Daily     psyllium  1 packet Oral Daily     rosuvastatin  20 mg Oral or NG Tube Daily     tamsulosin  0.4 mg Oral Daily     warfarin  7.5 mg Oral ONCE at 18:00       PRN meds:  acetaminophen, bisacodyl, lidocaine 2 %, magnesium hydroxide, - MEDICATION INSTRUCTIONS -, senna-docusate, Warfarin Therapy Reminder      physical exam  /54 (BP Location: Left arm)  Pulse 78  Temp 97.9  F (36.6  C) (Oral)  Resp 16  Ht 1.753 m (5' 9\")  Wt 75.3 kg (166 lb)  SpO2 93%  BMI 24.51 kg/m2     Gen: NAD sitting up edge of bed.   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  Hospital course was complicated by cerebral edema requiring ICU stay and hypertonic saline, TAYLOR, UTI and coccygeal pressure injury. PMH significant for PAD s/p stenting, RA, OA, and BPH: HTN is a new diagnosis. Admission to acute inpatient rehab 07/28/18.       Rehabilitation - continue comprehensive acute inpatient rehabilitation program with multidisciplinary approach including therapies, rehab nursing, and physiatry following. See interval history for updates.        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 Dysphagia improved.  cognitive deficits.      --continue warfarin; INR goal 2-3. 08/01/18 INR 1.88- will hold on bridge follow up INR 8/3.   --continue HTN and HLD control as below for secondary prevention  --f/u in neurology clinic as outpatient with repeat CT angiogram in 6 months to evaluate the vasculature within his neck     # HTN: new diagnosis per his report.  Long term goal <130/80. -130s  - Continue losartan 50 mg  Daily, flomax 04. Mg daily.      # HLD: LDL 88. Continue crestor.      # Seronegative RA: well controlled and is followed by a rheumatologist. On methotrexate and prednisone prior to admission. Methotrexate on hold; -continue prednisone 5mg daily.      # H/o BPH s/p Rezum 6/29/2018: nocturia and frequency during the day. Currently on flomax 0.4 bid and finasteride. 07/31/18  hematuria discussed with Dr. Dave from urology associate;  urology teamrecommended to place a yeh catheter for 4-5 days until his hematuria resolves. Ray declined Per  nursing report, voiding spontaneously and urine clear   - continue bladder program and SIC as needed.        # Allergic rhinitis: on Flonase prior to admission which was continued.      # H/o peripheral artery disease, status post stent to R SFA in 09/2013     # Pressure Injury (PI) located on midline coccyx: was seen by WOCN who recommended PIP measures including use of chair cushion and repositioning measures- avoid direct, supine positioning, etc. Continues to use Mepilex Sacral Dressings       FEN: regular diet   Bowel: prn and scheduled bowel meds and supp/   Bladder: continent   DVT Prophylaxis: on warfarin   GI Prophylaxis: PPI  Code: DNR  Disposition: home   ELOS:  goal ~8/14  Follow up Appointments on Discharge: PCP, neurology, and PM&R.    May Celeste PA-C  Physical Medicine & Rehabilitation

## 2018-08-03 NOTE — PLAN OF CARE
Problem: Goal/Outcome  Goal: Goal Outcome Summary  Outcome: No Change  A&O x4. Denied pain, nausea, SOB or other concerns overnight. Pt voiding spontaneously with assist x 1 in urinal standing at bedside. No BM overnight. Pt slept majority of shift. Call light within reach and pt able to make needs known. Bed alarm on for safety given previous impulsivity.

## 2018-08-03 NOTE — PLAN OF CARE
Problem: Patient Care Overview  Goal: Plan of Care/Patient Progress Review  SLP: pt benefited from education/discussion regarding therapy needs/goals.  When pt errs in common tasks (ie numerical reasoning, reading), he can better acknowledge changes.  Encouraged pt to start taking notes in notebook .  Pt states he sometimes forgot his medications at home, and realizes now he needs better system.

## 2018-08-04 LAB — INR PPP: 2.44 (ref 0.86–1.14)

## 2018-08-04 PROCEDURE — 12800006 ZZH R&B REHAB

## 2018-08-04 PROCEDURE — 40000225 ZZH STATISTIC SLP WARD VISIT: Performed by: SPEECH-LANGUAGE PATHOLOGIST

## 2018-08-04 PROCEDURE — 40000133 ZZH STATISTIC OT WARD VISIT

## 2018-08-04 PROCEDURE — 40000193 ZZH STATISTIC PT WARD VISIT

## 2018-08-04 PROCEDURE — 92507 TX SP LANG VOICE COMM INDIV: CPT | Mod: GN | Performed by: SPEECH-LANGUAGE PATHOLOGIST

## 2018-08-04 PROCEDURE — 97116 GAIT TRAINING THERAPY: CPT | Mod: GP | Performed by: STUDENT IN AN ORGANIZED HEALTH CARE EDUCATION/TRAINING PROGRAM

## 2018-08-04 PROCEDURE — 25000125 ZZHC RX 250: Performed by: PHYSICAL MEDICINE & REHABILITATION

## 2018-08-04 PROCEDURE — 97110 THERAPEUTIC EXERCISES: CPT | Mod: GP

## 2018-08-04 PROCEDURE — 40000193 ZZH STATISTIC PT WARD VISIT: Performed by: STUDENT IN AN ORGANIZED HEALTH CARE EDUCATION/TRAINING PROGRAM

## 2018-08-04 PROCEDURE — 97150 GROUP THERAPEUTIC PROCEDURES: CPT | Mod: GO

## 2018-08-04 PROCEDURE — 85610 PROTHROMBIN TIME: CPT | Performed by: PHYSICAL MEDICINE & REHABILITATION

## 2018-08-04 PROCEDURE — 25000132 ZZH RX MED GY IP 250 OP 250 PS 637: Performed by: PHYSICAL MEDICINE & REHABILITATION

## 2018-08-04 PROCEDURE — 36415 COLL VENOUS BLD VENIPUNCTURE: CPT | Performed by: PHYSICAL MEDICINE & REHABILITATION

## 2018-08-04 PROCEDURE — 97116 GAIT TRAINING THERAPY: CPT | Mod: GP

## 2018-08-04 PROCEDURE — 97112 NEUROMUSCULAR REEDUCATION: CPT | Mod: GP | Performed by: STUDENT IN AN ORGANIZED HEALTH CARE EDUCATION/TRAINING PROGRAM

## 2018-08-04 RX ORDER — WARFARIN SODIUM 5 MG/1
5 TABLET ORAL
Status: COMPLETED | OUTPATIENT
Start: 2018-08-04 | End: 2018-08-04

## 2018-08-04 RX ADMIN — ROSUVASTATIN CALCIUM 20 MG: 20 TABLET ORAL at 07:34

## 2018-08-04 RX ADMIN — TAMSULOSIN HYDROCHLORIDE 0.4 MG: 0.4 CAPSULE ORAL at 07:34

## 2018-08-04 RX ADMIN — MICONAZOLE NITRATE: 2 POWDER TOPICAL at 07:37

## 2018-08-04 RX ADMIN — FLUTICASONE PROPIONATE 1 SPRAY: 50 SPRAY, METERED NASAL at 07:37

## 2018-08-04 RX ADMIN — LOSARTAN POTASSIUM 50 MG: 50 TABLET ORAL at 07:34

## 2018-08-04 RX ADMIN — WARFARIN SODIUM 5 MG: 5 TABLET ORAL at 17:58

## 2018-08-04 RX ADMIN — PANTOPRAZOLE SODIUM 40 MG: 40 TABLET, DELAYED RELEASE ORAL at 06:23

## 2018-08-04 RX ADMIN — MICONAZOLE NITRATE: 2 POWDER TOPICAL at 20:52

## 2018-08-04 RX ADMIN — PREDNISONE 5 MG: 5 TABLET ORAL at 07:34

## 2018-08-04 RX ADMIN — FINASTERIDE 5 MG: 5 TABLET, FILM COATED ORAL at 07:34

## 2018-08-04 NOTE — PROGRESS NOTES
FOCUS/GOAL  Bladder management, Mobility and Safety management    ASSESSMENT, INTERVENTIONS AND CONTINUING PLAN FOR GOAL:  Pt is alert and oriented with some forgetfulness used call light appropriately for assistance   No incontinence of bowel and bladder , voiding spontaneously in the toilet   CGA with transfer requested that he used the w/c for mobility in the toilet with staff pushing him hold on to the grab bar during transfer with steadying assist from staff , pt is able to do his pericare with set up and steadying assist from staff .,

## 2018-08-04 NOTE — PLAN OF CARE
Problem: Patient Care Overview  Goal: Plan of Care/Patient Progress Review  SLP: pt continues to benefit from encouragement and discussion about progress. Uncertain yet if pt will need 24/7 supervision at home, or frequent check ins/help with medications.  Pt pleased with improvement in writing legibility.  Noting difficulty with formulating thoughts given sentences to complete, and occasional missing words.  Also mild difficulty with reading comprehension task - missing details.       normal...

## 2018-08-04 NOTE — PLAN OF CARE
Problem: Goal/Outcome  Goal: Goal Outcome Summary  PT: pt continues to demo L lean needing varying amount of assistance during transfers, especially when approaching to chair/bed/wc on the L needing mod to max A due to lean, pt also continues to require verbal cuing for hand placement. Cont with POC.

## 2018-08-04 NOTE — PLAN OF CARE
Problem: Goal/Outcome  Goal: Goal Outcome Summary  Outcome: Improving  FOCUS/GOAL  Medical management    ASSESSMENT, INTERVENTIONS AND CONTINUING PLAN FOR GOAL:  Patient slept well. He called for assistance once to the bathroom. He transferred out of bed, ambulated to and from the bathroom with minimal assistance and a walker. No c/o pain.

## 2018-08-04 NOTE — PROGRESS NOTES
"OT: Pt participated in the established \"Transitions Group\" for stroke pts. Highlighting topics such as return to meaningful activities, health/wellness, fatigue, depression/anxiety, bowel/bladder considerations w/ community re-integration and caregiver wellness/support. Pt receptive to class. Pt reports personal goals after discharge are to return home independently and return to driving.      "

## 2018-08-04 NOTE — PROGRESS NOTES
[facility] Acute Rehabilitation Unit Progress Note    Patient Name: Shamir Concepcion   YOB: 1940  MRN: [id number]    Age / Sex: 78 year old male    ASSESSMENT/PLAN:  Shamir Concepcion is a 78 year old gentleman in acute rehab for storke, ataxia, and impaired cognition. Patient progressing through therapies, with current sustaining cares meeting needs. See primary team note for details.  - EMR reviewed, no acute overnight events, vital stable, elevated BP of 149/66 this am, cont to monitor  - labs, imaging none new: INR 2.44 this AM  - continue multidisciplinary therapies and plan of care.    1. 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 HTN: no change  3. HLD: no change  4. Seornegative RA: on prednisone 5 mg daily  5. BPH: Is voiding spontaneously and urine clear still  6. Allergic Rhinits: no change  7. H/O PAD: s/p stent to R SFA in 2013  8. Coccyx wound: mepilex drsg  9. MH: previous reports of slightly depressive symptoms, feeling ok today    SUBJECTIVE/INTERVAL HX:    Patient was seen and examined at bedside rounds. Reports feeling well. Slept well without any complaints. Denies fevers/chills, shortness of breath, chest pain, bowel/bladder changes, or new pain.     CURRENT INTERVENTIONS:  Current Facility-Administered Medications   Medication     acetaminophen (TYLENOL) tablet 325-975 mg     bisacodyl (DULCOLAX) Suppository 10 mg     finasteride (PROSCAR) tablet 5 mg     fluticasone (FLONASE) 50 MCG/ACT spray 1 spray     lidocaine 2 % (URO-JET) jelly 10 mL     losartan (COZAAR) tablet 50 mg     magnesium hydroxide (MILK OF MAGNESIA) suspension 30 mL     miconazole (MICATIN; MICRO GUARD) 2 % powder     pantoprazole (PROTONIX) EC tablet 40 mg     Patient is already receiving anticoagulation with heparin, enoxaparin (LOVENOX), warfarin (COUMADIN)  or other  anticoagulant medication     polyethylene glycol (MIRALAX/GLYCOLAX) Packet 17 g     predniSONE (DELTASONE) tablet 5 mg     psyllium (METAMUCIL/KONSYL) Packet 1 packet     rosuvastatin (CRESTOR) tablet 20 mg     senna-docusate (SENOKOT-S;PERICOLACE) 8.6-50 MG per tablet 2 tablet     tamsulosin (FLOMAX) capsule 0.4 mg     Warfarin Therapy Reminder (Check START DATE - warfarin may be starting in the FUTURE)       PHYSICAL EXAM:  Vitals: Temp: 96.8  F (36  C) Temp src: Oral BP: 149/66 Pulse: 61   Resp: 16 SpO2: 93 % O2 Device: None (Room air)    Gen: No acute distress, pleasant  HEENT: hearing present to speaking voice  CVS: nl HR, no extremity edema noted  Resp: breathing unlabored at rest on room air, CTAB  MSK: moving all limbs spontaneously  Neuro: AOx3, communicative  Psych: nl affect, reserved          Kain Vega DO  PGY-2 PM&R Resident  [facility] Acute Rehab  Pager: [phone]

## 2018-08-04 NOTE — PLAN OF CARE
Problem: Patient Care: Nursing Focus  Goal: Bowel Management  Outcome: Improving  FOCUS/GOAL  Bowel management, Bladder management, Medical management, Skin integrity and Safety management    ASSESSMENT, INTERVENTIONS AND CONTINUING PLAN FOR GOAL:  Pt is A&Ox4 this shift and is denying any pain. Pt continues to have minimal L side weakness especially in leg. Pt remains a 1 staff assist w/ walker and is able to use toilet in room w/ staff assistance. Pt has been continent of bowel and bladder this shift. Pt had a PVR of 71 mL and reports feeling that bladder is empty after voiding. No redness or irritation was observed on Pt's groin or coccyx and Pt denies concern in these areas.

## 2018-08-05 LAB — INR PPP: 2.69 (ref 0.86–1.14)

## 2018-08-05 PROCEDURE — 97110 THERAPEUTIC EXERCISES: CPT | Mod: GP | Performed by: STUDENT IN AN ORGANIZED HEALTH CARE EDUCATION/TRAINING PROGRAM

## 2018-08-05 PROCEDURE — 97112 NEUROMUSCULAR REEDUCATION: CPT | Mod: GP

## 2018-08-05 PROCEDURE — 97535 SELF CARE MNGMENT TRAINING: CPT | Mod: GO | Performed by: OCCUPATIONAL THERAPIST

## 2018-08-05 PROCEDURE — 40000193 ZZH STATISTIC PT WARD VISIT

## 2018-08-05 PROCEDURE — 36415 COLL VENOUS BLD VENIPUNCTURE: CPT | Performed by: PHYSICAL MEDICINE & REHABILITATION

## 2018-08-05 PROCEDURE — 25000132 ZZH RX MED GY IP 250 OP 250 PS 637: Performed by: PHYSICAL MEDICINE & REHABILITATION

## 2018-08-05 PROCEDURE — 12800006 ZZH R&B REHAB

## 2018-08-05 PROCEDURE — 40000193 ZZH STATISTIC PT WARD VISIT: Performed by: STUDENT IN AN ORGANIZED HEALTH CARE EDUCATION/TRAINING PROGRAM

## 2018-08-05 PROCEDURE — 40000225 ZZH STATISTIC SLP WARD VISIT: Performed by: SPEECH-LANGUAGE PATHOLOGIST

## 2018-08-05 PROCEDURE — 97116 GAIT TRAINING THERAPY: CPT | Mod: GP | Performed by: STUDENT IN AN ORGANIZED HEALTH CARE EDUCATION/TRAINING PROGRAM

## 2018-08-05 PROCEDURE — 40000133 ZZH STATISTIC OT WARD VISIT: Performed by: OCCUPATIONAL THERAPIST

## 2018-08-05 PROCEDURE — 85610 PROTHROMBIN TIME: CPT | Performed by: PHYSICAL MEDICINE & REHABILITATION

## 2018-08-05 PROCEDURE — 25000125 ZZHC RX 250: Performed by: PHYSICAL MEDICINE & REHABILITATION

## 2018-08-05 PROCEDURE — 97530 THERAPEUTIC ACTIVITIES: CPT | Mod: GP

## 2018-08-05 PROCEDURE — 97127 ZZHC SP THERAPEUTIC INTERVENTION W/FOCUS ON COGNITIVE FUNCTION,EA 15 MIN: CPT | Mod: GN | Performed by: SPEECH-LANGUAGE PATHOLOGIST

## 2018-08-05 RX ORDER — WARFARIN SODIUM 4 MG/1
4 TABLET ORAL
Status: COMPLETED | OUTPATIENT
Start: 2018-08-05 | End: 2018-08-05

## 2018-08-05 RX ADMIN — FLUTICASONE PROPIONATE 1 SPRAY: 50 SPRAY, METERED NASAL at 07:59

## 2018-08-05 RX ADMIN — LOSARTAN POTASSIUM 50 MG: 50 TABLET ORAL at 07:57

## 2018-08-05 RX ADMIN — TAMSULOSIN HYDROCHLORIDE 0.4 MG: 0.4 CAPSULE ORAL at 07:57

## 2018-08-05 RX ADMIN — PANTOPRAZOLE SODIUM 40 MG: 40 TABLET, DELAYED RELEASE ORAL at 06:15

## 2018-08-05 RX ADMIN — ROSUVASTATIN CALCIUM 20 MG: 20 TABLET ORAL at 07:57

## 2018-08-05 RX ADMIN — FINASTERIDE 5 MG: 5 TABLET, FILM COATED ORAL at 07:58

## 2018-08-05 RX ADMIN — PREDNISONE 5 MG: 5 TABLET ORAL at 07:58

## 2018-08-05 RX ADMIN — WARFARIN SODIUM 4 MG: 4 TABLET ORAL at 17:08

## 2018-08-05 RX ADMIN — MICONAZOLE NITRATE: 2 POWDER TOPICAL at 07:59

## 2018-08-05 NOTE — PLAN OF CARE
Problem: Goal/Outcome  Goal: Goal Outcome Summary  Patient A&O x4, denied CP, lightheadedness, dizziness, numbness, tingling and SOB. Continent of bowel and bladder, walked to the bathroom, last PVR 70 ml,  LBM yesterday per pt. report, AS1 with walker and gait belt, no complain of pain, bed and chair alarm ON for safety, self repositioned and turned in bed, able to use call light appropriately and make needs known, will continue to monitor patient as POC.

## 2018-08-05 NOTE — PROGRESS NOTES
[facility] Acute Rehabilitation Unit Progress Note    Patient Name: Shamir Concepcion   YOB: 1940  MRN: [id number]    Age / Sex: 78 year old male    ASSESSMENT/PLAN:  Shamir Concepcion is a 78 year old gentleman in acute rehab for storke, ataxia, and impaired cognition. Patient progressing through therapies, with current sustaining cares meeting needs. See primary team note for details.  - EMR reviewed, no acute overnight events, vital stable,   - labs, imaging none new: INR 2.69 this AM  - continue multidisciplinary therapies and plan of care.    1. 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  Care conference 8 Aug  2. HTN: losartan 50 mg/daily  3. HLD: crestor 20 mg/daily  4. Seornegative RA: on prednisone 5 mg daily  5. BPH: finasteride 5 mg/daily and tamsulosin 0.4 mg/daily, urine still clear with PVR under 200  6. Allergic Rhinits: fluticasone 50mcg/daily  7. H/O PAD: s/p stent to R SFA in 2013  8. Coccyx wound: mepilex drsg, miconazole powder to groin BID  9. MH: previous reports of slightly depressive symptoms, feeling ok today    SUBJECTIVE/INTERVAL HX:    Patient was seen and examined at bedside rounds. Reports feeling well. Slept well without any complaints. Denies fevers/chills, shortness of breath, chest pain, bowel/bladder changes, or new pain.     CURRENT INTERVENTIONS:  Current Facility-Administered Medications   Medication     acetaminophen (TYLENOL) tablet 325-975 mg     bisacodyl (DULCOLAX) Suppository 10 mg     finasteride (PROSCAR) tablet 5 mg     fluticasone (FLONASE) 50 MCG/ACT spray 1 spray     lidocaine 2 % (URO-JET) jelly 10 mL     losartan (COZAAR) tablet 50 mg     magnesium hydroxide (MILK OF MAGNESIA) suspension 30 mL     miconazole (MICATIN; MICRO GUARD) 2 % powder     pantoprazole (PROTONIX) EC tablet 40 mg     Patient is already receiving  anticoagulation with heparin, enoxaparin (LOVENOX), warfarin (COUMADIN)  or other anticoagulant medication     polyethylene glycol (MIRALAX/GLYCOLAX) Packet 17 g     predniSONE (DELTASONE) tablet 5 mg     psyllium (METAMUCIL/KONSYL) Packet 1 packet     rosuvastatin (CRESTOR) tablet 20 mg     senna-docusate (SENOKOT-S;PERICOLACE) 8.6-50 MG per tablet 2 tablet     tamsulosin (FLOMAX) capsule 0.4 mg     Warfarin Therapy Reminder (Check START DATE - warfarin may be starting in the FUTURE)       PHYSICAL EXAM:  Vitals: Temp: 98.3  F (36.8  C) Temp src: Oral BP: 129/67 Pulse: 80   Resp: 18 SpO2: 95 % O2 Device: None (Room air)    Gen: No acute distress, pleasant  HEENT: hearing present to speaking voice  CVS: nl HR, RRR no extremity edema noted  Resp: breathing unlabored at rest on room air, CTAB  MSK: moving all limbs spontaneously  Neuro: AOx3, communicative  Psych: nl affect, reserved          Kain Vega DO  PGY-2 PM&R Resident  [facility] Acute Rehab  Pager: [phone]

## 2018-08-05 NOTE — PLAN OF CARE
"Problem: Patient Care Overview  Goal: Plan of Care/Patient Progress Review  SLP: pt did not adhere to transfer instructions with nursing (noted pt got up and did not wait for belt/walker to transfer to chair).  Significant difficulty with numerical reasoning task (word problems, time concepts) pt states \"I could have done that in my head before.\"  Noting ongoing difficulty with written expression - missing words, incomplete sentences. Difficulty also with written sequencing task.Will continue to assess/discuss with team recommendations (ie 24/7 supervision, or TCU or, frequent check ins/help with medications?)      "

## 2018-08-05 NOTE — PLAN OF CARE
Problem: Goal/Outcome  Goal: Goal Outcome Summary  Outcome: Improving  FOCUS/GOAL  Bladder management and Medical management    ASSESSMENT, INTERVENTIONS AND CONTINUING PLAN FOR GOAL:  Alert, able to make needs known. Needs assist of one with walker for transfers and toileting. Continent of bladder with use of toilet, random bladder scan 264 and . Denies pain or discomfort. Fall precautions in place at all times.

## 2018-08-05 NOTE — PLAN OF CARE
Problem: Goal/Outcome  Goal: Goal Outcome Summary  Patient alert and oriented, with some forgetfulness. Denies pain/SOB/Numbness. Patient continent of bowel and bladder in toilet, voiding spontaneously. Post void PVR was 112. CGA with walker for transfers. Bed alarm on for safety.

## 2018-08-06 LAB — INR PPP: 2.84 (ref 0.86–1.14)

## 2018-08-06 PROCEDURE — 97116 GAIT TRAINING THERAPY: CPT | Mod: GP | Performed by: STUDENT IN AN ORGANIZED HEALTH CARE EDUCATION/TRAINING PROGRAM

## 2018-08-06 PROCEDURE — 25000132 ZZH RX MED GY IP 250 OP 250 PS 637: Performed by: PHYSICAL MEDICINE & REHABILITATION

## 2018-08-06 PROCEDURE — 12800006 ZZH R&B REHAB

## 2018-08-06 PROCEDURE — 25000125 ZZHC RX 250: Performed by: PHYSICAL MEDICINE & REHABILITATION

## 2018-08-06 PROCEDURE — 97110 THERAPEUTIC EXERCISES: CPT | Mod: GP | Performed by: STUDENT IN AN ORGANIZED HEALTH CARE EDUCATION/TRAINING PROGRAM

## 2018-08-06 PROCEDURE — 85610 PROTHROMBIN TIME: CPT | Performed by: PHYSICAL MEDICINE & REHABILITATION

## 2018-08-06 PROCEDURE — 40000133 ZZH STATISTIC OT WARD VISIT: Performed by: OCCUPATIONAL THERAPIST

## 2018-08-06 PROCEDURE — 40000193 ZZH STATISTIC PT WARD VISIT: Performed by: STUDENT IN AN ORGANIZED HEALTH CARE EDUCATION/TRAINING PROGRAM

## 2018-08-06 PROCEDURE — 36415 COLL VENOUS BLD VENIPUNCTURE: CPT | Performed by: PHYSICAL MEDICINE & REHABILITATION

## 2018-08-06 PROCEDURE — 97530 THERAPEUTIC ACTIVITIES: CPT | Mod: GP | Performed by: STUDENT IN AN ORGANIZED HEALTH CARE EDUCATION/TRAINING PROGRAM

## 2018-08-06 PROCEDURE — 97112 NEUROMUSCULAR REEDUCATION: CPT | Mod: GP | Performed by: STUDENT IN AN ORGANIZED HEALTH CARE EDUCATION/TRAINING PROGRAM

## 2018-08-06 PROCEDURE — 97535 SELF CARE MNGMENT TRAINING: CPT | Mod: GO | Performed by: OCCUPATIONAL THERAPIST

## 2018-08-06 PROCEDURE — 97127 ZZHC SP THERAPEUTIC INTERVENTION W/FOCUS ON COGNITIVE FUNCTION,EA 15 MIN: CPT | Mod: GN | Performed by: SPEECH-LANGUAGE PATHOLOGIST

## 2018-08-06 PROCEDURE — 40000225 ZZH STATISTIC SLP WARD VISIT: Performed by: SPEECH-LANGUAGE PATHOLOGIST

## 2018-08-06 RX ORDER — WARFARIN SODIUM 1 MG/1
2 TABLET ORAL
Status: COMPLETED | OUTPATIENT
Start: 2018-08-06 | End: 2018-08-06

## 2018-08-06 RX ADMIN — FINASTERIDE 5 MG: 5 TABLET, FILM COATED ORAL at 07:54

## 2018-08-06 RX ADMIN — ROSUVASTATIN CALCIUM 20 MG: 20 TABLET ORAL at 07:50

## 2018-08-06 RX ADMIN — MICONAZOLE NITRATE: 2 POWDER TOPICAL at 07:51

## 2018-08-06 RX ADMIN — FLUTICASONE PROPIONATE 1 SPRAY: 50 SPRAY, METERED NASAL at 07:50

## 2018-08-06 RX ADMIN — PREDNISONE 5 MG: 5 TABLET ORAL at 07:50

## 2018-08-06 RX ADMIN — LOSARTAN POTASSIUM 50 MG: 50 TABLET ORAL at 07:50

## 2018-08-06 RX ADMIN — PANTOPRAZOLE SODIUM 40 MG: 40 TABLET, DELAYED RELEASE ORAL at 06:12

## 2018-08-06 RX ADMIN — WARFARIN SODIUM 2 MG: 1 TABLET ORAL at 18:21

## 2018-08-06 RX ADMIN — TAMSULOSIN HYDROCHLORIDE 0.4 MG: 0.4 CAPSULE ORAL at 07:50

## 2018-08-06 NOTE — PROGRESS NOTES
PM&R Daily Note:    78 with left PICA stroke associated with vertebral artery occlusion admitted to [facility] 7/21.  After workup and stabilization he transferred to acute rehabilitation 7/28.    Interval history:  Ray continues to participate well showing some nice functional improvements day by day.  Cognitively and it simple conversation well he is doing fairly well.  Needing slight increase time for reading and looking for details and attention.  Mild difficulties with memory as well.  Do anticipate he will need some additional support, specifics to be determined.  Balance continues to demonstrate leftward lean.  He needs some cues and reminders for walker proximity.  Definitely ambulation with assistance only still.    Medically:  Blood pressure is reasonably well-controlled, continue with losartan 50 mg daily.  Anticoagulation with warfarin, INR 2.84 today, target 2-3.  Benign prostatic hypertrophy, continues with finasteride and tamsulosin.  Post void residuals occasionally have been over 200 but usually low enough and not requiring intermittent catheterization.  We can discontinue bladder scans unless his symptoms change.      Vitals:    08/05/18 1747 08/06/18 0749 08/06/18 1152 08/06/18 1524   BP: 151/63 124/60 118/57 120/59   BP Location: Right arm Right arm Right arm Right arm   Pulse: 76 65 68 65   Resp: 16 18  16   Temp: 97.4  F (36.3  C) 98  F (36.7  C)  96.4  F (35.8  C)   TempSrc: Oral Oral  Oral   SpO2: 96% 97%  99%   Weight:       Height:         Fully alert pleasant  Fluent speech and language at simple conversational level  Affect is euthymic even in some discussion with his wife who passed away with lung cancer last November  Heart regular  Lungs clear      finasteride (PROSCAR) tablet 5 mg  5 mg Oral Daily     fluticasone  1 spray Both Nostrils Daily     losartan  50 mg Oral Daily     miconazole   Topical BID     pantoprazole  40 mg Oral QAM AC     predniSONE  5 mg Oral Daily      psyllium  1 packet Oral Daily     rosuvastatin  20 mg Oral or NG Tube Daily     tamsulosin  0.4 mg Oral Daily     warfarin  2 mg Oral ONCE at 18:00

## 2018-08-06 NOTE — PLAN OF CARE
Problem: Goal/Outcome  Goal: Goal Outcome Summary  Patient alert and oriented. Denies Pain/SOB/Numbness. CGA Mod assist of 1 with walker. Continent of bladder. PVRs this shift were 286 and 139. Patient is encourage to double void. Bed in low position and alarms are on for safety. Continue POC

## 2018-08-06 NOTE — PROGRESS NOTES
"CLINICAL NUTRITION SERVICES - ASSESSMENT NOTE     Nutrition Prescription    RECOMMENDATIONS FOR MDs/PROVIDERS TO ORDER:  -Consider daily MVI with minerals to ensure micronutrient needs met.    Malnutrition Status:    Pt does not meet 2 criteria.    Recommendations already ordered by Registered Dietitian (RD):  None today    Future/Additional Recommendations:  -Monitor po intakes and weight trends.   -If po intakes decline and/or weight loss consider addition of snacks or supplements.  -Consider review of  heart healthy diet guidelines.       REASON FOR ASSESSMENT  Shamir Concepcion is a/an 78 year old male assessed by the dietitian for LOS    NUTRITION HISTORY  Per chart review, pt was assessed by hospital RD for LOS on 7/27.  Noted to be on a Regular diet and eating mostly 100%.    Pt was seen by WOCN on 7/26:  \"Pressure Injury (PI) located on midline coccyx:\"     Per pt he usually eats 3 meals per day at home-  Breakfast: oatmeal and sometimes an English muffin with peanut butter  Lunch:  fast food hamburger  Supper:  frozen meal  He denies any recent changes in appetite or weight.  Not a big eater at baseline.    CURRENT NUTRITION ORDERS  Diet: Regular with Thin Liquids  Room Service with Assist    Intake/Tolerance: PO intakes have been % of recorded meals.  Per review of selections in [employer], pt is ordering 3 smaller meals per day (yesterday only 2 meals).   He receives set up assistance at meals.  Per pt he occasionally gets some outside food brought in.      LABS  Labs reviewed  (7/30) Vitamin D deficiency screen 21 (low end normal range)    MEDICATIONS  Medications reviewed  Prednisone daily    ANTHROPOMETRICS  Height: 175.3 cm (5' 9\")  Most Recent Weight: 71 kg (156 lb 9.6 oz)    [facility] Admission Weight (7/28)  75.3 kg/166 lbs  IBW: 160 lbs  BMI: Normal BMI  Weight History:  Per chart review, hospital admission weight (7/21)  71.7 kg/158 lbs 1.1 oz which is consistent with last available weight.  ? " Accuracy of [facility] admission weight.  UBW:  160-165 lbs per pt.   Current weight trends appear decreased about 10 lbs compared to 15 months ago per hx below.    Wt Readings from Last 10 Encounters:   08/04/18 71 kg (156 lb 9.6 oz)   07/25/18 71 kg (156 lb 8.4 oz)   05/02/17 76.1 kg (167 lb 12.3 oz)   04/03/17 75.8 kg (167 lb)   09/11/13 75.6 kg (166 lb 11.2 oz)   12/21/11 78.6 kg (173 lb 3.2 oz)       Dosing Weight: 71 kg    ASSESSED NUTRITION NEEDS  Estimated Energy Needs: 7112-8381 kcals/day (25 - 30 kcals/kg)  Justification: Maintenance  Estimated Protein Needs: >/=85 grams protein/day ( >/=1.2 grams of pro/kg)  Justification: Maintenance, skin integrity  Estimated Fluid Needs: (1 mL/kcal)   Justification: Per provider pending fluid status    PHYSICAL FINDINGS  See malnutrition section below.  Per RN note on admission to [facility] (7/28):  Mepilex on coccyx for protection, skin intact and no pinkness noted. Mepilex on L hand covering skin tear.     MALNUTRITION  % Intake: Decreased intake does not meet criteria  % Weight Loss: Weight loss does not meet criteria  Subcutaneous Fat Loss: None observed  Muscle Loss: None observed  Fluid Accumulation/Edema: None noted  Malnutrition Diagnosis: Patient does not meet two of the above criteria necessary for diagnosing malnutrition    NUTRITION DIAGNOSIS  Predicted suboptimal nutrient intakes related to decreased appetite and LOS as evidenced by ordering smaller meals.       INTERVENTIONS  Implementation  Nutrition Education:  Encouraged intakes of tid meals.  Briefly discussed sodium content of fast food and frozen meals.       Goals  Patient to consume % of nutritionally adequate meal trays TID, or the equivalent with supplements/snacks.     Monitoring/Evaluation  Progress toward goals will be monitored and evaluated per protocol.    Omayra Bassett RD, LD

## 2018-08-06 NOTE — PLAN OF CARE
Problem: Goal/Outcome  Goal: Goal Outcome Summary  Outcome: No Change  FOCUS/GOAL  Bowel management, Bladder management, Medical management and Mobility    ASSESSMENT, INTERVENTIONS AND CONTINUING PLAN FOR GOAL:  Pt's vitals stable. Alert and oriented x 3. Used the call light for assist. Needs reminders to order food and use the toilet. Continent of bladder. PVR 93. No bm this shift. Transfers and walks in the room using the walker and CGA. Bed and chair alarms on.

## 2018-08-06 NOTE — PLAN OF CARE
Problem: Patient Care Overview  Goal: Plan of Care/Patient Progress Review  Progressively improving balance and consistently places R hand back for sit<>stand, but needs reminders for walker proximity, especially when turning. Will continue to work on L heel strike during gait and leftward lean in standing, ambulating.  Pt responds well to 1-2 cues at a time, especially visual cues and appreciates learning reasons for interventions.

## 2018-08-06 NOTE — PLAN OF CARE
Problem: Individualization  Goal: Patient Individualization  OT: patient looking forward to ml 8/8/18. Patient's daughter planning to attend.

## 2018-08-06 NOTE — PLAN OF CARE
Problem: Patient Care Overview  Goal: Plan of Care/Patient Progress Review  Completed reading comprehension tasks that incorporated problem solving- caluclation- pt needing increaed time to read and needing to re-read a couple of times to aid in attention to detail and also processin go info- with calculation- pt had some errors but generally could set the problCompleted a written expression task- writing sentences using 3 words in a sentence- some spelling errors and difficulty with organzation -needing cues to rewrite for accuracy- completed 2/4em up- 2/5 accuracy. .Completed a divided attention taks- iding mistakes on a menu- pt needing cues to id on more than half of the task. Completed memory recall task - recall of a pictured scene- pt at 6/10 recall. Also had pt huang in his memory notebook - needed assist with what to write in it

## 2018-08-06 NOTE — PLAN OF CARE
Problem: Goal/Outcome  Goal: Goal Outcome Summary  Outcome: Improving  FOCUS/GOAL  Bladder management and Medical management    ASSESSMENT, INTERVENTIONS AND CONTINUING PLAN FOR GOAL:  Alert & oriented, uses call light to make needs known. Moves independently in bed. Needs assist of one with transfers and toileting with assist of walker. Continent of bladder with use of toilet, random bladder scan 208 @ 0002,  @ 0415. Denies pain or discomfort.

## 2018-08-07 ENCOUNTER — TELEPHONE (OUTPATIENT)
Dept: NEUROLOGY | Facility: CLINIC | Age: 78
End: 2018-08-07

## 2018-08-07 DIAGNOSIS — I63.9 CEREBROVASCULAR ACCIDENT (CVA), UNSPECIFIED MECHANISM (H): Primary | ICD-10-CM

## 2018-08-07 LAB — INR PPP: 2.34 (ref 0.86–1.14)

## 2018-08-07 PROCEDURE — 25000125 ZZHC RX 250: Performed by: PHYSICAL MEDICINE & REHABILITATION

## 2018-08-07 PROCEDURE — 40000225 ZZH STATISTIC SLP WARD VISIT: Performed by: SPEECH-LANGUAGE PATHOLOGIST

## 2018-08-07 PROCEDURE — 25000132 ZZH RX MED GY IP 250 OP 250 PS 637: Performed by: PHYSICAL MEDICINE & REHABILITATION

## 2018-08-07 PROCEDURE — 40000133 ZZH STATISTIC OT WARD VISIT: Performed by: OCCUPATIONAL THERAPIST

## 2018-08-07 PROCEDURE — 85610 PROTHROMBIN TIME: CPT | Performed by: PHYSICAL MEDICINE & REHABILITATION

## 2018-08-07 PROCEDURE — 97112 NEUROMUSCULAR REEDUCATION: CPT | Mod: GP

## 2018-08-07 PROCEDURE — 36415 COLL VENOUS BLD VENIPUNCTURE: CPT | Performed by: PHYSICAL MEDICINE & REHABILITATION

## 2018-08-07 PROCEDURE — 92507 TX SP LANG VOICE COMM INDIV: CPT | Mod: GN | Performed by: SPEECH-LANGUAGE PATHOLOGIST

## 2018-08-07 PROCEDURE — 12800006 ZZH R&B REHAB

## 2018-08-07 PROCEDURE — 97535 SELF CARE MNGMENT TRAINING: CPT | Mod: GO | Performed by: OCCUPATIONAL THERAPIST

## 2018-08-07 PROCEDURE — 97127 ZZHC SP THERAPEUTIC INTERVENTION W/FOCUS ON COGNITIVE FUNCTION,EA 15 MIN: CPT | Mod: GN | Performed by: SPEECH-LANGUAGE PATHOLOGIST

## 2018-08-07 PROCEDURE — 40000193 ZZH STATISTIC PT WARD VISIT

## 2018-08-07 RX ORDER — WARFARIN SODIUM 4 MG/1
4 TABLET ORAL
Status: COMPLETED | OUTPATIENT
Start: 2018-08-07 | End: 2018-08-07

## 2018-08-07 RX ADMIN — LOSARTAN POTASSIUM 50 MG: 50 TABLET ORAL at 08:15

## 2018-08-07 RX ADMIN — FINASTERIDE 5 MG: 5 TABLET, FILM COATED ORAL at 08:14

## 2018-08-07 RX ADMIN — TAMSULOSIN HYDROCHLORIDE 0.4 MG: 0.4 CAPSULE ORAL at 08:14

## 2018-08-07 RX ADMIN — MICONAZOLE NITRATE: 2 POWDER TOPICAL at 08:15

## 2018-08-07 RX ADMIN — PREDNISONE 5 MG: 5 TABLET ORAL at 08:15

## 2018-08-07 RX ADMIN — ROSUVASTATIN CALCIUM 20 MG: 20 TABLET ORAL at 08:14

## 2018-08-07 RX ADMIN — PANTOPRAZOLE SODIUM 40 MG: 40 TABLET, DELAYED RELEASE ORAL at 06:38

## 2018-08-07 RX ADMIN — FLUTICASONE PROPIONATE 1 SPRAY: 50 SPRAY, METERED NASAL at 08:16

## 2018-08-07 RX ADMIN — WARFARIN SODIUM 4 MG: 4 TABLET ORAL at 18:17

## 2018-08-07 NOTE — TELEPHONE ENCOUNTER
----- Message from Raffaele Cai MD sent at 7/26/2018 10:34 AM CDT -----  May I have a follow up for him in 6 months with a CT angiogram of his head and neck prior to that appointment?    Thanks!

## 2018-08-07 NOTE — PLAN OF CARE
Problem: Patient Care Overview  Goal: Plan of Care/Patient Progress Review  Compelted a reading comprehension task- iding details from an advertisement- pt still missing details- juan a attentin demands of task impacting- 3/5 accuracy. Compelted writin task- writing sentences using 3 target words in a sentence- pt still with omission of words or with written word retreival difficulty -- writing the wronge word; spelling errors also- cues to self- correct- pt can id his mistake after it is pointed out to him but having difficulty iding on his own- also reduction in overall organziation and complexity of sentences. Compelted a written problem solving and divided attention task- reading written instructions- pt at 7/10 accuracy with this- again missing some details which impmacts overall accuracy. Improved today with memory recall- paragraph level recall at 5/6 accuracy without the need for a repetition of info

## 2018-08-07 NOTE — PLAN OF CARE
Problem: Goal/Outcome  Goal: Goal Outcome Summary  Patient is alert and oriented x 3 with forgetfulness. Will have a care conference tomorrow. Miconazole powder applied to groin rash. CGA assist of 1 for tranfers and ambulation with walker and gaitbelt. Appetite is good. Denied any pain or discomfort. Voiding utilizing a urinal, independently. Also continent of bowel and bladder in toilet. Will continue with plan of care.

## 2018-08-07 NOTE — PLAN OF CARE
Problem: Goal/Outcome  Goal: Goal Outcome Summary  Outcome: Improving  FOCUS/GOAL  Medical management    ASSESSMENT, INTERVENTIONS AND CONTINUING PLAN FOR GOAL:  Patient slept well. He called for assistance to the bathroom once so far, he transferred, ambulated to and from the bathroom with CGA and and a walker. He stood up to void, he washed his hands with steadying assistance standing by the sink. PVR was 59. Bed alarm on.   Plan to discharge on 8/14. His medication list is printed, he may need to start MAP.

## 2018-08-07 NOTE — PLAN OF CARE
Problem: Goal/Outcome  Goal: Goal Outcome Summary  Patient alert and oriented. Denies Pain/SOB/Numbness. CGA Mod assist of 1 with walker. Continent of bladder. PVRs this shift were 86.  Bed in low position and alarms are on for safety. Continue POC

## 2018-08-07 NOTE — PROGRESS NOTES
"  [facility]   Acute Rehabilitation Unit  Daily progress note    interval history  Shamir Concepcion \"Ray\" seen resting in bed, reports he is physically doing well.  Denies n/v/d, sob, fevers, headaches, and dizziness.  Appetite is not great but is eating ok.  Discussed cognitive impairments \"My hands wont write the words in my head\", impaired balance and coordination discussed current level of assistance he is needing, showing more insight states he may need to hire help, and able to list things he currently would need assist with, dressing, showering, etc.  Ray was asked about help from daughters, he states they are both coming to care conference tomorrow, but feels he is \"independent and private\" and not sure he wants to ask for their help.  Also discussed stroke prevention and current anticoagulation, Ray was able to ask appropriate questions about the medication.     SLP: Progressively improving balance and consistently places R hand back for sit<>stand, but needs reminders for walker proximity, especially when turning. Will continue to work on L heel strike during gait and leftward lean in standing, ambulating.  Pt responds well to 1-2 cues at a time, especially visual cues and appreciates learning reasons for interventions.    medications  Scheduled meds    - Medication Assessment Program - Rehab Services   Does not apply See Admin Instructions     finasteride (PROSCAR) tablet 5 mg  5 mg Oral Daily     fluticasone  1 spray Both Nostrils Daily     losartan  50 mg Oral Daily     miconazole   Topical BID     pantoprazole  40 mg Oral QAM AC     predniSONE  5 mg Oral Daily     psyllium  1 packet Oral Daily     rosuvastatin  20 mg Oral or NG Tube Daily     tamsulosin  0.4 mg Oral Daily     warfarin  4 mg Oral ONCE at 18:00       PRN meds:  acetaminophen, bisacodyl, lidocaine 2 %, magnesium hydroxide, - MEDICATION INSTRUCTIONS -, polyethylene glycol, senna-docusate, Warfarin " "Therapy Reminder      physical exam  /59 (BP Location: Right arm)  Pulse 61  Temp 97.7  F (36.5  C) (Oral)  Resp 16  Ht 1.753 m (5' 9\")  Wt 71 kg (156 lb 9.6 oz)  SpO2 95%  BMI 23.13 kg/m2     Gen: NAD sitting up edge of bed.   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  Hospital course was complicated by cerebral edema requiring ICU stay and hypertonic saline, TAYLOR, UTI and coccygeal pressure injury. PMH significant for PAD s/p stenting, RA, OA, and BPH: HTN is a new diagnosis. Admission to acute inpatient rehab 07/28/18.       Rehabilitation - continue comprehensive acute inpatient rehabilitation program with multidisciplinary approach including therapies, rehab nursing, and physiatry following. See interval history for updates.        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 Dysphagia improved.  cognitive deficits.      --continue warfarin; INR goal 2-3.  --continue HTN and HLD control as below for secondary prevention  --f/u in neurology clinic as outpatient with repeat CT angiogram in 6 months to evaluate the vasculature within his neck     # HTN: new diagnosis per his report.  Long term goal <130/80. -130s  - Continue losartan 50 mg  Daily, flomax g daily.      # HLD: LDL 88. Continue crestor.      # Seronegative RA: well controlled and is followed by a rheumatologist. On methotrexate and prednisone prior to admission. Methotrexate on hold; -continue prednisone " 5mg daily.      # H/o BPH s/p Rezum 6/29/2018:   - now doing well without straight cath or hematuria  -continue flomax and finasteride     # Allergic rhinitis: on Flonase prior to admission which was continued.      # H/o peripheral artery disease, status post stent to R SFA in 09/2013     # Pressure Injury (PI) located on midline coccyx: was seen by [facility] who recommended PIP measures including use of chair cushion and repositioning measures- avoid direct, supine positioning, etc. Continues to use Mepilex Sacral Dressings       FEN: regular diet   Bowel: prn and scheduled bowel meds and supp/   Bladder: continent   DVT Prophylaxis: on warfarin   GI Prophylaxis: PPI  Code: DNR  Disposition: home   ELOS:  goal ~8/14  Follow up Appointments on Discharge: PCP, neurology, and PM&R.    May Celeste PA-C  Physical Medicine & Rehabilitation    Case discussed with Dr. Kapadia.

## 2018-08-07 NOTE — PLAN OF CARE
Problem: Patient Care Overview  Goal: Plan of Care/Patient Progress Review  Focused on increasing R weightshifting ability and improving midline orientation. Noting some improvement during transfers (min-modA initially to CGA-Shamir after intervention) but will require more repetitions of exercises in midline posture before pt will self correct more consistently.

## 2018-08-07 NOTE — PLAN OF CARE
Problem: Patient Care Overview  Goal: Plan of Care/Patient Progress Review  SLP present for cognitive linguistic therapy. Pt read and accurately wrote answers pertaining to functional math word problems with 90% accy independently. Given min verbal cues for math error, improved to 100%. Pt accurately wrote sentences given beginning prompt 100% of the time independently. SLP increased complexity of task and had pt use two words to make sentences. He independently created sentences of five or more words. Sentences made sense, though at times, were not gramatically correct.  In general, writing is mostly legible, though characters appeared to be shakey and poorly formed. Pt reports that his writing is much worse than previously. Sentences tended to be short in both tasks.

## 2018-08-08 LAB
ANION GAP SERPL CALCULATED.3IONS-SCNC: 10 MMOL/L (ref 3–14)
BUN SERPL-MCNC: 22 MG/DL (ref 7–30)
CALCIUM SERPL-MCNC: 8 MG/DL (ref 8.5–10.1)
CHLORIDE SERPL-SCNC: 107 MMOL/L (ref 94–109)
CO2 SERPL-SCNC: 26 MMOL/L (ref 20–32)
CREAT SERPL-MCNC: 1.19 MG/DL (ref 0.66–1.25)
ERYTHROCYTE [DISTWIDTH] IN BLOOD BY AUTOMATED COUNT: 14.1 % (ref 10–15)
GFR SERPL CREATININE-BSD FRML MDRD: 59 ML/MIN/1.7M2
GLUCOSE SERPL-MCNC: 91 MG/DL (ref 70–99)
HCT VFR BLD AUTO: 41.9 % (ref 40–53)
HGB BLD-MCNC: 13.4 G/DL (ref 13.3–17.7)
INR PPP: 2.36 (ref 0.86–1.14)
MCH RBC QN AUTO: 31 PG (ref 26.5–33)
MCHC RBC AUTO-ENTMCNC: 32 G/DL (ref 31.5–36.5)
MCV RBC AUTO: 97 FL (ref 78–100)
PLATELET # BLD AUTO: 331 10E9/L (ref 150–450)
POTASSIUM SERPL-SCNC: 4 MMOL/L (ref 3.4–5.3)
RBC # BLD AUTO: 4.32 10E12/L (ref 4.4–5.9)
SODIUM SERPL-SCNC: 143 MMOL/L (ref 133–144)
WBC # BLD AUTO: 8.9 10E9/L (ref 4–11)

## 2018-08-08 PROCEDURE — 97127 ZZHC SP THERAPEUTIC INTERVENTION W/FOCUS ON COGNITIVE FUNCTION,EA 15 MIN: CPT | Mod: GN | Performed by: SPEECH-LANGUAGE PATHOLOGIST

## 2018-08-08 PROCEDURE — 97530 THERAPEUTIC ACTIVITIES: CPT | Mod: GO | Performed by: OCCUPATIONAL THERAPIST

## 2018-08-08 PROCEDURE — 97116 GAIT TRAINING THERAPY: CPT | Mod: GP | Performed by: STUDENT IN AN ORGANIZED HEALTH CARE EDUCATION/TRAINING PROGRAM

## 2018-08-08 PROCEDURE — 99366 TEAM CONF W/PAT BY HC PROF: CPT | Performed by: SPEECH-LANGUAGE PATHOLOGIST

## 2018-08-08 PROCEDURE — 97110 THERAPEUTIC EXERCISES: CPT | Mod: GP | Performed by: STUDENT IN AN ORGANIZED HEALTH CARE EDUCATION/TRAINING PROGRAM

## 2018-08-08 PROCEDURE — 40000183 ZZH STATISTIC PT MED CONFERENCE < 30 MIN: Performed by: STUDENT IN AN ORGANIZED HEALTH CARE EDUCATION/TRAINING PROGRAM

## 2018-08-08 PROCEDURE — 80048 BASIC METABOLIC PNL TOTAL CA: CPT | Performed by: PHYSICAL MEDICINE & REHABILITATION

## 2018-08-08 PROCEDURE — 12800006 ZZH R&B REHAB

## 2018-08-08 PROCEDURE — 25000132 ZZH RX MED GY IP 250 OP 250 PS 637: Performed by: PHYSICAL MEDICINE & REHABILITATION

## 2018-08-08 PROCEDURE — 40000225 ZZH STATISTIC SLP WARD VISIT: Performed by: SPEECH-LANGUAGE PATHOLOGIST

## 2018-08-08 PROCEDURE — 97116 GAIT TRAINING THERAPY: CPT | Mod: GP

## 2018-08-08 PROCEDURE — 99366 TEAM CONF W/PAT BY HC PROF: CPT | Performed by: OCCUPATIONAL THERAPIST

## 2018-08-08 PROCEDURE — 40000133 ZZH STATISTIC OT WARD VISIT: Performed by: OCCUPATIONAL THERAPIST

## 2018-08-08 PROCEDURE — 36415 COLL VENOUS BLD VENIPUNCTURE: CPT | Performed by: PHYSICAL MEDICINE & REHABILITATION

## 2018-08-08 PROCEDURE — 97112 NEUROMUSCULAR REEDUCATION: CPT | Mod: GP | Performed by: STUDENT IN AN ORGANIZED HEALTH CARE EDUCATION/TRAINING PROGRAM

## 2018-08-08 PROCEDURE — 85027 COMPLETE CBC AUTOMATED: CPT | Performed by: PHYSICAL MEDICINE & REHABILITATION

## 2018-08-08 PROCEDURE — 92507 TX SP LANG VOICE COMM INDIV: CPT | Mod: GN | Performed by: SPEECH-LANGUAGE PATHOLOGIST

## 2018-08-08 PROCEDURE — 40000193 ZZH STATISTIC PT WARD VISIT

## 2018-08-08 PROCEDURE — 40000193 ZZH STATISTIC PT WARD VISIT: Performed by: STUDENT IN AN ORGANIZED HEALTH CARE EDUCATION/TRAINING PROGRAM

## 2018-08-08 PROCEDURE — 25000125 ZZHC RX 250: Performed by: PHYSICAL MEDICINE & REHABILITATION

## 2018-08-08 PROCEDURE — 85610 PROTHROMBIN TIME: CPT | Performed by: PHYSICAL MEDICINE & REHABILITATION

## 2018-08-08 RX ORDER — WARFARIN SODIUM 4 MG/1
4 TABLET ORAL
Status: COMPLETED | OUTPATIENT
Start: 2018-08-08 | End: 2018-08-08

## 2018-08-08 RX ADMIN — MICONAZOLE NITRATE: 2 POWDER TOPICAL at 20:16

## 2018-08-08 RX ADMIN — TAMSULOSIN HYDROCHLORIDE 0.4 MG: 0.4 CAPSULE ORAL at 08:26

## 2018-08-08 RX ADMIN — PREDNISONE 5 MG: 5 TABLET ORAL at 08:27

## 2018-08-08 RX ADMIN — WARFARIN SODIUM 4 MG: 4 TABLET ORAL at 17:44

## 2018-08-08 RX ADMIN — MICONAZOLE NITRATE: 2 POWDER TOPICAL at 08:27

## 2018-08-08 RX ADMIN — FLUTICASONE PROPIONATE 1 SPRAY: 50 SPRAY, METERED NASAL at 08:27

## 2018-08-08 RX ADMIN — PANTOPRAZOLE SODIUM 40 MG: 40 TABLET, DELAYED RELEASE ORAL at 06:13

## 2018-08-08 RX ADMIN — FINASTERIDE 5 MG: 5 TABLET, FILM COATED ORAL at 08:27

## 2018-08-08 RX ADMIN — ROSUVASTATIN CALCIUM 20 MG: 20 TABLET ORAL at 08:27

## 2018-08-08 RX ADMIN — LOSARTAN POTASSIUM 50 MG: 50 TABLET ORAL at 08:26

## 2018-08-08 NOTE — PROGRESS NOTES
"PM&R Daily Note:    78 with left PICA stroke associated with vertebral artery occlusion admitted to [facility] 7/21.  After workup and stabilization he transferred to acute rehabilitation 7/28.    Interval history:  Formal care conference held today. please see separate document in Plan of Care tab for full details. Briefly Ray has made some functional progress though persists with deficits especially in the cognitive domain. Some difficulties with reading, attention to details, memory and problem solving. Balance with walker and still needing cues for safety and some contact to min assistance still though able to walk hallway distances for this. Looking at needing initial 24 hour support. Rate of progress on slower side. For sure has further rehab / recovery potential but will need bit longer course so will start referrals to TCU setting. Will begin with referrals.     Medically:  Blood pressure is reasonably well-controlled, continue with losartan 50 mg daily.  Anticoagulation with warfarin, . today, target 2-3.  Benign prostatic hypertrophy, continues with finasteride and tamsulosin.  Post void residuals occasionally have been over 200 but usually low enough and not requiring intermittent catheterization.  We can discontinue bladder scans unless his symptoms change.      Vitals:    08/07/18 0810 08/07/18 1330 08/07/18 1531 08/08/18 0741   BP: 117/59 115/49 117/63 125/52   BP Location: Right arm Left arm Right arm Right arm   Pulse: 61 103 98 61   Resp:  16 14 16   Temp:  97  F (36.1  C) 98.1  F (36.7  C) 97  F (36.1  C)   TempSrc:  Oral Oral Oral   SpO2:  98% 92% 98%   Weight:  51.9 kg (114 lb 8 oz)     Height:  1.651 m (5' 5\")       Fully alert pleasant  Fluent speech and language at simple conversational level  Affect is euthymic even in some discussion with his wife who passed away with lung cancer last November  Heart regular  Lungs clear      - Medication Assessment Program - Rehab Services   " Does not apply See Admin Instructions     finasteride (PROSCAR) tablet 5 mg  5 mg Oral Daily     fluticasone  1 spray Both Nostrils Daily     losartan  50 mg Oral Daily     miconazole   Topical BID     pantoprazole  40 mg Oral QAM AC     predniSONE  5 mg Oral Daily     psyllium  1 packet Oral Daily     rosuvastatin  20 mg Oral or NG Tube Daily     tamsulosin  0.4 mg Oral Daily     40 minutes spent today, 30 in direct patient interaction with care conference, remainder in coordination of care.

## 2018-08-08 NOTE — PLAN OF CARE
Problem: Patient Care Overview  Goal: Plan of Care/Patient Progress Review  SLP -10 minutes 2/2 toileting

## 2018-08-08 NOTE — PLAN OF CARE
Problem: Patient Care Overview  Goal: Plan of Care/Patient Progress Review  PT: pt improved with gait mechanics in session but continues to be challenged with stability in gait with FWW. Continue to recommend FWW with all mobility and CGA due to instability, especially with navigating turns/sit<>stand.

## 2018-08-08 NOTE — CARE CONFERENCE
Acute Rehab Care Conference/Team Rounds      Type: Patient Conference    Present: Dr Champ Kapadia, Sue Scott LICSW, Shamir Concepcion patient, Kely Stephanie PT, Xi Villalpando OT, Sophy Stewart SLP, Raiza Holloway RN. Two daughters Dorcas and Selin.      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    Safety: Pt is alert and oriented with forgetfulness at times, uses call light to express needs,  transferring with CGA with a ww and gait belt.      Pain: Pt denies pain.     Medications, Skin, Tubes/Lines: MAP program is being implemented at this time, taking medications whole, has declined powder for groin rash at times, no tubes lines or drains intact.    Swallowing/Nutrition: no concerns.     Bowel/Bladder: Pt is continent of bowel and bladder, utilizing urinal at night and toilet during the day to void and defecate.     Psychosocial: Pt previously resided alone. Pt has two daughters that are supportive and involved. Disposition unclear due to level of support needed. Team working towards a safe dc plan.     ADLs/IADLs: Pt is making slow but steady progress. Pt continues to demonstrate left lean when standing and during transfers. Pt continues to demonstrate inconsistency with approach to surfaces with FWW and maneuvering FWW in tight spaces. Pt requires set-up with UBD and SBA with LBD tasks. Pt requires CGA with toilet transfers and min A with toilet hygiene. Pt requires SBA with G/H tasks standing at EOS with FWW. Pt requires CGA with shower transfers with use of shower chair and FWW. Pt requires CGA with kitchen mobility with FWW d/t difficulty maneuvering FWW and left lean. Pt requires supervision with light cooking task d/t difficulty sequencing cooking task. Recommend TCU for longer term rehab needs to increase IND with ADLs and IADLs, as pt would require 24/7  assistance if discharged home from ARU.     Mobility: Pt motivated and participates well, making progress though more gradually than had hoped. Left lean persists during gait and especially during transfers. Pt is inconsistent with safe approach to chair during stand>sit. Requires CGA-MIN A for gait 2/2 Left lean and decreased foot clearance. Pt with deficits in PT/OT/SLP domains, including cognition and vision. Recommend TCu for longer term rehab needs.    Cognition/Language:SLP:noting mild/moderate deficits for language skills for reading comprehension, missing details, needing to reread (also reduced retention), and writing/spelling, grammar errors, reduced organization.  Also moderate deficits for cognitive communication skills for reasoning/problem solving, memory, flexible attention .  Pt states he was missing medications prior. Encouraging memory strategies (ie writing notes). Reduced insight.  Recommend 24 supervision or TCU    Community Re-Entry: will need assist, potential for wc based for longer community distances    Transportation: will need assist, formal driving assessment before return to drive 2/2 visual and cognitive deficits. Family training for transfer    Decision maker: self and augment with daughters.    Plan of Care and goals reviewed and updated.    Discharge Plan/Recommendations    Fall Precautions: continue    Overall plan for the patient: Ray has made some functional progress though persists with deficits especially in the cognitive domain. Some difficulties with reading, attention to details, memory and problem solving. Balance with walker and still needing cues for safety and some contact to min assistance still though able to walk hallway distances for this. Looking at needing initial 24 hour support. Rate of progress on slower side. For sure has further rehab / recovery potential but will need bit longer course so will start referrals to TCU setting.    Patient/Family input to goals:  feel comfortable with TCU. Don't have the 24 hour support though daughter could do 1-2 weeks, time course looking bit longer and intensity of TCU will be better than home care for Ray now.    Utilization Review and Continued Stay Justification    Medical Necessity Criteria:    For any criteria that is not met, please document reason and plan for discharge, transfer, or modification of plan of care to address.    Requires intensive rehabilitation program to treat functional deficits?: Yes    Requires 3x per week or greater involvement of rehabilitation physician to oversee rehabilitation program?: Yes    Requires rehabilitation nursing interventions?: Yes    Patient is making functional progress?: Yes    There is a potential for additional functional progress? Yes    Patient is participating in therapy 3 hours per day a minimum of 5 days per week or 15 hours per week in 7 day period?:Yes    Has discharge needs that require coordinated discharge planning approach?:Yes    Final Physician Sign off    Statement of Approval: I agree with all the recommendations detailed in this document.     Patient Goals     OT goal: hygiene/grooming: independent, while standing  OT goal: upper body dressing: Independent, including set-up/clothing retrieval  OT goal: lower body dressing: Independent, including set-up/clothing retrieval  OT goal: upper body bathing: Modified independent, Supervision/stand-by assist  OT goal: lower body bathing: Modified independent, Supervision/stand-by assist  OT goal: bed mobility: Independent  OT Goal: transfer: Modified independent, Independent  OT goal: toilet transfer/toileting: Independent, cleaning and garment management, toilet transfer  OT goal: meal preparation: Independent, with simple meal preparation, ambulatory level  OT goal: home management: Independent, with light demand household tasks, ambulatory level  OT goal: cognitive: Patient/caregiver will verbalize understanding of cognitive  assessment results/recommendations as needed for safe discharge planning     PT goal: bed mobility: Independent  PT goal: transfers: Modified independent, Assistive device  PT goal: gait: Modified independent, 150 feet, Rolling walker  PT goal: stairs: Independent, Greater than 10 stairs, Rail on left (For FIM as pt has no stairs at home)  PT goal 1: Pt will be able to perform static stance in mid-line at Ind level for >60 secs to perform functional daily tasks  PT Goal 2: Pt will demonstrate gait speed >.8m/s in order to demonstrate decreased falls risk at home  PT Goal 3: Pt will attend falls group and identify 3 ways to decrease risk of a fall in home  PT Goal 4: Pt will demonstrate car transfer at SBA with fww for safe community mobility  PT Goal 5: Pt  will be Ind with HEP for coordination and balance for progress of ability between therapies.       SLP Frequency: Daily for 60 minutes per day   SLP goal 1: goalmet 7/31/18  SLP goal 2: Pt will complete reading comprehension tasks with 90% accuracy in order to support both ADLs and medical intervention  SLP goal 3: Pt will complete visual scanning and basic writing tasks with 90% accuracy and minimal prompts and cues.   SLP goal 4: Pt will complete moderately complex tasks of attention, problem solving and reasoning with 90% accuracy and minimal prompts and cues.   SLP goal 5: Pt will utilize external memory strategies to recall 3 bits of information after 5 minutes lapsed time with minimal prompts and cues.       Patient/Family Goal: Bowel: PT will regain bowel function to what it was prior to admission by discharge.   Patient/Family Goal: Medication Management: Pt will participate in MAP prior to dc to show understanding of medication regimen if applicable.  Goal: Skin Integrity: Pt will show understanding of repositioning and prevent further skin break down throughout stay.   Individualized Goal 1: Pt will participate in a stroke PLC class to learn the s/s and  risk factors of a stroke prior to dc.

## 2018-08-08 NOTE — DISCHARGE INSTRUCTIONS
"Follow up appointments:     -- Follow up at [facility] as scheduled on August 23rd with head CT prior.      -- While at TCU, providers there will manage your medical issues. Should see your primary provider Dr. Anthony Harding within a few weeks after TCU discharge.    -- Rheumotologist reportedly scheduled 10/31/2018  Champ Noonan MD    [address] [place] [address]    [phone] (Fax)      To reduce the risk of subsequent stroke there are several important factors including optimal management of anticoagulants, blood pressure, cholesterol, diabetes and smoking abstinence.    Anticoagulation:  You are on Warfarin (Coumadin) Target INR 2-3. Previously on aspirin 81 mg but that's held now that you are on warfarin.    Blood Pressure:  Keeping your blood pressures less than 130/80 has been shown to reduce risk of recurrent stroke. Recording your blood pressure and heart rate once daily in a log book can help you and your providers make decisions on optimal management. You are encouraged to bring your log book with you to your primary physician and/or cardiology doctor visits.    You are currently started on losartan to help control your blood pressure. Several lifestyle modifications have been associated with blood pressure reduction and are an important part of a comprehensive plan. These include: weight loss (if over-weight); a diet low in salt and cholesterol and rich in fruits and vegetables; regular aerobic physical activity and limited alcohol consumption.    Diabetes:  You do not have diabetes though it is important to continue monitoring for this in the future with your primary provider.    Cholesterol:  Traditional target levels for LDL cholesterol or \"bad cholesterol\" is less than 130 however once you have had a stroke, your target LDL level is now less than 70. Additional recommendations such as increasing your HDL or \"good\" cholesterol and lowering " your triglyceride level can also be important.    Your most recent lipid panel was as below. Started on rosuvastatin.     Lab Results   Component Value Date    CHOL 143 07/21/2018     Lab Results   Component Value Date    HDL 42 07/21/2018     Lab Results   Component Value Date    LDL 88 07/21/2018     Lab Results   Component Value Date    TRIG 63 07/21/2018     Lab Results   Component Value Date    CHOLHDLRATIO 5.1 09/11/2013       This should be followed up in 2-3 months with your primary provider.    Smoking:  You quit long ago. Don't start back now Ray! Finally one of the most important modifiable risk factors is to not smoke. This includes cigarettes, pipes, cigars, chewing tobacco and second hand smoke. Support through counseling, nicotine replacement, and oral smoking-cessation medications may all be helpful. Often people have been able to quit during their hospitalization but once returning to their familiar environment, the urges can be stronger. If this is the case, we encourage you to get support. There are numerous options, start by talking with your doctor.

## 2018-08-08 NOTE — PLAN OF CARE
patient is alert and oriented X4 and able to communicate needs. Pt denies pain, chest pain, chills/fever, N/V, and denies SOB. VSS. Lungs clear to auscultate and bowels active. Pt up to BR with CGA, walker and gait belt. Call light within pt's reach and no signs of pain or discomfort noted upon frequent rounding. Continue to monitor.

## 2018-08-08 NOTE — PLAN OF CARE
Problem: Goal/Outcome  Goal: Goal Outcome Summary  Alert and oriented x 3 with forgetfulness.Miconazole powder applied to groin rash. CGA assist of 1 for tranfers and ambulation with walker and gaitbelt. Appetite is good. Denied any pain or discomfort.Voiding utilizing a urinal, independently. Also continent of bowel and bladder in toilet. Had a care conference today, plan to dc to tcu, refferals underway. Will continue with poc.

## 2018-08-08 NOTE — PLAN OF CARE
Problem: Goal/Outcome  Goal: Goal Outcome Summary  Connected with family regarding TCU placement and options. Placed referrals to:    [facility]    Currently awaiting an available bed. Sw will continue to assist as needed.

## 2018-08-09 VITALS
TEMPERATURE: 96.9 F | RESPIRATION RATE: 16 BRPM | HEIGHT: 65 IN | WEIGHT: 114.5 LBS | DIASTOLIC BLOOD PRESSURE: 57 MMHG | HEART RATE: 67 BPM | OXYGEN SATURATION: 97 % | BODY MASS INDEX: 19.08 KG/M2 | SYSTOLIC BLOOD PRESSURE: 122 MMHG

## 2018-08-09 LAB — INR PPP: 2.57 (ref 0.86–1.14)

## 2018-08-09 PROCEDURE — 40000193 ZZH STATISTIC PT WARD VISIT: Performed by: STUDENT IN AN ORGANIZED HEALTH CARE EDUCATION/TRAINING PROGRAM

## 2018-08-09 PROCEDURE — 25000132 ZZH RX MED GY IP 250 OP 250 PS 637: Performed by: PHYSICAL MEDICINE & REHABILITATION

## 2018-08-09 PROCEDURE — 40000225 ZZH STATISTIC SLP WARD VISIT: Performed by: SPEECH-LANGUAGE PATHOLOGIST

## 2018-08-09 PROCEDURE — 97116 GAIT TRAINING THERAPY: CPT | Mod: GP | Performed by: STUDENT IN AN ORGANIZED HEALTH CARE EDUCATION/TRAINING PROGRAM

## 2018-08-09 PROCEDURE — 85610 PROTHROMBIN TIME: CPT | Performed by: PHYSICAL MEDICINE & REHABILITATION

## 2018-08-09 PROCEDURE — 40000133 ZZH STATISTIC OT WARD VISIT: Performed by: OCCUPATIONAL THERAPIST

## 2018-08-09 PROCEDURE — 97127 ZZHC SP THERAPEUTIC INTERVENTION W/FOCUS ON COGNITIVE FUNCTION,EA 15 MIN: CPT | Mod: GN | Performed by: SPEECH-LANGUAGE PATHOLOGIST

## 2018-08-09 PROCEDURE — 97530 THERAPEUTIC ACTIVITIES: CPT | Mod: GP | Performed by: STUDENT IN AN ORGANIZED HEALTH CARE EDUCATION/TRAINING PROGRAM

## 2018-08-09 PROCEDURE — 25000125 ZZHC RX 250: Performed by: PHYSICAL MEDICINE & REHABILITATION

## 2018-08-09 PROCEDURE — 97535 SELF CARE MNGMENT TRAINING: CPT | Mod: GO | Performed by: OCCUPATIONAL THERAPIST

## 2018-08-09 PROCEDURE — 36415 COLL VENOUS BLD VENIPUNCTURE: CPT | Performed by: PHYSICAL MEDICINE & REHABILITATION

## 2018-08-09 RX ORDER — WARFARIN SODIUM 4 MG/1
4 TABLET ORAL
Status: DISCONTINUED | OUTPATIENT
Start: 2018-08-09 | End: 2018-08-09 | Stop reason: HOSPADM

## 2018-08-09 RX ORDER — ROSUVASTATIN CALCIUM 20 MG/1
20 TABLET, COATED ORAL DAILY
DISCHARGE
Start: 2018-08-09 | End: 2022-07-21

## 2018-08-09 RX ORDER — FOLIC ACID 0.8 MG
800 TABLET ORAL DAILY
Status: ON HOLD | DISCHARGE
Start: 2018-08-09 | End: 2024-02-17

## 2018-08-09 RX ORDER — LOSARTAN POTASSIUM 50 MG/1
50 TABLET ORAL DAILY
DISCHARGE
Start: 2018-08-09 | End: 2018-08-18 | Stop reason: SINTOL

## 2018-08-09 RX ORDER — WARFARIN SODIUM 2 MG/1
TABLET ORAL
DISCHARGE
Start: 2018-08-09 | End: 2018-08-13 | Stop reason: DRUGHIGH

## 2018-08-09 RX ORDER — PREDNISONE 5 MG/1
5 TABLET ORAL DAILY PRN
DISCHARGE
Start: 2018-08-09 | End: 2022-07-21

## 2018-08-09 RX ORDER — ACETAMINOPHEN 325 MG/1
650 TABLET ORAL EVERY 4 HOURS PRN
DISCHARGE
Start: 2018-08-09

## 2018-08-09 RX ADMIN — TAMSULOSIN HYDROCHLORIDE 0.4 MG: 0.4 CAPSULE ORAL at 08:08

## 2018-08-09 RX ADMIN — PANTOPRAZOLE SODIUM 40 MG: 40 TABLET, DELAYED RELEASE ORAL at 06:25

## 2018-08-09 RX ADMIN — ROSUVASTATIN CALCIUM 20 MG: 20 TABLET ORAL at 08:09

## 2018-08-09 RX ADMIN — LOSARTAN POTASSIUM 50 MG: 50 TABLET ORAL at 08:08

## 2018-08-09 RX ADMIN — PREDNISONE 5 MG: 5 TABLET ORAL at 08:08

## 2018-08-09 RX ADMIN — FINASTERIDE 5 MG: 5 TABLET, FILM COATED ORAL at 08:08

## 2018-08-09 RX ADMIN — MICONAZOLE NITRATE: 2 POWDER TOPICAL at 08:09

## 2018-08-09 RX ADMIN — FLUTICASONE PROPIONATE 1 SPRAY: 50 SPRAY, METERED NASAL at 08:09

## 2018-08-09 NOTE — PLAN OF CARE
Problem: Patient Care Overview  Goal: Plan of Care/Patient Progress Review  Occupational Therapy Discharge Summary    Reason for therapy discharge:    Discharged to transitional care facility.    Progress towards therapy goal(s). See goals on Care Plan in [facility] electronic health record for goal details.  Goals not met.  Barriers to achieving goals:   discharge from facility and impaired balance, and impaired cognition. .    Therapy recommendation(s):    Continued therapy is recommended.  Rationale/Recommendations:  Recommend ongoing OT services to increase IND with ADLs, IADLs, and functional mobility.. Pt currently requires CGA with gathering clothing with FWW while standing. Pt is able to don/doff UBD tasks seated I'tly. Pt requires CGA to don/doff pants and pull-up with FWW d/t left lean and impaired standing balance. Pt requires CGA with toilet transfer with FWW and CGA with toilet hygiene. Pt requires CGA with shower transfer with FWW utilizing extended tub bench and grab bars. Pt requires supervision to wash/rinse/dry 8/10 body parts seated and assistance to wash/rinse/dry 2/10 body parts while standing with close SBA. Pt requires SBA with G/H tasks standing at EOS with FWW. Pt requires CGA with kitchen mobility with FWW with walker tray. Pt req'd supervision for simple cooking tasks d/t limited problem solving skills, difficulty with sequencing, and difficulty with direction following. Pt is inconsistent with safety with approach to surfaces with FWW-pt requires verbal cues to reach back or push up with RUE for STS. Pt presents with right neglect and right visual inattention as well.

## 2018-08-09 NOTE — PLAN OF CARE
Problem: Goal/Outcome  Goal: Goal Outcome Summary  Pt is accepted to [facility] for continued support. Plan to dc today, 8/9/18. Pt and daughter Dorcas are aware and agreeable to plan. Dorcas providing transport with dc @ 2 pm. Completed PAS, confirmation # [id number]

## 2018-08-09 NOTE — PLAN OF CARE
Problem: Goal/Outcome  Goal: Goal Outcome Summary  Outcome: Adequate for Discharge Date Met: 08/09/18  FOCUS/GOAL  Medical management, Discharge planning and Mobility    ASSESSMENT, INTERVENTIONS AND CONTINUING PLAN FOR GOAL:  Pt's vitals stable. Pt needing CGA with transfers and walking with walker due to unsteady gait. Denied pain. Pt continent of bowel and bladder using the toilet. Pt's daughter here to  pt so he can go to [facility] [facility]. Discharged at 1430 per wheelchair with NA assisting pt and daughter. Paperwork for TCU sent with pt's daughter.

## 2018-08-09 NOTE — PLAN OF CARE
Problem: Patient Care Overview  Goal: Plan of Care/Patient Progress Review  Pt seen for sptx: Pt improved today with written expression task- writitng sentences using 2 target words regla  A sentence- averaging 70% accuracy wit this task today- still has some spelling errors and written word retrieval errors as well as reduction in complexity and organization but overall improved from priro days. With reading comprehension- pt continues to need cues to re-read and double check his work- some difficulty locating info within and ad or paragraph; with problems solving/reasoning- continues to need min assist for more complex tasks; memory recall at paragraphlevel- averaged 80% today- needs continue reminders to use his memory notebook to aid in recall. Pt is d/cing to TCU today- [facility]- reommending continued sptx to work on the above goal areas.    Speech Language Therapy Discharge Summary    Reason for therapy discharge:    Discharged to transitional care facility.    Progress towards therapy goal(s). See goals on Care Plan in [facility] electronic health record for goal details.  Goals not met.  Barriers to achieving goals:   discharge from facility.    Therapy recommendation(s):    Continued therapy is recommended.  Rationale/Recommendations:  see above summary.

## 2018-08-09 NOTE — DISCHARGE SUMMARY
[facility]   Discharge Summary     Date Admitted: 7/28/2018  Date Discharge: 8/9/2018  Discharge Disposition: [facility]    Discharge Diagnosis:   1. 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 Impaired mobility, activities of daily living and cognition  3. Hypertension  4. Benign prostatic hypertrophy  5. Rheumatoid arthritis    Brief History of Presenting Illness and Hospital Course:  This is a 78 year old male admitted initially to [facility] 7/20 with dizziness nausea and imbalance.  Was identified with left cerebellar artery occlusion felt possibly linked with vertebral artery dissection.  Started on anticoagulation heparin bridge to warfarin target INR 2-3.  After initial medical workup and stabilization, he transferred to acute rehabilitation unit on 7/28/2018 for comprehensive cares.  Ray has shown some definite functional improvements however he does persist with some deficits both with balance as well as cognition with some impulsivity and slight impaired insight and judgment.  Because of this he is needing a bit longer course for recovery especially in the context that she was living home alone recently (wife passed away in November with cancer).  Supportive daughters but not able to provide the 24-hour support he is currently needing.  He is therefore discharging to transitional care unit for ongoing therapies and nursing care.  Medically, Ray has remained stable while on acute rehab.  His INR is now therapeutic and bridging enoxaparin was discontinued.  For hypertension, we were to simplify his medications, discontinue amlodipine and increased his Cozaar to 50 mg.  Renal function remains stable.  Rheumatoid arthritis has remained quiet.  Prior to admission was on methotrexate 12.5 mg every Saturday.  That medication was held at [facility], I have confirmed with his outpatient rheumatologist and we  will resume methotrexate now starting 8/11.  Historically used prednisone as needed for RA flares though not on the ongoing basis.  While in acute rehab, he has been maintained on 5 mg prednisone daily understanding that was his routine W clarification we will shift that back to just as needed.  His prior to admission tamsulosin and finasteride are continued for BPH.  Had some bladder scans with mild elevations but not at the threshold of needing catheterization.  For stroke risk reduction, as above he is on warfarin.  Started on rosuvastatin for cholesterol lowering and losartan for blood pressure.  He is a non-smoker.  Does not have diabetes.  Upon discharge it is recommended to continue with PT / OT / SLP at TCU.    Discharge Medications:   Shamir Concepcion   Home Medication Instructions DARNELL:[id number]    Printed on:08/09/18 1249   Medication Information                      acetaminophen (TYLENOL) 325 MG tablet  Take 2 tablets (650 mg) by mouth every 4 hours as needed for mild pain             Finasteride (PROSCAR PO)  Take 5 mg by mouth daily             fluticasone (FLONASE ALLERGY RELIEF) 50 MCG/ACT spray  Spray 1 spray into both nostrils daily             folic acid 800 MCG TABS  Take 800 mcg by mouth daily             losartan (COZAAR) 50 MG tablet  Take 1 tablet (50 mg) by mouth daily             methotrexate 2.5 MG tablet CHEMO  Take 5 tablets (12.5 mg) by mouth every 7 days Saturdays             miconazole (MICATIN; MICRO GUARD) 2 % powder  Apply topically 2 times daily as needed (groin rash)             predniSONE (DELTASONE) 5 MG tablet  Take 1 tablet (5 mg) by mouth daily as needed for rheumatoid arthritis flare.             rosuvastatin (CRESTOR) 20 MG tablet  Take 1 tablet (20 mg) by mouth daily             senna-docusate (SENOKOT-S;PERICOLACE) 8.6-50 MG per tablet  Take 2 tablets by mouth 2 times daily as needed for constipation             tamsulosin (FLOMAX) 0.4 MG capsule  1 tab twice daily    "          warfarin (COUMADIN) 2 MG tablet  Adjust for target INR 2-3. 4 mg 8/9, 2 mg 8/10, 4 mg on 8/11 and 8/12/18 - check INR 8/13 or sooner               Physical Examination:   /60 (BP Location: Right arm)  Pulse 64  Temp 96.9  F (36.1  C) (Oral)  Resp 16  Ht 1.651 m (5' 5\")  Wt 51.9 kg (114 lb 8 oz)  SpO2 97%  BMI 19.05 kg/m2  Alert pleasant, bright affect.  Fluent speech and language though impaired memory noting that he is asking some questions that we have reviewed in the past.  Even driving which we have reviewed several times in the past.  Heart regular rate and rhythm though slightly distant.  Lungs are clear bilaterally.  Abdomen soft  Extremities warm well perfused, some arthritis in his hands though not severe RA pattern and no active tenosynovitis.  Strength exam actually fairly well-preserved 5/5 and symmetric.  There remains some mild coordination deficits especially finger-nose-finger will clearly improved from rehab admission.  Balance still has some unsteadiness and slight ataxic pattern with walking still with a walker and contact-guard assistance.    Discharge Instructions:    Active Diet Order      Combination Diet Regular Diet Adult; Thin Liquids (water, ice chips, juice, milk, gelatin, ice cream, etc)      Advance Diet as Tolerated   Activity: Up with a walker and assistance only    Follow up Appointments:  With transitional care unit provider while there then with his primary provider Anthony Harding.  [facility] scheduled August 23 head CT 2 PM then appointment with physician assistant Saman Lucas.  Rheumatologist Dr. Champ Noonan October 31 through [facility].    Total Discharge time spent is > 30 minutes.   "

## 2018-08-09 NOTE — PLAN OF CARE
Problem: PT General Care Plan  Goal: Transfer (PT)  PT Transfer   Transfers CGA-MIN A , needs cues for safe approach to chair, reaching with R hand to help compensate for L lean  Goal: Gait (PT)  PT Gait   Gait with FWW, CGA-MIN A with vcs for posture, R hip toward FWW to adjust for L lean, vcs for heel strike and more smooth gait.   Goal: Stairs (PT)  PT Stairs   Completes stairs with B rails, CGA  Goal: PT Goal 1  PT Goal 1   Left lean improving, but still persists  Goal: PT Goal 2  PT Goal 2   Gait speed goal not met  Goal: PT Goal 3  PT Goal 3   Fall prevention education ongoing  Goal: PT Goal 5  PT Goal 5   HEP on going, to be addressed with TCU    Comments: Physical Therapy Discharge Summary    Reason for therapy discharge:    Discharged to transitional care facility.    Progress towards therapy goal(s). See goals on Care Plan in [facility] electronic health record for goal details.  Goals not met.  Barriers to achieving goals:   discharge from facility.    Therapy recommendation(s):    Continued therapy is recommended.  Rationale/Recommendations:  to maximize independence with functional mobility.

## 2018-08-09 NOTE — PLAN OF CARE
Problem: Goal/Outcome  Goal: Goal Outcome Summary  FOCUS/GOAL  Wound care management and Mobility    ASSESSMENT, INTERVENTIONS AND CONTINUING PLAN FOR GOAL:  Patient is alert and oriented but forgetful and is able to make needs known by using call light.  Patient denies pain.  Is CGA with gait belt and walker.  Continent of bowel and bladder.  Last BM 8/8/18 PM shift.  MAP discontinued due to transferring to TCU.  Fluids encouraged.

## 2018-08-09 NOTE — PHARMACY-ANTICOAGULATION SERVICE
Clinical Pharmacy- Warfarin Discharge Note  This patient is currently on warfarin for the treatment of Stroke associated with Vertebral artery occulsion.  INR Goal= 2-3  Expected length of therapy undetermined.      Anticoagulation Dose History     Recent Dosing and Labs Latest Ref Rng & Units 8/3/2018 8/4/2018 8/5/2018 8/6/2018 8/7/2018 8/8/2018 8/9/2018    Warfarin 1 mg - - - - 2 mg - - -    Warfarin 4 mg - - - 4 mg - 4 mg 4 mg -    Warfarin 5 mg - - 5 mg - - - - -    Warfarin 7.5 mg - 7.5 mg - - - - - -    INR 0.86 - 1.14 2.19(H) 2.44(H) 2.69(H) 2.84(H) 2.34(H) 2.36(H) 2.57(H)          Vitamin K doses administered during the last 7 days: none  FFP administered during the last 7 days: none  Recommend discharging the patient on a warfarin regimen of 4 mg on 8/9, 2 mg on 8/10, 4 mg on 8/11 and 8/12/18. The subsequent warfarin doses will be determined by clinic or TCU.    The patient should have an INR checked on Monday 8/13/18.

## 2018-08-09 NOTE — PLAN OF CARE
This is only partial note, started this interdisciplinary provider contributions though not completed as patient is discharging to transitional care unit today    Acute Rehab Care Conference/Team Rounds      Type: Team Rounds    Present:         Discharge Barriers/Treatment/Education    Rehab Diagnosis: Stroke    Active Medical Co-morbidities/Prognosis: Hypertension hyperlipidemia rheumatoid arthritis    Safety: Pt is alert and oriented but forgetful at times, alarms on, transferring with CGA ww and gait belt, unsteady at times on feet.     Pain: Pt denies pain.     Medications, Skin, Tubes/Lines: Map discontinued due to pt discharging to tcu, takes pills whole with water, rash on groin treated with miconazole powder, no tubes, lines or, drains in place.     Swallowing/Nutrition:    Bowel/Bladder: Pt is normally continent of bowel and bladder, transfers with CGA and ww to toilet to void and defecate. LBM 8/8/18.    Psychosocial: Pt is planning to dc pt TCU for continued support and therapy. Currently awaiting an available bed. Sw will continue to assist as needed.     ADLs/IADLs:    Mobility:     Cognition/Language:    Community Re-Entry:    Transportation:    Decision maker:     Plan of Care and goals reviewed and updated.

## 2018-08-09 NOTE — PROGRESS NOTES
Postural Assessment Scale for Stroke    Maintaining a posture  1. Sitting without support: 3  2. Standing with support: 3  3. Standing without support: 2  4. Standing on nonparetic le  5. Standing on paretic le    Changing posture  6. Supine to affected side lateral: 3  7. Supine to nonaffected side lateral: 3  8. Supine to sitting up on the edge of the table: 3  9. Sitting on the edge of the table to supine: 3  10. Sitting to standing up: 3  11. Standing up to sitting down:3  12. Standing, picking up a pencil from the floor: 1    Total score:    28 /36  Statistical improvement from 22/36 on 18

## 2018-08-10 ENCOUNTER — NURSING HOME VISIT (OUTPATIENT)
Dept: GERIATRICS | Facility: CLINIC | Age: 78
End: 2018-08-10
Payer: COMMERCIAL

## 2018-08-10 VITALS
SYSTOLIC BLOOD PRESSURE: 128 MMHG | HEART RATE: 64 BPM | BODY MASS INDEX: 22.96 KG/M2 | RESPIRATION RATE: 18 BRPM | HEIGHT: 69 IN | OXYGEN SATURATION: 94 % | DIASTOLIC BLOOD PRESSURE: 65 MMHG | TEMPERATURE: 97.2 F | WEIGHT: 155 LBS

## 2018-08-10 DIAGNOSIS — R41.89 COGNITIVE IMPAIRMENT: ICD-10-CM

## 2018-08-10 DIAGNOSIS — I10 ESSENTIAL HYPERTENSION: ICD-10-CM

## 2018-08-10 DIAGNOSIS — Z71.89 ADVANCED DIRECTIVES, COUNSELING/DISCUSSION: ICD-10-CM

## 2018-08-10 DIAGNOSIS — I63.219 CEREBROVASCULAR ACCIDENT (CVA) DUE TO OCCLUSION OF VERTEBRAL ARTERY, UNSPECIFIED BLOOD VESSEL LATERALITY (H): Primary | ICD-10-CM

## 2018-08-10 DIAGNOSIS — N40.0 BENIGN PROSTATIC HYPERPLASIA WITHOUT LOWER URINARY TRACT SYMPTOMS: ICD-10-CM

## 2018-08-10 DIAGNOSIS — R53.81 PHYSICAL DECONDITIONING: ICD-10-CM

## 2018-08-10 DIAGNOSIS — M06.9 RHEUMATOID ARTHRITIS, INVOLVING UNSPECIFIED SITE, UNSPECIFIED RHEUMATOID FACTOR PRESENCE: ICD-10-CM

## 2018-08-10 PROCEDURE — 99309 SBSQ NF CARE MODERATE MDM 30: CPT | Performed by: NURSE PRACTITIONER

## 2018-08-10 NOTE — LETTER
"    8/10/2018        RE: Shamir Concepcion  [address]  [facility] [id number]        [place] [facility]  PRIMARY CARE PROVIDER AND CLINIC:  [facility], [place]  Chief Complaint   Patient presents with     Hospital F/U     [place] Medical Record Number:  [id number]    HPI:    Shamir Concepcion is a 78 year old  (1940),[facility].  Hospital stay 07/28/2018 through 08/09/2018.  Admitted to this facility for  rehab, medical management and nursing care.  HPI information obtained from: facility chart records, facility staff, patient report, [place] Epic chart review and Care Everywhere [facility] chart review.    He has a medical history significant for BPH, RA, HTN and was initially hospitalized at [facility] 7/20/2018 after presenting to the ED with dizziness, nausea and imbalance. 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 Anticoagulation was started with heparin drip and  transitioned to coumadin with lovenox bridging.   He discharged to Acute Rehab 7/28/2018 where he made improvements in functional status and cognition. Amlodipine was discontinued and losartan increased. Methotrexate has been on hold and resumed at discharge.     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 Reports some trouble with \"thinking and equilibrium.\" Good strength of extremities. No swallow problems, good appetite.   INR 2.6 today. Current dose of coumadin is alternating 2/4 mg daily. No bleeding.   Rheumatoid arthritis, involving unspecified site, unspecified rheumatoid factor presence (H)-denies pain of any type. Followed by Dr Champ Noonan.   Essential hypertension-BPs: 128/65, 118/73, 134/80   HR: 60-82  Benign " prostatic hyperplasia without lower urinary tract symptoms-denies urinary symptoms.   Cognitive impairment-conversation appropriate. Fairly good historian.   Physical deconditioning-ambulating with walker and stand by assist. Requires stand by to min assist with cares.     CODE STATUS/ADVANCE DIRECTIVES DISCUSSION:   DNR / DNI  Patient's living condition: lives alone in a condo. Wife  2017 of cancer. 2 daughters in the area.     ALLERGIES:Review of patient's allergies indicates no known allergies.  PAST MEDICAL HISTORY:  has a past medical history of Allergic rhinitis; BPH (benign prostatic hyperplasia); Colon polyps; ED (erectile dysfunction); Family history of colon cancer; PAD (peripheral artery disease) (H); RA (rheumatoid arthritis) (H); and Seronegative rheumatoid arthritis (H).  PAST SURGICAL HISTORY:  has a past surgical history that includes laminect/discectomy, lumbar; MRA UPPER EXTREMITY WO&W CONT (stenting right SFA-AK pop); Angiogram; vascular surgery (R SFA stenting  ); colonoscopy; sinus surgery; ENT surgery; Phacoemulsification clear cornea with standard intraocular lens implant (Left, 2017); orthopedic surgery; and Phacoemulsification clear cornea with standard intraocular lens implant (Right, 2017).  FAMILY HISTORY: family history includes Cancer in his mother; Cerebrovascular Disease in his father.  SOCIAL HISTORY:  reports that he quit smoking about 19 years ago. His smoking use included Cigarettes. He has a 15.00 pack-year smoking history. He has never used smokeless tobacco. He reports that he drinks alcohol. He reports that he does not use illicit drugs.    Post Discharge Medication Reconciliation Status: discharge medications reconciled, continue medications without change.  Current Outpatient Prescriptions   Medication Sig Dispense Refill     acetaminophen (TYLENOL) 325 MG tablet Take 2 tablets (650 mg) by mouth every 4 hours as needed for mild pain       Finasteride  "(PROSCAR PO) Take 5 mg by mouth daily       fluticasone (FLONASE ALLERGY RELIEF) 50 MCG/ACT spray Spray 1 spray into both nostrils daily       folic acid 800 MCG TABS Take 800 mcg by mouth daily       losartan (COZAAR) 50 MG tablet Take 1 tablet (50 mg) by mouth daily       [START ON 8/11/2018] methotrexate 2.5 MG tablet CHEMO Take 5 tablets (12.5 mg) by mouth every 7 days Saturdays       miconazole (MICATIN; MICRO GUARD) 2 % powder Apply topically 2 times daily as needed (groin rash)       predniSONE (DELTASONE) 5 MG tablet Take 1 tablet (5 mg) by mouth daily as needed for rheumatoid arthritis flare.       rosuvastatin (CRESTOR) 20 MG tablet Take 1 tablet (20 mg) by mouth daily       senna-docusate (SENOKOT-S;PERICOLACE) 8.6-50 MG per tablet Take 2 tablets by mouth 2 times daily as needed for constipation       tamsulosin (FLOMAX) 0.4 MG capsule 1 tab twice daily       warfarin (COUMADIN) 2 MG tablet Adjust for target INR 2-3. 4 mg 8/9, 2 mg 8/10, 4 mg on 8/11 and 8/12/18 - check INR 8/13 or sooner         ROS:  10 point ROS of systems including Constitutional, Eyes, Respiratory, Cardiovascular, Gastroenterology, Genitourinary, Integumentary, Muscularskeletal, Psychiatric were all negative except for pertinent positives noted in my HPI.    Exam:  /65  Pulse 64  Temp 97.2  F (36.2  C)  Resp 18  Ht 5' 9\" (1.753 m)  Wt 155 lb (70.3 kg)  SpO2 94%  BMI 22.89 kg/m2  GENERAL APPEARANCE:  Alert, in no distress  ENT:  Mouth and posterior oropharynx normal, moist mucous membranes, normal hearing acuity  EYES:  EOM normal, conjunctiva and lids normal, PERRL  NECK:  No adenopathy,masses or thyromegaly  RESP:  respiratory effort and palpation of chest normal, lungs clear to auscultation , no respiratory distress  CV:  Palpation and auscultation of heart done , regular rate and rhythm, no murmur, rub, or gallop, no edema, +2 pedal pulses  ABDOMEN:  normal bowel sounds, soft, nontender, no hepatosplenomegaly or other " masses  M/S:   gait steady with walker. BURKETT with good strength. No joint inflammation  SKIN:  no rashes or open areas  PSYCH:  oriented X 3, memory impaired , affect and mood normal    Lab/Diagnostic data:     Last Comprehensive Metabolic Panel:  Sodium   Date Value Ref Range Status   08/08/2018 143 133 - 144 mmol/L Final     Potassium   Date Value Ref Range Status   08/08/2018 4.0 3.4 - 5.3 mmol/L Final     Chloride   Date Value Ref Range Status   08/08/2018 107 94 - 109 mmol/L Final     Carbon Dioxide   Date Value Ref Range Status   08/08/2018 26 20 - 32 mmol/L Final     Anion Gap   Date Value Ref Range Status   08/08/2018 10 3 - 14 mmol/L Final     Glucose   Date Value Ref Range Status   08/08/2018 91 70 - 99 mg/dL Final     Urea Nitrogen   Date Value Ref Range Status   08/08/2018 22 7 - 30 mg/dL Final     Creatinine   Date Value Ref Range Status   08/08/2018 1.19 0.66 - 1.25 mg/dL Final     GFR Estimate   Date Value Ref Range Status   08/08/2018 59 (L) >60 mL/min/1.7m2 Final     Comment:     Non  GFR Calc     Calcium   Date Value Ref Range Status   08/08/2018 8.0 (L) 8.5 - 10.1 mg/dL Final     Lab Results   Component Value Date    WBC 8.9 08/08/2018     Lab Results   Component Value Date    RBC 4.32 08/08/2018     Lab Results   Component Value Date    HGB 13.4 08/08/2018     Lab Results   Component Value Date    HCT 41.9 08/08/2018     Lab Results   Component Value Date    MCV 97 08/08/2018     Lab Results   Component Value Date    MCH 31.0 08/08/2018     Lab Results   Component Value Date    MCHC 32.0 08/08/2018     Lab Results   Component Value Date    RDW 14.1 08/08/2018     Lab Results   Component Value Date     08/08/2018       ASSESSMENT / PLAN:  (I63.219) Cerebrovascular accident (CVA) due to occlusion of vertebral artery, unspecified blood vessel laterality (H)  (primary encounter diagnosis)  Comment: improved status on Acute Rehab. INR therapeutic  Plan: coumadin alternating 2/4  "mg daily. INR 8/13/2018.  CBC, BMP 8/13/2108. Continue statin. [facility] with [facility] 8/23/2018. CT angiogram recommended in 6 mos to evaluate vasculature in his neck.     (M06.9) Rheumatoid arthritis, involving unspecified site, unspecified rheumatoid factor presence (H)  Comment: controlled  Plan: continue methotrexate, prednisone prn flare of joint pain    (I10) Essential hypertension  Comment: controlled  Plan: continue losartan. Monitor VS. BMP    (N40.0) Benign prostatic hyperplasia without lower urinary tract symptoms  Comment: chronic  Plan: continue tamsulosin, finasteride.     (R41.89) Cognitive impairment  Comment: appears to have mild short term memory loss  Plan: cognitive testing per therapies    (Z71.89) Advanced directives, counseling/discussion  Comment: he has an advanced directive on file and confirms DNR/DNI  Plan: POLST completed    (R53.81) Physical deconditioning  Comment: progress in acute rehab  Plan: PHYSICAL THERAPY/OT/ST. His goal is to return home, \"if that's possible.\"      Electronically signed by:  CHANDRIKA Wynn CNP                    Sincerely,        CHANDRIKA Wynn CNP    "

## 2018-08-10 NOTE — PROGRESS NOTES
"[place] [facility]  PRIMARY CARE PROVIDER AND CLINIC:  [facility], [place]  Chief Complaint   Patient presents with     Hospital F/U     [place] Medical Record Number:  [id number]    HPI:    Shamir Concepcion is a 78 year old  (1940),admitted to the [facility] from [facility].  Hospital stay 07/28/2018 through 08/09/2018.  Admitted to this facility for  rehab, medical management and nursing care.  HPI information obtained from: facility chart records, facility staff, patient report, [facility] chart review and Care Everywhere [facility] chart review.    He has a medical history significant for BPH, RA, HTN and was initially hospitalized at [facility] 7/20/2018 after presenting to the ED with dizziness, nausea and imbalance. 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 Anticoagulation was started with heparin drip and  transitioned to coumadin with lovenox bridging.   He discharged to Acute Rehab 7/28/2018 where he made improvements in functional status and cognition. Amlodipine was discontinued and losartan increased. Methotrexate has been on hold and resumed at discharge.     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 Reports some trouble with \"thinking and equilibrium.\" Good strength of extremities. No swallow problems, good appetite.   INR 2.6 today. Current dose of coumadin is alternating 2/4 mg daily. No bleeding.   Rheumatoid arthritis, involving unspecified site, unspecified rheumatoid factor presence (H)-denies pain of any type. Followed by Dr Champ Noonan.   Essential hypertension-BPs: 128/65, 118/73, 134/80   HR: 60-82  Benign prostatic hyperplasia without lower urinary tract symptoms-denies urinary symptoms.   Cognitive " impairment-conversation appropriate. Fairly good historian.   Physical deconditioning-ambulating with walker and stand by assist. Requires stand by to min assist with cares.     CODE STATUS/ADVANCE DIRECTIVES DISCUSSION:   DNR / DNI  Patient's living condition: lives alone in a condo. Wife  2017 of cancer. 2 daughters in the area.     ALLERGIES:Review of patient's allergies indicates no known allergies.  PAST MEDICAL HISTORY:  has a past medical history of Allergic rhinitis; BPH (benign prostatic hyperplasia); Colon polyps; ED (erectile dysfunction); Family history of colon cancer; PAD (peripheral artery disease) (H); RA (rheumatoid arthritis) (H); and Seronegative rheumatoid arthritis (H).  PAST SURGICAL HISTORY:  has a past surgical history that includes laminect/discectomy, lumbar; MRA UPPER EXTREMITY WO&W CONT (stenting right SFA-AK pop); Angiogram; vascular surgery (R SFA stenting  ); colonoscopy; sinus surgery; ENT surgery; Phacoemulsification clear cornea with standard intraocular lens implant (Left, 2017); orthopedic surgery; and Phacoemulsification clear cornea with standard intraocular lens implant (Right, 2017).  FAMILY HISTORY: family history includes Cancer in his mother; Cerebrovascular Disease in his father.  SOCIAL HISTORY:  reports that he quit smoking about 19 years ago. His smoking use included Cigarettes. He has a 15.00 pack-year smoking history. He has never used smokeless tobacco. He reports that he drinks alcohol. He reports that he does not use illicit drugs.    Post Discharge Medication Reconciliation Status: discharge medications reconciled, continue medications without change.  Current Outpatient Prescriptions   Medication Sig Dispense Refill     acetaminophen (TYLENOL) 325 MG tablet Take 2 tablets (650 mg) by mouth every 4 hours as needed for mild pain       Finasteride (PROSCAR PO) Take 5 mg by mouth daily       fluticasone (FLONASE ALLERGY RELIEF) 50 MCG/ACT spray  "Spray 1 spray into both nostrils daily       folic acid 800 MCG TABS Take 800 mcg by mouth daily       losartan (COZAAR) 50 MG tablet Take 1 tablet (50 mg) by mouth daily       [START ON 8/11/2018] methotrexate 2.5 MG tablet CHEMO Take 5 tablets (12.5 mg) by mouth every 7 days Saturdays       miconazole (MICATIN; MICRO GUARD) 2 % powder Apply topically 2 times daily as needed (groin rash)       predniSONE (DELTASONE) 5 MG tablet Take 1 tablet (5 mg) by mouth daily as needed for rheumatoid arthritis flare.       rosuvastatin (CRESTOR) 20 MG tablet Take 1 tablet (20 mg) by mouth daily       senna-docusate (SENOKOT-S;PERICOLACE) 8.6-50 MG per tablet Take 2 tablets by mouth 2 times daily as needed for constipation       tamsulosin (FLOMAX) 0.4 MG capsule 1 tab twice daily       warfarin (COUMADIN) 2 MG tablet Adjust for target INR 2-3. 4 mg 8/9, 2 mg 8/10, 4 mg on 8/11 and 8/12/18 - check INR 8/13 or sooner         ROS:  10 point ROS of systems including Constitutional, Eyes, Respiratory, Cardiovascular, Gastroenterology, Genitourinary, Integumentary, Muscularskeletal, Psychiatric were all negative except for pertinent positives noted in my HPI.    Exam:  /65  Pulse 64  Temp 97.2  F (36.2  C)  Resp 18  Ht 5' 9\" (1.753 m)  Wt 155 lb (70.3 kg)  SpO2 94%  BMI 22.89 kg/m2  GENERAL APPEARANCE:  Alert, in no distress  ENT:  Mouth and posterior oropharynx normal, moist mucous membranes, normal hearing acuity  EYES:  EOM normal, conjunctiva and lids normal, PERRL  NECK:  No adenopathy,masses or thyromegaly  RESP:  respiratory effort and palpation of chest normal, lungs clear to auscultation , no respiratory distress  CV:  Palpation and auscultation of heart done , regular rate and rhythm, no murmur, rub, or gallop, no edema, +2 pedal pulses  ABDOMEN:  normal bowel sounds, soft, nontender, no hepatosplenomegaly or other masses  M/S:   gait steady with walker. BURKETT with good strength. No joint inflammation  SKIN:  no " rashes or open areas  PSYCH:  oriented X 3, memory impaired , affect and mood normal    Lab/Diagnostic data:     Last Comprehensive Metabolic Panel:  Sodium   Date Value Ref Range Status   08/08/2018 143 133 - 144 mmol/L Final     Potassium   Date Value Ref Range Status   08/08/2018 4.0 3.4 - 5.3 mmol/L Final     Chloride   Date Value Ref Range Status   08/08/2018 107 94 - 109 mmol/L Final     Carbon Dioxide   Date Value Ref Range Status   08/08/2018 26 20 - 32 mmol/L Final     Anion Gap   Date Value Ref Range Status   08/08/2018 10 3 - 14 mmol/L Final     Glucose   Date Value Ref Range Status   08/08/2018 91 70 - 99 mg/dL Final     Urea Nitrogen   Date Value Ref Range Status   08/08/2018 22 7 - 30 mg/dL Final     Creatinine   Date Value Ref Range Status   08/08/2018 1.19 0.66 - 1.25 mg/dL Final     GFR Estimate   Date Value Ref Range Status   08/08/2018 59 (L) >60 mL/min/1.7m2 Final     Comment:     Non  GFR Calc     Calcium   Date Value Ref Range Status   08/08/2018 8.0 (L) 8.5 - 10.1 mg/dL Final     Lab Results   Component Value Date    WBC 8.9 08/08/2018     Lab Results   Component Value Date    RBC 4.32 08/08/2018     Lab Results   Component Value Date    HGB 13.4 08/08/2018     Lab Results   Component Value Date    HCT 41.9 08/08/2018     Lab Results   Component Value Date    MCV 97 08/08/2018     Lab Results   Component Value Date    MCH 31.0 08/08/2018     Lab Results   Component Value Date    MCHC 32.0 08/08/2018     Lab Results   Component Value Date    RDW 14.1 08/08/2018     Lab Results   Component Value Date     08/08/2018       ASSESSMENT / PLAN:  (I63.219) Cerebrovascular accident (CVA) due to occlusion of vertebral artery, unspecified blood vessel laterality (H)  (primary encounter diagnosis)  Comment: improved status on Acute Rehab. INR therapeutic  Plan: coumadin alternating 2/4 mg daily. INR 8/13/2018.  CBC, BMP 8/13/2108. Continue statin. [facility]  "with CT 8/23/2018. CT angiogram recommended in 6 mos to evaluate vasculature in his neck.     (M06.9) Rheumatoid arthritis, involving unspecified site, unspecified rheumatoid factor presence (H)  Comment: controlled  Plan: continue methotrexate, prednisone prn flare of joint pain    (I10) Essential hypertension  Comment: controlled  Plan: continue losartan. Monitor VS. BMP    (N40.0) Benign prostatic hyperplasia without lower urinary tract symptoms  Comment: chronic  Plan: continue tamsulosin, finasteride.     (R41.89) Cognitive impairment  Comment: appears to have mild short term memory loss  Plan: cognitive testing per therapies    (Z71.89) Advanced directives, counseling/discussion  Comment: he has an advanced directive on file and confirms DNR/DNI  Plan: POLST completed    (R53.81) Physical deconditioning  Comment: progress in acute rehab  Plan: PHYSICAL THERAPY/OT/ST. His goal is to return home, \"if that's possible.\"      Electronically signed by:  CHANDRIKA Wynn CNP                "

## 2018-08-13 ENCOUNTER — NURSING HOME VISIT (OUTPATIENT)
Dept: GERIATRICS | Facility: CLINIC | Age: 78
End: 2018-08-13
Payer: COMMERCIAL

## 2018-08-13 ENCOUNTER — TRANSFERRED RECORDS (OUTPATIENT)
Dept: HEALTH INFORMATION MANAGEMENT | Facility: CLINIC | Age: 78
End: 2018-08-13

## 2018-08-13 VITALS
OXYGEN SATURATION: 98 % | SYSTOLIC BLOOD PRESSURE: 114 MMHG | RESPIRATION RATE: 18 BRPM | DIASTOLIC BLOOD PRESSURE: 63 MMHG | HEART RATE: 65 BPM | BODY MASS INDEX: 23.11 KG/M2 | TEMPERATURE: 97.6 F | WEIGHT: 156 LBS | HEIGHT: 69 IN

## 2018-08-13 VITALS
HEART RATE: 59 BPM | DIASTOLIC BLOOD PRESSURE: 54 MMHG | WEIGHT: 156 LBS | OXYGEN SATURATION: 96 % | RESPIRATION RATE: 18 BRPM | TEMPERATURE: 97.3 F | HEIGHT: 69 IN | BODY MASS INDEX: 23.11 KG/M2 | SYSTOLIC BLOOD PRESSURE: 110 MMHG

## 2018-08-13 DIAGNOSIS — Z51.81 ENCOUNTER FOR MONITORING COUMADIN THERAPY: ICD-10-CM

## 2018-08-13 DIAGNOSIS — Z79.01 LONG TERM CURRENT USE OF ANTICOAGULANT THERAPY: ICD-10-CM

## 2018-08-13 DIAGNOSIS — Z79.01 ENCOUNTER FOR MONITORING COUMADIN THERAPY: ICD-10-CM

## 2018-08-13 DIAGNOSIS — I63.219 CEREBROVASCULAR ACCIDENT (CVA) DUE TO OCCLUSION OF VERTEBRAL ARTERY, UNSPECIFIED BLOOD VESSEL LATERALITY (H): Primary | ICD-10-CM

## 2018-08-13 LAB
ANION GAP SERPL CALCULATED.3IONS-SCNC: 5 MMOL/L (ref 3–14)
BUN SERPL-MCNC: 23 MG/DL (ref 7–30)
CALCIUM SERPL-MCNC: 8.5 MG/DL (ref 8.5–10.1)
CHLORIDE SERPLBLD-SCNC: 107 MMOL/L (ref 94–109)
CO2 SERPL-SCNC: 30 MMOL/L (ref 20–32)
CREAT SERPL-MCNC: 1.12 MG/DL (ref 0.66–1.25)
ERYTHROCYTE [DISTWIDTH] IN BLOOD BY AUTOMATED COUNT: 14.1 % (ref 10–15)
GFR SERPL CREATININE-BSD FRML MDRD: 63 ML/MIN/1.73M2
GLUCOSE SERPL-MCNC: 86 MG/DL (ref 70–99)
HCT VFR BLD AUTO: 37.8 % (ref 40–53)
HEMOGLOBIN: 12.6 G/DL (ref 13.3–17.7)
MCH RBC QN AUTO: 31.4 PG (ref 26.5–33)
MCHC RBC AUTO-ENTMCNC: 33.3 G/DL (ref 31.5–36.5)
MCV RBC AUTO: 94 FL (ref 78–100)
PLATELET # BLD AUTO: 324 10E9/L (ref 150–450)
POTASSIUM SERPL-SCNC: 4 MMOL/L (ref 3.4–5.3)
RBC # BLD AUTO: 4.01 10E12/L (ref 4.4–5.9)
SODIUM SERPL-SCNC: 142 MMOL/L (ref 133–144)
WBC # BLD AUTO: 6.9 10E9/L (ref 4–11)

## 2018-08-13 PROCEDURE — 99308 SBSQ NF CARE LOW MDM 20: CPT | Performed by: NURSE PRACTITIONER

## 2018-08-13 NOTE — LETTER
8/13/2018        RE: Shamir Concepcion  [address]  [facility] [id number]          [place] [facility]    HPI:    Shamir Concepcion is a 78 year old  (1940), who is being [facility] today [facility]   HPI information obtained from: facility chart records, facility staff, patient report and [facility] chart review. Today's concern is INR/Coumadin management for CVA    Bleeding Signs/Symptoms:  None  Thromboembolic Signs/Symptoms:  None    Medication Changes:  No  Dietary Changes:  No  Activity Changes: No  Bacterial/Viral Infection:  No    Missed Coumadin Doses:  None    On ASA: No    Other Concerns:  No    OBJECTIVE:    INR Today:  2.0, down from 2.6  Current Dose:  Alternating 2/4 mg daily    ASSESSMENT:     Cerebrovascular accident (CVA) due to occlusion of vertebral artery, unspecified blood vessel laterality (H)  Long term current use of anticoagulant therapy  Encounter for monitoring coumadin therapy  Therapeutic INR for goal of 2-3    PLAN:    New Dose: 4 mg daily      Next INR: 8/15/2018        Electronically signed by:  CHANDRIKA Wynn CNP            Sincerely,        CHANDRIKA Wynn CNP

## 2018-08-13 NOTE — PROGRESS NOTES
[place] [facility]    HPI:    Shamir Concepcion is a 78 year old  (1940), who is being seen today for an episodic care visit at [facility].   HPI information obtained from: facility chart records, facility staff, patient report and [facility] chart review. Today's concern is INR/Coumadin management for CVA    Bleeding Signs/Symptoms:  None  Thromboembolic Signs/Symptoms:  None    Medication Changes:  No  Dietary Changes:  No  Activity Changes: No  Bacterial/Viral Infection:  No    Missed Coumadin Doses:  None    On ASA: No    Other Concerns:  No    OBJECTIVE:    INR Today:  2.0, down from 2.6  Current Dose:  Alternating 2/4 mg daily    ASSESSMENT:     Cerebrovascular accident (CVA) due to occlusion of vertebral artery, unspecified blood vessel laterality (H)  Long term current use of anticoagulant therapy  Encounter for monitoring coumadin therapy  Therapeutic INR for goal of 2-3    PLAN:    New Dose: 4 mg daily      Next INR: 8/15/2018        Electronically signed by:  CHANDRIKA Wynn CNP

## 2018-08-14 ENCOUNTER — NURSING HOME VISIT (OUTPATIENT)
Dept: GERIATRICS | Facility: CLINIC | Age: 78
End: 2018-08-14
Payer: COMMERCIAL

## 2018-08-14 DIAGNOSIS — N40.0 BENIGN PROSTATIC HYPERPLASIA WITHOUT LOWER URINARY TRACT SYMPTOMS: ICD-10-CM

## 2018-08-14 DIAGNOSIS — I63.219 CEREBROVASCULAR ACCIDENT (CVA) DUE TO OCCLUSION OF VERTEBRAL ARTERY, UNSPECIFIED BLOOD VESSEL LATERALITY (H): Primary | ICD-10-CM

## 2018-08-14 DIAGNOSIS — M06.9 RHEUMATOID ARTHRITIS, INVOLVING UNSPECIFIED SITE, UNSPECIFIED RHEUMATOID FACTOR PRESENCE: ICD-10-CM

## 2018-08-14 DIAGNOSIS — R41.89 COGNITIVE IMPAIRMENT: ICD-10-CM

## 2018-08-14 DIAGNOSIS — I10 ESSENTIAL HYPERTENSION: ICD-10-CM

## 2018-08-14 PROCEDURE — 99305 1ST NF CARE MODERATE MDM 35: CPT | Performed by: INTERNAL MEDICINE

## 2018-08-14 NOTE — LETTER
8/14/2018        RE: Shamir Concepcion  [address]  [facility] [address]        Shamir Concepcion is a 78 year old male seen August 14, 2018 at [facility] where he was admitted last week from Acute Rehab.   Patient was doing well until 7/28/18 when he developed acute diplopia, dizziness and nausea.   Imaging showed cerebellar, right dorsal pontine and left occipital infarcts with mass effect on the 4th ventricle.  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      Follow up CT did not show any hydrocephalus or hemorrhagic transformation.   He was anticoagulated with heparin, then Lovenox bridge to warfarin and seen by PHYSICAL THERAPY / OCCUPATIONAL THERAPY and Speech Therapy.    He transferred to Acute Rehab where he improved functionally.   Some cognitive impairment, STML, impulsitivty; has asked about return to driving multiple times.     Transferred here for ongoing Rehab prior to returning home.     Patient is seen on the unit, up to chair.  His brother-in-law is visiting and has brought him donuts.   Patient reports he is feeling well, still dizzy.   Has been working with therapies with goal of returning home alone.  Pleased with the progress he has made so far.         Past Medical History:   Diagnosis Date     Allergic rhinitis      BPH (benign prostatic hyperplasia)      Colon polyps      ED (erectile dysfunction)      Family history of colon cancer      PAD (peripheral artery disease) (H)      RA (rheumatoid arthritis) (H)      Seronegative rheumatoid arthritis (H)    Cerebellar stroke, 2018    Past Surgical History:   Procedure Laterality Date     ANGIOGRAM       C MRA UPPER EXTREMITY WO&W CONT  stenting right SFA-AK pop     COLONOSCOPY      polyps     ENT SURGERY      T&A     LAMINECT/DISCECTOMY, LUMBAR       ORTHOPEDIC SURGERY       PHACOEMULSIFICATION CLEAR CORNEA WITH STANDARD INTRAOCULAR LENS IMPLANT Left 4/4/2017    Procedure: PHACOEMULSIFICATION CLEAR CORNEA WITH STANDARD INTRAOCULAR  "LENS IMPLANT;  Surgeon: Stevie Espinal MD;  [facility]     PHACOEMULSIFICATION CLEAR CORNEA WITH STANDARD INTRAOCULAR LENS IMPLANT Right 5/2/2017    Procedure: PHACOEMULSIFICATION CLEAR CORNEA WITH STANDARD INTRAOCULAR LENS IMPLANT;  RIGHT EYE PHACOEMULSIFICATION CLEAR CORNEA WITH STANDARD INTRAOCULAR LENS IMPLANT;  Surgeon: Stevie Espinal MD;  Location:  [facility]     SINUS SURGERY       VASCULAR SURGERY  R SFA stenting  2012     Family History   Problem Relation Age of Onset     Cancer Mother      Cerebrovascular Disease Father      Social History   Substance Use Topics     Smoking status: Former Smoker     Packs/day: 0.50     Years: 30.00     Types: Cigarettes     Quit date: 3/1/1999     Smokeless tobacco: Never Used     Alcohol use Yes      Comment: wine nightly      SH:   11/2017   Lives alone, condo in [place].   He has 2 daughters.     Review Of Systems  Skin: negative   Eyes: impaired vision  Ears/Nose/Throat: hearing loss  Respiratory: No shortness of breath, dyspnea on exertion, cough, or hemoptysis  Cardiovascular: negative  Gastrointestinal: negative  Genitourinary: BPH  Musculoskeletal: ambulatory with FWW and SBA, min assist with ADLs.     Neurologic: +STML, decreased balance  Psychiatric: negative  Hematologic/Lymphatic/Immunologic: negative  Endocrine: negative      GENERAL APPEARANCE: alert and no distress   /54  Pulse 59  Temp 97.3  F (36.3  C)  Resp 18  Ht 5' 9\" (1.753 m)  Wt 156 lb (70.8 kg)  SpO2 96%  BMI 23.04 kg/m2   HEENT: normocephalic, no lesion or abnormalities  NECK: no adenopathy, no asymmetry, masses, or scars and thyroid normal to palpation  RESP: lungs clear to auscultation - no rales, rhonchi or wheezes  CV: regular rate and rhythm, normal S1 S2  ABDOMEN:  soft, nontender, no HSM or masses and bowel sounds normal  MS: extremities normal- no gross deformities noted, no evidence of inflammation in joints, FROM in all extremities.  SKIN: no suspicious " lesions or rashes  NEURO: Normal strength and tone, sensory exam grossly normal, and speech normal  PSYCH: affect okay  LYMPHATICS: No cervical,  or supraclavicular nodes     Last Comprehensive Metabolic Panel:  Sodium   Date Value Ref Range Status   08/13/2018 142 133 - 144 mmol/L Final     Potassium   Date Value Ref Range Status   08/13/2018 4.0 3.4 - 5.3 mmol/L Final     Chloride   Date Value Ref Range Status   08/13/2018 107 94 - 109 mmol/L Final     Carbon Dioxide   Date Value Ref Range Status   08/13/2018 30 20 - 32 mmol/L Final     Anion Gap   Date Value Ref Range Status   08/13/2018 5 3 - 14 mmol/L Final     Glucose   Date Value Ref Range Status   08/13/2018 86 70 - 99 mg/dL Final     Urea Nitrogen   Date Value Ref Range Status   08/13/2018 23 7 - 30 mg/dL Final     Creatinine   Date Value Ref Range Status   08/13/2018 1.12 0.66 - 1.25 mg/dL Final     GFR Estimate   Date Value Ref Range Status   08/13/2018 63 >60 mL/min/1.73m2 Final     Calcium   Date Value Ref Range Status   08/13/2018 8.5 8.5 - 10.1 mg/dL Final     Lab Results   Component Value Date    WBC 6.9 08/13/2018      HGB 12.6 08/13/2018      MCV 94 08/13/2018       08/13/2018     Lab Results   Component Value Date    AST 21 07/21/2018     Lab Results   Component Value Date    ALT 13 07/21/2018     Lab Results   Component Value Date    BILITOTAL 0.7 07/21/2018     Lab Results   Component Value Date    ALBUMIN 2.8 07/30/2018     Lab Results   Component Value Date    PROTTOTAL 6.9 07/21/2018      Lab Results   Component Value Date    ALKPHOS 101 07/21/2018        IMP/PLAN:    (I63.219) 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   Also on statin and bp meds for secondary prevention.   PHYSICAL THERAPY / OCCUPATIONAL  THERAPY for stroke rehab, balance, gait, ADLs.   Discharge goal is return home, prior level of independence.       (M06.9) Rheumatoid arthritis, involving unspecified site, unspecified rheumatoid factor presence (H)  Comment: no current pain or significant joint deformity   Plan: continue methotrexate, folic acid; also has prn prednisone dose available if a flare occurs.       (I10) Essential hypertension  Comment:   BP Readings from Last 3 Encounters:   08/13/18 110/54   08/13/18 114/63   08/10/18 128/65      Plan: continue losartan       (R41.89) Cognitive impairment  Comment: STML  Plan: formal cognitive testing per OCCUPATIONAL THERAPY to help determine discharge options.       (N40.0) Benign prostatic hyperplasia without lower urinary tract symptoms  Comment: ongoing   Plan: continue tamsulosin and finasteride        Daniella Ordaz MD       Sincerely,        Daniella Ordaz MD

## 2018-08-14 NOTE — PROGRESS NOTES
Shamir Concepcion is a 78 year old male seen August 14, 2018 at [facility] where he was admitted last week from Acute Rehab.   Patient was doing well until 7/28/18 when he developed acute diplopia, dizziness and nausea.   Imaging showed cerebellar, right dorsal pontine and left occipital infarcts with mass effect on the 4th ventricle.  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      Follow up CT did not show any hydrocephalus or hemorrhagic transformation.   He was anticoagulated with heparin, then Lovenox bridge to warfarin and seen by PHYSICAL THERAPY / OCCUPATIONAL THERAPY and Speech Therapy.    He transferred to Acute Rehab where he improved functionally.   Some cognitive impairment, STML, impulsitivty; has asked about return to driving multiple times.     Transferred here for ongoing Rehab prior to returning home.     Patient is seen on the unit, up to chair.  His brother-in-law is visiting and has brought him donuts.   Patient reports he is feeling well, still dizzy.   Has been working with therapies with goal of returning home alone.  Pleased with the progress he has made so far.         Past Medical History:   Diagnosis Date     Allergic rhinitis      BPH (benign prostatic hyperplasia)      Colon polyps      ED (erectile dysfunction)      Family history of colon cancer      PAD (peripheral artery disease) (H)      RA (rheumatoid arthritis) (H)      Seronegative rheumatoid arthritis (H)    Cerebellar stroke, 2018    Past Surgical History:   Procedure Laterality Date     ANGIOGRAM       C MRA UPPER EXTREMITY WO&W CONT  stenting right SFA-AK pop     COLONOSCOPY      polyps     ENT SURGERY      T&A     LAMINECT/DISCECTOMY, LUMBAR       ORTHOPEDIC SURGERY       PHACOEMULSIFICATION CLEAR CORNEA WITH STANDARD INTRAOCULAR LENS IMPLANT Left 4/4/2017    Procedure: PHACOEMULSIFICATION CLEAR CORNEA WITH STANDARD INTRAOCULAR LENS IMPLANT;  Surgeon: Stevie Espinal MD;  Location: [facility]     PHACOEMULSIFICATION CLEAR  "CORNEA WITH STANDARD INTRAOCULAR LENS IMPLANT Right 5/2/2017    Procedure: PHACOEMULSIFICATION CLEAR CORNEA WITH STANDARD INTRAOCULAR LENS IMPLANT;  RIGHT EYE PHACOEMULSIFICATION CLEAR CORNEA WITH STANDARD INTRAOCULAR LENS IMPLANT;  Surgeon: Stevie Espinal MD;  Location:  [facility]     SINUS SURGERY       VASCULAR SURGERY  R SFA stenting  2012     Family History   Problem Relation Age of Onset     Cancer Mother      Cerebrovascular Disease Father      Social History   Substance Use Topics     Smoking status: Former Smoker     Packs/day: 0.50     Years: 30.00     Types: Cigarettes     Quit date: 3/1/1999     Smokeless tobacco: Never Used     Alcohol use Yes      Comment: wine nightly      SH:   11/2017   Lives alone, condo in [place].   He has 2 daughters.     Review Of Systems  Skin: negative   Eyes: impaired vision  Ears/Nose/Throat: hearing loss  Respiratory: No shortness of breath, dyspnea on exertion, cough, or hemoptysis  Cardiovascular: negative  Gastrointestinal: negative  Genitourinary: BPH  Musculoskeletal: ambulatory with FWW and SBA, min assist with ADLs.     Neurologic: +STML, decreased balance  Psychiatric: negative  Hematologic/Lymphatic/Immunologic: negative  Endocrine: negative      GENERAL APPEARANCE: alert and no distress   /54  Pulse 59  Temp 97.3  F (36.3  C)  Resp 18  Ht 5' 9\" (1.753 m)  Wt 156 lb (70.8 kg)  SpO2 96%  BMI 23.04 kg/m2   HEENT: normocephalic, no lesion or abnormalities  NECK: no adenopathy, no asymmetry, masses, or scars and thyroid normal to palpation  RESP: lungs clear to auscultation - no rales, rhonchi or wheezes  CV: regular rate and rhythm, normal S1 S2  ABDOMEN:  soft, nontender, no HSM or masses and bowel sounds normal  MS: extremities normal- no gross deformities noted, no evidence of inflammation in joints, FROM in all extremities.  SKIN: no suspicious lesions or rashes  NEURO: Normal strength and tone, sensory exam grossly normal, and speech " normal  PSYCH: affect okay  LYMPHATICS: No cervical,  or supraclavicular nodes     Last Comprehensive Metabolic Panel:  Sodium   Date Value Ref Range Status   08/13/2018 142 133 - 144 mmol/L Final     Potassium   Date Value Ref Range Status   08/13/2018 4.0 3.4 - 5.3 mmol/L Final     Chloride   Date Value Ref Range Status   08/13/2018 107 94 - 109 mmol/L Final     Carbon Dioxide   Date Value Ref Range Status   08/13/2018 30 20 - 32 mmol/L Final     Anion Gap   Date Value Ref Range Status   08/13/2018 5 3 - 14 mmol/L Final     Glucose   Date Value Ref Range Status   08/13/2018 86 70 - 99 mg/dL Final     Urea Nitrogen   Date Value Ref Range Status   08/13/2018 23 7 - 30 mg/dL Final     Creatinine   Date Value Ref Range Status   08/13/2018 1.12 0.66 - 1.25 mg/dL Final     GFR Estimate   Date Value Ref Range Status   08/13/2018 63 >60 mL/min/1.73m2 Final     Calcium   Date Value Ref Range Status   08/13/2018 8.5 8.5 - 10.1 mg/dL Final     Lab Results   Component Value Date    WBC 6.9 08/13/2018      HGB 12.6 08/13/2018      MCV 94 08/13/2018       08/13/2018     Lab Results   Component Value Date    AST 21 07/21/2018     Lab Results   Component Value Date    ALT 13 07/21/2018     Lab Results   Component Value Date    BILITOTAL 0.7 07/21/2018     Lab Results   Component Value Date    ALBUMIN 2.8 07/30/2018     Lab Results   Component Value Date    PROTTOTAL 6.9 07/21/2018      Lab Results   Component Value Date    ALKPHOS 101 07/21/2018        IMP/PLAN:    (I63.219) 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   Also on statin and bp meds for secondary prevention.   PHYSICAL THERAPY / OCCUPATIONAL THERAPY for stroke rehab, balance, gait, ADLs.   Discharge goal is return home, prior level  of independence.       (M06.9) Rheumatoid arthritis, involving unspecified site, unspecified rheumatoid factor presence (H)  Comment: no current pain or significant joint deformity   Plan: continue methotrexate, folic acid; also has prn prednisone dose available if a flare occurs.       (I10) Essential hypertension  Comment:   BP Readings from Last 3 Encounters:   08/13/18 110/54   08/13/18 114/63   08/10/18 128/65      Plan: continue losartan       (R41.89) Cognitive impairment  Comment: [facility]  Plan: formal cognitive testing per OCCUPATIONAL THERAPY to help determine discharge options.       (N40.0) Benign prostatic hyperplasia without lower urinary tract symptoms  Comment: ongoing   Plan: continue tamsulosin and finasteride        Daniella Ordaz MD

## 2018-08-15 ENCOUNTER — NURSING HOME VISIT (OUTPATIENT)
Dept: GERIATRICS | Facility: CLINIC | Age: 78
End: 2018-08-15
Payer: COMMERCIAL

## 2018-08-15 ENCOUNTER — DOCUMENTATION ONLY (OUTPATIENT)
Dept: OTHER | Facility: CLINIC | Age: 78
End: 2018-08-15

## 2018-08-15 VITALS
BODY MASS INDEX: 23.11 KG/M2 | OXYGEN SATURATION: 93 % | WEIGHT: 156 LBS | RESPIRATION RATE: 18 BRPM | TEMPERATURE: 98.2 F | SYSTOLIC BLOOD PRESSURE: 98 MMHG | HEIGHT: 69 IN | HEART RATE: 65 BPM | DIASTOLIC BLOOD PRESSURE: 58 MMHG

## 2018-08-15 DIAGNOSIS — I63.219 CEREBROVASCULAR ACCIDENT (CVA) DUE TO OCCLUSION OF VERTEBRAL ARTERY, UNSPECIFIED BLOOD VESSEL LATERALITY (H): Primary | ICD-10-CM

## 2018-08-15 DIAGNOSIS — Z79.01 ENCOUNTER FOR MONITORING COUMADIN THERAPY: ICD-10-CM

## 2018-08-15 DIAGNOSIS — Z79.01 LONG TERM CURRENT USE OF ANTICOAGULANT THERAPY: ICD-10-CM

## 2018-08-15 DIAGNOSIS — Z51.81 ENCOUNTER FOR MONITORING COUMADIN THERAPY: ICD-10-CM

## 2018-08-15 PROBLEM — Z71.89 ADVANCED DIRECTIVES, COUNSELING/DISCUSSION: Status: RESOLVED | Noted: 2018-08-10 | Resolved: 2018-08-15

## 2018-08-15 PROCEDURE — 99308 SBSQ NF CARE LOW MDM 20: CPT | Performed by: NURSE PRACTITIONER

## 2018-08-15 NOTE — LETTER
8/15/2018        RE: Shamir Concepcion  [address]  [place] [address]          [facility]    HPI:    Shamir Concepcion is a 78 year old  (1940), who is [facility]   HPI information obtained from: facility chart records, facility staff and patient report. 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

## 2018-08-15 NOTE — PROGRESS NOTES
[facility]    HPI:    Shamir Concepcion is a 78 year old  (1940), who is being seen today for an episodic care visit at [facility].   HPI information obtained from: facility chart records, facility staff and patient report. 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

## 2018-08-17 ENCOUNTER — NURSING HOME VISIT (OUTPATIENT)
Dept: GERIATRICS | Facility: CLINIC | Age: 78
End: 2018-08-17
Payer: COMMERCIAL

## 2018-08-17 VITALS
HEART RATE: 88 BPM | RESPIRATION RATE: 18 BRPM | WEIGHT: 156 LBS | DIASTOLIC BLOOD PRESSURE: 50 MMHG | SYSTOLIC BLOOD PRESSURE: 113 MMHG | HEIGHT: 69 IN | OXYGEN SATURATION: 95 % | BODY MASS INDEX: 23.11 KG/M2 | TEMPERATURE: 96.7 F

## 2018-08-17 DIAGNOSIS — M06.9 RHEUMATOID ARTHRITIS, INVOLVING UNSPECIFIED SITE, UNSPECIFIED RHEUMATOID FACTOR PRESENCE: ICD-10-CM

## 2018-08-17 DIAGNOSIS — R41.89 COGNITIVE IMPAIRMENT: ICD-10-CM

## 2018-08-17 DIAGNOSIS — I10 ESSENTIAL HYPERTENSION: ICD-10-CM

## 2018-08-17 DIAGNOSIS — N40.0 BENIGN PROSTATIC HYPERPLASIA WITHOUT LOWER URINARY TRACT SYMPTOMS: ICD-10-CM

## 2018-08-17 DIAGNOSIS — R53.81 PHYSICAL DECONDITIONING: ICD-10-CM

## 2018-08-17 DIAGNOSIS — I63.219 CEREBROVASCULAR ACCIDENT (CVA) DUE TO OCCLUSION OF VERTEBRAL ARTERY, UNSPECIFIED BLOOD VESSEL LATERALITY (H): Primary | ICD-10-CM

## 2018-08-17 PROCEDURE — 99309 SBSQ NF CARE MODERATE MDM 30: CPT | Performed by: NURSE PRACTITIONER

## 2018-08-17 NOTE — LETTER
8/17/2018        RE: Shamir Concepcion  [address]  [facility] [address]        [facility]    Chief Complaint   Patient presents with     RECHECK       [facility] Medical Record Number:  [id number]    HPI:    Shamir Concepcion is a 78 year old  (1940),  [facility] HPI information obtained from: facility chart records, facility staff, patient report, [facility] chart review and Care Everywhere [facility] chart review.    He came to this facility from Acute Rehab on 8/9/2018 for short term rehab and medical management after hospitalization  at [facility] 7/20/2018 with dizziness, nausea and imbalance. 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 Anticoagulation was started with heparin drip and  transitioned to coumadin with lovenox bridging.   He discharged to Acute Rehab 7/28/2018 where he made improvements in functional status and cognition. Amlodipine was discontinued and losartan increased. Methotrexate has been on hold and resumed at discharge.     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 His brother is here and feels that he's improving. INR 1.7 today, up from 1.5 on coumadin 5 mg daily. No bleeding.   Rheumatoid arthritis, involving unspecified site, unspecified rheumatoid factor presence (H)-denies pain of any type  Essential hypertension-BPs: 113/50, 98/57, 102/62   HR:  62-71  Has noticed dizziness at times, usually with activity. Good appetite and fluid intake.   Benign prostatic hyperplasia without lower urinary tract symptoms-denies urinary symptoms.   Cognitive impairment-SLUMS 18/30, CPT 4.6/5.6  Speech therapist reports mild deficits in attention, memory and executive function. He's very motivated to learn compensatory strategies.   Physical  deconditioning-ambulating 250 ft with walker and stand by assist. Requires stand by to min assist with cares.     ALLERGIES: Review of patient's allergies indicates no known allergies.  Past Medical, Surgical, Family and Social History reviewed and updated in [facility].    Current Outpatient Prescriptions   Medication Sig Dispense Refill     acetaminophen (TYLENOL) 325 MG tablet Take 2 tablets (650 mg) by mouth every 4 hours as needed for mild pain       Finasteride (PROSCAR PO) Take 5 mg by mouth daily       fluticasone (FLONASE ALLERGY RELIEF) 50 MCG/ACT spray Spray 1 spray into both nostrils daily       folic acid 800 MCG TABS Take 800 mcg by mouth daily       losartan (COZAAR) 50 MG tablet Take 1 tablet (50 mg) by mouth daily       methotrexate 2.5 MG tablet CHEMO Take 5 tablets (12.5 mg) by mouth every 7 days Saturdays       miconazole (MICATIN; MICRO GUARD) 2 % powder Apply topically 2 times daily as needed (groin rash)       predniSONE (DELTASONE) 5 MG tablet Take 1 tablet (5 mg) by mouth daily as needed for rheumatoid arthritis flare.       rosuvastatin (CRESTOR) 20 MG tablet Take 1 tablet (20 mg) by mouth daily       senna-docusate (SENOKOT-S;PERICOLACE) 8.6-50 MG per tablet Take 2 tablets by mouth 2 times daily as needed for constipation       tamsulosin (FLOMAX) 0.4 MG capsule 1 tab twice daily       Warfarin Sodium (COUMADIN PO) Take 5 mg by mouth daily        Medications reviewed:  Medications reconciled to facility chart and changes were made to reflect current medications as identified as above med list. Below are the changes that were made:   Medications stopped since last EPIC medication reconciliation:   There are no discontinued medications.    Medications started since last [facility] medication reconciliation:  No orders of the defined types were placed in this encounter.    REVIEW OF SYSTEMS:  4 point ROS including Respiratory, CV, GI and , other than that noted in the HPI,  is negative    Physical  "Exam:  /50  Pulse 88  Temp 96.7  F (35.9  C)  Resp 18  Ht 5' 9\" (1.753 m)  Wt 156 lb (70.8 kg)  SpO2 95%  BMI 23.04 kg/m2  GENERAL APPEARANCE:  Alert, in no distress  ENT:  Mouth and posterior oropharynx normal, moist mucous membranes, normal hearing acuity  EYES:  EOM normal, conjunctiva and lids normal  NECK:  No adenopathy,masses or thyromegaly  RESP:  respiratory effort and palpation of chest normal, lungs clear to auscultation , no respiratory distress  CV:  Palpation and auscultation of heart done , regular rate and rhythm, no murmur, no edema, +2 pedal pulses  ABDOMEN:  soft, nontender, no hepatosplenomegaly or other masses  M/S:   gait steady with walker. BURKETT with good strength. No joint inflammation  SKIN:  no rashes or open areas  PSYCH:  oriented X 3, memory impaired , affect and mood normal    Recent Labs:     Last Comprehensive Metabolic Panel:  Sodium   Date Value Ref Range Status   08/13/2018 142 133 - 144 mmol/L Final     Potassium   Date Value Ref Range Status   08/13/2018 4.0 3.4 - 5.3 mmol/L Final     Chloride   Date Value Ref Range Status   08/13/2018 107 94 - 109 mmol/L Final     Carbon Dioxide   Date Value Ref Range Status   08/13/2018 30 20 - 32 mmol/L Final     Anion Gap   Date Value Ref Range Status   08/13/2018 5 3 - 14 mmol/L Final     Glucose   Date Value Ref Range Status   08/13/2018 86 70 - 99 mg/dL Final     Urea Nitrogen   Date Value Ref Range Status   08/13/2018 23 7 - 30 mg/dL Final     Creatinine   Date Value Ref Range Status   08/13/2018 1.12 0.66 - 1.25 mg/dL Final     GFR Estimate   Date Value Ref Range Status   08/13/2018 63 >60 mL/min/1.73m2 Final     Calcium   Date Value Ref Range Status   08/13/2018 8.5 8.5 - 10.1 mg/dL Final     Lab Results   Component Value Date    WBC 6.9 08/13/2018     Lab Results   Component Value Date    RBC 4.01 08/13/2018     Lab Results   Component Value Date    HGB 12.6 08/13/2018     Lab Results   Component Value Date    HCT 37.8 " 08/13/2018     Lab Results   Component Value Date    MCV 94 08/13/2018     Lab Results   Component Value Date    MCH 31.4 08/13/2018     Lab Results   Component Value Date    MCHC 33.3 08/13/2018     Lab Results   Component Value Date    RDW 14.1 08/13/2018     Lab Results   Component Value Date     08/13/2018       ASSESSMENT / PLAN:  (I63.219) 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   Plan: continue coumadin 5 mg daily. INR 8/20/2018. Continue statin.  [facility] with [facility] 8/23/2018. CT angiogram recommended in 6 mos to evaluate vasculature in his neck.      (M06.9) Rheumatoid arthritis, involving unspecified site, unspecified rheumatoid factor presence (H)  Comment: controlled  Plan: continue methotrexate, folic acid. Prednisone prn flare.     (I10) Essential hypertension  Comment: hypotensive.   Plan: discontinue losartan. Monitor VS    (N40.0) Benign prostatic hyperplasia without lower urinary tract symptoms  Comment: managed  Plan: continue tamsulosin, finasteride.     (R41.89) Cognitive impairment  Comment: mild deficits on cognitive testing  Plan: continue SPEECH THERAPY. Supportive care    (R53.81) Physical deconditioning  Comment: progressing in therapies  Plan: continue PHYSICAL THERAPY/OT. Goal is to return home with services.         Electronically signed by  CHANDRIKA Wynn CNP                      Sincerely,        CHANDRIKA Wynn CNP

## 2018-08-17 NOTE — PROGRESS NOTES
[facility]    Chief Complaint   Patient presents with     RECHECK       [place] Medical Record Number:  [id number]    HPI:    Shamir Concepcion is a 78 year old  (1940), seen for follow up visit today at [place] on [facility]. HPI information obtained from: facility chart records, facility staff, patient report, [facility] chart review and Care Everywhere [facility] chart review.    He came to this facility from [facility] on 8/9/2018 for short term rehab and medical management after hospitalization  at [facility] 7/20/2018 with dizziness, nausea and imbalance. 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 Anticoagulation was started with heparin drip and  transitioned to coumadin with lovenox bridging.   He discharged to [facility] 7/28/2018 where he made improvements in functional status and cognition. Amlodipine was discontinued and losartan increased. Methotrexate has been on hold and resumed at discharge.     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 His brother is here and feels that he's improving. INR 1.7 today, up from 1.5 on coumadin 5 mg daily. No bleeding.   Rheumatoid arthritis, involving unspecified site, unspecified rheumatoid factor presence (H)-denies pain of any type  Essential hypertension-BPs: 113/50, 98/57, 102/62   HR:  62-71  Has noticed dizziness at times, usually with activity. Good appetite and fluid intake.   Benign prostatic hyperplasia without lower urinary tract symptoms-denies urinary symptoms.   Cognitive impairment-SLUMS 18/30, CPT 4.6/5.6  Speech therapist reports mild deficits in attention, memory and executive function. He's very motivated to learn compensatory strategies.   Physical deconditioning-ambulating 250 ft with walker and stand by assist. Requires stand by to min assist with cares.  "    ALLERGIES: Review of patient's allergies indicates no known allergies.  Past Medical, Surgical, Family and Social History reviewed and updated in [facility].    Current Outpatient Prescriptions   Medication Sig Dispense Refill     acetaminophen (TYLENOL) 325 MG tablet Take 2 tablets (650 mg) by mouth every 4 hours as needed for mild pain       Finasteride (PROSCAR PO) Take 5 mg by mouth daily       fluticasone (FLONASE ALLERGY RELIEF) 50 MCG/ACT spray Spray 1 spray into both nostrils daily       folic acid 800 MCG TABS Take 800 mcg by mouth daily       losartan (COZAAR) 50 MG tablet Take 1 tablet (50 mg) by mouth daily       methotrexate 2.5 MG tablet CHEMO Take 5 tablets (12.5 mg) by mouth every 7 days Saturdays       miconazole (MICATIN; MICRO GUARD) 2 % powder Apply topically 2 times daily as needed (groin rash)       predniSONE (DELTASONE) 5 MG tablet Take 1 tablet (5 mg) by mouth daily as needed for rheumatoid arthritis flare.       rosuvastatin (CRESTOR) 20 MG tablet Take 1 tablet (20 mg) by mouth daily       senna-docusate (SENOKOT-S;PERICOLACE) 8.6-50 MG per tablet Take 2 tablets by mouth 2 times daily as needed for constipation       tamsulosin (FLOMAX) 0.4 MG capsule 1 tab twice daily       Warfarin Sodium (COUMADIN PO) Take 5 mg by mouth daily        Medications reviewed:  Medications reconciled to facility chart and changes were made to reflect current medications as identified as above med list. Below are the changes that were made:   Medications stopped since last EPIC medication reconciliation:   There are no discontinued medications.    Medications started since last [facility] medication reconciliation:  No orders of the defined types were placed in this encounter.    REVIEW OF SYSTEMS:  4 point ROS including Respiratory, CV, GI and , other than that noted in the HPI,  is negative    Physical Exam:  /50  Pulse 88  Temp 96.7  F (35.9  C)  Resp 18  Ht 5' 9\" (1.753 m)  Wt 156 lb (70.8 kg)  SpO2 " 95%  BMI 23.04 kg/m2  GENERAL APPEARANCE:  Alert, in no distress  ENT:  Mouth and posterior oropharynx normal, moist mucous membranes, normal hearing acuity  EYES:  EOM normal, conjunctiva and lids normal  NECK:  No adenopathy,masses or thyromegaly  RESP:  respiratory effort and palpation of chest normal, lungs clear to auscultation , no respiratory distress  CV:  Palpation and auscultation of heart done , regular rate and rhythm, no murmur, no edema, +2 pedal pulses  ABDOMEN:  soft, nontender, no hepatosplenomegaly or other masses  M/S:   gait steady with walker. BURKETT with good strength. No joint inflammation  SKIN:  no rashes or open areas  PSYCH:  oriented X 3, memory impaired , affect and mood normal    Recent Labs:     Last Comprehensive Metabolic Panel:  Sodium   Date Value Ref Range Status   08/13/2018 142 133 - 144 mmol/L Final     Potassium   Date Value Ref Range Status   08/13/2018 4.0 3.4 - 5.3 mmol/L Final     Chloride   Date Value Ref Range Status   08/13/2018 107 94 - 109 mmol/L Final     Carbon Dioxide   Date Value Ref Range Status   08/13/2018 30 20 - 32 mmol/L Final     Anion Gap   Date Value Ref Range Status   08/13/2018 5 3 - 14 mmol/L Final     Glucose   Date Value Ref Range Status   08/13/2018 86 70 - 99 mg/dL Final     Urea Nitrogen   Date Value Ref Range Status   08/13/2018 23 7 - 30 mg/dL Final     Creatinine   Date Value Ref Range Status   08/13/2018 1.12 0.66 - 1.25 mg/dL Final     GFR Estimate   Date Value Ref Range Status   08/13/2018 63 >60 mL/min/1.73m2 Final     Calcium   Date Value Ref Range Status   08/13/2018 8.5 8.5 - 10.1 mg/dL Final     Lab Results   Component Value Date    WBC 6.9 08/13/2018     Lab Results   Component Value Date    RBC 4.01 08/13/2018     Lab Results   Component Value Date    HGB 12.6 08/13/2018     Lab Results   Component Value Date    HCT 37.8 08/13/2018     Lab Results   Component Value Date    MCV 94 08/13/2018     Lab Results   Component Value Date    MCH  31.4 08/13/2018     Lab Results   Component Value Date    MCHC 33.3 08/13/2018     Lab Results   Component Value Date    RDW 14.1 08/13/2018     Lab Results   Component Value Date     08/13/2018       ASSESSMENT / PLAN:  (I63.219) 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   Plan: continue coumadin 5 mg daily. INR 8/20/2018. Continue statin.  [facility] 8/23/2018. CT angiogram recommended in 6 mos to evaluate vasculature in his neck.      (M06.9) Rheumatoid arthritis, involving unspecified site, unspecified rheumatoid factor presence (H)  Comment: controlled  Plan: continue methotrexate, folic acid. Prednisone prn flare.     (I10) Essential hypertension  Comment: hypotensive.   Plan: discontinue losartan. Monitor VS    (N40.0) Benign prostatic hyperplasia without lower urinary tract symptoms  Comment: managed  Plan: continue tamsulosin, finasteride.     (R41.89) Cognitive impairment  Comment: mild deficits on cognitive testing  Plan: continue SPEECH THERAPY. Supportive care    (R53.81) Physical deconditioning  Comment: progressing in therapies  Plan: continue PHYSICAL THERAPY/OT. Goal is to return home with services.         Electronically signed by  CHANDRIKA Wynn CNP

## 2018-08-20 ENCOUNTER — TRANSFERRED RECORDS (OUTPATIENT)
Dept: HEALTH INFORMATION MANAGEMENT | Facility: CLINIC | Age: 78
End: 2018-08-20

## 2018-08-20 ENCOUNTER — NURSING HOME VISIT (OUTPATIENT)
Dept: GERIATRICS | Facility: CLINIC | Age: 78
End: 2018-08-20
Payer: COMMERCIAL

## 2018-08-20 VITALS
RESPIRATION RATE: 14 BRPM | HEIGHT: 69 IN | BODY MASS INDEX: 23.11 KG/M2 | WEIGHT: 156 LBS | HEART RATE: 54 BPM | DIASTOLIC BLOOD PRESSURE: 60 MMHG | SYSTOLIC BLOOD PRESSURE: 113 MMHG | OXYGEN SATURATION: 97 % | TEMPERATURE: 97.3 F

## 2018-08-20 DIAGNOSIS — Z79.01 LONG TERM CURRENT USE OF ANTICOAGULANT THERAPY: ICD-10-CM

## 2018-08-20 DIAGNOSIS — Z79.01 ENCOUNTER FOR MONITORING COUMADIN THERAPY: ICD-10-CM

## 2018-08-20 DIAGNOSIS — Z51.81 ENCOUNTER FOR MONITORING COUMADIN THERAPY: ICD-10-CM

## 2018-08-20 DIAGNOSIS — I63.219 CEREBROVASCULAR ACCIDENT (CVA) DUE TO OCCLUSION OF VERTEBRAL ARTERY, UNSPECIFIED BLOOD VESSEL LATERALITY (H): Primary | ICD-10-CM

## 2018-08-20 LAB
ANION GAP SERPL CALCULATED.3IONS-SCNC: 9 MMOL/L (ref 3–14)
BUN SERPL-MCNC: 13 MG/DL (ref 7–30)
CALCIUM SERPL-MCNC: 8.3 MG/DL (ref 8.5–10.1)
CHLORIDE SERPLBLD-SCNC: 107 MMOL/L (ref 94–109)
CO2 SERPL-SCNC: 27 MMOL/L (ref 20–32)
CREAT SERPL-MCNC: 1.18 MG/DL (ref 0.66–1.25)
GFR SERPL CREATININE-BSD FRML MDRD: 60 ML/MIN/1.7M2
GLUCOSE SERPL-MCNC: 85 MG/DL (ref 70–99)
HEMOGLOBIN: 12 G/DL (ref 13.3–17.7)
POTASSIUM SERPL-SCNC: 3.5 MMOL/L (ref 3.4–5.3)
SODIUM SERPL-SCNC: 143 MMOL/L (ref 133–144)

## 2018-08-20 PROCEDURE — 99308 SBSQ NF CARE LOW MDM 20: CPT | Performed by: NURSE PRACTITIONER

## 2018-08-20 NOTE — LETTER
8/20/2018        RE: Shamir Concepcion  [address]  [place] [place] [address]          [facility]    HPI:    Shamir Concepcion is a 78 year old  (1940), who is being seen today for an episodic care visit at [place] [facility].   HPI information obtained from: facility chart records, facility staff, patient report and [facility] chart review. Today's concern is INR/Coumadin management for CVA    Bleeding Signs/Symptoms:  None  Thromboembolic Signs/Symptoms:  None    Medication Changes:  No  Dietary Changes:  No  Activity Changes: No  Bacterial/Viral Infection:  No    Missed Coumadin Doses:  None    On ASA: No    Other Concerns:  No    OBJECTIVE:    INR Today:  2.1  Current Dose:  5 mg daily    ASSESSMENT:     Cerebrovascular accident (CVA) due to occlusion of vertebral artery, unspecified blood vessel laterality (H)  Long term current use of anticoagulant therapy  Encounter for monitoring coumadin therapy  Therapeutic INR for goal of 2-3    PLAN:    New Dose: No Change      Next INR: 8/22/2018        Electronically signed by:  CHANDRIKA Wynn CNP            Sincerely,        CHANDRIKA Wynn CNP

## 2018-08-20 NOTE — PROGRESS NOTES
[place] [facility]    HPI:    Shamir Concepcion is a 78 year old  (1940), who is being seen today for an episodic care visit at [facility].   HPI information obtained from: facility chart records, facility staff, patient report and [facility] chart review. Today's concern is INR/Coumadin management for CVA    Bleeding Signs/Symptoms:  None  Thromboembolic Signs/Symptoms:  None    Medication Changes:  No  Dietary Changes:  No  Activity Changes: No  Bacterial/Viral Infection:  No    Missed Coumadin Doses:  None    On ASA: No    Other Concerns:  No    OBJECTIVE:    INR Today:  2.1  Current Dose:  5 mg daily    ASSESSMENT:     Cerebrovascular accident (CVA) due to occlusion of vertebral artery, unspecified blood vessel laterality (H)  Long term current use of anticoagulant therapy  Encounter for monitoring coumadin therapy  Therapeutic INR for goal of 2-3    PLAN:    New Dose: No Change      Next INR: 8/22/2018        Electronically signed by:  CHANDRIKA Wynn CNP

## 2018-08-22 ENCOUNTER — NURSING HOME VISIT (OUTPATIENT)
Dept: GERIATRICS | Facility: CLINIC | Age: 78
End: 2018-08-22
Payer: COMMERCIAL

## 2018-08-22 VITALS
DIASTOLIC BLOOD PRESSURE: 64 MMHG | TEMPERATURE: 97.4 F | WEIGHT: 162 LBS | OXYGEN SATURATION: 95 % | BODY MASS INDEX: 23.99 KG/M2 | SYSTOLIC BLOOD PRESSURE: 113 MMHG | HEIGHT: 69 IN | RESPIRATION RATE: 18 BRPM | HEART RATE: 69 BPM

## 2018-08-22 DIAGNOSIS — I10 ESSENTIAL HYPERTENSION: ICD-10-CM

## 2018-08-22 DIAGNOSIS — R53.81 PHYSICAL DECONDITIONING: ICD-10-CM

## 2018-08-22 DIAGNOSIS — I63.219 CEREBROVASCULAR ACCIDENT (CVA) DUE TO OCCLUSION OF VERTEBRAL ARTERY, UNSPECIFIED BLOOD VESSEL LATERALITY (H): Primary | ICD-10-CM

## 2018-08-22 DIAGNOSIS — N40.0 BENIGN PROSTATIC HYPERPLASIA WITHOUT LOWER URINARY TRACT SYMPTOMS: ICD-10-CM

## 2018-08-22 DIAGNOSIS — M06.9 RHEUMATOID ARTHRITIS, INVOLVING UNSPECIFIED SITE, UNSPECIFIED RHEUMATOID FACTOR PRESENCE: ICD-10-CM

## 2018-08-22 DIAGNOSIS — R41.89 COGNITIVE IMPAIRMENT: ICD-10-CM

## 2018-08-22 PROCEDURE — 99309 SBSQ NF CARE MODERATE MDM 30: CPT | Performed by: NURSE PRACTITIONER

## 2018-08-22 NOTE — LETTER
"    8/22/2018        RE: Shamir Concepcion  [address]  [place] [address]        [facility]    Chief Complaint   Patient presents with     RECHECK       [place] Medical Record Number:  [id number]    HPI:    Shamir Concepcion is a 78 year old  (1940), who is being seen today for an episodic care visit at [place] on [facility].  HPI information obtained from: facility chart records, facility staff, patient report, [facility] chart review and Care Everywhere [facility] chart review.    He came to this facility from [facility] on 8/9/2018 for short term rehab and medical management after hospitalization  at [facility] 7/20/2018 with dizziness, nausea and imbalance. 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 Anticoagulation was started with heparin drip and  transitioned to coumadin with lovenox bridging.   He discharged to [facility] 7/28/2018 where he made improvements in functional status and cognition. Amlodipine was discontinued and losartan increased. Methotrexate has been on hold and resumed at discharge.     Current issues are:     Cerebrovascular accident (CVA) due to occlusion of vertebral artery, unspecified blood vessel laterality (H)-reports feeling better and thinks his cognition has improved. Reports balance is \"still a problem.\" No falls. INR 2.4 today on coumadin 5 mg daily.   Rheumatoid arthritis, involving unspecified site, unspecified rheumatoid factor presence (H)-denies pain of any type  Essential hypertension-losartan discontinued due to low BPs and dizziness. Reports dizziness has improved. BPs:  113/64, 121/63, 128/64   HR: 60-71  Benign prostatic hyperplasia without lower urinary tract symptoms-denies urinary symptoms.   Cognitive impairment-SLUMS 18/30, CPT 4.6/5.6  Speech therapist reports mild deficits in attention, memory and executive function. He continues to ask about driving and understands he is " not safe to drive. Family also aware that no driving is recommended.   Physical deconditioning-ambulating 250 ft with walker and stand by assist. Requires stand by to min assist with cares.     ALLERGIES: Review of patient's allergies indicates no known allergies.  Past Medical, Surgical, Family and Social History reviewed and updated in [facility].    Current Outpatient Prescriptions   Medication Sig Dispense Refill     acetaminophen (TYLENOL) 325 MG tablet Take 2 tablets (650 mg) by mouth every 4 hours as needed for mild pain       Finasteride (PROSCAR PO) Take 5 mg by mouth daily       fluticasone (FLONASE ALLERGY RELIEF) 50 MCG/ACT spray Spray 1 spray into both nostrils daily       folic acid 800 MCG TABS Take 800 mcg by mouth daily       methotrexate 2.5 MG tablet CHEMO Take 5 tablets (12.5 mg) by mouth every 7 days Saturdays       miconazole (MICATIN; MICRO GUARD) 2 % powder Apply topically 2 times daily as needed (groin rash)       predniSONE (DELTASONE) 5 MG tablet Take 1 tablet (5 mg) by mouth daily as needed for rheumatoid arthritis flare.       rosuvastatin (CRESTOR) 20 MG tablet Take 1 tablet (20 mg) by mouth daily       senna-docusate (SENOKOT-S;PERICOLACE) 8.6-50 MG per tablet Take 2 tablets by mouth 2 times daily as needed for constipation       tamsulosin (FLOMAX) 0.4 MG capsule 1 tab twice daily       Warfarin Sodium (COUMADIN PO) Take 5 mg by mouth daily        Medications reviewed:  Medications reconciled to facility chart and changes were made to reflect current medications as identified as above med list. Below are the changes that were made:   Medications stopped since last EPIC medication reconciliation:   There are no discontinued medications.    Medications started since last [facility] medication reconciliation:  No orders of the defined types were placed in this encounter.    REVIEW OF SYSTEMS:  4 point ROS including Respiratory, CV, GI and , other than that noted in the HPI,  is  "negative    Physical Exam:  /64  Pulse 69  Temp 97.4  F (36.3  C)  Resp 18  Ht 5' 9\" (1.753 m)  Wt 162 lb (73.5 kg)  SpO2 95%  BMI 23.92 kg/m2  GENERAL APPEARANCE:  Alert, in no distress  ENT:  Mouth and posterior oropharynx normal, moist mucous membranes, normal hearing acuity  EYES:  EOM normal, conjunctiva and lids normal  NECK:  No adenopathy,masses or thyromegaly  RESP:  respiratory effort and palpation of chest normal, lungs clear to auscultation , no respiratory distress  CV:  Palpation and auscultation of heart done , regular rate and rhythm, no murmur, no edema, +2 pedal pulses  ABDOMEN:  soft, nontender, no hepatosplenomegaly or other masses  M/S:   gait steady with walker. BURKETT with good strength. No joint inflammation  SKIN:  no rashes or open areas  PSYCH:  oriented X 3, memory impaired , affect and mood normal    Recent Labs:   Last Comprehensive Metabolic Panel:  Sodium   Date Value Ref Range Status   08/20/2018 143 133 - 144 mmol/L Final     Potassium   Date Value Ref Range Status   08/20/2018 3.5 3.4 - 5.3 mmol/L Final     Chloride   Date Value Ref Range Status   08/20/2018 107 94 - 109 mmol/L Final     Carbon Dioxide   Date Value Ref Range Status   08/20/2018 27 20 - 32 mmol/L Final     Anion Gap   Date Value Ref Range Status   08/20/2018 9 3 - 14 mmol/L Final     Glucose   Date Value Ref Range Status   08/20/2018 85 70 - 99 mg/dL Final     Urea Nitrogen   Date Value Ref Range Status   08/20/2018 13 7 - 30 mg/dL Final     Creatinine   Date Value Ref Range Status   08/20/2018 1.18 0.66 - 1.25 mg/dL Final     GFR Estimate   Date Value Ref Range Status   08/20/2018 60 >60 mL/min/1.7m2 Final     Calcium   Date Value Ref Range Status   08/20/2018 8.3 (A) 8.5 - 10.1 mg/dL Final     Lab Results   Component Value Date    WBC 6.9 08/13/2018     Lab Results   Component Value Date    RBC 4.01 08/13/2018     Lab Results   Component Value Date    HGB 12.0 08/20/2018     Lab Results   Component Value " Date    HCT 37.8 08/13/2018     Lab Results   Component Value Date    MCV 94 08/13/2018     Lab Results   Component Value Date    MCH 31.4 08/13/2018     Lab Results   Component Value Date    MCHC 33.3 08/13/2018     Lab Results   Component Value Date    RDW 14.1 08/13/2018     Lab Results   Component Value Date     08/13/2018     ASSESSMENT / PLAN:  (I63.219) Cerebrovascular accident (CVA) due to occlusion of vertebral artery, unspecified blood vessel laterality (H)  (primary encounter diagnosis)  Comment: INR therapeutic. Balance remains the main problem.   Plan: continue coumadin 5 mg daily. INR 8/24/2018. Continue statin.  [facility] 8/23/2018. CT angiogram recommended in 6 mos to evaluate vasculature in his neck.     (M06.9) Rheumatoid arthritis, involving unspecified site, unspecified rheumatoid factor presence (H)  Comment: controlled  Plan: continue methotrexate, folic acid. Prednisone prn flare.     (I10) Essential hypertension  Comment: BPs improved off losartan  Plan: monitor VS    (N40.0) Benign prostatic hyperplasia without lower urinary tract symptoms  Comment: chronic  Plan: continue tamsulosin, finasteride.     (R41.89) Cognitive impairment  Comment: mild deficits on cognitive testing. He continues to ask about driving, but it's doubtful he could pass a drivers test.   Plan: supportive care.  to provide family with info re: driving course at [facility]. Continue SPEECH THERAPY.     (R53.81) Physical deconditioning  Comment: progressing in therapies  Plan: continue PT/OT. Goal is to return home with services.           Electronically signed by  Mike COBOS CNP                      Sincerely,        CHANDRIKA Wynn CNP

## 2018-08-22 NOTE — PROGRESS NOTES
"[facility]    Chief Complaint   Patient presents with     YANET       [place] Medical Record Number:  [id number]    HPI:    Shamir Concepcion is a 78 year old  (1940), who is being seen today for an episodic care visit at [place] [facility].  HPI information obtained from: facility chart records, facility staff, patient report, [facility] chart review and Care Everywhere [facility] chart review.    He came to this facility from [facility] on 8/9/2018 for short term rehab and medical management after hospitalization  at [facility] 7/20/2018 with dizziness, nausea and imbalance. 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 Anticoagulation was started with heparin drip and  transitioned to coumadin with lovenox bridging.   He discharged to [facility]hab 7/28/2018 where he made improvements in functional status and cognition. Amlodipine was discontinued and losartan increased. Methotrexate has been on hold and resumed at discharge.     Current issues are:     Cerebrovascular accident (CVA) due to occlusion of vertebral artery, unspecified blood vessel laterality (H)-reports feeling better and thinks his cognition has improved. Reports balance is \"still a problem.\" No falls. INR 2.4 today on coumadin 5 mg daily.   Rheumatoid arthritis, involving unspecified site, unspecified rheumatoid factor presence (H)-denies pain of any type  Essential hypertension-losartan discontinued due to low BPs and dizziness. Reports dizziness has improved. BPs:  113/64, 121/63, 128/64   HR: 60-71  Benign prostatic hyperplasia without lower urinary tract symptoms-denies urinary symptoms.   Cognitive impairment-SLUMS 18/30, CPT 4.6/5.6  Speech therapist reports mild deficits in attention, memory and executive function. He continues to ask about driving and understands he is not safe to drive. Family also aware that no driving is recommended.   Physical " "deconditioning-ambulating 250 ft with walker and stand by assist. Requires stand by to min assist with cares.     ALLERGIES: Review of patient's allergies indicates no known allergies.  Past Medical, Surgical, Family and Social History reviewed and updated in [facility].    Current Outpatient Prescriptions   Medication Sig Dispense Refill     acetaminophen (TYLENOL) 325 MG tablet Take 2 tablets (650 mg) by mouth every 4 hours as needed for mild pain       Finasteride (PROSCAR PO) Take 5 mg by mouth daily       fluticasone (FLONASE ALLERGY RELIEF) 50 MCG/ACT spray Spray 1 spray into both nostrils daily       folic acid 800 MCG TABS Take 800 mcg by mouth daily       methotrexate 2.5 MG tablet CHEMO Take 5 tablets (12.5 mg) by mouth every 7 days Saturdays       miconazole (MICATIN; MICRO GUARD) 2 % powder Apply topically 2 times daily as needed (groin rash)       predniSONE (DELTASONE) 5 MG tablet Take 1 tablet (5 mg) by mouth daily as needed for rheumatoid arthritis flare.       rosuvastatin (CRESTOR) 20 MG tablet Take 1 tablet (20 mg) by mouth daily       senna-docusate (SENOKOT-S;PERICOLACE) 8.6-50 MG per tablet Take 2 tablets by mouth 2 times daily as needed for constipation       tamsulosin (FLOMAX) 0.4 MG capsule 1 tab twice daily       Warfarin Sodium (COUMADIN PO) Take 5 mg by mouth daily        Medications reviewed:  Medications reconciled to facility chart and changes were made to reflect current medications as identified as above med list. Below are the changes that were made:   Medications stopped since last EPIC medication reconciliation:   There are no discontinued medications.    Medications started since last [facility] medication reconciliation:  No orders of the defined types were placed in this encounter.    REVIEW OF SYSTEMS:  4 point ROS including Respiratory, CV, GI and , other than that noted in the HPI,  is negative    Physical Exam:  /64  Pulse 69  Temp 97.4  F (36.3  C)  Resp 18  Ht 5' 9\" " (1.753 m)  Wt 162 lb (73.5 kg)  SpO2 95%  BMI 23.92 kg/m2  GENERAL APPEARANCE:  Alert, in no distress  ENT:  Mouth and posterior oropharynx normal, moist mucous membranes, normal hearing acuity  EYES:  EOM normal, conjunctiva and lids normal  NECK:  No adenopathy,masses or thyromegaly  RESP:  respiratory effort and palpation of chest normal, lungs clear to auscultation , no respiratory distress  CV:  Palpation and auscultation of heart done , regular rate and rhythm, no murmur, no edema, +2 pedal pulses  ABDOMEN:  soft, nontender, no hepatosplenomegaly or other masses  M/S:   gait steady with walker. BURKETT with good strength. No joint inflammation  SKIN:  no rashes or open areas  PSYCH:  oriented X 3, memory impaired , affect and mood normal    Recent Labs:   Last Comprehensive Metabolic Panel:  Sodium   Date Value Ref Range Status   08/20/2018 143 133 - 144 mmol/L Final     Potassium   Date Value Ref Range Status   08/20/2018 3.5 3.4 - 5.3 mmol/L Final     Chloride   Date Value Ref Range Status   08/20/2018 107 94 - 109 mmol/L Final     Carbon Dioxide   Date Value Ref Range Status   08/20/2018 27 20 - 32 mmol/L Final     Anion Gap   Date Value Ref Range Status   08/20/2018 9 3 - 14 mmol/L Final     Glucose   Date Value Ref Range Status   08/20/2018 85 70 - 99 mg/dL Final     Urea Nitrogen   Date Value Ref Range Status   08/20/2018 13 7 - 30 mg/dL Final     Creatinine   Date Value Ref Range Status   08/20/2018 1.18 0.66 - 1.25 mg/dL Final     GFR Estimate   Date Value Ref Range Status   08/20/2018 60 >60 mL/min/1.7m2 Final     Calcium   Date Value Ref Range Status   08/20/2018 8.3 (A) 8.5 - 10.1 mg/dL Final     Lab Results   Component Value Date    WBC 6.9 08/13/2018     Lab Results   Component Value Date    RBC 4.01 08/13/2018     Lab Results   Component Value Date    HGB 12.0 08/20/2018     Lab Results   Component Value Date    HCT 37.8 08/13/2018     Lab Results   Component Value Date    MCV 94 08/13/2018      Lab Results   Component Value Date    MCH 31.4 08/13/2018     Lab Results   Component Value Date    MCHC 33.3 08/13/2018     Lab Results   Component Value Date    RDW 14.1 08/13/2018     Lab Results   Component Value Date     08/13/2018     ASSESSMENT / PLAN:  (I63.219) Cerebrovascular accident (CVA) due to occlusion of vertebral artery, unspecified blood vessel laterality (H)  (primary encounter diagnosis)  Comment: INR therapeutic. Balance remains the main problem.   Plan: continue coumadin 5 mg daily. INR 8/24/2018. Continue statin.  Follow up [facility] with CT 8/23/2018. CT angiogram recommended in 6 mos to evaluate vasculature in his neck.     (M06.9) Rheumatoid arthritis, involving unspecified site, unspecified rheumatoid factor presence (H)  Comment: controlled  Plan: continue methotrexate, folic acid. Prednisone prn flare.     (I10) Essential hypertension  Comment: BPs improved off losartan  Plan: monitor VS    (N40.0) Benign prostatic hyperplasia without lower urinary tract symptoms  Comment: chronic  Plan: continue tamsulosin, finasteride.     (R41.89) Cognitive impairment  Comment: mild deficits on cognitive testing. He continues to ask about driving, but it's doubtful he could pass a drivers test.   Plan: supportive care.  to provide family with info [facility]. Continue SPEECH THERAPY.     (R53.81) Physical deconditioning  Comment: progressing in therapies  Plan: continue PT/OT. Goal is to return home with services.           Electronically signed by  Mike COBOS CNP

## 2018-08-23 ENCOUNTER — OFFICE VISIT (OUTPATIENT)
Dept: NEUROSURGERY | Facility: CLINIC | Age: 78
End: 2018-08-23
Attending: PHYSICIAN ASSISTANT
Payer: COMMERCIAL

## 2018-08-23 ENCOUNTER — HOSPITAL ENCOUNTER (OUTPATIENT)
Dept: CT IMAGING | Facility: CLINIC | Age: 78
Discharge: HOME | End: 2018-08-23
Attending: PHYSICIAN ASSISTANT | Admitting: PHYSICIAN ASSISTANT
Payer: COMMERCIAL

## 2018-08-23 DIAGNOSIS — I63.9 ACUTE ISCHEMIC STROKE (H): ICD-10-CM

## 2018-08-23 DIAGNOSIS — I63.9 CEREBELLAR INFARCT (H): Primary | ICD-10-CM

## 2018-08-23 PROCEDURE — 70450 CT HEAD/BRAIN W/O DYE: CPT

## 2018-08-23 PROCEDURE — 99213 OFFICE O/P EST LOW 20 MIN: CPT | Performed by: PHYSICIAN ASSISTANT

## 2018-08-23 PROCEDURE — G0463 HOSPITAL OUTPT CLINIC VISIT: HCPCS

## 2018-08-23 NOTE — MR AVS SNAPSHOT
After Visit Summary   8/23/2018    Shamir Concepcion    MRN: [id number]           Patient Information     Date Of Birth          1940        Visit Information        Provider Department      8/23/2018 2:30 PM Saman Lucas PA-C [facility]        Today's Diagnoses     Cerebellar infarct (H)    -  1       Follow-ups after your visit        Additional Services     NEUROLOGY ADULT REFERRAL       Your provider has referred you for the following:   Consult at [facility]: [facility] [phone]   [email or web address]    Please be aware that coverage of these services is subject to the terms and limitations of your health insurance plan.  Call member services at your health plan with any benefit or coverage questions.      Please bring the following with you to your appointment:    (1) Any X-Rays, CTs or MRIs which have been performed.  Contact the facility where they were done to arrange for  prior to your scheduled appointment.    (2) List of current medications  (3) This referral request   (4) Any documents/labs given to you for this referral                  Who to contact     If you have questions or need follow up information about today's clinic visit or your schedule please contact [facility]UROSURGERY [facility] directly at [phone].  Normal or non-critical lab and imaging results will be communicated to you by MyChart, letter or phone within 4 business days after the clinic has received the results. If you do not hear from us within 7 days, please contact the clinic through MyChart or phone. If you have a critical or abnormal lab result, we will notify you by phone as soon as possible.  Submit refill requests through [employer] or call your pharmacy and they will forward the refill request to us. Please allow 3 business days for your refill to be completed.          Additional Information About Your Visit        MyChart  "Information     [employer] lets you send messages to your doctor, view your test results, renew your prescriptions, schedule appointments and more. To sign up, go to [email or web address] . Click on \"Log in\" on the left side of the screen, which will take you to the Welcome page. Then click on \"Sign up Now\" on the right side of the page.     You will be asked to enter the access code listed below, as well as some personal information. Please follow the directions to create your username and password.     Your access code is: [email or web address]  Expires: 10/26/2018 11:32 AM     Your access code will  in 90 days. If you need help or a new code, please call your [facility] clinic or [phone].        Care EveryWhere ID     This is your Care EveryWhere ID. This could be used by other organizations to access your [facility] medical records  [id number]         Blood Pressure from Last 3 Encounters:   18 113/64   18 113/60   18 113/50    Weight from Last 3 Encounters:   18 162 lb (73.5 kg)   18 156 lb (70.8 kg)   18 156 lb (70.8 kg)              We Performed the Following     NEUROLOGY ADULT REFERRAL        Primary Care Provider Fax #    Physician No Ref-Primary [phone]       No address on file        Equal Access to Services     [facility] : Hadii florentino ku hadasho Sokwasi, waaxda luqadaha, qaybta kaalmada juany, burke bearden . So [facility] [phone].    ATENCIÓN: Si habla español, tiene a woods disposición servicios gratuitos de asistencia lingüística. Baltazar al [phone].    We comply with applicable federal civil rights laws and [place] laws. We do not discriminate on the basis of race, color, national origin, age, disability, sex, sexual orientation, or gender identity.            Thank you!     Thank you for choosing [facility] NEUROSURGERY [facility]  for your care. Our goal is always to provide you with excellent care. Hearing back from our " patients is one way we can continue to improve our services. Please take a few minutes to complete the written survey that you may receive in the mail after your visit with us. Thank you!             Your Updated Medication List - Protect others around you: Learn how to safely use, store and throw away your medicines at www.disposemymeds.org.          This list is accurate as of 8/23/18  3:52 PM.  Always use your most recent med list.                   Brand Name Dispense Instructions for use Diagnosis    acetaminophen 325 MG tablet    TYLENOL     Take 2 tablets (650 mg) by mouth every 4 hours as needed for mild pain    Pain, Rheumatoid arthritis involving both hands, unspecified rheumatoid factor presence (H)       COUMADIN PO      Take 5 mg by mouth daily        FLONASE ALLERGY RELIEF 50 MCG/ACT spray   Generic drug:  fluticasone      Spray 1 spray into both nostrils daily        folic acid 800 MCG Tabs      Take 800 mcg by mouth daily    Rheumatoid arthritis involving both hands, unspecified rheumatoid factor presence (H)       methotrexate 2.5 MG tablet CHEMO      Take 5 tablets (12.5 mg) by mouth every 7 days Saturdays    Rheumatoid arthritis involving both hands, unspecified rheumatoid factor presence (H)       miconazole 2 % powder    MICATIN; MICRO GUARD     Apply topically 2 times daily as needed (groin rash)    Fungal infection of the groin       predniSONE 5 MG tablet    DELTASONE     Take 1 tablet (5 mg) by mouth daily as needed for rheumatoid arthritis flare.    Rheumatoid arthritis involving both hands, unspecified rheumatoid factor presence (H)       PROSCAR PO      Take 5 mg by mouth daily        rosuvastatin 20 MG tablet    CRESTOR     Take 1 tablet (20 mg) by mouth daily    Acute ischemic stroke (H)       senna-docusate 8.6-50 MG per tablet    SENOKOT-S;PERICOLACE     Take 2 tablets by mouth 2 times daily as needed for constipation    Constipation, unspecified constipation type       tamsulosin 0.4  MG capsule    FLOMAX     1 tab twice daily

## 2018-08-23 NOTE — PROGRESS NOTES
[facility]  Neurosurgery:    HPI: Shamir Concepcion is a 78 year old male who presents for follow up in regards to follow-up for a stroke.  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  He is in today with a new CT scan.  He states that he has been working with physical therapy, focusing on balance techniques.  He feels he is not at his baseline, but has been improving.  No headaches or visual changes.  He has no other concerns.  He states that his PCP did recently retire, so he is scheduling with a new primary care provider, which is a good idea for him as he does have multiple medical issues and is also on warfarin.  I encouraged him to follow-up with a local neurologist as well.    The patient denies any changes in bowel or bladder function, or any new problems with balance or coordination.     ROS negative for fever, chills, chest pain or shortness of breath.     Exam:  There were no vitals taken for this visit.  Constitutional:  Alert, well nourished, NAD.  HEENT: Normocephalic, atraumatic.   Pulm:  Without shortness of breath, normal effort.   CV:  No pitting edema of BLE.   Skin: No rashes or lesions.     Neurological:  Awake  Alert  Oriented x 3  CN II-XII grossly intact.     Motor exam:     Shoulder Abduction:  Right:  5/5    Left:  5/5  Biceps:                      Right:  5/5    Left:  5/5  Triceps:                     Right:  5/5    Left:  5/5  Wrist Extensors:       Right:  5/5    Left:  5/5  Wrist Flexors:           Right:  5/5    Left:  5/5  Intrinsics:                  Right:  5/5    Left:  5/5     Hip Flexor:                Right: 5/5  Left:  5/5  Hip Adductor:             Right:  5/5  Left:  5/5  Hip Abductor:             Right:  5/5  Left:  5/5  Gastroc Soleus:        Right:  5/5  Left:  5/5  Tib/Ant:                      Right:  5/5  Left:  5/5  EHL:                     Right:  5/5  Left:  5/5     Able to spontaneously move U/E bilaterally  Sensation intact  throughout all U/E dermatomes      Imaging:   IMPRESSION:   1. Evolving cerebellar infarcts. These are smaller than on 7/26/2018.  No mass effect. No hydrocephalus.  2. No bleed or mass effect.  3. There is generalized atrophy of the brain.  White matter changes  are present in the cerebral hemispheres that are consistent with small  vessel ischemic disease in this age patient.    A/P:   1) status post cerebellar infarcts.    He is continuing to improve.  CT scan is improved as well.  He will continue working with physical therapy on his balance.  I did encourage him again to see and establish care with a primary care provider, as well as a neurologist, for which I will place a referral.  He voiced agreement and understanding.  We will see him back on an as-needed basis.    The patient voiced agreement and understanding of the plan of care. All questions were answered today.         Saman Lucas PA-C  [facility]  [address] [address]    Tel [phone]

## 2018-08-23 NOTE — LETTER
8/23/2018         RE: Shamir Concepcion  [address]  [place] [address]        Dear Colleague,    Thank you for referring your patient, Shamir Concepcion, to the [facility]IC. Please see a copy of my visit note below.    [facility]  Neurosurgery:    HPI: Shamir Concepcion is a 78 year old male who presents for follow up in regards to follow-up for a stroke.  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  He is in today with a new CT scan.  He states that he has been working with physical therapy, focusing on balance techniques.  He feels he is not at his baseline, but has been improving.  No headaches or visual changes.  He has no other concerns.  He states that his PCP did recently retire, so he is scheduling with a new primary care provider, which is a good idea for him as he does have multiple medical issues and is also on warfarin.  I encouraged him to follow-up with a local neurologist as well.    The patient denies any changes in bowel or bladder function, or any new problems with balance or coordination.     ROS negative for fever, chills, chest pain or shortness of breath.     Exam:  There were no vitals taken for this visit.  Constitutional:  Alert, well nourished, NAD.  HEENT: Normocephalic, atraumatic.   Pulm:  Without shortness of breath, normal effort.   CV:  No pitting edema of BLE.   Skin: No rashes or lesions.     Neurological:  Awake  Alert  Oriented x 3  CN II-XII grossly intact.     Motor exam:     Shoulder Abduction:  Right:  5/5    Left:  5/5  Biceps:                      Right:  5/5    Left:  5/5  Triceps:                     Right:  5/5    Left:  5/5  Wrist Extensors:       Right:  5/5    Left:  5/5  Wrist Flexors:           Right:  5/5    Left:  5/5  Intrinsics:                  Right:  5/5    Left:  5/5     Hip Flexor:                Right: 5/5  Left:  5/5  Hip Adductor:             Right:  5/5  Left:  5/5  Hip  Abductor:             Right:  5/5  Left:  5/5  Gastroc Soleus:        Right:  5/5  Left:  5/5  Tib/Ant:                      Right:  5/5  Left:  5/5  EHL:                     Right:  5/5  Left:  5/5     Able to spontaneously move U/E bilaterally  Sensation intact throughout all U/E dermatomes      Imaging:   IMPRESSION:   1. Evolving cerebellar infarcts. These are smaller than on 7/26/2018.  No mass effect. No hydrocephalus.  2. No bleed or mass effect.  3. There is generalized atrophy of the brain.  White matter changes  are present in the cerebral hemispheres that are consistent with small  vessel ischemic disease in this age patient.    A/P:   1) status post cerebellar infarcts.    He is continuing to improve.  CT scan is improved as well.  He will continue working with physical therapy on his balance.  I did encourage him again to see and establish care with a primary care provider, as well as a neurologist, for which I will place a referral.  He voiced agreement and understanding.  We will see him back on an as-needed basis.    The patient voiced agreement and understanding of the plan of care. All questions were answered today.         Saman Lucas PA-C  [facility]  [address] [address]    Tel [phone]      Again, thank you for allowing me to participate in the care of your patient.        Sincerely,        Saman Lucas PA-C

## 2018-08-24 ENCOUNTER — NURSING HOME VISIT (OUTPATIENT)
Dept: GERIATRICS | Facility: CLINIC | Age: 78
End: 2018-08-24
Payer: COMMERCIAL

## 2018-08-24 VITALS
SYSTOLIC BLOOD PRESSURE: 144 MMHG | WEIGHT: 162 LBS | HEART RATE: 63 BPM | BODY MASS INDEX: 23.99 KG/M2 | OXYGEN SATURATION: 96 % | TEMPERATURE: 96.9 F | DIASTOLIC BLOOD PRESSURE: 80 MMHG | RESPIRATION RATE: 16 BRPM | HEIGHT: 69 IN

## 2018-08-24 DIAGNOSIS — Z79.01 ENCOUNTER FOR MONITORING COUMADIN THERAPY: ICD-10-CM

## 2018-08-24 DIAGNOSIS — Z51.81 ENCOUNTER FOR MONITORING COUMADIN THERAPY: ICD-10-CM

## 2018-08-24 DIAGNOSIS — Z79.01 LONG TERM CURRENT USE OF ANTICOAGULANT THERAPY: ICD-10-CM

## 2018-08-24 DIAGNOSIS — I63.219 CEREBROVASCULAR ACCIDENT (CVA) DUE TO OCCLUSION OF VERTEBRAL ARTERY, UNSPECIFIED BLOOD VESSEL LATERALITY (H): Primary | ICD-10-CM

## 2018-08-24 PROCEDURE — 99308 SBSQ NF CARE LOW MDM 20: CPT | Performed by: NURSE PRACTITIONER

## 2018-08-24 NOTE — LETTER
"    8/24/2018        RE: Shamir Concepcion  [address]  [place] [address]          [facility]    HPI:    Shamir Concepcion is a 78 year old  (1940), who is being seen today for an episodic care visit at [place] on [facility].   HPI information obtained from: facility chart records, facility staff, patient report and [facility] chart review. Today's concern is INR/Coumadin management for CVA    Bleeding Signs/Symptoms:  None  Thromboembolic Signs/Symptoms:  None    Medication Changes:  No  Dietary Changes:  Yes: now at TCU  Activity Changes: Yes: working in rehab  Bacterial/Viral Infection:  No    Missed Coumadin Doses:  None    On ASA: No    Other Concerns:  No    OBJECTIVE:  /80  Pulse 63  Temp 96.9  F (36.1  C)  Resp 16  Ht 5' 9\" (1.753 m)  Wt 162 lb (73.5 kg)  SpO2 96%  BMI 23.92 kg/m2   Awake male, working in therapies. On room air, no distress  INR Today:  2.2  Current Dose:  5mg every day for the past week     ASSESSMENT:  1. 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 Long term current use of anticoagulant therapy    3. Encounter for monitoring coumadin therapy    chronic, stable.   Therapeutic INR for goal of 2-3    PLAN:    New Dose: 5 mg PO daily      Next INR: 1 week    Electronically signed by:  CHANDRIKA Mcnamara CNP            Sincerely,        CHANDRIKA Mcnamara CNP    "

## 2018-08-24 NOTE — PROGRESS NOTES
"  [place] [facility]    HPI:    Shamir Concepcion is a 78 year old  (1940), who is being seen today for an episodic care visit at [place] on [facility].   HPI information obtained from: facility chart records, facility staff, patient report and [facility] chart review. Today's concern is INR/Coumadin management for CVA    Bleeding Signs/Symptoms:  None  Thromboembolic Signs/Symptoms:  None    Medication Changes:  No  Dietary Changes:  Yes: now at TCU  Activity Changes: Yes: working in rehab  Bacterial/Viral Infection:  No    Missed Coumadin Doses:  None    On ASA: No    Other Concerns:  No    OBJECTIVE:  /80  Pulse 63  Temp 96.9  F (36.1  C)  Resp 16  Ht 5' 9\" (1.753 m)  Wt 162 lb (73.5 kg)  SpO2 96%  BMI 23.92 kg/m2   Awake male, working in therapies. On room air, no distress  INR Today:  2.2  Current Dose:  5mg every day for the past week     ASSESSMENT:  1. 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 Long term current use of anticoagulant therapy    3. Encounter for monitoring coumadin therapy    chronic, stable.   Therapeutic INR for goal of 2-3    PLAN:    New Dose: 5 mg PO daily      Next INR: 1 week    Electronically signed by:  CHANDRIKA Mcnamara CNP        "

## 2018-08-29 ENCOUNTER — NURSING HOME VISIT (OUTPATIENT)
Dept: GERIATRICS | Facility: CLINIC | Age: 78
End: 2018-08-29
Payer: COMMERCIAL

## 2018-08-29 VITALS
BODY MASS INDEX: 23.99 KG/M2 | SYSTOLIC BLOOD PRESSURE: 124 MMHG | WEIGHT: 162 LBS | DIASTOLIC BLOOD PRESSURE: 70 MMHG | RESPIRATION RATE: 14 BRPM | OXYGEN SATURATION: 94 % | HEART RATE: 65 BPM | TEMPERATURE: 97.5 F | HEIGHT: 69 IN

## 2018-08-29 DIAGNOSIS — Z51.81 ENCOUNTER FOR MONITORING COUMADIN THERAPY: ICD-10-CM

## 2018-08-29 DIAGNOSIS — I63.219 CEREBROVASCULAR ACCIDENT (CVA) DUE TO OCCLUSION OF VERTEBRAL ARTERY, UNSPECIFIED BLOOD VESSEL LATERALITY (H): Primary | ICD-10-CM

## 2018-08-29 DIAGNOSIS — Z79.01 ENCOUNTER FOR MONITORING COUMADIN THERAPY: ICD-10-CM

## 2018-08-29 DIAGNOSIS — Z79.01 LONG TERM CURRENT USE OF ANTICOAGULANT THERAPY: ICD-10-CM

## 2018-08-29 PROCEDURE — 99308 SBSQ NF CARE LOW MDM 20: CPT | Performed by: NURSE PRACTITIONER

## 2018-08-29 NOTE — LETTER
8/29/2018        RE: Shamir Concepcion  [address]  [facility] [address]          [place] [facility]    HPI:    Shamir Concepcion is a 78 year old  (1940), who is being seen today for an episodic care [facility] [place] [facility]   HPI information obtained from: facility chart records, facility staff, patient report and [facility] chart review. 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

## 2018-08-29 NOTE — PROGRESS NOTES
[place] [facility]    HPI:    Shamir Concepcion is a 78 year old  (1940), who is being seen today for an episodic care visit at [facility].   HPI information obtained from: facility chart records, facility staff, patient report and [facility] chart review. 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

## 2018-08-31 ENCOUNTER — NURSING HOME VISIT (OUTPATIENT)
Dept: GERIATRICS | Facility: CLINIC | Age: 78
End: 2018-08-31
Payer: COMMERCIAL

## 2018-08-31 VITALS
SYSTOLIC BLOOD PRESSURE: 119 MMHG | BODY MASS INDEX: 23.99 KG/M2 | TEMPERATURE: 96.2 F | RESPIRATION RATE: 18 BRPM | HEART RATE: 72 BPM | HEIGHT: 69 IN | OXYGEN SATURATION: 94 % | WEIGHT: 162 LBS | DIASTOLIC BLOOD PRESSURE: 73 MMHG

## 2018-08-31 DIAGNOSIS — M06.9 RHEUMATOID ARTHRITIS, INVOLVING UNSPECIFIED SITE, UNSPECIFIED RHEUMATOID FACTOR PRESENCE: ICD-10-CM

## 2018-08-31 DIAGNOSIS — I10 ESSENTIAL HYPERTENSION: ICD-10-CM

## 2018-08-31 DIAGNOSIS — R41.89 COGNITIVE IMPAIRMENT: ICD-10-CM

## 2018-08-31 DIAGNOSIS — R53.81 PHYSICAL DECONDITIONING: ICD-10-CM

## 2018-08-31 DIAGNOSIS — I63.219 CEREBROVASCULAR ACCIDENT (CVA) DUE TO OCCLUSION OF VERTEBRAL ARTERY, UNSPECIFIED BLOOD VESSEL LATERALITY (H): Primary | ICD-10-CM

## 2018-08-31 DIAGNOSIS — N40.0 BENIGN PROSTATIC HYPERPLASIA WITHOUT LOWER URINARY TRACT SYMPTOMS: ICD-10-CM

## 2018-08-31 PROCEDURE — 99309 SBSQ NF CARE MODERATE MDM 30: CPT | Performed by: NURSE PRACTITIONER

## 2018-08-31 NOTE — PROGRESS NOTES
"[place] [facility]    Chief Complaint   Patient presents with     YANET       [place] Medical Record Number:  [id number]    HPI:    Shamir Concepcion is a 78 year old  (1940), who is being seen today for an episodic care visit at [place] on [facility] .  HPI information obtained from: facility chart records, facility staff, patient report, [facility] chart review and Care Everywhere [facility] chart review.    He came to this facility from [facility] on 8[facility]9/2018 for short term rehab and medical management after hospitalization  at [facility] 7/20/2018 with dizziness, nausea and imbalance. 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 Anticoagulation was started with heparin drip and  transitioned to coumadin with lovenox bridging.   He discharged to [facility] 7/28/2018 where he made improvements in functional status and cognition. Amlodipine was discontinued and losartan increased. Methotrexate has been on hold and resumed at discharge.     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 Balance is \"still off\" but otherwise he feels close to his baseline. INR 1.7 today on coumadin 6 mg daily.   [facility]23[facility]. Referral was made for follow up with Neurology. CT angiogram recommended in 6 mos to evaluate vasculature in his neck.   Rheumatoid arthritis, involving unspecified site, unspecified rheumatoid factor presence (H)-denies pain  Essential hypertension-BPs:  119/73, 137/72, 124/70  HR: 60-72  Benign prostatic hyperplasia without lower urinary tract symptoms-denies urinary symptoms   Cognitive impairment-SLUMS 18/30, CPT 4.6/5.6  Speech therapist reports mild deficits in attention, memory and executive function.   Physical deconditioning-ambulating 250 ft with walker and stand by assist. " "Requires stand by to min assist with cares.       ALLERGIES: Review of patient's allergies indicates no known allergies.  Past Medical, Surgical, Family and Social History reviewed and updated in [facility].    Current Outpatient Prescriptions   Medication Sig Dispense Refill     acetaminophen (TYLENOL) 325 MG tablet Take 2 tablets (650 mg) by mouth every 4 hours as needed for mild pain       Finasteride (PROSCAR PO) Take 5 mg by mouth daily       fluticasone (FLONASE ALLERGY RELIEF) 50 MCG/ACT spray Spray 1 spray into both nostrils daily       folic acid 800 MCG TABS Take 800 mcg by mouth daily       methotrexate 2.5 MG tablet CHEMO Take 5 tablets (12.5 mg) by mouth every 7 days Saturdays       miconazole (MICATIN; MICRO GUARD) 2 % powder Apply topically 2 times daily as needed (groin rash)       predniSONE (DELTASONE) 5 MG tablet Take 1 tablet (5 mg) by mouth daily as needed for rheumatoid arthritis flare.       rosuvastatin (CRESTOR) 20 MG tablet Take 1 tablet (20 mg) by mouth daily       senna-docusate (SENOKOT-S;PERICOLACE) 8.6-50 MG per tablet Take 2 tablets by mouth 2 times daily as needed for constipation       tamsulosin (FLOMAX) 0.4 MG capsule 1 tab twice daily       Warfarin Sodium (COUMADIN PO) Take 6 mg by mouth daily        Medications reviewed:  Medications reconciled to facility chart and changes were made to reflect current medications as identified as above med list. Below are the changes that were made:   Medications stopped since last EPIC medication reconciliation:   There are no discontinued medications.    Medications started since last [facility] medication reconciliation:  No orders of the defined types were placed in this encounter.    REVIEW OF SYSTEMS:  4 point ROS including Respiratory, CV, GI and , other than that noted in the HPI,  is negative    Physical Exam:  /73  Pulse 72  Temp 96.2  F (35.7  C)  Resp 18  Ht 5' 9\" (1.753 m)  Wt 162 lb (73.5 kg)  SpO2 94%  BMI 23.92 " kg/m2  GENERAL APPEARANCE:  Alert, in no distress  ENT:  Mouth and posterior oropharynx normal, moist mucous membranes, normal hearing acuity  EYES:  EOM normal, conjunctiva and lids normal  NECK:  No adenopathy,masses or thyromegaly  RESP:  respiratory effort and palpation of chest normal, lungs clear to auscultation , no respiratory distress  CV:  Palpation and auscultation of heart done , regular rate and rhythm, no murmur, no edema, +2 pedal pulses  ABDOMEN:  soft, nontender, no hepatosplenomegaly or other masses  M/S:   gait steady with walker. BURKETT with good strength. No joint inflammation  SKIN:  no rashes or open areas  PSYCH:  oriented X 3, memory impaired , affect and mood normal      Recent Labs:   Last Comprehensive Metabolic Panel:  Sodium   Date Value Ref Range Status   08/20/2018 143 133 - 144 mmol/L Final     Potassium   Date Value Ref Range Status   08/20/2018 3.5 3.4 - 5.3 mmol/L Final     Chloride   Date Value Ref Range Status   08/20/2018 107 94 - 109 mmol/L Final     Carbon Dioxide   Date Value Ref Range Status   08/20/2018 27 20 - 32 mmol/L Final     Anion Gap   Date Value Ref Range Status   08/20/2018 9 3 - 14 mmol/L Final     Glucose   Date Value Ref Range Status   08/20/2018 85 70 - 99 mg/dL Final     Urea Nitrogen   Date Value Ref Range Status   08/20/2018 13 7 - 30 mg/dL Final     Creatinine   Date Value Ref Range Status   08/20/2018 1.18 0.66 - 1.25 mg/dL Final     GFR Estimate   Date Value Ref Range Status   08/20/2018 60 >60 mL/min/1.7m2 Final     Calcium   Date Value Ref Range Status   08/20/2018 8.3 (A) 8.5 - 10.1 mg/dL Final     Lab Results   Component Value Date    WBC 6.9 08/13/2018     Lab Results   Component Value Date    RBC 4.01 08/13/2018     Lab Results   Component Value Date    HGB 12.0 08/20/2018     Lab Results   Component Value Date    HCT 37.8 08/13/2018     Lab Results   Component Value Date    MCV 94 08/13/2018     Lab Results   Component Value Date    MCH 31.4  08/13/2018     Lab Results   Component Value Date    MCHC 33.3 08/13/2018     Lab Results   Component Value Date    RDW 14.1 08/13/2018     Lab Results   Component Value Date     08/13/2018       ASSESSMENT / PLAN:  (I63.219) 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 INR slightly low.   Plan: coumadin 6 mg daily. INR 9/4/2018. Continue statin. Neurology follow up as scheduled.     (M06.9) Rheumatoid arthritis, involving unspecified site, unspecified rheumatoid factor presence (H)  Comment: no s/s of acute flare  Plan: continue methotrexate, folic acid.     (I10) Essential hypertension  Comment: improved off losartan  Plan: monitor VS    (N40.0) Benign prostatic hyperplasia without lower urinary tract symptoms  Comment: managed  Plan: continue tamsulosin, finasteride    (R41.89) Cognitive impairment  Comment: mild deficits on cognitive testing  Plan: supportive care.     (R53.81) Physical deconditioning  Comment: progressing in therapies  Plan: continue PHYSICAL THERAPY/OT/ST. Recommendation is AL, but he's planning to return home with family support.         Electronically signed by  Mike COBOS CNP

## 2018-08-31 NOTE — LETTER
"    8/31/2018        RE: Shamir Concepcion  [address]  [facility] [address]        [place] [facility]    Chief Complaint   Patient presents with     RECHECK       [place] Medical Record Number:  [id number]    HPI:    Shamir Concepcion is a 78 year old  (1940), who is being seen today for an episodic care visit at [place] on [facility] .  HPI information obtained from: facility chart records, facility staff, patient report, [facility] chart review and Care Everywhere [facility] chart review.    He came to this facility from [facility] on 8/9/2018 for short term rehab and medical management after hospitalization  at [facility] 7/20/2018 with dizziness, nausea and imbalance. 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 Anticoagulation was started with heparin drip and  transitioned to coumadin with lovenox bridging.   He discharged to [facility] 7/28/2018 where he made improvements in functional status and cognition. Amlodipine was discontinued and losartan increased. Methotrexate has been on hold and resumed at discharge.     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 Balance is \"still off\" but otherwise he feels close to his baseline. INR 1.7 today on coumadin 6 mg daily.   [facility] 8/23/2018. Referral was made for follow up with Neurology. CT angiogram recommended in 6 mos to evaluate vasculature in his neck.   Rheumatoid arthritis, involving unspecified site, unspecified rheumatoid factor presence (H)-denies pain  Essential hypertension-BPs:  119/73, 137/72, 124/70  HR: 60-72  Benign prostatic hyperplasia without lower urinary tract symptoms-denies urinary symptoms   Cognitive impairment-SLUMS 18/30, CPT 4.6/5.6  Speech therapist reports mild deficits in attention, memory and " executive function.   Physical deconditioning-ambulating 250 ft with walker and stand by assist. Requires stand by to min assist with cares.       ALLERGIES: Review of patient's allergies indicates no known allergies.  Past Medical, Surgical, Family and Social History reviewed and updated in [facility].    Current Outpatient Prescriptions   Medication Sig Dispense Refill     acetaminophen (TYLENOL) 325 MG tablet Take 2 tablets (650 mg) by mouth every 4 hours as needed for mild pain       Finasteride (PROSCAR PO) Take 5 mg by mouth daily       fluticasone (FLONASE ALLERGY RELIEF) 50 MCG/ACT spray Spray 1 spray into both nostrils daily       folic acid 800 MCG TABS Take 800 mcg by mouth daily       methotrexate 2.5 MG tablet CHEMO Take 5 tablets (12.5 mg) by mouth every 7 days Saturdays       miconazole (MICATIN; MICRO GUARD) 2 % powder Apply topically 2 times daily as needed (groin rash)       predniSONE (DELTASONE) 5 MG tablet Take 1 tablet (5 mg) by mouth daily as needed for rheumatoid arthritis flare.       rosuvastatin (CRESTOR) 20 MG tablet Take 1 tablet (20 mg) by mouth daily       senna-docusate (SENOKOT-S;PERICOLACE) 8.6-50 MG per tablet Take 2 tablets by mouth 2 times daily as needed for constipation       tamsulosin (FLOMAX) 0.4 MG capsule 1 tab twice daily       Warfarin Sodium (COUMADIN PO) Take 6 mg by mouth daily        Medications reviewed:  Medications reconciled to facility chart and changes were made to reflect current medications as identified as above med list. Below are the changes that were made:   Medications stopped since last EPIC medication reconciliation:   There are no discontinued medications.    Medications started since last [facility] medication reconciliation:  No orders of the defined types were placed in this encounter.    REVIEW OF SYSTEMS:  4 point ROS including Respiratory, CV, GI and , other than that noted in the HPI,  is negative    Physical Exam:  /73  Pulse 72  Temp 96.2  F  "(35.7  C)  Resp 18  Ht 5' 9\" (1.753 m)  Wt 162 lb (73.5 kg)  SpO2 94%  BMI 23.92 kg/m2  GENERAL APPEARANCE:  Alert, in no distress  ENT:  Mouth and posterior oropharynx normal, moist mucous membranes, normal hearing acuity  EYES:  EOM normal, conjunctiva and lids normal  NECK:  No adenopathy,masses or thyromegaly  RESP:  respiratory effort and palpation of chest normal, lungs clear to auscultation , no respiratory distress  CV:  Palpation and auscultation of heart done , regular rate and rhythm, no murmur, no edema, +2 pedal pulses  ABDOMEN:  soft, nontender, no hepatosplenomegaly or other masses  M/S:   gait steady with walker. BURKETT with good strength. No joint inflammation  SKIN:  no rashes or open areas  PSYCH:  oriented X 3, memory impaired , affect and mood normal      Recent Labs:   Last Comprehensive Metabolic Panel:  Sodium   Date Value Ref Range Status   08/20/2018 143 133 - 144 mmol/L Final     Potassium   Date Value Ref Range Status   08/20/2018 3.5 3.4 - 5.3 mmol/L Final     Chloride   Date Value Ref Range Status   08/20/2018 107 94 - 109 mmol/L Final     Carbon Dioxide   Date Value Ref Range Status   08/20/2018 27 20 - 32 mmol/L Final     Anion Gap   Date Value Ref Range Status   08/20/2018 9 3 - 14 mmol/L Final     Glucose   Date Value Ref Range Status   08/20/2018 85 70 - 99 mg/dL Final     Urea Nitrogen   Date Value Ref Range Status   08/20/2018 13 7 - 30 mg/dL Final     Creatinine   Date Value Ref Range Status   08/20/2018 1.18 0.66 - 1.25 mg/dL Final     GFR Estimate   Date Value Ref Range Status   08/20/2018 60 >60 mL/min/1.7m2 Final     Calcium   Date Value Ref Range Status   08/20/2018 8.3 (A) 8.5 - 10.1 mg/dL Final     Lab Results   Component Value Date    WBC 6.9 08/13/2018     Lab Results   Component Value Date    RBC 4.01 08/13/2018     Lab Results   Component Value Date    HGB 12.0 08/20/2018     Lab Results   Component Value Date    HCT 37.8 08/13/2018     Lab Results   Component Value " Date    MCV 94 08/13/2018     Lab Results   Component Value Date    MCH 31.4 08/13/2018     Lab Results   Component Value Date    MCHC 33.3 08/13/2018     Lab Results   Component Value Date    RDW 14.1 08/13/2018     Lab Results   Component Value Date     08/13/2018       ASSESSMENT / PLAN:  (I63.219) 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 INR slightly low.   Plan: coumadin 6 mg daily. INR 9/4/2018. Continue statin. Neurology follow up as scheduled.     (M06.9) Rheumatoid arthritis, involving unspecified site, unspecified rheumatoid factor presence (H)  Comment: no s/s of acute flare  Plan: continue methotrexate, folic acid.     (I10) Essential hypertension  Comment: improved off losartan  Plan: monitor VS    (N40.0) Benign prostatic hyperplasia without lower urinary tract symptoms  Comment: managed  Plan: continue tamsulosin, finasteride    (R41.89) Cognitive impairment  Comment: mild deficits on cognitive testing  Plan: supportive care.     (R53.81) Physical deconditioning  Comment: progressing in therapies  Plan: continue PHYSICAL THERAPY/OT/ST. Recommendation is AL, but he's planning to return home with family support.         Electronically signed by  Mike COBOS CNP                      Sincerely,        CHANDRIKA Wynn CNP

## 2018-09-04 ENCOUNTER — NURSING HOME VISIT (OUTPATIENT)
Dept: GERIATRICS | Facility: CLINIC | Age: 78
End: 2018-09-04
Payer: COMMERCIAL

## 2018-09-04 VITALS
OXYGEN SATURATION: 97 % | DIASTOLIC BLOOD PRESSURE: 72 MMHG | RESPIRATION RATE: 12 BRPM | HEART RATE: 60 BPM | TEMPERATURE: 96.2 F | SYSTOLIC BLOOD PRESSURE: 147 MMHG

## 2018-09-04 DIAGNOSIS — I63.219 CEREBROVASCULAR ACCIDENT (CVA) DUE TO OCCLUSION OF VERTEBRAL ARTERY, UNSPECIFIED BLOOD VESSEL LATERALITY (H): Primary | ICD-10-CM

## 2018-09-04 DIAGNOSIS — Z79.01 LONG TERM CURRENT USE OF ANTICOAGULANT THERAPY: ICD-10-CM

## 2018-09-04 DIAGNOSIS — Z79.01 ENCOUNTER FOR MONITORING COUMADIN THERAPY: ICD-10-CM

## 2018-09-04 DIAGNOSIS — Z51.81 ENCOUNTER FOR MONITORING COUMADIN THERAPY: ICD-10-CM

## 2018-09-04 PROCEDURE — 99308 SBSQ NF CARE LOW MDM 20: CPT | Performed by: NURSE PRACTITIONER

## 2018-09-04 NOTE — PROGRESS NOTES
[place] [facility]    HPI:    Shamir Concepcion is a 78 year old  (1940), who is being seen today for an episodic care visit at [facility].   HPI information obtained from: facility chart records, facility staff, patient report and [facility] chart review. Today's concern is INR/Coumadin management for CVA    Bleeding Signs/Symptoms:  None  Thromboembolic Signs/Symptoms:  None    Medication Changes:  No  Dietary Changes:  No  Activity Changes: No  Bacterial/Viral Infection:  No    Missed Coumadin Doses:  None    On ASA: No    Other Concerns:  No    OBJECTIVE:    INR Today:  2.3, up from 1.7  Current Dose:  6 mg daily    ASSESSMENT:     Cerebrovascular accident (CVA) due to occlusion of vertebral artery, unspecified blood vessel laterality (H)  Encounter for monitoring coumadin therapy  Long term current use of anticoagulant therapy  Therapeutic INR for goal of 2-3    PLAN:    New Dose: No Change      Next INR: 9/6/2018        Electronically signed by:  CHANDRIKA Wynn CNP

## 2018-09-04 NOTE — LETTER
9/4/2018        RE: Shamir Concepcion  [address]  [facility] [address]          [place] [facility]    HPI:    Shamir Concepcion is a 78 year old  (1940), who is being seen today for an [facility] [place] [facility]   HPI information obtained from: facility chart records, facility staff, patient report and [facility] chart review. Today's concern is INR/Coumadin management for CVA    Bleeding Signs/Symptoms:  None  Thromboembolic Signs/Symptoms:  None    Medication Changes:  No  Dietary Changes:  No  Activity Changes: No  Bacterial/Viral Infection:  No    Missed Coumadin Doses:  None    On ASA: No    Other Concerns:  No    OBJECTIVE:    INR Today:  2.3, up from 1.7  Current Dose:  6 mg daily    ASSESSMENT:     Cerebrovascular accident (CVA) due to occlusion of vertebral artery, unspecified blood vessel laterality (H)  Encounter for monitoring coumadin therapy  Long term current use of anticoagulant therapy  Therapeutic INR for goal of 2-3    PLAN:    New Dose: No Change      Next INR: 9/6/2018        Electronically signed by:  CHANDRIKA Wynn CNP            Sincerely,        CHANDRIKA Wynn CNP

## 2018-09-06 ENCOUNTER — DISCHARGE SUMMARY NURSING HOME (OUTPATIENT)
Dept: GERIATRICS | Facility: CLINIC | Age: 78
End: 2018-09-06
Payer: COMMERCIAL

## 2018-09-06 VITALS
OXYGEN SATURATION: 96 % | WEIGHT: 162 LBS | RESPIRATION RATE: 14 BRPM | SYSTOLIC BLOOD PRESSURE: 115 MMHG | TEMPERATURE: 97.7 F | DIASTOLIC BLOOD PRESSURE: 71 MMHG | HEART RATE: 67 BPM | BODY MASS INDEX: 23.92 KG/M2

## 2018-09-06 DIAGNOSIS — N40.0 BENIGN PROSTATIC HYPERPLASIA WITHOUT LOWER URINARY TRACT SYMPTOMS: ICD-10-CM

## 2018-09-06 DIAGNOSIS — Z79.01 ENCOUNTER FOR MONITORING COUMADIN THERAPY: ICD-10-CM

## 2018-09-06 DIAGNOSIS — Z51.81 ENCOUNTER FOR MONITORING COUMADIN THERAPY: ICD-10-CM

## 2018-09-06 DIAGNOSIS — R53.81 PHYSICAL DECONDITIONING: ICD-10-CM

## 2018-09-06 DIAGNOSIS — I63.219 CEREBROVASCULAR ACCIDENT (CVA) DUE TO OCCLUSION OF VERTEBRAL ARTERY, UNSPECIFIED BLOOD VESSEL LATERALITY (H): Primary | ICD-10-CM

## 2018-09-06 DIAGNOSIS — Z79.01 LONG TERM CURRENT USE OF ANTICOAGULANT THERAPY: ICD-10-CM

## 2018-09-06 DIAGNOSIS — I10 ESSENTIAL HYPERTENSION: ICD-10-CM

## 2018-09-06 DIAGNOSIS — R41.89 COGNITIVE IMPAIRMENT: ICD-10-CM

## 2018-09-06 DIAGNOSIS — M06.9 RHEUMATOID ARTHRITIS, INVOLVING UNSPECIFIED SITE, UNSPECIFIED RHEUMATOID FACTOR PRESENCE: ICD-10-CM

## 2018-09-06 PROCEDURE — 99316 NF DSCHRG MGMT 30 MIN+: CPT | Performed by: NURSE PRACTITIONER

## 2018-09-06 NOTE — LETTER
9/6/2018        RE: Shamir Concepcion  [address]  [facility] [address]          [facility] DISCHARGE SUMMARY    PATIENT'S NAME: Shamir Concepcion  YOB: 1940  MEDICAL RECORD NUMBER:  [id number]    PRIMARY CARE PROVIDER AND CLINIC RESPONSIBLE AFTER TRANSFER: No Ref-Primary, Physician      CODE STATUS/ADVANCE DIRECTIVES DISCUSSION:   DNR / DNI     No Known Allergies    TRANSFERRING PROVIDERS: CHANDRIKA Mcnamara CNP, Daniella Ordaz MD  DATE OF SNF ADMISSION:  August / 09 / 2018  DATE OF SNF (anticipated) DISCHARGE: September / 09 / 2018  DISCHARGE DISPOSITION: Non-FMG Provider   Nursing Facility: [facility] stay 7/28/2018 to 8/9/2018.     Condition on Discharge:  Improving.  Function:  Walks 300 feet with FWW.   Cognitive Scores: SLUMS 18/30 and CPT 4.6/5.6    Equipment: walker    DISCHARGE DIAGNOSIS:   1. 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 Encounter for monitoring coumadin therapy    3. Long term current use of anticoagulant therapy    4. Rheumatoid arthritis, involving unspecified site, unspecified rheumatoid factor presence (H)    5. Essential hypertension    6. Benign prostatic hyperplasia without lower urinary tract symptoms    7. Cognitive impairment    8. Physical deconditioning        HPI Nursing Facility Course:  HPI information obtained from: facility chart records, facility staff, patient report and [facility] chart review.    78 year old s/p hospitalization with dizziness, nausea and imbalance. 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 Anticoagulation was started with heparin drip and  transitioned to coumadin with Lovenox bridging. He discharged to Acute Rehab 7/28/2018 where he made improvements in functional status and cognition. Amlodipine was discontinued and  losartan increased. Methotrexate has been on hold and resumed at discharge.       Cerebrovascular accident (CVA) due to occlusion of vertebral artery, unspecified blood vessel laterality (H)  Encounter for monitoring coumadin therapy  Long term current use of anticoagulant therapy  Cognitive impairment  Physical deconditioning  Was seen for f/u by neurosurgery on 8/23 and notes state CT scan improved, patient to establish care with PCP and neurology.  Patient improved with therapies - ambulated with walker, ready to DC home on 9/9 with home care for PT, OT, RN and HHA. He is on Coumadin and today INR 2.5 (previous INR 2.3). Has taken Coumadin 6 mg PO daily for over a week. Will plan on continuing same dose and checking INR on 9/13 with further management via PCP.     Rheumatoid arthritis, involving unspecified site, unspecified rheumatoid factor presence (H)  By history, currently no pain. Has orders for Methotrexate and PRN prednisone as well as Tylenol PRN.   Lab Results   Component Value Date    WBC 6.9 08/13/2018    WBC 8.9 08/08/2018     Lab Results   Component Value Date     08/13/2018     08/08/2018     Lab Results   Component Value Date    HGB 12.0 08/20/2018    HGB 12.6 08/13/2018       Essential hypertension  SBP range 115-155 recently.   BP Range: /50-80 mmHg, HR Range: 54-88 bpm, Wt Range: 154.6lbs 8/9 162lbs 9/5    Benign prostatic hyperplasia without lower urinary tract symptoms  Voiding well. On Flomax and Proscar.       PAST MEDICAL HISTORY:  has a past medical history of Allergic rhinitis; BPH (benign prostatic hyperplasia); Colon polyps; ED (erectile dysfunction); Family history of colon cancer; PAD (peripheral artery disease) (H); RA (rheumatoid arthritis) (H); and Seronegative rheumatoid arthritis (H).    DISCHARGE MEDICATIONS:  Current Outpatient Prescriptions   Medication Sig Dispense Refill     acetaminophen (TYLENOL) 325 MG tablet Take 2 tablets (650 mg) by mouth every 4  hours as needed for mild pain       Finasteride (PROSCAR PO) Take 5 mg by mouth daily       fluticasone (FLONASE ALLERGY RELIEF) 50 MCG/ACT spray Spray 1 spray into both nostrils daily       folic acid 800 MCG TABS Take 800 mcg by mouth daily       methotrexate 2.5 MG tablet CHEMO Take 5 tablets (12.5 mg) by mouth every 7 days Saturdays       miconazole (MICATIN; MICRO GUARD) 2 % powder Apply topically 2 times daily as needed (groin rash)       predniSONE (DELTASONE) 5 MG tablet Take 1 tablet (5 mg) by mouth daily as needed for rheumatoid arthritis flare.       rosuvastatin (CRESTOR) 20 MG tablet Take 1 tablet (20 mg) by mouth daily       senna-docusate (SENOKOT-S;PERICOLACE) 8.6-50 MG per tablet Take 2 tablets by mouth 2 times daily as needed for constipation       tamsulosin (FLOMAX) 0.4 MG capsule 1 tab twice daily       Warfarin Sodium (COUMADIN PO) Take 6 mg by mouth daily          MEDICATION CHANGES/RATIONALE:   None    Controlled medications sent with patient:   not applicable/none     ROS:    10 point ROS of systems including Constitutional, Eyes, Respiratory, Cardiovascular, Gastroenterology, Genitourinary, Integumentary, Musculoskeletal, Psychiatric were all negative except for pertinent positives noted in my HPI.    Physical Exam:   Vitals: /71  Pulse 67  Temp 97.7  F (36.5  C)  Resp 14  Wt 162 lb (73.5 kg)  SpO2 96%  BMI 23.92 kg/m2  BMI= Body mass index is 23.92 kg/(m^2).  GENERAL APPEARANCE:  Alert, in no distress, pleasant, cooperative, oriented x 4  EYES:  EOM, lids, pupils and irises normal, sclera clear and conjunctiva normal, no discharge or mattering on lids or lashes noted  ENT:  Mouth normal, moist mucous membranes, nose normal without drainage or crusting, external ears without lesions, hearing acuity intact  NECK: supple, symmetrical  RESP:  respiratory effort and palpation of chest normal, no chest wall tenderness, no respiratory distress, Lung sounds clear, patient is on room  air  CV:  Palpation and auscultation of heart done, rate and rhythm controlled and regular, no murmur, no rub or gallop. Edema none bilateral lower extremities  ABDOMEN:  normal bowel sounds, soft, nontender.  M/S:   Gait and station ambulates independently with walker, no tenderness or swelling of the joints; able to move all extremities   NEURO: cranial nerves 2-12 grossly intact, no facial asymmetry, no speech deficits and able to follow directions, moves all extremities symmetrically  PSYCH:  insight and judgement at baseline, memory appears intact, affect and mood normal     DISCHARGE PLAN:  Occupational Therapy, Physical Therapy, Registered Nurse, Home Health Aide and From:  [facility]  Patient instructed to follow-up with:  PCP in 7 days      Current [place] scheduled appointments:  No future appointments.    MTM referral needed and placed by this provider: No    Pending labs:  INR due on 9/13    SNF labs   CBC RESULTS:   Recent Labs   Lab Test 08/20/18 08/13/18 08/08/18   0538   WBC   --   6.9  8.9   RBC   --   4.01*  4.32*   HGB  12.0*  12.6*  13.4   HCT   --   37.8*  41.9   MCV   --   94  97   MCH   --   31.4  31.0   MCHC   --   33.3  32.0   RDW   --   14.1  14.1   PLT   --   324  331       Last Basic Metabolic Panel:  Recent Labs   Lab Test 08/20/18 08/13/18   NA  143  142   POTASSIUM  3.5  4.0   CHLORIDE  107  107   RANJAN  8.3*  8.5   CO2  27  30   BUN  13  23   CR  1.18  1.12   GLC  85  86       Liver Function Studies -   Recent Labs   Lab Test  07/30/18   0613  07/21/18   1133  07/20/18   2205   PROTTOTAL   --   6.9  7.3   ALBUMIN  2.8*  3.2*  3.3*   BILITOTAL   --   0.7  0.8   ALKPHOS   --   101  105   AST   --   21  19   ALT   --   13  15       Lab Results   Component Value Date    A1C 5.3 07/21/2018    A1C 5.3 09/11/2013     Discharge Treatments:   Patient to establish primary care and neurology follow up.     TOTAL DISCHARGE TIME:   Greater than 30 minutes  Electronically signed  by:  CHANDRIKA Mcnamara CNP      Sincerely,        CHANDRIKA Mcnamara CNP

## 2018-09-06 NOTE — PROGRESS NOTES
[place] [facility] DISCHARGE SUMMARY    PATIENT'S NAME: Shamir Concepcion  YOB: 1940  MEDICAL RECORD NUMBER:  [id number]    PRIMARY CARE PROVIDER AND CLINIC RESPONSIBLE AFTER TRANSFER: No Ref-Primary, Physician      CODE STATUS/ADVANCE DIRECTIVES DISCUSSION:   DNR / DNI     No Known Allergies    TRANSFERRING PROVIDERS: CHANDRIKA Mcnamara CNP, Daniella Ordaz MD  DATE OF SNF ADMISSION:  August / 09 / 2018  DATE OF SNF (anticipated) DISCHARGE: September / 09 / 2018  DISCHARGE DISPOSITION: Non-FMG Provider   Nursing Facility: [facility] stay 7/28/2018 to 8/9/2018.     Condition on Discharge:  Improving.  Function:  Walks 300 feet with FWW.   Cognitive Scores: SLUMS 18/30 and CPT 4.6/5.6    Equipment: walker    DISCHARGE DIAGNOSIS:   1. 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 Encounter for monitoring coumadin therapy    3. Long term current use of anticoagulant therapy    4. Rheumatoid arthritis, involving unspecified site, unspecified rheumatoid factor presence (H)    5. Essential hypertension    6. Benign prostatic hyperplasia without lower urinary tract symptoms    7. Cognitive impairment    8. Physical deconditioning        HPI Nursing Facility Course:  HPI information obtained from: facility chart records, facility staff, patient report and [facility] chart review.    78 year old s/p hospitalization with dizziness, nausea and imbalance. 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 Anticoagulation was started with heparin drip and  transitioned to coumadin with Lovenox bridging. He discharged to Acute Rehab 7/28/2018 where he made improvements in functional status and cognition. Amlodipine was discontinued and losartan increased. Methotrexate has been on hold and resumed at discharge.        Cerebrovascular accident (CVA) due to occlusion of vertebral artery, unspecified blood vessel laterality (H)  Encounter for monitoring coumadin therapy  Long term current use of anticoagulant therapy  Cognitive impairment  Physical deconditioning  Was seen for f/u by neurosurgery on 8/23 and notes state CT scan improved, patient to establish care with PCP and neurology.  Patient improved with therapies - ambulated with walker, ready to DC home on 9/9 with home care for PT, OT, RN and HHA. He is on Coumadin and today INR 2.5 (previous INR 2.3). Has taken Coumadin 6 mg PO daily for over a week. Will plan on continuing same dose and checking INR on 9/13 with further management via PCP.     Rheumatoid arthritis, involving unspecified site, unspecified rheumatoid factor presence (H)  By history, currently no pain. Has orders for Methotrexate and PRN prednisone as well as Tylenol PRN.   Lab Results   Component Value Date    WBC 6.9 08/13/2018    WBC 8.9 08/08/2018     Lab Results   Component Value Date     08/13/2018     08/08/2018     Lab Results   Component Value Date    HGB 12.0 08/20/2018    HGB 12.6 08/13/2018       Essential hypertension  SBP range 115-155 recently.   BP Range: /50-80 mmHg, HR Range: 54-88 bpm, Wt Range: 154.6lbs 8/9 162lbs 9/5    Benign prostatic hyperplasia without lower urinary tract symptoms  Voiding well. On Flomax and Proscar.       PAST MEDICAL HISTORY:  has a past medical history of Allergic rhinitis; BPH (benign prostatic hyperplasia); Colon polyps; ED (erectile dysfunction); Family history of colon cancer; PAD (peripheral artery disease) (H); RA (rheumatoid arthritis) (H); and Seronegative rheumatoid arthritis (H).    DISCHARGE MEDICATIONS:  Current Outpatient Prescriptions   Medication Sig Dispense Refill     acetaminophen (TYLENOL) 325 MG tablet Take 2 tablets (650 mg) by mouth every 4 hours as needed for mild pain       Finasteride (PROSCAR PO) Take 5 mg by  mouth daily       fluticasone (FLONASE ALLERGY RELIEF) 50 MCG/ACT spray Spray 1 spray into both nostrils daily       folic acid 800 MCG TABS Take 800 mcg by mouth daily       methotrexate 2.5 MG tablet CHEMO Take 5 tablets (12.5 mg) by mouth every 7 days Saturdays       miconazole (MICATIN; MICRO GUARD) 2 % powder Apply topically 2 times daily as needed (groin rash)       predniSONE (DELTASONE) 5 MG tablet Take 1 tablet (5 mg) by mouth daily as needed for rheumatoid arthritis flare.       rosuvastatin (CRESTOR) 20 MG tablet Take 1 tablet (20 mg) by mouth daily       senna-docusate (SENOKOT-S;PERICOLACE) 8.6-50 MG per tablet Take 2 tablets by mouth 2 times daily as needed for constipation       tamsulosin (FLOMAX) 0.4 MG capsule 1 tab twice daily       Warfarin Sodium (COUMADIN PO) Take 6 mg by mouth daily          MEDICATION CHANGES/RATIONALE:   None    Controlled medications sent with patient:   not applicable/none     ROS:    10 point ROS of systems including Constitutional, Eyes, Respiratory, Cardiovascular, Gastroenterology, Genitourinary, Integumentary, Musculoskeletal, Psychiatric were all negative except for pertinent positives noted in my HPI.    Physical Exam:   Vitals: /71  Pulse 67  Temp 97.7  F (36.5  C)  Resp 14  Wt 162 lb (73.5 kg)  SpO2 96%  BMI 23.92 kg/m2  BMI= Body mass index is 23.92 kg/(m^2).  GENERAL APPEARANCE:  Alert, in no distress, pleasant, cooperative, oriented x 4  EYES:  EOM, lids, pupils and irises normal, sclera clear and conjunctiva normal, no discharge or mattering on lids or lashes noted  ENT:  Mouth normal, moist mucous membranes, nose normal without drainage or crusting, external ears without lesions, hearing acuity intact  NECK: supple, symmetrical  RESP:  respiratory effort and palpation of chest normal, no chest wall tenderness, no respiratory distress, Lung sounds clear, patient is on room air  CV:  Palpation and auscultation of heart done, rate and rhythm  controlled and regular, no murmur, no rub or gallop. Edema none bilateral lower extremities  ABDOMEN:  normal bowel sounds, soft, nontender.  M/S:   Gait and station ambulates independently with walker, no tenderness or swelling of the joints; able to move all extremities   NEURO: cranial nerves 2-12 grossly intact, no facial asymmetry, no speech deficits and able to follow directions, moves all extremities symmetrically  PSYCH:  insight and judgement at baseline, memory appears intact, affect and mood normal     DISCHARGE PLAN:  Occupational Therapy, Physical Therapy, Registered Nurse, Home Health Aide and From:  [facility]  Patient instructed to follow-up with:  PCP in 7 days      Current [place] scheduled appointments:  No future appointments.    MTM referral needed and placed by this provider: No    Pending labs:  INR due on 9/13    [facility] labs   CBC RESULTS:   Recent Labs   Lab Test 08/20/18 08/13/18 08/08/18   0538   WBC   --   6.9  8.9   RBC   --   4.01*  4.32*   HGB  12.0*  12.6*  13.4   HCT   --   37.8*  41.9   MCV   --   94  97   MCH   --   31.4  31.0   MCHC   --   33.3  32.0   RDW   --   14.1  14.1   PLT   --   324  331       Last Basic Metabolic Panel:  Recent Labs   Lab Test 08/20/18 08/13/18   NA  143  142   POTASSIUM  3.5  4.0   CHLORIDE  107  107   RANJAN  8.3*  8.5   CO2  27  30   BUN  13  23   CR  1.18  1.12   GLC  85  86       Liver Function Studies -   Recent Labs   Lab Test  07/30/18   0613  07/21/18   1133  07/20/18   2205   PROTTOTAL   --   6.9  7.3   ALBUMIN  2.8*  3.2*  3.3*   BILITOTAL   --   0.7  0.8   ALKPHOS   --   101  105   AST   --   21  19   ALT   --   13  15       Lab Results   Component Value Date    A1C 5.3 07/21/2018    A1C 5.3 09/11/2013     Discharge Treatments:   Patient to establish primary care and neurology follow up.     TOTAL DISCHARGE TIME:   Greater than 30 minutes  Electronically signed by:  CHANDRIKA Mcnamara CNP

## 2018-10-02 DIAGNOSIS — I73.9 PAD (PERIPHERAL ARTERY DISEASE) (H): Primary | ICD-10-CM

## 2018-10-11 ENCOUNTER — CARE COORDINATION (OUTPATIENT)
Dept: MEDSURG UNIT | Facility: CLINIC | Age: 78
End: 2018-10-11

## 2018-11-15 ENCOUNTER — TELEPHONE (OUTPATIENT)
Dept: NEUROLOGY | Facility: CLINIC | Age: 78
End: 2018-11-15

## 2018-11-15 NOTE — TELEPHONE ENCOUNTER
Per Dr. Cai patient is to have a repeat CT angiogram head/neck in early February, 2019, to evaluate the vasculature within his neck and follow up with stroke neurology after having the CT scan to determine duration of his anticoagulation.     Received message from scheduling that patient had questions before he will schedule. [facility] for patient to return call.

## 2018-12-13 NOTE — TELEPHONE ENCOUNTER
Patient states he does not have questions about follow-up and has agreed to schedule. Informed patient that scheduling will contact him to arrange appointment.

## 2019-04-29 ENCOUNTER — HOSPITAL ENCOUNTER (OUTPATIENT)
Dept: ULTRASOUND IMAGING | Facility: CLINIC | Age: 79
Discharge: HOME | End: 2019-04-29
Attending: RADIOLOGY | Admitting: RADIOLOGY
Payer: COMMERCIAL

## 2019-04-29 ENCOUNTER — OFFICE VISIT (OUTPATIENT)
Dept: OTHER | Facility: CLINIC | Age: 79
End: 2019-04-29
Attending: RADIOLOGY
Payer: COMMERCIAL

## 2019-04-29 ENCOUNTER — HOSPITAL ENCOUNTER (OUTPATIENT)
Dept: ULTRASOUND IMAGING | Facility: CLINIC | Age: 79
End: 2019-04-29
Attending: RADIOLOGY
Payer: COMMERCIAL

## 2019-04-29 VITALS
DIASTOLIC BLOOD PRESSURE: 78 MMHG | SYSTOLIC BLOOD PRESSURE: 116 MMHG | HEART RATE: 66 BPM | RESPIRATION RATE: 16 BRPM | BODY MASS INDEX: 27.32 KG/M2 | OXYGEN SATURATION: 97 % | HEIGHT: 65 IN | WEIGHT: 164 LBS

## 2019-04-29 DIAGNOSIS — I73.9 PAD (PERIPHERAL ARTERY DISEASE) (H): ICD-10-CM

## 2019-04-29 DIAGNOSIS — I73.9 PAD (PERIPHERAL ARTERY DISEASE) (H): Primary | ICD-10-CM

## 2019-04-29 PROCEDURE — 93926 LOWER EXTREMITY STUDY: CPT | Mod: RT

## 2019-04-29 PROCEDURE — G0463 HOSPITAL OUTPT CLINIC VISIT: HCPCS | Mod: 25

## 2019-04-29 PROCEDURE — 93922 UPR/L XTREMITY ART 2 LEVELS: CPT

## 2019-04-29 ASSESSMENT — MIFFLIN-ST. JEOR: SCORE: 1385.78

## 2019-04-29 NOTE — PATIENT INSTRUCTIONS
Will call you in 1 year, if still not having issues can wait 2 years before repeating ultrasound and consult with Dr. Benites  Contact us sooner if legs start to cramp or any other concerns.

## 2019-04-29 NOTE — PROGRESS NOTES
"Shamir Concepcion is a 78 year old male who presents for:  Chief Complaint   Patient presents with     RECHECK     h/o of right distal SFA stent done on 9/11/2013. comparison study 3/13/2016 2 year f/u with Dr. Benites.  Pt has had stroke since last here        Vitals:    Vitals:    04/29/19 1411   BP: 116/78   BP Location: Left arm   Patient Position: Chair   Cuff Size: Adult Regular   Pulse: 66   Resp: 16   SpO2: 97%   Weight: 164 lb (74.4 kg)   Height: 5' 5\" (1.651 m)       BMI:  Estimated body mass index is 27.29 kg/m  as calculated from the following:    Height as of this encounter: 5' 5\" (1.651 m).    Weight as of this encounter: 164 lb (74.4 kg).    Pain Score:  Data Unavailable        Oni Barbosa    "

## 2019-04-30 NOTE — PROGRESS NOTES
"Patient Name: Shamir Concepcion  Patient MRN: [id number]  Patient : 1940    HPI: This is a 78 year old year old male presenting for follow-up evaluation of right superficial femoral artery stent placement done on 2013 at [facility].  Today, patient present for his 2 year follow up. Unfortunately, he reports that he suffered a CVA back in 2018.  He is still receiving therapy for balance issues.  He currently walks with a cane for balance only.  He denies any weakness in his legs, claudication or rest pain.  He states that he is very active and walks on a treadmill 3 times/ week at the local [facility].  He also reports he is now .     His neurological health is being managed by Dr. Gonzalez.  He is on a blood thinner but is unable to tell me what exactly it is.  However, he knows he is not on Coumadin.     OBJECTIVE:   /78 (BP Location: Left arm, Patient Position: Chair, Cuff Size: Adult Regular)   Pulse 66   Resp 16   Ht 5' 5\" (1.651 m)   Wt 164 lb (74.4 kg)   SpO2 97%   BMI 27.29 kg/m      General Appearance: WDWN male in NAD  No carotid bruit  Abdomen is soft and non tended, no pulsatile mass  Lower Extremity: no edema, feet are warm and pink and free of ulcerations.    Pulses:  Bilateral femoral: 2+  Bilateral posterior tibial: 2+  Bilateral dorsal pedis: 1+  Current Outpatient Medications   Medication     acetaminophen (TYLENOL) 325 MG tablet     Finasteride (PROSCAR PO)     fluticasone (FLONASE ALLERGY RELIEF) 50 MCG/ACT spray     folic acid 800 MCG TABS     methotrexate 2.5 MG tablet CHEMO     miconazole (MICATIN; MICRO GUARD) 2 % powder     predniSONE (DELTASONE) 5 MG tablet     rosuvastatin (CRESTOR) 20 MG tablet     senna-docusate (SENOKOT-S;PERICOLACE) 8.6-50 MG per tablet     tamsulosin (FLOMAX) 0.4 MG capsule     Warfarin Sodium (COUMADIN PO)     No current facility-administered medications for this visit.      Patient Active Problem List   Diagnosis     Pure " hypercholesterolemia     BPH (benign prostatic hyperplasia)     Rheumatoid arthritis (H)     Acute ischemic stroke (H)     Stroke (cerebrum) (H)     Physical deconditioning     Essential hypertension     Cognitive impairment     Long term current use of anticoagulant therapy     Encounter for monitoring Coumadin therapy       Imaging:   Results for orders placed or performed during the hospital encounter of 04/29/19   US Lower Extremity Arterial Duplex Right    Narrative    ULTRASOUND RIGHT LOWER EXTREMITY ARTERIAL DUPLEX  4/29/2019 1:56 PM    HISTORY:  78-year-old patient with distal SFA stent/above-knee  popliteal arterial stent placed on September 11, 2013. Patient returns  for routine surveillance. Patient has history of peripheral arterial  disease.    COMPARISON: March 13, 2017.    TECHNIQUE: Color Doppler and spectral waveform analysis obtained in  the right lower extremity stent and adjacent native arteries. No  velocity elevation to suggest stenosis.      Impression    IMPRESSION: Widely patent stent in the distal right superficial  femoral artery, relatively unchanged.    NAYAN BURLESON MD   ULTRASOUND ANKLE-BRACHIAL INDEX DOPPLER WITHOUT EXERCISE, ONE TO TWO  LEVELS, BILATERAL 4/29/2019 1:57 PM     HISTORY:  78-year-old patient with right distal SFA/above-knee  popliteal arterial stent placed September 11, 2013.     COMPARISON: March 13, 2017.     FINDINGS: Resting BHAVANI in the right lower extremity is 0.96, previously  1.04. Resting BHAVANI in the left lower extremity is 0.81, previously 1.0.  Patient had a stroke the previous year, therefore exercise stress  testing not performed. Distal waveforms are triphasic/biphasic in the  right lower extremity and biphasic in the left lower extremity.                                                                      IMPRESSION:  1. Normal BHAVANI examination in the right lower extremity.  2. Minimal to mild arterial insufficiency in the left lower extremity,  slightly  worsened since previous exam.     NAYAN BENITES MD      Assessment/Plan  The is a pleasant 78 year old gentlemen who continues to do well following stenting of his right superficial femoral artery back in 2013.  Unfortunately, he did suffer a stroke in July 2018.  He seems to be recovering well from the stroke with only deficit being balance issues. We discussed his imaging that was performed today.  His right superficial femoral artery stent is patent.  There is a minimal decrease in the BHAVANI's on the left side.  Today his resting BHAVANI is .81 compared to previous 1.0  However, the patient is asymptomatic so intervention is not indicated.  His BHAVANI on the right lower extremity is normal at .96    I did recommend follow up in 1 year however, the patient would like to extend it to 2 years.  I suggested that we call him in 1 year to see how he is doing and base his next follow up on that conversation.  He agreed to the plan.       I met with the patient for 30 minutes of which >50% of the time was used to discuss treatment options and/or coordination of care.    Thank you for the consult. We will continue to monitor this patient for their vascular needs.    Dr. Nayan Benites MD  Interventional Radiology  Pager: [phone]  [facility]

## 2019-05-19 ENCOUNTER — APPOINTMENT (OUTPATIENT)
Dept: MRI IMAGING | Facility: CLINIC | Age: 79
End: 2019-05-19
Attending: EMERGENCY MEDICINE
Payer: COMMERCIAL

## 2019-05-19 ENCOUNTER — HOSPITAL ENCOUNTER (EMERGENCY)
Facility: CLINIC | Age: 79
Discharge: HOME | End: 2019-05-19
Attending: EMERGENCY MEDICINE | Admitting: EMERGENCY MEDICINE
Payer: COMMERCIAL

## 2019-05-19 VITALS
HEART RATE: 55 BPM | OXYGEN SATURATION: 95 % | HEIGHT: 69 IN | DIASTOLIC BLOOD PRESSURE: 75 MMHG | SYSTOLIC BLOOD PRESSURE: 127 MMHG | BODY MASS INDEX: 23.7 KG/M2 | RESPIRATION RATE: 20 BRPM | TEMPERATURE: 97.7 F | WEIGHT: 160 LBS

## 2019-05-19 DIAGNOSIS — R53.1 EPISODE OF GENERALIZED WEAKNESS: ICD-10-CM

## 2019-05-19 LAB
ALBUMIN UR-MCNC: 10 MG/DL
ANION GAP SERPL CALCULATED.3IONS-SCNC: 7 MMOL/L (ref 3–14)
APPEARANCE UR: CLEAR
BASOPHILS # BLD AUTO: 0 10E9/L (ref 0–0.2)
BASOPHILS NFR BLD AUTO: 0.3 %
BILIRUB UR QL STRIP: NEGATIVE
BUN SERPL-MCNC: 18 MG/DL (ref 7–30)
CALCIUM SERPL-MCNC: 8.4 MG/DL (ref 8.5–10.1)
CHLORIDE SERPL-SCNC: 108 MMOL/L (ref 94–109)
CO2 SERPL-SCNC: 26 MMOL/L (ref 20–32)
COLOR UR AUTO: YELLOW
CREAT SERPL-MCNC: 1.32 MG/DL (ref 0.66–1.25)
DIFFERENTIAL METHOD BLD: ABNORMAL
EOSINOPHIL # BLD AUTO: 0 10E9/L (ref 0–0.7)
EOSINOPHIL NFR BLD AUTO: 0 %
ERYTHROCYTE [DISTWIDTH] IN BLOOD BY AUTOMATED COUNT: 14.7 % (ref 10–15)
GFR SERPL CREATININE-BSD FRML MDRD: 51 ML/MIN/{1.73_M2}
GLUCOSE SERPL-MCNC: 107 MG/DL (ref 70–99)
GLUCOSE UR STRIP-MCNC: NEGATIVE MG/DL
HCT VFR BLD AUTO: 37.2 % (ref 40–53)
HGB BLD-MCNC: 12.3 G/DL (ref 13.3–17.7)
HGB UR QL STRIP: ABNORMAL
IMM GRANULOCYTES # BLD: 0 10E9/L (ref 0–0.4)
IMM GRANULOCYTES NFR BLD: 0.3 %
INR PPP: 1.23 (ref 0.86–1.14)
INTERPRETATION ECG - MUSE: NORMAL
KETONES UR STRIP-MCNC: NEGATIVE MG/DL
LEUKOCYTE ESTERASE UR QL STRIP: NEGATIVE
LYMPHOCYTES # BLD AUTO: 1.1 10E9/L (ref 0.8–5.3)
LYMPHOCYTES NFR BLD AUTO: 13.3 %
MCH RBC QN AUTO: 31.6 PG (ref 26.5–33)
MCHC RBC AUTO-ENTMCNC: 33.1 G/DL (ref 31.5–36.5)
MCV RBC AUTO: 96 FL (ref 78–100)
MONOCYTES # BLD AUTO: 0.3 10E9/L (ref 0–1.3)
MONOCYTES NFR BLD AUTO: 4.3 %
MUCOUS THREADS #/AREA URNS LPF: PRESENT /LPF
NEUTROPHILS # BLD AUTO: 6.5 10E9/L (ref 1.6–8.3)
NEUTROPHILS NFR BLD AUTO: 81.8 %
NITRATE UR QL: NEGATIVE
NRBC # BLD AUTO: 0 10*3/UL
NRBC BLD AUTO-RTO: 0 /100
PH UR STRIP: 5.5 PH (ref 5–7)
PLATELET # BLD AUTO: 325 10E9/L (ref 150–450)
POTASSIUM SERPL-SCNC: 4 MMOL/L (ref 3.4–5.3)
RBC # BLD AUTO: 3.89 10E12/L (ref 4.4–5.9)
RBC #/AREA URNS AUTO: 6 /HPF (ref 0–2)
SODIUM SERPL-SCNC: 141 MMOL/L (ref 133–144)
SOURCE: ABNORMAL
SP GR UR STRIP: 1.02 (ref 1–1.03)
SQUAMOUS #/AREA URNS AUTO: 1 /HPF (ref 0–1)
UROBILINOGEN UR STRIP-MCNC: NORMAL MG/DL (ref 0–2)
WBC # BLD AUTO: 7.9 10E9/L (ref 4–11)
WBC #/AREA URNS AUTO: 1 /HPF (ref 0–5)

## 2019-05-19 PROCEDURE — 70553 MRI BRAIN STEM W/O & W/DYE: CPT

## 2019-05-19 PROCEDURE — 81001 URINALYSIS AUTO W/SCOPE: CPT | Performed by: EMERGENCY MEDICINE

## 2019-05-19 PROCEDURE — 99285 EMERGENCY DEPT VISIT HI MDM: CPT | Mod: 25

## 2019-05-19 PROCEDURE — A9585 GADOBUTROL INJECTION: HCPCS | Performed by: EMERGENCY MEDICINE

## 2019-05-19 PROCEDURE — 85025 COMPLETE CBC W/AUTO DIFF WBC: CPT | Performed by: EMERGENCY MEDICINE

## 2019-05-19 PROCEDURE — 70549 MR ANGIOGRAPH NECK W/O&W/DYE: CPT

## 2019-05-19 PROCEDURE — 80048 BASIC METABOLIC PNL TOTAL CA: CPT | Performed by: EMERGENCY MEDICINE

## 2019-05-19 PROCEDURE — 70544 MR ANGIOGRAPHY HEAD W/O DYE: CPT

## 2019-05-19 PROCEDURE — 93005 ELECTROCARDIOGRAM TRACING: CPT

## 2019-05-19 PROCEDURE — 85610 PROTHROMBIN TIME: CPT | Performed by: EMERGENCY MEDICINE

## 2019-05-19 PROCEDURE — 25500064 ZZH RX 255 OP 636: Performed by: EMERGENCY MEDICINE

## 2019-05-19 RX ORDER — GADOBUTROL 604.72 MG/ML
10 INJECTION INTRAVENOUS ONCE
Status: COMPLETED | OUTPATIENT
Start: 2019-05-19 | End: 2019-05-19

## 2019-05-19 RX ADMIN — GADOBUTROL 10 ML: 604.72 INJECTION INTRAVENOUS at 10:08

## 2019-05-19 ASSESSMENT — ENCOUNTER SYMPTOMS
EYES NEGATIVE: 1
ABDOMINAL PAIN: 0
DIARRHEA: 0
WEAKNESS: 1
FEVER: 0

## 2019-05-19 ASSESSMENT — MIFFLIN-ST. JEOR: SCORE: 1431.14

## 2019-05-19 NOTE — ED AVS SNAPSHOT
Emergency Department  [address] [place] [address]  Phone:  [phone]  Fax:  [phone]                                    Shamir Concepcion   MRN: [id number]    Department:   Emergency Department   Date of Visit:  5/19/2019           After Visit Summary Signature Page    I have received my discharge instructions, and my questions have been answered. I have discussed any challenges I see with this plan with the nurse or doctor.    ..........................................................................................................................................  Patient/Patient Representative Signature      ..........................................................................................................................................  Patient Representative Print Name and Relationship to Patient    ..................................................               ................................................  Date                                   Time    ..........................................................................................................................................  Reviewed by Signature/Title    ...................................................              ..............................................  Date                                               Time          22EPIC Rev 08/18

## 2019-05-19 NOTE — ED PROVIDER NOTES
History     Chief Complaint:  Generalized Weakness      HPI   Shamir Concepcion is a 78 year old male with history of cerebral infarction, vertebral dissection on warfarin, BPH, and PAD who presents to the emergency department today for evaluation of episode of  generalized weakness. The patient reports yesterday he was sitting while watching TV, and he suddenly got an upset stomach and went to the kitchen. He then lowered himself to the ground in a controlled manner, and had one episode of vomiting. He says he wasn't dizzy, and this was a clear lowering to the ground. He says he had a lack of strength and wasn't sure of his balance, and due to this stayed on the ground for a couple of hours. He states he recalls the whole event and doesn't think he had syncopal event. He pressed his Life Alert button, but told them not to come as he was eventually able to get himself up. His daughter was concerned of these symptoms due to his history of stroke, and he presented to the emergency department today. He reports that he doesn't remember eating dinner yesterday. Additionally, he lives at home by himself without assistance. He denies recent illness, chest pain, diarrhea, abdominal pain, fever and vision changes. Of note, he is currently on Warfarin.     Allergies:  No Known Drug Allergies        Medications:    acetaminophen (TYLENOL) 325 MG tablet  Finasteride (PROSCAR PO)  fluticasone (FLONASE ALLERGY RELIEF) 50 MCG/ACT spray  folic acid 800 MCG TABS  methotrexate 2.5 MG tablet CHEMO  miconazole (MICATIN; MICRO GUARD) 2 % powder  predniSONE (DELTASONE) 5 MG tablet  rosuvastatin (CRESTOR) 20 MG tablet  senna-docusate (SENOKOT-S;PERICOLACE) 8.6-50 MG per tablet  tamsulosin (FLOMAX) 0.4 MG capsule  Warfarin Sodium (COUMADIN PO)      Past Medical History:    Allergic rhinitis  BPH   Cerebral infarction  Colon polyps  Erectile dysfunction  PAD  RA      Past Surgical History:    Angiogram  C MRA Upper extremity  "WO&Wcont  T&A  Laminect/discectomy lumbar  Orthopedic surgery  Phacoemulsification clear cornea with standard intraocular lens implant  Sinus surgery   Vascular surgery      Family History:    Cancer  Cerebrovascular disease      Social History:  The patient was accompanied to the ED by daughter.  Smoking Status: Former Smoker  Smokeless Tobacco: Never Used  Alcohol Use: Yes   Marital Status:   [5]       Review of Systems   Constitutional: Negative for fever.   Eyes: Negative.    Cardiovascular: Negative for chest pain.   Gastrointestinal: Negative for abdominal pain and diarrhea.   Neurological: Positive for weakness.   All other systems reviewed and are negative.    Physical Exam   First Vitals:  BP: 116/52  Pulse: 58  Heart Rate: 85  Temp: 97.7  F (36.5  C)  Resp: 20  Height: 175.3 cm (5' 9\")  Weight: 72.6 kg (160 lb)  SpO2: 95 %      Physical Exam  VS: Reviewed per above  HENT: Mucous membranes moist  EYES: sclera anicteric  CV: Rate as noted, regular rhythm.   RESP: Effort normal. Breath sounds are normal bilaterally.  GI: no tenderness, not distended.  NEURO: GCS 15, cranial nerves II through XII are intact, 5 out of 5 strength in all 4 extremities, sensation is intact light touch in all 4 extremities. Normal FNF testing BL. Negative Romberg.  MSK: No deformity of the extremities  SKIN: Warm and dry      Emergency Department Course     ECG:  Indication: general weakness  Completed at 0746.  Read at 0758.   Sinus bradycardia with 1st degree AV block. Low voltage QRS. Cannot rule out Anterior infarct, age undetermined. Abnormal ECG.   Rate 58 bpm. UT interval 216. QRS duration 86. QT/QTc 452/443. P-R-T axes 61 29 14.     Imaging:  Radiology findings were communicated with the patient who voiced understanding of the findings.    MR Neck w/o & w Contrast Angiogram  IMPRESSION:    1. No change compared to 7/20/2018.  2. Chronic left vertebral artery occlusion with reconstitution at the  distal V2 segment via " paraspinal muscular collaterals.  3. Markedly diminutive right vertebral artery with reconstitution at  the V3 segment via the right external carotid artery (occipital  branch).  4. No evidence of flow-limiting stenosis at the carotid bifurcations.  Report per radiology      MR Brain w/o & w Contrast  IMPRESSION:  1. No evidence of acute ischemia or hemorrhage.  2. Expected evolution of multiple old infarcts with findings of  cerebellar laminar necrosis.  Report per radiology      MR Head w/o Contrast Angiogram  IMPRESSION:  1. Scattered intracranial atherosclerotic irregularity without  evidence of large vessel occlusion.  2. Asymmetrically decreased flow within the left posterior inferior  cerebral artery consistent with prior occlusion. Findings are improved  compared to 7/21/2018 CTA  Report per radiology      Laboratory:  Laboratory findings were communicated with the patient who voiced understanding of the findings.    CBC: WBC 7.9, HGB 12.3 (L),    BMP: Glucose 107 (H), Creatinine 1.32 (H), GFR Estimate 51 (L) o/w WNL    INR: 1.23 (H)     UA: Yellow and Clear. Urine blood small, Protein Albumin Urine 10, RBC/HPF 6 (H), Mucous Urine Present o/w WNL      Interventions:  1008 Gadavist 10 mLs IV       Emergency Department Course:  Nursing notes and vitals reviewed.  0720: I performed an exam of the patient as documented above.    IV was inserted and blood was drawn for laboratory testing, results above.    The patient provided a urine sample here in the emergency department. This was sent for laboratory testing, findings above.    The patient was sent for a MR Neck, MR Brain, MR Head while in the emergency department, results above.    0738 I spoke with Neurology service regarding patient's presentation, findings, and plan of care.     Findings and plan explained to the Patient. Patient discharged home with instructions regarding supportive care, medications, and reasons to return. The importance of close  follow-up was reviewed.   I personally reviewed the laboratory and imaging results with the Patient and answered all related questions prior to discharge.   Impression & Plan      Medical Decision Making:  Patient presents to the ER for evaluation of episode of generalized weakness.  Initial vital signs unrevealing.  Exam reveals no focal neurological deficits.  Based on history it does not sound like syncopal event.  Given medical history of vertebral dissection and PICA stroke, I was worried for possible TIA with truncal ataxia leading to patient's episode.  Discussed the case with stroke neurology and they recommended MRI, MRA of the brain and neck.  Other labs in the ER were unrevealing. MR imaging revealed no acute findings from prior. Other labs and UA and ECG were unrevealing. Pt and daughter reassured. Plan for recheck in clinic setting. Close return precautions discussed.     Diagnosis:    ICD-10-CM    1. Episode of generalized weakness R53.1        Disposition:  Discharged to home.      Scribe Disclosure:  I, Estefani Damon, am serving as a scribe at 7:18 AM on 5/19/2019 to document services personally performed by Brandin Hernandez MD based on my observations and the provider's statements to me.    Estefani Damon  5/19/2019    EMERGENCY DEPARTMENT       Brandin Hernandez MD  05/19/19 [id number]

## 2020-05-05 ENCOUNTER — TELEPHONE (OUTPATIENT)
Dept: OTHER | Facility: CLINIC | Age: 80
End: 2020-05-05

## 2020-05-05 DIAGNOSIS — I73.9 PAD (PERIPHERAL ARTERY DISEASE) (H): Primary | ICD-10-CM

## 2020-05-05 NOTE — TELEPHONE ENCOUNTER
Spoke with patient.  Doing well, denies claudication. No rest, skin is intact.  Motor function is intact, bilateral legs are warm and pink.  Denies pain.  Will schedule patient for follow up 4/2021 as previously requested with repeat imaging and consult with Adelita Monae RN  IR nurse clinician  [phone]

## 2021-06-04 ENCOUNTER — DOCUMENTATION ONLY (OUTPATIENT)
Dept: OTHER | Facility: CLINIC | Age: 81
End: 2021-06-04

## 2021-06-28 ENCOUNTER — HOSPITAL ENCOUNTER (OUTPATIENT)
Dept: ULTRASOUND IMAGING | Facility: CLINIC | Age: 81
End: 2021-06-28
Attending: RADIOLOGY
Payer: COMMERCIAL

## 2021-06-28 ENCOUNTER — OFFICE VISIT (OUTPATIENT)
Dept: OTHER | Facility: CLINIC | Age: 81
End: 2021-06-28
Attending: RADIOLOGY
Payer: COMMERCIAL

## 2021-06-28 VITALS — SYSTOLIC BLOOD PRESSURE: 130 MMHG | HEART RATE: 55 BPM | TEMPERATURE: 96.6 F | DIASTOLIC BLOOD PRESSURE: 71 MMHG

## 2021-06-28 DIAGNOSIS — I73.9 PAD (PERIPHERAL ARTERY DISEASE) (H): ICD-10-CM

## 2021-06-28 DIAGNOSIS — I73.9 PAD (PERIPHERAL ARTERY DISEASE) (H): Primary | ICD-10-CM

## 2021-06-28 PROCEDURE — 93924 LWR XTR VASC STDY BILAT: CPT

## 2021-06-28 PROCEDURE — G0463 HOSPITAL OUTPT CLINIC VISIT: HCPCS

## 2021-06-28 PROCEDURE — 93926 LOWER EXTREMITY STUDY: CPT | Mod: RT

## 2021-06-28 NOTE — PROGRESS NOTES
[facility]      Patient is in clinic today to see Dr. Benites.      Patient is here for a  follow up      Pt is currently taking Statin and Warfarin.    Patient's condition is stable.    /71 (BP Location: Left arm, Patient Position: Chair, Cuff Size: Adult Regular)   Pulse 55   Temp 96.6  F (35.9  C) (Temporal)     The provider has been notified that the patient has no concerns.     Questions patient would like addressed today are: N/A.    Refills are needed: No    At the end of the visit the patient was provided with education both verbally and on their AVS regarding follow up appointment(s).        Dee Tomlin MA

## 2021-06-29 NOTE — PATIENT INSTRUCTIONS
Repeat imaging in 1 year.  If stable and patient is asymptomatic we can call patient with results.  Instructed to contact us questions or concerns.

## 2021-06-29 NOTE — PROGRESS NOTES
VASCULAR OUTPATIENT CONSULT OR VISIT  PHYSICIAN:  Dr. Rambo Benites      LOCATION: [facility]    Shamir Concepcion   Medical Record #:  [id number]  YOB: 1940  Age:  81 year old     Date of Service: 6/28/2021    PRIMARY CARE PROVIDER: Gia Ref-Primary, Physician      HPI:  Shamir Concepcion is a 81 year old male who is accompanied by his daughter for follow up evaluation of right superficial femoral artery stent placement done on 9/11/2013 at [facility].  Today the patient presents for his 2-year follow-up.  Unfortunately, he reports that he did suffer a stroke back in July 2018.  This has been quite a lifestyle adjustment.  He does have some residual weakness and uses a cane for balance.  Today he denies any weakness in his legs, claudication or rest pain.  He has recently moved into a assisted living and is adjusting well.  He has been limited with his exercising due to the COVID-19 pandemic however more recently he has been able to start walking on a treadmill.  He currently is on Eliquis.    PHH:    Past Medical History:   Diagnosis Date     Allergic rhinitis      BPH (benign prostatic hyperplasia)      Cerebral infarction (H)      Colon polyps      ED (erectile dysfunction)      Family history of colon cancer      PAD (peripheral artery disease) (H)      RA (rheumatoid arthritis) (H)      Seronegative rheumatoid arthritis (H)         Past Surgical History:   Procedure Laterality Date     ANGIOGRAM       C MRA UPPER EXTREMITY WO&W CONT  stenting right SFA-AK pop     COLONOSCOPY      polyps     ENT SURGERY      T&A     LAMINECT/DISCECTOMY, LUMBAR       ORTHOPEDIC SURGERY       PHACOEMULSIFICATION CLEAR CORNEA WITH STANDARD INTRAOCULAR LENS IMPLANT Left 4/4/2017    Procedure: PHACOEMULSIFICATION CLEAR CORNEA WITH STANDARD INTRAOCULAR LENS IMPLANT;  Surgeon: Stevie Espinal MD;  [facility]     PHACOEMULSIFICATION CLEAR CORNEA WITH STANDARD INTRAOCULAR  LENS IMPLANT Right 5/2/2017    Procedure: PHACOEMULSIFICATION CLEAR CORNEA WITH STANDARD INTRAOCULAR LENS IMPLANT;  RIGHT EYE PHACOEMULSIFICATION CLEAR CORNEA WITH STANDARD INTRAOCULAR LENS IMPLANT;  Surgeon: Stevie Espinal MD;  Location: [facility]     SINUS SURGERY       VASCULAR SURGERY  R SFA stenting  2012       ALLERGIES:  Patient has no known allergies.    MEDS:    Current Outpatient Medications:      acetaminophen (TYLENOL) 325 MG tablet, Take 2 tablets (650 mg) by mouth every 4 hours as needed for mild pain, Disp: , Rfl:      Finasteride (PROSCAR PO), Take 5 mg by mouth daily, Disp: , Rfl:      fluticasone (FLONASE ALLERGY RELIEF) 50 MCG/ACT spray, Spray 1 spray into both nostrils daily, Disp: , Rfl:      folic acid 800 MCG TABS, Take 800 mcg by mouth daily, Disp: , Rfl:      methotrexate 2.5 MG tablet CHEMO, Take 5 tablets (12.5 mg) by mouth every 7 days Saturdays, Disp: , Rfl:      miconazole (MICATIN; MICRO GUARD) 2 % powder, Apply topically 2 times daily as needed (groin rash), Disp: , Rfl:      predniSONE (DELTASONE) 5 MG tablet, Take 1 tablet (5 mg) by mouth daily as needed for rheumatoid arthritis flare., Disp: , Rfl:      rosuvastatin (CRESTOR) 20 MG tablet, Take 1 tablet (20 mg) by mouth daily, Disp: , Rfl:      senna-docusate (SENOKOT-S;PERICOLACE) 8.6-50 MG per tablet, Take 2 tablets by mouth 2 times daily as needed for constipation, Disp: , Rfl:      tamsulosin (FLOMAX) 0.4 MG capsule, 1 tab twice daily, Disp: , Rfl:      Warfarin Sodium (COUMADIN PO), Take 6 mg by mouth daily , Disp: , Rfl:     SOCIAL HABITS:    History   Smoking Status     Former Smoker     Packs/day: 0.50     Years: 30.00     Types: Cigarettes     Quit date: 3/1/1999   Smokeless Tobacco     Never Used     Social History    Substance and Sexual Activity      Alcohol use: Yes        Comment: wine nightly      History   Drug Use No       FAMILY HISTORY:    Family History   Problem Relation Age of Onset     Cancer Mother       Cerebrovascular Disease Father        REVIEW OF SYSTEMS:    A 12 point ROS was reviewed and except for what is listed in the HPI above, all others are negative    PE:  /71 (BP Location: Left arm, Patient Position: Chair, Cuff Size: Adult Regular)   Pulse 55   Temp 96.6  F (35.9  C) (Temporal)   Wt Readings from Last 1 Encounters:   05/19/19 160 lb (72.6 kg)     There is no height or weight on file to calculate BMI.    EXAM:  GENERAL: This is a well-developed 81 year old male who appears his stated age  EYES: Grossly normal.  MOUTH: Buccal mucosa normal   MUSCULOSKELETAL: Grossly normal and both lower extremities are intact.  HEME/LYMPH: No lymphedema  NEUROLOGIC: Focally intact, Alert and oriented x 3.   PSYCH: appropriate affect  INTEGUMENT: No open lesions or ulcers  Lower extremity:  Distal pulses are strong and intact.  1+ dorsal pedis bilaterally 2+ posterior tibial artery bilaterally        DIAGNOSTIC STUDIES:     Images:  Us Bhavani Doppler With Exercise Bilateral    Result Date: 6/28/2021  ULTRASOUND BILATERAL ANKLE-BRACHIAL INDEX DOPPLER WITH EXERCISE  6/28/2021 12:19 PM HISTORY: Status post right SFA to above the knee popliteal stent done on 9/11/2013.  Comparison study done 4/29/2019.  One-year follow-up. PAD (peripheral artery disease) (H). COMPARISON: March 13, 2017 FINDINGS: Right BHAVANI: PT: 0.83, previously 1.04. DP: 0.79, previously 0.97. Left BHAVANI: PT: 0.86, previously 0.99. DP: 0.80, previously 1.0. Waveforms: Distal waveforms are triphasic/biphasic bilaterally. Exercise: The patient was exercised on a treadmill at 1 mph at a 10% incline for 5 minutes total. No exercise symptoms reported. Right exercise BHAVANI: 0.79, previously 1.04. Left exercise BHAVANI: 0.87, previously 0.93.     IMPRESSION: 1. Mild arterial insufficiency in the right lower extremity, slightly worsened since previous exam. 2. Mild arterial insufficiency in the left lower extremity, slightly worsened since previous exam. BHAVANI CRITERIA:  >1.4 NC 0.95-1.4 Normal 0.90 - 0.94 Mild 0.5 - 0.89 Moderate 0.2 - 0.49 Severe <0.2 Critical NAYAN BURLESON MD    Us Lower Extremity Arterial Duplex Right    Result Date: 6/28/2021  US RIGHT LOWER EXTREMITY ARTERIAL DUPLEX ULTRASOUND 6/28/2021 12:23 PM HISTORY: 81-year-old patient with peripheral arterial disease. Right SFA and above-knee popliteal arterial stent performed September 11, 2013. COMPARISON: April 29, 2019. TECHNIQUE: Color Doppler and spectrum waveform analysis performed throughout the stented area in the right SFA and popliteal arterial segments. FINDINGS: The stent in the right lower extremity is patent. Portions of stent obscured by calcified plaque. No velocity elevation to suggest stenosis.     IMPRESSION: Patent stent in the right lower extremity. NAYAN BURLESON MD      LABS:      Sodium   Date Value Ref Range Status   05/19/2019 141 133 - 144 mmol/L Final   08/20/2018 143 133 - 144 mmol/L Final   08/13/2018 142 133 - 144 mmol/L Final     Urea Nitrogen   Date Value Ref Range Status   05/19/2019 18 7 - 30 mg/dL Final   08/20/2018 13 7 - 30 mg/dL Final   08/13/2018 23 7 - 30 mg/dL Final     Hemoglobin   Date Value Ref Range Status   05/19/2019 12.3 (L) 13.3 - 17.7 g/dL Final   08/20/2018 12.0 (A) 13.3 - 17.7 g/dL Final   08/13/2018 12.6 (A) 13.3 - 17.7 g/dL Final     Platelet Count   Date Value Ref Range Status   05/19/2019 325 150 - 450 10e9/L Final   08/13/2018 324 150 - 450 10e9/L Final   08/08/2018 331 150 - 450 10e9/L Final     INR   Date Value Ref Range Status   05/19/2019 1.23 (H) 0.86 - 1.14 Final   08/09/2018 2.57 (H) 0.86 - 1.14 Final   08/08/2018 2.36 (H) 0.86 - 1.14 Final     Assessment/plan:  This is a pleasant 81-year-old gentleman who continues to do well following stenting of the right superficial femoral artery back in 2013.  Unfortunately, he did suffer stroke in July 2018.  We discussed his imaging that was performed today.  His right superficial femoral artery stent is patent.   The right exercise BHAVANI is 0.79 previously 1.04.  The left exercise BHAVANI is 0.87, previously .93  We discussed that there is a minimal decrease in his ABIs compared to previously.  However do that the patient is asymptomatic no intervention is currently warranted.  I recommended to the patient that we repeat ABIs and imaging of the arterial stent in 1 year.  We can call him with the results if stable.  The patient and the daughter agreed to the plan.      Dr. Rambo Benites MD  Interventional Radiology  Pager: [phone]  [facility]    Total time spent on 6/29/2021 was 30 minutes, including time spent counseling the patient, performing a medically appropriate evaluation, reviewing prior medical history, ordering medications and tests, documenting clinical information in the medical record, and communication of results.

## 2022-06-20 ENCOUNTER — HOSPITAL ENCOUNTER (OUTPATIENT)
Dept: ULTRASOUND IMAGING | Facility: CLINIC | Age: 82
Discharge: HOME | End: 2022-06-20
Attending: RADIOLOGY
Payer: COMMERCIAL

## 2022-06-20 ENCOUNTER — OFFICE VISIT (OUTPATIENT)
Dept: OTHER | Facility: CLINIC | Age: 82
End: 2022-06-20
Attending: RADIOLOGY
Payer: COMMERCIAL

## 2022-06-20 VITALS — DIASTOLIC BLOOD PRESSURE: 58 MMHG | HEART RATE: 59 BPM | SYSTOLIC BLOOD PRESSURE: 100 MMHG

## 2022-06-20 DIAGNOSIS — I73.9 PAD (PERIPHERAL ARTERY DISEASE) (H): Primary | ICD-10-CM

## 2022-06-20 DIAGNOSIS — I73.9 PAD (PERIPHERAL ARTERY DISEASE) (H): ICD-10-CM

## 2022-06-20 PROCEDURE — G0463 HOSPITAL OUTPT CLINIC VISIT: HCPCS | Mod: 25

## 2022-06-20 PROCEDURE — 93926 LOWER EXTREMITY STUDY: CPT | Mod: RT

## 2022-06-20 PROCEDURE — 93924 LWR XTR VASC STDY BILAT: CPT

## 2022-06-20 RX ORDER — VIT C/E/ZN/COPPR/LUTEIN/ZEAXAN 60 MG-6 MG
1 CAPSULE ORAL DAILY
Status: ON HOLD | COMMUNITY
End: 2024-02-17

## 2022-06-20 RX ORDER — LISINOPRIL 10 MG/1
10 TABLET ORAL DAILY
Status: ON HOLD | COMMUNITY
Start: 2021-10-14 | End: 2023-03-05

## 2022-06-20 RX ORDER — ALENDRONATE SODIUM 70 MG/1
70 TABLET ORAL
COMMUNITY
Start: 2021-09-20

## 2022-06-20 NOTE — PROGRESS NOTES
[facility]        Patient is here for a  follow up.     Pt is currently taking Statin and Eliquis.    /58 (BP Location: Left arm, Patient Position: Chair, Cuff Size: Adult Regular)   Pulse 59     The provider has been notified that the patient has no concerns.     Questions patient would like addressed today are: N/A.    Refills are needed: N/A    Has homecare services and agency name:  Gia Tomlin MA

## 2022-06-21 NOTE — RESULT ENCOUNTER NOTE
Discussed during consult on 6/20/2022 with Dr. Benites.  Adelita Rios RN  IR nurse clinician  [phone]

## 2022-06-21 NOTE — PROGRESS NOTES
VASCULAR OUTPATIENT CONSULT OR VISIT  PHYSICIAN:  Dr. Rambo Benites      LOCATION: [facility]    Shamir Concepcion   Medical Record #:  [id number]  YOB: 1940  Age:  82 year old     Date of Service: 6/20/2022    PRIMARY CARE PROVIDER: Gia Ref-Primary, Physician      HPI:  Shamir Concepcion is a 82 year old male who is being seen today for follow-up evaluation of right superficial femoral artery stent placement that was done on 9/11/2013 at [facility].   He did suffer a stroke back in July 2018 which does affect his balance.  He does have some residual weakness and uses a cane for balance.  He exercises 3-4 times a week by walking on a treadmill and also riding a bike.  He denies any weakness in his legs, claudication or rest pain.  He currently is on Eliquis.    PHH:    Past Medical History:   Diagnosis Date     Allergic rhinitis      BPH (benign prostatic hyperplasia)      Cerebral infarction (H)      Colon polyps      ED (erectile dysfunction)      Family history of colon cancer      PAD (peripheral artery disease) (H)      RA (rheumatoid arthritis) (H)      Seronegative rheumatoid arthritis (H)         Past Surgical History:   Procedure Laterality Date     ANGIOGRAM       COLONOSCOPY      polyps     ENT SURGERY      T&A     LAMINECT/DISCECTOMY, LUMBAR       ORTHOPEDIC SURGERY       PHACOEMULSIFICATION CLEAR CORNEA WITH STANDARD INTRAOCULAR LENS IMPLANT Left 4/4/2017    Procedure: PHACOEMULSIFICATION CLEAR CORNEA WITH STANDARD INTRAOCULAR LENS IMPLANT;  Surgeon: Stevie Espinal MD;  Location: [facility]     PHACOEMULSIFICATION CLEAR CORNEA WITH STANDARD INTRAOCULAR LENS IMPLANT Right 5/2/2017    Procedure: PHACOEMULSIFICATION CLEAR CORNEA WITH STANDARD INTRAOCULAR LENS IMPLANT;  RIGHT EYE PHACOEMULSIFICATION CLEAR CORNEA WITH STANDARD INTRAOCULAR LENS IMPLANT;  Surgeon: Stevie Espinal MD;  Location: [facility]     SINUS SURGERY       VASCULAR SURGERY  R SFA  stenting  2012     [facility] MRA UPPER EXTREMITY WO&W CONT  stenting right SFA-AK pop       ALLERGIES:  Patient has no known allergies.    MEDS:    Current Outpatient Medications:      acetaminophen (TYLENOL) 325 MG tablet, Take 2 tablets (650 mg) by mouth every 4 hours as needed for mild pain, Disp: , Rfl:      alendronate (FOSAMAX) 70 MG tablet, alendronate 70 mg tablet, Disp: , Rfl:      apixaban ANTICOAGULANT (ELIQUIS ANTICOAGULANT) 5 MG tablet, Eliquis 5 mg tablet, Disp: , Rfl:      Cholecalciferol (VITAMIN D-3) 125 MCG (5000 UT) TABS, , Disp: , Rfl:      folic acid 800 MCG TABS, Take 800 mcg by mouth daily, Disp: , Rfl:      lisinopril (ZESTRIL) 10 MG tablet, lisinopril 10 mg tablet, Disp: , Rfl:      methotrexate 2.5 MG tablet CHEMO, Take 5 tablets (12.5 mg) by mouth every 7 days Saturdays, Disp: , Rfl:      multivitamin  with lutein (OCUVITE WITH LUTEIN) CAPS per capsule, Take 1 capsule by mouth daily, Disp: , Rfl:      predniSONE (DELTASONE) 5 MG tablet, Take 1 tablet (5 mg) by mouth daily as needed for rheumatoid arthritis flare., Disp: , Rfl:      rosuvastatin (CRESTOR) 20 MG tablet, Take 1 tablet (20 mg) by mouth daily, Disp: , Rfl:      Finasteride (PROSCAR PO), Take 5 mg by mouth daily (Patient not taking: Reported on 6/20/2022), Disp: , Rfl:      fluticasone (FLONASE) 50 MCG/ACT nasal spray, Spray 1 spray into both nostrils daily (Patient not taking: Reported on 6/20/2022), Disp: , Rfl:      miconazole (MICATIN; MICRO GUARD) 2 % powder, Apply topically 2 times daily as needed (groin rash) (Patient not taking: Reported on 6/20/2022), Disp: , Rfl:      senna-docusate (SENOKOT-S;PERICOLACE) 8.6-50 MG per tablet, Take 2 tablets by mouth 2 times daily as needed for constipation (Patient not taking: Reported on 6/20/2022), Disp: , Rfl:      tamsulosin (FLOMAX) 0.4 MG capsule, 1 tab twice daily (Patient not taking: Reported on 6/20/2022), Disp: , Rfl:      Warfarin Sodium (COUMADIN PO), Take 6 mg by mouth daily   (Patient not taking: Reported on 6/20/2022), Disp: , Rfl:     SOCIAL HABITS:    History   Smoking Status     Former Smoker     Packs/day: 0.50     Years: 30.00     Types: Cigarettes     Quit date: 3/1/1999   Smokeless Tobacco     Never Used     Social History    Substance and Sexual Activity      Alcohol use: Yes        Comment: wine nightly      History   Drug Use No       FAMILY HISTORY:    Family History   Problem Relation Age of Onset     Cancer Mother      Cerebrovascular Disease Father        REVIEW OF SYSTEMS:    A 12 point ROS was reviewed and except for what is listed in the HPI above, all others are negative    PE:  /58 (BP Location: Left arm, Patient Position: Chair, Cuff Size: Adult Regular)   Pulse 59   Wt Readings from Last 1 Encounters:   05/19/19 160 lb (72.6 kg)     There is no height or weight on file to calculate BMI.    EXAM:  GENERAL: This is a well-developed 82 year old male who appears his stated age  EYES: Grossly normal.  MOUTH: Buccal mucosa normal   MUSCULOSKELETAL: Grossly normal and both lower extremities are intact.  HEME/LYMPH: No lymphedema  NEUROLOGIC: Focally intact, Alert and oriented x 3.   PSYCH: appropriate affect  INTEGUMENT: No open lesions or ulcers  Lower extremity: No swelling.  Strong right Doppler pulses in the dorsalis pedis and posterior tib.  Left dorsalis pedis is palpable, posterior tibial artery is Doppler  Feet are free of ulcerations and warm and pink        DIAGNOSTIC STUDIES:     Images:  US BHAVANI Doppler with Exercise Bilateral    Result Date: 6/20/2022  US ANKLE-BRACHIAL INDEX DOPPLER WITH EXERCISE BILATERAL   6/20/2022 12:43 PM HISTORY: History of right SFA stent done 2013. Comparison stent done 6/28/2021. One year follow-up. PAD (peripheral artery disease) (H). COMPARISON: June 28, 2021 FINDINGS: Right BHAVANI: PT: 64, index of 0.65, previously 0.83. DP: 40, index of 0.4, previously 0.79 Left BHAVANI: PT: 86, index of 0.87, previously 0.86. DP: 88, index of  0.89, previously 0.8 Waveforms: Distal waveforms in the right PT and DP are monophasic, previously biphasic/triphasic. Distal left PT and DP are triphasic/biphasic. Exercise: The patient was exercised on a treadmill at 1 mph at a 10% incline for 3 minutes total. Exercise discontinued at 3 minutes for safety reasons, patient describes mild bilateral leg tightness.. Right exercise BHAVANI: 0.74, previously 0.79. Left exercise BHAVANI: 0.96, previously 0.87     IMPRESSION: 1. Moderate arterial insufficiency in the right lower extremity, diminished from previous exam. 2. Minimal arterial insufficiency in the left lower extremity, unchanged. BHAVANI CRITERIA: >1.4 NC 0.95-1.4 Normal 0.90 - 0.94 Mild 0.5 - 0.89 Moderate 0.2 - 0.49 Severe <0.2 Critical NAYAN BURLESON MD   SYSTEM ID:  [id number]    US Lower Extremity Arterial Duplex Right    Result Date: 6/20/2022  ULTRASOUND RIGHT LOWER EXTREMITY ARTERIAL DUPLEX  6/20/2022 12:43 PM HISTORY:  82-year-old patient with history of peripheral arterial disease. Patient has history of right SFA stent performed in 2013 in the distal SFA and popliteal artery. COMPARISON: June 28, 2021. TECHNIQUE: Color Doppler and spectral waveform analysis obtained in the right lower extremity arteries, at the level of the stent and inflow/outflow arteries. FINDINGS: Inflow native superficial femoral artery has elevated velocity of 385/16 cm/sec with visible stenosis. The stent appears patent, though with diminished velocities within the stent ranging from 20-44 cm/sec in the immediate vicinity of the stent. Waveforms are diffusely monophasic.     IMPRESSION: 1. Inflow stenosis and native SFA. 2. Stent is patent, though with diminished velocities and diffusely monophasic waveforms. NAYAN BURLESON MD   SYSTEM ID:  [id number]          LABS:      Sodium   Date Value Ref Range Status   05/19/2019 141 133 - 144 mmol/L Final   08/20/2018 143 133 - 144 mmol/L Final   08/13/2018 142 133 - 144 mmol/L Final     Urea  Nitrogen   Date Value Ref Range Status   05/19/2019 18 7 - 30 mg/dL Final   08/20/2018 13 7 - 30 mg/dL Final   08/13/2018 23 7 - 30 mg/dL Final     Hemoglobin   Date Value Ref Range Status   05/19/2019 12.3 (L) 13.3 - 17.7 g/dL Final   08/20/2018 12.0 (A) 13.3 - 17.7 g/dL Final   08/13/2018 12.6 (A) 13.3 - 17.7 g/dL Final     Platelet Count   Date Value Ref Range Status   05/19/2019 325 150 - 450 10e9/L Final   08/13/2018 324 150 - 450 10e9/L Final   08/08/2018 331 150 - 450 10e9/L Final     INR   Date Value Ref Range Status   05/19/2019 1.23 (H) 0.86 - 1.14 Final   08/09/2018 2.57 (H) 0.86 - 1.14 Final   08/08/2018 2.36 (H) 0.86 - 1.14 Final     Assessment/plan:  This is a pleasant 82-year-old gentleman who continues to deny claudication symptoms following stenting of the right superficial femoral artery back in 2013.  We discussed the results his imaging that was performed today.  There is an inflow stenosis at the native superficial femoral artery.  The stent is patent though with diminished velocities and diffuse to monophasic waveforms through the stent.  The right BHAVANI index is 0.65, previously 0.83.  The left BHAVANI index is 0.87 previously 0.86.  The distal waveforms in the right PT and DP are monophasic.  I recommended to the patient that we do an angiogram of the right leg for treatment of stenosis.  The patient is not interested in any intervention at this time.  We did discuss that over time this area will likely narrow down to the point where it will not be able to be recannulized and that our hope is that he would develop collaterals or if he develops symptoms a bad past would be needed.  The patient would like to further discuss with his daughter.  If we postpone the angiogram I do recommend that we follow-up in 4 months with repeat imaging.  The patient is agreeable to this plan.    This was a in person visit in which 30 minutes of  total time was spent (either in face-to-face or non-face-to-face  time).    Dr. Rambo Benites MD  Interventional Radiology  Pager: [phone]  [facility]

## 2022-06-21 NOTE — RESULT ENCOUNTER NOTE
Discussed during consult on 6/20/2022 with Dr. Benites.  Adelita Rios RN  IR nurse clinician  [phone]'

## 2022-06-22 DIAGNOSIS — I73.9 PAD (PERIPHERAL ARTERY DISEASE) (H): Primary | ICD-10-CM

## 2022-07-06 ENCOUNTER — TELEPHONE (OUTPATIENT)
Dept: OTHER | Facility: CLINIC | Age: 82
End: 2022-07-06

## 2022-07-06 NOTE — TELEPHONE ENCOUNTER
Left message for daughter Dorcas to schedule angiogram for patient.  Adelita Rios RN  IR nurse clinician  [phone]

## 2022-07-07 ENCOUNTER — TELEPHONE (OUTPATIENT)
Dept: OTHER | Facility: CLINIC | Age: 82
End: 2022-07-07

## 2022-07-12 ENCOUNTER — TELEPHONE (OUTPATIENT)
Dept: OTHER | Facility: CLINIC | Age: 82
End: 2022-07-12

## 2022-07-12 DIAGNOSIS — I67.858 OTHER HEREDITARY CEREBROVASCULAR DISEASE: ICD-10-CM

## 2022-07-12 DIAGNOSIS — I73.9 PAD (PERIPHERAL ARTERY DISEASE) (H): Primary | ICD-10-CM

## 2022-07-12 NOTE — TELEPHONE ENCOUNTER
Left message on VM for daughter Dorcas to schedule angiogram.  Adelita Rios RN  IR nurse clinician  [phone]     S/P foot surgery, right

## 2022-07-14 ENCOUNTER — HOSPITAL ENCOUNTER (OUTPATIENT)
Facility: CLINIC | Age: 82
End: 2022-07-14
Admitting: RADIOLOGY
Payer: COMMERCIAL

## 2022-07-20 NOTE — TELEPHONE ENCOUNTER
Patient scheduled for 8/12: angiogram with Dr. Benites.  Adelita Rios RN  IR nurse clinician  [phone]

## 2022-07-21 ENCOUNTER — HOSPITAL ENCOUNTER (INPATIENT)
Facility: CLINIC | Age: 82
LOS: 1 days | Discharge: NURSING, REHAB OR LONG-TERM CARE | DRG: 563 | End: 2022-07-24
Attending: EMERGENCY MEDICINE | Admitting: STUDENT IN AN ORGANIZED HEALTH CARE EDUCATION/TRAINING PROGRAM
Payer: COMMERCIAL

## 2022-07-21 ENCOUNTER — APPOINTMENT (OUTPATIENT)
Dept: CT IMAGING | Facility: CLINIC | Age: 82
DRG: 563 | End: 2022-07-21
Attending: EMERGENCY MEDICINE
Payer: COMMERCIAL

## 2022-07-21 ENCOUNTER — APPOINTMENT (OUTPATIENT)
Dept: GENERAL RADIOLOGY | Facility: CLINIC | Age: 82
DRG: 563 | End: 2022-07-21
Attending: EMERGENCY MEDICINE
Payer: COMMERCIAL

## 2022-07-21 DIAGNOSIS — S09.90XA CLOSED HEAD INJURY, INITIAL ENCOUNTER: ICD-10-CM

## 2022-07-21 DIAGNOSIS — M06.8A: ICD-10-CM

## 2022-07-21 DIAGNOSIS — S01.112A EYEBROW LACERATION, LEFT, INITIAL ENCOUNTER: ICD-10-CM
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
## 2022-07-21 DIAGNOSIS — M25.512 ACUTE PAIN OF LEFT SHOULDER: ICD-10-CM

## 2022-07-21 DIAGNOSIS — Z92.29 HX OF LONG-TERM (CURRENT) USE OF ANTICOAGULANTS: ICD-10-CM

## 2022-07-21 DIAGNOSIS — S42.252A CLOSED DISPLACED FRACTURE OF GREATER TUBEROSITY OF LEFT HUMERUS, INITIAL ENCOUNTER: ICD-10-CM

## 2022-07-21 DIAGNOSIS — I63.9 CEREBROVASCULAR ACCIDENT (CVA), UNSPECIFIED MECHANISM (H): ICD-10-CM

## 2022-07-21 DIAGNOSIS — H05.232 PERIORBITAL HEMATOMA OF LEFT EYE: ICD-10-CM

## 2022-07-21 DIAGNOSIS — S42.292A CLOSED FRACTURE OF HEAD OF LEFT HUMERUS, INITIAL ENCOUNTER: Primary | ICD-10-CM

## 2022-07-21 DIAGNOSIS — S43.005A SHOULDER DISLOCATION, LEFT, INITIAL ENCOUNTER: ICD-10-CM

## 2022-07-21 LAB
ANION GAP SERPL CALCULATED.3IONS-SCNC: 7 MMOL/L (ref 3–14)
BASOPHILS # BLD AUTO: 0 10E3/UL (ref 0–0.2)
BASOPHILS NFR BLD AUTO: 0 %
BUN SERPL-MCNC: 23 MG/DL (ref 7–30)
CALCIUM SERPL-MCNC: 9.1 MG/DL (ref 8.5–10.1)
CHLORIDE BLD-SCNC: 107 MMOL/L (ref 94–109)
CO2 SERPL-SCNC: 25 MMOL/L (ref 20–32)
CREAT SERPL-MCNC: 1.43 MG/DL (ref 0.66–1.25)
EOSINOPHIL # BLD AUTO: 0 10E3/UL (ref 0–0.7)
EOSINOPHIL NFR BLD AUTO: 0 %
ERYTHROCYTE [DISTWIDTH] IN BLOOD BY AUTOMATED COUNT: 15.2 % (ref 10–15)
GFR SERPL CREATININE-BSD FRML MDRD: 49 ML/MIN/1.73M2
GLUCOSE BLD-MCNC: 180 MG/DL (ref 70–99)
HCT VFR BLD AUTO: 35.7 % (ref 40–53)
HGB BLD-MCNC: 11.6 G/DL (ref 13.3–17.7)
IMM GRANULOCYTES # BLD: 0.1 10E3/UL
IMM GRANULOCYTES NFR BLD: 1 %
LYMPHOCYTES # BLD AUTO: 0.8 10E3/UL (ref 0.8–5.3)
LYMPHOCYTES NFR BLD AUTO: 6 %
MCH RBC QN AUTO: 31.3 PG (ref 26.5–33)
MCHC RBC AUTO-ENTMCNC: 32.5 G/DL (ref 31.5–36.5)
MCV RBC AUTO: 96 FL (ref 78–100)
MONOCYTES # BLD AUTO: 0.5 10E3/UL (ref 0–1.3)
MONOCYTES NFR BLD AUTO: 4 %
NEUTROPHILS # BLD AUTO: 11.5 10E3/UL (ref 1.6–8.3)
NEUTROPHILS NFR BLD AUTO: 89 %
NRBC # BLD AUTO: 0 10E3/UL
NRBC BLD AUTO-RTO: 0 /100
PLATELET # BLD AUTO: 408 10E3/UL (ref 150–450)
POTASSIUM BLD-SCNC: 4.1 MMOL/L (ref 3.4–5.3)
RBC # BLD AUTO: 3.71 10E6/UL (ref 4.4–5.9)
SARS-COV-2 RNA RESP QL NAA+PROBE: NEGATIVE
SODIUM SERPL-SCNC: 139 MMOL/L (ref 133–144)
WBC # BLD AUTO: 12.9 10E3/UL (ref 4–11)

## 2022-07-21 PROCEDURE — 0HQ1XZZ REPAIR FACE SKIN, EXTERNAL APPROACH: ICD-10-PCS | Performed by: EMERGENCY MEDICINE

## 2022-07-21 PROCEDURE — 999N000065 XR SHOULDER LEFT PORT G/E 2 VIEWS: Mod: LT

## 2022-07-21 PROCEDURE — 36415 COLL VENOUS BLD VENIPUNCTURE: CPT | Performed by: EMERGENCY MEDICINE

## 2022-07-21 PROCEDURE — 85025 COMPLETE CBC W/AUTO DIFF WBC: CPT | Performed by: EMERGENCY MEDICINE

## 2022-07-21 PROCEDURE — 73030 X-RAY EXAM OF SHOULDER: CPT | Mod: LT

## 2022-07-21 PROCEDURE — 12011 RPR F/E/E/N/L/M 2.5 CM/<: CPT

## 2022-07-21 PROCEDURE — 258N000003 HC RX IP 258 OP 636: Performed by: EMERGENCY MEDICINE

## 2022-07-21 PROCEDURE — 96376 TX/PRO/DX INJ SAME DRUG ADON: CPT

## 2022-07-21 PROCEDURE — 250N000011 HC RX IP 250 OP 636: Performed by: EMERGENCY MEDICINE

## 2022-07-21 PROCEDURE — C9803 HOPD COVID-19 SPEC COLLECT: HCPCS

## 2022-07-21 PROCEDURE — 71046 X-RAY EXAM CHEST 2 VIEWS: CPT

## 2022-07-21 PROCEDURE — 99220 PR INITIAL OBSERVATION CARE,LEVEL III: CPT | Performed by: STUDENT IN AN ORGANIZED HEALTH CARE EDUCATION/TRAINING PROGRAM

## 2022-07-21 PROCEDURE — U0005 INFEC AGEN DETEC AMPLI PROBE: HCPCS | Performed by: EMERGENCY MEDICINE

## 2022-07-21 PROCEDURE — G0378 HOSPITAL OBSERVATION PER HR: HCPCS

## 2022-07-21 PROCEDURE — 99291 CRITICAL CARE FIRST HOUR: CPT | Mod: 25

## 2022-07-21 PROCEDURE — 70450 CT HEAD/BRAIN W/O DYE: CPT

## 2022-07-21 PROCEDURE — 29105 APPLICATION LONG ARM SPLINT: CPT | Mod: LT

## 2022-07-21 PROCEDURE — 0RSKXZZ REPOSITION LEFT SHOULDER JOINT, EXTERNAL APPROACH: ICD-10-PCS | Performed by: EMERGENCY MEDICINE

## 2022-07-21 PROCEDURE — 96361 HYDRATE IV INFUSION ADD-ON: CPT

## 2022-07-21 PROCEDURE — 82248 BILIRUBIN DIRECT: CPT | Performed by: STUDENT IN AN ORGANIZED HEALTH CARE EDUCATION/TRAINING PROGRAM

## 2022-07-21 PROCEDURE — 82310 ASSAY OF CALCIUM: CPT | Performed by: EMERGENCY MEDICINE

## 2022-07-21 PROCEDURE — 96374 THER/PROPH/DIAG INJ IV PUSH: CPT

## 2022-07-21 RX ORDER — PROPOFOL 10 MG/ML
0.1 INJECTION, EMULSION INTRAVENOUS
Status: DISCONTINUED | OUTPATIENT
Start: 2022-07-21 | End: 2022-07-24 | Stop reason: HOSPADM

## 2022-07-21 RX ORDER — FENTANYL CITRATE 50 UG/ML
75 INJECTION, SOLUTION INTRAMUSCULAR; INTRAVENOUS
Status: COMPLETED | OUTPATIENT
Start: 2022-07-21 | End: 2022-07-21

## 2022-07-21 RX ORDER — TROSPIUM CHLORIDE 20 MG/1
20 TABLET, FILM COATED ORAL 2 TIMES DAILY
Status: ON HOLD | COMMUNITY
End: 2024-02-17

## 2022-07-21 RX ORDER — PROPOFOL 10 MG/ML
1 INJECTION, EMULSION INTRAVENOUS ONCE
Status: COMPLETED | OUTPATIENT
Start: 2022-07-21 | End: 2022-07-21

## 2022-07-21 RX ORDER — ROSUVASTATIN CALCIUM 10 MG/1
10 TABLET, COATED ORAL DAILY
COMMUNITY

## 2022-07-21 RX ADMIN — FENTANYL CITRATE 75 MCG: 50 INJECTION, SOLUTION INTRAMUSCULAR; INTRAVENOUS at 18:33

## 2022-07-21 RX ADMIN — SODIUM CHLORIDE 500 ML: 9 INJECTION, SOLUTION INTRAVENOUS at 18:27

## 2022-07-21 RX ADMIN — FENTANYL CITRATE 75 MCG: 50 INJECTION, SOLUTION INTRAMUSCULAR; INTRAVENOUS at 20:22

## 2022-07-21 RX ADMIN — PROPOFOL 40 MG: 10 INJECTION, EMULSION INTRAVENOUS at 21:31

## 2022-07-21 RX ADMIN — PROPOFOL 20 MG: 10 INJECTION, EMULSION INTRAVENOUS at 21:55

## 2022-07-21 RX ADMIN — FENTANYL CITRATE 75 MCG: 50 INJECTION, SOLUTION INTRAMUSCULAR; INTRAVENOUS at 18:58

## 2022-07-21 ASSESSMENT — ENCOUNTER SYMPTOMS
COUGH: 0
SHORTNESS OF BREATH: 0
DIFFICULTY URINATING: 0
ABDOMINAL PAIN: 0
FEVER: 0
ARTHRALGIAS: 1
WOUND: 1
FREQUENCY: 0
DYSURIA: 0
BLOOD IN STOOL: 0
DIARRHEA: 0

## 2022-07-21 NOTE — ED TRIAGE NOTES
Pt fell landing on the concrete floor, striking left shoulder and left forehead. Laceration noted to left eyebrow. C/o left shoulder pain. Pt is on eloquis

## 2022-07-21 NOTE — ED TRIAGE NOTES
Triage Assessment     Row Name 07/21/22 173       Triage Assessment (Adult)    Airway WDL WDL       Respiratory WDL    Respiratory WDL WDL       Skin Circulation/Temperature WDL    Skin Circulation/Temperature WDL --  eyebrow laceration       Cardiac WDL    Cardiac WDL WDL       Peripheral/Neurovascular WDL    Peripheral Neurovascular WDL WDL       Cognitive/Neuro/Behavioral WDL    Cognitive/Neuro/Behavioral WDL WDL

## 2022-07-21 NOTE — ED PROVIDER NOTES
History   Chief Complaint:  Fall, Head Injury, and Shoulder Injury     The history is provided by the patient and a relative.      Shamir Concepcion is a 82 year old male on blood thinners with history of stroke who presents for evaluation after a fall. The patient reports that he was cleaning up his room when he fell over. He denies any loss of consciousness, or symptoms before the fall including chest pain shortness of breath or abdominal pain. He currently reports left shoulder pain, has a laceration on his left eye, and a small abrasion on his left knee. He states that the pain in his shoulder is a 8/10. He denies any fever, cough, sore throat, bowel issues, or urinary issues. His daughter reports that he does not have frequent falls, but has a history of a stroke which is why he is on blood thinners. He states that his last meal was around lunch time. He took no medication since his fall.     Review of Systems   Constitutional: Negative for fever.   Respiratory: Negative for cough and shortness of breath.    Cardiovascular: Negative for chest pain.   Gastrointestinal: Negative for abdominal pain, blood in stool and diarrhea.   Genitourinary: Negative for difficulty urinating, dysuria and frequency.   Musculoskeletal: Positive for arthralgias (left shoulder).   Skin: Positive for wound (left eye and left knee).   All other systems reviewed and are negative.    Allergies:  The patient has no known allergies.     Medications:  Eliquis   Fosamax   Proscar   Flonase   Zestril   Deltasone   Crestor   Senokot  Flomax   Coumadin     Past Medical History:     Stroke   Hypertension   Rheumatoid arthritis   Hypercholesterolemia      Past Surgical History:    Vascular surgery   Sinus surgery   Intraocular lens placement   Orthopedic surgery   Laminectomy/ discectomy   T&A   Colonoscopy   Angiogram      Family History:    Cancer   Cerebrovascular disease     Social History:  The patient presents to the ED with his  "daughter.  Arrived via EMS.   Lives in an independent senior living center.    Physical Exam     Patient Vitals for the past 24 hrs:   BP Temp Temp src Pulse Resp SpO2 Height Weight   07/22/22 0000 -- -- -- -- -- -- 1.753 m (5' 9\") 70.3 kg (155 lb)   07/21/22 2300 (!) 143/85 98.5  F (36.9  C) Oral -- -- -- -- --   07/21/22 2142 -- -- -- 90 13 98 % -- --   07/21/22 2137 (!) 157/92 -- -- 87 11 99 % -- --   07/21/22 2136 -- -- -- 89 20 100 % -- --   07/21/22 2133 -- -- -- 112 18 99 % -- --   07/21/22 2130 (!) 138/114 -- -- 89 12 98 % -- --   07/21/22 2030 (!) 164/85 -- -- -- -- 93 % -- --   07/21/22 1830 -- -- -- -- -- -- -- 68.9 kg (151 lb 14.4 oz)   07/21/22 1815 -- -- -- -- -- 98 % -- --   07/21/22 1810 (!) 171/87 -- -- 73 -- -- -- --   07/21/22 1731 -- -- -- 65 -- 98 % -- --   07/21/22 1728 (!) 158/72 98.3  F (36.8  C) Temporal -- 18 -- -- --     Physical Exam  General: Alert, appears elderly, otherwise well-developed and well-nourished. Cooperative.     In mild distress  HEENT:  Head:  Left eyebrow hematoma with laceration.   Ears:  External ears are normal  Mouth/Throat:  Oropharynx is without erythema or exudate and mucous membranes are moist. No dental trauma.   Eyes:   Conjunctivae normal and EOM are normal. No scleral icterus.    Pupils are equal, round, and reactive to light.   CV:  Normal rate, regular rhythm, normal heart sounds and radial pulses are 2+ and symmetric.  No murmur.  Resp:  Breath sounds are clear bilaterally    Non-labored, no retractions or accessory muscle use  GI:  Abdomen is soft, no distension, no tenderness. No rebound or guarding.  No CVA tenderness bilaterally  MS:  Normal range of motion. No edema.    Normal strength in all 4 extremities.     Back atraumatic.    No midline cervical, thoracic, or lumbar tenderness    Left Shoulder:    The Clavicle is intact clinically    The AC joint is tender    A dislocation is palpated/appreciated clinically    The proximal humerus is " tender    The mid and distal shaft of the humerus are non-tender    Axillary nerve function is intact    Brachial and Radial Artery pulse is normal    Median, Ulnar, and Radial Nerve function distally are intact  Skin:  Warm and dry.  Laceration to left eyebrow  Neuro: Alert. Normal strength.  GCS: 15  Psych:  Normal mood and affect.    Emergency Department Course   Imaging:  XR Shoulder Left Port G/E 2 Views   Final Result   IMPRESSION: Successful interval reduction of the dislocated shoulder seen on the previous examination. This is now anatomic. There is a fracture of the greater tuberosity humerus but this does not appear to extend across the humeral neck and does not    involve the articular margin.       XR Shoulder Left 2 Views   Final Result   IMPRESSION: Anterior glenohumeral joint dislocation. Displaced fracture of the greater tuberosity and lateral aspect of the humeral head and neck. Degenerative arthrosis AC joint. Degenerative changes in the visualized spine. Diffuse bony    demineralization.       XR Chest 2 Views   Final Result   IMPRESSION: No pneumothorax, focal infiltrate or pleural effusion. Normal heart size. Tortuous aorta with atherosclerotic calcifications. Please see left shoulder radiograph for details regarding the left shoulder dislocation. No displaced rib fractures    visualized. Wedge compression deformities in the thoracic spine and osteopenia.      CT Head w/o Contrast   Final Result   IMPRESSION:   1.  Chronic left cerebellar infarct again noted.   2.  No CT evidence for acute intracranial process.   3.  Brain atrophy and presumed chronic microvascular ischemic changes as above.        Report per radiology    Laboratory:  Labs Ordered and Resulted from Time of ED Arrival to Time of ED Departure   BASIC METABOLIC PANEL - Abnormal       Result Value    Sodium 139      Potassium 4.1      Chloride 107      Carbon Dioxide (CO2) 25      Anion Gap 7      Urea Nitrogen 23      Creatinine  1.43 (*)     Calcium 9.1      Glucose 180 (*)     GFR Estimate 49 (*)    CBC WITH PLATELETS AND DIFFERENTIAL - Abnormal    WBC Count 12.9 (*)     RBC Count 3.71 (*)     Hemoglobin 11.6 (*)     Hematocrit 35.7 (*)     MCV 96      MCH 31.3      MCHC 32.5      RDW 15.2 (*)     Platelet Count 408      % Neutrophils 89      % Lymphocytes 6      % Monocytes 4      % Eosinophils 0      % Basophils 0      % Immature Granulocytes 1      NRBCs per 100 WBC 0      Absolute Neutrophils 11.5 (*)     Absolute Lymphocytes 0.8      Absolute Monocytes 0.5      Absolute Eosinophils 0.0      Absolute Basophils 0.0      Absolute Immature Granulocytes 0.1      Absolute NRBCs 0.0     COVID-19 VIRUS (CORONAVIRUS) BY PCR - Normal    SARS CoV2 PCR Negative          [facility]    -Laceration Repair    Date/Time: 7/21/2022 6:25 PM  Performed by: Champ Plummer MD  Authorized by: Champ Plummer MD     Risks, benefits and alternatives discussed.      ANESTHESIA (see MAR for exact dosages):     Anesthesia method:  Local infiltration    Local anesthetic:  Bupivacaine 0.5% w/o epi  LACERATION DETAILS     Location:  Face    Face location:  L eyebrow    Length (cm):  1.8    REPAIR TYPE:     Repair type:  Simple      EXPLORATION:     Wound exploration: wound explored through full range of motion and entire depth of wound probed and visualized      Contaminated: no      TREATMENT:     Area cleansed with:  Saline    Amount of cleaning:  Standard    Irrigation solution:  Sterile saline    Irrigation method:  Syringe    Visualized foreign bodies/material removed: no      SKIN REPAIR     Repair method:  Sutures    Suture size:  5-0    Suture material:  Nylon    Suture technique:  Simple interrupted    Number of sutures:  4    APPROXIMATION     Approximation:  Close    POST-PROCEDURE DETAILS     Dressing:  Antibiotic ointment and non-adherent dressing        PROCEDURE    Patient Tolerance:  Patient tolerated the procedure well with  no [facility]    -Dislocation - Upper Extremity    Date/Time: 7/21/2022 6:25 PM  Performed by: Champ Plummer MD  Authorized by: Champ Plummer MD     Emergent condition/consent implied    ED EVALUATION:      Assessment Time: 7/21/2022 7:13 PM      I have performed an Emergency Department Evaluation including taking a history and physical examination, this evaluation will be documented in the electronic medical record for this ED encounter.     Indication: Left shoulder fracture and dislocation    ASA Class: Class 3- Severe systemic disease, definite functional limitations    Mallampati: Grade 2- soft palate, base of uvula, tonsillar pillars, and portion of posterior pharyngeal wall visible    NPO Status: not NPO, emergent situation    UNIVERSAL PROTOCOL   Site Marked: No  Prior Images Obtained and Reviewed:  Yes  Required items: Required blood products, implants, devices and special equipment available    Patient identity confirmed:  Verbally with patient, arm band, provided demographic data and hospital-assigned identification number  Patient was reevaluated immediately before administering moderate or deep sedation or anesthesia  Confirmation Checklist:  Patient's identity using two indicators, relevant allergies, procedure was appropriate and matched the consent or emergent situation and correct equipment/implants were available  Time out: Immediately prior to the procedure a time out was called    Universal Protocol: the Joint Commission Universal Protocol was followed    Preparation: Patient was prepped and draped in usual sterile fashion      LOCATION     Location:  Shoulder    Shoulder location:  L shoulder    Shoulder dislocation type: anterior      Chronicity:  New    PRE PROCEDURE ASSESSMENT      Pre-procedure imaging:  X-ray    Imaging findings: dislocation present and fracture present      Fracture of greater humeral tuberosity: yes      Distal perfusion:  normal      SEDATION  Patient Sedated: Yes    Sedation Type:  Deep  Sedation:  Propofol  Vital signs: Vital signs monitored during sedation      ANESTHESIA (see MAR for exact dosages)      Anesthesia method:  None    PROCEDURE DETAILS      Manipulation performed: yes      Shoulder reduction method:  Direct traction and external rotation    Reduction successful: yes      Reduction confirmed with imaging: yes      Immobilization: Shoulder immobilizer.    Supplies used:  Prefabricated splint    POST PROCEDURE DETAILS     Neurological function: normal      Distal perfusion: normal      Range of motion: improved        PROCEDURE    Patient Tolerance:  Patient tolerated the procedure well with no immediate complications  Length of time physician/provider present for 1:1 monitoring during sedation: 10    Emergency Department Course:     Reviewed:  I reviewed nursing notes, vitals and past medical history    Assessments:  1811 I obtained history and examined the patient as noted above.   1857 I rechecked the patient and numbed his shoulder in order to fix his dislocation.  2101 I rechecked the patient and repaired his laceration, see procedure note above.   2125 I rechecked the patient and repaired his dislocated shoulder, see procedure not above.    Consults:  2220 I spoke with Dr. Mejia of the hospitalist service from [facility] regarding patient's presentation, findings, and plan of care.    Interventions:  1827 0.9% sodium chloride BOLUS 1L IV   1833 Fentanyl 75 mcg IV  1858 Fentanyl 75 mcg IV  2022 Fentanyl 75 mcg IV  2131 Diprivan 40 mg IV  2155 Diprivan 20 mg IV    Disposition:  The patient was admitted to the hospital under the care of Dr. Mejia.     Impression & Plan   Medical Decision Making:  Patient is a 82-year-old male who presents after mechanical fall with closed head injury, left periorbital soft tissue hematoma, left eyebrow laceration, and left shoulder pain.  Patient has a fracture dislocation  of the left humerus at the shoulder joint.  Shoulder was reduced per procedure note successfully.  He was placed in a shoulder immobilizer.  Patient had laceration repaired per procedure note.  Tetanus is up-to-date.  Patient had no chest pain, shortness of breath, or presyncope or syncopal event leading to the fall today.  It does seem consistent with mechanical fall.  Patient currently lives in independent senior living and so daughter is quite concerned about patient's return to an independent living status at this point given his left upper extremity is now immobilized with a shoulder immobilizer.  He will be admitted for observation for further PT/OT and potential acute rehab placement needs.  I spoke with Dr. Mejia of hospitalist service who agreed to admission.    Diagnosis:    ICD-10-CM    1. Fall, initial encounter  W19.XXXA    2. Closed head injury, initial encounter  S09.90XA    3. Hx of long-term (current) use of anticoagulants  Z92.29    4. Periorbital hematoma of left eye  H05.232    5. Eyebrow laceration, left, initial encounter  S01.112A    6. Acute pain of left shoulder  M25.512    7. Shoulder dislocation, left, initial encounter  S43.005A    8. Closed displaced fracture of greater tuberosity of left humerus, initial encounter  S42.252A        Discharge Medications:  Current Discharge Medication List          Scribe Disclosure:  I, Elie Hussein, am serving as a scribe at 5:59 PM on 7/21/2022 to document services personally performed by Champ Plummer MD based on my observations and the provider's statements to me.          Champ Plummer MD  07/22/22 0119

## 2022-07-22 ENCOUNTER — APPOINTMENT (OUTPATIENT)
Dept: OCCUPATIONAL THERAPY | Facility: CLINIC | Age: 82
DRG: 563 | End: 2022-07-22
Attending: STUDENT IN AN ORGANIZED HEALTH CARE EDUCATION/TRAINING PROGRAM
Payer: COMMERCIAL

## 2022-07-22 LAB
ALBUMIN SERPL-MCNC: 4.2 G/DL (ref 3.4–5)
ALP SERPL-CCNC: 95 U/L (ref 40–150)
ALT SERPL W P-5'-P-CCNC: 21 U/L (ref 0–70)
ANION GAP SERPL CALCULATED.3IONS-SCNC: 5 MMOL/L (ref 3–14)
AST SERPL W P-5'-P-CCNC: 23 U/L (ref 0–45)
BILIRUB DIRECT SERPL-MCNC: 0.2 MG/DL (ref 0–0.2)
BILIRUB SERPL-MCNC: 0.5 MG/DL (ref 0.2–1.3)
BUN SERPL-MCNC: 22 MG/DL (ref 7–30)
CALCIUM SERPL-MCNC: 8.4 MG/DL (ref 8.5–10.1)
CHLORIDE BLD-SCNC: 112 MMOL/L (ref 94–109)
CO2 SERPL-SCNC: 23 MMOL/L (ref 20–32)
CREAT SERPL-MCNC: 1.3 MG/DL (ref 0.66–1.25)
ERYTHROCYTE [DISTWIDTH] IN BLOOD BY AUTOMATED COUNT: 15.1 % (ref 10–15)
GFR SERPL CREATININE-BSD FRML MDRD: 55 ML/MIN/1.73M2
GLUCOSE BLD-MCNC: 117 MG/DL (ref 70–99)
HCT VFR BLD AUTO: 31.7 % (ref 40–53)
HGB BLD-MCNC: 10.3 G/DL (ref 13.3–17.7)
HGB BLD-MCNC: 9.9 G/DL (ref 13.3–17.7)
HOLD SPECIMEN: NORMAL
MCH RBC QN AUTO: 31.6 PG (ref 26.5–33)
MCHC RBC AUTO-ENTMCNC: 32.5 G/DL (ref 31.5–36.5)
MCV RBC AUTO: 97 FL (ref 78–100)
PLATELET # BLD AUTO: 293 10E3/UL (ref 150–450)
POTASSIUM BLD-SCNC: 4.5 MMOL/L (ref 3.4–5.3)
PROT SERPL-MCNC: 8.2 G/DL (ref 6.8–8.8)
RBC # BLD AUTO: 3.26 10E6/UL (ref 4.4–5.9)
SODIUM SERPL-SCNC: 140 MMOL/L (ref 133–144)
WBC # BLD AUTO: 6.1 10E3/UL (ref 4–11)

## 2022-07-22 PROCEDURE — 85018 HEMOGLOBIN: CPT | Performed by: INTERNAL MEDICINE

## 2022-07-22 PROCEDURE — 93005 ELECTROCARDIOGRAM TRACING: CPT

## 2022-07-22 PROCEDURE — 97166 OT EVAL MOD COMPLEX 45 MIN: CPT | Mod: GO | Performed by: OCCUPATIONAL THERAPIST

## 2022-07-22 PROCEDURE — 85027 COMPLETE CBC AUTOMATED: CPT | Performed by: STUDENT IN AN ORGANIZED HEALTH CARE EDUCATION/TRAINING PROGRAM

## 2022-07-22 PROCEDURE — 36415 COLL VENOUS BLD VENIPUNCTURE: CPT | Performed by: STUDENT IN AN ORGANIZED HEALTH CARE EDUCATION/TRAINING PROGRAM

## 2022-07-22 PROCEDURE — G0378 HOSPITAL OBSERVATION PER HR: HCPCS

## 2022-07-22 PROCEDURE — 80048 BASIC METABOLIC PNL TOTAL CA: CPT | Performed by: STUDENT IN AN ORGANIZED HEALTH CARE EDUCATION/TRAINING PROGRAM

## 2022-07-22 PROCEDURE — 93010 ELECTROCARDIOGRAM REPORT: CPT | Performed by: INTERNAL MEDICINE

## 2022-07-22 PROCEDURE — 97535 SELF CARE MNGMENT TRAINING: CPT | Mod: GO | Performed by: OCCUPATIONAL THERAPIST

## 2022-07-22 PROCEDURE — 250N000013 HC RX MED GY IP 250 OP 250 PS 637: Performed by: STUDENT IN AN ORGANIZED HEALTH CARE EDUCATION/TRAINING PROGRAM

## 2022-07-22 PROCEDURE — 36415 COLL VENOUS BLD VENIPUNCTURE: CPT | Performed by: INTERNAL MEDICINE

## 2022-07-22 PROCEDURE — 99226 PR SUBSEQUENT OBSERVATION CARE,LEVEL III: CPT | Performed by: INTERNAL MEDICINE

## 2022-07-22 RX ORDER — ROSUVASTATIN CALCIUM 10 MG/1
10 TABLET, COATED ORAL DAILY
Status: DISCONTINUED | OUTPATIENT
Start: 2022-07-22 | End: 2022-07-24 | Stop reason: HOSPADM

## 2022-07-22 RX ORDER — NALOXONE HYDROCHLORIDE 0.4 MG/ML
0.4 INJECTION, SOLUTION INTRAMUSCULAR; INTRAVENOUS; SUBCUTANEOUS
Status: DISCONTINUED | OUTPATIENT
Start: 2022-07-22 | End: 2022-07-24 | Stop reason: HOSPADM

## 2022-07-22 RX ORDER — ACETAMINOPHEN 325 MG/1
650 TABLET ORAL EVERY 4 HOURS PRN
Status: DISCONTINUED | OUTPATIENT
Start: 2022-07-22 | End: 2022-07-22

## 2022-07-22 RX ORDER — ACETAMINOPHEN 325 MG/1
650 TABLET ORAL EVERY 6 HOURS PRN
Status: DISCONTINUED | OUTPATIENT
Start: 2022-07-22 | End: 2022-07-24 | Stop reason: HOSPADM

## 2022-07-22 RX ORDER — ONDANSETRON 2 MG/ML
4 INJECTION INTRAMUSCULAR; INTRAVENOUS EVERY 6 HOURS PRN
Status: DISCONTINUED | OUTPATIENT
Start: 2022-07-22 | End: 2022-07-24 | Stop reason: HOSPADM

## 2022-07-22 RX ORDER — NALOXONE HYDROCHLORIDE 0.4 MG/ML
0.2 INJECTION, SOLUTION INTRAMUSCULAR; INTRAVENOUS; SUBCUTANEOUS
Status: DISCONTINUED | OUTPATIENT
Start: 2022-07-22 | End: 2022-07-24 | Stop reason: HOSPADM

## 2022-07-22 RX ORDER — LISINOPRIL 10 MG/1
10 TABLET ORAL DAILY
Status: DISCONTINUED | OUTPATIENT
Start: 2022-07-22 | End: 2022-07-24 | Stop reason: HOSPADM

## 2022-07-22 RX ORDER — LANOLIN ALCOHOL/MO/W.PET/CERES
800 CREAM (GRAM) TOPICAL DAILY
Status: DISCONTINUED | OUTPATIENT
Start: 2022-07-22 | End: 2022-07-24 | Stop reason: HOSPADM

## 2022-07-22 RX ORDER — ACETAMINOPHEN 650 MG/1
650 SUPPOSITORY RECTAL EVERY 6 HOURS PRN
Status: DISCONTINUED | OUTPATIENT
Start: 2022-07-22 | End: 2022-07-24 | Stop reason: HOSPADM

## 2022-07-22 RX ORDER — ONDANSETRON 4 MG/1
4 TABLET, ORALLY DISINTEGRATING ORAL EVERY 6 HOURS PRN
Status: DISCONTINUED | OUTPATIENT
Start: 2022-07-22 | End: 2022-07-24 | Stop reason: HOSPADM

## 2022-07-22 RX ADMIN — LISINOPRIL 10 MG: 10 TABLET ORAL at 08:23

## 2022-07-22 RX ADMIN — ACETAMINOPHEN 650 MG: 325 TABLET ORAL at 00:48

## 2022-07-22 RX ADMIN — FOLIC ACID TAB 400 MCG 800 MCG: 400 TAB at 08:23

## 2022-07-22 RX ADMIN — ACETAMINOPHEN 650 MG: 325 TABLET ORAL at 10:06

## 2022-07-22 RX ADMIN — OXYCODONE HYDROCHLORIDE 2.5 MG: 5 TABLET ORAL at 16:23

## 2022-07-22 RX ADMIN — ROSUVASTATIN 10 MG: 10 TABLET, FILM COATED ORAL at 08:23

## 2022-07-22 ASSESSMENT — ACTIVITIES OF DAILY LIVING (ADL): PREVIOUS_RESPONSIBILITIES: HOUSEKEEPING;LAUNDRY;MEDICATION MANAGEMENT

## 2022-07-22 NOTE — PROVIDER NOTIFICATION
MD Notification    Notified Person: MD    Notified Person Name: Dr. Leslee Paiz    Notification Date/Time: 7/22/2022 @ 11:30 am    Notification Interaction: [employer] Messaging    Purpose of Notification: Patient's tele appearing irregular w/ frequent PAC's & PVC's; no EKG on file. Should we get an EKG?    Orders Received: V.O. for EKG    Comments:

## 2022-07-22 NOTE — CONSULTS
Brief Ortho Consult Note    Notes and XR reviewed.  L shoulder fx-dislocation with mildly displaced greater tuberosity fracture.  Shoulder successfully reduced in the ED last evening.  No indication for urgent operative intervention.  Continue sling immobilization while upright.  May rest without the sling in bed.  NWB LUE.  May use elbow and forearm/hand for ADLs.  OK for discharge from ortho standpoint and f/u with shoulder specialist next week.  Formal consult pending unless patient able to discharge this morning.

## 2022-07-22 NOTE — PROGRESS NOTES
[facility]    Medicine Progress Note - Hospitalist Service    Date of Admission:  7/21/2022    Assessment & Plan               Shamir Concepcion is a 82 year old male with past medical history significant for HTN, peripheral arterial disease s/p femoral artery stent placement, prior stroke on chronic anticoagulation, rheumatoid arthritis  admitted on 7/21/2022 after a mechanical fall.     Mechanical fall   Left humerus fracture and anterior glenohumeral joint dislocation  Left eyebrow laceration, periorbital hematoma  The patient presents to the ED after a mechanical fall. He fell on to his left shoulder. XR of the left shoulder showed Anterior glenohumeral joint dislocation and displaced fracture of the greater tuberosity and lateral aspect of the humeral head and neck. He has a left eyebrow laceration, but head CT was negative for acute intracranial process.   * He underwent successful reduction of the dislocated shoulder in the ED and placed in shoulder immobilizer  - Pain control PRN   - Will consult ortho for follow-up plans   - Monitor hematoma  - Suture removal 5-7 days   - PT/OT   - SW/CC consult for possible TCU placement     OT evaluated patient and recommended TCU placement.    Hypertension   PTA medications include lisinopril  - Continue lisinopril     CKD III   Recent baseline creatinine has been around 1.6-1.7. Creatinine on admission is 1.43  - Monitor renal function and avoid nephrotoxins.     Peripheral arterial Disease   S/p right superficial femoral artery stent placement (2013)    Prior stroke (2018)  He does have some residual weakness and uses a cane for ambulation   - Will hold apixaban for now to monitor progression of ecchymoses, can likely resume in AM if stable.   - Continue Rosuvastatin   - PT/OT    Continue to hold Eliquis today due to drop in hemoglobin  Could possibly restart tomorrow if hemoglobin stabilizes      Rheumatoid arthritis   PTA methotrexate Q7days  (Saturday)   - Resume methotrexate and folic acid     Mild Chronic anemia  Hemoglobin in 11.6 on admission. Baseline is around 12.   - Monitor closely in the setting of large hematoma.   Hemoglobin decreased  Recheck hemoglobin this afternoon and tomorrow  If it continues to drop, may need to consider imaging to look for any intra abdominal hematoma etc.       Diet: Regular Diet Adult    DVT Prophylaxis: Pneumatic Compression Devices  Michelle Catheter: Not present  Central Lines: None  Cardiac Monitoring: None  Code Status: No CPR- Do NOT Intubate      Disposition Plan      Expected Discharge Date: 07/23/2022        Discharge Comments: TCU placement.        The patient's care was discussed with the Bedside Nurse and Patient.    Leslee Paiz MD  Hospitalist Service  [facility]  Securely message with the Voc[employer] Web Console (learn more here)  Text page via [employer] Paging/Directory         Clinically Significant Risk Factors Present on Admission          # Hypocalcemia: Ca = 8.4 mg/dL (Ref range: 8.5 - 10.1 mg/dL) and/or iCa = N/A on admission, will replace as needed      # Coagulation Defect: home medication list includes an anticoagulant medication   # Hypertension: home medication list includes antihypertensive(s)          ______________________________________________________________________    Interval History     Complains of pain in his left shoulder.  Does not have any active bleeding.  Hemoglobin has dropped to 10.3.    Data reviewed today: I reviewed all medications, new labs and imaging results over the last 24 hours. I personally reviewed no images or EKG's today.    Physical Exam   Vital Signs: Temp: 98.6  F (37  C) Temp src: Oral BP: 119/63 Pulse: 66   Resp: 16 SpO2: 97 % O2 Device: None (Room air)    Weight: 155 lbs 0 oz  General Appearance:  no distress  Large bruise around his left eye, sutures over left eyebrow  Respiratory: Lungs clear  Cardiovascular: Rate controlled  GI:  Nontender  Skin: No rash  Extremities: No edema      Data   Recent Labs   Lab 07/22/22  0623 07/21/22  1824   WBC 6.1 12.9*   HGB 10.3* 11.6*   MCV 97 96    408    139   POTASSIUM 4.5 4.1   CHLORIDE 112* 107   CO2 23 25   BUN 22 23   CR 1.30* 1.43*   ANIONGAP 5 7   RANJAN 8.4* 9.1   * 180*   ALBUMIN  --  4.2   PROTTOTAL  --  8.2   BILITOTAL  --  0.5   ALKPHOS  --  95   ALT  --  21   AST  --  23     Recent Results (from the past 24 hour(s))   CT Head w/o Contrast    Narrative    EXAM: CT HEAD W/O CONTRAST  LOCATION: [facility]  DATE/TIME: 7/21/2022 5:46 PM    INDICATION: Acute pain after blunt trauma, thinners.  COMPARISON: Brain MRI 5/19/2019  TECHNIQUE: Routine CT Head without IV contrast. Multiplanar reformats. Dose reduction techniques were used.    FINDINGS:  INTRACRANIAL CONTENTS: No intracranial hemorrhage, extraaxial collection, or mass effect.  No CT evidence of acute infarct. Mild presumed chronic small vessel ischemic changes. Chronic ischemic infarct along the undersurface of the left cerebellar   hemisphere again noted. Moderate generalized volume loss. No hydrocephalus.     VISUALIZED ORBITS/SINUSES/MASTOIDS: No intraorbital abnormality. No paranasal sinus mucosal disease. No middle ear or mastoid effusion.    BONES/SOFT TISSUES: No acute abnormality.      Impression    IMPRESSION:  1.  Chronic left cerebellar infarct again noted.  2.  No CT evidence for acute intracranial process.  3.  Brain atrophy and presumed chronic microvascular ischemic changes as above.   XR Chest 2 Views    Narrative    EXAM: XR CHEST 2 VIEWS  LOCATION: [facility]  DATE/TIME: 7/21/2022 5:55 PM    INDICATION: acute pain after blunt trauma  COMPARISON: 07/21/2018.      Impression    IMPRESSION: No pneumothorax, focal infiltrate or pleural effusion. Normal heart size. Tortuous aorta with atherosclerotic calcifications. Please see left shoulder radiograph for  details regarding the left shoulder dislocation. No displaced rib fractures   visualized. Wedge compression deformities in the thoracic spine and osteopenia.   XR Shoulder Left 2 Views    Narrative    EXAM: XR SHOULDER LEFT 2 VIEWS  LOCATION: [facility] [place]  DATE/TIME: 7/21/2022 6:11 PM    INDICATION: acute pain after blunt trauma  COMPARISON: None.      Impression    IMPRESSION: Anterior glenohumeral joint dislocation. Displaced fracture of the greater tuberosity and lateral aspect of the humeral head and neck. Degenerative arthrosis AC joint. Degenerative changes in the visualized spine. Diffuse bony   demineralization.    XR Shoulder Left Port G/E 2 Views    Narrative    EXAM: XR SHOULDER LEFT PORT G/E 2 VIEWS  LOCATION: [facility] [place]  DATE/TIME: 7/21/2022 9:38 PM    INDICATION: Post reduction  COMPARISON: 7/21/2022      Impression    IMPRESSION: Successful interval reduction of the dislocated shoulder seen on the previous examination. This is now anatomic. There is a fracture of the greater tuberosity humerus but this does not appear to extend across the humeral neck and does not   involve the articular margin.      Medications       [Held by provider] apixaban ANTICOAGULANT  5 mg Oral BID     folic acid  800 mcg Oral Daily     lisinopril  10 mg Oral Daily     [START ON 7/23/2022] methotrexate  12.5 mg Oral Q7 Days     rosuvastatin  10 mg Oral Daily

## 2022-07-22 NOTE — PHARMACY-ADMISSION MEDICATION HISTORY
Pharmacy Medication History  Admission medication history interview status for the 7/21/2022  admission is complete. See EPIC admission navigator for prior to admission medications     Location of Interview: Patient room  Medication history sources: Patient's family/friend (daughter) and Surescripts    Significant changes made to the medication list:  -Removed finasteride, prednisone, tamsulosin, warfarin  -Changed rosuvastatin from 20mg to 10mg  -Added trospium    In the past week, patient estimated taking medication this percent of the time: greater than 90%    Additional medication history information:   none    Medication reconciliation completed by provider prior to medication history? No    Time spent in this activity: 15 min    Prior to Admission medications    Medication Sig Last Dose Taking? Auth Provider Long Term End Date   acetaminophen (TYLENOL) 325 MG tablet Take 2 tablets (650 mg) by mouth every 4 hours as needed for mild pain prn Yes Champ Kapadia MD     alendronate (FOSAMAX) 70 MG tablet Take 70 mg by mouth every 7 days 7/17/2022 at Sundays Yes Reported, Patient Yes    apixaban ANTICOAGULANT (ELIQUIS) 5 MG tablet Take 5 mg by mouth 2 times daily 7/21/2022 at am Yes Reported, Patient     fluticasone (FLONASE) 50 MCG/ACT nasal spray Spray 1 spray into both nostrils daily as needed Past Month at prn Yes Reported, Patient     folic acid 800 MCG TABS Take 800 mcg by mouth daily 7/21/2022 at am Yes Champ Kapadia MD     lisinopril (ZESTRIL) 10 MG tablet Take 10 mg by mouth daily 7/21/2022 at am Yes Reported, Patient Yes    methotrexate 2.5 MG tablet CHEMO Take 5 tablets (12.5 mg) by mouth every 7 days Saturdays 7/16/2022 at Saturdays Yes Champ Kapadia MD Yes    multivitamin  with lutein (OCUVITE WITH LUTEIN) CAPS per capsule Take 1 capsule by mouth daily 7/21/2022 at am Yes Reported, Patient     rosuvastatin (CRESTOR) 10 MG tablet Take 10 mg by mouth daily 7/21/2022 at am Yes Unknown, Entered By  History No    trospium (SANCTURA) 20 MG tablet Take 20 mg by mouth 2 times daily 7/21/2022 at am Yes Unknown, Entered By History     VITAMIN D, CHOLECALCIFEROL, PO Take by mouth daily 7/21/2022 at am Yes Unknown, Entered By History         The information provided in this note is only as accurate as the sources available at the time of update(s)

## 2022-07-22 NOTE — H&P
[facility]    History and Physical - Hospitalist Service       Date of Admission:  7/21/2022    Assessment & Plan      Shamir Concepcion is a 82 year old male with past medical history significant for HTN, peripheral arterial disease s/p femoral artery stent placement, prior stroke on chronic anticoagulation, rheumatoid arthritis  admitted on 7/21/2022 after a mechanical fall.     Mechanical fall   Left humerus fracture and anterior glenohumeral joint dislocation  Left eyebrow laceration, periorbital hematoma  The patient presents to the ED after a mechanical fall. He fell on to his left shoulder. XR of the left shoulder showed Anterior glenohumeral joint dislocation and displaced fracture of the greater tuberosity and lateral aspect of the humeral head and neck. He has a left eyebrow laceration, but head CT was negative for acute intracranial process.   * He underwent successful reduction of the dislocated shoulder in the ED and placed in shoulder immobilizer  - Pain control PRN   - Will consult ortho for follow-up plans   - Monitor hematoma  - Suture removal 5-7 days   - PT/OT   - SW/CC consult for possible TCU placement     Hypertension   PTA medications include lisinopril  - Continue lisinopril     CKD III   Recent baseline creatinine has been around 1.6-1.7. Creatinine on admission is 1.43  - Monitor renal function and avoid nephrotoxins.     Peripheral arterial Disease   S/p right superficial femoral artery stent placement (2013)    Prior stroke (2018)  He does have some residual weakness and uses a cane for ambulation   - Will hold apixaban for now to monitor progression of ecchymoses, can likely resume in AM if stable.   - Continue Rosuvastatin   - PT/OT    Rheumatoid arthritis   PTA methotrexate Q7days (Saturday)   - Resume methotrexate and folic acid     Mild Chronic anemia  Hemoglobin in 11.6 on admission. Baseline is around 12.   - Monitor closely in the setting of large  hematoma.        Diet: NPO for Medical/Clinical Reasons Except for: No Exceptions    DVT Prophylaxis: Pneumatic Compression Devices  Michelle Catheter: Not present  Central Lines: None  Cardiac Monitoring: None  Code Status: DNR/DNI    Disposition Plan      The patient's care was discussed with the Patient.    Betzy Mejia  Hospitalist Service  [facility]  Securely message with the Vocera Web Console (learn more here)  Text page via [employer] Paging/Directory         ______________________________________________________________________    Chief Complaint   Mechanical fall   History is obtained from the patient    History of Present Illness   Shamir Concepcion is a 82 year old male who presents to the ED after a mechanical fall. The patient's story is somewhat difficult to follow, but he was going to throw out the newspaper and somehow lost his footing/balance and fell on to his left side. He denies preceding symptoms such as dizziness or chest pain/palpitaions. He denies LOC. He was able to get up on his own and call for help.     He was found to have an anterior glenohumeral joint dislocation and displaced fracture of the greater tuberosity and lateral aspect of the humeral head and neck. He also has a left eyebrow laceration.     He has some pain his his left shoulder, but no other pain or symptoms currently.     Review of Systems    The 10 point Review of Systems is negative other than noted in the HPI or here.     Past Medical History    I have reviewed this patient's medical history and updated it with pertinent information if needed.   Past Medical History:   Diagnosis Date     Allergic rhinitis      BPH (benign prostatic hyperplasia)      Cerebral infarction (H)      Colon polyps      ED (erectile dysfunction)      Family history of colon cancer      PAD (peripheral artery disease) (H)      RA (rheumatoid arthritis) (H)      Seronegative rheumatoid arthritis (H)        Past Surgical  History   I have reviewed this patient's surgical history and updated it with pertinent information if needed.  Past Surgical History:   Procedure Laterality Date     ANGIOGRAM       COLONOSCOPY      polyps     ENT SURGERY      T&A     LAMINECT/DISCECTOMY, LUMBAR       ORTHOPEDIC SURGERY       PHACOEMULSIFICATION CLEAR CORNEA WITH STANDARD INTRAOCULAR LENS IMPLANT Left 2017    Procedure: PHACOEMULSIFICATION CLEAR CORNEA WITH STANDARD INTRAOCULAR LENS IMPLANT;  Surgeon: Stevie Espinal MD;  Location: [facility]     PHACOEMULSIFICATION CLEAR CORNEA WITH STANDARD INTRAOCULAR LENS IMPLANT Right 2017    Procedure: PHACOEMULSIFICATION CLEAR CORNEA WITH STANDARD INTRAOCULAR LENS IMPLANT;  RIGHT EYE PHACOEMULSIFICATION CLEAR CORNEA WITH STANDARD INTRAOCULAR LENS IMPLANT;  Surgeon: Stevie Espinal MD;  Location: [facility]     SINUS SURGERY       VASCULAR SURGERY  R SFA stenting  2012     ZZC MRA UPPER EXTREMITY WO&W CONT  stenting right SFA-AK pop       Social History   I have reviewed this patient's social history and updated it with pertinent information if needed.  Social History     Tobacco Use     Smoking status: Former Smoker     Packs/day: 0.50     Years: 30.00     Pack years: 15.00     Types: Cigarettes     Quit date: 3/1/1999     Years since quittin.4     Smokeless tobacco: Never Used   Substance Use Topics     Alcohol use: Yes     Comment: wine nightly     Drug use: No       Family History   I have reviewed this patient's family history and updated it with pertinent information if needed.  Family History   Problem Relation Age of Onset     Cancer Mother      Cerebrovascular Disease Father        Prior to Admission Medications   Prior to Admission Medications   Prescriptions Last Dose Informant Patient Reported? Taking?   Cholecalciferol (VITAMIN D-3) 125 MCG (5000 UT) TABS   Yes No   Finasteride (PROSCAR PO)  Self Yes No   Sig: Take 5 mg by mouth daily   Patient not taking: Reported on 2022    Warfarin Sodium (COUMADIN PO)   Yes No   Sig: Take 6 mg by mouth daily    Patient not taking: Reported on 2022   acetaminophen (TYLENOL) 325 MG tablet   No No   Sig: Take 2 tablets (650 mg) by mouth every 4 hours as needed for mild pain   alendronate (FOSAMAX) 70 MG tablet   Yes No   Sig: alendronate 70 mg tablet   apixaban ANTICOAGULANT (ELIQUIS ANTICOAGULANT) 5 MG tablet   Yes No   Sig: Eliquis 5 mg tablet   fluticasone (FLONASE) 50 MCG/ACT nasal spray  Self Yes No   Sig: Spray 1 spray into both nostrils daily   Patient not taking: Reported on 2022   folic acid 800 MCG TABS   No No   Sig: Take 800 mcg by mouth daily   lisinopril (ZESTRIL) 10 MG tablet   Yes No   Sig: lisinopril 10 mg tablet   methotrexate 2.5 MG tablet CHEMO   No No   Sig: Take 5 tablets (12.5 mg) by mouth every 7 days    miconazole (MICATIN; MICRO GUARD) 2 % powder   No No   Sig: Apply topically 2 times daily as needed (groin rash)   Patient not taking: Reported on 2022   multivitamin  with lutein (OCUVITE WITH LUTEIN) CAPS per capsule   Yes No   Sig: Take 1 capsule by mouth daily   predniSONE (DELTASONE) 5 MG tablet   No No   Sig: Take 1 tablet (5 mg) by mouth daily as needed for rheumatoid arthritis flare.   rosuvastatin (CRESTOR) 20 MG tablet   No No   Sig: Take 1 tablet (20 mg) by mouth daily   senna-docusate (SENOKOT-S;PERICOLACE) 8.6-50 MG per tablet   No No   Sig: Take 2 tablets by mouth 2 times daily as needed for constipation   Patient not taking: Reported on 2022   tamsulosin (FLOMAX) 0.4 MG capsule  Self Yes No   Si tab twice daily   Patient not taking: Reported on 2022      Facility-Administered Medications: None     Allergies   No Known Allergies    Physical Exam   Vital Signs: Temp: 98.3  F (36.8  C) Temp src: Temporal BP: (!) 157/92 Pulse: 90   Resp: 13 SpO2: 98 % O2 Device: None (Room air)    Weight: 151 lbs 14.35 oz    Constitutional: Awake, alert, cooperative, no apparent distress.  Eyes:  Conjunctiva and pupils examined and normal. Large ecchymoses around left eye. Left eyebrow laceration, sutured.   HEENT: Moist mucous membranes, normal dentition.  Respiratory: Clear to auscultation bilaterally, no crackles or wheezing.  Cardiovascular: Regular rate and rhythm, normal S1 and S2, and no murmur noted.  GI: Soft, non-distended, non-tender, normal bowel sounds.  Skin: No rashes, no cyanosis, no edema.  Musculoskeletal: No joint swelling, erythema or tenderness. Left arm in immobilizer   Neurologic: Cranial nerves 2-12 intact, normal strength and sensation.  Psychiatric: Alert, oriented to person, place and time, no obvious anxiety or depression.      Data   Data reviewed today: I reviewed all medications, new labs and imaging results over the last 24 hours.   Recent Labs   Lab 07/21/22  1824   WBC 12.9*   HGB 11.6*   MCV 96         POTASSIUM 4.1   CHLORIDE 107   CO2 25   BUN 23   CR 1.43*   ANIONGAP 7   RANJAN 9.1   *     Recent Results (from the past 24 hour(s))   CT Head w/o Contrast    Narrative    EXAM: CT HEAD W/O CONTRAST  LOCATION: [facility]  DATE/TIME: 7/21/2022 5:46 PM    INDICATION: Acute pain after blunt trauma, thinners.  COMPARISON: Brain MRI 5/19/2019  TECHNIQUE: Routine CT Head without IV contrast. Multiplanar reformats. Dose reduction techniques were used.    FINDINGS:  INTRACRANIAL CONTENTS: No intracranial hemorrhage, extraaxial collection, or mass effect.  No CT evidence of acute infarct. Mild presumed chronic small vessel ischemic changes. Chronic ischemic infarct along the undersurface of the left cerebellar   hemisphere again noted. Moderate generalized volume loss. No hydrocephalus.     VISUALIZED ORBITS/SINUSES/MASTOIDS: No intraorbital abnormality. No paranasal sinus mucosal disease. No middle ear or mastoid effusion.    BONES/SOFT TISSUES: No acute abnormality.      Impression    IMPRESSION:  1.  Chronic left cerebellar infarct again  noted.  2.  No CT evidence for acute intracranial process.  3.  Brain atrophy and presumed chronic microvascular ischemic changes as above.   XR Chest 2 Views    Narrative    EXAM: XR CHEST 2 VIEWS  LOCATION: [facility]  DATE/TIME: 7/21/2022 5:55 PM    INDICATION: acute pain after blunt trauma  COMPARISON: 07/21/2018.      Impression    IMPRESSION: No pneumothorax, focal infiltrate or pleural effusion. Normal heart size. Tortuous aorta with atherosclerotic calcifications. Please see left shoulder radiograph for details regarding the left shoulder dislocation. No displaced rib fractures   visualized. Wedge compression deformities in the thoracic spine and osteopenia.   XR Shoulder Left 2 Views    Narrative    EXAM: XR SHOULDER LEFT 2 VIEWS  LOCATION: [facility]  DATE/TIME: 7/21/2022 6:11 PM    INDICATION: acute pain after blunt trauma  COMPARISON: None.      Impression    IMPRESSION: Anterior glenohumeral joint dislocation. Displaced fracture of the greater tuberosity and lateral aspect of the humeral head and neck. Degenerative arthrosis AC joint. Degenerative changes in the visualized spine. Diffuse bony   demineralization.    XR Shoulder Left Port G/E 2 Views    Narrative    EXAM: XR SHOULDER LEFT PORT G/E 2 VIEWS  LOCATION: [facility]  DATE/TIME: 7/21/2022 9:38 PM    INDICATION: Post reduction  COMPARISON: 7/21/2022      Impression    IMPRESSION: Successful interval reduction of the dislocated shoulder seen on the previous examination. This is now anatomic. There is a fracture of the greater tuberosity humerus but this does not appear to extend across the humeral neck and does not   involve the articular margin.

## 2022-07-22 NOTE — PROGRESS NOTES
PT: Orders received. Chart reviewed and discussed with care team.  PT not indicated due to OT evaluated pt and recommended TCU due to cognitive impairments, PT eval not needed.  Defer discharge recommendations to medical team.  Will complete orders.

## 2022-07-22 NOTE — PROGRESS NOTES
07/22/22 0724   Quick Adds   Type of Visit Initial Occupational Therapy Evaluation   Living Environment   People in Home alone   Current Living Arrangements independent living facility   Home Accessibility no concerns   Transportation Anticipated family or friend will provide   Living Environment Comments Lives alone in senior apartment third floor. Daughters live in the area.   Self-Care   Usual Activity Tolerance good   Current Activity Tolerance fair   Regular Exercise Yes   Activity/Exercise Type   (treadmill and weights)   Exercise Amount/Frequency daily   Equipment Currently Used at Home cane, straight;walker, rolling   Fall history within last six months yes   Number of times patient has fallen within last six months 1   Activity/Exercise/Self-Care Comment I with ADl at home. Congregate dining   Instrumental Activities of Daily Living (IADL)   Previous Responsibilities housekeeping;laundry;medication management   IADL Comments Daughters assist with shopping, finances and transporatation.Daughters normally available on weekends. Pt. manages medications.   General Information   Onset of Illness/Injury or Date of Surgery 07/21/22   Referring Physician Betzy Mejia   Patient/Family Therapy Goal Statement (OT) return to apartment   Additional Occupational Profile Info/Pertinent History of Current Problem Shamir Concepcion is a 82 year old male with past medical history significant for HTN, peripheral arterial disease s/p femoral artery stent placement, prior stroke on chronic anticoagulation, rheumatoid arthritis  admitted on 7/21/2022 after a mechanical fall. Left humerus fracture and anterior glenohumeral joint dislocation   Cognitive Status Examination   Orientation Status person;time   Safety Deficit minimal deficit;insight into deficits/self-awareness   Memory Deficit short-term memory   Executive Function Deficit judgment   Cognitive Status Comments Pt. with repetitive questions regarding L UE. Does  not fully remember what happened with Left UE and that he sustained a fx.   Pain Assessment   Patient Currently in Pain Yes, see Vital Sign flowsheet  (3/10 left shoulder)   Range of Motion Comprehensive   General Range of Motion upper extremity range of motion deficits identified   General Upper Extremity Assessment (Range of Motion)   Comment: Upper Extremity ROM Imobilized left shoulder s/p fracture   Upper Extremity: Range of Motion RUE ROM WNL   Strength Comprehensive (MMT)   Comment, General Manual Muscle Testing (MMT) Assessment R UE WFL, L UE not attempted due to immobilization.   Bed Mobility   Bed Mobility rolling left;rolling right;supine-sit   Rolling Left Upson (Bed Mobility) verbal cues   Rolling Right Upson (Bed Mobility) verbal cues   Supine-Sit Upson (Bed Mobility) contact guard   Assistive Device (Bed Mobility) bed rails   Transfers   Transfers toilet transfer   Toilet Transfer   Type (Toilet Transfer) sit-stand;stand-sit   Upson Level (Toilet Transfer) minimum assist (75% patient effort);supervision   Assistive Device (Toilet Transfer) grab bars/safety frame   Toilet Transfer Comments reuqired safety cues   Clinical Impression   Criteria for Skilled Therapeutic Interventions Met (OT) Yes, treatment indicated   OT Diagnosis Weakness, decreased ROM, unstedy gait   OT Problem List-Impairments impacting ADL problems related to;activity tolerance impaired;cognition;range of motion (ROM);pain;post-surgical precautions   Assessment of Occupational Performance 3-5 Performance Deficits   Identified Performance Deficits Dressing, homemaking, ADL transfers,   Planned Therapy Interventions (OT) ADL retraining;IADL retraining;transfer training   Clinical Decision Making Complexity (OT) moderate complexity   Anticipated Equipment Needs Upon Discharge (OT) cane, straight;commode chair;tub bench   Risk & Benefits of therapy have been explained evaluation/treatment results reviewed;care  plan/treatment goals reviewed;risks/benefits reviewed;current/potential barriers reviewed;participants voiced agreement with care plan;participants included;patient   OT Discharge Planning   OT Discharge Recommendation (DC Rec) Transitional Care Facility   OT Rationale for DC Rec Pt. is below reported baseline level of function s/p fall with L UE fracture with immobilized Left UE pending ortho consult. He lives alone and presents with mild confusion. At this time he would require 24/7 supervision for return home. Pt. will benefit from TCU placemnt for ADL retraining, mobility training, and further cogntive assessment to determine if higher of level  of care is warranted vs. return to  Ind Living.   OT Brief overview of current status MIN transfers. MOD assist UE cares   Therapy Certification   Start of Care Date 07/22/22   Certification date from 07/22/22   Certification date to 07/28/22   Medical Diagnosis Fall with Left humeral fracture   Total Evaluation Time (Minutes)   Total Evaluation Time (Minutes) 25   OT Goals   Therapy Frequency (OT) 5 times/wk   OT Predicted Duration/Target Date for Goal Attainment 07/28/22   OT Goals Toilet Transfer/Toileting;Cognition;Upper Body Dressing;Lower Body Dressing   OT: Upper Body Dressing Supervision/stand-by assist   OT: Lower Body Dressing Supervision/stand-by assist   OT: Toilet Transfer/Toileting Supervision/stand-by assist   OT: Cognitive Patient/caregiver will verbalize understanding of cognitive assessment results/recommendations as needed for safe discharge planning

## 2022-07-22 NOTE — PROGRESS NOTES
[facility]      OUTPATIENT OCCUPATIONAL THERAPY  EVALUATION  PLAN OF TREATMENT FOR OUTPATIENT REHABILITATION  (COMPLETE FOR INITIAL CLAIMS ONLY)  Patient's Last Name, First Name, M.I.  YOB: 1940  Shamir Concepcion                          Provider's Name  [facility] Medical Record No.  [id number]                               Onset Date:  07/21/22   Start of Care Date:  (P) 07/22/22     Type:     ___PT   _X_OT   ___SLP Medical Diagnosis:  (P) Fall with Left humeral fracture                        OT Diagnosis:  (P) Weakness, decreased ROM, unstedy gait   Visits from SOC:  1   _________________________________________________________________________________  Plan of Treatment/Functional Goals    Planned Interventions: (P) ADL retraining, IADL retraining, transfer training   Goals: See Occupational Therapy Goals on Care Plan [facility].    Therapy Frequency: (P) 5 times/wk  Predicted Duration of Therapy Intervention: (P) 07/28/22  _________________________________________________________________________________    I CERTIFY THE NEED FOR THESE SERVICES FURNISHED UNDER        THIS PLAN OF TREATMENT AND WHILE UNDER MY CARE     (Physician co-signature of this document indicates review and certification of the therapy plan).              Certification date from: (P) 07/22/22, Certification date to: (P) 07/28/22    Referring Physician: Betzy Mejia            Initial Assessment        See Occupational Therapy evaluation dated (P) 07/22/22 in Epic electronic health record.

## 2022-07-22 NOTE — ED NOTES
[facility]  ED Nurse Handoff Report    ED Chief complaint: Fall, Head Injury, and Shoulder Injury      ED Diagnosis:   Final diagnoses:   Fall, initial encounter   Closed head injury, initial encounter   Hx of long-term (current) use of anticoagulants   Periorbital hematoma of left eye   Eyebrow laceration, left, initial encounter   Acute pain of left shoulder   Shoulder dislocation, left, initial encounter   Closed fracture of head of left humerus, initial encounter       Code Status: MD to Address    Allergies: No Known Allergies    Patient Story: Pt reports having a fall at home striking his L shoulder and head. Pt is on blood thinners d/t a hx of a stroke. Head CT neg. Pt has a laceration above his L eye. Pt denies LOC, SOB, CP, or fever. Pt has a small abrasion on L knee. Pt states shoulder pain is severe and shoulder XR shows fracture of humerus and dislocation. Pt had a successful reduction of shoulder. Plan for admission for safe disposition plan as pt lives at home alone and pain management.   Focused Assessment:  AOx4. VSS on RA. Ambulates w/cane. Shoulder in sling.     Treatments and/or interventions provided: fentanyl x3. IV bolus.    Labs Ordered and Resulted from Time of ED Arrival to Time of ED Departure   BASIC METABOLIC PANEL - Abnormal       Result Value    Sodium 139      Potassium 4.1      Chloride 107      Carbon Dioxide (CO2) 25      Anion Gap 7      Urea Nitrogen 23      Creatinine 1.43 (*)     Calcium 9.1      Glucose 180 (*)     GFR Estimate 49 (*)    CBC WITH PLATELETS AND DIFFERENTIAL - Abnormal    WBC Count 12.9 (*)     RBC Count 3.71 (*)     Hemoglobin 11.6 (*)     Hematocrit 35.7 (*)     MCV 96      MCH 31.3      MCHC 32.5      RDW 15.2 (*)     Platelet Count 408      % Neutrophils 89      % Lymphocytes 6      % Monocytes 4      % Eosinophils 0      % Basophils 0      % Immature Granulocytes 1      NRBCs per 100 WBC 0      Absolute Neutrophils 11.5 (*)     Absolute  Lymphocytes 0.8      Absolute Monocytes 0.5      Absolute Eosinophils 0.0      Absolute Basophils 0.0      Absolute Immature Granulocytes 0.1      Absolute NRBCs 0.0     COVID-19 VIRUS (CORONAVIRUS) BY PCR     Patient's response to treatments and/or interventions: improving    To be done/followed up on inpatient unit: hospitalist consult. PT/OT consult     Does this patient have any cognitive concerns?: AOx4    Activity level - Baseline/Home:  Cane  Activity Level - Current:   Cane    Patient's Preferred language: English   Needed?: No    Isolation: None  Infection: Not Applicable  Patient tested for COVID 19 prior to admission: YES  Bariatric?: No    Vital Signs:   Vitals:    07/21/22 2133 07/21/22 2136 07/21/22 2137 07/21/22 2142   BP:   (!) 157/92    Pulse: 112 89 87 90   Resp: 18 20 11 13   Temp:       TempSrc:       SpO2: 99% 100% 99% 98%   Weight:           Cardiac Rhythm:Cardiac Rhythm: Atrial fibrillation    Was the PSS-3 completed:   Yes  What interventions are required if any?               Family Comments: daughter updated at bedside  OBS brochure/video discussed/provided to patient/family: N/A              Name of person given brochure if not patient: n/a              Relationship to patient: n/a    For the majority of the shift this patient's behavior was Green.   Behavioral interventions performed were n/a.    ED NURSE PHONE NUMBER: *[phone]

## 2022-07-22 NOTE — PROGRESS NOTES
RECEIVING UNIT ED HANDOFF REVIEW    ED Nurse Handoff Report was reviewed by: Stan Chavez RN on July 21, 2022 at 11:15 PM

## 2022-07-22 NOTE — CONSULTS
Care Management Follow Up    Length of Stay (days): 0    Expected Discharge Date: 07/23/2022     Concerns to be Addressed:       Patient plan of care discussed at interdisciplinary rounds: Yes  Anticipated Discharge Disposition: Transitional Care     Anticipated Discharge Services:    Anticipated Discharge DME:      Patient/family educated on Medicare website which has current facility and service quality ratings:  yes  Education Provided on the Discharge Plan:    Patient/Family in Agreement with the Plan: yes    Referrals Placed by CM/SW:    Private pay costs discussed: private room/amenity fees and transportation costs    Additional Information:  Consult for discharge planning. Patient admitted on 7/21 for mechanical fall and a tentative discharge date of 7/23/22.  Writer reviewed chart and TCU recommendations at discharge. Writer met with patient at bedside and introduced self and role. Patient in agreement for TCU at discharge and would like referrals sent around [facility]. Writer discussed private room and cost associated vs shared room and patient is open to whats available. Referrals sent via [facility]. Writer discussed transportation options and possible out of pocket costs of transport with patient. Patient would like family to drive him at discharge. Patient requested that SW call his daughters and tell them the plan. SW spoke with [facility] via phone and they are in agreement with TCU. Patient has been at acute rehab and [facility] in the past.      Patient has been vaccinated for COVID and has had the booster.     Ruchi Amin, IVORY, LGSW

## 2022-07-23 PROBLEM — S42.252A CLOSED DISPLACED FRACTURE OF GREATER TUBEROSITY OF LEFT HUMERUS, INITIAL ENCOUNTER: Status: ACTIVE | Noted: 2022-07-23

## 2022-07-23 LAB
ANION GAP SERPL CALCULATED.3IONS-SCNC: 7 MMOL/L (ref 3–14)
BUN SERPL-MCNC: 21 MG/DL (ref 7–30)
CALCIUM SERPL-MCNC: 8.3 MG/DL (ref 8.5–10.1)
CHLORIDE BLD-SCNC: 112 MMOL/L (ref 94–109)
CO2 SERPL-SCNC: 22 MMOL/L (ref 20–32)
CREAT SERPL-MCNC: 1.26 MG/DL (ref 0.66–1.25)
ERYTHROCYTE [DISTWIDTH] IN BLOOD BY AUTOMATED COUNT: 15.5 % (ref 10–15)
GFR SERPL CREATININE-BSD FRML MDRD: 57 ML/MIN/1.73M2
GLUCOSE BLD-MCNC: 105 MG/DL (ref 70–99)
HCT VFR BLD AUTO: 29.9 % (ref 40–53)
HGB BLD-MCNC: 9.9 G/DL (ref 13.3–17.7)
MCH RBC QN AUTO: 31.6 PG (ref 26.5–33)
MCHC RBC AUTO-ENTMCNC: 33.1 G/DL (ref 31.5–36.5)
MCV RBC AUTO: 96 FL (ref 78–100)
PLATELET # BLD AUTO: 258 10E3/UL (ref 150–450)
POTASSIUM BLD-SCNC: 3.6 MMOL/L (ref 3.4–5.3)
RBC # BLD AUTO: 3.13 10E6/UL (ref 4.4–5.9)
SODIUM SERPL-SCNC: 141 MMOL/L (ref 133–144)
WBC # BLD AUTO: 7.2 10E3/UL (ref 4–11)

## 2022-07-23 PROCEDURE — G0378 HOSPITAL OBSERVATION PER HR: HCPCS

## 2022-07-23 PROCEDURE — 99232 SBSQ HOSP IP/OBS MODERATE 35: CPT

## 2022-07-23 PROCEDURE — 120N000001 HC R&B MED SURG/OB

## 2022-07-23 PROCEDURE — 250N000012 HC RX MED GY IP 250 OP 636 PS 637: Performed by: STUDENT IN AN ORGANIZED HEALTH CARE EDUCATION/TRAINING PROGRAM

## 2022-07-23 PROCEDURE — 250N000013 HC RX MED GY IP 250 OP 250 PS 637: Performed by: STUDENT IN AN ORGANIZED HEALTH CARE EDUCATION/TRAINING PROGRAM

## 2022-07-23 PROCEDURE — 82310 ASSAY OF CALCIUM: CPT | Performed by: INTERNAL MEDICINE

## 2022-07-23 PROCEDURE — 36415 COLL VENOUS BLD VENIPUNCTURE: CPT | Performed by: INTERNAL MEDICINE

## 2022-07-23 PROCEDURE — 99207 PR APP CREDIT; MD BILLING SHARED VISIT: CPT | Performed by: INTERNAL MEDICINE

## 2022-07-23 PROCEDURE — 85027 COMPLETE CBC AUTOMATED: CPT | Performed by: INTERNAL MEDICINE

## 2022-07-23 RX ADMIN — OXYCODONE HYDROCHLORIDE 2.5 MG: 5 TABLET ORAL at 05:59

## 2022-07-23 RX ADMIN — METHOTREXATE SODIUM 12.5 MG: 2.5 TABLET ORAL at 09:29

## 2022-07-23 RX ADMIN — ROSUVASTATIN 10 MG: 10 TABLET, FILM COATED ORAL at 08:30

## 2022-07-23 RX ADMIN — LISINOPRIL 10 MG: 10 TABLET ORAL at 08:30

## 2022-07-23 RX ADMIN — FOLIC ACID TAB 400 MCG 800 MCG: 400 TAB at 08:30

## 2022-07-23 RX ADMIN — ACETAMINOPHEN 650 MG: 325 TABLET ORAL at 02:16

## 2022-07-23 RX ADMIN — ACETAMINOPHEN 650 MG: 325 TABLET ORAL at 16:54

## 2022-07-23 ASSESSMENT — ACTIVITIES OF DAILY LIVING (ADL)
DRESSING/BATHING_DIFFICULTY: NO
CHANGE_IN_FUNCTIONAL_STATUS_SINCE_ONSET_OF_CURRENT_ILLNESS/INJURY: NO
ADLS_ACUITY_SCORE: 27
WEAR_GLASSES_OR_BLIND: NO
TOILETING_ISSUES: NO
ADLS_ACUITY_SCORE: 27
WALKING_OR_CLIMBING_STAIRS_DIFFICULTY: NO
ADLS_ACUITY_SCORE: 27
NUMBER_OF_TIMES_PATIENT_HAS_FALLEN_WITHIN_LAST_SIX_MONTHS: 1
DIFFICULTY_EATING/SWALLOWING: NO
ADLS_ACUITY_SCORE: 27
FALL_HISTORY_WITHIN_LAST_SIX_MONTHS: YES
DOING_ERRANDS_INDEPENDENTLY_DIFFICULTY: NO
EQUIPMENT_CURRENTLY_USED_AT_HOME: CANE, STRAIGHT;WALKER, ROLLING
ADLS_ACUITY_SCORE: 28
CONCENTRATING,_REMEMBERING_OR_MAKING_DECISIONS_DIFFICULTY: NO

## 2022-07-23 NOTE — PROGRESS NOTES
[facility]    Medicine Progress Note - Hospitalist Service    Date of Admission:  7/21/2022    Assessment & Plan          Shamir Concepcion is a 82 year old male with past medical history significant for HTN, peripheral arterial disease s/p femoral artery stent placement, prior stroke on chronic anticoagulation, rheumatoid arthritis  admitted on 7/21/2022 after a mechanical fall. He remains hospitalized awaiting appropriate discharge placement.    Mechanical fall   Left humerus fracture and anterior glenohumeral joint dislocation  Left eyebrow laceration, periorbital hematoma  The patient presents to the ED after a mechanical fall. He fell on to his left shoulder. XR of the left shoulder showed Anterior glenohumeral joint dislocation and displaced fracture of the greater tuberosity and lateral aspect of the humeral head and neck. He has a left eyebrow laceration, but head CT was negative for acute intracranial process.   * He underwent successful reduction of the dislocated shoulder in the ED and placed in shoulder immobilizer  - Pain control PRN   - Will consult ortho for follow-up plans   - Monitor hematoma  - Suture removal 5-7 days   - PT/OT evaluated with plan  - SW/CC aware of need for placement assistance; working [facility] TCU placement.    Hypertension   PTA medications include lisinopril  -BPs have been within normal limits  - Continue lisinopril     CKD III   Recent baseline creatinine has been around 1.6-1.7. Creatinine on admission is 1.43  - Monitor renal function and avoid nephrotoxins.   -Cr this morning trending down to 1.26    Peripheral arterial Disease   S/p right superficial femoral artery stent placement (2013)    Prior stroke (2018)  He does have some residual weakness and uses a cane for ambulation   - Will hold apixaban for now to monitor progression of ecchymoses, can likely resume in AM if stable.   - Continue Rosuvastatin   - PT/OT    Continue to hold Eliquis today due  to drop in hemoglobin  Could possibly restart tomorrow if hemoglobin stabilizes      Rheumatoid arthritis   PTA methotrexate Q7days (Saturday)   - Resume methotrexate and folic acid     Mild Chronic anemia  Hemoglobin in 11.6 on admission. Baseline is around 12.   - Monitor closely in the setting of large hematoma.   Hemoglobin decreased  hemoglobin today is 9.9-unchanged in past 12 hours.  -Hemoglobin in the morning.   If it continues to drop, may need to consider imaging to look for any intra abdominal hematoma etc.         Diet: Regular Diet Adult    DVT Prophylaxis: Pneumatic Compression Devices  Michelle Catheter: Not present  Central Lines: None  Cardiac Monitoring: None  Code Status: No CPR- Do NOT Intubate      Disposition Plan      Expected Discharge Date: 07/24/2022        Discharge Comments: TCU placement. monitor hgb.        The patient's care was discussed with the Attending Physician, Dr. Mcallister.    Savanna Tuttle NP  Hospitalist Service  [facility]  Securely message with the Vocera Web Console (learn more here)  Text page via [employer] Paging/Directory         Clinically Significant Risk Factors Present on Admission          # Hypocalcemia: Ca = 8.3 mg/dL (Ref range: 8.5 - 10.1 mg/dL) and/or iCa = N/A on admission, will replace as needed      # Coagulation Defect: home medication list includes an anticoagulant medication   # Hypertension: home medication list includes antihypertensive(s)          ______________________________________________________________________    Interval History   Patient is doing well with regards to pain and states it is minimal except when moving around. He states the medications are helping. Hgb this morning was 9.9 which is unchanged over last evening but remains low. VSS, afebrile. Appetite good, denies nausea or vomiting.     Data reviewed today: I reviewed all medications, new labs and imaging results over the last 24 hours. I personally  reviewed no images or EKG's today.    Physical Exam   Vital Signs: Temp: 98.4  F (36.9  C) Temp src: Oral BP: 133/68 Pulse: 59   Resp: 16 SpO2: 95 % O2 Device: None (Room air)    Weight: 155 lbs 0 oz  Constitutional: awake, alert, cooperative, no apparent distress, and appears stated age  Eyes: Lids and lashes normal, pupils equal, round and reactive to light, extra ocular muscles intact, sclera clear, conjunctiva normal  Respiratory: No increased work of breathing, good air exchange, clear to auscultation bilaterally, no crackles or wheezing  Cardiovascular: Normal apical impulse, regular rate and rhythm, normal S1 and S2, no S3 or S4, and no murmur noted  GI: No scars, normal bowel sounds, soft, non-distended, non-tender, no masses palpated, no hepatosplenomegally  Skin: ecchymosis on face and on left shoulder(s)  Musculoskeletal: LEFT SHOULDER:  tenderness present  range of motion limited by pain  Neurologic: Pleasant gentleman    Data   Recent Labs   Lab 07/23/22  0620 07/22/22  1506 07/22/22  0623 07/21/22  1824   WBC 7.2  --  6.1 12.9*   HGB 9.9* 9.9* 10.3* 11.6*   MCV 96  --  97 96     --  293 408     --  140 139   POTASSIUM 3.6  --  4.5 4.1   CHLORIDE 112*  --  112* 107   CO2 22  --  23 25   BUN 21  --  22 23   CR 1.26*  --  1.30* 1.43*   ANIONGAP 7  --  5 7   RANJAN 8.3*  --  8.4* 9.1   *  --  117* 180*   ALBUMIN  --   --   --  4.2   PROTTOTAL  --   --   --  8.2   BILITOTAL  --   --   --  0.5   ALKPHOS  --   --   --  95   ALT  --   --   --  21   AST  --   --   --  23     No results found for this or any previous visit (from the past 24 hour(s)).  Medications       [Held by provider] apixaban ANTICOAGULANT  5 mg Oral BID     folic acid  800 mcg Oral Daily     lisinopril  10 mg Oral Daily     methotrexate  12.5 mg Oral Q7 Days     rosuvastatin  10 mg Oral Daily

## 2022-07-23 NOTE — PROGRESS NOTES
PRIOR TO DISCHARGE       Comments:   -diagnostic tests and consults completed and resulted:  met    -vital signs normal or at patient baseline: met    -returns to baseline functional status: partially met     -safe disposition plan has been identified: not met, TCU pending    Nurse to notify provider when observation goals have been met and patient is ready for discharge.

## 2022-07-23 NOTE — UTILIZATION REVIEW
Admission Status; Secondary Review Determination    Under the authority of the Utilization Management Committee, the utilization review process indicated a secondary review on the above patient. The review outcome is based on review of the medical records, discussions with staff, and applying clinical experience noted on the date of the review.    (x) Inpatient Status Appropriate - This patient's medical care is consistent with medical management for inpatient care and reasonable inpatient medical practice.    RATIONALE FOR DETERMINATION: 82-year-old male on anticoagulants due to previous stroke who lost balance and fell while cleaning his room resulting in a laceration on his left eye, acute left shoulder pain and abrasion on his left knee.  Patient found to have an anterior glenohumeral joint dislocation with a displaced fracture of the greater tuberosity and lateral aspect of the humeral head and neck.  Patient had some mild leukocytosis, initial hemoglobin 11.6.  Patient's dislocated shoulder was reduced in the emergency room with plans for nonweightbearing of the joint with conservative measures.  However patient's hemoglobin dropped further down to 9.9 on the afternoon of hospital day 2.  Due to patient's recent usage of Eliquis and significant trauma patient was felt to be not safe for discharge requiring ongoing monitoring of vital signs and serial hemoglobins to rule out any significant internal bleeding.  Therefore patient required greater than 2 nights in the hospital appropriate for inpatient management.    At the time of admission with the information available to the attending physician more than 2 nights Hospital complex care was anticipated, based on patient risk of adverse outcome if treated as outpatient and complex care required. Inpatient admission is appropriate based on the Medicare guidelines.    This document was produced using voice recognition software    The information on this  document is developed by the utilization review team in order for the business office to ensure compliance. This only denotes the appropriateness of proper admission status and does not reflect the quality of care rendered.    The definitions of Inpatient Status and Observation Status used in making the determination above are those provided in the CMS Coverage Manual, Chapter 1 and Chapter 6, section 70.4.    Sincerely,    Bob Escobedo MD  Utilization Review  Physician Advisor  [facility].

## 2022-07-23 NOTE — PROGRESS NOTES
"Observation goals  PRIOR TO DISCHARGE        Comments: -diagnostic tests and consults completed and resulted Met  -vital signs normal or at patient baseline Met /68 (BP Location: Right arm)   Pulse 59   Temp 98.4  F (36.9  C) (Oral)   Resp 16   Ht 1.753 m (5' 9\")   Wt 70.3 kg (155 lb)   SpO2 95%   BMI 22.89 kg/m      -returns to baseline functional status In Progress  -safe disposition plan has been identified Not Met, pending placement  Nurse to notify provider when observation goals have been met and patient is ready for discharge.       "

## 2022-07-24 VITALS
SYSTOLIC BLOOD PRESSURE: 128 MMHG | TEMPERATURE: 98.1 F | OXYGEN SATURATION: 98 % | DIASTOLIC BLOOD PRESSURE: 64 MMHG | RESPIRATION RATE: 20 BRPM | BODY MASS INDEX: 22.96 KG/M2 | HEART RATE: 54 BPM | WEIGHT: 155 LBS | HEIGHT: 69 IN

## 2022-07-24 LAB
BASOPHILS # BLD AUTO: 0.1 10E3/UL (ref 0–0.2)
BASOPHILS NFR BLD AUTO: 1 %
EOSINOPHIL # BLD AUTO: 0 10E3/UL (ref 0–0.7)
EOSINOPHIL NFR BLD AUTO: 0 %
ERYTHROCYTE [DISTWIDTH] IN BLOOD BY AUTOMATED COUNT: 15.4 % (ref 10–15)
HCT VFR BLD AUTO: 31.5 % (ref 40–53)
HGB BLD-MCNC: 10.2 G/DL (ref 13.3–17.7)
IMM GRANULOCYTES # BLD: 0.1 10E3/UL
IMM GRANULOCYTES NFR BLD: 1 %
LYMPHOCYTES # BLD AUTO: 1.4 10E3/UL (ref 0.8–5.3)
LYMPHOCYTES NFR BLD AUTO: 16 %
MCH RBC QN AUTO: 31.6 PG (ref 26.5–33)
MCHC RBC AUTO-ENTMCNC: 32.4 G/DL (ref 31.5–36.5)
MCV RBC AUTO: 98 FL (ref 78–100)
MONOCYTES # BLD AUTO: 0.7 10E3/UL (ref 0–1.3)
MONOCYTES NFR BLD AUTO: 8 %
NEUTROPHILS # BLD AUTO: 6.5 10E3/UL (ref 1.6–8.3)
NEUTROPHILS NFR BLD AUTO: 74 %
NRBC # BLD AUTO: 0 10E3/UL
NRBC BLD AUTO-RTO: 0 /100
PLATELET # BLD AUTO: 276 10E3/UL (ref 150–450)
RBC # BLD AUTO: 3.23 10E6/UL (ref 4.4–5.9)
WBC # BLD AUTO: 8.7 10E3/UL (ref 4–11)

## 2022-07-24 PROCEDURE — 99207 PR APP CREDIT; MD BILLING SHARED VISIT: CPT | Performed by: INTERNAL MEDICINE

## 2022-07-24 PROCEDURE — 85025 COMPLETE CBC W/AUTO DIFF WBC: CPT

## 2022-07-24 PROCEDURE — 250N000013 HC RX MED GY IP 250 OP 250 PS 637: Performed by: STUDENT IN AN ORGANIZED HEALTH CARE EDUCATION/TRAINING PROGRAM

## 2022-07-24 PROCEDURE — 99238 HOSP IP/OBS DSCHRG MGMT 30/<: CPT

## 2022-07-24 PROCEDURE — 36415 COLL VENOUS BLD VENIPUNCTURE: CPT

## 2022-07-24 RX ORDER — OXYCODONE HYDROCHLORIDE 5 MG/1
2.5-5 TABLET ORAL EVERY 4 HOURS PRN
Qty: 10 TABLET | Refills: 0 | Status: ON HOLD | OUTPATIENT
Start: 2022-07-24 | End: 2022-08-10

## 2022-07-24 RX ADMIN — LISINOPRIL 10 MG: 10 TABLET ORAL at 07:49

## 2022-07-24 RX ADMIN — ROSUVASTATIN 10 MG: 10 TABLET, FILM COATED ORAL at 07:49

## 2022-07-24 RX ADMIN — ACETAMINOPHEN 650 MG: 325 TABLET ORAL at 01:01

## 2022-07-24 RX ADMIN — ACETAMINOPHEN 650 MG: 325 TABLET ORAL at 07:48

## 2022-07-24 RX ADMIN — FOLIC ACID TAB 400 MCG 800 MCG: 400 TAB at 07:49

## 2022-07-24 ASSESSMENT — ACTIVITIES OF DAILY LIVING (ADL)
ADLS_ACUITY_SCORE: 27
ADLS_ACUITY_SCORE: 28
ADLS_ACUITY_SCORE: 32
ADLS_ACUITY_SCORE: 28
ADLS_ACUITY_SCORE: 27
ADLS_ACUITY_SCORE: 28
ADLS_ACUITY_SCORE: 28

## 2022-07-24 NOTE — PROGRESS NOTES
Care Management Discharge Note    Discharge Date: 07/24/2022       Discharge Disposition: Transitional Care    Discharge Services:      Discharge DME:      Discharge Transportation: family or friend will provide    Private pay costs discussed: private room/amenity fees    PAS Confirmation Code: [id number]  Patient/family educated on Medicare website which has current facility and service quality ratings: yes    Education Provided on the Discharge Plan:    Persons Notified of Discharge Plans: Pt, dtrs, RN, MD, facility  Patient/Family in Agreement with the Plan: yes    Handoff Referral Completed: No    Additional Information:  [facility] for today. SW met with pt and dtrs, Turner. They chose [facility]. It is a private room at $58 per day extra. Family will transport at 1400. Orders faxed and facility updated. Nursing aware.         IVORY Arnold, [facility]  [phone] Desk phone  [phone] Cell/text (Preferred)  [facility]      PAS-RR    D: Per DHS regulation, JERRICA completed and submitted PAS-RR to [place] [employer] on [employer]ging Direct Connect via the Senior LinkAge Line.  PAS-RR confirmation # is : [id number]      P: Further questions may be directed to Senior LinkAge Line at #[phone], option #4 for PAS-RR staff.

## 2022-07-24 NOTE — DISCHARGE SUMMARY
[facility]  Hospitalist Discharge Summary      Date of Admission:  7/21/2022  Date of Discharge:  7/24/2022  Discharging Provider: Savanan Tuttle NP  Discharge Service: Hospitalist Service    Discharge Diagnoses   1. Closed fracture of head of left humerus, initial encounter    2. Fall, initial encounter    3. Closed head injury, initial encounter    4. Hx of long-term (current) use of anticoagulants    5. Periorbital hematoma of left eye    6. Eyebrow laceration, left, initial encounter    7. Acute pain of left shoulder    8. Shoulder dislocation, left, initial encounter    9. Closed displaced fracture of greater tuberosity of left humerus, initial encounter    10. Cerebrovascular accident (CVA), unspecified mechanism (H)    11. Other specified rheumatoid arthritis, other specified site (H)        Follow-ups Needed After Discharge   To be seen by shoulder specialist at [facility] in the next week.     Unresulted Labs Ordered in the Past 30 Days of this Admission     No orders found from 6/21/2022 to 7/22/2022.      These results will be followed up by PCP.    Discharge Disposition   Discharged to rehabilitation facility  Condition at discharge: Good      Hospital Course            Shamir Concepcion is a 82 year old male with past medical history significant for HTN, peripheral arterial disease s/p femoral artery stent placement, prior stroke on chronic anticoagulation, rheumatoid arthritis  admitted on 7/21/2022 after a mechanical fall. He remains hospitalized awaiting appropriate discharge placement.    Mechanical fall   Left humerus fracture and anterior glenohumeral joint dislocation  Left eyebrow laceration, periorbital hematoma  The patient presents to the ED after a mechanical fall. He fell on to his left shoulder. XR of the left shoulder showed Anterior glenohumeral joint dislocation and displaced fracture of the greater tuberosity and lateral aspect of the humeral  head and neck. He has a left eyebrow laceration, but head CT was negative for acute intracranial process.   * He underwent successful reduction of the dislocated shoulder in the ED and placed in shoulder immobilizer  - Pain control PRN   - Ortho Consult:No indication for urgent operative intervention. Continue sling immobilization while upright.  May rest without the sling in bed.  NWB LUE.  May use elbow and forearm/hand for ADLs.  OK for discharge from ortho standpoint and f/u with shoulder specialist next week   - Monitor hematoma  - Suture removal 5-7 days   - PT/OT evaluated with plan  - SW/CC aware of need for placement assistance; working on TCU placement.    Hypertension   PTA medications include lisinopril  -BPs have been within normal limits  - Continue lisinopril     CKD III   Recent baseline creatinine has been around 1.6-1.7. Creatinine on admission is 1.43  - Monitor renal function and avoid nephrotoxins.   -Cr 07/23 trending down to 1.26    Peripheral arterial Disease   S/p right superficial femoral artery stent placement (2013)    Prior stroke (2018)  He does have some residual weakness and uses a cane for ambulation   - Will hold apixaban for now to monitor progression of ecchymoses, can likely resume in AM if stable.   - Continue Rosuvastatin   - PT/OT    Rheumatoid arthritis   PTA methotrexate Q7days (Saturday)   - Resume methotrexate and folic acid     Mild Chronic anemia  Hemoglobin in 11.6 on admission. Baseline is around 12.   - Monitor closely in the setting of large hematoma.   Hemoglobin improving to 10.2 on 07/23  -Hemoglobin in the morning.   -Continue to hold Eliquis; restart 07/25      Consultations This Hospital Stay   RESPIRATORY CARE IP CONSULT  ORTHOPEDIC SURGERY IP CONSULT  CARE MANAGEMENT / SOCIAL WORK IP CONSULT  PHYSICAL THERAPY ADULT IP CONSULT  OCCUPATIONAL THERAPY ADULT IP CONSULT    Code Status   No CPR- Do NOT Intubate    Time Spent on this Encounter   Savanna DENNIS  BELINDA Tuttle, personally saw the patient today and spent less than or equal to 30 minutes discharging this patient.       Savanna Tuttle NP  [facility] EXTENDED RECOVERY AND SHORT STAY  [address] [address]  Phone: [phone]  ______________________________________________________________________    Physical Exam   Vital Signs: Temp: 97.4  F (36.3  C) Temp src: Oral BP: 132/62 Pulse: 57   Resp: 20 SpO2: 98 % O2 Device: None (Room air)    Weight: 155 lbs 0 oz  Physical Exam     Vital Signs: Temp: 97.5  F (36.4  C) Temp src: Oral BP: 124/64 Pulse: 56   Resp: 20 SpO2: 98 % O2 Device: None (Room air)    Weight: 155 lbs 0 oz  Constitutional: awake, alert, cooperative, no apparent distress, and appears stated age  Eyes: pupils equal, round and reactive to light, sclera clear and conjunctiva normal  Respiratory: No increased work of breathing, good air exchange, clear to auscultation bilaterally, no crackles or wheezing  Cardiovascular: Normal apical impulse, regular rate and rhythm, normal S1 and S2, no S3 or S4, and no murmur noted  Skin: ecchymosis on face and on right shoulder(s)  Musculoskeletal: LEFT SHOULDER:  Tenderness with palpation; pain with movement. Decreasing ecchymosis.   range of motion decreased related to pain.       Primary Care Physician   Physician No Ref-Primary    Discharge Orders   No discharge procedures on file.    Significant Results and Procedures   Most Recent 3 CBC's:Recent Labs   Lab Test 07/24/22  0618 07/23/22  0620 07/22/22  1506 07/22/22  0623   WBC 8.7 7.2  --  6.1   HGB 10.2* 9.9* 9.9* 10.3*   MCV 98 96  --  97    258  --  293       Discharge Medications   Current Discharge Medication List      START taking these medications    Details   oxyCODONE (ROXICODONE) 5 MG tablet Take 0.5-1 tablets (2.5-5 mg) by mouth every 4 hours as needed for moderate to severe pain  Qty: 10 tablet, Refills: 0    Associated Diagnoses: Shoulder  dislocation, left, initial encounter; Closed displaced fracture of greater tuberosity of left humerus, initial encounter; Closed fracture of head of left humerus, initial encounter         CONTINUE these medications which have CHANGED    Details   apixaban ANTICOAGULANT (ELIQUIS) 5 MG tablet Take 1 tablet (5 mg) by mouth 2 times daily    Associated Diagnoses: Cerebrovascular accident (CVA), unspecified mechanism (H)      methotrexate 2.5 MG tablet Take 5 tablets (12.5 mg) by mouth every 7 days    Associated Diagnoses: Other specified rheumatoid arthritis, other specified site (H)         CONTINUE these medications which have NOT CHANGED    Details   acetaminophen (TYLENOL) 325 MG tablet Take 2 tablets (650 mg) by mouth every 4 hours as needed for mild pain    Associated Diagnoses: Pain; Rheumatoid arthritis involving both hands, unspecified rheumatoid factor presence      alendronate (FOSAMAX) 70 MG tablet Take 70 mg by mouth every 7 days      fluticasone (FLONASE) 50 MCG/ACT nasal spray Spray 1 spray into both nostrils daily as needed      folic acid 800 MCG TABS Take 800 mcg by mouth daily    Associated Diagnoses: Rheumatoid arthritis involving both hands, unspecified rheumatoid factor presence      lisinopril (ZESTRIL) 10 MG tablet Take 10 mg by mouth daily      multivitamin  with lutein (OCUVITE WITH LUTEIN) CAPS per capsule Take 1 capsule by mouth daily      rosuvastatin (CRESTOR) 10 MG tablet Take 10 mg by mouth daily      trospium (SANCTURA) 20 MG tablet Take 20 mg by mouth 2 times daily      VITAMIN D, CHOLECALCIFEROL, PO Take by mouth daily           Allergies   No Known Allergies

## 2022-07-24 NOTE — PROGRESS NOTES
[facility]    Medicine Progress Note - Hospitalist Service    Date of Admission:  7/21/2022    Assessment & Plan          Shamir Concepcion is a 82 year old male with past medical history significant for HTN, peripheral arterial disease s/p femoral artery stent placement, prior stroke on chronic anticoagulation, rheumatoid arthritis  admitted on 7/21/2022 after a mechanical fall. He remains hospitalized awaiting appropriate discharge placement.    Mechanical fall   Left humerus fracture and anterior glenohumeral joint dislocation  Left eyebrow laceration, periorbital hematoma  The patient presents to the ED after a mechanical fall. He fell on to his left shoulder. XR of the left shoulder showed Anterior glenohumeral joint dislocation and displaced fracture of the greater tuberosity and lateral aspect of the humeral head and neck. He has a left eyebrow laceration, but head CT was negative for acute intracranial process.   * He underwent successful reduction of the dislocated shoulder in the ED and placed in shoulder immobilizer  - Pain control PRN   - Ortho Consult:No indication for urgent operative intervention. Continue sling immobilization while upright.  May rest without the sling in bed.  NWB LUE.  May use elbow and forearm/hand for ADLs.  OK for discharge from ortho standpoint and f/u with shoulder specialist next week   - Monitor hematoma  - Suture removal 5-7 days   - PT/OT evaluated with plan  - SW/CC aware of need for placement assistance; working on TCU placement.    Hypertension   PTA medications include lisinopril  -BPs have been within normal limits  - Continue lisinopril     CKD III   Recent baseline creatinine has been around 1.6-1.7. Creatinine on admission is 1.43  - Monitor renal function and avoid nephrotoxins.   -Cr 07/23 trending down to 1.26    Peripheral arterial Disease   S/p right superficial femoral artery stent placement (2013)    Prior stroke (2018)  He does have  some residual weakness and uses a cane for ambulation   - Will hold apixaban for now to monitor progression of ecchymoses, can likely resume in AM if stable.   - Continue Rosuvastatin   - PT/OT    Rheumatoid arthritis   PTA methotrexate Q7days (Saturday)   - Resume methotrexate and folic acid     Mild Chronic anemia  Hemoglobin in 11.6 on admission. Baseline is around 12.   - Monitor closely in the setting of large hematoma.   Hemoglobin improving to 10.2 on 07/23  -Hemoglobin in the morning.   -Continue to hold Eliquis; restart 07/25       Diet: Regular Diet Adult    DVT Prophylaxis: Pneumatic Compression Devices and ambulatoin  Michelle Catheter: Not present  Central Lines: None  Cardiac Monitoring: None  Code Status: No CPR- Do NOT Intubate      Disposition Plan      Expected Discharge Date: 07/25/2022        Discharge Comments: TCU placement. monitor hgb.        The patient's care was discussed with the Attending Physician, Dr. Carmen.    Savanna Tuttle NP  Hospitalist Service  [facility]  Securely message with the Vocera Web Console (learn more here)  Text page via [employer] Paging/Directory         Clinically Significant Risk Factors Present on Admission          # Hypocalcemia: Ca = 8.3 mg/dL (Ref range: 8.5 - 10.1 mg/dL) and/or iCa = N/A on admission, will replace as needed      # Coagulation Defect: home medication list includes an anticoagulant medication   # Hypertension: home medication list includes antihypertensive(s)          ______________________________________________________________________    Interval History   VSS, afebrile. States he slept fairly well last night and has decreased pain in shoulder with movement. Awaiting TCU placement for discharge.     Data reviewed today: I reviewed all medications, new labs and imaging results over the last 24 hours. I personally reviewed no images or EKG's today.    Physical Exam   Vital Signs: Temp: 97.5  F (36.4  C) Temp src: Oral  BP: 124/64 Pulse: 56   Resp: 20 SpO2: 98 % O2 Device: None (Room air)    Weight: 155 lbs 0 oz  Constitutional: awake, alert, cooperative, no apparent distress, and appears stated age  Eyes: pupils equal, round and reactive to light, sclera clear and conjunctiva normal  Respiratory: No increased work of breathing, good air exchange, clear to auscultation bilaterally, no crackles or wheezing  Cardiovascular: Normal apical impulse, regular rate and rhythm, normal S1 and S2, no S3 or S4, and no murmur noted  Skin: ecchymosis on face and on right shoulder(s)  Musculoskeletal: LEFT SHOULDER:  Tenderness with palpation; pain with movement. Decreasing ecchymosis.   range of motion decreased related to pain.    Data   Recent Labs   Lab 07/24/22  0618 07/23/22  0620 07/22/22  1506 07/22/22  0623 07/21/22  1824   WBC 8.7 7.2  --  6.1 12.9*   HGB 10.2* 9.9* 9.9* 10.3* 11.6*   MCV 98 96  --  97 96    258  --  293 408   NA  --  141  --  140 139   POTASSIUM  --  3.6  --  4.5 4.1   CHLORIDE  --  112*  --  112* 107   CO2  --  22  --  23 25   BUN  --  21  --  22 23   CR  --  1.26*  --  1.30* 1.43*   ANIONGAP  --  7  --  5 7   RANJAN  --  8.3*  --  8.4* 9.1   GLC  --  105*  --  117* 180*   ALBUMIN  --   --   --   --  4.2   PROTTOTAL  --   --   --   --  8.2   BILITOTAL  --   --   --   --  0.5   ALKPHOS  --   --   --   --  95   ALT  --   --   --   --  21   AST  --   --   --   --  23     No results found for this or any previous visit (from the past 24 hour(s)).

## 2022-07-24 NOTE — DISCHARGE INSTRUCTIONS
Make an appointment for follow up with a shoulder specialist at [facility] in the next week. While you were in the hospital you were seen by Dr. Stevie Akers from that group. Their number is : [phone]    Contact your regular healthcare provider or clinic for any new concerns, increased pain, or numbness or tingling in your hand..

## 2022-07-26 LAB
ATRIAL RATE - MUSE: 70 BPM
DIASTOLIC BLOOD PRESSURE - MUSE: NORMAL MMHG
INTERPRETATION ECG - MUSE: NORMAL
P AXIS - MUSE: 63 DEGREES
PR INTERVAL - MUSE: 218 MS
QRS DURATION - MUSE: 84 MS
QT - MUSE: 396 MS
QTC - MUSE: 427 MS
R AXIS - MUSE: -11 DEGREES
SYSTOLIC BLOOD PRESSURE - MUSE: NORMAL MMHG
T AXIS - MUSE: 2 DEGREES
VENTRICULAR RATE- MUSE: 70 BPM

## 2022-07-29 ENCOUNTER — LAB REQUISITION (OUTPATIENT)
Dept: LAB | Facility: CLINIC | Age: 82
End: 2022-07-29
Payer: COMMERCIAL

## 2022-07-29 DIAGNOSIS — I10 ESSENTIAL (PRIMARY) HYPERTENSION: ICD-10-CM

## 2022-08-01 LAB
ANION GAP SERPL CALCULATED.3IONS-SCNC: 10 MMOL/L (ref 7–15)
BUN SERPL-MCNC: 15.8 MG/DL (ref 8–23)
CALCIUM SERPL-MCNC: 8.5 MG/DL (ref 8.8–10.2)
CHLORIDE SERPL-SCNC: 109 MMOL/L (ref 98–107)
CREAT SERPL-MCNC: 1.4 MG/DL (ref 0.67–1.17)
DEPRECATED HCO3 PLAS-SCNC: 23 MMOL/L (ref 22–29)
GFR SERPL CREATININE-BSD FRML MDRD: 50 ML/MIN/1.73M2
GLUCOSE SERPL-MCNC: 83 MG/DL (ref 70–99)
MAGNESIUM SERPL-MCNC: 2 MG/DL (ref 1.7–2.3)
POTASSIUM SERPL-SCNC: 3.9 MMOL/L (ref 3.4–5.3)
SODIUM SERPL-SCNC: 142 MMOL/L (ref 136–145)

## 2022-08-01 PROCEDURE — 36415 COLL VENOUS BLD VENIPUNCTURE: CPT | Mod: ORL

## 2022-08-01 PROCEDURE — 80048 BASIC METABOLIC PNL TOTAL CA: CPT | Mod: ORL

## 2022-08-01 PROCEDURE — 83735 ASSAY OF MAGNESIUM: CPT | Mod: ORL

## 2022-08-01 PROCEDURE — P9604 ONE-WAY ALLOW PRORATED TRIP: HCPCS | Mod: ORL

## 2022-08-05 ENCOUNTER — LAB REQUISITION (OUTPATIENT)
Dept: LAB | Facility: CLINIC | Age: 82
End: 2022-08-05
Payer: COMMERCIAL

## 2022-08-05 DIAGNOSIS — Z01.818 ENCOUNTER FOR OTHER PREPROCEDURAL EXAMINATION: ICD-10-CM

## 2022-08-08 LAB
ERYTHROCYTE [DISTWIDTH] IN BLOOD BY AUTOMATED COUNT: 15.9 % (ref 10–15)
HCT VFR BLD AUTO: 30.4 % (ref 40–53)
HGB BLD-MCNC: 9.7 G/DL (ref 13.3–17.7)
MCH RBC QN AUTO: 32.1 PG (ref 26.5–33)
MCHC RBC AUTO-ENTMCNC: 31.9 G/DL (ref 31.5–36.5)
MCV RBC AUTO: 101 FL (ref 78–100)
PLATELET # BLD AUTO: 299 10E3/UL (ref 150–450)
RBC # BLD AUTO: 3.02 10E6/UL (ref 4.4–5.9)
WBC # BLD AUTO: 6.1 10E3/UL (ref 4–11)

## 2022-08-08 PROCEDURE — P9604 ONE-WAY ALLOW PRORATED TRIP: HCPCS | Mod: ORL

## 2022-08-08 PROCEDURE — 36415 COLL VENOUS BLD VENIPUNCTURE: CPT | Mod: ORL

## 2022-08-08 PROCEDURE — 85027 COMPLETE CBC AUTOMATED: CPT | Mod: ORL

## 2022-08-10 ENCOUNTER — APPOINTMENT (OUTPATIENT)
Dept: GENERAL RADIOLOGY | Facility: CLINIC | Age: 82
End: 2022-08-10
Attending: ORTHOPAEDIC SURGERY
Payer: COMMERCIAL

## 2022-08-10 ENCOUNTER — HOSPITAL ENCOUNTER (OUTPATIENT)
Facility: CLINIC | Age: 82
Discharge: HOME | End: 2022-08-10
Attending: ORTHOPAEDIC SURGERY | Admitting: ORTHOPAEDIC SURGERY
Payer: COMMERCIAL

## 2022-08-10 ENCOUNTER — ANESTHESIA EVENT (OUTPATIENT)
Dept: SURGERY | Facility: CLINIC | Age: 82
End: 2022-08-10
Payer: COMMERCIAL

## 2022-08-10 ENCOUNTER — ANESTHESIA (OUTPATIENT)
Dept: SURGERY | Facility: CLINIC | Age: 82
End: 2022-08-10
Payer: COMMERCIAL

## 2022-08-10 VITALS
BODY MASS INDEX: 22.57 KG/M2 | DIASTOLIC BLOOD PRESSURE: 81 MMHG | HEIGHT: 69 IN | TEMPERATURE: 97.9 F | WEIGHT: 152.4 LBS | RESPIRATION RATE: 16 BRPM | SYSTOLIC BLOOD PRESSURE: 150 MMHG | OXYGEN SATURATION: 98 % | HEART RATE: 76 BPM

## 2022-08-10 DIAGNOSIS — S42.252A CLOSED DISPLACED FRACTURE OF GREATER TUBEROSITY OF LEFT HUMERUS, INITIAL ENCOUNTER: Primary | ICD-10-CM

## 2022-08-10 LAB
ABO/RH(D): NORMAL
ANTIBODY SCREEN: NEGATIVE
SPECIMEN EXPIRATION DATE: NORMAL

## 2022-08-10 PROCEDURE — 999N000141 HC STATISTIC PRE-PROCEDURE NURSING ASSESSMENT: Performed by: ORTHOPAEDIC SURGERY

## 2022-08-10 PROCEDURE — 258N000003 HC RX IP 258 OP 636: Performed by: NURSE ANESTHETIST, CERTIFIED REGISTERED

## 2022-08-10 PROCEDURE — 36415 COLL VENOUS BLD VENIPUNCTURE: CPT | Performed by: PHYSICIAN ASSISTANT

## 2022-08-10 PROCEDURE — 250N000009 HC RX 250: Performed by: ORTHOPAEDIC SURGERY

## 2022-08-10 PROCEDURE — 86901 BLOOD TYPING SEROLOGIC RH(D): CPT | Performed by: PHYSICIAN ASSISTANT

## 2022-08-10 PROCEDURE — 710N000012 HC RECOVERY PHASE 2, PER MINUTE: Performed by: ORTHOPAEDIC SURGERY

## 2022-08-10 PROCEDURE — 250N000009 HC RX 250: Performed by: NURSE ANESTHETIST, CERTIFIED REGISTERED

## 2022-08-10 PROCEDURE — 999N000179 XR SURGERY CARM FLUORO LESS THAN 5 MIN W STILLS: Mod: TC

## 2022-08-10 PROCEDURE — 272N000001 HC OR GENERAL SUPPLY STERILE: Performed by: ORTHOPAEDIC SURGERY

## 2022-08-10 PROCEDURE — 272N000002 HC OR SUPPLY OTHER OPNP: Performed by: ORTHOPAEDIC SURGERY

## 2022-08-10 PROCEDURE — 710N000009 HC RECOVERY PHASE 1, LEVEL 1, PER MIN: Performed by: ORTHOPAEDIC SURGERY

## 2022-08-10 PROCEDURE — L3670 SO ACRO/CLAV CAN WEB PRE OTS: HCPCS

## 2022-08-10 PROCEDURE — 360N000084 HC SURGERY LEVEL 4 W/ FLUORO, PER MIN: Performed by: ORTHOPAEDIC SURGERY

## 2022-08-10 PROCEDURE — 250N000011 HC RX IP 250 OP 636: Performed by: PHYSICIAN ASSISTANT

## 2022-08-10 PROCEDURE — C1713 ANCHOR/SCREW BN/BN,TIS/BN: HCPCS | Performed by: ORTHOPAEDIC SURGERY

## 2022-08-10 PROCEDURE — 250N000011 HC RX IP 250 OP 636: Performed by: NURSE ANESTHETIST, CERTIFIED REGISTERED

## 2022-08-10 PROCEDURE — 250N000013 HC RX MED GY IP 250 OP 250 PS 637: Performed by: PHYSICIAN ASSISTANT

## 2022-08-10 PROCEDURE — 370N000017 HC ANESTHESIA TECHNICAL FEE, PER MIN: Performed by: ORTHOPAEDIC SURGERY

## 2022-08-10 DEVICE — IMPLANTABLE DEVICE: Type: IMPLANTABLE DEVICE | Site: ARM | Status: FUNCTIONAL

## 2022-08-10 RX ORDER — OXYCODONE HYDROCHLORIDE 5 MG/1
5 TABLET ORAL EVERY 4 HOURS PRN
Qty: 30 TABLET | Refills: 0 | Status: SHIPPED | OUTPATIENT
Start: 2022-08-10 | End: 2022-08-13

## 2022-08-10 RX ORDER — TRANEXAMIC ACID 650 MG/1
1950 TABLET ORAL ONCE
Status: COMPLETED | OUTPATIENT
Start: 2022-08-10 | End: 2022-08-10

## 2022-08-10 RX ORDER — GLYCOPYRROLATE 0.2 MG/ML
INJECTION, SOLUTION INTRAMUSCULAR; INTRAVENOUS PRN
Status: DISCONTINUED | OUTPATIENT
Start: 2022-08-10 | End: 2022-08-10

## 2022-08-10 RX ORDER — SENNA AND DOCUSATE SODIUM 50; 8.6 MG/1; MG/1
1 TABLET, FILM COATED ORAL 2 TIMES DAILY PRN
Qty: 20 TABLET | Refills: 0 | Status: ON HOLD | OUTPATIENT
Start: 2022-08-10 | End: 2024-02-17

## 2022-08-10 RX ORDER — HYDRALAZINE HYDROCHLORIDE 20 MG/ML
2.5-5 INJECTION INTRAMUSCULAR; INTRAVENOUS EVERY 10 MIN PRN
Status: DISCONTINUED | OUTPATIENT
Start: 2022-08-10 | End: 2022-08-10 | Stop reason: HOSPADM

## 2022-08-10 RX ORDER — NEOSTIGMINE METHYLSULFATE 1 MG/ML
VIAL (ML) INJECTION PRN
Status: DISCONTINUED | OUTPATIENT
Start: 2022-08-10 | End: 2022-08-10

## 2022-08-10 RX ORDER — ONDANSETRON 4 MG/1
4 TABLET, ORALLY DISINTEGRATING ORAL EVERY 30 MIN PRN
Status: DISCONTINUED | OUTPATIENT
Start: 2022-08-10 | End: 2022-08-10 | Stop reason: HOSPADM

## 2022-08-10 RX ORDER — EPHEDRINE SULFATE 50 MG/ML
INJECTION, SOLUTION INTRAMUSCULAR; INTRAVENOUS; SUBCUTANEOUS PRN
Status: DISCONTINUED | OUTPATIENT
Start: 2022-08-10 | End: 2022-08-10

## 2022-08-10 RX ORDER — BUPIVACAINE HYDROCHLORIDE AND EPINEPHRINE 2.5; 5 MG/ML; UG/ML
INJECTION, SOLUTION INFILTRATION; PERINEURAL PRN
Status: DISCONTINUED | OUTPATIENT
Start: 2022-08-10 | End: 2022-08-10 | Stop reason: HOSPADM

## 2022-08-10 RX ORDER — SODIUM CHLORIDE, SODIUM LACTATE, POTASSIUM CHLORIDE, CALCIUM CHLORIDE 600; 310; 30; 20 MG/100ML; MG/100ML; MG/100ML; MG/100ML
INJECTION, SOLUTION INTRAVENOUS CONTINUOUS
Status: DISCONTINUED | OUTPATIENT
Start: 2022-08-10 | End: 2022-08-10 | Stop reason: HOSPADM

## 2022-08-10 RX ORDER — LIDOCAINE 40 MG/G
CREAM TOPICAL
Status: DISCONTINUED | OUTPATIENT
Start: 2022-08-10 | End: 2022-08-10 | Stop reason: HOSPADM

## 2022-08-10 RX ORDER — FENTANYL CITRATE 50 UG/ML
50 INJECTION, SOLUTION INTRAMUSCULAR; INTRAVENOUS
Status: DISCONTINUED | OUTPATIENT
Start: 2022-08-10 | End: 2022-08-10 | Stop reason: HOSPADM

## 2022-08-10 RX ORDER — OXYCODONE HYDROCHLORIDE 5 MG/1
5 TABLET ORAL EVERY 4 HOURS PRN
Status: DISCONTINUED | OUTPATIENT
Start: 2022-08-10 | End: 2022-08-10 | Stop reason: HOSPADM

## 2022-08-10 RX ORDER — METOPROLOL TARTRATE 1 MG/ML
1-2 INJECTION, SOLUTION INTRAVENOUS EVERY 5 MIN PRN
Status: DISCONTINUED | OUTPATIENT
Start: 2022-08-10 | End: 2022-08-10 | Stop reason: HOSPADM

## 2022-08-10 RX ORDER — PROPOFOL 10 MG/ML
INJECTION, EMULSION INTRAVENOUS PRN
Status: DISCONTINUED | OUTPATIENT
Start: 2022-08-10 | End: 2022-08-10

## 2022-08-10 RX ORDER — CEFAZOLIN SODIUM/WATER 2 G/20 ML
2 SYRINGE (ML) INTRAVENOUS
Status: COMPLETED | OUTPATIENT
Start: 2022-08-10 | End: 2022-08-10

## 2022-08-10 RX ORDER — LIDOCAINE HYDROCHLORIDE 10 MG/ML
INJECTION, SOLUTION INFILTRATION; PERINEURAL PRN
Status: DISCONTINUED | OUTPATIENT
Start: 2022-08-10 | End: 2022-08-10

## 2022-08-10 RX ORDER — FENTANYL CITRATE 50 UG/ML
50 INJECTION, SOLUTION INTRAMUSCULAR; INTRAVENOUS EVERY 5 MIN PRN
Status: DISCONTINUED | OUTPATIENT
Start: 2022-08-10 | End: 2022-08-10 | Stop reason: HOSPADM

## 2022-08-10 RX ORDER — MEPERIDINE HYDROCHLORIDE 25 MG/ML
12.5 INJECTION INTRAMUSCULAR; INTRAVENOUS; SUBCUTANEOUS
Status: DISCONTINUED | OUTPATIENT
Start: 2022-08-10 | End: 2022-08-10 | Stop reason: HOSPADM

## 2022-08-10 RX ORDER — ONDANSETRON 2 MG/ML
4 INJECTION INTRAMUSCULAR; INTRAVENOUS EVERY 30 MIN PRN
Status: DISCONTINUED | OUTPATIENT
Start: 2022-08-10 | End: 2022-08-10 | Stop reason: HOSPADM

## 2022-08-10 RX ORDER — HYDROMORPHONE HCL IN WATER/PF 6 MG/30 ML
0.4 PATIENT CONTROLLED ANALGESIA SYRINGE INTRAVENOUS EVERY 5 MIN PRN
Status: DISCONTINUED | OUTPATIENT
Start: 2022-08-10 | End: 2022-08-10 | Stop reason: HOSPADM

## 2022-08-10 RX ORDER — MAGNESIUM HYDROXIDE 1200 MG/15ML
LIQUID ORAL PRN
Status: DISCONTINUED | OUTPATIENT
Start: 2022-08-10 | End: 2022-08-10 | Stop reason: HOSPADM

## 2022-08-10 RX ORDER — SODIUM CHLORIDE, SODIUM LACTATE, POTASSIUM CHLORIDE, CALCIUM CHLORIDE 600; 310; 30; 20 MG/100ML; MG/100ML; MG/100ML; MG/100ML
INJECTION, SOLUTION INTRAVENOUS CONTINUOUS PRN
Status: DISCONTINUED | OUTPATIENT
Start: 2022-08-10 | End: 2022-08-10

## 2022-08-10 RX ORDER — ONDANSETRON 2 MG/ML
INJECTION INTRAMUSCULAR; INTRAVENOUS PRN
Status: DISCONTINUED | OUTPATIENT
Start: 2022-08-10 | End: 2022-08-10

## 2022-08-10 RX ORDER — FENTANYL CITRATE 50 UG/ML
INJECTION, SOLUTION INTRAMUSCULAR; INTRAVENOUS PRN
Status: DISCONTINUED | OUTPATIENT
Start: 2022-08-10 | End: 2022-08-10

## 2022-08-10 RX ORDER — DEXAMETHASONE SODIUM PHOSPHATE 4 MG/ML
INJECTION, SOLUTION INTRA-ARTICULAR; INTRALESIONAL; INTRAMUSCULAR; INTRAVENOUS; SOFT TISSUE PRN
Status: DISCONTINUED | OUTPATIENT
Start: 2022-08-10 | End: 2022-08-10

## 2022-08-10 RX ORDER — CEFAZOLIN SODIUM/WATER 2 G/20 ML
2 SYRINGE (ML) INTRAVENOUS SEE ADMIN INSTRUCTIONS
Status: DISCONTINUED | OUTPATIENT
Start: 2022-08-10 | End: 2022-08-10 | Stop reason: HOSPADM

## 2022-08-10 RX ADMIN — LIDOCAINE HYDROCHLORIDE 50 MG: 10 INJECTION, SOLUTION INFILTRATION; PERINEURAL at 14:08

## 2022-08-10 RX ADMIN — SODIUM CHLORIDE, POTASSIUM CHLORIDE, SODIUM LACTATE AND CALCIUM CHLORIDE: 600; 310; 30; 20 INJECTION, SOLUTION INTRAVENOUS at 15:07

## 2022-08-10 RX ADMIN — DEXAMETHASONE SODIUM PHOSPHATE 4 MG: 4 INJECTION, SOLUTION INTRA-ARTICULAR; INTRALESIONAL; INTRAMUSCULAR; INTRAVENOUS; SOFT TISSUE at 14:08

## 2022-08-10 RX ADMIN — ONDANSETRON HYDROCHLORIDE 4 MG: 2 INJECTION, SOLUTION INTRAVENOUS at 15:59

## 2022-08-10 RX ADMIN — Medication 5 MG: at 15:43

## 2022-08-10 RX ADMIN — FENTANYL CITRATE 50 MCG: 50 INJECTION, SOLUTION INTRAMUSCULAR; INTRAVENOUS at 14:43

## 2022-08-10 RX ADMIN — Medication 5 MG: at 15:35

## 2022-08-10 RX ADMIN — FENTANYL CITRATE 100 MCG: 50 INJECTION, SOLUTION INTRAMUSCULAR; INTRAVENOUS at 14:08

## 2022-08-10 RX ADMIN — PHENYLEPHRINE HYDROCHLORIDE 0.2 MCG/KG/MIN: 10 INJECTION INTRAVENOUS at 14:30

## 2022-08-10 RX ADMIN — TRANEXAMIC ACID 1950 MG: 650 TABLET ORAL at 10:46

## 2022-08-10 RX ADMIN — FENTANYL CITRATE 50 MCG: 50 INJECTION, SOLUTION INTRAMUSCULAR; INTRAVENOUS at 14:34

## 2022-08-10 RX ADMIN — SODIUM CHLORIDE, POTASSIUM CHLORIDE, SODIUM LACTATE AND CALCIUM CHLORIDE: 600; 310; 30; 20 INJECTION, SOLUTION INTRAVENOUS at 14:03

## 2022-08-10 RX ADMIN — ROCURONIUM BROMIDE 30 MG: 50 INJECTION, SOLUTION INTRAVENOUS at 14:08

## 2022-08-10 RX ADMIN — FENTANYL CITRATE 50 MCG: 50 INJECTION, SOLUTION INTRAMUSCULAR; INTRAVENOUS at 15:24

## 2022-08-10 RX ADMIN — PROPOFOL 120 MG: 10 INJECTION, EMULSION INTRAVENOUS at 14:08

## 2022-08-10 RX ADMIN — GLYCOPYRROLATE 0.4 MCG: 0.2 INJECTION, SOLUTION INTRAMUSCULAR; INTRAVENOUS at 15:35

## 2022-08-10 RX ADMIN — NEOSTIGMINE METHYLSULFATE 3.5 MG: 1 INJECTION, SOLUTION INTRAVENOUS at 15:35

## 2022-08-10 RX ADMIN — Medication 2 G: at 14:03

## 2022-08-10 ASSESSMENT — ENCOUNTER SYMPTOMS: DYSRHYTHMIAS: 1

## 2022-08-10 ASSESSMENT — ACTIVITIES OF DAILY LIVING (ADL)
ADLS_ACUITY_SCORE: 37
ADLS_ACUITY_SCORE: 36
ADLS_ACUITY_SCORE: 37
ADLS_ACUITY_SCORE: 37

## 2022-08-10 ASSESSMENT — LIFESTYLE VARIABLES: TOBACCO_USE: 1

## 2022-08-10 NOTE — ANESTHESIA POSTPROCEDURE EVALUATION
Patient: Shamir Concepcion    Procedure: Procedure(s):  Open reduction internal fixation left greater tuberosity avulsion fracture       Anesthesia Type:  General    Note:  Disposition: Outpatient   Postop Pain Control: Uneventful            Sign Out: Well controlled pain   PONV: No   Neuro/Psych: Uneventful            Sign Out: Acceptable/Baseline neuro status   Airway/Respiratory: Uneventful            Sign Out: Acceptable/Baseline resp. status   CV/Hemodynamics: Uneventful            Sign Out: Acceptable CV status; No obvious hypovolemia; No obvious fluid overload   Other NRE: NONE   DID A NON-ROUTINE EVENT OCCUR? No           Last vitals:  Vitals Value Taken Time   /75 08/10/22 1700   Temp 97.6  F (36.4  C) 08/10/22 1700   Pulse 76 08/10/22 1700   Resp 16 08/10/22 1700   SpO2 98 % 08/10/22 1700       Electronically Signed By: Sameer Dowd MD  August 10, 2022  5:29 PM

## 2022-08-10 NOTE — BRIEF OP NOTE
[facility]    Brief Operative Note    Pre-operative diagnosis: Fracture of humerus greater tuberosity [S42.253A]  Post-operative diagnosis Same as pre-operative diagnosis    Procedure: Procedure(s):  Open reduction internal fixation left greater tuberosity avulsion fracture  Surgeon: Surgeon(s) and Role:     * Larry Saxena MD - Primary     * Erica Concepcion PA-C - Assisting  Anesthesia: General   Estimated Blood Loss: 50 CCs  Drains: None  Specimens: * No specimens in log *  Findings:   None.  Complications: None.  Implants:   Implant Name Type Inv. Item Serial No.  Lot No. LRB No. Used Action   95 Proximal Humeral Plate    ARTHREX 8005 09 AUG 2022 Left 1 Implanted   Cortical Bone Screw, 3.5mm, 28mm    [employer]REX 8005 09 AUG 2022 Left 1 Implanted and Explanted   Cortical Bone Screws, 3.5, 30mm    AR[employer]X 8005 09 AUG 2022 Left 2 Implanted and Explanted   cortical bone screws, 3.5mm, 30mm     ARTHREX 8005 09 AUG 2022 Left 1 Implanted   Cortical Screws, Double Lead Lock, 3.5mm, 30mm    ARTHREX 8005 09 AUG 2022 Left 1 Implanted   Cancellous Locking Screw, Fully Threaded, 4.0mm, 26 mm     8005 09 AUG 2022 Left 1 Implanted   Cancellous Locking Screw, Fully Threaded, 4.0 mm, 36mm      8005 09 AUG 2022 Left 2 Implanted   Cancellous Locking Screw, fully threaded, 4.0mm, 44 mm    ARTHREX 8005 09 AUG 2022 Left 1 Implanted   Cancellous Locking Screw, Fully Threaded, 4.0 mm, 46mm     ARTHREX 8005 09 AUG 2022 Left 2 Implanted     Plan:  NWB to LUE  Ultrasling x 4 weeks  DVT prophylaxis - SCDs & resume PTA Eliquis POD#1   Keep dressing C/D/I for 1 week; okay to shower    Follow up with Georgia Concepcion PA-C in clinic in 2 weeks.

## 2022-08-10 NOTE — ANESTHESIA CARE TRANSFER NOTE
Patient: Shamir Concepcion    Procedure: Procedure(s):  Open reduction internal fixation left greater tuberosity avulsion fracture       Diagnosis: Fracture of humerus greater tuberosity [S42.253A]  Diagnosis Additional Information: No value filed.    Anesthesia Type:   General     Note:    Oropharynx: oropharynx clear of all foreign objects  Level of Consciousness: awake  Oxygen Supplementation: face mask  Level of Supplemental Oxygen (L/min / FiO2): 6  Independent Airway: airway patency satisfactory and stable  Dentition: dentition unchanged  Vital Signs Stable: post-procedure vital signs reviewed and stable  Report to RN Given: handoff report given  Patient transferred to: PACU    Handoff Report: Identifed the Patient, Identified the Reponsible Provider, Reviewed the pertinent medical history, Discussed the surgical course, Reviewed Intra-OP anesthesia mangement and issues during anesthesia, Set expectations for post-procedure period and Allowed opportunity for questions and acknowledgement of understanding      Vitals:  Vitals Value Taken Time   /72 08/10/22 1611   Temp 37    Pulse 74 08/10/22 1612   Resp 20 08/10/22 1612   SpO2 100 % 08/10/22 1612   Vitals shown include unvalidated device data.    Electronically Signed By: CHANDRIKA Cole CRNA  August 10, 2022  4:14 PM

## 2022-08-10 NOTE — OP NOTE
Procedure Date: 08/10/2022    PREOPERATIVE DIAGNOSIS:  Left shoulder proximal humerus greater tuberosity fracture.    POSTOPERATIVE DIAGNOSIS:  Left shoulder proximal humerus greater tuberosity fracture.    PROCEDURE:  Left shoulder open reduction and internal fixation, proximal humerus fracture.    SURGEON:  Larry Saxena MD    FIRST ASSISTANT:  TRAE Weller.  A skilled first assistant was necessary for patient positioning, prepping and draping of the soft tissue retraction, help with reduction maneuvers, closing and patient transfer.    ANESTHESIA:  General and local.    COMPLICATIONS:  None apparent.    ESTIMATED BLOOD LOSS:  50 mL    IMPLANTS:  Arthrex proximal humerus plate.    INDICATIONS FOR PROCEDURE:  The patient is an 82-year-old male who had a fall and sustained a proximal humerus fracture.  It was initially treated conservatively.  Unfortunately on followup, it was noted that he did have significant displacement of the greater tuberosity.  After discussion of treatment options with the patient and the family, we decided the best way moving forward would be an open reduction and internal fixation.  He agreed to proceed.    DESCRIPTION OF PROCEDURE:  On the day of the procedure, the patient was met in the preoperative area by the Surgery and Anesthesia team.  The left shoulder was marked by the operative surgeon.  Informed consent was obtained.  Risks and benefits of the surgery were discussed with the patient including risk of bleeding, infection, damage to neurovascular structures, stiffness, continued pain and need for future revision surgery.  He understood and agreed to proceed.    Our Anesthesia colleagues then brought the patient to the operating room and general anesthesia was administered.  He was placed in the beach chair position and the left shoulder was prepped and draped in the usual sterile fashion.  Preoperative antibiotics given.  A preoperative timeout was performed.  We began the  procedure by making an anterolateral incision from the acromion distally.  Sharp dissection was carried down through the skin and subcutaneous tissue in the anterior raphae of the deltoid was split and the axillary nerve was palpated about 5 cm distal to the acromion and protected throughout the case.  The bursa was excised and the fracture was well visualized.  The greater tuberosity was displaced posteriorly and superiorly and there was a small fracture, that was anterior and superior as well.  The fracture bed was irrigated and lavaged and curetted and cleaned up.  We then reduced to the fracture using manual reduction techniques and held in position using K-wires.  We did place a #2 FiberWire through the supraspinatus and infraspinatus as well as around the anterior aspect of the tuberosity and used that to mobilize the tuberosities and we also used that to help repair our fracture.  When we got our fracture fully reduced, we checked under fluoroscopic visualization that demonstrated excellent reduction, the Arthrex plate was then placed deep to the axillary nerve and directly onto the bone.  It was checked under fluoroscopic visualization, and pinned into place.  A cortical screw was then placed through the oblong hole through the shaft and 5 proximal locking screws were then placed through the fracture and into the head, making sure to stay out of the joint.  This was double checked under fluoroscopic visualization.  Two additional cortical screws were placed distally, one had to be replaced for a locking screw and then one of the more proximal screws also had to be removed because the locking mechanism was not working.  Therefore, we ended up with 4 proximal locking screws holding the fracture well reduced.  We then used #2 FiberWires in order to pass them through the plate and tied them down in order to hold the rotator cuff to the plate and hold that fracture reduced.  Final x-rays demonstrated excellent  reduction and plate placement.  The wound was then copiously irrigated and we turned our attention to closure.  The deltoid was reapproximated using #0 Vicryl, followed by standard closure of the skin.  Sterile dressings were applied.  The patient was then awakened from anesthesia and brought to the PACU for recovery.  Postoperatively, he will be nonweightbearing.  He will remain in the sling for a total of 3-4 weeks and he will initiate physical therapy starting at the 2-week kesha and start working on some gentle active assist and passive range of motion.  Full recovery expected at approximately 3 months.    Larry Saxena MD        D: 08/10/2022   T: 08/10/2022   MT: ANGELICA    Name:     KATTY GRUBERRichy  MRN:      [id number]        Account:        [id number]   :      1940           Procedure Date: 08/10/2022     Document: [id number]

## 2022-08-10 NOTE — ANESTHESIA PREPROCEDURE EVALUATION
Anesthesia Pre-Procedure Evaluation    Patient: Shamir Concepcion   MRN: [id number] : 1940        Procedure : Procedure(s):  Open reduction internal fixation left greater tuberosity avulsion fracture          Past Medical History:   Diagnosis Date     Allergic rhinitis      BPH (benign prostatic hyperplasia)      Cerebral infarction (H)      Colon polyps      ED (erectile dysfunction)      Family history of colon cancer      Hypertension      PAD (peripheral artery disease) (H)     stent     RA (rheumatoid arthritis) (H)      Seronegative rheumatoid arthritis (H)       Past Surgical History:   Procedure Laterality Date     ANGIOGRAM       COLONOSCOPY      polyps     ENT SURGERY      T&A     LAMINECT/DISCECTOMY, LUMBAR       ORTHOPEDIC SURGERY       PHACOEMULSIFICATION CLEAR CORNEA WITH STANDARD INTRAOCULAR LENS IMPLANT Left 2017    Procedure: PHACOEMULSIFICATION CLEAR CORNEA WITH STANDARD INTRAOCULAR LENS IMPLANT;  Surgeon: Stevie Espinal MD;  Location: [facility]     PHACOEMULSIFICATION CLEAR CORNEA WITH STANDARD INTRAOCULAR LENS IMPLANT Right 2017    Procedure: PHACOEMULSIFICATION CLEAR CORNEA WITH STANDARD INTRAOCULAR LENS IMPLANT;  RIGHT EYE PHACOEMULSIFICATION CLEAR CORNEA WITH STANDARD INTRAOCULAR LENS IMPLANT;  Surgeon: Stevie Espinal MD;  Location: [facility]     SINUS SURGERY       VASCULAR SURGERY  R SFA stenting  2012     ZZC MRA UPPER EXTREMITY WO&W CONT  stenting right SFA-AK pop      No Known Allergies   Social History     Tobacco Use     Smoking status: Former Smoker     Packs/day: 0.50     Years: 30.00     Pack years: 15.00     Types: Cigarettes     Quit date: 3/1/1999     Years since quittin.4     Smokeless tobacco: Never Used   Substance Use Topics     Alcohol use: Not Currently     Comment: rarely      Wt Readings from Last 1 Encounters:   08/10/22 69.1 kg (152 lb 6.4 oz)        Anesthesia Evaluation   Pt has had prior anesthetic.         ROS/MED HX  ENT/Pulmonary:     (+)  tobacco use, Past use,  (-) sleep apnea   Neurologic:     (+) CVA (unsteady gait),     Cardiovascular:     (+) Dyslipidemia hypertension-Peripheral Vascular Disease----Taking blood thinners dysrhythmias, a-fib,     METS/Exercise Tolerance:     Hematologic:       Musculoskeletal:   (+) arthritis, fracture, Fracture location: LUE,     GI/Hepatic:    (-) GERD   Renal/Genitourinary:     (+) renal disease, type: CRI,     Endo:       Psychiatric/Substance Use:       Infectious Disease:       Malignancy:       Other:            Physical Exam    Airway        Mallampati: II   TM distance: > 3 FB   Neck ROM: limited     Respiratory Devices and Support         Dental           Cardiovascular          Rhythm and rate: regular and normal     Pulmonary           breath sounds clear to auscultation           OUTSIDE LABS:  CBC:   Lab Results   Component Value Date    WBC 8.7 07/24/2022    WBC 7.2 07/23/2022    HGB 10.2 (L) 07/24/2022    HGB 9.9 (L) 07/23/2022    HCT 31.5 (L) 07/24/2022    HCT 29.9 (L) 07/23/2022     07/24/2022     07/23/2022     BMP:   Lab Results   Component Value Date     07/23/2022     07/22/2022    POTASSIUM 3.6 07/23/2022    POTASSIUM 4.5 07/22/2022    CHLORIDE 112 (H) 07/23/2022    CHLORIDE 112 (H) 07/22/2022    CO2 22 07/23/2022    CO2 23 07/22/2022    BUN 21 07/23/2022    BUN 22 07/22/2022    CR 1.26 (H) 07/23/2022    CR 1.30 (H) 07/22/2022     (H) 07/23/2022     (H) 07/22/2022     COAGS:   Lab Results   Component Value Date    PTT 30 07/20/2018    INR 1.23 (H) 05/19/2019     POC:   Lab Results   Component Value Date     (H) 07/22/2018     HEPATIC:   Lab Results   Component Value Date    ALBUMIN 4.2 07/21/2022    PROTTOTAL 8.2 07/21/2022    ALT 21 07/21/2022    AST 23 07/21/2022    ALKPHOS 95 07/21/2022    BILITOTAL 0.5 07/21/2022     OTHER:   Lab Results   Component Value Date    A1C 5.3 07/21/2018    RANJAN 8.3 (L) 07/23/2022    LIPASE 105 07/20/2018        Anesthesia Plan    ASA Status:  3   NPO Status:  NPO Appropriate    Anesthesia Type: General.     - Airway: ETT   Induction: Intravenous.   Maintenance: Balanced.        Consents    Anesthesia Plan(s) and associated risks, benefits, and realistic alternatives discussed. Questions answered and patient/representative(s) expressed understanding.    - Discussed:     - Discussed with:  Patient         Postoperative Care    Pain management: IV analgesics, Oral pain medications, Multi-modal analgesia, Peripheral nerve block (Single Shot).   PONV prophylaxis: Ondansetron (or other 5HT-3), Dexamethasone or Solumedrol     Comments:                Sameer Dowd MD

## 2022-08-10 NOTE — DISCHARGE INSTRUCTIONS
"  Larry Saxena M.D.  [facility]  [address] [place] [address]  Tel: [phone] Fax: [phone]  [email or web address]    Patsy Saxena s Care Coordinator  Tel: [phone]    POST-OPERATIVE INSTRUCTIONS  Open Reduction Internal Fixation for Proximal Humerus Fracture      Activity   Try and rest the first few days following surgery.  Wear your sling at all times, including sleeping.  You will be directed by Dr. Saxena as to when you may discontinue your sling.  Generally most patients will end up staying in the sling for the first 2 weeks following surgery.  With the sling, you may be more comfortable sleeping in a seated position (i.e.  recliner) with a pillow under your forearm for support.  Ice or the cryo-cuff can be used as much as possible for the first 3-4 weeks to help decrease swelling.  Please see \"medication\" section for more information on icing.    You may remove your arm from the sling 4-5 times a day and begin the enclosed home exercises (specific instructions included in this handout) the day after surgery.    ?  Pendulum & Codman exercises: Using gravity and momentum, gently move your arm in small, slow circles for approximately 5-10 minutes.    ? Elbow & wrist range of motion exercises:  Gently bend and extend your elbow to help prevent stiffness.    Do not lift any objects greater than 1 pound for the first 4-6 weeks  Driving is not recommended for the 1st 4 weeks following surgery and contraindicated when taking narcotic pain medication.  Return to (sedentary) work or school the day after surgery if pain is tolerable.  Return to heavy labor will be determined by Dr. Saxena.    Dressings   Dressing should remain in place for 7 days. You can shower with the dressing on as it is waterproof. After 7 days, it is ok to remove the dressing. Leave the steri strips intact, they will fall off on their own.  You can then shower with the dressing off after day 7, but do not " vigorously scrub at the incision site.  Please DO NOT BATHE, HOT TUB, OR SOAK/SUBMERGE THE INCISION IN WATER as this can increase risk of infection.  Wait one month from surgery or until you are directed to do so.  Small amounts of bloody drainage, numbness at incision site, swelling, and bruising are normal findings following surgery.  Do not use bacitracin or any ointments under band-aids.        Medication   You have been prescribed one of the following pain medications:  Oxycodone - Please take 1-2 tabs every 4-6 hours as needed for pain.  Ultram - Please take 1 tab every 6 hours as needed for breakthrough pain    In addition to narcotics it is advised that you take scheduled NSAID (non-steroidal anti-inflammatory) and Tylenol (alternating) for the first three days and then as your pain gradually improves you will wean off the narcotics and take NSAID/Tylenol on an as needed basis.              -    Tylenol (Acetaminophen) - take no more than 4,000 mg in a 24 hour period.  Do not take if you have liver disease.              -     Ibuprofen (Advil) - take no more than 2,400 mg in a 24 hour period.  Do not take if you have kidney disease, history of ulcers,               or GI bleeds.              -     Naproxen (Aleve) - if you prefer this over the counter NSAID over ibuprofen/Advil you may take this instead.  DO NOT TAKE BOTH IBUPROFEN & NAPROXEN as this increases your risk for toxic side effects.  Please no more than 1,500 mg of Naproxen in a 24 hour period.  Do not take if you have kidney disease, history of ulcers, or GI bleeds.  ICE - while you are resting use cryo-cuff or place cold ice packs on your shoulder for 15-20 minutes at a time.  Place a clean, dry towel or pillowcase between your skin and the cold pack.  Ice can be used as much as possible for the first 3-4 weeks to help decrease the swelling.  You may also have been given a prescription for enteric coated Aspirin 325 mg which you should take once  daily with food to decrease the risk of post-operative blood clot formation.  You may have been given a prescription for Senokot to take 2 tablets daily as needed for constipation.  Sedation during surgery, narcotic medications, decreased fluid intake, and decreased activity all contribute to constipation.  If you find that you are still constipated despite the Senokot medication, there are other over the counter medications that you can try:  Miralax, Fleet enema, or bisacodyl suppository.  If you have any side effects (i.e. Nausea, rash, difficulty breathing) from medication discontinue its use and call our office.    Physical Therapy  The need for a timing of outpatient physical therapy will be determined by Dr. Saxena and discussed at your first post-operative visit.  Please perform your prescribed home exercises to help facilitate early shoulder range of motion and to prevent stiffness in your elbow and wrist.    Follow Up  You have likely already been scheduled for your first post-operative visit.  If you have not received an appointment please contact the office to schedule an appointment 10-14 days after your surgery.  You will be seen by Dr. Saxena or his Physician Assistant for your first post-operative visit to review your intra-operative findings and to go over any questions you may have.  If necessary, sutures will also be removed at that time.  Call the office immediately if you develop a fever (>101.5), chills, excessive incision drainage, calf pain, persisting arm numbness.    If you have any questions or concerns please feel free to call the office.                                    Home Exercises                           Pendulum s    Stand supporting yourself on a table with your good arm. Let your other arm hang down completely relaxed and slowly move it in a circular motion both clockwise and counter clockwise. Keep your circles within the width of your feet.      Codman s    Stand supporting  yourself on a table with your good arm. Let your other arm hang down completely relaxed and slowly move it forward & back as well as side to side motion.        * All exercises to be done for 5-10 minutes, 4-5 times a day.              Home Exercises (cont d)      Elbow Curls:  Come out of your sling. Without any extra weight, use your good hand to help bring your other hand towards your shoulder by bending your elbow. Then slowly lower your arm back to a straight position. Then repeat.    Wrist Curls:  While in and out of your sling, make a light fist and move your operative wrist in an up and down movement slowly like you are knocking on a door.    Ball Squeezes:  While in and out of your sling, squeeze the foam ball to exercise the hand, fingers, and wrist muscles.  This exercise can be very effective to help promote good circulation and prevent excessive swelling.      * All exercises to be done for 5-10 minutes, 4-5 times a day.        DR. MANJU WILLIAM M.D.                            CLINIC PHONE NUMBER:  [phone]  [facility]    GENERAL ANESTHESIA OR SEDATION ADULT DISCHARGE INSTRUCTIONS   SPECIAL PRECAUTIONS FOR 24 HOURS AFTER SURGERY    IT IS NOT UNUSUAL TO FEEL LIGHT-HEADED OR FAINT, UP TO 24 HOURS AFTER SURGERY OR WHILE TAKING PAIN MEDICATION.  IF YOU HAVE THESE SYMPTOMS; SIT FOR A FEW MINUTES BEFORE STANDING AND HAVE SOMEONE ASSIST YOU WHEN YOU GET UP TO WALK OR USE THE BATHROOM.    YOU SHOULD REST AND RELAX FOR THE NEXT 24 HOURS AND YOU MUST MAKE ARRANGEMENTS TO HAVE SOMEONE STAY WITH YOU FOR AT LEAST 24 HOURS AFTER YOUR DISCHARGE.  AVOID HAZARDOUS AND STRENUOUS ACTIVITIES.  DO NOT MAKE IMPORTANT DECISIONS FOR 24 HOURS.    DO NOT DRIVE ANY VEHICLE OR OPERATE MECHANICAL EQUIPMENT FOR 24 HOURS FOLLOWING THE END OF YOUR SURGERY.  EVEN THOUGH YOU MAY FEEL NORMAL, YOUR REACTIONS MAY BE AFFECTED BY THE MEDICATION YOU HAVE RECEIVED.    DO NOT DRINK ALCOHOLIC BEVERAGES FOR 24 HOURS FOLLOWING YOUR  SURGERY.    DRINK CLEAR LIQUIDS (APPLE JUICE, GINGER ALE, 7-UP, BROTH, ETC.).  PROGRESS TO YOUR REGULAR DIET AS YOU FEEL ABLE.    YOU MAY HAVE A DRY MOUTH, A SORE THROAT, MUSCLES ACHES OR TROUBLE SLEEPING.  THESE SHOULD GO AWAY AFTER 24 HOURS.    CALL YOUR DOCTOR FOR ANY OF THE FOLLOWING:  SIGNS OF INFECTION (FEVER, GROWING TENDERNESS AT THE SURGERY SITE, A LARGE AMOUNT OF DRAINAGE OR BLEEDING, SEVERE PAIN, FOUL-SMELLING DRAINAGE, REDNESS OR SWELLING.    IT HAS BEEN OVER 8 TO 10 HOURS SINCE SURGERY AND YOU ARE STILL NOT ABLE TO URINATE (PASS WATER).     Maximum acetaminophen (Tylenol) dose from all sources should not exceed 4 grams (4000 mg) per day.

## 2022-08-10 NOTE — ANESTHESIA PROCEDURE NOTES
Airway       Patient location during procedure: OR  Staff -        CRNA: Randell Mckeon APRN CRNA       Performed By: CRNA  Consent for Airway        Urgency: elective  Indications and Patient Condition       Indications for airway management: hardy-procedural       Induction type:intravenous       Mask difficulty assessment: 1 - vent by mask    Final Airway Details       Final airway type: endotracheal airway       Successful airway: ETT - single  Endotracheal Airway Details        ETT size (mm): 8.0       Cuffed: yes       Successful intubation technique: direct laryngoscopy       DL Blade Type: Cai 2       Grade View of Cords: 1       Adjucts: stylet       Position: Right       Measured from: lips       Secured at (cm): 24    Post intubation assessment        Placement verified by: capnometry, equal breath sounds and chest rise        Number of attempts at approach: 1       Number of other approaches attempted: 0       Secured with: plastic tape       Ease of procedure: easy       Dentition: Intact and Unchanged

## 2022-09-28 ENCOUNTER — TELEPHONE (OUTPATIENT)
Dept: OTHER | Facility: CLINIC | Age: 82
End: 2022-09-28

## 2022-09-28 NOTE — TELEPHONE ENCOUNTER
Daughter called to reschedule right leg angiogram for her father ( patient)  Patient has recovered from shoulder fracture.  Scheduled for 10/12: check in at 7:30 am at [facility].  HOLD Eliquis 48 hours,  Need COVID test within 48 hours, will provide proof.  Patient will obtain a pre-op  Further instructions discussed and mailed.    Adelita Rios RN  IR nurse clinician  [phone]

## 2022-10-03 ENCOUNTER — TRANSFERRED RECORDS (OUTPATIENT)
Dept: HEALTH INFORMATION MANAGEMENT | Facility: CLINIC | Age: 82
End: 2022-10-03

## 2022-10-05 ENCOUNTER — TELEPHONE (OUTPATIENT)
Dept: OTHER | Facility: CLINIC | Age: 82
End: 2022-10-05

## 2022-10-05 NOTE — TELEPHONE ENCOUNTER
Left message on VM for Dorcas Mcintosh.  Returning phone call.  Adelita Rios RN  IR nurse clinician  [phone]

## 2022-10-12 ENCOUNTER — HOSPITAL ENCOUNTER (OUTPATIENT)
Facility: CLINIC | Age: 82
Discharge: HOME | End: 2022-10-12
Admitting: RADIOLOGY
Payer: COMMERCIAL

## 2022-10-12 ENCOUNTER — APPOINTMENT (OUTPATIENT)
Dept: INTERVENTIONAL RADIOLOGY/VASCULAR | Facility: CLINIC | Age: 82
End: 2022-10-12
Attending: RADIOLOGY
Payer: COMMERCIAL

## 2022-10-12 VITALS
HEIGHT: 69 IN | SYSTOLIC BLOOD PRESSURE: 143 MMHG | WEIGHT: 155.9 LBS | TEMPERATURE: 98.7 F | BODY MASS INDEX: 23.09 KG/M2 | RESPIRATION RATE: 10 BRPM | HEART RATE: 60 BPM | DIASTOLIC BLOOD PRESSURE: 90 MMHG | OXYGEN SATURATION: 98 %

## 2022-10-12 DIAGNOSIS — I73.9 PAD (PERIPHERAL ARTERY DISEASE) (H): Primary | ICD-10-CM

## 2022-10-12 DIAGNOSIS — I67.858 OTHER HEREDITARY CEREBROVASCULAR DISEASE: ICD-10-CM

## 2022-10-12 LAB
ANION GAP SERPL CALCULATED.3IONS-SCNC: 7 MMOL/L (ref 3–14)
APTT PPP: 33 SECONDS (ref 22–38)
BUN SERPL-MCNC: 24 MG/DL (ref 7–30)
CALCIUM SERPL-MCNC: 8.3 MG/DL (ref 8.5–10.1)
CHLORIDE BLD-SCNC: 111 MMOL/L (ref 94–109)
CO2 SERPL-SCNC: 24 MMOL/L (ref 20–32)
CREAT SERPL-MCNC: 1.37 MG/DL (ref 0.66–1.25)
ERYTHROCYTE [DISTWIDTH] IN BLOOD BY AUTOMATED COUNT: 15.3 % (ref 10–15)
GFR SERPL CREATININE-BSD FRML MDRD: 51 ML/MIN/1.73M2
GLUCOSE BLD-MCNC: 102 MG/DL (ref 70–99)
HBA1C MFR BLD: 5.5 % (ref 0–5.6)
HCT VFR BLD AUTO: 32.9 % (ref 40–53)
HGB BLD-MCNC: 10.5 G/DL (ref 13.3–17.7)
INR PPP: 1.15 (ref 0.85–1.15)
MCH RBC QN AUTO: 31.9 PG (ref 26.5–33)
MCHC RBC AUTO-ENTMCNC: 31.9 G/DL (ref 31.5–36.5)
MCV RBC AUTO: 100 FL (ref 78–100)
PLATELET # BLD AUTO: 314 10E3/UL (ref 150–450)
POTASSIUM BLD-SCNC: 3.9 MMOL/L (ref 3.4–5.3)
RBC # BLD AUTO: 3.29 10E6/UL (ref 4.4–5.9)
SODIUM SERPL-SCNC: 142 MMOL/L (ref 133–144)
WBC # BLD AUTO: 6.3 10E3/UL (ref 4–11)

## 2022-10-12 PROCEDURE — 272N000602 HC WOUND GLUE CR1

## 2022-10-12 PROCEDURE — C1769 GUIDE WIRE: HCPCS

## 2022-10-12 PROCEDURE — 255N000002 HC RX 255 OP 636: Performed by: RADIOLOGY

## 2022-10-12 PROCEDURE — 36247 INS CATH ABD/L-EXT ART 3RD: CPT

## 2022-10-12 PROCEDURE — 272N000570 HC SHEATH CR7

## 2022-10-12 PROCEDURE — 272N000196 HC ACCESSORY CR5

## 2022-10-12 PROCEDURE — 250N000011 HC RX IP 250 OP 636: Performed by: RADIOLOGY

## 2022-10-12 PROCEDURE — 272N000302 HC DEVICE INFLATION CR5

## 2022-10-12 PROCEDURE — 85610 PROTHROMBIN TIME: CPT | Performed by: RADIOLOGY

## 2022-10-12 PROCEDURE — C1760 CLOSURE DEV, VASC: HCPCS

## 2022-10-12 PROCEDURE — 250N000011 HC RX IP 250 OP 636: Performed by: NURSE PRACTITIONER

## 2022-10-12 PROCEDURE — 258N000003 HC RX IP 258 OP 636: Performed by: RADIOLOGY

## 2022-10-12 PROCEDURE — 75710 ARTERY X-RAYS ARM/LEG: CPT | Mod: RT

## 2022-10-12 PROCEDURE — 80048 BASIC METABOLIC PNL TOTAL CA: CPT | Performed by: RADIOLOGY

## 2022-10-12 PROCEDURE — 272N000119 HC CATH CR4

## 2022-10-12 PROCEDURE — 250N000009 HC RX 250: Performed by: NURSE PRACTITIONER

## 2022-10-12 PROCEDURE — 272N000566 HC SHEATH CR3

## 2022-10-12 PROCEDURE — C1725 CATH, TRANSLUMIN NON-LASER: HCPCS

## 2022-10-12 PROCEDURE — 85027 COMPLETE CBC AUTOMATED: CPT | Performed by: RADIOLOGY

## 2022-10-12 PROCEDURE — 36415 COLL VENOUS BLD VENIPUNCTURE: CPT | Performed by: RADIOLOGY

## 2022-10-12 PROCEDURE — 999N000071 HC STATISTIC HEART CATH LAB OR EP LAB

## 2022-10-12 PROCEDURE — 250N000013 HC RX MED GY IP 250 OP 250 PS 637: Performed by: NURSE PRACTITIONER

## 2022-10-12 PROCEDURE — 85730 THROMBOPLASTIN TIME PARTIAL: CPT | Performed by: RADIOLOGY

## 2022-10-12 PROCEDURE — 83036 HEMOGLOBIN GLYCOSYLATED A1C: CPT | Performed by: RADIOLOGY

## 2022-10-12 PROCEDURE — C1876 STENT, NON-COA/NON-COV W/DEL: HCPCS

## 2022-10-12 PROCEDURE — 272N000116 HC CATH CR1

## 2022-10-12 RX ORDER — NALOXONE HYDROCHLORIDE 0.4 MG/ML
0.4 INJECTION, SOLUTION INTRAMUSCULAR; INTRAVENOUS; SUBCUTANEOUS
Status: DISCONTINUED | OUTPATIENT
Start: 2022-10-12 | End: 2022-10-12 | Stop reason: HOSPADM

## 2022-10-12 RX ORDER — NALOXONE HYDROCHLORIDE 0.4 MG/ML
0.2 INJECTION, SOLUTION INTRAMUSCULAR; INTRAVENOUS; SUBCUTANEOUS
Status: DISCONTINUED | OUTPATIENT
Start: 2022-10-12 | End: 2022-10-12 | Stop reason: HOSPADM

## 2022-10-12 RX ORDER — IODIXANOL 320 MG/ML
150 INJECTION, SOLUTION INTRAVASCULAR ONCE
Status: COMPLETED | OUTPATIENT
Start: 2022-10-12 | End: 2022-10-12

## 2022-10-12 RX ORDER — CLOPIDOGREL 300 MG/1
300 TABLET, FILM COATED ORAL ONCE
Status: COMPLETED | OUTPATIENT
Start: 2022-10-12 | End: 2022-10-12

## 2022-10-12 RX ORDER — HEPARIN SODIUM 1000 [USP'U]/ML
500-6000 INJECTION, SOLUTION INTRAVENOUS; SUBCUTANEOUS
Status: COMPLETED | OUTPATIENT
Start: 2022-10-12 | End: 2022-10-12

## 2022-10-12 RX ORDER — CLOPIDOGREL BISULFATE 75 MG/1
75 TABLET ORAL DAILY
Qty: 90 TABLET | Refills: 1 | Status: SHIPPED | OUTPATIENT
Start: 2022-10-12 | End: 2022-10-13

## 2022-10-12 RX ORDER — HEPARIN SODIUM 200 [USP'U]/100ML
1 INJECTION, SOLUTION INTRAVENOUS CONTINUOUS PRN
Status: DISCONTINUED | OUTPATIENT
Start: 2022-10-12 | End: 2022-10-12 | Stop reason: HOSPADM

## 2022-10-12 RX ORDER — FENTANYL CITRATE 50 UG/ML
25-50 INJECTION, SOLUTION INTRAMUSCULAR; INTRAVENOUS EVERY 5 MIN PRN
Status: DISCONTINUED | OUTPATIENT
Start: 2022-10-12 | End: 2022-10-12 | Stop reason: HOSPADM

## 2022-10-12 RX ORDER — FLUMAZENIL 0.1 MG/ML
0.2 INJECTION, SOLUTION INTRAVENOUS
Status: DISCONTINUED | OUTPATIENT
Start: 2022-10-12 | End: 2022-10-12 | Stop reason: HOSPADM

## 2022-10-12 RX ORDER — SODIUM CHLORIDE 9 MG/ML
INJECTION, SOLUTION INTRAVENOUS CONTINUOUS
Status: DISCONTINUED | OUTPATIENT
Start: 2022-10-12 | End: 2022-10-12 | Stop reason: HOSPADM

## 2022-10-12 RX ORDER — ACETAMINOPHEN 325 MG/1
650 TABLET ORAL
Status: DISCONTINUED | OUTPATIENT
Start: 2022-10-12 | End: 2022-10-12 | Stop reason: HOSPADM

## 2022-10-12 RX ORDER — LIDOCAINE 40 MG/G
CREAM TOPICAL
Status: DISCONTINUED | OUTPATIENT
Start: 2022-10-12 | End: 2022-10-12 | Stop reason: HOSPADM

## 2022-10-12 RX ADMIN — LIDOCAINE HYDROCHLORIDE 7.5 ML: 10 INJECTION, SOLUTION INFILTRATION; PERINEURAL at 10:01

## 2022-10-12 RX ADMIN — HEPARIN SODIUM 4 BAG: 200 INJECTION, SOLUTION INTRAVENOUS at 08:21

## 2022-10-12 RX ADMIN — MIDAZOLAM HYDROCHLORIDE 1 MG: 1 INJECTION, SOLUTION INTRAMUSCULAR; INTRAVENOUS at 09:59

## 2022-10-12 RX ADMIN — HEPARIN SODIUM 2500 UNITS: 1000 INJECTION INTRAVENOUS; SUBCUTANEOUS at 10:35

## 2022-10-12 RX ADMIN — ACETAMINOPHEN 650 MG: 325 TABLET, FILM COATED ORAL at 13:05

## 2022-10-12 RX ADMIN — FENTANYL CITRATE 25 MCG: 50 INJECTION, SOLUTION INTRAMUSCULAR; INTRAVENOUS at 09:43

## 2022-10-12 RX ADMIN — MIDAZOLAM HYDROCHLORIDE 0.5 MG: 1 INJECTION, SOLUTION INTRAMUSCULAR; INTRAVENOUS at 10:50

## 2022-10-12 RX ADMIN — FENTANYL CITRATE 25 MCG: 50 INJECTION, SOLUTION INTRAMUSCULAR; INTRAVENOUS at 09:59

## 2022-10-12 RX ADMIN — IODIXANOL 79 ML: 320 INJECTION, SOLUTION INTRAVASCULAR at 11:11

## 2022-10-12 RX ADMIN — FENTANYL CITRATE 25 MCG: 50 INJECTION, SOLUTION INTRAMUSCULAR; INTRAVENOUS at 10:21

## 2022-10-12 RX ADMIN — FENTANYL CITRATE 25 MCG: 50 INJECTION, SOLUTION INTRAMUSCULAR; INTRAVENOUS at 10:47

## 2022-10-12 RX ADMIN — CLOPIDOGREL BISULFATE 300 MG: 300 TABLET, FILM COATED ORAL at 13:05

## 2022-10-12 RX ADMIN — SODIUM CHLORIDE: 9 INJECTION, SOLUTION INTRAVENOUS at 08:25

## 2022-10-12 RX ADMIN — MIDAZOLAM HYDROCHLORIDE 1 MG: 1 INJECTION, SOLUTION INTRAMUSCULAR; INTRAVENOUS at 09:42

## 2022-10-12 ASSESSMENT — ACTIVITIES OF DAILY LIVING (ADL)
ADLS_ACUITY_SCORE: 39
ADLS_ACUITY_SCORE: 40
ADLS_ACUITY_SCORE: 40

## 2022-10-12 NOTE — PROGRESS NOTES
Care Suites Admission Nursing Note    Patient Information  Name: Shamir Concepcion  Age: 82 year old  Reason for admission: LE angiogram  Care Suites arrival time: 0745    Visitor Information  Name: Dorcas PIZANO [phone]  Informed of visitor restrictions: Yes  1 visitor allowed per patient   Visitor must screen negative for COVID symptoms   Visitor must wear a mask  Waiting rooms closed to visitors    Patient Admission/Assessment   Pre-procedure assessment complete: Yes  If abnormal assessment/labs, provider notified: No  NPO: Yes  Medications held per instructions/orders: Yes  Consent: obtained  If applicable, pregnancy test status: declined  Patient oriented to room: Yes  Education/questions answered: Yes  Plan/other: Review labs, obtain consent and proceed with scheduled treatment or intervention      Discharge Planning  Discharge name/phone number: Dorcas PIZANO [phone]  Overnight post sedation caregiver: Dorcas PIZANO [phone]  Discharge location: [place]    Elie Shay RN

## 2022-10-12 NOTE — DISCHARGE SUMMARY
Care Suites Discharge Nursing Note    Patient Information  Name: Shamir Concepcion  Age: 82 year old    Discharge Education:  Discharge instructions reviewed: Yes  Additional education/resources provided: no  Patient/patient representative verbalizes understanding: Yes  Patient discharging on new medications: Yes  Medication education completed: Yes    Discharge Plans:   Discharge location: home  Discharge ride contacted: Yes  Approximate discharge time: 1430    Discharge Criteria:  Discharge criteria met and vital signs stable: Yes    Patient Belongs:  Patient belongings returned to patient: Yes    Elie Shay RN

## 2022-10-12 NOTE — IR NOTE
Patient Name: Shamir Concepcion  Medical Record Number: [id number]  Today's Date: 10/12/2022    Procedure: Right Leg Angiogram  Proceduralist: Dr. Benites  Pathology present: no    Procedure Start: 0958  Procedure end: 1106  Sedation medications administered: Last Dose: 1050, Fentanyl 100 mcg, Versed 2.5 mg, Lidocaine 7.5 ml's, Heparin 2500 units.    Report given to: SUZY Nino RN  : no    Other Notes: Pt will require 3 bedrest post procedure. Pt arrived to IR room 2 from cs 5. Consent reviewed. Pt denies any questions or concerns regarding procedure. Pt positioned supine and monitored per protocol. Pt tolerated procedure without any noted complications.

## 2022-10-12 NOTE — PRE-PROCEDURE
GENERAL PRE-PROCEDURE:   Procedure:  Right leg angiogram with possible angioplasty and/or stent placement with moderate sedation   Date/Time:  10/12/2022 8:59 AM    Written consent obtained?: Yes    Risks and benefits: Risks, benefits and alternatives were discussed    Consent given by:  Patient  Patient states understanding of procedure being performed: Yes    Patient's understanding of procedure matches consent: Yes    Procedure consent matches procedure scheduled: Yes    Expected level of sedation:  Moderate  Appropriately NPO:  Yes  ASA Class:  3  Mallampati  :  Grade 3- soft palate visible, posterior pharyngeal wall not visible  Lungs:  Lungs clear with good breath sounds bilaterally  Heart:  Normal heart sounds and rate and other (comment)  Heart exam comment:  Distant  History & Physical reviewed:  History and physical reviewed and no updates needed  Statement of review:  I have reviewed the lab findings, diagnostic data, medications, and the plan for sedation

## 2022-10-12 NOTE — PROGRESS NOTES
Care Suites Post Procedure Note    Patient Information  Name: Shamir Concepcion  Age: 82 year old    Post Procedure  Time patient returned to Care Suites: 1115  Concerns/abnormal assessment: no  If abnormal assessment, provider notified: N/A  Plan/Other: monitor until discharge    Elie Shay RN

## 2022-10-12 NOTE — DISCHARGE INSTRUCTIONS
Peripheral Angiogram Discharge Instructions - Femoral     After you go home:    Have an adult stay with you until tomorrow.  Drink extra fluids for 2 days.  You may resume your normal diet.  No smoking       For 24 hours - due to the sedation you received:  Relax and take it easy.  Do NOT make any important or legal decisions.  Do NOT drive or operate machines at home or at work.  Do NOT drink alcohol.    Care of Groin Puncture Site:    For the first 24 hrs - check the puncture site every 1-2 hours while awake.  For 2 days, when you cough, sneeze, laugh or move your bowels, hold your hand over the puncture site and press firmly.  Remove the bandaid after 24 hours. If there is minor oozing, apply another bandaid and remove it after 12 hours.  It is normal to have a small bruise or pea size lump at the site.  You may shower tomorrow.  Do NOT take a bath, or use a hot tub or pool for at least 3 days. Do NOT scrub the site. Do not use lotion or powder near the puncture site.     Activity:            For 2 days:  No stooping or squatting  Do NOT do any heavy activity such as exercise, lifting, or straining.   No housework, yard work or any activity that make you sweat  Do NOT lift more than 10 pounds    Bleeding:    If you start bleeding from the site in your groin, lie down flat and press firmly on/above the site for 10 minutes.   Once bleeding stops, lay flat for 2 hours.   Call the [facility] as soon as you can.       Call 911 right away if you have heavy bleeding or bleeding that does not stop.      Medicines:    If you are taking an antiplatelet medication such as Plavix, do not stop taking it until you talk to your provider.     Take your medications, including blood thinners, unless your provider tells you not to.    If you have stopped any medicines, check with your provider about when to restart them.        Follow Up Appointments:    Follow up with [facility] as directed.    Call the  clinic if:    You have increased pain or a large or growing hard lump around the site.  The site is red, swollen, hot or tender.  Blood or fluid is draining from the site.  You have chills or a fever greater than 101 F (38 C).  Your leg feels numb, cool or changes color.  You have hives, a rash or unusual itching.  New pain in the back or belly that you cannot control with Tylenol.  Any questions or concerns.    Other Instructions:    If you received a stent - carry your stent card with you at all times.      If you have questions or your original symptoms do not improve, call:         [facility] @ [phone]

## 2022-10-13 ENCOUNTER — TELEPHONE (OUTPATIENT)
Dept: OTHER | Facility: CLINIC | Age: 82
End: 2022-10-13

## 2022-10-13 DIAGNOSIS — I73.9 PAD (PERIPHERAL ARTERY DISEASE) (H): Primary | ICD-10-CM

## 2022-10-13 RX ORDER — ASPIRIN 81 MG/1
81 TABLET, CHEWABLE ORAL ONCE
Status: DISCONTINUED | OUTPATIENT
Start: 2022-10-13 | End: 2023-03-01

## 2022-10-13 NOTE — TELEPHONE ENCOUNTER
Daughter returned phone call.  States, her dad is doing very well.  Went to coffee today and already noticed a difference in his leg.  Felt better.  She went to  RX for Plavix and pharmacy questioned if patient should be taking Plavix as well as Eliquis.  Confirmed with Dr. Benites, will discontinue order for Plavix and start patient on ASA 81 mg.  Instructed daughter on above.    Adelita Rios RN  IR nurse clinician  [phone]

## 2022-10-13 NOTE — TELEPHONE ENCOUNTER
Notified Dr. Benites regarding contraindication with Aspirin and Methotrexate. Hepatic toxicity.  Instructed to override and continue with order for Aspirin 81 mg.    Adelita Rios RN  IR nurse clinician  [phone]

## 2022-10-13 NOTE — TELEPHONE ENCOUNTER
S/p right leg angiogram with stenting done on 10/12/2022  Left VM on patients and daughters phone  Adelita Rios RN  IR nurse clinician  [phone]

## 2022-10-24 ENCOUNTER — TELEPHONE (OUTPATIENT)
Dept: OTHER | Facility: CLINIC | Age: 82
End: 2022-10-24

## 2022-10-24 NOTE — TELEPHONE ENCOUNTER
Daughter, Dorcas, called to schedule Shamir's follow up.  I explained that the provider is out for 8 weeks so I will need to route to the nurse to review and develop a new follow up plan.  Dorcas states no urgency because Shamir is recovering well and has no concerns.  Please call daughter to schedule any follow up.

## 2022-11-02 ENCOUNTER — TRANSFERRED RECORDS (OUTPATIENT)
Dept: HEALTH INFORMATION MANAGEMENT | Facility: CLINIC | Age: 82
End: 2022-11-02

## 2022-11-09 NOTE — TELEPHONE ENCOUNTER
Dorcas called back and asked when a follow up would be needed. May I put Shamir in next available with Dr. Benites?

## 2022-11-10 NOTE — TELEPHONE ENCOUNTER
Contacted Dorcas.  Discussed I will call her the results of the imaging.  Adelita Rios RN  IR nurse clinician  [phone]

## 2022-11-15 ENCOUNTER — HOSPITAL ENCOUNTER (OUTPATIENT)
Dept: ULTRASOUND IMAGING | Facility: CLINIC | Age: 82
Discharge: HOME | End: 2022-11-15
Attending: RADIOLOGY
Payer: COMMERCIAL

## 2022-11-15 DIAGNOSIS — I73.9 PAD (PERIPHERAL ARTERY DISEASE) (H): ICD-10-CM

## 2022-11-15 PROCEDURE — 93924 LWR XTR VASC STDY BILAT: CPT

## 2022-11-15 PROCEDURE — 93926 LOWER EXTREMITY STUDY: CPT | Mod: RT

## 2022-11-16 DIAGNOSIS — I73.9 PAD (PERIPHERAL ARTERY DISEASE) (H): Primary | ICD-10-CM

## 2022-11-16 NOTE — RESULT ENCOUNTER NOTE
Called patient with results.  Denies claudication in both legs, doing well.  Recommend follow up in 1 year with Dr. Benites.  Adelita Rios RN  IR nurse clinician  [phone]

## 2022-11-16 NOTE — RESULT ENCOUNTER NOTE
Contacted patient with results.  Denies claudication symptoms in both legs.  Doing well.  Recommend 1 year follow up.  Adelita Rios RN  IR nurse clinician  [phone]

## 2023-01-11 ENCOUNTER — TRANSFERRED RECORDS (OUTPATIENT)
Dept: HEALTH INFORMATION MANAGEMENT | Facility: CLINIC | Age: 83
End: 2023-01-11

## 2023-01-30 ENCOUNTER — APPOINTMENT (OUTPATIENT)
Dept: CT IMAGING | Facility: CLINIC | Age: 83
End: 2023-01-30
Attending: EMERGENCY MEDICINE
Payer: COMMERCIAL

## 2023-01-30 ENCOUNTER — HOSPITAL ENCOUNTER (EMERGENCY)
Facility: CLINIC | Age: 83
Discharge: HOME | End: 2023-01-30
Attending: EMERGENCY MEDICINE | Admitting: EMERGENCY MEDICINE
Payer: COMMERCIAL

## 2023-01-30 VITALS
BODY MASS INDEX: 22.96 KG/M2 | RESPIRATION RATE: 18 BRPM | OXYGEN SATURATION: 98 % | DIASTOLIC BLOOD PRESSURE: 72 MMHG | SYSTOLIC BLOOD PRESSURE: 129 MMHG | WEIGHT: 155 LBS | HEIGHT: 69 IN | HEART RATE: 82 BPM | TEMPERATURE: 98.9 F

## 2023-01-30 DIAGNOSIS — S09.90XA CLOSED HEAD INJURY, INITIAL ENCOUNTER: ICD-10-CM

## 2023-01-30 DIAGNOSIS — S01.01XA LACERATION OF SCALP, INITIAL ENCOUNTER: ICD-10-CM

## 2023-01-30 PROCEDURE — 12004 RPR S/N/AX/GEN/TRK7.6-12.5CM: CPT

## 2023-01-30 PROCEDURE — 99284 EMERGENCY DEPT VISIT MOD MDM: CPT | Mod: 25

## 2023-01-30 PROCEDURE — 70450 CT HEAD/BRAIN W/O DYE: CPT

## 2023-01-30 ASSESSMENT — ENCOUNTER SYMPTOMS: WOUND: 1

## 2023-01-31 NOTE — ED PROVIDER NOTES
History     Chief Complaint:  Fall and Head Injury       The history is provided by the patient and a relative.      Shamir Concepcion is an 82 year old male on Eliquis who presents with his Daughter, Dorcas, for a posterior head laceration. Patient states that he tripped while going backward last night inside of his home and is unsure of what he hit his head on. Patient has memory of the fall. His daughter came to visit him and noticed the wound and prompted an ED visit roughly 24 hours post injury.    Independent Historian: yes with daughter     Review of External Notes: Yes, reviewed notes from [facility] urgent care where the patient had been seen just prior to arrival.    ROS:  Review of Systems   Skin: Positive for wound (posterior head laceration).   All other systems reviewed and are negative.    Allergies:  No known allergies    Medications:    Alendronate   Eliquis   Lisinopril   Methotrexate   Rosuvastatin   Senna-docusate sodium   Trospium   Sertraline   Gabapentin     Past Medical History:   Allergic rhinitis   BPH  Cerebral infarction  Colon polyps   ED   Hypertension  PAD  Stroke   Cataract   RA  Claudication  Aneurysm of right common iliac artery  Stage 3a chronic kidney disease  Dyslipidemia  History of cerebellar stroke-with occlusion/dissection on eliquis  Osteopenia    Past Surgical History:    Angiogram  ENT surgery: T&A  IR lower extremity angiograph right  Left shoulder open reduction and internal fixation, proximal humerus fracture  Phacoemulsification clear cornea with standard intraocular lens implant X2  Sinus surgery  Vascular surgery   ZZC MRA upper extremity WO&W cont.     Family History:    Cancer  CVD    Social History:  Patient presents with daughter via personal vehicle     Physical Exam     Patient Vitals for the past 24 hrs:   BP Temp Temp src Pulse Resp SpO2 Height Weight   01/30/23 2239 129/72 -- -- 82 -- 98 % -- --   01/30/23 2114 139/86 -- -- 50 18 92 % -- --   01/30/23 1947 128/62  "98.9  F (37.2  C) Temporal 65 16 100 % 1.753 m (5' 9\") 70.3 kg (155 lb)      Physical Exam  General: Alert, No distress. Nontoxic appearance  Head: Large 12 cm laceration on the occipital scalp  Mouth/Throat: Oropharynx moist.   Eyes: Conjunctivae are normal. Pupils are equal..   Neck: Normal range of motion.    CV: Appears well perfused.  Resp:No respiratory distress.   MSK: Normal range of motion. No obvious deformity.   Neuro: The patient is alert and interactive. BURKETT. Speech normal. GCS 15  Skin: Laceration on the occipital scalp.  Psych: normal mood and affect. behavior is normal.     Emergency Department Course     Imaging:  Head CT w/o contrast   Final Result   IMPRESSION:   1.  No CT evidence for acute intracranial process.   2.  Brain atrophy and presumed chronic microvascular ischemic changes as above.   3.  Ventriculomegaly with appearance that would be consistent with normal pressure hydrocephalus in the appropriate clinical setting.         Report per radiology    Procedures     Laceration Repair      Procedure: Laceration Repair    Indication: Laceration    Consent: Verbal    Location: Bilateral occipital head     Length: 12 cm    Preparation: Irrigation with Sterile Saline.    Anesthesia/Sedation: Bupivacaine - 0.5%      Treatment/Exploration: Wound explored and minimal debris removed     Closure: The wound was closed with 11 staples.    Patient Status: The patient tolerated the procedure well: Yes. There were no complications.      Emergency Department Course & Assessments:     Interventions:  Medications - No data to display     Independent Interpretation (X-rays, CTs, rhythm strip):  CT scan was reviewed and I agree with radiology.  No evidence for intercerebral hemorrhage or skull fracture    Consultations/Discussion of Management or Tests:    ED Course as of 01/30/23 2242 Mon Jan 30, 2023 2131 I obtained history and examined the patient as noted above.    2233 I performed a laceration repair. "     Social Determinants of Health affecting care:  Patient lives independently in senior living.  He is brought to the emergency department by his daughter after being seen in urgent care.    Assessments:    Disposition:  The patient was discharged to home.     Impression & Plan      Medical Decision Making:  This patient is presenting to the ER nearly 24 hours after a fall while at home.  He is a poor historian regarding the surrounding circumstances.  This does not sound like a syncopal event.  It appears to be a mechanical fall.  The patient's wound was cleaned explored and small pain chips were removed from the wound.  It was then closed as above with 11 scalp staples.  CT scan of his head is negative for intracerebral hemorrhage or skull fracture he has no neck pain.    Diagnosis:    ICD-10-CM    1. Laceration of scalp, initial encounter  S01.01XA       2. Closed head injury, initial encounter  S09.90XA              Scribe Disclosure:  SONNY, Fern Brar, am serving as a scribe at 10:00 PM on 1/30/2023 to document services personally performed by Francisco Rodriguez MD based on my observations and the provider's statements to me.    1/30/2023   Francisco Rodriguez MD Joing, Todd Roger, MD  01/31/23 0116

## 2023-01-31 NOTE — ED TRIAGE NOTES
Daughter states he didn't answer his phone but that is not uncommon.  Fall, hit head.  Unsure if last night or today.  Blood on carpet & couch.  Bruising, hematoma to back of head & difficult to see how large lac is.   Patient is on Eliquis.   Patient states he fell backwards last night.  Just lost balance.  Did have headache.  Denies at this time.

## 2023-01-31 NOTE — ED NOTES
Rapid Assessment Note    History:   Shamir Concepcion is an 82 year old male on Eliquis with a history of cerebral infarction and recurrent mechanical falls who presents with an open head injury following a mechanical fall that occurred last night around 2000. A laceration is present to the posterior scalp region. Patient reportedly lost his balance and fell backwards. No loss of consciousness. Did have a headache earlier today that has now subsided. Denies neck pain, nausea, or vomiting. Denies chest pain or abdominal pain. Has a distal end of left radius fracture after another mechanical fall 12 days ago. Last tetanus shot was 2018.     Exam:   General:  Alert, interactive  Cardiovascular:  Well perfused  Lungs:  No respiratory distress, no accessory muscle use  Neuro:  Moving all 4 extremities  Skin: A laceration to the posterior scalp region which has a lot of dried blood over it.  Warm, dry  Psych:  Normal affect      Plan of Care:   I evaluated the patient and developed an initial plan of care. I discussed this plan and explained that I, or one of my partners, would be returning to complete the evaluation.     I, Anne Galarza, am serving as a scribe to document services personally performed by Neo Shipman MD, based on my observations and the provider's statements to me.    1/31/2023  [employer]    Portions of this medical record were completed by a scribe. UPON MY REVIEW AND AUTHENTICATION BY ELECTRONIC SIGNATURE, this confirms (a) I performed the applicable clinical services, and (b) the record is accurate.      Neo Shipman MD  01/31/23 0003

## 2023-02-24 ENCOUNTER — HOSPITAL ENCOUNTER (EMERGENCY)
Facility: CLINIC | Age: 83
Discharge: HOME | End: 2023-02-24
Attending: EMERGENCY MEDICINE | Admitting: EMERGENCY MEDICINE
Payer: COMMERCIAL

## 2023-02-24 VITALS
OXYGEN SATURATION: 99 % | HEIGHT: 69 IN | DIASTOLIC BLOOD PRESSURE: 96 MMHG | WEIGHT: 153 LBS | HEART RATE: 63 BPM | SYSTOLIC BLOOD PRESSURE: 164 MMHG | BODY MASS INDEX: 22.66 KG/M2 | RESPIRATION RATE: 18 BRPM | TEMPERATURE: 98.2 F

## 2023-02-24 DIAGNOSIS — R00.1 BRADYCARDIA: ICD-10-CM

## 2023-02-24 LAB
ALBUMIN SERPL BCG-MCNC: 3.6 G/DL (ref 3.5–5.2)
ALP SERPL-CCNC: 162 U/L (ref 40–129)
ALT SERPL W P-5'-P-CCNC: 16 U/L (ref 10–50)
ANION GAP SERPL CALCULATED.3IONS-SCNC: 10 MMOL/L (ref 7–15)
AST SERPL W P-5'-P-CCNC: 20 U/L (ref 10–50)
BASOPHILS # BLD AUTO: 0.1 10E3/UL (ref 0–0.2)
BASOPHILS NFR BLD AUTO: 1 %
BILIRUB SERPL-MCNC: 0.4 MG/DL
BUN SERPL-MCNC: 31.3 MG/DL (ref 8–23)
CALCIUM SERPL-MCNC: 9.1 MG/DL (ref 8.8–10.2)
CHLORIDE SERPL-SCNC: 106 MMOL/L (ref 98–107)
CREAT SERPL-MCNC: 1.5 MG/DL (ref 0.67–1.17)
DEPRECATED HCO3 PLAS-SCNC: 23 MMOL/L (ref 22–29)
EOSINOPHIL # BLD AUTO: 0 10E3/UL (ref 0–0.7)
EOSINOPHIL NFR BLD AUTO: 0 %
ERYTHROCYTE [DISTWIDTH] IN BLOOD BY AUTOMATED COUNT: 15.8 % (ref 10–15)
GFR SERPL CREATININE-BSD FRML MDRD: 46 ML/MIN/1.73M2
GLUCOSE SERPL-MCNC: 100 MG/DL (ref 70–99)
HCT VFR BLD AUTO: 31.1 % (ref 40–53)
HGB BLD-MCNC: 9.8 G/DL (ref 13.3–17.7)
IMM GRANULOCYTES # BLD: 0 10E3/UL
IMM GRANULOCYTES NFR BLD: 0 %
LYMPHOCYTES # BLD AUTO: 1 10E3/UL (ref 0.8–5.3)
LYMPHOCYTES NFR BLD AUTO: 13 %
MCH RBC QN AUTO: 31.8 PG (ref 26.5–33)
MCHC RBC AUTO-ENTMCNC: 31.5 G/DL (ref 31.5–36.5)
MCV RBC AUTO: 101 FL (ref 78–100)
MONOCYTES # BLD AUTO: 0.7 10E3/UL (ref 0–1.3)
MONOCYTES NFR BLD AUTO: 10 %
NEUTROPHILS # BLD AUTO: 5.7 10E3/UL (ref 1.6–8.3)
NEUTROPHILS NFR BLD AUTO: 76 %
NRBC # BLD AUTO: 0 10E3/UL
NRBC BLD AUTO-RTO: 0 /100
PLATELET # BLD AUTO: 367 10E3/UL (ref 150–450)
POTASSIUM SERPL-SCNC: 4.5 MMOL/L (ref 3.4–5.3)
PROT SERPL-MCNC: 7 G/DL (ref 6.4–8.3)
RBC # BLD AUTO: 3.08 10E6/UL (ref 4.4–5.9)
SODIUM SERPL-SCNC: 139 MMOL/L (ref 136–145)
TROPONIN T SERPL HS-MCNC: 22 NG/L
WBC # BLD AUTO: 7.4 10E3/UL (ref 4–11)

## 2023-02-24 PROCEDURE — 84484 ASSAY OF TROPONIN QUANT: CPT | Performed by: EMERGENCY MEDICINE

## 2023-02-24 PROCEDURE — 80053 COMPREHEN METABOLIC PANEL: CPT | Performed by: EMERGENCY MEDICINE

## 2023-02-24 PROCEDURE — 99284 EMERGENCY DEPT VISIT MOD MDM: CPT

## 2023-02-24 PROCEDURE — 85025 COMPLETE CBC W/AUTO DIFF WBC: CPT | Performed by: EMERGENCY MEDICINE

## 2023-02-24 PROCEDURE — 93005 ELECTROCARDIOGRAM TRACING: CPT

## 2023-02-24 PROCEDURE — 36415 COLL VENOUS BLD VENIPUNCTURE: CPT | Performed by: EMERGENCY MEDICINE

## 2023-02-24 ASSESSMENT — ENCOUNTER SYMPTOMS
HEADACHES: 1
VOMITING: 0
NAUSEA: 0
COUGH: 0
SHORTNESS OF BREATH: 0

## 2023-02-24 ASSESSMENT — ACTIVITIES OF DAILY LIVING (ADL)
ADLS_ACUITY_SCORE: 39

## 2023-02-24 NOTE — ED PROVIDER NOTES
Rapid Assessment Note    History:   Shamir Concepcion is a 82 year old male who presents with with bradycardia. The patient reports that he was in clinic today where he was found to have a heart beat in the 30s. His daughter states that he has been falling recently with his most recent last night, for which he received a CT scan for today in clinic. He denies any shortness of breath, fever, cough, or syncope. He lives independently.     Exam:   General:  Alert, interactive  Cardiovascular:  Well perfused  Lungs:  No respiratory distress, no accessory muscle use  Neuro:  Moving all 4 extremities  Skin:  Warm, dry  Psych:  Normal affect  MSK: cast on his left wrist.     Consultations:  1615 I spoke with Dr. Holcomb of the Cardiologist service from [facility] regarding patient's presentation, findings, and plan of care.    Plan of Care:   I evaluated the patient and developed an initial plan of care. I discussed this plan and explained that I, or one of my partners, would be returning to complete the evaluation.     I, Elie Hussein, am serving as a scribe to document services personally performed by Alyce Dickinson MD, based on my observations and the provider's statements to me.    2/24/2023  [employer]    Portions of this medical record were completed by a scribe. UPON MY REVIEW AND AUTHENTICATION BY ELECTRONIC SIGNATURE, this confirms (a) I performed the applicable clinical services, and (b) the record is accurate.

## 2023-02-24 NOTE — ED TRIAGE NOTES
Per pt and Daughter - HR in the 30s at clinic. Frequent falls at home where pt lives independently. HR in the 50s in Triage.      Triage Assessment     Row Name 02/24/23 1616       Triage Assessment (Adult)    Airway WDL WDL       [place] Coma Scale    Best Eye Response 4-->(E4) spontaneous    Best Motor Response 6-->(M6) obeys commands    Best Verbal Response 5-->(V5) oriented    [place] Coma Scale Score 15

## 2023-02-25 LAB
ATRIAL RATE - MUSE: 67 BPM
DIASTOLIC BLOOD PRESSURE - MUSE: NORMAL MMHG
INTERPRETATION ECG - MUSE: NORMAL
P AXIS - MUSE: 67 DEGREES
PR INTERVAL - MUSE: 218 MS
QRS DURATION - MUSE: 78 MS
QT - MUSE: 378 MS
QTC - MUSE: 399 MS
R AXIS - MUSE: 26 DEGREES
SYSTOLIC BLOOD PRESSURE - MUSE: NORMAL MMHG
T AXIS - MUSE: 5 DEGREES
VENTRICULAR RATE- MUSE: 67 BPM

## 2023-02-25 NOTE — ED PROVIDER NOTES
History     Chief Complaint:  Bradycardia       The history is provided by the patient.      Shamir Concepcion is an 82 year old male on Eliquis with a history of hypertension who presents with bradycardia. The patient's wife reports that today while at his clinic to get staples removed from his scalp from a recent fall, they found his heart rate to be in the 30s and did an EKG as well. She says that the clinic recommended they come here for further evaluation. The patient currently endorses a headache. He denies cough, nausea, vomiting, chest pain and shortness of breath. The patient's wife mentions that the patient has frequent falls, but there are no recent falls apparently.  He does not feel lightheaded or dizzy and did not feel lightheaded or dizzy when his heart rate was apparently in the 30s.    Independent Historian:   None - Patient Only and Spouse/Partner    Review of External Notes: None    ROS:  Review of Systems   Respiratory: Negative for cough and shortness of breath.    Cardiovascular: Negative for chest pain.   Gastrointestinal: Negative for nausea and vomiting.   Neurological: Positive for headaches.   All other systems reviewed and are negative.    Allergies:  No Known Allergies     Medications:    Fosamax  Eliquis  Zestril  Crestor  Senna  Sanctura    Past Medical History:    BPH  Cerebral infarction  Colon polyps  Erectile dysfunction  Hypertension  PAD  RA    Past Surgical History:    Angiogram  Colonoscopy  ENT surgery  Laminectomy discectomy  Open reduction internal fixation  Orthopedic surgery  Phacoemulsification  Sinus surgery  Vascular surgery  MRA upper extremity      Family History:    Mother: Cancer  Father: Cerebrovascular disease    Social History:  Reports that he quit smoking about 24 years ago. His smoking use included cigarettes. He has a 15.00 pack-year smoking history. He has never used smokeless tobacco. He reports that he does not currently use alcohol. He reports that he  "does not use drugs.  PCP: No Ref-Primary, Physician     Physical Exam     Patient Vitals for the past 24 hrs:   BP Temp Temp src Pulse Resp SpO2 Height Weight   02/24/23 2030 (!) 164/96 98.2  F (36.8  C) Oral 63 18 99 % -- --   02/24/23 1546 (!) 146/68 97  F (36.1  C) Temporal 54 16 99 % 1.753 m (5' 9\") 69.4 kg (153 lb)        Physical Exam  Nursing note and vitals reviewed.  Constitutional:  Oriented to person, place, and time. Cooperative.   HENT:   Nose:    Nose normal.   Mouth/Throat:   Facemask in place.  Eyes:    Conjunctivae normal and EOM are normal.      Pupils are equal, round, and reactive to light.   Neck:    Trachea normal.   Cardiovascular:  Bradycardic rate, regular rhythm, normal heart sounds and normal pulses. No murmur heard.  Pulmonary/Chest:  Effort normal and breath sounds normal.   Abdominal:   Soft. Normal appearance and bowel sounds are normal.      There is no tenderness.      There is no rebound and no CVA tenderness.   Musculoskeletal:  1+ pitting edema in the right lower extremity.  Extremities atraumatic x 4.   Lymphadenopathy:  No cervical adenopathy.   Neurological:   Alert and oriented to person, place, and time. Normal strength.      No cranial nerve deficit or sensory deficit. GCS eye subscore is 4. GCS verbal subscore is 5. GCS motor subscore is 6.   Skin:    Healed laceration to the posterior scalp region. No rash noted.   Psychiatric:   Normal mood and affect.    Emergency Department Course     Imaging:  Cardiac Event Monitor Adult Pediatric    (Results Pending)      Report per radiology    Laboratory:  Labs Ordered and Resulted from Time of ED Arrival to Time of ED Departure   COMPREHENSIVE METABOLIC PANEL - Abnormal       Result Value    Sodium 139      Potassium 4.5      Chloride 106      Carbon Dioxide (CO2) 23      Anion Gap 10      Urea Nitrogen 31.3 (*)     Creatinine 1.50 (*)     Calcium 9.1      Glucose 100 (*)     Alkaline Phosphatase 162 (*)     AST 20      ALT 16      " Protein Total 7.0      Albumin 3.6      Bilirubin Total 0.4      GFR Estimate 46 (*)    CBC WITH PLATELETS AND DIFFERENTIAL - Abnormal    WBC Count 7.4      RBC Count 3.08 (*)     Hemoglobin 9.8 (*)     Hematocrit 31.1 (*)      (*)     MCH 31.8      MCHC 31.5      RDW 15.8 (*)     Platelet Count 367      % Neutrophils 76      % Lymphocytes 13      % Monocytes 10      % Eosinophils 0      % Basophils 1      % Immature Granulocytes 0      NRBCs per 100 WBC 0      Absolute Neutrophils 5.7      Absolute Lymphocytes 1.0      Absolute Monocytes 0.7      Absolute Eosinophils 0.0      Absolute Basophils 0.1      Absolute Immature Granulocytes 0.0      Absolute NRBCs 0.0     TROPONIN T, HIGH SENSITIVITY - Normal    Troponin T, High Sensitivity 22          Emergency Department Course & Assessments:        Social Determinants of Health affecting care:   None    Assessments:  2025 I spoke with patient and assessed symptoms.    Disposition:  The patient was discharged to home.     Impression & Plan      Medical Decision Making:  This is an 82-year-old male who was instructed to come here from the clinic due to bradycardia and specifically a heart rate apparently in the 30s.  I reviewed the EKG from the clinic as well as our EKG, and his heart rate actually a appears to be in the 60s.  However it is possible that not all of the beats are actually perfusing, which might be accounting for the heart rate in the 30s at the clinic.  Regardless, the patient is not symptomatic, even though he does have frequent falls.  The patient was seen by my colleague, Dr. Dickinson initially in triage, and she spoke with the on-call cardiologist, Dr. Holcomb.  He recommended that the patient have an outpatient 30-day monitor, which I ordered.  His blood work here is unremarkable as is his EKG.  He feels fine going home, which I think is reasonable and appropriate.  I will also order an outpatient cardiology consult.  I recommended he follow-up  with them as well as his primary physician and return here with any concerns or worsening symptoms as well.      Diagnosis:    ICD-10-CM    1. Bradycardia  R00.1 Cardiac Event Monitor Adult Pediatric     Follow-Up with Cardiology           Discharge Medications:  Discharge Medication List as of 2/24/2023  9:07 PM             Scribe Disclosure:  I, Devika James, am serving as a scribe at 9:09 PM on 2/24/2023 to document services personally performed by Neo Shipman MD based on my observations and the provider's statements to me.   2/24/2023   Neo Shipman MD Lashkowitz, Seth H, MD  02/24/23 [id number]

## 2023-03-01 ENCOUNTER — APPOINTMENT (OUTPATIENT)
Dept: GENERAL RADIOLOGY | Facility: CLINIC | Age: 83
DRG: 871 | End: 2023-03-01
Attending: SURGERY
Payer: COMMERCIAL

## 2023-03-01 ENCOUNTER — HOSPITAL ENCOUNTER (INPATIENT)
Facility: CLINIC | Age: 83
LOS: 6 days | Discharge: NURSING, REHAB OR LONG-TERM CARE | DRG: 871 | End: 2023-03-07
Attending: EMERGENCY MEDICINE | Admitting: INTERNAL MEDICINE
Payer: COMMERCIAL

## 2023-03-01 ENCOUNTER — APPOINTMENT (OUTPATIENT)
Dept: CT IMAGING | Facility: CLINIC | Age: 83
DRG: 871 | End: 2023-03-01
Attending: EMERGENCY MEDICINE
Payer: COMMERCIAL

## 2023-03-01 ENCOUNTER — APPOINTMENT (OUTPATIENT)
Dept: CARDIOLOGY | Facility: CLINIC | Age: 83
DRG: 871 | End: 2023-03-01
Attending: INTERNAL MEDICINE
Payer: COMMERCIAL

## 2023-03-01 DIAGNOSIS — Z79.01 LONG TERM CURRENT USE OF ANTICOAGULANT THERAPY: ICD-10-CM

## 2023-03-01 DIAGNOSIS — R09.02 HYPOXIA: ICD-10-CM

## 2023-03-01 DIAGNOSIS — R00.1 BRADYCARDIA: ICD-10-CM

## 2023-03-01 DIAGNOSIS — I95.9 HYPOTENSION, UNSPECIFIED HYPOTENSION TYPE: ICD-10-CM

## 2023-03-01 DIAGNOSIS — D62 ANEMIA DUE TO BLOOD LOSS, ACUTE: ICD-10-CM

## 2023-03-01 DIAGNOSIS — K22.10 ESOPHAGEAL ULCER WITHOUT BLEEDING: Primary | ICD-10-CM

## 2023-03-01 DIAGNOSIS — S22.32XA CLOSED FRACTURE OF ONE RIB OF LEFT SIDE, INITIAL ENCOUNTER: ICD-10-CM

## 2023-03-01 DIAGNOSIS — S01.01XA LACERATION OF SCALP, INITIAL ENCOUNTER: ICD-10-CM

## 2023-03-01 DIAGNOSIS — Z86.59 HISTORY OF DEMENTIA: ICD-10-CM

## 2023-03-01 LAB
ABO/RH(D): NORMAL
ALBUMIN SERPL BCG-MCNC: 3.2 G/DL (ref 3.5–5.2)
ALBUMIN UR-MCNC: 30 MG/DL
ALP SERPL-CCNC: 132 U/L (ref 40–129)
ALT SERPL W P-5'-P-CCNC: 19 U/L (ref 10–50)
ANION GAP SERPL CALCULATED.3IONS-SCNC: 19 MMOL/L (ref 7–15)
ANTIBODY SCREEN: NEGATIVE
APPEARANCE UR: CLEAR
AST SERPL W P-5'-P-CCNC: 42 U/L (ref 10–50)
ATRIAL RATE - MUSE: NORMAL BPM
BASOPHILS # BLD AUTO: 0.1 10E3/UL (ref 0–0.2)
BASOPHILS NFR BLD AUTO: 0 %
BILIRUB SERPL-MCNC: 0.7 MG/DL
BILIRUB UR QL STRIP: NEGATIVE
BLD PROD TYP BPU: NORMAL
BLOOD COMPONENT TYPE: NORMAL
BUN SERPL-MCNC: 29.3 MG/DL (ref 8–23)
CALCIUM SERPL-MCNC: 8.4 MG/DL (ref 8.8–10.2)
CHLORIDE SERPL-SCNC: 102 MMOL/L (ref 98–107)
CODING SYSTEM: NORMAL
COLOR UR AUTO: YELLOW
CREAT BLD-MCNC: 2.3 MG/DL (ref 0.7–1.3)
CREAT SERPL-MCNC: 2.04 MG/DL (ref 0.67–1.17)
DEPRECATED HCO3 PLAS-SCNC: 16 MMOL/L (ref 22–29)
DIASTOLIC BLOOD PRESSURE - MUSE: NORMAL MMHG
EOSINOPHIL # BLD AUTO: 0 10E3/UL (ref 0–0.7)
EOSINOPHIL NFR BLD AUTO: 0 %
ERYTHROCYTE [DISTWIDTH] IN BLOOD BY AUTOMATED COUNT: 15.9 % (ref 10–15)
GFR SERPL CREATININE-BSD FRML MDRD: 27 ML/MIN/1.73M2
GFR SERPL CREATININE-BSD FRML MDRD: 32 ML/MIN/1.73M2
GLUCOSE BLDC GLUCOMTR-MCNC: 129 MG/DL (ref 70–99)
GLUCOSE SERPL-MCNC: 173 MG/DL (ref 70–99)
GLUCOSE UR STRIP-MCNC: NEGATIVE MG/DL
HCT VFR BLD AUTO: 28.1 % (ref 40–53)
HGB BLD-MCNC: 8.6 G/DL (ref 13.3–17.7)
HGB UR QL STRIP: ABNORMAL
HYALINE CASTS: 9 /LPF
IMM GRANULOCYTES # BLD: 0.3 10E3/UL
IMM GRANULOCYTES NFR BLD: 2 %
INTERPRETATION ECG - MUSE: NORMAL
ISSUE DATE AND TIME: NORMAL
KETONES UR STRIP-MCNC: NEGATIVE MG/DL
LACTATE SERPL-SCNC: 1.7 MMOL/L (ref 0.7–2)
LEUKOCYTE ESTERASE UR QL STRIP: ABNORMAL
LVEF ECHO: NORMAL
LYMPHOCYTES # BLD AUTO: 1.9 10E3/UL (ref 0.8–5.3)
LYMPHOCYTES NFR BLD AUTO: 13 %
MCH RBC QN AUTO: 31.2 PG (ref 26.5–33)
MCHC RBC AUTO-ENTMCNC: 30.6 G/DL (ref 31.5–36.5)
MCV RBC AUTO: 102 FL (ref 78–100)
MONOCYTES # BLD AUTO: 1.6 10E3/UL (ref 0–1.3)
MONOCYTES NFR BLD AUTO: 10 %
MUCOUS THREADS #/AREA URNS LPF: PRESENT /LPF
NEUTROPHILS # BLD AUTO: 11.6 10E3/UL (ref 1.6–8.3)
NEUTROPHILS NFR BLD AUTO: 75 %
NITRATE UR QL: NEGATIVE
NRBC # BLD AUTO: 0 10E3/UL
NRBC BLD AUTO-RTO: 0 /100
P AXIS - MUSE: NORMAL DEGREES
PH UR STRIP: 5.5 [PH] (ref 5–7)
PLATELET # BLD AUTO: 497 10E3/UL (ref 150–450)
POTASSIUM SERPL-SCNC: 5 MMOL/L (ref 3.4–5.3)
PR INTERVAL - MUSE: NORMAL MS
PROCALCITONIN SERPL IA-MCNC: 0.31 NG/ML
PROT SERPL-MCNC: 6 G/DL (ref 6.4–8.3)
QRS DURATION - MUSE: 78 MS
QT - MUSE: 362 MS
QTC - MUSE: 452 MS
R AXIS - MUSE: 51 DEGREES
RBC # BLD AUTO: 2.76 10E6/UL (ref 4.4–5.9)
RBC URINE: 2 /HPF
SODIUM SERPL-SCNC: 137 MMOL/L (ref 136–145)
SP GR UR STRIP: 1.03 (ref 1–1.03)
SPECIMEN EXPIRATION DATE: NORMAL
SQUAMOUS EPITHELIAL: 1 /HPF
SYSTOLIC BLOOD PRESSURE - MUSE: NORMAL MMHG
T AXIS - MUSE: -38 DEGREES
TROPONIN T SERPL HS-MCNC: 58 NG/L
UNIT ABO/RH: NORMAL
UNIT NUMBER: NORMAL
UNIT STATUS: NORMAL
UNIT TYPE ISBT: 9500
UROBILINOGEN UR STRIP-MCNC: NORMAL MG/DL
VENTRICULAR RATE- MUSE: 94 BPM
WBC # BLD AUTO: 15.5 10E3/UL (ref 4–11)
WBC URINE: 9 /HPF

## 2023-03-01 PROCEDURE — 74177 CT ABD & PELVIS W/CONTRAST: CPT

## 2023-03-01 PROCEDURE — 250N000009 HC RX 250: Performed by: EMERGENCY MEDICINE

## 2023-03-01 PROCEDURE — 93306 TTE W/DOPPLER COMPLETE: CPT | Mod: 26 | Performed by: INTERNAL MEDICINE

## 2023-03-01 PROCEDURE — 999N000208 ECHOCARDIOGRAM COMPLETE

## 2023-03-01 PROCEDURE — 87086 URINE CULTURE/COLONY COUNT: CPT | Performed by: INTERNAL MEDICINE

## 2023-03-01 PROCEDURE — 258N000003 HC RX IP 258 OP 636: Performed by: EMERGENCY MEDICINE

## 2023-03-01 PROCEDURE — 82962 GLUCOSE BLOOD TEST: CPT

## 2023-03-01 PROCEDURE — 73030 X-RAY EXAM OF SHOULDER: CPT | Mod: RT

## 2023-03-01 PROCEDURE — 99223 1ST HOSP IP/OBS HIGH 75: CPT | Performed by: PHYSICIAN ASSISTANT

## 2023-03-01 PROCEDURE — 0HQ0XZZ REPAIR SCALP SKIN, EXTERNAL APPROACH: ICD-10-PCS | Performed by: EMERGENCY MEDICINE

## 2023-03-01 PROCEDURE — 250N000011 HC RX IP 250 OP 636: Performed by: EMERGENCY MEDICINE

## 2023-03-01 PROCEDURE — 250N000013 HC RX MED GY IP 250 OP 250 PS 637: Performed by: INTERNAL MEDICINE

## 2023-03-01 PROCEDURE — 12002 RPR S/N/AX/GEN/TRNK2.6-7.5CM: CPT

## 2023-03-01 PROCEDURE — 999N000065 XR WRIST LEFT G/E 3 VIEWS

## 2023-03-01 PROCEDURE — 81001 URINALYSIS AUTO W/SCOPE: CPT | Performed by: EMERGENCY MEDICINE

## 2023-03-01 PROCEDURE — 86901 BLOOD TYPING SEROLOGIC RH(D): CPT | Performed by: EMERGENCY MEDICINE

## 2023-03-01 PROCEDURE — 258N000003 HC RX IP 258 OP 636: Performed by: INTERNAL MEDICINE

## 2023-03-01 PROCEDURE — 99222 1ST HOSP IP/OBS MODERATE 55: CPT | Performed by: SURGERY

## 2023-03-01 PROCEDURE — 36415 COLL VENOUS BLD VENIPUNCTURE: CPT | Performed by: EMERGENCY MEDICINE

## 2023-03-01 PROCEDURE — 93005 ELECTROCARDIOGRAM TRACING: CPT

## 2023-03-01 PROCEDURE — 99292 CRITICAL CARE ADDL 30 MIN: CPT | Mod: 25

## 2023-03-01 PROCEDURE — 86923 COMPATIBILITY TEST ELECTRIC: CPT | Performed by: HOSPITALIST

## 2023-03-01 PROCEDURE — 83605 ASSAY OF LACTIC ACID: CPT | Performed by: EMERGENCY MEDICINE

## 2023-03-01 PROCEDURE — 99291 CRITICAL CARE FIRST HOUR: CPT | Mod: 25

## 2023-03-01 PROCEDURE — 70450 CT HEAD/BRAIN W/O DYE: CPT

## 2023-03-01 PROCEDURE — 73130 X-RAY EXAM OF HAND: CPT | Mod: LT

## 2023-03-01 PROCEDURE — 250N000011 HC RX IP 250 OP 636: Performed by: INTERNAL MEDICINE

## 2023-03-01 PROCEDURE — 84484 ASSAY OF TROPONIN QUANT: CPT | Performed by: INTERNAL MEDICINE

## 2023-03-01 PROCEDURE — 94150 VITAL CAPACITY TEST: CPT

## 2023-03-01 PROCEDURE — 87040 BLOOD CULTURE FOR BACTERIA: CPT | Performed by: EMERGENCY MEDICINE

## 2023-03-01 PROCEDURE — 86850 RBC ANTIBODY SCREEN: CPT | Performed by: EMERGENCY MEDICINE

## 2023-03-01 PROCEDURE — 120N000001 HC R&B MED SURG/OB

## 2023-03-01 PROCEDURE — 72125 CT NECK SPINE W/O DYE: CPT

## 2023-03-01 PROCEDURE — 85025 COMPLETE CBC W/AUTO DIFF WBC: CPT | Performed by: EMERGENCY MEDICINE

## 2023-03-01 PROCEDURE — 82565 ASSAY OF CREATININE: CPT

## 2023-03-01 PROCEDURE — 255N000002 HC RX 255 OP 636: Performed by: INTERNAL MEDICINE

## 2023-03-01 PROCEDURE — 80053 COMPREHEN METABOLIC PANEL: CPT | Performed by: EMERGENCY MEDICINE

## 2023-03-01 PROCEDURE — 999N000157 HC STATISTIC RCP TIME EA 10 MIN

## 2023-03-01 PROCEDURE — P9016 RBC LEUKOCYTES REDUCED: HCPCS

## 2023-03-01 PROCEDURE — 96360 HYDRATION IV INFUSION INIT: CPT | Mod: 59

## 2023-03-01 PROCEDURE — 84145 PROCALCITONIN (PCT): CPT | Performed by: EMERGENCY MEDICINE

## 2023-03-01 PROCEDURE — 99223 1ST HOSP IP/OBS HIGH 75: CPT | Mod: AI | Performed by: INTERNAL MEDICINE

## 2023-03-01 RX ORDER — MULTIVITAMIN,THERAPEUTIC
1 TABLET ORAL DAILY
Status: ON HOLD | COMMUNITY
End: 2024-02-17

## 2023-03-01 RX ORDER — LIDOCAINE 40 MG/G
CREAM TOPICAL
Status: DISCONTINUED | OUTPATIENT
Start: 2023-03-01 | End: 2023-03-02

## 2023-03-01 RX ORDER — NITROGLYCERIN 0.4 MG/1
0.4 TABLET SUBLINGUAL EVERY 5 MIN PRN
Status: DISCONTINUED | OUTPATIENT
Start: 2023-03-01 | End: 2023-03-07 | Stop reason: HOSPADM

## 2023-03-01 RX ORDER — ASPIRIN 81 MG/1
81 TABLET ORAL DAILY
Status: ON HOLD | COMMUNITY
End: 2023-03-05

## 2023-03-01 RX ORDER — ACETAMINOPHEN 325 MG/1
975 TABLET ORAL EVERY 8 HOURS
Status: DISCONTINUED | OUTPATIENT
Start: 2023-03-01 | End: 2023-03-07 | Stop reason: HOSPADM

## 2023-03-01 RX ORDER — ONDANSETRON 4 MG/1
4 TABLET, ORALLY DISINTEGRATING ORAL EVERY 6 HOURS PRN
Status: DISCONTINUED | OUTPATIENT
Start: 2023-03-01 | End: 2023-03-07 | Stop reason: HOSPADM

## 2023-03-01 RX ORDER — IOPAMIDOL 755 MG/ML
76 INJECTION, SOLUTION INTRAVASCULAR ONCE
Status: COMPLETED | OUTPATIENT
Start: 2023-03-01 | End: 2023-03-01

## 2023-03-01 RX ORDER — POLYETHYLENE GLYCOL 3350 17 G/17G
17 POWDER, FOR SOLUTION ORAL DAILY PRN
Status: DISCONTINUED | OUTPATIENT
Start: 2023-03-01 | End: 2023-03-07 | Stop reason: HOSPADM

## 2023-03-01 RX ORDER — AMOXICILLIN 250 MG
2 CAPSULE ORAL 2 TIMES DAILY PRN
Status: DISCONTINUED | OUTPATIENT
Start: 2023-03-01 | End: 2023-03-07 | Stop reason: HOSPADM

## 2023-03-01 RX ORDER — ACETAMINOPHEN 325 MG/1
975 TABLET ORAL EVERY 8 HOURS SCHEDULED
Status: DISCONTINUED | OUTPATIENT
Start: 2023-03-01 | End: 2023-03-01

## 2023-03-01 RX ORDER — PIPERACILLIN SODIUM, TAZOBACTAM SODIUM 3; .375 G/15ML; G/15ML
3.38 INJECTION, POWDER, LYOPHILIZED, FOR SOLUTION INTRAVENOUS ONCE
Status: COMPLETED | OUTPATIENT
Start: 2023-03-01 | End: 2023-03-01

## 2023-03-01 RX ORDER — LIDOCAINE 40 MG/G
CREAM TOPICAL
Status: DISCONTINUED | OUTPATIENT
Start: 2023-03-01 | End: 2023-03-07 | Stop reason: HOSPADM

## 2023-03-01 RX ORDER — ONDANSETRON 2 MG/ML
4 INJECTION INTRAMUSCULAR; INTRAVENOUS EVERY 6 HOURS PRN
Status: DISCONTINUED | OUTPATIENT
Start: 2023-03-01 | End: 2023-03-07 | Stop reason: HOSPADM

## 2023-03-01 RX ORDER — PIPERACILLIN SODIUM, TAZOBACTAM SODIUM 3; .375 G/15ML; G/15ML
3.38 INJECTION, POWDER, LYOPHILIZED, FOR SOLUTION INTRAVENOUS EVERY 6 HOURS
Status: DISCONTINUED | OUTPATIENT
Start: 2023-03-01 | End: 2023-03-03

## 2023-03-01 RX ORDER — LIDOCAINE 4 G/G
1 PATCH TOPICAL EVERY MORNING
Status: DISCONTINUED | OUTPATIENT
Start: 2023-03-01 | End: 2023-03-07 | Stop reason: HOSPADM

## 2023-03-01 RX ORDER — SODIUM CHLORIDE 9 MG/ML
INJECTION, SOLUTION INTRAVENOUS CONTINUOUS
Status: DISCONTINUED | OUTPATIENT
Start: 2023-03-01 | End: 2023-03-04

## 2023-03-01 RX ORDER — AMOXICILLIN 250 MG
1 CAPSULE ORAL 2 TIMES DAILY PRN
Status: DISCONTINUED | OUTPATIENT
Start: 2023-03-01 | End: 2023-03-07 | Stop reason: HOSPADM

## 2023-03-01 RX ADMIN — HUMAN ALBUMIN MICROSPHERES AND PERFLUTREN 9 ML: 10; .22 INJECTION, SOLUTION INTRAVENOUS at 15:47

## 2023-03-01 RX ADMIN — SODIUM CHLORIDE: 9 INJECTION, SOLUTION INTRAVENOUS at 23:21

## 2023-03-01 RX ADMIN — SODIUM CHLORIDE: 9 INJECTION, SOLUTION INTRAVENOUS at 14:43

## 2023-03-01 RX ADMIN — ACETAMINOPHEN 975 MG: 325 TABLET, FILM COATED ORAL at 22:05

## 2023-03-01 RX ADMIN — IOPAMIDOL 76 ML: 755 INJECTION, SOLUTION INTRAVENOUS at 01:16

## 2023-03-01 RX ADMIN — PIPERACILLIN AND TAZOBACTAM 3.38 G: 3; .375 INJECTION, POWDER, FOR SOLUTION INTRAVENOUS at 23:21

## 2023-03-01 RX ADMIN — PIPERACILLIN AND TAZOBACTAM 3.38 G: 3; .375 INJECTION, POWDER, FOR SOLUTION INTRAVENOUS at 05:09

## 2023-03-01 RX ADMIN — SODIUM CHLORIDE 1000 ML: 9 INJECTION, SOLUTION INTRAVENOUS at 02:21

## 2023-03-01 RX ADMIN — SODIUM CHLORIDE 1000 ML: 9 INJECTION, SOLUTION INTRAVENOUS at 04:29

## 2023-03-01 RX ADMIN — SODIUM CHLORIDE 86 ML: 900 INJECTION INTRAVENOUS at 01:17

## 2023-03-01 RX ADMIN — SODIUM CHLORIDE: 9 INJECTION, SOLUTION INTRAVENOUS at 06:23

## 2023-03-01 RX ADMIN — PIPERACILLIN AND TAZOBACTAM 3.38 G: 3; .375 INJECTION, POWDER, FOR SOLUTION INTRAVENOUS at 17:05

## 2023-03-01 RX ADMIN — ACETAMINOPHEN 975 MG: 325 TABLET, FILM COATED ORAL at 14:47

## 2023-03-01 RX ADMIN — PIPERACILLIN AND TAZOBACTAM 3.38 G: 3; .375 INJECTION, POWDER, FOR SOLUTION INTRAVENOUS at 12:09

## 2023-03-01 ASSESSMENT — ACTIVITIES OF DAILY LIVING (ADL)
ADLS_ACUITY_SCORE: 41
ADLS_ACUITY_SCORE: 39
ADLS_ACUITY_SCORE: 41
ADLS_ACUITY_SCORE: 41
ADLS_ACUITY_SCORE: 49
ADLS_ACUITY_SCORE: 41

## 2023-03-01 NOTE — ED NOTES
[facility]  ED Nurse Handoff Report    ED Chief complaint: Fall and Hypotension      ED Diagnosis:   Final diagnoses:   Hypotension, unspecified hypotension type   Anemia due to blood loss, acute   Laceration of scalp, initial encounter   Closed fracture of one rib of left side, initial encounter   Long term current use of anticoagulant therapy   History of dementia       Code Status: DNR / DNI    Allergies: No Known Allergies    Patient Story: Pt is a 82 year old male who presents by EMS for evaluation of a presumed fall.  It is unknown when he was last seen well, though some sort of medical alert activated EMS who found him on the floor minimally responsive with several nearby pools of blood on the carpeted floor of her bedroom of his residence in a local [facility] high[place] building.  EMS noted fresh blood in the back of his head.  EMS spoke with daughter who stated that he is somewhat confused at baseline, he was noted to be confused today as well.  Patient is not sure what happened.  Paramedics found a blood pressure of 82/36, pulse around 120.  Blood sugar not checked by EMS.  He is reportedly on Eliquis for history of A-fib.    Focused Assessment:  Pt is awake, alert and orientated, daughters at the bedside     Treatments and/or interventions provided: Labs, CT,      Patient's response to treatments and/or interventions: Tolerating BP fluids.     To be done/followed up on inpatient unit:      Does this patient have any cognitive concerns?:  AOX 4    Activity level - Baseline/Home:  Unknown  Activity Level - Current:   Unknown    Patient's Preferred language: English   Needed?: No    Isolation: None  Infection: Not Applicable  Other   Patient tested for COVID 19 prior to admission: NO  Bariatric?: No    Vital Signs:   Vitals:    03/01/23 0419 03/01/23 0432 03/01/23 0451 03/01/23 0501   BP: (!) 75/37 90/68  (!) 87/50   BP Location:       Patient Position:       Pulse: 58 85  115   Resp:        Temp:       TempSrc:       SpO2: 98% 95% 97% 98%   Weight:       Height:           Cardiac Rhythm:Cardiac Rhythm: (S) Atrial fibrillation (70-90's bpm.)    Was the PSS-3 completed:   Yes  What interventions are required if any?               Family Comments:   OBS brochure/video discussed/provided to patient/family: No              Name of person given brochure if not patient:               Relationship to patient:     For the majority of the shift this patient's behavior was Green.   Behavioral interventions performed were .    ED NURSE PHONE NUMBER: RN 5

## 2023-03-01 NOTE — ED NOTES
Report received from WILLY Ley. Pt care assumed. Pt resting in bed, daughters at bedside. BP remains low.

## 2023-03-01 NOTE — PHARMACY-ADMISSION MEDICATION HISTORY
Pharmacy Medication History  Admission medication history interview status for the 3/1/2023  admission is complete. See EPIC admission navigator for prior to admission medications     Location of Interview: Patient room  Medication history sources: Patient, Patient's family/friend (daughters) and Surescripts    Significant changes made to the medication list:  Added sertraline, MVI    In the past week, patient estimated taking medication this percent of the time: greater than 90%    Additional medication history information:   None    Medication reconciliation completed by provider prior to medication history? No    Time spent in this activity: 15 min    Prior to Admission medications    Medication Sig Last Dose Taking? Auth Provider Long Term End Date   acetaminophen (TYLENOL) 325 MG tablet Take 2 tablets (650 mg) by mouth every 4 hours as needed for mild pain  at prn Yes Champ Kapadia MD     alendronate (FOSAMAX) 70 MG tablet Take 70 mg by mouth every 7 days Sunday's 2/26/2023 Yes Reported, Patient Yes    apixaban ANTICOAGULANT (ELIQUIS) 5 MG tablet Take 1 tablet (5 mg) by mouth 2 times daily 2/28/2023 at am Yes Savanna Tuttle NP     aspirin 81 MG EC tablet Take 81 mg by mouth daily 2/28/2023 at am Yes Unknown, Entered By History No    fluticasone (FLONASE) 50 MCG/ACT nasal spray Spray 1 spray into both nostrils daily as needed  at prn Yes Reported, Patient     folic acid 800 MCG TABS Take 800 mcg by mouth daily 2/28/2023 at am Yes Champ Kapadia MD     lisinopril (ZESTRIL) 10 MG tablet Take 10 mg by mouth daily 2/28/2023 at am Yes Reported, Patient Yes    methotrexate 2.5 MG tablet Take 5 tablets (12.5 mg) by mouth every 7 days  Patient taking differently: Take 12.5 mg by mouth every 7 days Saturday 2/25/2023 Yes Savanna Tuttle NP Yes    multivitamin  with lutein (OCUVITE WITH LUTEIN) CAPS per capsule Take 1 capsule by mouth daily 2/28/2023 at am Yes Reported, Patient     multivitamin,  therapeutic (THERA-VIT) TABS tablet Take 1 tablet by mouth daily 2/28/2023 at am Yes Unknown, Entered By History     rosuvastatin (CRESTOR) 10 MG tablet Take 10 mg by mouth daily 2/28/2023 at am Yes Unknown, Entered By History No    SENNA-docusate sodium (SENNA S) 8.6-50 MG tablet Take 1 tablet by mouth 2 times daily as needed (while on narcotics to prevent constipation)  at prn Yes Erica Concepcion PA-C     sertraline (ZOLOFT) 50 MG tablet Take 50 mg by mouth daily 2/28/2023 at am Yes Unknown, Entered By History Yes    trospium (SANCTURA) 20 MG tablet Take 20 mg by mouth 2 times daily 2/28/2023 at am Yes Unknown, Entered By History     Vitamin D3 (CHOLECALCIFEROL) 125 MCG (5000 UT) tablet Take 125 mcg by mouth daily 2/28/2023 at am Yes Unknown, Entered By History         The information provided in this note is only as accurate as the sources available at the time of update(s)

## 2023-03-01 NOTE — ED PROVIDER NOTES
History   Chief Complaint:  Fall    HPI   History supplemented by electronic chart review and EMS  History limited by patient's confusion    Shamir Concepcion is a 82 year old male who presents by EMS for evaluation of a presumed fall.  It is unknown when he was last seen well, though some sort of medical alert activated EMS who found him on the floor minimally responsive with several nearby pools of blood on the carpeted floor of her bedroom of his residence in a local [facility] high[place] building.  EMS noted fresh blood in the back of his head.  EMS spoke with daughter who stated that he is somewhat confused at baseline, he was noted to be confused today as well.  Patient is not sure what happened.  Paramedics found a blood pressure of 82/36, pulse around 120.  Blood sugar not checked by EMS.  He is reportedly on Eliquis for history of A-fib.    Independent Historian: daughter, who reports that she last spoke with the patient on February 27, at which time he seemed to be at his baseline.    Review of External Notes: I personally reviewed some prior records, note that he was seen here on January 30 for a scalp laceration that was stapled.  He was subsequently seen here very recently in February 24, for bradycardia that have been noted with a heart rate around 30 in clinic when he went to have his staples removed.  His heart rate was adequate in the ED at that time, so a 30-day cardiac monitor was ordered at the recommendation of the consulting cardiologist at that time.    Review of Systems:  Unable to obtain full review of systems due to AMS    Allergies:  No Known Allergies     Medications:    acetaminophen (TYLENOL) 325 MG tablet  alendronate (FOSAMAX) 70 MG tablet  apixaban ANTICOAGULANT (ELIQUIS) 5 MG tablet  fluticasone (FLONASE) 50 MCG/ACT nasal spray  folic acid 800 MCG TABS  lisinopril (ZESTRIL) 10 MG tablet  methotrexate 2.5 MG tablet  multivitamin  with lutein (OCUVITE WITH LUTEIN) CAPS per  capsule  rosuvastatin (CRESTOR) 10 MG tablet  SENNA-docusate sodium (SENNA S) 8.6-50 MG tablet  trospium (SANCTURA) 20 MG tablet  VITAMIN D, CHOLECALCIFEROL, PO      Past Medical History:    Past Medical History:   Diagnosis Date     Allergic rhinitis      BPH (benign prostatic hyperplasia)      Cerebral infarction (H)      Colon polyps      ED (erectile dysfunction)      Family history of colon cancer      Hypertension      PAD (peripheral artery disease) (H)      RA (rheumatoid arthritis) (H)      Seronegative rheumatoid arthritis (H)        Patient Active Problem List    Diagnosis Date Noted     Anemia due to blood loss, acute 03/01/2023     Priority: Medium     Laceration of scalp, initial encounter 03/01/2023     Priority: Medium     History of dementia 03/01/2023     Priority: Medium     Closed fracture of one rib of left side, initial encounter 03/01/2023     Priority: Medium     Hypotension, unspecified hypotension type 03/01/2023     Priority: Medium     Closed displaced fracture of greater tuberosity of left humerus, initial encounter 07/23/2022     Priority: Medium     Hx of long-term (current) use of anticoagulants 07/21/2022     Priority: Medium     Closed head injury, initial encounter 07/21/2022     Priority: Medium     Closed fracture of head of left humerus, initial encounter 07/21/2022     Priority: Medium     Fall, initial encounter 07/21/2022     Priority: Medium     Shoulder dislocation, left, initial encounter 07/21/2022     Priority: Medium     Eyebrow laceration, left, initial encounter 07/21/2022     Priority: Medium     Acute pain of left shoulder 07/21/2022     Priority: Medium     Periorbital hematoma of left eye 07/21/2022     Priority: Medium     Long term current use of anticoagulant therapy 08/13/2018     Priority: Medium     Encounter for monitoring Coumadin therapy 08/13/2018     Priority: Medium     Physical deconditioning 08/10/2018     Priority: Medium     Essential hypertension  08/10/2018     Priority: Medium     Cognitive impairment 08/10/2018     Priority: Medium     Stroke (cerebrum) (H) 07/28/2018     Priority: Medium     Acute ischemic stroke (H) 07/21/2018     Priority: Medium     Rheumatoid arthritis (H) 12/21/2011     Priority: Medium     Problem list name updated by automated process. Provider to review       Pure hypercholesterolemia 12/14/2011     Priority: Medium     BPH (benign prostatic hyperplasia) 12/14/2011     Priority: Medium        Past Surgical History:    Past Surgical History:   Procedure Laterality Date     ANGIOGRAM       COLONOSCOPY      polyps     ENT SURGERY      T&A     IR LOWER EXTREMITY ANGIOGRAM RIGHT  10/12/2022     LAMINECT/DISCECTOMY, LUMBAR       OPEN REDUCTION INTERNAL FIXATION HUMERUS PROXIMAL Left 8/10/2022    Procedure: Left shoulder open reduction and internal fixation, proximal humerus fracture;  Surgeon: Larry Saxena MD;  Location: [facility] OR     ORTHOPEDIC SURGERY       PHACOEMULSIFICATION CLEAR CORNEA WITH STANDARD INTRAOCULAR LENS IMPLANT Left 4/4/2017    Procedure: PHACOEMULSIFICATION CLEAR CORNEA WITH STANDARD INTRAOCULAR LENS IMPLANT;  Surgeon: Stevie Espinal MD;  Location: [facility]     PHACOEMULSIFICATION CLEAR CORNEA WITH STANDARD INTRAOCULAR LENS IMPLANT Right 5/2/2017    Procedure: PHACOEMULSIFICATION CLEAR CORNEA WITH STANDARD INTRAOCULAR LENS IMPLANT;  RIGHT EYE PHACOEMULSIFICATION CLEAR CORNEA WITH STANDARD INTRAOCULAR LENS IMPLANT;  Surgeon: Stevie Espinal MD;  Location: [facility]     SINUS SURGERY       VASCULAR SURGERY  R SFA stenting  2012     ZC MRA UPPER EXTREMITY WO&W CONT  stenting right SFA-AK pop        Family History:    family history includes Cancer in his mother; Cerebrovascular Disease in his father.    Social History:   reports that he quit smoking about 24 years ago. His smoking use included cigarettes. He has a 15.00 pack-year smoking history. He has never used smokeless tobacco. He reports that he does not  "currently use alcohol. He reports that he does not use drugs.    Physical Exam     Patient Vitals for the past 24 hrs:   BP Temp Temp src Pulse Resp SpO2 Height Weight   03/01/23 0612 -- -- -- 113 12 99 % -- --   03/01/23 0600 91/50 -- -- 78 12 97 % -- --   03/01/23 0501 (!) 87/50 -- -- 115 -- 98 % -- --   03/01/23 0451 -- -- -- -- -- 97 % -- --   03/01/23 0432 90/68 -- -- 85 -- 95 % -- --   03/01/23 0419 (!) 75/37 -- -- 58 -- 98 % -- --   03/01/23 0406 (!) 75/35 -- -- 95 27 97 % -- --   03/01/23 0348 (!) 79/41 -- -- 84 18 97 % -- --   03/01/23 0347 (!) 79/41 -- -- 82 17 97 % -- --   03/01/23 0333 (!) 83/46 -- -- 78 19 -- -- --   03/01/23 0318 (!) 74/53 -- -- 84 -- -- -- --   03/01/23 0315 (!) 81/44 -- -- 56 -- -- -- --   03/01/23 0300 (!) 88/66 -- -- 65 18 95 % -- --   03/01/23 0245 (!) 79/41 -- -- 71 (!) 38 97 % -- --   03/01/23 0240 111/50 -- -- 75 15 95 % -- --   03/01/23 0233 90/51 -- -- 86 11 91 % -- --   03/01/23 0230 (!) 85/48 -- -- 88 13 95 % -- --   03/01/23 0215 94/70 -- -- 107 12 99 % -- --   03/01/23 0204 91/44 -- -- 80 25 96 % -- --   03/01/23 0200 (!) 73/48 -- -- 86 17 96 % -- --   03/01/23 0145 92/67 -- -- 93 -- 100 % -- --   03/01/23 0142 101/65 -- -- 76 -- 96 % -- --   03/01/23 0117 -- -- -- -- -- -- 1.778 m (5' 10\") 67.9 kg (149 lb 9.6 oz)   03/01/23 0105 -- -- -- 90 -- -- -- --   03/01/23 0102 (!) 114/99 (!) 96.3  F (35.7  C) Temporal -- 22 (!) 89 % -- --      Physical Exam  General: Ill-appearing male recumbent in stabilization room 2  HENT: face nontender with full painless ROM mandible, no bony deformity, OP clear, no difficulty controlling secretions  Eyes: PERRL without proptosis  CV: slightly irregular rhythm, cap refill normal in all extremities, no murmur audible  Resp: normal effort, speaks in full phrases, no stridor  GI: abdomen soft, nontender, no guarding  Nontender external genitalia  MSK:  Cervical spine: no midline tenderness, C-collar in place  Thoracic spine: no midline " "tenderness, no CVAT  Lumbar spine: no midline tenderness  Chest wall: nontender without crepitus  Pelvis stable  Extremities: no focal tenderness, left forearm in rigid cast  Skin:   Fresh blood matted in hair without visible active bleeding; later reexamination showed a laceration to the left posterior scalp with slight gaping  Neuro: awake, alert, somewhat hard of hearing,  equal, can lift both legs off bed, poor historian, repeatedly asked \"what happened\" and \"where am I\", responds appropriately to commands  Psych: cooperative with basic cares, no apparent hallucinations    Emergency Department Course   Electrocardiogram  ECG taken at 0104, ECG interpreted at 0109 by JUAN Baires MD  Atrial fibrillation, artifact present, no ST elevation or depression  Rate 94 bpm. ND interval n/a. QRS duration 78. QTc 452    Imaging:    CT Head w/o Contrast   Final Result   IMPRESSION:   HEAD CT:   1.  No acute intracranial hemorrhage or calvarial fracture.   2.  Ventriculomegaly disproportionate to volume loss, which in the correct clinical setting would raise the possibility of normal pressure/communicating hydrocephalus.   3.  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   4.  There is complete opacification of the right maxillary sinus. Correlate for acute sinusitis.      CERVICAL SPINE CT:   1.  No CT evidence for acute fracture or post traumatic subluxation.   2.  Degenerative changes as highlighted above.      CT Cervical Spine w/o Contrast   Final Result   IMPRESSION:   HEAD CT:   1.  No acute intracranial hemorrhage or calvarial fracture.   2.  Ventriculomegaly disproportionate to volume loss, which in the correct clinical setting would raise the possibility of normal pressure/communicating hydrocephalus.   3.  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   4.  There is complete opacification of the right maxillary sinus. Correlate for acute sinusitis.      CERVICAL SPINE CT:   1.  No CT evidence for acute " fracture or post traumatic subluxation.   2.  Degenerative changes as highlighted above.      CT Chest (PE) Abdomen Pelvis w Contrast   Final Result   IMPRESSION:   1.  Minimally displaced left posterior 11th rib fracture.   2.  No other acute pathology in the chest, abdomen, or pelvis.   3.  No pulmonary embolism.   4.  Mildly dilated ascending thoracic aorta, 41 mm.   5.  Severe coronary artery calcification.              Laboratory:  Labs Ordered and Resulted from Time of ED Arrival to Time of ED Departure   COMPREHENSIVE METABOLIC PANEL - Abnormal       Result Value    Sodium 137      Potassium 5.0      Chloride 102      Carbon Dioxide (CO2) 16 (*)     Anion Gap 19 (*)     Urea Nitrogen 29.3 (*)     Creatinine 2.04 (*)     Calcium 8.4 (*)     Glucose 173 (*)     Alkaline Phosphatase 132 (*)     AST 42      ALT 19      Protein Total 6.0 (*)     Albumin 3.2 (*)     Bilirubin Total 0.7      GFR Estimate 32 (*)    ROUTINE UA WITH MICROSCOPIC - Abnormal    Color Urine Yellow      Appearance Urine Clear      Glucose Urine Negative      Bilirubin Urine Negative      Ketones Urine Negative      Specific Gravity Urine 1.034      Blood Urine Trace (*)     pH Urine 5.5      Protein Albumin Urine 30 (*)     Urobilinogen Urine Normal      Nitrite Urine Negative      Leukocyte Esterase Urine Trace (*)     Mucus Urine Present (*)     RBC Urine 2      WBC Urine 9 (*)     Squamous Epithelials Urine 1      Hyaline Casts Urine 9 (*)    CBC WITH PLATELETS AND DIFFERENTIAL - Abnormal    WBC Count 15.5 (*)     RBC Count 2.76 (*)     Hemoglobin 8.6 (*)     Hematocrit 28.1 (*)      (*)     MCH 31.2      MCHC 30.6 (*)     RDW 15.9 (*)     Platelet Count 497 (*)     % Neutrophils 75      % Lymphocytes 13      % Monocytes 10      % Eosinophils 0      % Basophils 0      % Immature Granulocytes 2      NRBCs per 100 WBC 0      Absolute Neutrophils 11.6 (*)     Absolute Lymphocytes 1.9      Absolute Monocytes 1.6 (*)     Absolute  Eosinophils 0.0      Absolute Basophils 0.1      Absolute Immature Granulocytes 0.3      Absolute NRBCs 0.0     ISTAT CREATININE POCT - Abnormal    Creatinine POCT 2.3 (*)     GFR, ESTIMATED POCT 27 (*)    GLUCOSE BY METER - Abnormal    GLUCOSE BY METER POCT 129 (*)    LACTIC ACID WHOLE BLOOD - Normal    Lactic Acid 1.7     GLUCOSE MONITOR NURSING POCT   PROCALCITONIN   TROPONIN T, HIGH SENSITIVITY   PREPARE RED BLOOD CELLS (UNIT)    ISSUE DATE AND TIME 20230301010100      Blood Component Type Red Blood Cells      Product Code S1695E58      Unit Status Issued      Unit Number [id number]      UNIT ABO/RH O-      CODING SYSTEM [id number]      UNIT TYPE ISBT 9500     PREPARE RED BLOOD CELLS (UNIT)    ISSUE DATE AND TIME 20230301010100      Blood Component Type Red Blood Cells      Product Code I7719F45      Unit Status Issued      Unit Number [id number]      UNIT ABO/RH O-      CODING SYSTEM [id number]      UNIT TYPE ISBT 9500     PREPARE RED BLOOD CELLS (UNIT)    ISSUE DATE AND TIME 20230301010100      Blood Component Type Red Blood Cells      Product Code F3780M21      Unit Status Returned      Unit Number [id number]      UNIT ABO/RH O-      CODING SYSTEM [id number]      UNIT TYPE ISBT 9500     PREPARE RED BLOOD CELLS (UNIT)    ISSUE DATE AND TIME 20230301010100      Blood Component Type Red Blood Cells      Product Code G8886V55      Unit Status Returned      Unit Number [id number]      UNIT ABO/RH O-      CODING SYSTEM [id number]      UNIT TYPE ISBT 9500     TYPE AND SCREEN, ADULT   BLOOD CULTURE   BLOOD CULTURE   ABO/RH TYPE AND SCREEN        Procedures:     Procedure Note: Laceration Repair   Performed by: Stevie Baires MD    Verbal consent given by patient and daughters who confirms understanding of the procedure being performed after discussing the risks, benefits and alternatives.    Preparation: Patient was prepped and draped in usual sterile fashion, with assistance from ERT.  Irrigation solution:  saline    Body area: L posterior scalp  Laceration length: 4.3 cm  Contamination: The wound is not grossly contaminated.  Foreign bodies: none  Tendon involvement: none  Anesthesia: Local   Local anesthetic: Bupivacaine 0.5% without epinephrine    Debridement: sharp excisional debridement was performed  Skin closure: Closed with 6 x 3.0 Ethilon   Technique: interrupted  Approximation: close  Approximation difficulty: moderate due to need for sharp excisional debridement    Patient tolerated the procedure well with no immediate complications.    Emergency Department Course:  Reviewed:  I reviewed nursing notes, vitals, and past medical history    Assessments/Consultations/Discussion of Management or Tests :  I obtained history and examined the patient as noted above.   ED Course as of 03/01/23 0613   Wed Mar 01, 2023   0058 Full trauma activation called, I spoke with HUC and ED charge RN.   0104 I spoke with Dr. Page, Trauma surgeon, she will come to ED to evaluate patient.   0108 I called CT tech to discuss CT protocol.   0117 I went to speak with daughter who is now in ED, we spoke for several minutes in hallway.   0121 I spoke with Dr. Page in person in ED, awaiting CT images.   0211 I reviewed CT images and reads available so far.  I spoke with ED RN who just notified me of SBP 70s-80s, will start 1L bolus.   0228 I rechecked pt in Stabe 2, BP 94/70, pt resting, C-collar removed.  2nd dtr now at bedside, I updated his daughters and spoke with ED RN.   0345 I rechecked patient in Stabe 2.   0353 Scalp wound cleaned, I placed local anesthetic, will return to perform wound care when cleaned again.   0422 I returned to pt to perform scalp sutures.   0459 I spoke with Dr. Gibbons, Hospitalist, who accepts care.     Independent Interpretation (X-rays, CTs, rhythm strip):  I personally reviewed and interpreted his CT images, no intracranial hemorrhage seen, no cervical spine fracture, no pneumothorax or  internal hemorrhage seen on CT of the chest abdomen pelvis.    Interventions:  Medications   sodium chloride 0.9% infusion (has no administration in time range)   piperacillin-tazobactam (ZOSYN) 3.375 g vial to attach to  mL bag (has no administration in time range)   iopamidol (ISOVUE-370) solution 76 mL (76 mLs Intravenous $Given 3/1/23 0116)   Saline (86 mLs Intravenous $Given 3/1/23 0117)   0.9% sodium chloride BOLUS (0 mLs Intravenous Stopped 3/1/23 0230)   0.9% sodium chloride BOLUS (0 mLs Intravenous Stopped 3/1/23 0507)   piperacillin-tazobactam (ZOSYN) 3.375 g vial to attach to  mL bag (0 g Intravenous Stopped 3/1/23 0612)      Disposition:  [place] [facility] [place] of Dr. Gibbons    Impression & Plan    Medical Decision Making:  Given his prehospital hypotension in the setting of trauma as well as advanced age and anticoagulant use, he met criteria for activation of a full trauma, and I am grateful for the prompt bedside assistance of Dr. Page with trauma surgery.  Extensive imaging was performed with evidence of a single rib fracture without underlying pneumothorax or hemothorax.  He has an obvious scalp laceration that was hemostatic from arrival, ultimately wound care was performed with good wound edge approximation as documented above.  The specific etiology of his hypotension is unconfirmed.  Certainly acute blood loss was considered though his initial hemoglobin is not too much lower than it had been previously.  I realize that this may not yet fully reflect the extent of blood loss.  Patient did not wish to pursue transfusion at this time, though is willing to potentially consider it later if he feels worse.  The patient states, and his 2 daughters at bedside confirm, that he would not wish to have a central line, pressors, CPR, or intubation under any circumstances, and would not want to go to the ICU.  For this reason these more aggressive measures were deferred, though after  discussion with the accepting hospitalist we did initiate empiric antibiotics to cover for the possibility of sepsis though suspicion is not particularly high.  Patient reports poor oral intake lately, some degree of hypovolemia even prior to his trauma may be playing a role as well.  His mental status seems to be at baseline now that corroborating information can be obtained from the patient's family at bedside.  His condition certainly carries high morbidity mortality in light of his advanced age, abnormal vital signs, anticoagulant use, and rib fracture.  He will benefit from a tertiary examination from a trauma standpoint while here in the hospital, though there are no immediately surgical findings.  He is admitted to a monitored bed in the [facility] under care of the hospitalist service.    Diagnosis:    ICD-10-CM    1. Hypotension, unspecified hypotension type  I95.9       2. Anemia due to blood loss, acute  D62       3. Laceration of scalp, initial encounter  S01.01XA       4. Closed fracture of one rib of left side, initial encounter  S22.32XA       5. Long term current use of anticoagulant therapy  Z79.01       6. History of dementia  Z86.59          Critical Care Time:  53 minutes, independent of procedures.  This included time spent obtaining a history and physical, reviewing the patient's chart, interpreting lab and imaging studies, re-examining the patient, coordinating care with other providers, and documenting ED care provided.  He has hypotension in the setting of trauma and anticoagulant use, possible sepsis, and required aggressive resuscitation with IV fluids, broad-spectrum antibiotics, and emergent imaging which reveals no rib fracture.  He also has a scalp laceration that was sutured.  He has acute blood loss anemia.  His condition carries high morbidity and mortality.    3/1/2023   MD Dequan Euceda Jeffrey Alan, MD  03/01/23 0618

## 2023-03-01 NOTE — H&P
[facility]    History and Physical - Hospitalist Service       Date of Admission:  3/1/2023    Assessment & Plan      Shamir Concepcion is a 82 year old male admitted on 3/1/2023. He presents after being found down    *note: pt is DNR/DNI, no pressors or lines, no ICU. History limited from pt 2/2 dementia    Loss of consciousness, suspected syncope  Hypotension, unspecified (hypovolemic vs sepsis vs ?)  Atrial fibrillation (new) with recent bradycardia (? SSS)  Hx hypertension  [needs rec- lisinopril 10 mg daily]  *lives in [facility], with multiple falls recently. In ED 1/30 after fall with scalp lac. Plans in place to move to [facility] on Friday  *noted bradycardia in clinic to 30s 2/24, was evaluated in ED  *Last spoke with daughter on Monday. Medical-alert was activated, pt found minimally responsive with pools of blood surrounding him with head lac. EMS found BP 82/36.   *In ED hypotensive to 70s systolic, HR 60-80s. Afebrile. Lactic 1.7. WBC 15.5. UA WBC 9.  CT c/a/p with L 11 rib fx, no other acute findings. CT head w/o hemorrhage, note possible NPH, chronic cerebellar infarcts. C-spine w/o fx.   - IMC  - blood cultures pending   - urine cx pending  - IV fluids  - empiric zosyn  - procal pending  - echocardiogram  - cardiology consult with concern for possible SSS (if ppm were needed, dtrs in agreement)  - troponin    Acute hypoxic respiratory failure  O2 sats in ED initially 89%, improved with 3L O2.   - wean O2 as able    Anemia  Hgb on presentation at 8.6. was 9.8 on 2/24.  - monitor hgb  - transfusion consent signed by dtr, in chart    TAYLOR  CKD  Baseline creatinine ~1.3-1.5. Creatinine on presentation at 2.04.   - IV fluids  - monitor    Recent L distal radial fracture  Following with orthopedic through [facility]. Has cast.     Dementia  Notable cognitive impairment in ED, not able to provide much history  - Re-orient as needed  - Maintain normal day/night, sleep wake  cycles  - Minimize sedating/altering medications as able    Rheumatoid arthritis  [needs rec- methotrexate 12.5 mg weekly, folic acid]  - hold methotrexate for now    Hx CVA 2018  Hx cerebellar CVA with occlusion/ dissection.   - hold apixaban for now    PAD-Hx R SFA stenting 2013  HLD- resume statin with rec  MDD- resume sertraline 50 mg daily with rec  BPH- resume trospium with rec     Diet:   Regular  DVT Prophylaxis: Pneumatic Compression Devices  Michelle Catheter: Not present  Lines: None     Cardiac Monitoring: None  Code Status:   DNR/DNI, no pressors/ lines, no ICU- confirmed with daugnicole    Clinically Significant Risk Factors Present on Admission          # Hypocalcemia: Lowest Ca = 8.4 mg/dL in last 2 days, will monitor and replace as appropriate    # Anion Gap Metabolic Acidosis: Highest Anion Gap = 19 mmol/L in last 2 days, will monitor and treat as appropriate  # Hypoalbuminemia: Lowest albumin = 3.2 g/dL at 3/1/2023  1:07 AM, will monitor as appropriate  # Drug Induced Coagulation Defect: home medication list includes an anticoagulant medication    # Hypertension: home medication list includes antihypertensive(s)              Disposition Plan      Expected Discharge Date: 03/03/2023                  Husam Gibbons MD  Hospitalist Service  [facility]  Securely message with RF Code (more info)  Text page via [employer] Paging/Directory     ______________________________________________________________________    Chief Complaint   Loss of consciousness    History is obtained from the patient, electronic health record, emergency department physician and patient's daughters    History of Present Illness   Shamir Concepcion is a 82 year old male who presents after a loss of consciousness.  Note patient has no recall of events as he has dementia.  Patient's daughter was with him on Monday, 2 days ago, until 5 PM.  She states he was doing well at that time.  The morning of presentation  the patient's medical alert bracelet got activated somehow.  EMS arrived and they found the patient minimally responsive on the floor.  There were several pools of blood from a scalp laceration that the patient had.  They found his blood pressure to be in the 80s systolic.  In the emergency department patient is alert and very pleasant.  He is in no distress appears comfortable.  He denies chest pain or shortness of breath.  Denies abdominal pain.  Again he is unable to recall any of the events.      Past Medical History    Past Medical History:   Diagnosis Date     Allergic rhinitis      BPH (benign prostatic hyperplasia)      Cerebral infarction (H)      Colon polyps      ED (erectile dysfunction)      Family history of colon cancer      Hypertension      PAD (peripheral artery disease) (H)     stent     RA (rheumatoid arthritis) (H)      Seronegative rheumatoid arthritis (H)        Past Surgical History   Past Surgical History:   Procedure Laterality Date     ANGIOGRAM       COLONOSCOPY      polyps     ENT SURGERY      T&A     IR LOWER EXTREMITY ANGIOGRAM RIGHT  10/12/2022     LAMINECT/DISCECTOMY, LUMBAR       OPEN REDUCTION INTERNAL FIXATION HUMERUS PROXIMAL Left 8/10/2022    Procedure: Left shoulder open reduction and internal fixation, proximal humerus fracture;  Surgeon: Larry Saxena MD;  Location:  OR     ORTHOPEDIC SURGERY       PHACOEMULSIFICATION CLEAR CORNEA WITH STANDARD INTRAOCULAR LENS IMPLANT Left 4/4/2017    Procedure: PHACOEMULSIFICATION CLEAR CORNEA WITH STANDARD INTRAOCULAR LENS IMPLANT;  Surgeon: Stevie Espinal MD;  Location: [facility]     PHACOEMULSIFICATION CLEAR CORNEA WITH STANDARD INTRAOCULAR LENS IMPLANT Right 5/2/2017    Procedure: PHACOEMULSIFICATION CLEAR CORNEA WITH STANDARD INTRAOCULAR LENS IMPLANT;  RIGHT EYE PHACOEMULSIFICATION CLEAR CORNEA WITH STANDARD INTRAOCULAR LENS IMPLANT;  Surgeon: Stevie Espinal MD;  Location: [facility]     SINUS SURGERY       VASCULAR SURGERY  R SFA  stenting  2012     [facility] MRA UPPER EXTREMITY WO&W CONT  stenting right SFA-AK pop       Prior to Admission Medications   Prior to Admission Medications   Prescriptions Last Dose Informant Patient Reported? Taking?   SENNA-docusate sodium (SENNA S) 8.6-50 MG tablet   No No   Sig: Take 1 tablet by mouth 2 times daily as needed (while on narcotics to prevent constipation)   VITAMIN D, CHOLECALCIFEROL, PO   Yes No   Sig: Take by mouth daily   acetaminophen (TYLENOL) 325 MG tablet   No No   Sig: Take 2 tablets (650 mg) by mouth every 4 hours as needed for mild pain   alendronate (FOSAMAX) 70 MG tablet   Yes No   Sig: Take 70 mg by mouth every 7 days   apixaban ANTICOAGULANT (ELIQUIS) 5 MG tablet   No No   Sig: Take 1 tablet (5 mg) by mouth 2 times daily   fluticasone (FLONASE) 50 MCG/ACT nasal spray   Yes No   Sig: Spray 1 spray into both nostrils daily as needed   folic acid 800 MCG TABS   No No   Sig: Take 800 mcg by mouth daily   lisinopril (ZESTRIL) 10 MG tablet   Yes No   Sig: Take 10 mg by mouth daily   methotrexate 2.5 MG tablet   No No   Sig: Take 5 tablets (12.5 mg) by mouth every 7 days   multivitamin  with lutein (OCUVITE WITH LUTEIN) CAPS per capsule   Yes No   Sig: Take 1 capsule by mouth daily   rosuvastatin (CRESTOR) 10 MG tablet   Yes No   Sig: Take 10 mg by mouth daily   trospium (SANCTURA) 20 MG tablet   Yes No   Sig: Take 20 mg by mouth 2 times daily      Facility-Administered Medications Last Administration Doses Remaining   aspirin (ASA) chewable tablet 81 mg None recorded 1           Review of Systems    Review of systems not obtained due to patient factors - confusion     Physical Exam   Vital Signs: Temp: (!) 96.3  F (35.7  C) Temp src: Temporal BP: 90/68 Pulse: 85   Resp: 27 SpO2: 97 % O2 Device: Nasal cannula Oxygen Delivery: 3 LPM  Weight: 149 lbs 9.6 oz    General Appearance: Alert, pleasant, confused, not oriented  Respiratory: clear anteriorly  Cardiovascular: irregular, distant heart sounds,  no murmur. No edema  GI: soft, nt/nd  Skin: scalp lac, otherwise grossly normal  Other: CN grossly intact, BURKETT     Medical Decision Making       90 MINUTES SPENT BY ME on the date of service doing chart review, history, exam, documentation & further activities per the note.      Data     I have personally reviewed the following data over the past 24 hrs:    15.5 (H)  \   8.6 (L)   / 497 (H)     137 102 29.3 (H) /  173 (H)   5.0 16 (L) 2.04 (H) \       ALT: 19 AST: 42 AP: 132 (H) TBILI: 0.7   ALB: 3.2 (L) TOT PROTEIN: 6.0 (L) LIPASE: N/A       Procal: N/A CRP: N/A Lactic Acid: 1.7         Imaging results reviewed over the past 24 hrs:   Recent Results (from the past 24 hour(s))   CT Chest (PE) Abdomen Pelvis w Contrast    Narrative    EXAM: CT CHEST PE ABDOMEN PELVIS W CONTRAST  LOCATION: [facility]  DATE/TIME: 3/1/2023 1:50 AM    INDICATION: fall, trauma, hypotension, hypoxia, eval for PE and trauma  COMPARISON: None.  TECHNIQUE: CT chest pulmonary angiogram and routine CT abdomen pelvis with IV contrast. Arterial phase through the chest and venous phase through the abdomen and pelvis. Multiplanar reformats and MIP reconstructions were performed. Dose reduction   techniques were used.   CONTRAST: 76mL Isovue 370    FINDINGS:  ANGIOGRAM CHEST: Pulmonary arteries are normal caliber and negative for pulmonary emboli. Thoracic aorta is negative for dissection. Mildly dilated ascending thoracic aorta, 41 mm. Moderate atherosclerotic calcification. No CT evidence of right heart   strain.     LUNGS AND PLEURA: Moderate centrilobular and paraseptal emphysema, upper lobe predominant. Bilateral lower lobe patchy airspace opacities and nodularity, concerning for infection. Mild bronchial wall thickening.    MEDIASTINUM/AXILLAE: No lymphadenopathy.     CORONARY ARTERY CALCIFICATION: Severe.    HEPATOBILIARY: Liver is unremarkable. No bile duct dilatation. Calcified gallstones.    PANCREAS:  Normal.    SPLEEN: Normal.    ADRENAL GLANDS: Normal.    KIDNEYS/BLADDER: Mild bilateral renal atrophy. No significant mass, stone, or hydronephrosis.    BOWEL: No obstruction or inflammatory change. No free air or free fluid.    LYMPH NODES: Normal.    VASCULATURE: Mild ectasia of the infrarenal abdominal aorta without aneurysm. Aortoiliac severe atherosclerotic calcification.    PELVIC ORGANS: Normal.    MUSCULOSKELETAL: Left posterior 11th minimally displaced rib fracture. No other fractures identified. Left proximal humerus surgical hardware.      Impression    IMPRESSION:  1.  Minimally displaced left posterior 11th rib fracture.  2.  No other acute pathology in the chest, abdomen, or pelvis.  3.  No pulmonary embolism.  4.  Mildly dilated ascending thoracic aorta, 41 mm.  5.  Severe coronary artery calcification.   CT Cervical Spine w/o Contrast    Narrative    EXAM: CT HEAD W/O CONTRAST, CT CERVICAL SPINE W/O CONTRAST  LOCATION: [facility]  DATE/TIME: 3/1/2023 1:52 AM    INDICATION: Fall, trauma, confusion, nonfocal neuro exam, neck injury.  COMPARISON: Head CT 1/30/2023  TECHNIQUE:   1) Routine CT Head without IV contrast. Multiplanar reformats. Dose reduction techniques were used.  2) Routine CT Cervical Spine without IV contrast. Multiplanar reformats. Dose reduction techniques were used.    FINDINGS:   HEAD CT:   INTRACRANIAL CONTENTS: No intracranial hemorrhage, extraaxial collection, or mass effect.  No CT evidence of acute infarct. Mild to moderate presumed chronic small vessel ischemic changes. Ventriculomegaly disproportionate to the degree of volume loss.   Correlate for normal pressure hydrocephalus. Moderate to large chronic infarct in the left cerebellar hemisphere and small chronic infarct in the right cerebellar hemisphere.     VISUALIZED ORBITS/SINUSES/MASTOIDS: No intraorbital abnormality. Complete opacification of the right maxillary sinus. No middle ear or mastoid  effusion.    BONES/SOFT TISSUES: No acute abnormality.    CERVICAL SPINE CT:   VERTEBRA: Cervical vertebra are normal in height. There are mild alterations in the lateral alignment which can be accounted for by degenerative changes. No fracture or posttraumatic subluxation.     CANAL/FORAMINA: Multilevel degenerative changes with moderate to advanced interbody degeneration from C3-C4 through C6-C7. There is mild central canal stenosis at C3-C4. There is high-grade foraminal stenosis on the right at C3-C4 and C4-C5 and on the   left at C3-C4 and C6-C7.    PARASPINAL: Atheromatous changes of the carotid bifurcations. Visualized lung fields are clear.      Impression    IMPRESSION:  HEAD CT:  1.  No acute intracranial hemorrhage or calvarial fracture.  2.  Ventriculomegaly disproportionate to volume loss, which in the correct clinical setting would raise the possibility of normal pressure/communicating hydrocephalus.  3.  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  4.  There is complete opacification of the right maxillary sinus. Correlate for acute sinusitis.    CERVICAL SPINE CT:  1.  No CT evidence for acute fracture or post traumatic subluxation.  2.  Degenerative changes as highlighted above.   CT Head w/o Contrast    Narrative    EXAM: CT HEAD W/O CONTRAST, CT CERVICAL SPINE W/O CONTRAST  LOCATION: [facility]  DATE/TIME: 3/1/2023 1:52 AM    INDICATION: Fall, trauma, confusion, nonfocal neuro exam, neck injury.  COMPARISON: Head CT 1/30/2023  TECHNIQUE:   1) Routine CT Head without IV contrast. Multiplanar reformats. Dose reduction techniques were used.  2) Routine CT Cervical Spine without IV contrast. Multiplanar reformats. Dose reduction techniques were used.    FINDINGS:   HEAD CT:   INTRACRANIAL CONTENTS: No intracranial hemorrhage, extraaxial collection, or mass effect.  No CT evidence of acute infarct. Mild to moderate presumed chronic small vessel ischemic changes.  Ventriculomegaly disproportionate to the degree of volume loss.   Correlate for normal pressure hydrocephalus. Moderate to large chronic infarct in the left cerebellar hemisphere and small chronic infarct in the right cerebellar hemisphere.     VISUALIZED ORBITS/SINUSES/MASTOIDS: No intraorbital abnormality. Complete opacification of the right maxillary sinus. No middle ear or mastoid effusion.    BONES/SOFT TISSUES: No acute abnormality.    CERVICAL SPINE CT:   VERTEBRA: Cervical vertebra are normal in height. There are mild alterations in the lateral alignment which can be accounted for by degenerative changes. No fracture or posttraumatic subluxation.     CANAL/FORAMINA: Multilevel degenerative changes with moderate to advanced interbody degeneration from C3-C4 through C6-C7. There is mild central canal stenosis at C3-C4. There is high-grade foraminal stenosis on the right at C3-C4 and C4-C5 and on the   left at C3-C4 and C6-C7.    PARASPINAL: Atheromatous changes of the carotid bifurcations. Visualized lung fields are clear.      Impression    IMPRESSION:  HEAD CT:  1.  No acute intracranial hemorrhage or calvarial fracture.  2.  Ventriculomegaly disproportionate to volume loss, which in the correct clinical setting would raise the possibility of normal pressure/communicating hydrocephalus.  3.  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  4.  There is complete opacification of the right maxillary sinus. Correlate for acute sinusitis.    CERVICAL SPINE CT:  1.  No CT evidence for acute fracture or post traumatic subluxation.  2.  Degenerative changes as highlighted above.

## 2023-03-01 NOTE — PLAN OF CARE
Alert and oriented x1/2- Dementia baseline. Vital signs stable on RA ex hypotension. Assist of 2 w/ Lift or GBW, although too weak to be walking around yet. PT/OT consult ordered. Tolerating regular diet. Lung sounds clear diminished. Bowel sounds +, + flatus, BM today but not on my shift, Adequate urine output, using external catheter. Skin - Posterior head laceration from fall, scattered bruising, L radial arm cast from old fracture. Pain managed with scheduled tylenol. Denies nausea. Tele Rapid Afib- cardio following. Drips NS 100ml/hr. X2 PIV. CMS intact. Tolerating IV ABX. Cont to monitor telemetry.      Plan:     PT/OT consult & neurology consult and TCU

## 2023-03-01 NOTE — CONSULTS
[facility]    Cardiology Consultation     Date of Admission:  3/1/2023    Assessment & Plan   Shamir Concepcion is a 82 year old male who was admitted on 3/1/2023.    1. Fall. History limited given patients history of dementia. He he has had several recent falls at home with plans to move to a higher level of care soon. Found hypotensive on the floor at his home with head lac which has been repaired.  -  Unclear if he had syncope or LOC as he is unable to provide any history.  2.  Frequent PACs.  3.  Hx dementia.  4.  Hypotension. Resolved.   5.  Recent ED visit with concern for bradycardia. EKG however shows no bradycardia and SR with PACs with bigeminy. Monitor was ordered however unclear if he was wearing this PTA.   6 . TAYLOR on CKD. Creatinine up to 2.0 on admission. Receiving IVF.  7.  Hx CVA in 2018. On Eliquis.  8.  Recent L distal radial fracture.   9.  Hx RA.      Assessment/plan:  Pleasant 82 year old gentleman with a history of dementia limiting his history with several recent frequent falls. He is now admitted after being found down at home. He was in the ED last week after concern for bradycardia at the clinic however on review of EKG from the clinic he was in SR with PACs with bigeminy. In the ED last week, he was not bradycardic. Unfortunately, the monitor that was ordered was not yet placed thus he was not wearing this at the time of his recent events. HRs have been stable here. Hypotension has improved with fluid resuscitation. PTA lisinopril is on hold.    His EKG from admission is difficult to interpret due to artifact although there was concern for possibly atrial fibrillation. On telemetry, suspect sinus with frequent PACs. I have ordered a repeat EKG to be done. Notably, he has been on chronic Eliquis therapy I believe following his CVA.    An echocardiogram has been ordered which is pending at this time. Will follow up results. In the meantime, would recommend  continuing to monitor on telemetry. Assuming no significant arrhythmias are found this admission, would discharge with an event monitor as previously planned. No other cardiology recommendations. We will sign off, please call with questions.    Moderate complexity     Adela Fernando PA-C, MD    Primary Care Physician   Physician No Ref-Primary    Reason for Consult   Reason for consult: I was asked by Dr Gibbons to evaluate this patient for possibly syncope, possible bradycardia.    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 He lives at a local senior building but due to recent falls a [facility].     On 1/30/23 he was in the ED after a fall with a scalp laceration. Per notes, it seems he had a mechanical fall. He was discharged home. More recently, he was in the ED due to concerns for bradycardia. There was concern his HR was in the 30s in a clinic follow up. An EKG was done at clinic which showed SR with PACs with bigeminy. He was referred to the ED where HR was stable in the 60s. He had no symptoms. He was discharged with an event monitor to evaluate for possibly arrhythmias     He returns now today after being found down at his apartment. He does not remember what happened but denies feeling ill in the past few days. He was last seen by his daughter two days ago until the evening and was doing well per the H&P. His life alert was activated this morning. EMS found him minimally responsive with pools of blood surrounding him with a head laceration. He was hypotensive with BP in the 80s. HR per documentation was in the 60 - 80s. On  arrival, he was seen by the ED and trauma team and his head laceration was addressed. He has had an extensive work lab and imaging work up. He has been started on empiric Zosyn. Head CT showed no hemorrhage with possibly NPH and chronic cerebellar infarcts. Creatinine is up from baseline to 2.0 and hemoglobin is down to 8.6 from 9.8 last week.      He is on Eliquis given his CVA history which has been held. I do not see a prior history of atrial fibrillation his chart. On my assessment, he is unable to provide any meaningful history. He denies dyspnea, palpitations, or chest pain. BP is improved in the 120s systolic. He is unsure if he has been wearing his cardiac monitor. No bradycardia has been noted on telemetry.     Past Medical History   Past Medical History:   Diagnosis Date     Allergic rhinitis      BPH (benign prostatic hyperplasia)      Cerebral infarction (H)      Colon polyps      ED (erectile dysfunction)      Family history of colon cancer      Hypertension      PAD (peripheral artery disease) (H)     stent     RA (rheumatoid arthritis) (H)      Seronegative rheumatoid arthritis (H)        Past Surgical History   Past Surgical History:   Procedure Laterality Date     ANGIOGRAM       COLONOSCOPY      polyps     ENT SURGERY      T&A     IR LOWER EXTREMITY ANGIOGRAM RIGHT  10/12/2022     LAMINECT/DISCECTOMY, LUMBAR       OPEN REDUCTION INTERNAL FIXATION HUMERUS PROXIMAL Left 8/10/2022    Procedure: Left shoulder open reduction and internal fixation, proximal humerus fracture;  Surgeon: Larry Saxena MD;  Location:  OR     ORTHOPEDIC SURGERY       PHACOEMULSIFICATION CLEAR CORNEA WITH STANDARD INTRAOCULAR LENS IMPLANT Left 4/4/2017    Procedure: PHACOEMULSIFICATION CLEAR CORNEA WITH STANDARD INTRAOCULAR LENS IMPLANT;  Surgeon: Stevie Espinal MD;  Location[facility]     PHACOEMULSIFICATION CLEAR CORNEA WITH STANDARD INTRAOCULAR LENS IMPLANT Right 5/2/2017    Procedure: PHACOEMULSIFICATION CLEAR CORNEA  WITH STANDARD INTRAOCULAR LENS IMPLANT;  RIGHT EYE PHACOEMULSIFICATION CLEAR CORNEA WITH STANDARD INTRAOCULAR LENS IMPLANT;  Surgeon: Stevie Espinal MD;  Location: [facility]     SINUS SURGERY       VASCULAR SURGERY  R SFA stenting  2012     ZZ MRA UPPER EXTREMITY WO&W CONT  stenting right SFA-AK pop     Prior to Admission Medications   Prior to Admission Medications   Prescriptions Last Dose Informant Patient Reported? Taking?   SENNA-docusate sodium (SENNA S) 8.6-50 MG tablet  at prn  No Yes   Sig: Take 1 tablet by mouth 2 times daily as needed (while on narcotics to prevent constipation)   Vitamin D3 (CHOLECALCIFEROL) 125 MCG (5000 UT) tablet 2/28/2023 at am  Yes Yes   Sig: Take 125 mcg by mouth daily   acetaminophen (TYLENOL) 325 MG tablet  at prn  No Yes   Sig: Take 2 tablets (650 mg) by mouth every 4 hours as needed for mild pain   alendronate (FOSAMAX) 70 MG tablet 2/26/2023  Yes Yes   Sig: Take 70 mg by mouth every 7 days Sunday's   apixaban ANTICOAGULANT (ELIQUIS) 5 MG tablet 2/28/2023 at am  No Yes   Sig: Take 1 tablet (5 mg) by mouth 2 times daily   aspirin 81 MG EC tablet 2/28/2023 at am  Yes Yes   Sig: Take 81 mg by mouth daily   fluticasone (FLONASE) 50 MCG/ACT nasal spray  at prn  Yes Yes   Sig: Spray 1 spray into both nostrils daily as needed   folic acid 800 MCG TABS 2/28/2023 at am  No Yes   Sig: Take 800 mcg by mouth daily   lisinopril (ZESTRIL) 10 MG tablet 2/28/2023 at am  Yes Yes   Sig: Take 10 mg by mouth daily   methotrexate 2.5 MG tablet 2/25/2023  No Yes   Sig: Take 5 tablets (12.5 mg) by mouth every 7 days   Patient taking differently: Take 12.5 mg by mouth every 7 days Saturday   multivitamin  with lutein (OCUVITE WITH LUTEIN) CAPS per capsule 2/28/2023 at am  Yes Yes   Sig: Take 1 capsule by mouth daily   multivitamin, therapeutic (THERA-VIT) TABS tablet 2/28/2023 at am  Yes Yes   Sig: Take 1 tablet by mouth daily   rosuvastatin (CRESTOR) 10 MG tablet 2/28/2023 at am  Yes Yes   Sig:  "Take 10 mg by mouth daily   sertraline (ZOLOFT) 50 MG tablet 2/28/2023 at am  Yes Yes   Sig: Take 50 mg by mouth daily   trospium (SANCTURA) 20 MG tablet 2/28/2023 at am  Yes Yes   Sig: Take 20 mg by mouth 2 times daily      Facility-Administered Medications: None     Current Facility-Administered Medications   Medication Dose Route Frequency     piperacillin-tazobactam  3.375 g Intravenous Q6H     Current Facility-Administered Medications   Medication Last Rate     sodium chloride 100 mL/hr at 03/01/23 0843     Allergies   No Known Allergies    Social History    reports that he quit smoking about 24 years ago. His smoking use included cigarettes. He has a 15.00 pack-year smoking history. He has never used smokeless tobacco. He reports that he does not currently use alcohol. He reports that he does not use drugs.    Family History   I have reviewed this patient's family history and updated it with pertinent information if needed.  Family History   Problem Relation Age of Onset     Cancer Mother      Cerebrovascular Disease Father           Review of Systems   A comprehensive review of system was performed and is negative other than that noted in the HPI or here.     Physical Exam   Vital Signs with Ranges  Temp:  [96.3  F (35.7  C)] 96.3  F (35.7  C)  Pulse:  [] 109  Resp:  [11-38] 22  BP: ()/(35-99) 124/57  SpO2:  [89 %-100 %] 98 %  Wt Readings from Last 4 Encounters:   03/01/23 67.9 kg (149 lb 9.6 oz)   02/24/23 69.4 kg (153 lb)   01/30/23 70.3 kg (155 lb)   10/12/22 70.7 kg (155 lb 14.4 oz)     No intake/output data recorded.      Vitals: /57   Pulse 109   Temp (!) 96.3  F (35.7  C) (Temporal)   Resp 22   Ht 1.778 m (5' 10\")   Wt 67.9 kg (149 lb 9.6 oz)   SpO2 98%   BMI 21.47 kg/m      Physical Exam:   General - Alert and oriented to time place and person in no acute distress  Eyes - No scleral icterus  HEENT - Neck supple, moist mucous membranes  Cardiovascular - irregular, mildly " tachycardic. No murmur  Extremities - There is no edema  Respiratory - CTAB  Skin - No pallor or cyanosis  Gastrointestinal - Non tender and non distended without rebound or guarding  Psych - Appropriate affect   Neurological - No gross motor neurological focal deficits    No lab results found in last 7 days.    Invalid input(s): TROPONINIES    Recent Labs   Lab 03/01/23 0107 03/01/23  0106 03/01/23  0059 02/24/23  1615   WBC 15.5*  --   --  7.4   HGB 8.6*  --   --  9.8*   *  --   --  101*   *  --   --  367     --   --  139   POTASSIUM 5.0  --   --  4.5   CHLORIDE 102  --   --  106   CO2 16*  --   --  23   BUN 29.3*  --   --  31.3*   CR 2.04* 2.3*  --  1.50*   GFRESTIMATED 32* 27*  --  46*   ANIONGAP 19*  --   --  10   RANJAN 8.4*  --   --  9.1   *  --  129* 100*   ALBUMIN 3.2*  --   --  3.6   PROTTOTAL 6.0*  --   --  7.0   BILITOTAL 0.7  --   --  0.4   ALKPHOS 132*  --   --  162*   ALT 19  --   --  16   AST 42  --   --  20     Recent Labs   Lab Test 07/21/18  1133   CHOL 143   HDL 42   LDL 88   TRIG 63     Recent Labs   Lab 03/01/23 0107 02/24/23  1615   WBC 15.5* 7.4   HGB 8.6* 9.8*   HCT 28.1* 31.1*   * 101*   * 367     No results for input(s): PH, PHV, PO2, PO2V, SAT, PCO2, PCO2V, HCO3, HCO3V in the last 168 hours.  No results for input(s): NTBNPI, NTBNP in the last 168 hours.  No results for input(s): DD in the last 168 hours.  No results for input(s): SED, CRP in the last 168 hours.  Recent Labs   Lab 03/01/23 0107 02/24/23  1615   * 367     No results for input(s): TSH in the last 168 hours.  Recent Labs   Lab 03/01/23  0504   COLOR Yellow   APPEARANCE Clear   URINEGLC Negative   URINEBILI Negative   URINEKETONE Negative   SG 1.034   UBLD Trace*   URINEPH 5.5   PROTEIN 30*   NITRITE Negative   LEUKEST Trace*   RBCU 2   WBCU 9*       Imaging:  Recent Results (from the past 48 hour(s))   CT Chest (PE) Abdomen Pelvis w Contrast    Narrative    EXAM: CT CHEST PE  ABDOMEN PELVIS W CONTRAST  LOCATION: [facility]  DATE/TIME: 3/1/2023 1:50 AM    INDICATION: fall, trauma, hypotension, hypoxia, eval for PE and trauma  COMPARISON: None.  TECHNIQUE: CT chest pulmonary angiogram and routine CT abdomen pelvis with IV contrast. Arterial phase through the chest and venous phase through the abdomen and pelvis. Multiplanar reformats and MIP reconstructions were performed. Dose reduction   techniques were used.   CONTRAST: 76mL Isovue 370    FINDINGS:  ANGIOGRAM CHEST: Pulmonary arteries are normal caliber and negative for pulmonary emboli. Thoracic aorta is negative for dissection. Mildly dilated ascending thoracic aorta, 41 mm. Moderate atherosclerotic calcification. No CT evidence of right heart   strain.     LUNGS AND PLEURA: Moderate centrilobular and paraseptal emphysema, upper lobe predominant. Bilateral lower lobe patchy airspace opacities and nodularity, concerning for infection. Mild bronchial wall thickening.    MEDIASTINUM/AXILLAE: No lymphadenopathy.     CORONARY ARTERY CALCIFICATION: Severe.    HEPATOBILIARY: Liver is unremarkable. No bile duct dilatation. Calcified gallstones.    PANCREAS: Normal.    SPLEEN: Normal.    ADRENAL GLANDS: Normal.    KIDNEYS/BLADDER: Mild bilateral renal atrophy. No significant mass, stone, or hydronephrosis.    BOWEL: No obstruction or inflammatory change. No free air or free fluid.    LYMPH NODES: Normal.    VASCULATURE: Mild ectasia of the infrarenal abdominal aorta without aneurysm. Aortoiliac severe atherosclerotic calcification.    PELVIC ORGANS: Normal.    MUSCULOSKELETAL: Left posterior 11th minimally displaced rib fracture. No other fractures identified. Left proximal humerus surgical hardware.      Impression    IMPRESSION:  1.  Minimally displaced left posterior 11th rib fracture.  2.  No other acute pathology in the chest, abdomen, or pelvis.  3.  No pulmonary embolism.  4.  Mildly dilated ascending thoracic  aorta, 41 mm.  5.  Severe coronary artery calcification.   CT Cervical Spine w/o Contrast    Narrative    EXAM: CT HEAD W/O CONTRAST, CT CERVICAL SPINE W/O CONTRAST  LOCATION: [facility]  DATE/TIME: 3/1/2023 1:52 AM    INDICATION: Fall, trauma, confusion, nonfocal neuro exam, neck injury.  COMPARISON: Head CT 1/30/2023  TECHNIQUE:   1) Routine CT Head without IV contrast. Multiplanar reformats. Dose reduction techniques were used.  2) Routine CT Cervical Spine without IV contrast. Multiplanar reformats. Dose reduction techniques were used.    FINDINGS:   HEAD CT:   INTRACRANIAL CONTENTS: No intracranial hemorrhage, extraaxial collection, or mass effect.  No CT evidence of acute infarct. Mild to moderate presumed chronic small vessel ischemic changes. Ventriculomegaly disproportionate to the degree of volume loss.   Correlate for normal pressure hydrocephalus. Moderate to large chronic infarct in the left cerebellar hemisphere and small chronic infarct in the right cerebellar hemisphere.     VISUALIZED ORBITS/SINUSES/MASTOIDS: No intraorbital abnormality. Complete opacification of the right maxillary sinus. No middle ear or mastoid effusion.    BONES/SOFT TISSUES: No acute abnormality.    CERVICAL SPINE CT:   VERTEBRA: Cervical vertebra are normal in height. There are mild alterations in the lateral alignment which can be accounted for by degenerative changes. No fracture or posttraumatic subluxation.     CANAL/FORAMINA: Multilevel degenerative changes with moderate to advanced interbody degeneration from C3-C4 through C6-C7. There is mild central canal stenosis at C3-C4. There is high-grade foraminal stenosis on the right at C3-C4 and C4-C5 and on the   left at C3-C4 and C6-C7.    PARASPINAL: Atheromatous changes of the carotid bifurcations. Visualized lung fields are clear.      Impression    IMPRESSION:  HEAD CT:  1.  No acute intracranial hemorrhage or calvarial fracture.  2.   Ventriculomegaly disproportionate to volume loss, which in the correct clinical setting would raise the possibility of normal pressure/communicating hydrocephalus.  3.  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  4.  There is complete opacification of the right maxillary sinus. Correlate for acute sinusitis.    CERVICAL SPINE CT:  1.  No CT evidence for acute fracture or post traumatic subluxation.  2.  Degenerative changes as highlighted above.   CT Head w/o Contrast    Narrative    EXAM: CT HEAD W/O CONTRAST, CT CERVICAL SPINE W/O CONTRAST  LOCATION: [facility]  DATE/TIME: 3/1/2023 1:52 AM    INDICATION: Fall, trauma, confusion, nonfocal neuro exam, neck injury.  COMPARISON: Head CT 1/30/2023  TECHNIQUE:   1) Routine CT Head without IV contrast. Multiplanar reformats. Dose reduction techniques were used.  2) Routine CT Cervical Spine without IV contrast. Multiplanar reformats. Dose reduction techniques were used.    FINDINGS:   HEAD CT:   INTRACRANIAL CONTENTS: No intracranial hemorrhage, extraaxial collection, or mass effect.  No CT evidence of acute infarct. Mild to moderate presumed chronic small vessel ischemic changes. Ventriculomegaly disproportionate to the degree of volume loss.   Correlate for normal pressure hydrocephalus. Moderate to large chronic infarct in the left cerebellar hemisphere and small chronic infarct in the right cerebellar hemisphere.     VISUALIZED ORBITS/SINUSES/MASTOIDS: No intraorbital abnormality. Complete opacification of the right maxillary sinus. No middle ear or mastoid effusion.    BONES/SOFT TISSUES: No acute abnormality.    CERVICAL SPINE CT:   VERTEBRA: Cervical vertebra are normal in height. There are mild alterations in the lateral alignment which can be accounted for by degenerative changes. No fracture or posttraumatic subluxation.     CANAL/FORAMINA: Multilevel degenerative changes with moderate to advanced interbody degeneration from C3-C4  through C6-C7. There is mild central canal stenosis at C3-C4. There is high-grade foraminal stenosis on the right at C3-C4 and C4-C5 and on the   left at C3-C4 and C6-C7.    PARASPINAL: Atheromatous changes of the carotid bifurcations. Visualized lung fields are clear.      Impression    IMPRESSION:  HEAD CT:  1.  No acute intracranial hemorrhage or calvarial fracture.  2.  Ventriculomegaly disproportionate to volume loss, which in the correct clinical setting would raise the possibility of normal pressure/communicating hydrocephalus.  3.  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  4.  There is complete opacification of the right maxillary sinus. Correlate for acute sinusitis.    CERVICAL SPINE CT:  1.  No CT evidence for acute fracture or post traumatic subluxation.  2.  Degenerative changes as highlighted above.       Echo:  No results found for this or any previous visit (from the past 4320 hour(s)).    Clinically Significant Risk Factors Present on Admission         # Hypocalcemia: Lowest Ca = 8.4 mg/dL in last 2 days, will monitor and replace as appropriate    # Anion Gap Metabolic Acidosis: Highest Anion Gap = 19 mmol/L in last 2 days, will monitor and treat as appropriate  # Hypoalbuminemia: Lowest albumin = 3.2 g/dL at 3/1/2023  1:07 AM, will monitor as appropriate   # Drug Induced Coagulation Defect: home medication list includes an anticoagulant medication      Cardiovascular: Cardiac Arrhythmia: Atrial premature depolarization (PAC)  Hypotension, unspecified    Fluid & Electrolyte Disorders: Hypovolemia    Nephrology: Acute kidney failure, unspecified  CKD POA List: Stage 3 (unspecified if a or b) (GFR 30 - 59)    Neurology: Dementia: Unspecified dementia without behavioral disturbance    Limitations of activities/Fatigue (optional): Chronic Fatigue and Other Debilities: Age-related physical debility  Chronic fatigue, unspecified

## 2023-03-01 NOTE — PROGRESS NOTES
No charge note today as patient was admitted earlier today by Dr. Gibbons.Shamir Concepcion is a 82 year old male admitted on 3/1/2023. He presents after being found down.  Syncope was thought to be secondary to hypotension with systolic blood pressure in the 60s 70s.  Etiology unclear.  Started on IV Zosyn empirically with procalcitonin at 0.31.  UA is not impressive for UTI.  Urine culture sent.  CT chest abdomen pelvis with contrast was done and it showed minimally displaced left posterior 11th rib fracture.  No other acute pathology mildly dilated ascending aorta at 41 mm.  Trauma surgery consult placed and are following.  Patient was noted to be in acute kidney injury with creatinine of 2 started on IV fluids.  Patient also had a ED visit recently for bradycardia.  Cardiology consultation requested on admission.  Continue to monitor on telemetry and continue current orders.  Patient was boarded and seen in the ED discussed with bedside RN patient is pleasantly confused heart rate is currently in the 90s    Bettye Abdullahi MD

## 2023-03-01 NOTE — ED NOTES
C-collar removed at this time.  Pt tolerated well.   Jil Randolhp RN,.......................................... 3/1/2023   2:20 AM

## 2023-03-01 NOTE — CONSULTS
[facility]   Trauma Surgical Consultation           Assessment and Plan:   Assessment:   82 year old male on eliquis who presents after a presumed fall from standing with pre hospital hypotension and scalp laceration necessitating full trauma activation. Imaging is currently pending including Head CT, cervical spine CT and CT CAP.     Acute traumatic injuries by imaging: scalp laceration.      Plan:   Follow up imaging studies  Hold anticoagulation  Admit to hospitalist service  Trauma tertiary exam tomorrow  Trauma surgery will follow along  No indication for additional imaging at this time or need for operative intervention    Addendum:   Imaging revealed a left 11th rib fracture. No additional traumatic injuries. Scalp laceration repaired by Dr. Kaur in the ER.   Appreciate hospitalist admission for this pt with multiple medical problems                Chief Complaint:   fall     History is obtained from the patient.         History of Present Illness:   This patient is a 82 year old male on eliquis for possible atrial fibrillation with history of dementia who presented to the ED after being found down in his home. Pt does not recall events leading up to fall.  Currently he denies pain. He answers most questions with head nod or shake but is able to vocalize and states he is cold. He is not sure where he is or what happened. He lives alone. He denies any pain. He was recently seen in our ER on 2/24/2023 due to bradycardia (identified at a clinic visit for staple removal from scalp after a fall in January) and discharged with a cardiac monitor and cardiology follow up. His daughter is here and reports pt is DNR/DNI and was scheduled to be admitted to [facility] for long term care             Past Medical History:    has a past medical history of Allergic rhinitis, BPH (benign prostatic hyperplasia), Cerebral infarction (H), Colon polyps, ED (erectile dysfunction), Family history of colon  cancer, Hypertension, PAD (peripheral artery disease) (H), RA (rheumatoid arthritis) (H), and Seronegative rheumatoid arthritis (H).          Past Surgical History:     Past Surgical History:   Procedure Laterality Date     ANGIOGRAM       COLONOSCOPY      polyps     ENT SURGERY      T&A     IR LOWER EXTREMITY ANGIOGRAM RIGHT  10/12/2022     LAMINECT/DISCECTOMY, LUMBAR       OPEN REDUCTION INTERNAL FIXATION HUMERUS PROXIMAL Left 8/10/2022    Procedure: Left shoulder open reduction and internal fixation, proximal humerus fracture;  Surgeon: Larry Saxena MD;  Location: [facility] OR     ORTHOPEDIC SURGERY       PHACOEMULSIFICATION CLEAR CORNEA WITH STANDARD INTRAOCULAR LENS IMPLANT Left 2017    Procedure: PHACOEMULSIFICATION CLEAR CORNEA WITH STANDARD INTRAOCULAR LENS IMPLANT;  Surgeon: Stevie Espinal MD;  Location: [facility]     PHACOEMULSIFICATION CLEAR CORNEA WITH STANDARD INTRAOCULAR LENS IMPLANT Right 2017    Procedure: PHACOEMULSIFICATION CLEAR CORNEA WITH STANDARD INTRAOCULAR LENS IMPLANT;  RIGHT EYE PHACOEMULSIFICATION CLEAR CORNEA WITH STANDARD INTRAOCULAR LENS IMPLANT;  Surgeon: Stevie Espinal MD;  Location: [facility]     SINUS SURGERY       VASCULAR SURGERY  R SFA stenting       ZZC MRA UPPER EXTREMITY WO&W CONT  stenting right SFA-AK pop               Social History:     Social History     Tobacco Use     Smoking status: Former     Packs/day: 0.50     Years: 30.00     Pack years: 15.00     Types: Cigarettes     Quit date: 3/1/1999     Years since quittin.0     Smokeless tobacco: Never   Substance Use Topics     Alcohol use: Not Currently     Comment: rarely             Family History:   Reviewed       Allergies:   No Known Allergies          Medications:   aspirin (ASA) chewable tablet 81 mg    acetaminophen (TYLENOL) 325 MG tablet, Take 2 tablets (650 mg) by mouth every 4 hours as needed for mild pain  alendronate (FOSAMAX) 70 MG tablet, Take 70 mg by mouth every 7 days  apixaban  "ANTICOAGULANT (ELIQUIS) 5 MG tablet, Take 1 tablet (5 mg) by mouth 2 times daily  fluticasone (FLONASE) 50 MCG/ACT nasal spray, Spray 1 spray into both nostrils daily as needed  folic acid 800 MCG TABS, Take 800 mcg by mouth daily  lisinopril (ZESTRIL) 10 MG tablet, Take 10 mg by mouth daily  methotrexate 2.5 MG tablet, Take 5 tablets (12.5 mg) by mouth every 7 days  multivitamin  with lutein (OCUVITE WITH LUTEIN) CAPS per capsule, Take 1 capsule by mouth daily  rosuvastatin (CRESTOR) 10 MG tablet, Take 10 mg by mouth daily  SENNA-docusate sodium (SENNA S) 8.6-50 MG tablet, Take 1 tablet by mouth 2 times daily as needed (while on narcotics to prevent constipation)  trospium (SANCTURA) 20 MG tablet, Take 20 mg by mouth 2 times daily  VITAMIN D, CHOLECALCIFEROL, PO, Take by mouth daily        aspirin  81 mg Oral Once            Review of Systems:   The 10 point review of systems is negative other than noted in the HPI.           Physical Exam:   BP (!) 114/99 (BP Location: Right arm, Patient Position: Fowlers)   Pulse 90   Temp (!) 96.3  F (35.7  C) (Temporal)   Resp 22   Ht 1.778 m (5' 10\")   Wt 67.9 kg (149 lb 9.6 oz)   SpO2 (!) 89%   BMI 21.47 kg/m    General appearance: well-nourished, no apparent distress  HEENT: Pupils are equal and round. TM clear bilaterally.  There is dried matted blood across the posterior and bilateral occipital scalp. There is no easily identifiable scalp laceration on my exam. Cervical collar is in place.  Neck is without thyromegaly, masses, swelling, ecchymosis.  Cervical spine is not tender.   Chest:  Clear to auscultation bilaterally.  Heart with regular rate and rhythm, no murmurs.  No palpable swelling, masses, ecchymosis, no crepitus, pectus excavatum present  Abdomen:  Nondistended, soft, nontender to palpation.  No masses.  Back: no palpable masses or visible deformity.  Not tender to midline palpation of the thoracic/lumbar spine  Extremities: able to move all " extremities. Cast present on left forearm.   Neurologic: nonfocal, grossly intact times four extremities, alert and oriented times three.  Cranial nerves II through XII intact grossly.  Psychiatric: mood and affect are appropriate.  Skin: ecchymosis on left upper buttock, ecchymosis to left hand with skin tear present. As above, dried blood across occipital scalp           Data:   WBC -   WBC   Date Value Ref Range Status   05/19/2019 7.9 4.0 - 11.0 10e9/L Final     WBC Count   Date Value Ref Range Status   03/01/2023 15.5 (H) 4.0 - 11.0 10e3/uL Final   ], HgB -   Hemoglobin   Date Value Ref Range Status   03/01/2023 8.6 (L) 13.3 - 17.7 g/dL Final   05/19/2019 12.3 (L) 13.3 - 17.7 g/dL Final   ]   Liver Function Studies -   Recent Labs   Lab Test 02/24/23  1615   PROTTOTAL 7.0   ALBUMIN 3.6   BILITOTAL 0.4   ALKPHOS 162*   AST 20   ALT 16         IMAGING:  Pending    This note was created using voice recognition software. Undetected word substitutions or other errors may have occurred.     Nessa Page MD    Time spent with the patient, reviewing the EMR, reviewing laboratory and imaging studies, more than 50% of which was counseling and coordinating care:  45 minutes.

## 2023-03-01 NOTE — PROGRESS NOTES
"General Surgery Progress Note    Subjective: Pt reports he is feeling much improved from my initial visit overnight.  He reports no pain anywhere.  His daughter is at bedside.  He does not recall what happened or how he fell.  He reports he has had several falls recently and had the cast on his left arm was placed about 6 weeks ago.  He denies any pain in his arm.    Objective: /57   Pulse 109   Temp (!) 96.3  F (35.7  C) (Temporal)   Resp 22   Ht 1.778 m (5' 10\")   Wt 67.9 kg (149 lb 9.6 oz)   SpO2 98%   BMI 21.47 kg/m    Gen: Alert, lying in bed appears comfortable   HEENT: Well approximated scalp laceration on the left posterior scalp with no ongoing bleeding.  Mild surrounding ecchymosis.  Cervical spine: No midline tenderness to palpation.  CV: Tachycardic  Pulm/chest: nonlabored breathing, no chest wall ecchymosis or deformity no tenderness to chest palpation  Abd: Soft, nontender nondistended  Ext: Right shoulder with anterior skin ecchymosis and mild swelling near the humeral head with some tenderness to palpation.  Left forearm is in a cast and there is mild swelling around the proximal left thumb with skin ecchymosis.  Small skin tear on the dorsum of the hand.    Assessment/Plan:   Shamir Concepcion  is a 82 year old male admitted status post presumed fall from standing the patient has no recollection of the events leading up to fall.  We reviewed his imaging which showed no intracranial bleed, normal cervical spine CT, CT chest with findings concerning for possible pneumonia as well as a left #11 rib fracture which is minimally displaced and no other significant findings.  On tertiary exam he has pain and swelling on his right shoulder as well as on the left proximal thumb and imaging was ordered to evaluate for fracture.  -Follow-up x-ray right shoulder and left hand  -Will order rib fracture protocol  -Trauma surgery will follow along    Nessa Page MD  [facility]  [phone]

## 2023-03-01 NOTE — ED NOTES
[facility]  ED Arrival Note    Arrived to stabe room 2 from a [facility] via [facility].  Reportedly pt had an unwitnessed fall in his apartment. EMS supsect the medical alert pendant went off.  On arrival pt was found on the floor, unresponsive initially to voice, and with blood on back of his head.  Pt was hypotensive with SBP's 80's (82/36); HR >120's bpm; Hx A-fib on Eliquis. 88-91%RA.  Pre hospital BG~ unable to obtain.  Code status ~Unknown    Pt arrived to stabe room awake, confused but following commands, and on a c-collar;Tachypneic  Generalized pale color, Skin was cool touch.     Visitors during triage: Daughter    Meets Stroke Criteria?: No    Meets Trauma Criteria?: Full Trauma    Shock Index: N/A, for provider reference    Directed to: Stabilization room    Pronouns: he/him

## 2023-03-01 NOTE — ED NOTES
MD notifed regarding low SBP's.  VROB to infuse 1 L NS Bolus.    Jil Randolph RN,.......................................... 3/1/2023   2:00 AM

## 2023-03-01 NOTE — ED NOTES
EDT at bedside for wound cleaning procedure.  SBP's <90's following 1L NS Bolus.  Pt weaned off suplemental oxygen.  Two daughters at bedside.    Report given to oncoming Stabilization RN.  Jil Randolph RN,.......................................... 3/1/2023   3:05 AM

## 2023-03-02 ENCOUNTER — APPOINTMENT (OUTPATIENT)
Dept: CT IMAGING | Facility: CLINIC | Age: 83
DRG: 871 | End: 2023-03-02
Attending: INTERNAL MEDICINE
Payer: COMMERCIAL

## 2023-03-02 LAB
ALBUMIN SERPL BCG-MCNC: 2.7 G/DL (ref 3.5–5.2)
ALP SERPL-CCNC: 108 U/L (ref 40–129)
ALT SERPL W P-5'-P-CCNC: 14 U/L (ref 10–50)
ANION GAP SERPL CALCULATED.3IONS-SCNC: 10 MMOL/L (ref 7–15)
AST SERPL W P-5'-P-CCNC: 20 U/L (ref 10–50)
BILIRUB SERPL-MCNC: 0.4 MG/DL
BLD PROD TYP BPU: NORMAL
BLD PROD TYP BPU: NORMAL
BLOOD COMPONENT TYPE: NORMAL
BLOOD COMPONENT TYPE: NORMAL
BUN SERPL-MCNC: 24.6 MG/DL (ref 8–23)
CALCIUM SERPL-MCNC: 7.6 MG/DL (ref 8.8–10.2)
CHLORIDE SERPL-SCNC: 113 MMOL/L (ref 98–107)
CODING SYSTEM: NORMAL
CODING SYSTEM: NORMAL
CREAT SERPL-MCNC: 1.84 MG/DL (ref 0.67–1.17)
CROSSMATCH: NORMAL
CROSSMATCH: NORMAL
DEPRECATED HCO3 PLAS-SCNC: 19 MMOL/L (ref 22–29)
ERYTHROCYTE [DISTWIDTH] IN BLOOD BY AUTOMATED COUNT: 16 % (ref 10–15)
FERRITIN SERPL-MCNC: 181 NG/ML (ref 31–409)
GFR SERPL CREATININE-BSD FRML MDRD: 36 ML/MIN/1.73M2
GLUCOSE SERPL-MCNC: 109 MG/DL (ref 70–99)
HCT VFR BLD AUTO: 17.4 % (ref 40–53)
HGB BLD-MCNC: 5.5 G/DL (ref 13.3–17.7)
HGB BLD-MCNC: 5.7 G/DL (ref 13.3–17.7)
HGB BLD-MCNC: 7.5 G/DL (ref 13.3–17.7)
IRON BINDING CAPACITY (ROCHE): 219 UG/DL (ref 240–430)
IRON SATN MFR SERPL: 8 % (ref 15–46)
IRON SERPL-MCNC: 18 UG/DL (ref 61–157)
ISSUE DATE AND TIME: NORMAL
ISSUE DATE AND TIME: NORMAL
LACTATE SERPL-SCNC: 1 MMOL/L (ref 0.7–2)
MAGNESIUM SERPL-MCNC: 1.8 MG/DL (ref 1.7–2.3)
MCH RBC QN AUTO: 31.4 PG (ref 26.5–33)
MCHC RBC AUTO-ENTMCNC: 31.6 G/DL (ref 31.5–36.5)
MCV RBC AUTO: 99 FL (ref 78–100)
PHOSPHATE SERPL-MCNC: 2.3 MG/DL (ref 2.5–4.5)
PLATELET # BLD AUTO: 376 10E3/UL (ref 150–450)
POTASSIUM SERPL-SCNC: 3.8 MMOL/L (ref 3.4–5.3)
PROT SERPL-MCNC: 5.2 G/DL (ref 6.4–8.3)
RBC # BLD AUTO: 1.75 10E6/UL (ref 4.4–5.9)
SODIUM SERPL-SCNC: 142 MMOL/L (ref 136–145)
UNIT ABO/RH: NORMAL
UNIT ABO/RH: NORMAL
UNIT NUMBER: NORMAL
UNIT NUMBER: NORMAL
UNIT STATUS: NORMAL
UNIT STATUS: NORMAL
UNIT TYPE ISBT: 5100
UNIT TYPE ISBT: 5100
UPPER GI ENDOSCOPY: NORMAL
WBC # BLD AUTO: 10.5 10E3/UL (ref 4–11)

## 2023-03-02 PROCEDURE — 82728 ASSAY OF FERRITIN: CPT | Performed by: INTERNAL MEDICINE

## 2023-03-02 PROCEDURE — 250N000013 HC RX MED GY IP 250 OP 250 PS 637: Performed by: SURGERY

## 2023-03-02 PROCEDURE — 83605 ASSAY OF LACTIC ACID: CPT | Performed by: INTERNAL MEDICINE

## 2023-03-02 PROCEDURE — 36415 COLL VENOUS BLD VENIPUNCTURE: CPT | Performed by: HOSPITALIST

## 2023-03-02 PROCEDURE — 36415 COLL VENOUS BLD VENIPUNCTURE: CPT | Performed by: INTERNAL MEDICINE

## 2023-03-02 PROCEDURE — 83550 IRON BINDING TEST: CPT | Performed by: INTERNAL MEDICINE

## 2023-03-02 PROCEDURE — P9016 RBC LEUKOCYTES REDUCED: HCPCS | Performed by: HOSPITALIST

## 2023-03-02 PROCEDURE — 120N000001 HC R&B MED SURG/OB

## 2023-03-02 PROCEDURE — 99232 SBSQ HOSP IP/OBS MODERATE 35: CPT | Performed by: SURGERY

## 2023-03-02 PROCEDURE — 250N000009 HC RX 250: Performed by: INTERNAL MEDICINE

## 2023-03-02 PROCEDURE — 85018 HEMOGLOBIN: CPT | Performed by: INTERNAL MEDICINE

## 2023-03-02 PROCEDURE — 43235 EGD DIAGNOSTIC BRUSH WASH: CPT | Performed by: INTERNAL MEDICINE

## 2023-03-02 PROCEDURE — 84100 ASSAY OF PHOSPHORUS: CPT | Performed by: SURGERY

## 2023-03-02 PROCEDURE — 80053 COMPREHEN METABOLIC PANEL: CPT | Performed by: INTERNAL MEDICINE

## 2023-03-02 PROCEDURE — 99233 SBSQ HOSP IP/OBS HIGH 50: CPT | Performed by: INTERNAL MEDICINE

## 2023-03-02 PROCEDURE — 250N000011 HC RX IP 250 OP 636: Performed by: INTERNAL MEDICINE

## 2023-03-02 PROCEDURE — 999N000099 HC STATISTIC MODERATE SEDATION < 10 MIN: Performed by: INTERNAL MEDICINE

## 2023-03-02 PROCEDURE — 258N000003 HC RX IP 258 OP 636: Performed by: INTERNAL MEDICINE

## 2023-03-02 PROCEDURE — 0DJ08ZZ INSPECTION OF UPPER INTESTINAL TRACT, VIA NATURAL OR ARTIFICIAL OPENING ENDOSCOPIC: ICD-10-PCS | Performed by: INTERNAL MEDICINE

## 2023-03-02 PROCEDURE — 250N000013 HC RX MED GY IP 250 OP 250 PS 637: Performed by: INTERNAL MEDICINE

## 2023-03-02 PROCEDURE — C9113 INJ PANTOPRAZOLE SODIUM, VIA: HCPCS | Performed by: INTERNAL MEDICINE

## 2023-03-02 PROCEDURE — 85027 COMPLETE CBC AUTOMATED: CPT | Performed by: INTERNAL MEDICINE

## 2023-03-02 PROCEDURE — 83735 ASSAY OF MAGNESIUM: CPT | Performed by: SURGERY

## 2023-03-02 PROCEDURE — 70450 CT HEAD/BRAIN W/O DYE: CPT

## 2023-03-02 PROCEDURE — 71250 CT THORAX DX C-: CPT

## 2023-03-02 PROCEDURE — 85018 HEMOGLOBIN: CPT | Performed by: HOSPITALIST

## 2023-03-02 RX ORDER — FENTANYL CITRATE 50 UG/ML
INJECTION, SOLUTION INTRAMUSCULAR; INTRAVENOUS PRN
Status: DISCONTINUED | OUTPATIENT
Start: 2023-03-02 | End: 2023-03-03 | Stop reason: HOSPADM

## 2023-03-02 RX ORDER — BISACODYL 5 MG
10 TABLET, DELAYED RELEASE (ENTERIC COATED) ORAL ONCE
Status: COMPLETED | OUTPATIENT
Start: 2023-03-02 | End: 2023-03-02

## 2023-03-02 RX ORDER — POLYETHYLENE GLYCOL 3350 17 G/17G
238 POWDER, FOR SOLUTION ORAL ONCE
Status: COMPLETED | OUTPATIENT
Start: 2023-03-02 | End: 2023-03-02

## 2023-03-02 RX ADMIN — PANTOPRAZOLE SODIUM 40 MG: 40 INJECTION, POWDER, FOR SOLUTION INTRAVENOUS at 09:40

## 2023-03-02 RX ADMIN — PIPERACILLIN AND TAZOBACTAM 3.38 G: 3; .375 INJECTION, POWDER, FOR SOLUTION INTRAVENOUS at 06:39

## 2023-03-02 RX ADMIN — LIDOCAINE 1 PATCH: 560 PATCH PERCUTANEOUS; TOPICAL; TRANSDERMAL at 08:10

## 2023-03-02 RX ADMIN — Medication 1 MG: at 23:00

## 2023-03-02 RX ADMIN — ACETAMINOPHEN 975 MG: 325 TABLET, FILM COATED ORAL at 06:38

## 2023-03-02 RX ADMIN — ACETAMINOPHEN 975 MG: 325 TABLET, FILM COATED ORAL at 23:00

## 2023-03-02 RX ADMIN — BISACODYL 10 MG: 5 TABLET, COATED ORAL at 13:45

## 2023-03-02 RX ADMIN — PIPERACILLIN AND TAZOBACTAM 3.38 G: 3; .375 INJECTION, POWDER, FOR SOLUTION INTRAVENOUS at 17:44

## 2023-03-02 RX ADMIN — PANTOPRAZOLE SODIUM 40 MG: 40 INJECTION, POWDER, FOR SOLUTION INTRAVENOUS at 21:13

## 2023-03-02 RX ADMIN — PIPERACILLIN AND TAZOBACTAM 3.38 G: 3; .375 INJECTION, POWDER, FOR SOLUTION INTRAVENOUS at 13:17

## 2023-03-02 RX ADMIN — PIPERACILLIN AND TAZOBACTAM 3.38 G: 3; .375 INJECTION, POWDER, FOR SOLUTION INTRAVENOUS at 23:00

## 2023-03-02 RX ADMIN — POLYETHYLENE GLYCOL 3350 238 G: 17 POWDER, FOR SOLUTION ORAL at 18:06

## 2023-03-02 RX ADMIN — ACETAMINOPHEN 975 MG: 325 TABLET, FILM COATED ORAL at 15:42

## 2023-03-02 RX ADMIN — SODIUM CHLORIDE: 9 INJECTION, SOLUTION INTRAVENOUS at 17:44

## 2023-03-02 ASSESSMENT — ACTIVITIES OF DAILY LIVING (ADL)
ADLS_ACUITY_SCORE: 53
ADLS_ACUITY_SCORE: 57
ADLS_ACUITY_SCORE: 53
ADLS_ACUITY_SCORE: 57
ADLS_ACUITY_SCORE: 53

## 2023-03-02 NOTE — CONSULTS
Care Management Initial Consult    General Information  Assessment completed with: Clarice Roblero  Type of CM/SW Visit: Initial Assessment    Primary Care Provider verified and updated as needed:     Readmission within the last 30 days:        Reason for Consult: discharge planning, facility placement  Advance Care Planning:            Communication Assessment  Patient's communication style: spoken language (English or Bilingual)             Cognitive  Cognitive/Neuro/Behavioral: .WDL except  Level of Consciousness: confused  Arousal Level: opens eyes spontaneously  Orientation: disoriented to, time, situation  Mood/Behavior: cooperative, calm  Best Language: 0 - No aphasia  Speech: clear    Living Environment:   People in home: alone     Current living Arrangements: apartment      Able to return to prior arrangements: no       Family/Social Support:  Care provided by: self  Provides care for: no one, unable/limited ability to care for self  Marital Status: Single  Children          Description of Support System: Involved, Supportive    Support Assessment: Adequate family and caregiver support    Current Resources:   Patient receiving home care services: No     Community Resources: None  Equipment currently used at home:    Supplies currently used at home:      Employment/Financial:  Employment Status: retired        Financial Concerns: No concerns identified   Referral to Financial Worker: No       Lifestyle & Psychosocial Needs:  Social Determinants of Health     Tobacco Use: Medium Risk     Smoking Tobacco Use: Former     Smokeless Tobacco Use: Never     Passive Exposure: Not on file   Alcohol Use: Not on file   Financial Resource Strain: Not on file   Food Insecurity: Not on file   Transportation Needs: Not on file   Physical Activity: Not on file   Stress: Not on file   Social Connections: Not on file   Intimate Partner Violence: Not on file   Depression: Not on file   Housing Stability: Not on file        Functional Status:  Prior to admission patient needed assistance:              Mental Health Status:  Mental Health Status: No Current Concerns       Chemical Dependency Status:  Chemical Dependency Status: No Current Concerns             Values/Beliefs:  Spiritual, Cultural Beliefs, Anglican Practices, Values that affect care: no               Additional Information:  Phone call from pt's daughter, Clarice. Pt lives at [address] [place] [address] co-op. He was scheduled to move to [facility] this Friday (3/3) to a one bedroom. However, due to hospitalization, [facility] believes he will need more care then previously agreed upon. They do have higher level of care in their building. PT/OT alvaradoal's pending. Discussed possible need for TCU prior to move to [facility] to assist with determining proper level of care. Clarice in agreement. Pt has been to [facility] and [facility] in the past. Will send referrals and talk with family again when eval's complete and discharge date known. SW following.     IVORY Arnold, LGSW  [phone] Desk phone  [phone] Cell/text (Preferred)  [facility]

## 2023-03-02 NOTE — PLAN OF CARE
Goal Outcome Evaluation:  Orientations: A/O x 1-2. Dementia at baseline.   Vitals/Pain: VSS on RA, ex hypotension. Denies pain/nausea. Scheduled Tylenol available for pain.  Tele: A fib CVR with sinus beats  Lines/Drains: 2 PIV with NS running @ 100ml/hr through L PIV.  Skin/Wounds: Posterior head laceration, L radial arm cast from previous fall, scattered bruising.   GI/: Adequate urine output, No BM this shift, passing gas.  Labs: Abnormal/Trends, Electrolyte Replacement-   Ambulation/Assist: Assist x 2 GB/W or lift. Unsteady on feet while ambulating. Use bedside commode.   Sleep Quality: Fair  Plan: Encourage IS use, monitor telemetry, plan to discharge to TCU.

## 2023-03-02 NOTE — PROVIDER NOTIFICATION
Dr Abdullahi was paged to see if wanted a CT of his abdomen with the hgb drop this am.   She paged back immediately and ordered chest abd pelvis without contrast due to his elevated CR. This writer put the order in per her request as she was unable to.

## 2023-03-02 NOTE — SIGNIFICANT EVENT
Significant Event Note    Time of event: 6:29 AM March 2, 2023    Description of event:  Morning labs returned a critical Hb of 5.5 representing a 3gm drop in the last 24 hours. I spoke with his bedside nurse. The patient remains hemodynamically stable. He has not been having any visible bleeding in his urine, no vomiting and his last bowel movement was one day ago. The patient himself provides no complaints.     Plan:  F/u repeat Hb stat to confirm anemia  2 units prbc ordered      Discussed with: bedside nurse    Aye Cooln MD

## 2023-03-02 NOTE — PROGRESS NOTES
[facility]    Hospitalist Progress Note    Date of Service (when I saw the patient): 03/02/2023    Assessment & Plan   Shamir Concepcion is a 82 year old male who was admitted on 3/1/2023.  Shamir Concepcion is a 82 year old male admitted on 3/1/2023. He presents after being found down     *note: pt is DNR/DNI, no pressors or lines, no ICU. History limited from pt 2/2 dementia     Loss of consciousness, suspected syncope  Hypotension, unspecified (hypovolemic vs sepsis vs ?)  Atrial fibrillation (new) with recent bradycardia (? SSS)  Hx hypertension  [needs rec- lisinopril 10 mg daily]  *lives in [address], with multiple falls recently. In ED 1/30 after fall with scalp lac. Plans in place to move to [facility] on Friday  *noted bradycardia in clinic to 30s 2/24, was evaluated in ED  *Last spoke with daughter on Monday. Medical-alert was activated, pt found minimally responsive with pools of blood surrounding him with head lac. EMS found BP 82/36.   *In ED hypotensive to 70s systolic, HR 60-80s. Afebrile. Lactic 1.7. WBC 15.5. UA WBC 9.  CT c/a/p with L 11 rib fx, no other acute findings. CT head w/o hemorrhage, note possible NPH, chronic cerebellar infarcts. C-spine w/o fx.   -Admitted to [facility]  Getting IV fluids since admission  Blood pressure improved from systolic 70s prior to admission to 110s on 3/2/2023  PTA lisinopril continues to be on hold  Chest CT showed bilateral opacities consistent with possible pneumonia, minimally displaced left posterior 11th rib fracture    Cardiology consultation requested for episode of bradycardia prior to admission.  They are following please see their note regarding recommendations.  Heart rate currently improved to 70s  Echo done on 3/2/2023 showed EF of 55 to 60% 3.  No regional wall motion abnormalities.  Right ventricular systolic function is mildly reduced right ventricle is moderately dilated.  No valvular heart disease    Acute on chronic  anemia;  Patient's hemoglobin on admission was at 8.6  Hemoglobin dropped to 5.5 this morning  Eliquis for history of atrial fibrillation on hold since admission.  Continue to hold with the worsening anemia  No sign of GI bleed  Patient was found in a pool of blood after the fall with a head injury but no other significant bleeding noted since admission  2 units PRBCs ordered  Hemoglobin every 8 hours ordered  Conditional order to transfuse hemoglobin if hemoglobin less than 7 entered  CT chest abdomen pelvis and CT head ruled out acute bleed  Patient was started on Protonix 40 mg IV twice daily  Made him n.p.o.    GI consultation requested planning on EGD later today    Bilateral pneumonia on the CT chest on admission;  Patient is started on IV Zosyn empirically on admission we will continue the same for now  Procalcitonin returned elevated at 0.31 on admission  Blood cultures remain negative so far  Urine culture pending  Monitor respiratory status closely    Status post syncope resulting in fall and head injury;  Left posterior 11th rib fracture minimally displaced from the fall;  Trauma surgery consulted and are following  Pain is well controlled currently  Patient had multiple x-rays of his left wrist, x-ray of the right shoulder as per trauma surgery team and all the x-rays returned negative for any acute fractures  Patient dropped hemoglobin to 5.5 this morning so a CT scan of the chest abdomen pelvis without contrast was done due to the creatinine of 1.8 and showed no acute bleeding  CT head also done without contrast to rule out a subdural and it returned negative except a ventriculomegaly consistent with possible normal pressure hydrocephalus    General neurology consultation requested with his recurrent falls and findings of normal pressure Hydro hydrocephalus on the CT head     Acute hypoxic respiratory failure  O2 sats in ED initially 89%, improved with 3L O2.   - wean O2 as  able          TAYLOR  CKD  Baseline creatinine ~1.3-1.5. Creatinine on presentation at 2.04.  Creatinine slightly better at 1.84 on 3/2/2023   - IV fluids  - monitor     Recent L distal radial fracture  Following with orthopedic through [facility]. Has cast.      Dementia  Notable cognitive impairment in ED, not able to provide much history  - Re-orient as needed  - Maintain normal day/night, sleep wake cycles  - Minimize sedating/altering medications as able     Rheumatoid arthritis  [needs rec- methotrexate 12.5 mg weekly, folic acid]  - hold methotrexate for now     Hx CVA 2018  Hx cerebellar CVA with occlusion/ dissection.   - hold apixaban for now     PAD-Hx R SFA stenting 2013  HLD- resume statin with rec  MDD- resume sertraline 50 mg daily with rec  BPH- resume trospium with rec        Diet:    N.p.o. for EGD later today  DVT Prophylaxis: Pneumatic Compression Devices  Michelle Catheter: Not present  Lines: None     Cardiac Monitoring: None  Code Status:   DNR/DNI, no pressors/ lines, no ICU- confirmed with daugthers        Clinically Significant Risk Factors Present on Admission             # Hypocalcemia: Lowest Ca = 8.4 mg/dL in last 2 days, will monitor and replace as appropriate    # Anion Gap Metabolic Acidosis: Highest Anion Gap = 19 mmol/L in last 2 days, will monitor and treat as appropriate  # Hypoalbuminemia: Lowest albumin = 3.2 g/dL at 3/1/2023  1:07 AM, will monitor as appropriate  # Drug Induced Coagulation Defect: home medication list includes an anticoagulant medication    # Hypertension: home medication list includes antihypertensive(s)                     Disposition Plan        Expected Discharge Date:    In the next 2 to 3 days once anemia work-up is complete and patient remained stable    Discussed with bedside RN, GI team today    Bettye Abdullahi MD, MD  [phone] (P)      Interval History     Patient is resting comfortably in bed.  No active signs of bleeding since admission.  Hemoglobin  dropped to 5.5 this morning.  2 units PRBCs being given this morning.  He denies any pain currently.  Denies any shortness of breath.  No nausea or vomiting.  Heart rate currently in the 70s.  Made him n.p.o. for possible EGD later today    -Data reviewed today: I reviewed all new labs and imaging results over the last 24 hours. I personally reviewed CT scan of the head without contrast, CT chest abdomen pelvis without contrast this morning showing no acute bleed.    Physical Exam   Temp: 97.5  F (36.4  C) Temp src: Oral BP: 120/68 Pulse: 70   Resp: 11 SpO2: 94 % O2 Device: None (Room air)    Vitals:    03/01/23 0117 03/01/23 2000   Weight: 67.9 kg (149 lb 9.6 oz) 70 kg (154 lb 5.2 oz)     Vital Signs with Ranges  Temp:  [97.5  F (36.4  C)-99.2  F (37.3  C)] 97.5  F (36.4  C)  Pulse:  [] 70  Resp:  [11-39] 11  BP: ()/(50-90) 120/68  SpO2:  [93 %-98 %] 94 %  I/O last 3 completed shifts:  In: 580 [P.O.:580]  Out: 300 [Urine:300]    Constitutional: Awake, alert, cooperative, no apparent distress, pleasantly confused  Respiratory: Clear to auscultation bilaterally, no crackles or wheezing  Cardiovascular: Regular rate and rhythm, normal S1 and S2, and no murmur noted  GI: Normal bowel sounds, soft, non-distended, non-tender  Skin/Integumen: No rashes, no cyanosis, no edema.  Left hand cast in place  Other:     Medications     sodium chloride 60 mL/hr at 03/02/23 0933       acetaminophen  975 mg Oral Q8H     lidocaine  1 patch Transdermal QAM     lidocaine   Transdermal Q8H GLADIS     pantoprazole  40 mg Intravenous BID     piperacillin-tazobactam  3.375 g Intravenous Q6H     sodium chloride (PF)  3 mL Intracatheter Q8H     sodium chloride (PF)  3 mL Intracatheter Q8H       Data   Recent Labs   Lab 03/02/23  0827 03/02/23  0535 03/01/23  0107 03/01/23  0106 03/01/23  0059 02/24/23  1615   WBC  --  10.5 15.5*  --   --  7.4   HGB 5.7* 5.5* 8.6*  --   --  9.8*   MCV  --  99 102*  --   --  101*   PLT  --  376 497*   --   --  367   NA  --  142 137  --   --  139   POTASSIUM  --  3.8 5.0  --   --  4.5   CHLORIDE  --  113* 102  --   --  106   CO2  --  19* 16*  --   --  23   BUN  --  24.6* 29.3*  --   --  31.3*   CR  --  1.84* 2.04* 2.3*  --  1.50*   ANIONGAP  --  10 19*  --   --  10   RANJAN  --  7.6* 8.4*  --   --  9.1   GLC  --  109* 173*  --  129* 100*   ALBUMIN  --  2.7* 3.2*  --   --  3.6   PROTTOTAL  --  5.2* 6.0*  --   --  7.0   BILITOTAL  --  0.4 0.7  --   --  0.4   ALKPHOS  --  108 132*  --   --  162*   ALT  --  14 19  --   --  16   AST  --  20 42  --   --  20       Recent Results (from the past 24 hour(s))   XR Wrist Left G/E 3 Views    Narrative    XR WRIST LEFT G/E 3 VIEWS 3/1/2023 12:02 PM     HISTORY: prior wrist fx s/p fall, cast in place some swelling on  proximal thumb    COMPARISON: None.      Impression    IMPRESSION: Healing fracture of the distal radius involving the  metaphysis and extension the radiocarpal articular surface with  impaction. Cast is in place. Widening of the scapholunate interval  consistent with ligament insufficiency. Degenerative changes in the  first CMC joint.    CARMEN MIRAMONTES MD         SYSTEM ID:  [id number]   XR Hand Left G/E 3 Views    Narrative    XR HAND LEFT G/E 3 VIEWS 3/1/2023 12:03 PM     HISTORY: fall with swelling around left thumb and ecchymosis, recent  left wrist fx and arm in cast for 6 weeks    COMPARISON: Left wrist from today      Impression    IMPRESSION: Healing fracture of the distal radius is again identified.  Cast is in place. No acute fractures are evident. Degenerative changes  in the wrist and hand.    CARMEN MIRAMONTES MD         SYSTEM ID:  [id number]   XR Shoulder Right G/E 3 Views    Narrative    XR SHOULDER RIGHT G/E 3 VIEWS 3/1/2023 12:05 PM     HISTORY: pain, ecchymosis and mild swelling around humeral head s/p  fall    COMPARISON: None.      Impression    IMPRESSION: No fractures are evident. Normal glenohumeral alignment.  Mild degenerative changes in  the acromioclavicular joint.     CARMEN MIRAMONTES MD         SYSTEM ID:  [id number]   Echocardiogram Complete   Result Value    LVEF  55-60%    Narrative    [id number]  [id number]  [id number]^MATT^DAWNA^THIAGO     [facility]  Echocardiography Laboratory  [address] [place] [address]     Name: KATTY GRUBER  MRN: [id number]  : 1940  Study Date: 2023 03:07 PM  Age: 82 yrs  Gender: Male  Patient Location: [facility]  Reason For Study: Atrial Fibrillation  Ordering Physician: DAWNA GUTIERREZ  Performed By: Marnie Carpio     BSA: 1.8 m2  Height: 70 in  Weight: 150 lb  HR: 100  BP: 97/68 mmHg  ______________________________________________________________________________  Procedure  Complete Portable Echo Adult. Optison (NDC #1240-7408) given intravenously.  ______________________________________________________________________________  Interpretation Summary     The visual ejection fraction is 55-60%.  No regional wall motion abnormalities noted.  The right ventricular systolic function is mildly reduced.  The right ventricle is moderately dilated.  No significant valvular heart disease.  Very technically challenging study reduces sensitivity and specificity of  findings.  ______________________________________________________________________________  Left Ventricle  The left ventricle is normal in size. There is normal left ventricular wall  thickness. The visual ejection fraction is 55-60%. No regional wall motion  abnormalities noted.     Right Ventricle  The right ventricle is moderately dilated. The right ventricular systolic  function is mildly reduced.     Atria  The left atrium is not well visualized. Right atrial size is normal. There is  no color Doppler evidence of an atrial shunt.     Mitral Valve  The mitral valve leaflets are mildly thickened. There is trace mitral  regurgitation.     Tricuspid Valve  There is trace tricuspid regurgitation. The right  ventricular systolic  pressure is approximated at 23.8 mmHg plus the right atrial pressure. IVC  diameter and respiratory changes fall into an intermediate range suggesting an  RA pressure of 8 mmHg.     Aortic Valve  The aortic valve is not well visualized. No aortic regurgitation is present.     Pulmonic Valve  There is trace pulmonic valvular regurgitation.     Vessels  Mild aortic root dilatation. The ascending aorta is Mildly dilated.     Pericardium  Small pericardial effusion.     Rhythm  The rhythm was rapid atrial fibrillation.  ______________________________________________________________________________  MMode/2D Measurements & Calculations     IVSd: 0.98 cm  LVIDd: 4.4 cm  LVIDs: 3.0 cm  LVPWd: 0.96 cm  FS: 30.9 %  LV mass(C)d: 139.9 grams  LV mass(C)dI: 75.8 grams/m2  Ao root diam: 3.7 cm  LA dimension: 3.8 cm  asc Aorta Diam: 3.9 cm  LA/Ao: 1.0  LVOT diam: 2.3 cm  LVOT area: 4.1 cm2  RWT: 0.44     Doppler Measurements & Calculations  TR max mary: 244.1 cm/sec  TR max P.8 mmHg     ______________________________________________________________________________  Report approved by: Fatmata Mcnulty 2023 04:18 PM         CT Head w/o Contrast    Narrative    CT SCAN OF THE HEAD WITHOUT CONTRAST 2023 7:17 AM     HISTORY: Fall, head injury with low hemoglobin.    TECHNIQUE: Axial images of the head and coronal reformations without  IV contrast material. Radiation dose for this scan was reduced using  automated exposure control, adjustment of the mA and/or kV according  to patient size, or iterative reconstruction technique.    COMPARISON: Multiple prior comparisons, most recent head CT 3/1/2023.    FINDINGS: No acute intracranial hemorrhage. No mass effect or midline  shift. Moderate diffuse parenchymal volume loss. Chronic appearing  infarcts in the cerebellum left greater than right. Ventricles are  prominent in size and appear enlarged out of portion of the cerebral  sulci. Ventricular  size is similar to prior head CT 3/1/2023. No  abnormal extra-axial fluid collection.    Complete opacification of the right maxillary sinus. Mucosal  thickening in the ethmoid air cells right greater than left. Mild  mucosal thickening in the left maxillary sinus. The bony calvarium and  bones of the skull base appear intact.       Impression    IMPRESSION:     1. No evidence of acute intracranial hemorrhage, mass, or herniation.  2. Moderate diffuse parenchymal volume loss and white matter changes  most likely due to chronic microvascular ischemic disease. Chronic  infarcts in the cerebellum.  3. Enlarged ventricular system concerning for superimposed  hydrocephalus upon volume loss. Ventricles appear similar in size to  prior.      SOFIE CUNNINGHAM MD         SYSTEM ID:  [id number]   CT Chest Abdomen Pelvis w/o Contrast    Narrative    CT CHEST, ABDOMEN & PELVIS WITHOUT CONTRAST March 2, 2023 7:42 AM    CLINICAL HISTORY: Hemoglobin drop.    TECHNIQUE: CT scan of the chest, abdomen, and pelvis was performed  without IV contrast. Multiplanar reformats were obtained. Dose  reduction techniques were used.   CONTRAST: None    COMPARISON: March 1, 2023.    FINDINGS:   LUNGS AND PLEURA: Minimal bilateral effusions, new since previous but  these are simple in density. Benign granulomatous change. Mild  dependent atelectasis and/or infiltrate right worse than left.    MEDIASTINUM/AXILLAE: No lymphadenopathy. 4.1 cm aortic root again  evident.    CORONARY ARTERY CALCIFICATIONS: Severe and/or stents.    HEPATOBILIARY: Normal contour with no significant mass. No bile duct  dilatation. Calcified gallstones.    PANCREAS: No significant mass, duct dilatation, or inflammatory  change.    SPLEEN: Normal size.    ADRENAL GLANDS: No significant nodules.    KIDNEYS/BLADDER: Nonobstructing stone in the upper left kidney  measuring 4 mm. Additional calcifications appear vascular. No  hydronephrosis or ureteral stone. Bladder unremarkable,  filled with  contrast from prior study.    BOWEL: No obstruction or inflammatory change.    PELVIC ORGANS: No pelvic masses.    ADDITIONAL FINDINGS: No hemorrhage demonstrated. There are extensive  atherosclerotic changes of the visualized aorta and its branches.  There is no evidence of aortic aneurysm.    MUSCULOSKELETAL: No frankly destructive bony lesions. Left 11th rib  fracture again evident.      Impression    IMPRESSION:  1. No hemorrhage demonstrated as an etiology for hemoglobin drop.  2. Minimal bilateral effusions are new since comparison study, but  simple in density. New associated compressive atelectasis and/or  infiltrate increased from previous.  3. Cholelithiasis without cholecystitis.    KELSEY CORTEZ MD         SYSTEM ID:  [id number]

## 2023-03-02 NOTE — PROGRESS NOTES
"[facility]  General Surgery Progress Note        Cooper Pace MD, MD   03/02/2023        Interval History:      Pt here after a fall.  Acute Hgb drop this morning, no obvious source.  No new complaints of pain.  No reports of dark stools.         Assessment and Plan:      Acute Hgb drop;  No evidence for external bleeding, GI bleed, or extremity fracture.  CT without evidence of bleeding into pelvis, abdomen, or chest.  Agree with transfusion, supportive care.                    Physical Exam:      Blood pressure 110/62, pulse 76, temperature 97.6  F (36.4  C), temperature source Oral, resp. rate 20, height 1.77 m (5' 9.69\"), weight 70 kg (154 lb 5.2 oz), SpO2 95 %.  Vitals:    03/01/23 0117 03/01/23 2000   Weight: 67.9 kg (149 lb 9.6 oz) 70 kg (154 lb 5.2 oz)     Vital Signs with Ranges  Temp:  [97.6  F (36.4  C)-99.2  F (37.3  C)] 97.6  F (36.4  C)  Pulse:  [] 76  Resp:  [12-39] 20  BP: ()/(50-90) 110/62  SpO2:  [93 %-98 %] 95 %  I/O's Last 24 hours  I/O last 3 completed shifts:  In: 580 [P.O.:580]  Out: 300 [Urine:300]    Constitutional: Pt sleepy, no complaints.  Looks jaundiced   Lungs: Breathing comfortably on RA   Cardiovascular: NSR   Abdomen: Soft, non-distended, nontender   Skin: Pale, dry   Imaging: CT CAP reviewed          Medications:          acetaminophen  975 mg Oral Q8H     lidocaine  1 patch Transdermal QAM     lidocaine   Transdermal Q8H GLADIS     piperacillin-tazobactam  3.375 g Intravenous Q6H     sodium chloride (PF)  3 mL Intracatheter Q8H     sodium chloride (PF)  3 mL Intracatheter Q8H            Data:      All new lab and imaging data was reviewed.   Recent Labs   Lab Test 03/02/23  0535 03/01/23  0107 02/24/23  1615 10/12/22  0807 07/21/22  1824 05/19/19  0738 08/13/18  0000 08/09/18  0615   WBC 10.5 15.5* 7.4 6.3   < > 7.9   < >  --    HGB 5.5* 8.6* 9.8* 10.5*   < > 12.3*   < >  --    MCV 99 102* 101* 100   < > 96   < >  --     497* 367 314   < > " 325   < >  --    INR  --   --   --  1.15  --  1.23*  --  2.57*    < > = values in this interval not displayed.

## 2023-03-02 NOTE — CONSULTS
[facility]  Gastroenterology Consultation         Shamir Concepcion  [address]    [place] [address]  82 year old male    Admission Date/Time: 3/1/2023  Primary Care Provider: No Ref-Primary, Physician  Referring / Attending Physician:  Dr. Bettye Abdullahi    We were asked to see the patient in consultation by Dr. Bettye Abdullahi for evaluation of worsening anemia.      CC: found down    HPI:  Shamir Concepcion is a 82 year old male who has a past medical history of dementia, hypertension, RA, PAD and lost consciousness and brought to ED on 3/1.  Found in pools of blood from scalp laceration. Had systolic BP initially in 80s.    Surgery was consulted as concern for fracture or other source of trauma. Has had no pain. CT of cervical spine noted to intracranial bleed and normal cervical spine. CT of chest noted possible pneumonia and left rib fracture.    VSS. Noted to have significant drop in hemoglobin overnight from 8.3 to 5.5 with no visible signs of blood loss other than prior scalp wound. He takes Eliquis but has been held. Last colonoscopy 2009 was unremarkable.    ROS: A comprehensive ten point review of systems was negative aside from those in mentioned in the HPI.      PAST MED HX:  I have reviewed this patient's medical history and updated it with pertinent information if needed.   Past Medical History:   Diagnosis Date     Allergic rhinitis      BPH (benign prostatic hyperplasia)      Cerebral infarction (H)      Colon polyps      ED (erectile dysfunction)      Family history of colon cancer      Hypertension      PAD (peripheral artery disease) (H)     stent     RA (rheumatoid arthritis) (H)      Seronegative rheumatoid arthritis (H)        MEDICATIONS:   Prior to Admission Medications   Prescriptions Last Dose Informant Patient Reported? Taking?   SENNA-docusate sodium (SENNA S) 8.6-50 MG tablet  at prn  No Yes   Sig: Take 1 tablet by mouth 2 times daily as needed (while on  narcotics to prevent constipation)   Vitamin D3 (CHOLECALCIFEROL) 125 MCG (5000 UT) tablet 2023 at am  Yes Yes   Sig: Take 125 mcg by mouth daily   acetaminophen (TYLENOL) 325 MG tablet  at prn  No Yes   Sig: Take 2 tablets (650 mg) by mouth every 4 hours as needed for mild pain   alendronate (FOSAMAX) 70 MG tablet 2023  Yes Yes   Sig: Take 70 mg by mouth every 7 days    apixaban ANTICOAGULANT (ELIQUIS) 5 MG tablet 2023 at am  No Yes   Sig: Take 1 tablet (5 mg) by mouth 2 times daily   aspirin 81 MG EC tablet 2023 at am  Yes Yes   Sig: Take 81 mg by mouth daily   fluticasone (FLONASE) 50 MCG/ACT nasal spray  at prn  Yes Yes   Sig: Spray 1 spray into both nostrils daily as needed   folic acid 800 MCG TABS 2023 at am  No Yes   Sig: Take 800 mcg by mouth daily   lisinopril (ZESTRIL) 10 MG tablet 2023 at am  Yes Yes   Sig: Take 10 mg by mouth daily   methotrexate 2.5 MG tablet 2023  No Yes   Sig: Take 5 tablets (12.5 mg) by mouth every 7 days   Patient taking differently: Take 12.5 mg by mouth every 7 days Saturday   multivitamin  with lutein (OCUVITE WITH LUTEIN) CAPS per capsule 2023 at am  Yes Yes   Sig: Take 1 capsule by mouth daily   multivitamin, therapeutic (THERA-VIT) TABS tablet 2023 at am  Yes Yes   Sig: Take 1 tablet by mouth daily   rosuvastatin (CRESTOR) 10 MG tablet 2023 at am  Yes Yes   Sig: Take 10 mg by mouth daily   sertraline (ZOLOFT) 50 MG tablet 2023 at am  Yes Yes   Sig: Take 50 mg by mouth daily   trospium (SANCTURA) 20 MG tablet 2023 at am  Yes Yes   Sig: Take 20 mg by mouth 2 times daily      Facility-Administered Medications: None       ALLERGIES: No Known Allergies    SOCIAL HISTORY:  Social History     Tobacco Use     Smoking status: Former     Packs/day: 0.50     Years: 30.00     Pack years: 15.00     Types: Cigarettes     Quit date: 3/1/1999     Years since quittin.0     Smokeless tobacco: Never   Substance Use Topics      Alcohol use: Not Currently     Comment: rarely     Drug use: No       FAMILY HISTORY:  Family History   Problem Relation Age of Onset     Cancer Mother      Cerebrovascular Disease Father        PHYSICAL EXAM:   General  Alert, and comfortable  Vital Signs with Ranges  Temp: 98.3  F (36.8  C) Temp src: Oral BP: 135/83 Pulse: 78   Resp: 26 SpO2: 94 % O2 Device: None (Room air)    I/O last 3 completed shifts:  In: 580 [P.O.:580]  Out: 300 [Urine:300]    Constitutional: healthy, alert and no distress   Cardiovascular: negative, PMI normal. No lifts, heaves, or thrills. RRR. No murmurs, clicks gallops or rub  Respiratory: negative, Percussion normal. Good diaphragmatic excursion. Lungs clear  Abdomen: Abdomen soft, non-tender. BS normal. No masses, organomegaly          ADDITIONAL COMMENTS:   I reviewed the patient's new clinical lab test results.   Recent Labs   Lab Test 03/02/23  0827 03/02/23  0535 03/01/23  0107 02/24/23  1615 10/12/22  0807 07/21/22  1824 05/19/19  0738 08/13/18  0000 08/09/18  0615   WBC  --  10.5 15.5* 7.4 6.3   < > 7.9   < >  --    HGB 5.7* 5.5* 8.6* 9.8* 10.5*   < > 12.3*   < >  --    MCV  --  99 102* 101* 100   < > 96   < >  --    PLT  --  376 497* 367 314   < > 325   < >  --    INR  --   --   --   --  1.15  --  1.23*  --  2.57*    < > = values in this interval not displayed.     Recent Labs   Lab Test 03/02/23  0535 03/01/23  0107 02/24/23  1615   POTASSIUM 3.8 5.0 4.5   CHLORIDE 113* 102 106   CO2 19* 16* 23   BUN 24.6* 29.3* 31.3*   ANIONGAP 10 19* 10     Recent Labs   Lab Test 03/02/23  0535 03/01/23  0504 03/01/23  0107 02/24/23  1615 07/21/22  1824 05/19/19  0758 07/31/18  0227 07/21/18  0030 07/20/18  2205   ALBUMIN 2.7*  --  3.2* 3.6   < >  --   --    < > 3.3*   BILITOTAL 0.4  --  0.7 0.4   < >  --   --    < > 0.8   ALT 14  --  19 16   < >  --   --    < > 15   AST 20  --  42 20   < >  --   --    < > 19   PROTEIN  --  30*  --   --   --  10* 30*   < >  --    LIPASE  --   --   --   --    --   --   --   --  105    < > = values in this interval not displayed.       I reviewed the patient's new imaging results.        CONSULTATION ASSESSMENT AND PLAN:    Shamir Concepcion is a 82 year old male who has a past medical history of dementia, hypertension, RA, PAD and lost consciousness and brought to ED on 3/1.  Found in pools of blood from scalp laceration    Anemia due to blood loss, acute  Laceration of scalp, initial encounter  Hemoglobin 8.3 to 5.5 with baseline 9-10  No obvious blood loss than small scalp wound  Discussed with daughter, Dorcas, recommendation for EGD. WE discussed risks and benefits. She agrees for father to have EGD.  CT today notes no hematoma or source of blood loss in C/A/P  Concern for PUD vs lower GI bleed given acute anemia    -- Plan EGD today  -- Npo  -- transfuse PRBC and recheck hemoglobin- if less than 7.0 please transfuse  -- IV pantoprazole 40 mg BID  --- if negative EGD consider colonoscopy tomorrow          [facility].  Office: [phone]  Cell : [phone] (Dr. Arrington)  Cell: [phone] (Kaela Greene PA-C)

## 2023-03-03 ENCOUNTER — MEDICAL CORRESPONDENCE (OUTPATIENT)
Dept: HEALTH INFORMATION MANAGEMENT | Facility: CLINIC | Age: 83
End: 2023-03-03

## 2023-03-03 LAB
ANION GAP SERPL CALCULATED.3IONS-SCNC: 10 MMOL/L (ref 7–15)
BACTERIA UR CULT: NO GROWTH
BUN SERPL-MCNC: 16.3 MG/DL (ref 8–23)
CALCIUM SERPL-MCNC: 8.2 MG/DL (ref 8.8–10.2)
CHLORIDE SERPL-SCNC: 114 MMOL/L (ref 98–107)
COLONOSCOPY: NORMAL
CREAT SERPL-MCNC: 1.65 MG/DL (ref 0.67–1.17)
DEPRECATED HCO3 PLAS-SCNC: 18 MMOL/L (ref 22–29)
ERYTHROCYTE [DISTWIDTH] IN BLOOD BY AUTOMATED COUNT: 16.8 % (ref 10–15)
GFR SERPL CREATININE-BSD FRML MDRD: 41 ML/MIN/1.73M2
GLUCOSE SERPL-MCNC: 104 MG/DL (ref 70–99)
HCT VFR BLD AUTO: 27 % (ref 40–53)
HGB BLD-MCNC: 8 G/DL (ref 13.3–17.7)
HGB BLD-MCNC: 8.3 G/DL (ref 13.3–17.7)
HGB BLD-MCNC: 8.3 G/DL (ref 13.3–17.7)
LACTATE SERPL-SCNC: 1 MMOL/L (ref 0.7–2)
MAGNESIUM SERPL-MCNC: 1.8 MG/DL (ref 1.7–2.3)
MCH RBC QN AUTO: 29.7 PG (ref 26.5–33)
MCHC RBC AUTO-ENTMCNC: 30.7 G/DL (ref 31.5–36.5)
MCV RBC AUTO: 97 FL (ref 78–100)
PHOSPHATE SERPL-MCNC: 2.5 MG/DL (ref 2.5–4.5)
PLATELET # BLD AUTO: 456 10E3/UL (ref 150–450)
POTASSIUM SERPL-SCNC: 3.8 MMOL/L (ref 3.4–5.3)
RBC # BLD AUTO: 2.79 10E6/UL (ref 4.4–5.9)
SODIUM SERPL-SCNC: 142 MMOL/L (ref 136–145)
WBC # BLD AUTO: 11.2 10E3/UL (ref 4–11)

## 2023-03-03 PROCEDURE — 99231 SBSQ HOSP IP/OBS SF/LOW 25: CPT | Performed by: SURGERY

## 2023-03-03 PROCEDURE — 250N000013 HC RX MED GY IP 250 OP 250 PS 637: Performed by: SURGERY

## 2023-03-03 PROCEDURE — 120N000001 HC R&B MED SURG/OB

## 2023-03-03 PROCEDURE — 80048 BASIC METABOLIC PNL TOTAL CA: CPT | Performed by: SURGERY

## 2023-03-03 PROCEDURE — 250N000013 HC RX MED GY IP 250 OP 250 PS 637: Performed by: INTERNAL MEDICINE

## 2023-03-03 PROCEDURE — G0500 MOD SEDAT ENDO SERVICE >5YRS: HCPCS | Performed by: INTERNAL MEDICINE

## 2023-03-03 PROCEDURE — 250N000011 HC RX IP 250 OP 636: Performed by: INTERNAL MEDICINE

## 2023-03-03 PROCEDURE — 83605 ASSAY OF LACTIC ACID: CPT | Performed by: INTERNAL MEDICINE

## 2023-03-03 PROCEDURE — 84100 ASSAY OF PHOSPHORUS: CPT | Performed by: SURGERY

## 2023-03-03 PROCEDURE — 36415 COLL VENOUS BLD VENIPUNCTURE: CPT | Performed by: INTERNAL MEDICINE

## 2023-03-03 PROCEDURE — 85018 HEMOGLOBIN: CPT | Performed by: INTERNAL MEDICINE

## 2023-03-03 PROCEDURE — 85014 HEMATOCRIT: CPT | Performed by: INTERNAL MEDICINE

## 2023-03-03 PROCEDURE — 258N000003 HC RX IP 258 OP 636: Performed by: INTERNAL MEDICINE

## 2023-03-03 PROCEDURE — C9113 INJ PANTOPRAZOLE SODIUM, VIA: HCPCS | Performed by: INTERNAL MEDICINE

## 2023-03-03 PROCEDURE — 83735 ASSAY OF MAGNESIUM: CPT | Performed by: SURGERY

## 2023-03-03 PROCEDURE — 0DJD8ZZ INSPECTION OF LOWER INTESTINAL TRACT, VIA NATURAL OR ARTIFICIAL OPENING ENDOSCOPIC: ICD-10-PCS | Performed by: INTERNAL MEDICINE

## 2023-03-03 PROCEDURE — 99233 SBSQ HOSP IP/OBS HIGH 50: CPT | Performed by: INTERNAL MEDICINE

## 2023-03-03 PROCEDURE — 45378 DIAGNOSTIC COLONOSCOPY: CPT | Performed by: INTERNAL MEDICINE

## 2023-03-03 RX ORDER — LIDOCAINE 40 MG/G
CREAM TOPICAL
Status: DISCONTINUED | OUTPATIENT
Start: 2023-03-03 | End: 2023-03-03 | Stop reason: HOSPADM

## 2023-03-03 RX ORDER — MULTIVITAMIN,THERAPEUTIC
1 TABLET ORAL DAILY
Status: DISCONTINUED | OUTPATIENT
Start: 2023-03-03 | End: 2023-03-07 | Stop reason: HOSPADM

## 2023-03-03 RX ORDER — AMOXICILLIN AND CLAVULANATE POTASSIUM 400; 57 MG/1; MG/1
1 TABLET, CHEWABLE ORAL EVERY 12 HOURS SCHEDULED
Status: DISCONTINUED | OUTPATIENT
Start: 2023-03-03 | End: 2023-03-03

## 2023-03-03 RX ORDER — LANOLIN ALCOHOL/MO/W.PET/CERES
800 CREAM (GRAM) TOPICAL DAILY
Status: DISCONTINUED | OUTPATIENT
Start: 2023-03-03 | End: 2023-03-07 | Stop reason: HOSPADM

## 2023-03-03 RX ORDER — SENNA AND DOCUSATE SODIUM 50; 8.6 MG/1; MG/1
1 TABLET, FILM COATED ORAL 2 TIMES DAILY PRN
Status: DISCONTINUED | OUTPATIENT
Start: 2023-03-03 | End: 2023-03-03

## 2023-03-03 RX ORDER — ROSUVASTATIN CALCIUM 10 MG/1
10 TABLET, COATED ORAL DAILY
Status: DISCONTINUED | OUTPATIENT
Start: 2023-03-03 | End: 2023-03-07 | Stop reason: HOSPADM

## 2023-03-03 RX ORDER — ONDANSETRON 2 MG/ML
4 INJECTION INTRAMUSCULAR; INTRAVENOUS
Status: DISCONTINUED | OUTPATIENT
Start: 2023-03-03 | End: 2023-03-03 | Stop reason: HOSPADM

## 2023-03-03 RX ADMIN — PANTOPRAZOLE SODIUM 40 MG: 40 INJECTION, POWDER, FOR SOLUTION INTRAVENOUS at 08:46

## 2023-03-03 RX ADMIN — PANTOPRAZOLE SODIUM 40 MG: 40 INJECTION, POWDER, FOR SOLUTION INTRAVENOUS at 21:34

## 2023-03-03 RX ADMIN — ACETAMINOPHEN 975 MG: 325 TABLET, FILM COATED ORAL at 22:30

## 2023-03-03 RX ADMIN — PIPERACILLIN AND TAZOBACTAM 3.38 G: 3; .375 INJECTION, POWDER, FOR SOLUTION INTRAVENOUS at 06:07

## 2023-03-03 RX ADMIN — AMOXICILLIN AND CLAVULANATE POTASSIUM 1 TABLET: 875; 125 TABLET ORAL at 21:40

## 2023-03-03 RX ADMIN — SODIUM CHLORIDE: 9 INJECTION, SOLUTION INTRAVENOUS at 06:29

## 2023-03-03 RX ADMIN — ACETAMINOPHEN 975 MG: 325 TABLET, FILM COATED ORAL at 06:07

## 2023-03-03 ASSESSMENT — ACTIVITIES OF DAILY LIVING (ADL)
ADLS_ACUITY_SCORE: 57

## 2023-03-03 NOTE — PROGRESS NOTES
"Surgery Note    Pt hgb has been stable since transfusion. EGD negative for active bleeding. Note plans for colonoscopy today. Pt pleasantly confused this am.     O: BP (!) 111/93 (BP Location: Right arm, Patient Position: Semi-Brunner's, Cuff Size: Adult Regular)   Pulse 68   Temp 98.5  F (36.9  C) (Oral)   Resp 25   Ht 1.77 m (5' 9.69\")   Wt 72 kg (158 lb 11.7 oz)   SpO2 90%   BMI 22.98 kg/m    Gen: pt lying in bed appears comfortable.   CV: RRR  Resp: nonlabored  Abd: soft, nt  Ext: cast left arm, ecchymosis resolving    A/P: 82yom admitted s/p fall with left rib fx and scalp lac. Hgb had decreased yesterday to 5.5 and has been stable since. EDG negative. Repeat CT CAP negative for bleed. Likely related to EBL from scalp laceration and re-equilibration. No further workup from trauma surgery perspective.   - trauma surgery will sign off. Please call with further questions or concerns.     Nessa Page MD  [facility]  [phone]      "

## 2023-03-03 NOTE — PLAN OF CARE
[facility]: Alert and oriented to self only. Vital signs stable on 2L NC. Assist of 2, GBW, no OOB during shift, pt self repositions. Tolerating low fat diet. Lung sounds diminished, congested cough. Bowel sounds active, passing flatus, incontinent of bowel x2 during shift, incontinent of bladder, adequate urine output. L  posterior scalp laceration. Denies pain. Denies nausea. Tele SR with 1st degree AVB. NS infusing at 60 mL/hr. Pt refused medication pass for writer multiple times during shift.

## 2023-03-03 NOTE — PROGRESS NOTES
[facility]  Gastroenterology Progress Note     Shamir Concepcion MRN# [id number]   YOB: 1940 Age: 82 year old          Assessment and Plan:   Shamir Concepcion is a 82 year old male who has a past medical history of dementia, hypertension, RA, PAD and lost consciousness and brought to ED on 3/1.  Found in pools of blood from scalp laceration     Anemia due to blood loss, acute  Laceration of scalp, initial encounter  Hemoglobin stable in 8 range. with baseline 9-10  No obvious blood loss than small scalp wound  Discussed with daughter, Dorcas, recommendation for EGD/colonoscopy. WE discussed risks and benefits. She agrees for father to have EGD and then colonoscopy if needed.  CT today notes no hematoma or source of blood loss in C/A/P  Concern for PUD vs lower GI bleed given acute anemia  3/2 EGD noted esophageal ulcers, small hiatal hernia, mild Schatzki ring, erosive gastropathy, duodenal erosion. No source of blood loss identified. Prepped for colonosopy     -- Plan colonoscopy today  -- NPO  -- transfuse PRBC and recheck hemoglobin- if less than 7.0 please transfuse  -- pantoprazole 40 mg daily       Long term current use of anticoagulant therapy  History of dementia      Interval History:   no new complaints and stools light yellow in colon- non bloody              Review of Systems:   C: NEGATIVE for fever, chills, change in weight  E/M: NEGATIVE for ear, mouth and throat problems  R: NEGATIVE for significant cough or SOB  CV: NEGATIVE for chest pain, palpitations or peripheral edema             Medications:   I have reviewed this patient's current medications    acetaminophen  975 mg Oral Q8H     lidocaine  1 patch Transdermal QAM     lidocaine   Transdermal Q8H GLADIS     pantoprazole  40 mg Intravenous BID     piperacillin-tazobactam  3.375 g Intravenous Q6H     sodium chloride (PF)  3 mL Intracatheter Q8H     sodium chloride (PF)  3 mL Intracatheter Q8H                   Physical Exam:   Vitals were reviewed  Vital Signs with Ranges  Temp:  [97.4  F (36.3  C)-98.5  F (36.9  C)] 98.5  F (36.9  C)  Pulse:  [57-87] 87  Resp:  [11-26] 18  BP: (101-150)/() 145/108  SpO2:  [90 %-97 %] 91 %  I/O last 3 completed shifts:  In: 2690 [P.O.:1470; I.V.:420]  Out: -   Constitutional: healthy, alert and no distress   Cardiovascular: negative, PMI normal. No lifts, heaves, or thrills. RRR. No murmurs, clicks gallops or rub  Respiratory: negative, Percussion normal. Good diaphragmatic excursion. Lungs clear  Abdomen: Abdomen soft, non-tender. BS normal. No masses, organomegaly           Data:   I reviewed the patient's new clinical lab test results.   Recent Labs   Lab Test 03/03/23  0645 03/03/23  0032 03/02/23  1406 03/02/23  0827 03/02/23  0535 03/01/23  0107 02/24/23  1615 10/12/22  0807 07/21/22  1824 05/19/19  0738 08/13/18  0000 08/09/18  0615   WBC 11.2*  --   --   --  10.5 15.5*   < > 6.3   < > 7.9   < >  --    HGB 8.3*  8.3* 8.0* 7.5*   < > 5.5* 8.6*   < > 10.5*   < > 12.3*   < >  --    MCV 97  --   --   --  99 102*   < > 100   < > 96   < >  --    *  --   --   --  376 497*   < > 314   < > 325   < >  --    INR  --   --   --   --   --   --   --  1.15  --  1.23*  --  2.57*    < > = values in this interval not displayed.     Recent Labs   Lab Test 03/03/23  0645 03/02/23  0535 03/01/23  0107   POTASSIUM 3.8 3.8 5.0   CHLORIDE 114* 113* 102   CO2 18* 19* 16*   BUN 16.3 24.6* 29.3*   ANIONGAP 10 10 19*     Recent Labs   Lab Test 03/02/23  0535 03/01/23  0504 03/01/23  0107 02/24/23  1615 07/21/22  1824 05/19/19  0758 07/31/18  0227 07/21/18  0030 07/20/18  2205   ALBUMIN 2.7*  --  3.2* 3.6   < >  --   --    < > 3.3*   BILITOTAL 0.4  --  0.7 0.4   < >  --   --    < > 0.8   ALT 14  --  19 16   < >  --   --    < > 15   AST 20  --  42 20   < >  --   --    < > 19   PROTEIN  --  30*  --   --   --  10* 30*   < >  --    LIPASE  --   --   --   --   --   --   --   --  105    < > = values in  this interval not displayed.       I reviewed the patient's new imaging results.    All laboratory data reviewed  All imaging studies reviewed by me.    Kaela Greene PA-C,  3/3/2023  [facility]  Office : [phone]  Cell: [phone] (Dr. Arrington)  Cell: [phone] (Kaela Greene PA-C)

## 2023-03-03 NOTE — PLAN OF CARE
Goal Outcome Evaluation:  Orientations: A/O x 1-2, self and place. Dementia at baseline.   Vitals/Pain: VSS on RA, denies pain/nausea. Scheduled tylenol available.   Tele: SR with 1st degree AV block  Lines/Drains: PIV x 2 with NS infusing at 60ml/hr through left PIV.  Skin/Wounds: Posterior head laceration WDL, L radial arm casted from previous fall, scattered bruising.   GI/: Bowel prep completed at 0300. Many watery loose stools, stools are now clear/light brown. Voiding adequately.  Labs: Abnormal/Trends, Electrolyte Replacement- Hgb checks Q8. Hgb at 0030 was 8.0. 0600 was... Recheck at 1400.   Ambulation/Assist: Assist x 2 GB  Sleep Quality: Poor due to many loose stools throughout night. Melatonin given x 1.   Plan: Colonoscopy today. Eventually discharge to TCU.

## 2023-03-03 NOTE — PROGRESS NOTES
[facility]    Hospitalist Progress Note    Date of Service (when I saw the patient): 03/03/2023    Assessment & Plan   Shamir Concepcion is a 82 year old male who was admitted on 3/1/2023.  Shamir Concepcion is a 82 year old male admitted on 3/1/2023. He presents after being found down     *note: pt is DNR/DNI, no pressors or lines, no ICU. History limited from pt 2/2 dementia     Loss of consciousness, suspected syncope  Hypotension, unspecified (hypovolemic vs sepsis vs ?)  Atrial fibrillation (new) with recent bradycardia (? SSS)  Hx hypertension  [needs rec- lisinopril 10 mg daily]  *lives in [address] apt, with multiple falls recently. In ED 1/30 after fall with scalp lac. Plans in place to move to [address] on Friday  *noted bradycardia in clinic to 30s 2/24, was evaluated in ED  *Last spoke with daughter on Monday. Medical-alert was activated, pt found minimally responsive with pools of blood surrounding him with head lac. EMS found BP 82/36.   *In ED hypotensive to 70s systolic, HR 60-80s. Afebrile. Lactic 1.7. WBC 15.5. UA WBC 9.  CT c/a/p with L 11 rib fx, no other acute findings. CT head w/o hemorrhage, note possible NPH, chronic cerebellar infarcts. C-spine w/o fx.   -Admitted to [facility]  Getting IV fluids since admission  Blood pressure improved from systolic 70s prior to admission to 110s on 3/2/2023  PTA lisinopril continues to be on hold  Chest CT showed bilateral opacities consistent with possible pneumonia, minimally displaced left posterior 11th rib fracture    Cardiology consultation requested for episode of bradycardia prior to admission.  As per cardiology   He was in the ED last week after concern for bradycardia at the clinic however on review of EKG from the clinic he was in SR with PACs with claudio. In the ED last week, he was not bradycardic. Unfortunately, the monitor that was ordered was not yet placed thus he was not wearing this at the time of his recent  events. HRs have been stable here. Hypotension has improved with fluid resuscitation. PTA lisinopril is on hold.  They recommended event monitor on discharge.  Heart rate currently improved running in the 70s to 80s  Cardiology signed off currently      Echo done on 3/2/2023 showed EF of 55 to 60% 3.  No regional wall motion abnormalities.  Right ventricular systolic function is mildly reduced right ventricle is moderately dilated.  No valvular heart disease    Acute on chronic anemia;  Patient's hemoglobin on admission was at 8.6  Hemoglobin dropped to 5.5 this morning  Eliquis for history of atrial fibrillation on hold since admission.  Continue to hold with the worsening anemia  No sign of GI bleed  Patient was found in a pool of blood after the fall with a head injury but no other significant bleeding noted since admission  2 units PRBCs ordered.  Hemoglobin improved to 7.5 with the blood transfusion and stayed stable at 8.3 today  Hemoglobin every 8 hours ordered.  We will switch hemoglobin checks to daily CBC at this point  Conditional order to transfuse hemoglobin if hemoglobin less than 7 entered  CT chest abdomen pelvis and CT head ruled out acute bleed  Patient was started on Protonix 40 mg IV twice daily    GI consultation requested EGD done showed esophageal ulcers, small hiatal hernia, mild Schatzki ring, erosive gastropathy, duodenal erosion. No source of blood loss identified. Prepped for colonosopy  N.p.o. for colonoscopy today  Restart Eliquis when okay with GI    Bilateral pneumonia on the CT chest on admission;  Patient is started on IV Zosyn empirically on admission  We will switch Zosyn to oral Augmentin on 3/3/2023   procalcitonin returned elevated at 0.31 on admission  Blood cultures remain negative so far  Urine culture pending  Monitor respiratory status closely    Status post syncope resulting in fall and head injury;  Left posterior 11th rib fracture minimally displaced from the  fall;  Trauma surgery consulted and are following  Pain is well controlled currently  Patient had multiple x-rays of his left wrist, x-ray of the right shoulder as per trauma surgery team and all the x-rays returned negative for any acute fractures  Patient dropped hemoglobin to 5.5 this morning so a CT scan of the chest abdomen pelvis without contrast was done due to the creatinine of 1.8 and showed no acute bleeding  CT head also done without contrast to rule out a subdural and it returned negative except a ventriculomegaly consistent with possible normal pressure hydrocephalus    General neurology consultation requested with his recurrent falls and findings of normal pressure Hydro hydrocephalus on the CT head reviewed previous scans and felt that this is chronic in presentation.  As per neurology  Reviewed imaging noted findings of hydrocephalus in the previous studies which is not new, given the known history of dementia consideration of further intervention such as  shunt can be limited     Would suggest neuropsych testing as an outpatient fall risk precautions     Occupational and physical therapy recommended        Acute hypoxic respiratory failure  O2 sats in ED initially 89%, improved with 3L O2.   - wean O2 as able          TAYLOR  CKD  Baseline creatinine ~1.3-1.5. Creatinine on presentation at 2.04.  Creatinine slightly better at 1.84-1.6  on 3/3/2023   - IV fluids  - monitor     Recent L distal radial fracture  Following with orthopedic through [facility]. Has cast.      Dementia  Notable cognitive impairment in ED, not able to provide much history  - Re-orient as needed  - Maintain normal day/night, sleep wake cycles  - Minimize sedating/altering medications as able     Rheumatoid arthritis  [needs rec- methotrexate 12.5 mg weekly, folic acid]  - hold methotrexate for now     Hx CVA 2018  Hx cerebellar CVA with occlusion/ dissection.   - hold apixaban for now     PAD-Hx R SFA stenting 2013  HLD- resume  statin with rec  MDD- resume sertraline 50 mg daily with rec  BPH- resume trospium with rec        Diet:    N.p.o. for colonoscopy later today  DVT Prophylaxis: Pneumatic Compression Devices  Michelle Catheter: Not present  Lines: None     Cardiac Monitoring: None  Code Status:   DNR/DNI, no pressors/ lines, no ICU- confirmed with daugthers        Clinically Significant Risk Factors Present on Admission             # Hypocalcemia: Lowest Ca = 8.4 mg/dL in last 2 days, will monitor and replace as appropriate    # Anion Gap Metabolic Acidosis: Highest Anion Gap = 19 mmol/L in last 2 days, will monitor and treat as appropriate  # Hypoalbuminemia: Lowest albumin = 3.2 g/dL at 3/1/2023  1:07 AM, will monitor as appropriate  # Drug Induced Coagulation Defect: home medication list includes an anticoagulant medication    # Hypertension: home medication list includes antihypertensive(s)                     Disposition Plan        Expected Discharge Date:    In the next 2 to 3 days once anemia work-up is complete and patient remained stable    Discussed with bedside RN, patient, his daughter Dorcas was  Updated by me on 3/3/2023    Bettye Abdullahi MD, MD  [phone] (P)      Interval History     Patient is resting comfortably in bed.  No active signs of bleeding since admission.  Hemoglobin improved to 7.5 and stayed stable at 8.3 with transfusions.  Prep for colonoscopy today.  Feels thirsty otherwise no acute issues since yesterday    -Data reviewed today: I reviewed all new labs and imaging results over the last 24 hours. I personally reviewed CT scan of the head without contrast, CT chest abdomen pelvis without contrast this morning showing no acute bleed.    Physical Exam   Temp: 98.5  F (36.9  C) Temp src: Oral BP: (!) 111/93 Pulse: 68   Resp: 25 SpO2: 90 % O2 Device: None (Room air)    Vitals:    03/01/23 0117 03/01/23 2000 03/02/23 2000   Weight: 67.9 kg (149 lb 9.6 oz) 70 kg (154 lb 5.2 oz) 72 kg (158 lb 11.7 oz)      Vital Signs with Ranges  Temp:  [97.4  F (36.3  C)-98.5  F (36.9  C)] 98.5  F (36.9  C)  Pulse:  [57-87] 68  Resp:  [14-25] 25  BP: (101-150)/() 111/93  SpO2:  [90 %-96 %] 90 %  I/O last 3 completed shifts:  In: 2690 [P.O.:1470; I.V.:420]  Out: -     Constitutional: Awake, alert, cooperative, no apparent distress, pleasantly confused  Respiratory: Clear to auscultation bilaterally, no crackles or wheezing  Cardiovascular: Regular rate and rhythm, normal S1 and S2, and no murmur noted  GI: Normal bowel sounds, soft, non-distended, non-tender  Skin/Integumen: No rashes, no cyanosis, no edema.  Left hand cast in place  Other:     Medications     sodium chloride 60 mL/hr at 03/03/23 0629       acetaminophen  975 mg Oral Q8H     lidocaine  1 patch Transdermal QAM     lidocaine   Transdermal Q8H GLADIS     pantoprazole  40 mg Intravenous BID     piperacillin-tazobactam  3.375 g Intravenous Q6H     sodium chloride (PF)  3 mL Intracatheter Q8H     sodium chloride (PF)  3 mL Intracatheter Q8H     sodium chloride (PF)  3 mL Intracatheter Q8H     sodium chloride (PF)  3 mL Intracatheter Q8H       Data   Recent Labs   Lab 03/03/23  0645 03/03/23  0032 03/02/23  1406 03/02/23  0827 03/02/23  0535 03/01/23  0107   WBC 11.2*  --   --   --  10.5 15.5*   HGB 8.3*  8.3* 8.0* 7.5*   < > 5.5* 8.6*   MCV 97  --   --   --  99 102*   *  --   --   --  376 497*     --   --   --  142 137   POTASSIUM 3.8  --   --   --  3.8 5.0   CHLORIDE 114*  --   --   --  113* 102   CO2 18*  --   --   --  19* 16*   BUN 16.3  --   --   --  24.6* 29.3*   CR 1.65*  --   --   --  1.84* 2.04*   ANIONGAP 10  --   --   --  10 19*   RANJAN 8.2*  --   --   --  7.6* 8.4*   *  --   --   --  109* 173*   ALBUMIN  --   --   --   --  2.7* 3.2*   PROTTOTAL  --   --   --   --  5.2* 6.0*   BILITOTAL  --   --   --   --  0.4 0.7   ALKPHOS  --   --   --   --  108 132*   ALT  --   --   --   --  14 19   AST  --   --   --   --  20 42    < > = values in  this interval not displayed.       No results found for this or any previous visit (from the past 24 hour(s)).

## 2023-03-03 NOTE — PLAN OF CARE
Goal Outcome Evaluation:  Alert to self and place. VSS. Pain denied. Tele: SR w/ 1*AV block. Hgb recheck 7.5. Clear liq diet rick. Bowel prep began. Scalp laceration WDL. NS infusing at 60mL/hr, zosyn [id number] as ordered. LS:dim BS: audible. L arm cast, cms intact, baseline numbness in fingertips. +void, +flatus, -bm. Up A 1-2/BG pivot.

## 2023-03-03 NOTE — CONSULTS
DATE OF SERVICE : 3/2/2023    DATE OF ADMISSION: 3/1/2023    NEUROLOGICAL CONSULTATION    REQUESTED BY Dr. Gibbons, Husam Monson,*    REASON FOR CONSULTATION:     NPH    HISTORY OF PRESENT ILLNESS:     He is 82 years old with known history of dementia prior history of cerebellar stroke on Eliquis  presenting with fall at home.  EMS found him minimally responsive on the floor pool of blood from the scalp laceration blood pressures systolic in 80's. He was admitted for blood loss anemia Hb were trending down did received blood transfusion. He is planned for endoscopy.    History limited - pt known history of dementia    Past Medical History:   Diagnosis Date     Allergic rhinitis      BPH (benign prostatic hyperplasia)      Cerebral infarction (H)      Colon polyps      ED (erectile dysfunction)      Family history of colon cancer      Hypertension      PAD (peripheral artery disease) (H)     stent     RA (rheumatoid arthritis) (H)      Seronegative rheumatoid arthritis (H)          PSHx:   Past Surgical History:   Procedure Laterality Date     ANGIOGRAM       COLONOSCOPY      polyps     ENT SURGERY      T&A     ESOPHAGOSCOPY, GASTROSCOPY, DUODENOSCOPY (EGD), COMBINED N/A 3/2/2023    Procedure: Esophagoscopy, gastroscopy, duodenoscopy (EGD), combined;  Surgeon: Louie Arrington MD;  Location:  GI     IR LOWER EXTREMITY ANGIOGRAM RIGHT  10/12/2022     LAMINECT/DISCECTOMY, LUMBAR       OPEN REDUCTION INTERNAL FIXATION HUMERUS PROXIMAL Left 8/10/2022    Procedure: Left shoulder open reduction and internal fixation, proximal humerus fracture;  Surgeon: Larry Saxena MD;  Location:  OR     ORTHOPEDIC SURGERY       PHACOEMULSIFICATION CLEAR CORNEA WITH STANDARD INTRAOCULAR LENS IMPLANT Left 4/4/2017    Procedure: PHACOEMULSIFICATION CLEAR CORNEA WITH STANDARD INTRAOCULAR LENS IMPLANT;  Surgeon: Stevie Espinal MD;  Location: [facility]     PHACOEMULSIFICATION CLEAR CORNEA WITH STANDARD INTRAOCULAR LENS IMPLANT  Right 5/2/2017    Procedure: PHACOEMULSIFICATION CLEAR CORNEA WITH STANDARD INTRAOCULAR LENS IMPLANT;  RIGHT EYE PHACOEMULSIFICATION CLEAR CORNEA WITH STANDARD INTRAOCULAR LENS IMPLANT;  Surgeon: Stevie Espinal MD;  Location: [facility]     SINUS SURGERY       VASCULAR SURGERY  R SFA stenting  2012     ZZC MRA UPPER EXTREMITY WO&W CONT  stenting right SFA-AK pop     Facility-Administered Medications Prior to Admission   Medication Dose Route Frequency Provider Last Rate Last Admin     [DISCONTINUED] aspirin (ASA) chewable tablet 81 mg  81 mg Oral Once Rambo Benites MD         Medications Prior to Admission   Medication Sig Dispense Refill Last Dose     acetaminophen (TYLENOL) 325 MG tablet Take 2 tablets (650 mg) by mouth every 4 hours as needed for mild pain    at prn     alendronate (FOSAMAX) 70 MG tablet Take 70 mg by mouth every 7 days Sunday's 2/26/2023     apixaban ANTICOAGULANT (ELIQUIS) 5 MG tablet Take 1 tablet (5 mg) by mouth 2 times daily   2/28/2023 at am     aspirin 81 MG EC tablet Take 81 mg by mouth daily   2/28/2023 at am     fluticasone (FLONASE) 50 MCG/ACT nasal spray Spray 1 spray into both nostrils daily as needed    at prn     folic acid 800 MCG TABS Take 800 mcg by mouth daily   2/28/2023 at am     lisinopril (ZESTRIL) 10 MG tablet Take 10 mg by mouth daily   2/28/2023 at am     methotrexate 2.5 MG tablet Take 5 tablets (12.5 mg) by mouth every 7 days (Patient taking differently: Take 12.5 mg by mouth every 7 days Saturday)   2/25/2023     multivitamin  with lutein (OCUVITE WITH LUTEIN) CAPS per capsule Take 1 capsule by mouth daily   2/28/2023 at am     multivitamin, therapeutic (THERA-VIT) TABS tablet Take 1 tablet by mouth daily   2/28/2023 at am     rosuvastatin (CRESTOR) 10 MG tablet Take 10 mg by mouth daily   2/28/2023 at am     SENNA-docusate sodium (SENNA S) 8.6-50 MG tablet Take 1 tablet by mouth 2 times daily as needed (while on narcotics to prevent constipation) 20  "tablet 0  at prn     sertraline (ZOLOFT) 50 MG tablet Take 50 mg by mouth daily   2/28/2023 at am     trospium (SANCTURA) 20 MG tablet Take 20 mg by mouth 2 times daily   2/28/2023 at am     Vitamin D3 (CHOLECALCIFEROL) 125 MCG (5000 UT) tablet Take 125 mcg by mouth daily   2/28/2023 at am     Current Facility-Administered Medications   Medication Dose Route Frequency     acetaminophen  975 mg Oral Q8H     lidocaine  1 patch Transdermal QAM     lidocaine   Transdermal Q8H GLADIS     pantoprazole  40 mg Intravenous BID     piperacillin-tazobactam  3.375 g Intravenous Q6H     sodium chloride (PF)  3 mL Intracatheter Q8H     sodium chloride (PF)  3 mL Intracatheter Q8H     Current Facility-Administered Medications   Medication Dose Route Frequency     benzocaine 20%   Mouth/Throat PRN     fentaNYL   Intravenous PRN     lidocaine 4%   Topical Q1H PRN     lidocaine (buffered or not buffered)  0.1-1 mL Other Q1H PRN     melatonin  1 mg Oral At Bedtime PRN     midazolam   Intravenous PRN     nitroGLYcerin  0.4 mg Sublingual Q5 Min PRN     ondansetron  4 mg Oral Q6H PRN    Or     ondansetron  4 mg Intravenous Q6H PRN     polyethylene glycol  17 g Oral Daily PRN     senna-docusate  1 tablet Oral BID PRN    Or     senna-docusate  2 tablet Oral BID PRN     sodium chloride (PF)  3 mL Intracatheter q1 min prn     sodium chloride (PF)  3 mL Intracatheter q1 min prn              No Known Allergies      SocHx:  reports that he quit smoking about 24 years ago. His smoking use included cigarettes. He has a 15.00 pack-year smoking history. He has never used smokeless tobacco. He reports that he does not currently use alcohol. He reports that he does not use drugs.  Family History   Problem Relation Age of Onset     Cancer Mother      Cerebrovascular Disease Father        ROS: Limited     PHYSICAL EXAM  /80 (BP Location: Right arm)   Pulse 67   Temp 97.4  F (36.3  C) (Oral)   Resp 17   Ht 1.77 m (5' 9.69\")   Wt 70 kg (154 lb " 5.2 oz)   SpO2 92%   BMI 22.34 kg/m        Patient is alert and oriented to self follow simple commands, left upper limb in a cast  Unable to mention the name of the place, he states he is at home.  Fund of knowledge is impaired  Was equal and round visual fields are full extraocular movements are intact face is symmetric hearing normal to conversation tongue movements are normal  Able to move all 4 limbs against gravity no tremors or involuntary movements reflexes normal and symmetrical sensations are grossly intact gait is deferred  Lab and X-ray:   Recent Labs   Lab Test 03/02/23  1406 03/02/23  0827 03/02/23 0535 02/24/23  1615 10/12/22  0807 07/21/18  1955 07/21/18  1133   WBC  --   --  10.5   < > 6.3   < >  --    HGB 7.5*   < > 5.5*   < > 10.5*   < >  --    PLT  --   --  376   < > 314   < > 359   INR  --   --   --   --  1.15   < >  --    POTASSIUM  --   --  3.8   < > 3.9   < >  --    LDL  --   --   --   --   --   --  88    < > = values in this interval not displayed.     Recent Labs   Lab Test 03/02/23 0535 03/01/23 0107   POTASSIUM 3.8 5.0   CHLORIDE 113* 102   BUN 24.6* 29.3*     Recent Labs   Lab Test 03/02/23  1406 03/02/23  0827 03/02/23 0535 03/01/23 0107 02/24/23  1615 10/12/22  0807 07/21/22  1824 05/19/19  0738   WBC  --   --  10.5 15.5*   < > 6.3   < > 7.9   HGB 7.5* 5.7* 5.5* 8.6*   < > 10.5*   < > 12.3*   MCV  --   --  99 102*   < > 100   < > 96   PLT  --   --  376 497*   < > 314   < > 325   INR  --   --   --   --   --  1.15  --  1.23*    < > = values in this interval not displayed.     Recent Labs   Lab Test 03/02/23 0535 03/01/23 0107   AST 20 42   ALT 14 19   ALKPHOS 108 132*     Recent Labs   Lab Test 07/21/18  1133   HDL 42   LDL 88     No lab results found.    Laboratory results were personally interpreted and reviewed in detail.  Imaging studies reviewed and interpreted in detail      Summary: List Problems:   Patient Active Problem List   Diagnosis     Pure hypercholesterolemia      BPH (benign prostatic hyperplasia)     Rheumatoid arthritis (H)     Acute ischemic stroke (H)     Stroke (cerebrum) (H)     Physical deconditioning     Essential hypertension     Cognitive impairment     Long term current use of anticoagulant therapy     Encounter for monitoring Coumadin therapy     Hx of long-term (current) use of anticoagulants     Closed head injury, initial encounter     Closed fracture of head of left humerus, initial encounter     Fall, initial encounter     Shoulder dislocation, left, initial encounter     Eyebrow laceration, left, initial encounter     Acute pain of left shoulder     Periorbital hematoma of left eye     Closed displaced fracture of greater tuberosity of left humerus, initial encounter     Anemia due to blood loss, acute     Laceration of scalp, initial encounter     History of dementia     Closed fracture of one rib of left side, initial encounter     Hypotension, unspecified hypotension type       ASSESSMENT/PLAN    Shamir Concepcion presented with known history of dementia, prior history of stroke on Eliquis presenting with a fall, hypotension acute blood loss anemia we were consulted for abnormal CT findings concerning for normal pressure hydrocephalus  Reviewed imaging noted findings of hydrocephalus in the previous studies which is not new, given the known history of dementia consideration of further intervention such as  shunt can be limited    Would suggest neuropsych testing as an outpatient fall risk precautions    Occupational and physical therapy    Call us with any questions    Thank you for the opportunity to provide consultation on Shamir Concepcion

## 2023-03-03 NOTE — PROGRESS NOTES
Care Management Follow Up    Length of Stay (days): 2    Expected Discharge Date: 03/05/2023     Concerns to be Addressed:       Patient plan of care discussed at interdisciplinary rounds: Yes    Anticipated Discharge Disposition: Transitional Care     Anticipated Discharge Services:    Anticipated Discharge DME:      Patient/family educated on Medicare website which has current facility and service quality ratings: yes  Education Provided on the Discharge Plan:    Patient/Family in Agreement with the Plan: no    Referrals Placed by CM/SW: Post Acute Facilities  Private pay costs discussed: Not applicable    Additional Information:  TCU referrals sent. Pt accepted to [facility] and [facility]. Spoke with daughter, Dorcas. She prefers [facility] because pt has been there before. Pt has a choice of private or shared room at [facility]. Daughter prefers the private room and understands it is $61.58, per day pt will be billed for. Per [facility], pt will need a negative Covid test prior to admission. SW following.       IVORY Arnold, LGSW  [phone] Desk phone  [phone] Cell/text (Preferred)  [facility]

## 2023-03-04 ENCOUNTER — APPOINTMENT (OUTPATIENT)
Dept: PHYSICAL THERAPY | Facility: CLINIC | Age: 83
DRG: 871 | End: 2023-03-04
Attending: SURGERY
Payer: COMMERCIAL

## 2023-03-04 LAB
ANION GAP SERPL CALCULATED.3IONS-SCNC: 9 MMOL/L (ref 7–15)
BUN SERPL-MCNC: 11.9 MG/DL (ref 8–23)
CALCIUM SERPL-MCNC: 8.2 MG/DL (ref 8.8–10.2)
CHLORIDE SERPL-SCNC: 115 MMOL/L (ref 98–107)
CREAT SERPL-MCNC: 1.5 MG/DL (ref 0.67–1.17)
DEPRECATED HCO3 PLAS-SCNC: 19 MMOL/L (ref 22–29)
ERYTHROCYTE [DISTWIDTH] IN BLOOD BY AUTOMATED COUNT: 16.6 % (ref 10–15)
GFR SERPL CREATININE-BSD FRML MDRD: 46 ML/MIN/1.73M2
GLUCOSE SERPL-MCNC: 110 MG/DL (ref 70–99)
HCT VFR BLD AUTO: 24.7 % (ref 40–53)
HGB BLD-MCNC: 7.8 G/DL (ref 13.3–17.7)
MAGNESIUM SERPL-MCNC: 1.7 MG/DL (ref 1.7–2.3)
MCH RBC QN AUTO: 30.4 PG (ref 26.5–33)
MCHC RBC AUTO-ENTMCNC: 31.6 G/DL (ref 31.5–36.5)
MCV RBC AUTO: 96 FL (ref 78–100)
PHOSPHATE SERPL-MCNC: 2.5 MG/DL (ref 2.5–4.5)
PLATELET # BLD AUTO: 440 10E3/UL (ref 150–450)
POTASSIUM SERPL-SCNC: 3.6 MMOL/L (ref 3.4–5.3)
RBC # BLD AUTO: 2.57 10E6/UL (ref 4.4–5.9)
SODIUM SERPL-SCNC: 143 MMOL/L (ref 136–145)
WBC # BLD AUTO: 8.8 10E3/UL (ref 4–11)

## 2023-03-04 PROCEDURE — C9113 INJ PANTOPRAZOLE SODIUM, VIA: HCPCS | Performed by: INTERNAL MEDICINE

## 2023-03-04 PROCEDURE — 258N000003 HC RX IP 258 OP 636: Performed by: INTERNAL MEDICINE

## 2023-03-04 PROCEDURE — 97162 PT EVAL MOD COMPLEX 30 MIN: CPT | Mod: GP

## 2023-03-04 PROCEDURE — 36415 COLL VENOUS BLD VENIPUNCTURE: CPT | Performed by: SURGERY

## 2023-03-04 PROCEDURE — 80048 BASIC METABOLIC PNL TOTAL CA: CPT | Performed by: SURGERY

## 2023-03-04 PROCEDURE — 97530 THERAPEUTIC ACTIVITIES: CPT | Mod: GP

## 2023-03-04 PROCEDURE — 250N000013 HC RX MED GY IP 250 OP 250 PS 637: Performed by: INTERNAL MEDICINE

## 2023-03-04 PROCEDURE — 250N000011 HC RX IP 250 OP 636: Performed by: INTERNAL MEDICINE

## 2023-03-04 PROCEDURE — 120N000001 HC R&B MED SURG/OB

## 2023-03-04 PROCEDURE — 85027 COMPLETE CBC AUTOMATED: CPT | Performed by: INTERNAL MEDICINE

## 2023-03-04 PROCEDURE — 84100 ASSAY OF PHOSPHORUS: CPT | Performed by: SURGERY

## 2023-03-04 PROCEDURE — 99233 SBSQ HOSP IP/OBS HIGH 50: CPT | Performed by: INTERNAL MEDICINE

## 2023-03-04 PROCEDURE — 83735 ASSAY OF MAGNESIUM: CPT | Performed by: SURGERY

## 2023-03-04 RX ORDER — NALOXONE HYDROCHLORIDE 0.4 MG/ML
0.2 INJECTION, SOLUTION INTRAMUSCULAR; INTRAVENOUS; SUBCUTANEOUS
Status: DISCONTINUED | OUTPATIENT
Start: 2023-03-04 | End: 2023-03-07 | Stop reason: HOSPADM

## 2023-03-04 RX ORDER — TRAMADOL HYDROCHLORIDE 50 MG/1
50 TABLET ORAL EVERY 6 HOURS PRN
Status: DISCONTINUED | OUTPATIENT
Start: 2023-03-04 | End: 2023-03-07 | Stop reason: HOSPADM

## 2023-03-04 RX ORDER — NALOXONE HYDROCHLORIDE 0.4 MG/ML
0.4 INJECTION, SOLUTION INTRAMUSCULAR; INTRAVENOUS; SUBCUTANEOUS
Status: DISCONTINUED | OUTPATIENT
Start: 2023-03-04 | End: 2023-03-07 | Stop reason: HOSPADM

## 2023-03-04 RX ORDER — FLUMAZENIL 0.1 MG/ML
0.2 INJECTION, SOLUTION INTRAVENOUS
Status: ACTIVE | OUTPATIENT
Start: 2023-03-04 | End: 2023-03-04

## 2023-03-04 RX ADMIN — TRAMADOL HYDROCHLORIDE 50 MG: 50 TABLET, COATED ORAL at 13:34

## 2023-03-04 RX ADMIN — PANTOPRAZOLE SODIUM 40 MG: 40 INJECTION, POWDER, FOR SOLUTION INTRAVENOUS at 08:27

## 2023-03-04 RX ADMIN — AMOXICILLIN AND CLAVULANATE POTASSIUM 1 TABLET: 875; 125 TABLET ORAL at 20:47

## 2023-03-04 RX ADMIN — FOLIC ACID TAB 400 MCG 800 MCG: 400 TAB at 08:27

## 2023-03-04 RX ADMIN — THERA TABS 1 TABLET: TAB at 08:27

## 2023-03-04 RX ADMIN — ROSUVASTATIN CALCIUM 10 MG: 10 TABLET, FILM COATED ORAL at 08:27

## 2023-03-04 RX ADMIN — SODIUM CHLORIDE: 9 INJECTION, SOLUTION INTRAVENOUS at 03:57

## 2023-03-04 RX ADMIN — SERTRALINE HYDROCHLORIDE 50 MG: 50 TABLET ORAL at 08:27

## 2023-03-04 RX ADMIN — ACETAMINOPHEN 975 MG: 325 TABLET, FILM COATED ORAL at 08:27

## 2023-03-04 RX ADMIN — AMOXICILLIN AND CLAVULANATE POTASSIUM 1 TABLET: 875; 125 TABLET ORAL at 08:27

## 2023-03-04 RX ADMIN — PANTOPRAZOLE SODIUM 40 MG: 40 INJECTION, POWDER, FOR SOLUTION INTRAVENOUS at 20:33

## 2023-03-04 RX ADMIN — ACETAMINOPHEN 975 MG: 325 TABLET, FILM COATED ORAL at 22:56

## 2023-03-04 ASSESSMENT — ACTIVITIES OF DAILY LIVING (ADL)
ADLS_ACUITY_SCORE: 53
ADLS_ACUITY_SCORE: 57
ADLS_ACUITY_SCORE: 53
ADLS_ACUITY_SCORE: 49

## 2023-03-04 NOTE — PROVIDER NOTIFICATION
MD Notification    Notified Person: MD    Notified Person Name: Bettye Abdullahi    Notification Date/Time: 3/4/23 4020    Notification Interaction: [employer]    Purpose of Notification: Family reports L cast was supposed to be removed Thursday. Wondering if it can be removed here. Also would benefit from a weight bearing clarification per PT. Is it ok to use a platform walker on L? Family unable to clarify.    Orders Received: Will put in an ortho consult.    Comments:

## 2023-03-04 NOTE — PLAN OF CARE
[facility]. Alert, oriented to self. Agitated and uncooperative at times. Tele: SR w/ frequent PACs. T max 99.9 axillary. Other VSS on 2L NC (while sleeping). LS diminished. Frequent congested cough. Incontinent of bowel and bladder this shift. CMS intact. Posterior scalp laceration w/ staples, minimal drainage. Left wrist casted. Scattered bruising. Denies pain. PO intake encouraged, poor appetite. IVF NS infusing at 60 mL/hr. Repo q2h, but patient favors laying on right side. Discharge to [facility] when medically ready.

## 2023-03-04 NOTE — PROGRESS NOTES
[facility]    Hospitalist Progress Note    Date of Service (when I saw the patient): 03/04/2023    Assessment & Plan   Shamir Concepcion is a 82 year old male who was admitted on 3/1/2023.  Shamir Concepcion is a 82 year old male admitted on 3/1/2023. He presents after being found down     *note: pt is DNR/DNI, no pressors or lines, no ICU. History limited from pt 2/2 dementia     Loss of consciousness, suspected syncope  Hypotension, unspecified (hypovolemic vs sepsis vs ?)  Atrial fibrillation (new) with recent bradycardia (? SSS)  Hx hypertension  [needs rec- lisinopril 10 mg daily]  *lives in [address], with multiple falls recently. In ED 1/30 after fall with scalp lac. Plans in place to move to [facility] on Friday  *noted bradycardia in clinic to 30s 2/24, was evaluated in ED  *Last spoke with daughter on Monday. Medical-alert was activated, pt found minimally responsive with pools of blood surrounding him with head lac. EMS found BP 82/36.   *In ED hypotensive to 70s systolic, HR 60-80s. Afebrile. Lactic 1.7. WBC 15.5. UA WBC 9.  CT c/a/p with L 11 rib fx, no other acute findings. CT head w/o hemorrhage, note possible NPH, chronic cerebellar infarcts. C-spine w/o fx.   -Admitted to [facility]  Getting IV fluids since admission  Blood pressure improved from systolic 70s prior to admission to 110s on 3/2/2023  PTA lisinopril continues to be on hold  Chest CT showed bilateral opacities consistent with possible pneumonia, minimally displaced left posterior 11th rib fracture    Cardiology consultation requested for episode of bradycardia prior to admission.  As per cardiology   He was in the ED last week after concern for bradycardia at the clinic however on review of EKG from the clinic he was in SR with PACs with claudio. In the ED last week, he was not bradycardic. Unfortunately, the monitor that was ordered was not yet placed thus he was not wearing this at the time of his recent  events. HRs have been stable here. Hypotension has improved with fluid resuscitation. PTA lisinopril is on hold.  They recommended event monitor on discharge.  Heart rate currently improved running in the 70s to 80s  Cardiology signed off currently      Echo done on 3/2/2023 showed EF of 55 to 60% 3.  No regional wall motion abnormalities.  Right ventricular systolic function is mildly reduced right ventricle is moderately dilated.  No valvular heart disease    Acute on chronic anemia;  Patient's hemoglobin on admission was at 8.6  Hemoglobin dropped to 5.5 this morning  Eliquis for history of atrial fibrillation on hold since admission.  Continue to hold with the worsening anemia  No sign of GI bleed  Patient was found in a pool of blood after the fall with a head injury but no other significant bleeding noted since admission  2 units PRBCs ordered.  Hemoglobin improved to 7.5 with the blood transfusion and stayed stable at 8.3 today  Hemoglobin every 8 hours ordered.  We will switch hemoglobin checks to daily CBC at this point  Conditional order to transfuse hemoglobin if hemoglobin less than 7 entered  CT chest abdomen pelvis and CT head ruled out acute bleed  Patient was started on Protonix 40 mg IV twice daily    GI consultation requested EGD done showed esophageal ulcers, small hiatal hernia, mild Schatzki ring, erosive gastropathy, duodenal erosion. No source of blood loss identified. Prepped for colonosopy  Colonoscopy done on 3/4/2023 returned normal  Hemoglobin remained stable at 7.8 on 3/4/2023  Restart Eliquis when okay with GI    Bilateral pneumonia on the CT chest on admission;  Patient is started on IV Zosyn empirically on admission  We will switch Zosyn to oral Augmentin on 3/3/2023   procalcitonin returned elevated at 0.31 on admission  Blood cultures remain negative so far  Urine culture pending  Monitor respiratory status closely    Status post syncope resulting in fall and head injury;  Left  posterior 11th rib fracture minimally displaced from the fall;  Trauma surgery consulted and are following  Pain is well controlled currently  Patient had multiple x-rays of his left wrist, x-ray of the right shoulder as per trauma surgery team and all the x-rays returned negative for any acute fractures  Patient dropped hemoglobin to 5.5 this morning so a CT scan of the chest abdomen pelvis without contrast was done due to the creatinine of 1.8 and showed no acute bleeding  CT head also done without contrast to rule out a subdural and it returned negative except a ventriculomegaly consistent with possible normal pressure hydrocephalus    General neurology consultation requested with his recurrent falls and findings of normal pressure Hydro hydrocephalus on the CT head reviewed previous scans and felt that this is chronic in presentation.  As per neurology  Reviewed imaging noted findings of hydrocephalus in the previous studies which is not new, given the known history of dementia consideration of further intervention such as  shunt can be limited     Would suggest neuropsych testing as an outpatient fall risk precautions     Occupational and physical therapy recommended  Most likely will need discharge to TCU        Acute hypoxic respiratory failure  O2 sats in ED initially 89%, improved with 3L O2.   - wean O2 as able          TAYLOR  CKD  Baseline creatinine ~1.3-1.5. Creatinine on presentation at 2.04.  Creatinine slightly better at 1.84-1.6 -1.5 on 3/4/2023   -Saline lock IV fluids on 3/4/2023  Encourage oral fluid intake  Avoid nephrotoxins     Recent L distal radial fracture  Following with orthopedic through [facility]. Has cast.      Dementia  Notable cognitive impairment in ED, not able to provide much history  - Re-orient as needed  - Maintain normal day/night, sleep wake cycles  - Minimize sedating/altering medications as able     Rheumatoid arthritis  [needs rec- methotrexate 12.5 mg weekly, folic acid]  -  hold methotrexate for now     Hx CVA 2018  Hx cerebellar CVA with occlusion/ dissection.   - hold apixaban for now     PAD-Hx R SFA stenting 2013  HLD- resume statin with rec  MDD- resume sertraline 50 mg daily with rec  BPH- resume trospium with rec        Diet:    N.p.o. for colonoscopy later today  DVT Prophylaxis: Pneumatic Compression Devices  Michelle Catheter: Not present  Lines: None     Cardiac Monitoring: None  Code Status:   DNR/DNI, no pressors/ lines, no ICU- confirmed with daugthers        Clinically Significant Risk Factors Present on Admission             # Hypocalcemia: Lowest Ca = 8.4 mg/dL in last 2 days, will monitor and replace as appropriate    # Anion Gap Metabolic Acidosis: Highest Anion Gap = 19 mmol/L in last 2 days, will monitor and treat as appropriate  # Hypoalbuminemia: Lowest albumin = 3.2 g/dL at 3/1/2023  1:07 AM, will monitor as appropriate  # Drug Induced Coagulation Defect: home medication list includes an anticoagulant medication    # Hypertension: home medication list includes antihypertensive(s)                     Disposition Plan        Expected Discharge Date:    In the next 2 to 3 days once anemia work-up is complete and patient remained stable    Discussed with bedside RN, patient, his daughter Dorcas was  Updated by me on 3/3/2023    Bettye Abdullahi MD, MD  [phone] (P)      Interval History     Patient is resting comfortably in bed.  No active signs of bleeding since admission.  Hemoglobin is stable at 7.8 on 3/4/2023.   no acute issues since yesterday    -Data reviewed today: I reviewed all new labs and imaging results over the last 24 hours. I personally reviewed no imaging today.  Colonoscopy results reviewed which looked normal except hemorrhoids and some diverticulosis    Physical Exam   Temp: 98.7  F (37.1  C) Temp src: Axillary BP: (!) 144/71 Pulse: 65   Resp: 22 SpO2: 97 % O2 Device: Nasal cannula Oxygen Delivery: 2 LPM  Vitals:    03/01/23 2000 03/02/23 2000  03/04/23 0400   Weight: 70 kg (154 lb 5.2 oz) 72 kg (158 lb 11.7 oz) 71.2 kg (156 lb 15.5 oz)     Vital Signs with Ranges  Temp:  [98.7  F (37.1  C)-99.9  F (37.7  C)] 98.7  F (37.1  C)  Pulse:  [] 65  Resp:  [10-29] 22  BP: ()/(61-96) 144/71  SpO2:  [92 %-99 %] 97 %  I/O last 3 completed shifts:  In: 2520 [P.O.:360; I.V.:2160]  Out: -     Constitutional: Awake, alert, cooperative, no apparent distress, pleasantly confused  Respiratory: Clear to auscultation bilaterally, no crackles or wheezing  Cardiovascular: Regular rate and rhythm, normal S1 and S2, and no murmur noted  GI: Normal bowel sounds, soft, non-distended, non-tender  Skin/Integumen: No rashes, no cyanosis, no edema.  Left hand cast in place  Other:     Medications       acetaminophen  975 mg Oral Q8H     amoxicillin-clavulanate  1 tablet Oral Q12H [facility] (08/20)     folic acid  800 mcg Oral Daily     lidocaine  1 patch Transdermal QAM     lidocaine   Transdermal Q8H [facility]     multivitamin, therapeutic  1 tablet Oral Daily     pantoprazole  40 mg Intravenous BID     rosuvastatin  10 mg Oral Daily     sertraline  50 mg Oral Daily     sodium chloride (PF)  3 mL Intracatheter Q8H     sodium chloride (PF)  3 mL Intracatheter Q8H       Data   Recent Labs   Lab 03/04/23  1121 03/03/23  0645 03/03/23  0032 03/02/23  0827 03/02/23  0535 03/01/23  0107   WBC 8.8 11.2*  --   --  10.5 15.5*   HGB 7.8* 8.3*  8.3* 8.0*   < > 5.5* 8.6*   MCV 96 97  --   --  99 102*    456*  --   --  376 497*    142  --   --  142 137   POTASSIUM 3.6 3.8  --   --  3.8 5.0   CHLORIDE 115* 114*  --   --  113* 102   CO2 19* 18*  --   --  19* 16*   BUN 11.9 16.3  --   --  24.6* 29.3*   CR 1.50* 1.65*  --   --  1.84* 2.04*   ANIONGAP 9 10  --   --  10 19*   RANJAN 8.2* 8.2*  --   --  7.6* 8.4*   * 104*  --   --  109* 173*   ALBUMIN  --   --   --   --  2.7* 3.2*   PROTTOTAL  --   --   --   --  5.2* 6.0*   BILITOTAL  --   --   --   --  0.4 0.7   ALKPHOS  --   --   --    --  108 132*   ALT  --   --   --   --  14 19   AST  --   --   --   --  20 42    < > = values in this interval not displayed.       No results found for this or any previous visit (from the past 24 hour(s)).

## 2023-03-04 NOTE — PROVIDER NOTIFICATION
MD Notification    Notified Person: MD    Notified Person Name: Bettye Abdullahi    Notification Date/Time: 3/4/23 1253    Notification Interaction: [employer]    Purpose of Notification: Pt c/o headache, gave Tylenol Q8H at 0830, no PRN pain meds available.     Orders Received: Ordered Tramadol    Comments:

## 2023-03-04 NOTE — PROVIDER NOTIFICATION
MD Notification    Notified Person: MD    Notified Person Name: Bettye Abdullahi    Notification Date/Time: 3/4/23 7409    Notification Interaction: [employer] Mess[employer]    Purpose of Notification: Pt has been vitally stable, can we discontinue IMC orders?    Orders Received: IMC orders discontinued    Comments:

## 2023-03-05 LAB
ANION GAP SERPL CALCULATED.3IONS-SCNC: 10 MMOL/L (ref 7–15)
BUN SERPL-MCNC: 10.9 MG/DL (ref 8–23)
CALCIUM SERPL-MCNC: 8.2 MG/DL (ref 8.8–10.2)
CHLORIDE SERPL-SCNC: 110 MMOL/L (ref 98–107)
CREAT SERPL-MCNC: 1.32 MG/DL (ref 0.67–1.17)
DEPRECATED HCO3 PLAS-SCNC: 20 MMOL/L (ref 22–29)
GFR SERPL CREATININE-BSD FRML MDRD: 54 ML/MIN/1.73M2
GLUCOSE SERPL-MCNC: 95 MG/DL (ref 70–99)
HGB BLD-MCNC: 8.4 G/DL (ref 13.3–17.7)
MAGNESIUM SERPL-MCNC: 1.6 MG/DL (ref 1.7–2.3)
PHOSPHATE SERPL-MCNC: 2.7 MG/DL (ref 2.5–4.5)
POTASSIUM SERPL-SCNC: 3.6 MMOL/L (ref 3.4–5.3)
SODIUM SERPL-SCNC: 140 MMOL/L (ref 136–145)

## 2023-03-05 PROCEDURE — C9113 INJ PANTOPRAZOLE SODIUM, VIA: HCPCS | Performed by: INTERNAL MEDICINE

## 2023-03-05 PROCEDURE — 250N000013 HC RX MED GY IP 250 OP 250 PS 637: Performed by: INTERNAL MEDICINE

## 2023-03-05 PROCEDURE — 82310 ASSAY OF CALCIUM: CPT | Performed by: SURGERY

## 2023-03-05 PROCEDURE — 36415 COLL VENOUS BLD VENIPUNCTURE: CPT | Performed by: SURGERY

## 2023-03-05 PROCEDURE — 83735 ASSAY OF MAGNESIUM: CPT | Performed by: SURGERY

## 2023-03-05 PROCEDURE — 84100 ASSAY OF PHOSPHORUS: CPT | Performed by: SURGERY

## 2023-03-05 PROCEDURE — 99233 SBSQ HOSP IP/OBS HIGH 50: CPT | Performed by: INTERNAL MEDICINE

## 2023-03-05 PROCEDURE — 36415 COLL VENOUS BLD VENIPUNCTURE: CPT | Performed by: INTERNAL MEDICINE

## 2023-03-05 PROCEDURE — 120N000001 HC R&B MED SURG/OB

## 2023-03-05 PROCEDURE — 85018 HEMOGLOBIN: CPT | Performed by: INTERNAL MEDICINE

## 2023-03-05 PROCEDURE — 250N000011 HC RX IP 250 OP 636: Performed by: INTERNAL MEDICINE

## 2023-03-05 RX ORDER — PANTOPRAZOLE SODIUM 40 MG/1
40 TABLET, DELAYED RELEASE ORAL
Qty: 60 TABLET | Refills: 0 | Status: ON HOLD | DISCHARGE
Start: 2023-03-05 | End: 2024-02-19

## 2023-03-05 RX ORDER — PANTOPRAZOLE SODIUM 40 MG/1
40 TABLET, DELAYED RELEASE ORAL
Status: DISCONTINUED | OUTPATIENT
Start: 2023-03-05 | End: 2023-03-07 | Stop reason: HOSPADM

## 2023-03-05 RX ADMIN — PANTOPRAZOLE SODIUM 40 MG: 40 TABLET, DELAYED RELEASE ORAL at 15:33

## 2023-03-05 RX ADMIN — AMOXICILLIN AND CLAVULANATE POTASSIUM 1 TABLET: 875; 125 TABLET ORAL at 08:22

## 2023-03-05 RX ADMIN — APIXABAN 5 MG: 5 TABLET, FILM COATED ORAL at 21:29

## 2023-03-05 RX ADMIN — THERA TABS 1 TABLET: TAB at 08:23

## 2023-03-05 RX ADMIN — SERTRALINE HYDROCHLORIDE 50 MG: 50 TABLET ORAL at 08:22

## 2023-03-05 RX ADMIN — ACETAMINOPHEN 975 MG: 325 TABLET, FILM COATED ORAL at 23:44

## 2023-03-05 RX ADMIN — ROSUVASTATIN CALCIUM 10 MG: 10 TABLET, FILM COATED ORAL at 08:22

## 2023-03-05 RX ADMIN — APIXABAN 5 MG: 5 TABLET, FILM COATED ORAL at 11:07

## 2023-03-05 RX ADMIN — ACETAMINOPHEN 975 MG: 325 TABLET, FILM COATED ORAL at 06:46

## 2023-03-05 RX ADMIN — PANTOPRAZOLE SODIUM 40 MG: 40 INJECTION, POWDER, FOR SOLUTION INTRAVENOUS at 08:22

## 2023-03-05 RX ADMIN — ACETAMINOPHEN 975 MG: 325 TABLET, FILM COATED ORAL at 15:32

## 2023-03-05 RX ADMIN — FOLIC ACID TAB 400 MCG 800 MCG: 400 TAB at 08:25

## 2023-03-05 RX ADMIN — AMOXICILLIN AND CLAVULANATE POTASSIUM 1 TABLET: 875; 125 TABLET, FILM COATED ORAL at 21:29

## 2023-03-05 ASSESSMENT — ACTIVITIES OF DAILY LIVING (ADL)
ADLS_ACUITY_SCORE: 55
ADLS_ACUITY_SCORE: 53
ADLS_ACUITY_SCORE: 55
ADLS_ACUITY_SCORE: 55
ADLS_ACUITY_SCORE: 53
ADLS_ACUITY_SCORE: 53
ADLS_ACUITY_SCORE: 55
ADLS_ACUITY_SCORE: 53

## 2023-03-05 NOTE — PLAN OF CARE
"Oriented to self, dementia at baseline. VSS on 3LNC. LS dim. C/O headache, tramadol x 1 given. Calm, cooperative, slept between cares. Arouses to voice. Tele SD w/ PACs. Frequent congested cough. Inct B&B, BM x 2, AUOP w/ ext cath. CMS intact. L posterior scalp sutures w/ scant drainage, ROGERIO. L wrist cast, ortho consult ordered. PO intake encouraged, poor appetite. Emesis x 1 d/t \"quickly drinking too much\" per pt's family, pt reported feeling better, denied nausea after. Turn and repo Q2H but favors R side lying. Hgb 7.8, plan for recheck tomorrow AM. Discharge to [facility] pending.    "

## 2023-03-05 NOTE — PROGRESS NOTES
[facility]    Hospitalist Progress Note    Date of Service (when I saw the patient): 03/05/2023    Assessment & Plan   Shamir Concepcion is a 82 year old male who was admitted on 3/1/2023.  Shamir Concepcion is a 82 year old male admitted on 3/1/2023. He presents after being found down     *note: pt is DNR/DNI, no pressors or lines, no ICU. History limited from pt 2/2 dementia     Loss of consciousness, suspected syncope most likley secondary to hypotension   Hypotension, unspecified (hypovolemic vs sepsis vs ?)  Atrial fibrillation (new) with recent bradycardia (? SSS)  Hx hypertension  [needs rec- lisinopril 10 mg daily]  *lives in [facility], with multiple falls recently. In ED 1/30 after fall with scalp lac. Plans in place to move to [facility] on Friday  *noted bradycardia in clinic to 30s 2/24, was evaluated in ED  *Last spoke with daughter on Monday. Medical-alert was activated, pt found minimally responsive with pools of blood surrounding him with head lac. EMS found BP 82/36.   *In ED hypotensive to 70s systolic, HR 60-80s. Afebrile. Lactic 1.7. WBC 15.5. UA WBC 9.  CT c/a/p with L 11 rib fx, no other acute findings. CT head w/o hemorrhage, note possible NPH, chronic cerebellar infarcts. C-spine w/o fx.   -Admitted to [facility]  Getting IV fluids since admission.  Stopped IV fluids on 3/4/2023  Blood pressure improved from systolic 70s prior to admission to 110s on 3/2/2023  PTA lisinopril continues to be on hold with acute kidney injury  Chest CT showed bilateral opacities consistent with possible pneumonia, minimally displaced left posterior 11th rib fracture    Cardiology consultation requested for episode of bradycardia prior to admission.  As per cardiology   He was in the ED last week after concern for bradycardia at the clinic however on review of EKG from the clinic he was in SR with PACs with bigeminy. In the ED last week, he was not bradycardic. Unfortunately, the monitor  that was ordered was not yet placed thus he was not wearing this at the time of his recent events. HRs have been stable here. Hypotension has improved with fluid resuscitation. PTA lisinopril is on hold.  They recommended event monitor on discharge.  Heart rate currently improved running in the 70s to 80s  Cardiology signed off currently      Echo done on 3/2/2023 showed EF of 55 to 60% 3.  No regional wall motion abnormalities.  Right ventricular systolic function is mildly reduced right ventricle is moderately dilated.  No valvular heart disease    Acute on chronic anemia most likely secondary to esophageal ulcers in the setting of anticoagulation with Eliquis and baby aspirin;  Patient's hemoglobin on admission was at 8.6  Hemoglobin dropped to 5.5 on 3/2/2023 morning  Eliquis for history of atrial fibrillation on hold since admission.  No sign of GI bleed  Patient was found in a pool of blood after the fall with a head injury but no other significant bleeding noted since admission  2 units PRBCs ordered.  Hemoglobin improved to 7.5 with the blood transfusion and stayed stable 7.5-8.4    Conditional order to transfuse hemoglobin if hemoglobin less than 7 entered  CT chest abdomen pelvis and CT head ruled out acute bleed  Patient was started on Protonix 40 mg IV twice daily switch to p.o. now    GI consultation requested EGD done showed esophageal ulcers, small hiatal hernia, mild Schatzki ring, erosive gastropathy, duodenal erosion. No source of blood loss identified. Prepped for colonosopy  Colonoscopy done on 3/4/2023 returned normal  Hemoglobin remained stable     Discussed with GI with Dr. Solis they are okay with restarting Eliquis on 3/5/2023  Monitor CBC tomorrow if stable can discharge to TCU    Discontinued  baby aspirin as combination of Eliquis and baby aspirin increases the risk of bleeding    Bilateral pneumonia on the CT chest on admission;  Patient is started on IV Zosyn empirically on  admission  We will switch Zosyn to oral Augmentin on 3/3/2023 5-day course completion of antibiotics on 3/5/2023   procalcitonin returned elevated at 0.31 on admission  Blood cultures remain negative so far  Urine culture was negative  Wean off of oxygen as tolerated    Status post syncope resulting in fall and head injury;  Left posterior 11th rib fracture minimally displaced from the fall;  Trauma surgery consulted and are following  Pain is well controlled currently  Patient had multiple x-rays of his left wrist, x-ray of the right shoulder as per trauma surgery team and all the x-rays returned negative for any acute fractures  Patient dropped hemoglobin to 5.5 this morning so a CT scan of the chest abdomen pelvis without contrast was done due to the creatinine of 1.8 and showed no acute bleeding  CT head also done without contrast to rule out a subdural and it returned negative except a ventriculomegaly consistent with possible normal pressure hydrocephalus    General neurology consultation requested with his recurrent falls and findings of normal pressure Hydro hydrocephalus on the CT head reviewed previous scans and felt that this is chronic in presentation.  As per neurology  Reviewed imaging noted findings of hydrocephalus in the previous studies which is not new, given the known history of dementia consideration of further intervention such as  shunt can be limited     Would suggest neuropsych testing as an outpatient fall risk precautions     Occupational and physical therapy recommended  Most likely will need discharge to TCU    Posterior Head laceration S/p Suturing in ED on 3/1/23 ;  Suture removal in 7days         Acute hypoxic respiratory failure  O2 sats in ED initially 89%, improved with 3L O2.   - wean O2 as able          TAYLOR  CKD  Baseline creatinine ~1.3-1.5. Creatinine on presentation at 2.04.  Creatinine slightly better at 1.84-1.6 -1.5 on 3/4/2023   -Saline lock IV fluids on  3/4/2023  Encourage oral fluid intake  Avoid nephrotoxins     Recent L distal radial fracture  Following with orthopedic through [facility]. Has cast.   Orthopedic surgery consult placed on 3/4/2023 to get help with weightbearing status on the left wrist with use of walker for patient     Dementia  Acute encephalopathy most likely metabolic in the setting of pneumonia, GI bleed, hospitalization delirium in the setting of underlying dementia  Notable cognitive impairment in ED, not able to provide much history  - Re-orient as needed  - Maintain normal day/night, sleep wake cycles  - Minimize sedating/altering medications as able  Patient is pleasantly confused.  Needs TCU placement on discharge     Rheumatoid arthritis  [needs rec- methotrexate 12.5 mg weekly, folic acid]  - hold methotrexate for now     Hx CVA 2018  Hx cerebellar CVA with occlusion/ dissection.   Restarted Eliquis as per GI on 3/5/2023     PAD-Hx R SFA stenting 2013  HLD- resume statin with rec  MDD- resume sertraline 50 mg daily with rec  BPH- resume trospium with rec        Diet:    Low-salt diet  DVT Prophylaxis: Pneumatic Compression Devices  Michelle Catheter: Not present  Lines: None     Cardiac Monitoring: None  Code Status:   DNR/DNI, no pressors/ lines, no ICU- confirmed with daugthers        Clinically Significant Risk Factors Present on Admission             # Hypocalcemia: Lowest Ca = 8.4 mg/dL in last 2 days, will monitor and replace as appropriate    # Anion Gap Metabolic Acidosis: Highest Anion Gap = 19 mmol/L in last 2 days, will monitor and treat as appropriate  # Hypoalbuminemia: Lowest albumin = 3.2 g/dL at 3/1/2023  1:07 AM, will monitor as appropriate  # Drug Induced Coagulation Defect: home medication list includes an anticoagulant medication    # Hypertension: home medication list includes antihypertensive(s)                     Disposition Plan        Expected Discharge Date:    In the next 1 to 2 days if hemoglobin remains  stable    Discussed with bedside RN, patient, his daughter Dorcas was  Updated by me on 3/3/2023    Bettye Abdullahi MD, MD  [phone] (P)      Interval History     Patient is resting comfortably in bed.  No active signs of bleeding since admission.  Hemoglobin is stable at 8.4 on 3/5/2023.   no acute issues since yesterday    -Data reviewed today: I reviewed all new labs and imaging results over the last 24 hours. I personally reviewed no imaging today.  Colonoscopy results reviewed which looked normal except hemorrhoids and some diverticulosis    Physical Exam   Temp: 98.4  F (36.9  C) Temp src: Oral BP: 139/76 Pulse: 53   Resp: 20 SpO2: 97 % O2 Device: Nasal cannula Oxygen Delivery: 2 LPM  Vitals:    03/02/23 2000 03/04/23 0400 03/05/23 0655   Weight: 72 kg (158 lb 11.7 oz) 71.2 kg (156 lb 15.5 oz) 71.2 kg (157 lb)     Vital Signs with Ranges  Temp:  [98.4  F (36.9  C)-98.6  F (37  C)] 98.4  F (36.9  C)  Pulse:  [53-85] 53  Resp:  [20] 20  BP: (139-156)/(76-90) 139/76  SpO2:  [94 %-97 %] 97 %  I/O last 3 completed shifts:  In: 2032 [P.O.:2032]  Out: 725 [Urine:725]    Constitutional: Awake, alert, cooperative, no apparent distress, pleasantly confused  Respiratory: Clear to auscultation bilaterally, no crackles or wheezing  Cardiovascular: Regular rate and rhythm, normal S1 and S2, and no murmur noted  GI: Normal bowel sounds, soft, non-distended, non-tender  Skin/Integumen: No rashes, no cyanosis, no edema.  Left hand cast in place  Other:     Medications       acetaminophen  975 mg Oral Q8H     amoxicillin-clavulanate  1 tablet Oral Q12H [facility] (08/20)     apixaban ANTICOAGULANT  5 mg Oral BID     folic acid  800 mcg Oral Daily     lidocaine  1 patch Transdermal QAM     lidocaine   Transdermal Q8H [facility]     multivitamin, therapeutic  1 tablet Oral Daily     pantoprazole  40 mg Oral BID AC     rosuvastatin  10 mg Oral Daily     sertraline  50 mg Oral Daily     sodium chloride (PF)  3 mL Intracatheter Q8H       Data    Recent Labs   Lab 03/05/23  1145 03/05/23  0542 03/04/23  1121 03/03/23  0645 03/02/23  0827 03/02/23  0535 03/01/23  0107   WBC  --   --  8.8 11.2*  --  10.5 15.5*   HGB 8.4*  --  7.8* 8.3*  8.3*   < > 5.5* 8.6*   MCV  --   --  96 97  --  99 102*   PLT  --   --  440 456*  --  376 497*   NA  --  140 143 142  --  142 137   POTASSIUM  --  3.6 3.6 3.8  --  3.8 5.0   CHLORIDE  --  110* 115* 114*  --  113* 102   CO2  --  20* 19* 18*  --  19* 16*   BUN  --  10.9 11.9 16.3  --  24.6* 29.3*   CR  --  1.32* 1.50* 1.65*  --  1.84* 2.04*   ANIONGAP  --  10 9 10  --  10 19*   RANJAN  --  8.2* 8.2* 8.2*  --  7.6* 8.4*   GLC  --  95 110* 104*  --  109* 173*   ALBUMIN  --   --   --   --   --  2.7* 3.2*   PROTTOTAL  --   --   --   --   --  5.2* 6.0*   BILITOTAL  --   --   --   --   --  0.4 0.7   ALKPHOS  --   --   --   --   --  108 132*   ALT  --   --   --   --   --  14 19   AST  --   --   --   --   --  20 42    < > = values in this interval not displayed.       No results found for this or any previous visit (from the past 24 hour(s)).

## 2023-03-05 NOTE — PLAN OF CARE
Alert, oriented to self. Slept most of the evening and was awake most of the night. Tele: SR/SD w/ frequent PACs. VSS on 2L NC. LS coarse/diminished. Frequent congested cough. Incontinent of bowel and bladder. External cath in palce. CMS intact. Posterior scalp laceration w/ sutures, minimal drainage. Left wrist casted. Scattered bruising. Denies pain. PO intake encouraged, poor appetite. Repo q2h, but patient favors laying on right side. Discharge to [facility] when medically ready.

## 2023-03-06 ENCOUNTER — APPOINTMENT (OUTPATIENT)
Dept: GENERAL RADIOLOGY | Facility: CLINIC | Age: 83
DRG: 871 | End: 2023-03-06
Attending: PHYSICIAN ASSISTANT
Payer: COMMERCIAL

## 2023-03-06 LAB
ANION GAP SERPL CALCULATED.3IONS-SCNC: 14 MMOL/L (ref 7–15)
BACTERIA BLD CULT: NO GROWTH
BACTERIA BLD CULT: NO GROWTH
BUN SERPL-MCNC: 11.2 MG/DL (ref 8–23)
CALCIUM SERPL-MCNC: 8.3 MG/DL (ref 8.8–10.2)
CHLORIDE SERPL-SCNC: 108 MMOL/L (ref 98–107)
CREAT SERPL-MCNC: 1.31 MG/DL (ref 0.67–1.17)
DEPRECATED HCO3 PLAS-SCNC: 20 MMOL/L (ref 22–29)
ERYTHROCYTE [DISTWIDTH] IN BLOOD BY AUTOMATED COUNT: 16.3 % (ref 10–15)
GFR SERPL CREATININE-BSD FRML MDRD: 54 ML/MIN/1.73M2
GLUCOSE SERPL-MCNC: 99 MG/DL (ref 70–99)
HCT VFR BLD AUTO: 27.3 % (ref 40–53)
HGB BLD-MCNC: 8.5 G/DL (ref 13.3–17.7)
MAGNESIUM SERPL-MCNC: 1.6 MG/DL (ref 1.7–2.3)
MCH RBC QN AUTO: 29.7 PG (ref 26.5–33)
MCHC RBC AUTO-ENTMCNC: 31.1 G/DL (ref 31.5–36.5)
MCV RBC AUTO: 96 FL (ref 78–100)
PHOSPHATE SERPL-MCNC: 3 MG/DL (ref 2.5–4.5)
PLATELET # BLD AUTO: 533 10E3/UL (ref 150–450)
POTASSIUM SERPL-SCNC: 3.6 MMOL/L (ref 3.4–5.3)
RBC # BLD AUTO: 2.86 10E6/UL (ref 4.4–5.9)
SODIUM SERPL-SCNC: 142 MMOL/L (ref 136–145)
WBC # BLD AUTO: 10.7 10E3/UL (ref 4–11)

## 2023-03-06 PROCEDURE — L3908 WHO COCK-UP NONMOLDE PRE OTS: HCPCS

## 2023-03-06 PROCEDURE — 99233 SBSQ HOSP IP/OBS HIGH 50: CPT | Performed by: HOSPITALIST

## 2023-03-06 PROCEDURE — 83735 ASSAY OF MAGNESIUM: CPT | Performed by: SURGERY

## 2023-03-06 PROCEDURE — 36415 COLL VENOUS BLD VENIPUNCTURE: CPT | Performed by: INTERNAL MEDICINE

## 2023-03-06 PROCEDURE — 82310 ASSAY OF CALCIUM: CPT | Performed by: HOSPITALIST

## 2023-03-06 PROCEDURE — 73110 X-RAY EXAM OF WRIST: CPT | Mod: LT

## 2023-03-06 PROCEDURE — 999N000157 HC STATISTIC RCP TIME EA 10 MIN

## 2023-03-06 PROCEDURE — 85014 HEMATOCRIT: CPT | Performed by: INTERNAL MEDICINE

## 2023-03-06 PROCEDURE — 250N000013 HC RX MED GY IP 250 OP 250 PS 637: Performed by: INTERNAL MEDICINE

## 2023-03-06 PROCEDURE — 84100 ASSAY OF PHOSPHORUS: CPT | Performed by: SURGERY

## 2023-03-06 PROCEDURE — 94150 VITAL CAPACITY TEST: CPT

## 2023-03-06 PROCEDURE — 120N000001 HC R&B MED SURG/OB

## 2023-03-06 RX ADMIN — ACETAMINOPHEN 975 MG: 325 TABLET, FILM COATED ORAL at 23:48

## 2023-03-06 RX ADMIN — ROSUVASTATIN CALCIUM 10 MG: 10 TABLET, FILM COATED ORAL at 09:27

## 2023-03-06 RX ADMIN — THERA TABS 1 TABLET: TAB at 09:27

## 2023-03-06 RX ADMIN — TRAMADOL HYDROCHLORIDE 50 MG: 50 TABLET, COATED ORAL at 09:27

## 2023-03-06 RX ADMIN — ACETAMINOPHEN 975 MG: 325 TABLET, FILM COATED ORAL at 06:46

## 2023-03-06 RX ADMIN — TRAMADOL HYDROCHLORIDE 50 MG: 50 TABLET, COATED ORAL at 04:35

## 2023-03-06 RX ADMIN — FOLIC ACID TAB 400 MCG 800 MCG: 400 TAB at 09:27

## 2023-03-06 RX ADMIN — SERTRALINE HYDROCHLORIDE 50 MG: 50 TABLET ORAL at 09:27

## 2023-03-06 RX ADMIN — APIXABAN 5 MG: 5 TABLET, FILM COATED ORAL at 09:27

## 2023-03-06 RX ADMIN — PANTOPRAZOLE SODIUM 40 MG: 40 TABLET, DELAYED RELEASE ORAL at 06:46

## 2023-03-06 RX ADMIN — APIXABAN 5 MG: 5 TABLET, FILM COATED ORAL at 21:00

## 2023-03-06 ASSESSMENT — ACTIVITIES OF DAILY LIVING (ADL)
ADLS_ACUITY_SCORE: 55

## 2023-03-06 NOTE — PROGRESS NOTES
Patient achieved a NIF of -25 and a FVC of 1500 ml both done with poor to fair pt effort.  Will cont to monitor.  3/6/2023  Jinny Reynoso, RT

## 2023-03-06 NOTE — PROGRESS NOTES
BRIEF ORTHO NOTE:    I saw patient this morning and removed cast in preparation for Dr. Juarez to see patient this afternoon for consult.    Cherelle Dubois PA-C  [facility]

## 2023-03-06 NOTE — PROGRESS NOTES
Care Management Follow Up    Length of Stay (days): 5    Expected Discharge Date: 03/06/2023     Concerns to be Addressed:       Patient plan of care discussed at interdisciplinary rounds: No    Anticipated Discharge Disposition: Transitional Care     Anticipated Discharge Services:    Anticipated Discharge DME:      Patient/family educated on Medicare website which has current facility and service quality ratings: yes  Education Provided on the Discharge Plan:    Patient/Family in Agreement with the Plan: no    Referrals Placed by CM/SW: Post Acute Facilities  Private pay costs discussed: Not applicable    Additional Information:  JERRICA received voicemail from Randi at [facility] with acceptance and asking if pt was medically ready to discharge. They could take pt at any time today. JERRICA spoke with CC that pt's hemoglobin is needing to be stable before discharge. JERRICA paged MD to inform of TCU placement. JERRICA spoke with MD, pt now has a sitter and there have been behavioral issues since this morning, so unable to transition to TCU today. PC to [facility] to update, today was the last day they are able to hold the bed. JERRICA will continue to follow.     Addendum 1253: JERRICA spoke with charge RN, pt does not have a sitter and does not feel there are behavioral challenges. Paged MD to confirm discharge today. MD reported not medically ready. Call to [facility], they no longer have a bed available. [facility] accepted.       KATY Espinoza  Social Work  [facility]

## 2023-03-06 NOTE — PROGRESS NOTES
"[facility]    Hospitalist Progress Note    Date of Service (when I saw the patient): 03/06/2023    Assessment & Plan   Shamir Concepcion is a 82 year old male admitted on 3/1/2023. He presents after being found down     *note: pt is DNR/DNI, no pressors or lines, no ICU. History limited from pt 2/2 dementia     Loss of consciousness, suspected syncope most likley secondary to hypotension   Hypotension, unspecified (hypovolemic vs sepsis vs ?)  Atrial fibrillation (new) with recent bradycardia (? SSS)  Hx hypertension  [needs rec- lisinopril 10 mg daily]  *lives in [facility], with multiple falls recently. In ED 1/30 after fall with scalp lac. Plans in place to move to [facility] on Friday; most recently [facility] TCU  *noted bradycardia in clinic to 30s 2/24, was evaluated in ED  *Last spoke with daughter on Monday. Medical-alert was activated, pt found minimally responsive with pools of blood surrounding him with head lac. EMS found BP 82/36.   *In ED hypotensive to 70s systolic, HR 60-80s. Afebrile. Lactic 1.7. WBC 15.5. UA WBC 9.  CT c/a/p with L 11 rib fx, no other acute findings. CT head w/o hemorrhage, note possible NPH, chronic cerebellar infarcts. C-spine w/o fx.   Getting IV fluids since admission.  Stopped IV fluids on 3/4/2023  Blood pressure improved from systolic 70s prior to admission to 110s on 3/2/2023  PTA lisinopril continues to be on hold with acute kidney injury  Chest CT showed bilateral opacities consistent with possible pneumonia, minimally displaced left posterior 11th rib fracture  Cardiology consult \"He was in the ED last week after concern for bradycardia at the clinic however on review of EKG from the clinic he was in SR with PACs with bigeminy. In the ED last week, he was not bradycardic. Unfortunately, the monitor that was ordered was not yet placed thus he was not wearing this at the time of his recent events. HRs have been stable here. Hypotension has " improved with fluid resuscitation. PTA lisinopril is on hold.  They recommended event monitor on discharge.  Heart rate currently improved running in the 70s to 80s.  Cardiology signed off currently  Echo done on 3/2/2023 showed EF of 55 to 60% 3.  No regional wall motion abnormalities.  Right ventricular systolic function is mildly reduced right ventricle is moderately dilated.  No valvular heart disease  Therapies rec TCU    Acute on chronic anemia most likely secondary to esophageal ulcers in the setting of anticoagulation with Eliquis and baby aspirin;  Patient's hemoglobin on admission was at 8.6  Hemoglobin dropped to 5.5 on 3/2/2023  Eliquis for history of atrial fibrillation on hold since admission.  No sign of GI bleed  Patient was found in a pool of blood after the fall with a head injury but no other significant bleeding noted since admission  2 units PRBCs ordered.  Hemoglobin improved to 7.5 with the blood transfusion and stayed stable 7.5-8.4  Conditional order to transfuse hemoglobin if hemoglobin less than 7 entered  CT chest abdomen pelvis and CT head ruled out acute bleed  Patient was started on Protonix 40 mg IV twice daily switch to p.o. now  GI consultation requested EGD done showed esophageal ulcers, small hiatal hernia, mild Schatzki ring, erosive gastropathy, duodenal erosion. No source of blood loss identified. Colonoscopy done on 3/4/2023 returned normal  Hemoglobin  stable   Discussed with GI with Dr. Solis they are okay with restarting Eliquis on 3/5/2023  Hemoglobin/6/23 8.5 stable  Discontinued  baby aspirin, on Eliquis as above   On encounter this morning moderate blood noted on pillow, recheck hemoglobin in a.m. patient cannot provide history, denies pain or known hematemesis.    Bilateral pneumonia on the CT chest on admission;  Patient is started on IV Zosyn empirically on admission  We will switch Zosyn to oral Augmentin on 3/3/2023 5-day course completion of antibiotics on  3/5/2023   procalcitonin returned elevated at 0.31 on admission  Blood cultures NGTD  Urine culture was negative  Wean off of oxygen as tolerated, presently O2 1 L NC.    Status post syncope resulting in fall and head injury;  Left posterior 11th rib fracture minimally displaced from the fall;  Trauma surgery consulted and are following  Pain is well controlled currently  Patient had multiple x-rays of his left wrist, x-ray of the right shoulder as per trauma surgery team and all the x-rays returned negative except for left ulnar fracture, see below.  Patient dropped hemoglobin to 5.5 this morning so a CT scan of the chest abdomen pelvis without contrast was done due to the creatinine of 1.8 and showed no acute bleeding, EGD findings as above.  CT head also done without contrast to rule out a subdural and it returned negative except a ventriculomegaly consistent with possible normal pressure hydrocephalus  General neurology consultation requested with his recurrent falls and findings of normal pressure Hydro hydrocephalus on the CT head reviewed previous scans and felt that this is chronic in presentation.  As per neurology: Reviewed imaging noted findings of hydrocephalus in the previous studies which is not new, given the known history of dementia consideration of further intervention such as  shunt can be limited. suggest neuropsych testing as an outpatient fall risk precautions  Occupational and physical therapy recommended TCU    Posterior Head laceration S/p Suturing in ED on 3/1/23 ;  Suture removal in 7days       Acute hypoxic respiratory failure  O2 sats in ED initially 89%, improved with 3L O2.   - wean O2 as able currently 1 L NC      TAYLOR  CKD  Baseline creatinine ~1.3-1.5. Creatinine on presentation at 2.04.  Creatinine slightly better at 1.84-1.6 -1.5 on 3/4/2023   -Saline lock IV fluids on 3/4/2023  Encourage oral fluid intake  Avoid nephrotoxins  -[facility] 3 6/23 pending.  Magnesium 1.6, start magnesium  replacement protocol.     Recent L distal radial fracture  Following with orthopedic through [facility]. Has cast.  Cast removed today 3/6/2023, to be evaluated by [facility].   To determine weightbearing status and status of fracture.     Dementia  Acute encephalopathy most likely metabolic in the setting of pneumonia, GI bleed, hospitalization delirium in the setting of underlying dementia  Notable cognitive impairment in ED, not able to provide much history  - Re-orient as needed  - Maintain normal day/night, sleep wake cycles  - Minimize sedating/altering medications as able  Patient is pleasantly confused.  Needs TCU placement on discharge  -Today on rounds patient had multiple attempts to get out of bed over rails, was not able to be redirected, required 2 nursing aides to settle patient in bed, sitter placed, subsequently removed.  Patient is a fall risk.  Nursing states history of behavioral issues regarding sedation and poor attempts to get up on his own and out of bed.  Is a fall risk with noted multiple traumatic injuries.  Close monitor for now hold off PRN sedation; attempt reassurance/redirection, sitter as needed to prevent patient trying to get up on his own, known fall risk.     Rheumatoid arthritis  [needs rec- methotrexate 12.5 mg weekly, folic acid]  - hold methotrexate for now     Hx CVA 2018  Hx cerebellar CVA with occlusion/ dissection.   Restarted Eliquis as per GI on 3/5/2023     PAD-Hx R SFA stenting 2013  HLD- resume statin with rec  MDD- resume sertraline 50 mg daily with rec  BPH- resume trospium with rec      Diet:    Low-salt diet  DVT Prophylaxis: Pneumatic Compression Devices  Michelle Catheter: Not present  Lines: None     Cardiac Monitoring: None  Code Status:   DNR/DNI, no pressors/ lines, no ICU- confirmed with susana      Clinically Significant Risk Factors Present on Admission             # Hypocalcemia: Lowest Ca = 8.4 mg/dL in last 2 days, will monitor and replace as appropriate    #  Anion Gap Metabolic Acidosis: Highest Anion Gap = 19 mmol/L in last 2 days, will monitor and treat as appropriate  # Hypoalbuminemia: Lowest albumin = 3.2 g/dL at 3/1/2023  1:07 AM, will monitor as appropriate  # Drug Induced Coagulation Defect: home medication list includes an anticoagulant medication    # Hypertension: home medication list includes antihypertensive(s)            Expected Discharge Date:  1-2 days pending stable hgb; control impulsive behaviors w/o sitter or excess sedation; compliance to medications and lab monitoring; safe disposition: [facility]     Kelley Staley MD, MD  [phone] (P)    Total time 50 minutes for today 3/6/2023 :   time consisted of the following, assuming Patient care with review of records including labs, imaging results, medications, interdisciplinary notes; examination of Patient;  and completing documentation and orders.  Care Management included counseling/discussion with Nursing and SW regarding current condition including impulsivity; fall risk; hematemesis; L wrist fracture, and Coordination of Care time with Nursing  and Specialists, Ortho regarding  R wrist fracture care plan, management and surveillance.       Interval History   On morning encounters patient with multiple attempts to get out of bed over railing, not redirectable/reassured, required 2 NA assist with nursing, initiated sitter.  Patient denies pain.  Moderate old blood noted on pillow, known hematemesis from esophageal erosions.  Oriented times person only[known].    -Data reviewed today: I reviewed all new labs and imaging results over the last 24 hours.  Physical Exam   Temp: 98.2  F (36.8  C) Temp src: Oral BP: 136/87 Pulse: 67   Resp: 20 SpO2: 93 % O2 Device: Nasal cannula Oxygen Delivery: 1 LPM  Vitals:    03/02/23 2000 03/04/23 0400 03/05/23 0655   Weight: 72 kg (158 lb 11.7 oz) 71.2 kg (156 lb 15.5 oz) 71.2 kg (157 lb)       Constitutional: Sleeping on morning encounters, arousable to voice,  did not follow commands, oriented x1.  Respiratory: Respirations nonlabored room  Cardiovascular: Regular  GI: Soft, nontender.  No rebound, guarding or other peritoneal signs  Skin/Integumen:   Left hand cast in place  Neuro.  Grossly nonfocal, complete exam not able to cooperate with exam  Psych.  Oriented x1.  Affect, impulsive.    Medications       acetaminophen  975 mg Oral Q8H     apixaban ANTICOAGULANT  5 mg Oral BID     folic acid  800 mcg Oral Daily     lidocaine  1 patch Transdermal QAM     lidocaine   Transdermal Q8H GLADIS     multivitamin, therapeutic  1 tablet Oral Daily     pantoprazole  40 mg Oral BID AC     rosuvastatin  10 mg Oral Daily     sertraline  50 mg Oral Daily     sodium chloride (PF)  3 mL Intracatheter Q8H       Data   Recent Labs   Lab 03/06/23  1110 03/05/23  1145 03/05/23  0542 03/04/23  1121 03/03/23  0645 03/02/23  0827 03/02/23  0535 03/01/23  0107   WBC 10.7  --   --  8.8 11.2*  --  10.5 15.5*   HGB 8.5* 8.4*  --  7.8* 8.3*  8.3*   < > 5.5* 8.6*   MCV 96  --   --  96 97  --  99 102*   *  --   --  440 456*  --  376 497*   NA  --   --  140 143 142  --  142 137   POTASSIUM  --   --  3.6 3.6 3.8  --  3.8 5.0   CHLORIDE  --   --  110* 115* 114*  --  113* 102   CO2  --   --  20* 19* 18*  --  19* 16*   BUN  --   --  10.9 11.9 16.3  --  24.6* 29.3*   CR  --   --  1.32* 1.50* 1.65*  --  1.84* 2.04*   ANIONGAP  --   --  10 9 10  --  10 19*   RANJAN  --   --  8.2* 8.2* 8.2*  --  7.6* 8.4*   GLC  --   --  95 110* 104*  --  109* 173*   ALBUMIN  --   --   --   --   --   --  2.7* 3.2*   PROTTOTAL  --   --   --   --   --   --  5.2* 6.0*   BILITOTAL  --   --   --   --   --   --  0.4 0.7   ALKPHOS  --   --   --   --   --   --  108 132*   ALT  --   --   --   --   --   --  14 19   AST  --   --   --   --   --   --  20 42    < > = values in this interval not displayed.       Recent Results (from the past 24 hour(s))   XR Wrist Left G/E 3 Views    Narrative    XR WRIST LEFT G/E 3 VIEWS 3/6/2023 11:52  AM     HISTORY: Evaluate fracture out of cast    COMPARISON: 3/1/2023       Impression    IMPRESSION: Fracture of the radial styloid with mild radial  distraction of the fracture present. This is unchanged from the  previous exam. Fracture of the ulnar styloid, also stable. Fractured  radius extends into the articular margin of the wrist joint. Diastasis  of the scapholunate interval recent versus injury to the ligament.  Mild degenerative change of the STT joint and first CMC joint.  Degenerative changes of first MCP joint.    NYDIA BALBUENA MD         SYSTEM ID:  [id number]

## 2023-03-06 NOTE — CONSULTS
[facility]    Orthopedic Consultation    Shamir Concepcion MRN# [id number]   Age: 82 year old YOB: 1940     Date of Admission: 3/1/2023    Reason for consult: Left wrist fracture (DOI: 1/30/23)       Requesting provider: Bettye Abdullahi        Level of consult: One-time consult to assist in determining a diagnosis, recommend an appropriate treatment plan and place orders           Assessment and Plan:   Assessment:   Left wrist fracture (DOI: 1/30/23)      Plan:   Xrays reviewed by Dr Larry Fontana to transition to use of removable brace (ordered)   Remove brace for hygiene and daily skin checks.    Okay to remove brace to work on ROMAT with therapy.    WBAT with brace applied.   Follow-up at [facility] in 2 weeks            Chief Complaint:   Left knee brace          History of Present Illness:   This patient is a 82 year old male who presents with the following condition requiring a hospital admission: Anemia due to blood loss, acute [D62]  Laceration of scalp, initial encounter [S01.01XA]  History of dementia [Z86.59]  Long term current use of anticoagulant therapy [Z79.01]  Closed fracture of one rib of left side, initial encounter [S22.32XA]  Hypotension, unspecified hypotension type [I95.9]    Patient unable to provide history:   Orthopedics consulted to evaluate left wrist fracture.  According to medical records, patient sustained a left wrist fracture on 1/30/23.  He was placed into a cast.    Cast was removed earlier today and xrays obtained.            Past Medical History:     Past Medical History:   Diagnosis Date     Allergic rhinitis      BPH (benign prostatic hyperplasia)      Cerebral infarction (H)      Colon polyps      ED (erectile dysfunction)      Family history of colon cancer      Hypertension      PAD (peripheral artery disease) (H)     stent     RA (rheumatoid arthritis) (H)      Seronegative rheumatoid arthritis (H)              Past Surgical History:      Past Surgical History:   Procedure Laterality Date     ANGIOGRAM       COLONOSCOPY      polyps     COLONOSCOPY N/A 3/3/2023    Procedure: Colonoscopy;  Surgeon: Eros Ambriz MD;  Location:  GI     ENT SURGERY      T&A     ESOPHAGOSCOPY, GASTROSCOPY, DUODENOSCOPY (EGD), COMBINED N/A 3/2/2023    Procedure: Esophagoscopy, gastroscopy, duodenoscopy (EGD), combined;  Surgeon: Louie Arrington MD;  Location:  GI     IR LOWER EXTREMITY ANGIOGRAM RIGHT  10/12/2022     LAMINECT/DISCECTOMY, LUMBAR       OPEN REDUCTION INTERNAL FIXATION HUMERUS PROXIMAL Left 8/10/2022    Procedure: Left shoulder open reduction and internal fixation, proximal humerus fracture;  Surgeon: Larry Saxena MD;  Location:  OR     ORTHOPEDIC SURGERY       PHACOEMULSIFICATION CLEAR CORNEA WITH STANDARD INTRAOCULAR LENS IMPLANT Left 2017    Procedure: PHACOEMULSIFICATION CLEAR CORNEA WITH STANDARD INTRAOCULAR LENS IMPLANT;  Surgeon: Stevie Espinal MD;  Location:  EC     PHACOEMULSIFICATION CLEAR CORNEA WITH STANDARD INTRAOCULAR LENS IMPLANT Right 2017    Procedure: PHACOEMULSIFICATION CLEAR CORNEA WITH STANDARD INTRAOCULAR LENS IMPLANT;  RIGHT EYE PHACOEMULSIFICATION CLEAR CORNEA WITH STANDARD INTRAOCULAR LENS IMPLANT;  Surgeon: Stevie Espinal MD;  Location:  EC     SINUS SURGERY       VASCULAR SURGERY  R SFA stenting       ZZC MRA UPPER EXTREMITY WO&W CONT  stenting right SFA-AK pop             Social History:     Social History     Tobacco Use     Smoking status: Former     Packs/day: 0.50     Years: 30.00     Pack years: 15.00     Types: Cigarettes     Quit date: 3/1/1999     Years since quittin.0     Smokeless tobacco: Never   Substance Use Topics     Alcohol use: Not Currently     Comment: rarely             Family History:     Family History   Problem Relation Age of Onset     Cancer Mother      Cerebrovascular Disease Father              Immunizations:     VACCINE/DOSE   Diptheria   DPT   DTAP    HBIG   Hepatitis A   Hepatitis B   HIB   Influenza   Measles   Meningococcal   MMR   Mumps   Pneumococcal   Polio   Rubella   Small Pox   TDAP   Varicella   Zoster             Allergies:   No Known Allergies          Medications:     Current Facility-Administered Medications   Medication     acetaminophen (TYLENOL) tablet 975 mg     apixaban ANTICOAGULANT (ELIQUIS) tablet 5 mg     folic acid (FOLVITE) tablet 800 mcg     Lidocaine (LIDOCARE) 4 % Patch 1 patch     lidocaine (LMX4) cream     lidocaine 1 % 0.1-1 mL     lidocaine patch in PLACE     melatonin tablet 1 mg     multivitamin, therapeutic (THERA-VIT) tablet 1 tablet     naloxone (NARCAN) injection 0.2 mg     naloxone (NARCAN) injection 0.2 mg     naloxone (NARCAN) injection 0.4 mg     naloxone (NARCAN) injection 0.4 mg     nitroGLYcerin (NITROSTAT) sublingual tablet 0.4 mg     ondansetron (ZOFRAN ODT) ODT tab 4 mg    Or     ondansetron (ZOFRAN) injection 4 mg     pantoprazole (PROTONIX) EC tablet 40 mg     polyethylene glycol (MIRALAX) Packet 17 g     rosuvastatin (CRESTOR) tablet 10 mg     senna-docusate (SENOKOT-S/PERICOLACE) 8.6-50 MG per tablet 1 tablet    Or     senna-docusate (SENOKOT-S/PERICOLACE) 8.6-50 MG per tablet 2 tablet     sertraline (ZOLOFT) tablet 50 mg     sodium chloride (PF) 0.9% PF flush 3 mL     sodium chloride (PF) 0.9% PF flush 3 mL     traMADol (ULTRAM) tablet 50 mg             Review of Systems:   ROS:  10 point ROS neg other than the symptoms noted above in the HPI.            Physical Exam:   All vitals have been reviewed  Patient Vitals for the past 24 hrs:   BP Temp Temp src Pulse Resp SpO2   03/06/23 0730 136/87 98.2  F (36.8  C) Oral 67 20 93 %   03/06/23 0400 136/81 97.8  F (36.6  C) -- 54 20 94 %   03/05/23 2111 138/86 97.5  F (36.4  C) Oral 56 18 97 %   03/05/23 1613 (!) 147/75 97.7  F (36.5  C) Oral 53 18 92 %       Intake/Output Summary (Last 24 hours) at 3/6/2023 1416  Last data filed at 3/6/2023 1000  Gross per 24 hour    Intake 1160 ml   Output 925 ml   Net 235 ml         Physical Exam   Temp: 98.2  F (36.8  C) Temp src: Oral BP: 136/87 Pulse: 67   Resp: 20 SpO2: 93 % O2 Device: Nasal cannula Oxygen Delivery: 1 LPM  Vital Signs with Ranges  Temp:  [97.5  F (36.4  C)-98.2  F (36.8  C)] 98.2  F (36.8  C)  Pulse:  [53-67] 67  Resp:  [18-20] 20  BP: (136-147)/(75-87) 136/87  SpO2:  [92 %-97 %] 93 %  157 lbs 0 oz    Constitutional: Sleeping t/o exam, awakens to voice.  Cardiovascular: Regular rate and rhythm per pulses  GI: Abdomen non-distended.  Lymph/Hematologic: No lymphadenopathy in areas examined  Skin: No rashes, no cyanosis, no edema.  Musculoskeletal: On exam of the left UE: skin scaling.  Non-tender to palpation along the left UE.  Able to passively range the left wrist with minimal pain.  Scabs along dorsal hand.  No surrounding erythema.  Pulses and sensation intact.    Neurologic: normal without focal findings  Neuropsychiatric: dementia             Data:   All laboratory data reviewed  Results for orders placed or performed during the hospital encounter of 03/01/23   UPPER GI ENDOSCOPY     Status: None   Result Value Ref Range    Upper GI Endoscopy       [address] [address] [place]  [address]  _______________________________________________________________________________  Patient Name: Shamir Concepcion         Procedure Date: 3/2/2023 12:01 PM  MRN: [id number]                       Account Number: [id number]  YOB: 1940              Admit Type: Inpatient  Age: 82                               Room: 1                          Note Status: Finalized                Attending MD: CINTIA MARAVILLA MD  Instrument Name: 509 GIF  Gastroscope   _______________________________________________________________________________     Procedure:                Upper GI endoscopy  Indications:              Iron deficiency anemia  Providers:                CINTIA MARAVILLA MD, Sophy  WILLY Sloan  Referring MD:               Medicines:                Fentanyl 50 micrograms IV, Midazolam 1 mg IV  Complications:            No immediate complications.  _____________________________________________________ __________________________  Procedure:                Pre-Anesthesia Assessment:                            - Prior to the procedure, a History and Physical                             was performed, and patient medications and                             allergies were reviewed. The patient is competent.                             The risks and benefits of the procedure and the                             sedation options and risks were discussed with the                             patient. All questions were answered and informed                             consent was obtained. Patient identification and                             proposed procedure were verified by the physician                             in the pre-procedure area. Mental Status                             Examination: alert and oriented. Airway                             Examination: normal oropharyngeal airway and neck                             mobility. Respiratory Examination: clear to                              auscultation. CV Examination: normal. Prophylactic                             Antibiotics: The patient does not require                             prophylactic antibiotics. Prior Anticoagulants: The                             patient has taken no anticoagulant or antiplatelet                             agents. ASA Grade Assessment: II - A patient with                             mild systemic disease. After reviewing the risks                             and benefits, the patient was deemed in                             satisfactory condition to undergo the procedure.                             The anesthesia plan was to use moderate sedation /                             analgesia (conscious  sedation). Immediately prior                             to administration of medications, the patient was                             re-assessed for adequacy to receive sedatives. The                             heart rate, respiratory rate, oxygen saturations,                              blood pressure, adequacy of pulmonary ventilation,                             and response to care were monitored throughout the                             procedure. The physical status of the patient was                             re-assessed after the procedure.                            After obtaining informed consent, the endoscope was                             passed under direct vision. Throughout the                             procedure, the patient's blood pressure, pulse, and                             oxygen saturations were monitored continuously. The                             endoscope 509 was introduced through the mouth, and                             advanced to the third part of duodenum. The upper                             GI endoscopy was accomplished without difficulty.                             The patient tolerated the procedure well.                                                                                   Findings:       Three c ratered and linear esophageal ulcers were found 35 cm from the        incisors. The largest lesion was 20 mm in largest dimension.       A small hiatal hernia was present.       A mild Schatzki ring was found at the gastroesophageal junction.       Multiple dispersed erosions with no bleeding and no stigmata of recent        bleeding were found in the gastric antrum.       The first portion of the duodenum and second portion of the duodenum        were normal.       A single localized erosion without bleeding was found in the duodenal        bulb.                                                                                   Impression:               -  Esophageal ulcers.                            - Small hiatal hernia.                            - Mild Schatzki ring.                            - Erosive gastropathy with no bleeding and no                             stigmata of recent bleeding.                            - Normal first portion of the duodenum and second                              portion of the duodenum.                            - Duodenal erosion without bleeding.                            - No specimens collected.  Recommendation:           - Anti Relux Precautions                            - Continue present medications.                            - [facility] patient [facility] for [facility] care.                            - Perform a colonoscopy.                                                                                   Procedure Code(s):       --- Professional ---       16473, Esophagogastroduodenoscopy, flexible, transoral; diagnostic,        including collection of specimen(s) by brushing or washing, when        performed (separate procedure)  Diagnosis Code(s):       --- Professional ---       K22.10, Ulcer of esophagus without bleeding       K44.9, Diaphragmatic hernia without obstruction or gangrene       K22.2, Esophageal obstruction       K31.89, Other diseases of stomach and duodenum       K26.9, Duodenal ulcer, unspec ified as acute or chronic, without        hemorrhage or perforation       D50.9, Iron deficiency anemia, unspecified    CPT copyright 2020 [employer]. All rights reserved.    The codes documented in this report are preliminary and upon  review may   be revised to meet current compliance requirements.    Electronically Signed by Louie Moran  ________________________  LOUIE MARAVILLA MD  3/2/2023 12:32:48 PM  I was physically present for the entire viewing portion of the exam.  LOUIE MARAVILLA MD  Number of Addenda: 0    Note Initiated On: 3/2/2023 12:01 PM  Total Procedure  Duration: 0 hours 2 minutes 4 seconds   Scope In: 12:22:10 PM  Scope Out: 12:24:14 PM     COLONOSCOPY     Status: None   Result Value Ref Range    COLONOSCOPY       [address] [address] [place]  [address]  _______________________________________________________________________________  Patient Name: Shamir Concepcion         Procedure Date: 3/3/2023 12:29 PM  MRN: [id number]                       Account Number: [id number]  YOB: 1940              Admit Type: Inpatient  Age: 82                               Room: 2  Note Status: Finalized                Attending MD: LUCI BLUM MD  Instrument Name: 525 [id number] Adult Colon   _______________________________________________________________________________     Procedure:                Colonoscopy  Indications:              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  ____________ ___________________________________________________________________  Procedure:                Pre-Anesthesia Assessment:                            - Prior to the procedure, a History and Physical                             was performed, and patient medications and                             allergies were reviewed. The patient is competent.                             The risks and benefits of the procedure and the                             sedation options and risks were discussed with the                             patient. All questions were answered and informed                             consent was obtained. Patient identification and                             proposed procedure were verified by the physician.                             Mental Status  Examination: alert and oriented.                             Prophylactic Antibiotics: The patient does not                             require prophylactic antibiotics. Prior                             Anticoagulants: T he patient has taken no                             anticoagulant or antiplatelet agents. ASA Grade                             Assessment: II - A patient with mild systemic                             disease. After reviewing the risks and benefits,                             the patient was deemed in satisfactory condition to                             undergo the procedure. The anesthesia plan was to                             use moderate sedation / analgesia (conscious                             sedation). Immediately prior to administration of                             medications, the patient was re-assessed for                             adequacy to receive sedatives. The heart rate,                             respiratory rate, oxygen saturations, blood                             pressure, adequacy of pulmonary ventilation, and                             response to care were monitored throughout the                             procedure. The physical status of the pa tient was                             re-assessed after the procedure.                            After obtaining informed consent, the colonoscope                             was passed under direct vision. Throughout the                             procedure, the patient's blood pressure, pulse, and                             oxygen saturations were monitored continuously. The                             colonoscope 525 was introduced through the anus and                             advanced to the cecum, identified by appendiceal                             orifice and ileocecal valve. The colonoscopy was                             performed without difficulty. The patient tolerated                              the procedure well. The quality of the bowel                             preparation was evaluated using the BBPS ([place]                             Bowel Preparation Scale) with scores of: Right                             Colon = 3, Transverse Colon = 3 and Left Colon = 3                              (entire mucosa seen well with no residual staining,                             small fragments of stool or opaque liquid). The                             total BBPS score equals 9.                                                                                   Findings:       Hemorrhoids were found on perianal exam.       The colon (entire examined portion) appeared normal.       Multiple small and large-mouthed diverticula were found in the sigmoid        colon and descending colon.       External and internal hemorrhoids were found during retroflexion. The        hemorrhoids were medium-sized.                                                                                   Impression:               - Hemorrhoids found on perianal exam.                            - The entire examined colon is normal.                            - Diverticulosis in the sigmoid colon and in the                             descending colon.                            - E xternal and internal hemorrhoids.                            - No specimens collected.                            - Bleeding likely from esophageal ulcer, would not                             recommend small bowel capsule at this time given I                             am not sure patient can follow [facility] as                             outpatient.  Recommendation:           - Return patient to [facility] for ongoing care.                            - Resume regular diet.                            - Continue present medications.                                                                                   Procedure Code(s):       --- Professional ---        81774, Colonoscopy, flexible; diagnostic, including collection of        specimen(s) by brushing or washing, when performed (separate procedure)  Diagnosis Code(s):       --- Professional ---       K64.8, Other hemorrhoids       D50.0, Iron deficiency anemia secondary to blood loss (chronic)       D50.9, Iron deficiency an emia, unspecified       K57.30, Diverticulosis of large intestine without perforation or abscess        without bleeding    CPT copyright 2020 [employer]. All rights reserved.    The codes documented in this report are preliminary and upon  review may   be revised to meet current compliance requirements.    Electronic Signature by Dr. Eros Blum  __________________  EROS BLUM MD  3/3/2023 1:07:53 PM  I was physically present for the entire viewing portion of the exam.  EROS BLUM MD  Number of Addenda: 0    Note Initiated On: 3/3/2023 12:29 PM  Scope Withdrawal Time: 0 hours 8 minutes 12 seconds   Total Procedure Duration: 0 hours 14 minutes 27 seconds   Scope In: 12:48:45 PM  Scope Out: 1:03:12 PM     CT Head w/o Contrast     Status: None    Narrative    EXAM: CT HEAD W/O CONTRAST, CT CERVICAL SPINE W/O CONTRAST  LOCATION: [facility]  DATE/TIME: 3/1/2023 1:52 AM    INDICATION: Fall, trauma, confusion, nonfocal neuro exam, neck injury.  COMPARISON: Head CT 1/30/2023  TECHNIQUE:   1) Routine CT Head without IV contrast. Multiplanar reformats. Dose reduction techniques were used.  2) Routine CT Cervical Spine without IV contrast. Multiplanar reformats. Dose reduction techniques were used.    FINDINGS:   HEAD CT:   INTRACRANIAL CONTENTS: No intracranial hemorrhage, extraaxial collection, or mass effect.  No CT evidence of acute infarct. Mild to moderate presumed chronic small vessel ischemic changes. Ventriculomegaly disproportionate to the degree of volume loss.   Correlate for normal pressure hydrocephalus. Moderate to large  chronic infarct in the left cerebellar hemisphere and small chronic infarct in the right cerebellar hemisphere.     VISUALIZED ORBITS/SINUSES/MASTOIDS: No intraorbital abnormality. Complete opacification of the right maxillary sinus. No middle ear or mastoid effusion.    BONES/SOFT TISSUES: No acute abnormality.    CERVICAL SPINE CT:   VERTEBRA: Cervical vertebra are normal in height. There are mild alterations in the lateral alignment which can be accounted for by degenerative changes. No fracture or posttraumatic subluxation.     CANAL/FORAMINA: Multilevel degenerative changes with moderate to advanced interbody degeneration from C3-C4 through C6-C7. There is mild central canal stenosis at C3-C4. There is high-grade foraminal stenosis on the right at C3-C4 and C4-C5 and on the   left at C3-C4 and C6-C7.    PARASPINAL: Atheromatous changes of the carotid bifurcations. Visualized lung fields are clear.      Impression    IMPRESSION:  HEAD CT:  1.  No acute intracranial hemorrhage or calvarial fracture.  2.  Ventriculomegaly disproportionate to volume loss, which in the correct clinical setting would raise the possibility of normal pressure/communicating hydrocephalus.  3.  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  4.  There is complete opacification of the right maxillary sinus. Correlate for acute sinusitis.    CERVICAL SPINE CT:  1.  No CT evidence for acute fracture or post traumatic subluxation.  2.  Degenerative changes as highlighted above.   CT Cervical Spine w/o Contrast     Status: None    Narrative    EXAM: CT HEAD W/O CONTRAST, CT CERVICAL SPINE W/O CONTRAST  LOCATION: [facility]  DATE/TIME: 3/1/2023 1:52 AM    INDICATION: Fall, trauma, confusion, nonfocal neuro exam, neck injury.  COMPARISON: Head CT 1/30/2023  TECHNIQUE:   1) Routine CT Head without IV contrast. Multiplanar reformats. Dose reduction techniques were used.  2) Routine CT Cervical Spine without IV contrast.  Multiplanar reformats. Dose reduction techniques were used.    FINDINGS:   HEAD CT:   INTRACRANIAL CONTENTS: No intracranial hemorrhage, extraaxial collection, or mass effect.  No CT evidence of acute infarct. Mild to moderate presumed chronic small vessel ischemic changes. Ventriculomegaly disproportionate to the degree of volume loss.   Correlate for normal pressure hydrocephalus. Moderate to large chronic infarct in the left cerebellar hemisphere and small chronic infarct in the right cerebellar hemisphere.     VISUALIZED ORBITS/SINUSES/MASTOIDS: No intraorbital abnormality. Complete opacification of the right maxillary sinus. No middle ear or mastoid effusion.    BONES/SOFT TISSUES: No acute abnormality.    CERVICAL SPINE CT:   VERTEBRA: Cervical vertebra are normal in height. There are mild alterations in the lateral alignment which can be accounted for by degenerative changes. No fracture or posttraumatic subluxation.     CANAL/FORAMINA: Multilevel degenerative changes with moderate to advanced interbody degeneration from C3-C4 through C6-C7. There is mild central canal stenosis at C3-C4. There is high-grade foraminal stenosis on the right at C3-C4 and C4-C5 and on the   left at C3-C4 and C6-C7.    PARASPINAL: Atheromatous changes of the carotid bifurcations. Visualized lung fields are clear.      Impression    IMPRESSION:  HEAD CT:  1.  No acute intracranial hemorrhage or calvarial fracture.  2.  Ventriculomegaly disproportionate to volume loss, which in the correct clinical setting would raise the possibility of normal pressure/communicating hydrocephalus.  3.  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  4.  There is complete opacification of the right maxillary sinus. Correlate for acute sinusitis.    CERVICAL SPINE CT:  1.  No CT evidence for acute fracture or post traumatic subluxation.  2.  Degenerative changes as highlighted above.   CT Chest (PE) Abdomen Pelvis w Contrast     Status: None     Narrative    EXAM: CT CHEST PE ABDOMEN PELVIS W CONTRAST  LOCATION: [facility]  DATE/TIME: 3/1/2023 1:50 AM    INDICATION: fall, trauma, hypotension, hypoxia, eval for PE and trauma  COMPARISON: None.  TECHNIQUE: CT chest pulmonary angiogram and routine CT abdomen pelvis with IV contrast. Arterial phase through the chest and venous phase through the abdomen and pelvis. Multiplanar reformats and MIP reconstructions were performed. Dose reduction   techniques were used.   CONTRAST: 76mL Isovue 370    FINDINGS:  ANGIOGRAM CHEST: Pulmonary arteries are normal caliber and negative for pulmonary emboli. Thoracic aorta is negative for dissection. Mildly dilated ascending thoracic aorta, 41 mm. Moderate atherosclerotic calcification. No CT evidence of right heart   strain.     LUNGS AND PLEURA: Moderate centrilobular and paraseptal emphysema, upper lobe predominant. Bilateral lower lobe patchy airspace opacities and nodularity, concerning for infection. Mild bronchial wall thickening.    MEDIASTINUM/AXILLAE: No lymphadenopathy.     CORONARY ARTERY CALCIFICATION: Severe.    HEPATOBILIARY: Liver is unremarkable. No bile duct dilatation. Calcified gallstones.    PANCREAS: Normal.    SPLEEN: Normal.    ADRENAL GLANDS: Normal.    KIDNEYS/BLADDER: Mild bilateral renal atrophy. No significant mass, stone, or hydronephrosis.    BOWEL: No obstruction or inflammatory change. No free air or free fluid.    LYMPH NODES: Normal.    VASCULATURE: Mild ectasia of the infrarenal abdominal aorta without aneurysm. Aortoiliac severe atherosclerotic calcification.    PELVIC ORGANS: Normal.    MUSCULOSKELETAL: Left posterior 11th minimally displaced rib fracture. No other fractures identified. Left proximal humerus surgical hardware.      Impression    IMPRESSION:  1.  Minimally displaced left posterior 11th rib fracture.  2.  No other acute pathology in the chest, abdomen, or pelvis.  3.  No pulmonary embolism.  4.   Mildly dilated ascending thoracic aorta, 41 mm.  5.  Severe coronary artery calcification.   XR Wrist Left G/E 3 Views     Status: None    Narrative    XR WRIST LEFT G/E 3 VIEWS 3/1/2023 12:02 PM     HISTORY: prior wrist fx s/p fall, cast in place some swelling on  proximal thumb    COMPARISON: None.      Impression    IMPRESSION: Healing fracture of the distal radius involving the  metaphysis and extension the radiocarpal articular surface with  impaction. Cast is in place. Widening of the scapholunate interval  consistent with ligament insufficiency. Degenerative changes in the  first CMC joint.    CARMEN MIRAMONTES MD         SYSTEM ID:  [id number]   XR Hand Left G/E 3 Views     Status: None    Narrative    XR HAND LEFT G/E 3 VIEWS 3/1/2023 12:03 PM     HISTORY: fall with swelling around left thumb and ecchymosis, recent  left wrist fx and arm in cast for 6 weeks    COMPARISON: Left wrist from today      Impression    IMPRESSION: Healing fracture of the distal radius is again identified.  Cast is in place. No acute fractures are evident. Degenerative changes  in the wrist and hand.    CARMEN MIRAMONTES MD         SYSTEM ID:  [id number]   XR Shoulder Right G/E 3 Views     Status: None    Narrative    XR SHOULDER RIGHT G/E 3 VIEWS 3/1/2023 12:05 PM     HISTORY: pain, ecchymosis and mild swelling around humeral head s/p  fall    COMPARISON: None.      Impression    IMPRESSION: No fractures are evident. Normal glenohumeral alignment.  Mild degenerative changes in the acromioclavicular joint.     CARMEN MIRAMONTES MD         SYSTEM ID:  [id number]   CT Head w/o Contrast     Status: None    Narrative    CT SCAN OF THE HEAD WITHOUT CONTRAST March 2, 2023 7:17 AM     HISTORY: Fall, head injury with low hemoglobin.    TECHNIQUE: Axial images of the head and coronal reformations without  IV contrast material. Radiation dose for this scan was reduced using  automated exposure control, adjustment of the mA and/or kV  according  to patient size, or iterative reconstruction technique.    COMPARISON: Multiple prior comparisons, most recent head CT 3/1/2023.    FINDINGS: No acute intracranial hemorrhage. No mass effect or midline  shift. Moderate diffuse parenchymal volume loss. Chronic appearing  infarcts in the cerebellum left greater than right. Ventricles are  prominent in size and appear enlarged out of portion of the cerebral  sulci. Ventricular size is similar to prior head CT 3/1/2023. No  abnormal extra-axial fluid collection.    Complete opacification of the right maxillary sinus. Mucosal  thickening in the ethmoid air cells right greater than left. Mild  mucosal thickening in the left maxillary sinus. The bony calvarium and  bones of the skull base appear intact.       Impression    IMPRESSION:     1. No evidence of acute intracranial hemorrhage, mass, or herniation.  2. Moderate diffuse parenchymal volume loss and white matter changes  most likely due to chronic microvascular ischemic disease. Chronic  infarcts in the cerebellum.  3. Enlarged ventricular system concerning for superimposed  hydrocephalus upon volume loss. Ventricles appear similar in size to  prior.      SOFIE CUNNINGHAM MD         SYSTEM ID:  [id number]   CT Chest Abdomen Pelvis w/o Contrast     Status: None    Narrative    CT CHEST, ABDOMEN & PELVIS WITHOUT CONTRAST March 2, 2023 7:42 AM    CLINICAL HISTORY: Hemoglobin drop.    TECHNIQUE: CT scan of the chest, abdomen, and pelvis was performed  without IV contrast. Multiplanar reformats were obtained. Dose  reduction techniques were used.   CONTRAST: None    COMPARISON: March 1, 2023.    FINDINGS:   LUNGS AND PLEURA: Minimal bilateral effusions, new since previous but  these are simple in density. Benign granulomatous change. Mild  dependent atelectasis and/or infiltrate right worse than left.    MEDIASTINUM/AXILLAE: No lymphadenopathy. 4.1 cm aortic root again  evident.    CORONARY ARTERY CALCIFICATIONS:  Severe and/or stents.    HEPATOBILIARY: Normal contour with no significant mass. No bile duct  dilatation. Calcified gallstones.    PANCREAS: No significant mass, duct dilatation, or inflammatory  change.    SPLEEN: Normal size.    ADRENAL GLANDS: No significant nodules.    KIDNEYS/BLADDER: Nonobstructing stone in the upper left kidney  measuring 4 mm. Additional calcifications appear vascular. No  hydronephrosis or ureteral stone. Bladder unremarkable, filled with  contrast from prior study.    BOWEL: No obstruction or inflammatory change.    PELVIC ORGANS: No pelvic masses.    ADDITIONAL FINDINGS: No hemorrhage demonstrated. There are extensive  atherosclerotic changes of the visualized aorta and its branches.  There is no evidence of aortic aneurysm.    MUSCULOSKELETAL: No frankly destructive bony lesions. Left 11th rib  fracture again evident.      Impression    IMPRESSION:  1. No hemorrhage demonstrated as an etiology for hemoglobin drop.  2. Minimal bilateral effusions are new since comparison study, but  simple in density. New associated compressive atelectasis and/or  infiltrate increased from previous.  3. Cholelithiasis without cholecystitis.    KELSEY CORTEZ MD         SYSTEM ID:  [id number]   XR Wrist Left G/E 3 Views     Status: None    Narrative    XR WRIST LEFT G/E 3 VIEWS 3/6/2023 11:52 AM     HISTORY: Evaluate fracture out of cast    COMPARISON: 3/1/2023       Impression    IMPRESSION: Fracture of the radial styloid with mild radial  distraction of the fracture present. This is unchanged from the  previous exam. Fracture of the ulnar styloid, also stable. Fractured  radius extends into the articular margin of the wrist joint. Diastasis  of the scapholunate interval recent versus injury to the ligament.  Mild degenerative change of the STT joint and first CMC joint.  Degenerative changes of first MCP joint.    NYDIA BALBUENA MD         SYSTEM ID:  [id number]   Comprehensive metabolic panel     Status:  Abnormal   Result Value Ref Range    Sodium 137 136 - 145 mmol/L    Potassium 5.0 3.4 - 5.3 mmol/L    Chloride 102 98 - 107 mmol/L    Carbon Dioxide (CO2) 16 (L) 22 - 29 mmol/L    Anion Gap 19 (H) 7 - 15 mmol/L    Urea Nitrogen 29.3 (H) 8.0 - 23.0 mg/dL    Creatinine 2.04 (H) 0.67 - 1.17 mg/dL    Calcium 8.4 (L) 8.8 - 10.2 mg/dL    Glucose 173 (H) 70 - 99 mg/dL    Alkaline Phosphatase 132 (H) 40 - 129 U/L    AST 42 10 - 50 U/L    ALT 19 10 - 50 U/L    Protein Total 6.0 (L) 6.4 - 8.3 g/dL    Albumin 3.2 (L) 3.5 - 5.2 g/dL    Bilirubin Total 0.7 <=1.2 mg/dL    GFR Estimate 32 (L) >60 mL/min/1.73m2   UA with Microscopic     Status: Abnormal   Result Value Ref Range    Color Urine Yellow Colorless, Straw, Light Yellow, Yellow    Appearance Urine Clear Clear    Glucose Urine Negative Negative mg/dL    Bilirubin Urine Negative Negative    Ketones Urine Negative Negative mg/dL    Specific Gravity Urine 1.034 1.003 - 1.035    Blood Urine Trace (A) Negative    pH Urine 5.5 5.0 - 7.0    Protein Albumin Urine 30 (A) Negative mg/dL    Urobilinogen Urine Normal Normal, 2.0 mg/dL    Nitrite Urine Negative Negative    Leukocyte Esterase Urine Trace (A) Negative    Mucus Urine Present (A) None Seen /LPF    RBC Urine 2 <=2 /HPF    WBC Urine 9 (H) <=5 /HPF    Squamous Epithelials Urine 1 <=1 /HPF    Hyaline Casts Urine 9 (H) <=2 /LPF   CBC with platelets and differential     Status: Abnormal   Result Value Ref Range    WBC Count 15.5 (H) 4.0 - 11.0 10e3/uL    RBC Count 2.76 (L) 4.40 - 5.90 10e6/uL    Hemoglobin 8.6 (L) 13.3 - 17.7 g/dL    Hematocrit 28.1 (L) 40.0 - 53.0 %     (H) 78 - 100 fL    MCH 31.2 26.5 - 33.0 pg    MCHC 30.6 (L) 31.5 - 36.5 g/dL    RDW 15.9 (H) 10.0 - 15.0 %    Platelet Count 497 (H) 150 - 450 10e3/uL    % Neutrophils 75 %    % Lymphocytes 13 %    % Monocytes 10 %    % Eosinophils 0 %    % Basophils 0 %    % Immature Granulocytes 2 %    NRBCs per 100 WBC 0 <1 /100    Absolute Neutrophils 11.6 (H) 1.6 -  8.3 10e3/uL    Absolute Lymphocytes 1.9 0.8 - 5.3 10e3/uL    Absolute Monocytes 1.6 (H) 0.0 - 1.3 10e3/uL    Absolute Eosinophils 0.0 0.0 - 0.7 10e3/uL    Absolute Basophils 0.1 0.0 - 0.2 10e3/uL    Absolute Immature Granulocytes 0.3 <=0.4 10e3/uL    Absolute NRBCs 0.0 10e3/uL   Creatinine POCT     Status: Abnormal   Result Value Ref Range    Creatinine POCT 2.3 (H) 0.7 - 1.3 mg/dL    GFR, ESTIMATED POCT 27 (L) >60 mL/min/1.73m2   Glucose by meter     Status: Abnormal   Result Value Ref Range    GLUCOSE BY METER POCT 129 (H) 70 - 99 mg/dL   Lactic acid whole blood     Status: Normal   Result Value Ref Range    Lactic Acid 1.7 0.7 - 2.0 mmol/L   Procalcitonin     Status: Abnormal   Result Value Ref Range    Procalcitonin 0.31 (H) <0.05 ng/mL   Troponin T, High Sensitivity     Status: Abnormal   Result Value Ref Range    Troponin T, High Sensitivity 58 (H) <=22 ng/L   Comprehensive metabolic panel     Status: Abnormal   Result Value Ref Range    Sodium 142 136 - 145 mmol/L    Potassium 3.8 3.4 - 5.3 mmol/L    Chloride 113 (H) 98 - 107 mmol/L    Carbon Dioxide (CO2) 19 (L) 22 - 29 mmol/L    Anion Gap 10 7 - 15 mmol/L    Urea Nitrogen 24.6 (H) 8.0 - 23.0 mg/dL    Creatinine 1.84 (H) 0.67 - 1.17 mg/dL    Calcium 7.6 (L) 8.8 - 10.2 mg/dL    Glucose 109 (H) 70 - 99 mg/dL    Alkaline Phosphatase 108 40 - 129 U/L    AST 20 10 - 50 U/L    ALT 14 10 - 50 U/L    Protein Total 5.2 (L) 6.4 - 8.3 g/dL    Albumin 2.7 (L) 3.5 - 5.2 g/dL    Bilirubin Total 0.4 <=1.2 mg/dL    GFR Estimate 36 (L) >60 mL/min/1.73m2   Magnesium     Status: Normal   Result Value Ref Range    Magnesium 1.8 1.7 - 2.3 mg/dL   Phosphorus     Status: Abnormal   Result Value Ref Range    Phosphorus 2.3 (L) 2.5 - 4.5 mg/dL   CBC with platelets     Status: Abnormal   Result Value Ref Range    WBC Count 10.5 4.0 - 11.0 10e3/uL    RBC Count 1.75 (L) 4.40 - 5.90 10e6/uL    Hemoglobin 5.5 (LL) 13.3 - 17.7 g/dL    Hematocrit 17.4 (L) 40.0 - 53.0 %    MCV 99 78 - 100  fL    MCH 31.4 26.5 - 33.0 pg    MCHC 31.6 31.5 - 36.5 g/dL    RDW 16.0 (H) 10.0 - 15.0 %    Platelet Count 376 150 - 450 10e3/uL   Hemoglobin     Status: Abnormal   Result Value Ref Range    Hemoglobin 5.7 (LL) 13.3 - 17.7 g/dL   Lactic Acid STAT     Status: Normal   Result Value Ref Range    Lactic Acid 1.0 0.7 - 2.0 mmol/L   Hemoglobin     Status: Abnormal   Result Value Ref Range    Hemoglobin 7.5 (L) 13.3 - 17.7 g/dL   Iron and iron binding capacity     Status: Abnormal   Result Value Ref Range    Iron 18 (L) 61 - 157 ug/dL    Iron Binding Capacity 219 (L) 240 - 430 ug/dL    Iron Sat Index 8 (L) 15 - 46 %   Ferritin     Status: Normal   Result Value Ref Range    Ferritin 181 31 - 409 ng/mL   Hemoglobin     Status: Abnormal   Result Value Ref Range    Hemoglobin 8.0 (L) 13.3 - 17.7 g/dL   Basic metabolic panel     Status: Abnormal   Result Value Ref Range    Sodium 142 136 - 145 mmol/L    Potassium 3.8 3.4 - 5.3 mmol/L    Chloride 114 (H) 98 - 107 mmol/L    Carbon Dioxide (CO2) 18 (L) 22 - 29 mmol/L    Anion Gap 10 7 - 15 mmol/L    Urea Nitrogen 16.3 8.0 - 23.0 mg/dL    Creatinine 1.65 (H) 0.67 - 1.17 mg/dL    Calcium 8.2 (L) 8.8 - 10.2 mg/dL    Glucose 104 (H) 70 - 99 mg/dL    GFR Estimate 41 (L) >60 mL/min/1.73m2   Magnesium     Status: Normal   Result Value Ref Range    Magnesium 1.8 1.7 - 2.3 mg/dL   Phosphorus     Status: Normal   Result Value Ref Range    Phosphorus 2.5 2.5 - 4.5 mg/dL   Hemoglobin     Status: Abnormal   Result Value Ref Range    Hemoglobin 8.3 (L) 13.3 - 17.7 g/dL   CBC with platelets     Status: Abnormal   Result Value Ref Range    WBC Count 11.2 (H) 4.0 - 11.0 10e3/uL    RBC Count 2.79 (L) 4.40 - 5.90 10e6/uL    Hemoglobin 8.3 (L) 13.3 - 17.7 g/dL    Hematocrit 27.0 (L) 40.0 - 53.0 %    MCV 97 78 - 100 fL    MCH 29.7 26.5 - 33.0 pg    MCHC 30.7 (L) 31.5 - 36.5 g/dL    RDW 16.8 (H) 10.0 - 15.0 %    Platelet Count 456 (H) 150 - 450 10e3/uL   Lactic Acid STAT     Status: Normal   Result  Value Ref Range    Lactic Acid 1.0 0.7 - 2.0 mmol/L   Basic metabolic panel     Status: Abnormal   Result Value Ref Range    Sodium 143 136 - 145 mmol/L    Potassium 3.6 3.4 - 5.3 mmol/L    Chloride 115 (H) 98 - 107 mmol/L    Carbon Dioxide (CO2) 19 (L) 22 - 29 mmol/L    Anion Gap 9 7 - 15 mmol/L    Urea Nitrogen 11.9 8.0 - 23.0 mg/dL    Creatinine 1.50 (H) 0.67 - 1.17 mg/dL    Calcium 8.2 (L) 8.8 - 10.2 mg/dL    Glucose 110 (H) 70 - 99 mg/dL    GFR Estimate 46 (L) >60 mL/min/1.73m2   Magnesium     Status: Normal   Result Value Ref Range    Magnesium 1.7 1.7 - 2.3 mg/dL   Phosphorus     Status: Normal   Result Value Ref Range    Phosphorus 2.5 2.5 - 4.5 mg/dL   CBC with platelets     Status: Abnormal   Result Value Ref Range    WBC Count 8.8 4.0 - 11.0 10e3/uL    RBC Count 2.57 (L) 4.40 - 5.90 10e6/uL    Hemoglobin 7.8 (L) 13.3 - 17.7 g/dL    Hematocrit 24.7 (L) 40.0 - 53.0 %    MCV 96 78 - 100 fL    MCH 30.4 26.5 - 33.0 pg    MCHC 31.6 31.5 - 36.5 g/dL    RDW 16.6 (H) 10.0 - 15.0 %    Platelet Count 440 150 - 450 10e3/uL   Basic metabolic panel     Status: Abnormal   Result Value Ref Range    Sodium 140 136 - 145 mmol/L    Potassium 3.6 3.4 - 5.3 mmol/L    Chloride 110 (H) 98 - 107 mmol/L    Carbon Dioxide (CO2) 20 (L) 22 - 29 mmol/L    Anion Gap 10 7 - 15 mmol/L    Urea Nitrogen 10.9 8.0 - 23.0 mg/dL    Creatinine 1.32 (H) 0.67 - 1.17 mg/dL    Calcium 8.2 (L) 8.8 - 10.2 mg/dL    Glucose 95 70 - 99 mg/dL    GFR Estimate 54 (L) >60 mL/min/1.73m2   Magnesium     Status: Abnormal   Result Value Ref Range    Magnesium 1.6 (L) 1.7 - 2.3 mg/dL   Phosphorus     Status: Normal   Result Value Ref Range    Phosphorus 2.7 2.5 - 4.5 mg/dL   Hemoglobin     Status: Abnormal   Result Value Ref Range    Hemoglobin 8.4 (L) 13.3 - 17.7 g/dL   Magnesium     Status: Abnormal   Result Value Ref Range    Magnesium 1.6 (L) 1.7 - 2.3 mg/dL   Phosphorus     Status: Normal   Result Value Ref Range    Phosphorus 3.0 2.5 - 4.5 mg/dL   CBC  with platelets     Status: Abnormal   Result Value Ref Range    WBC Count 10.7 4.0 - 11.0 10e3/uL    RBC Count 2.86 (L) 4.40 - 5.90 10e6/uL    Hemoglobin 8.5 (L) 13.3 - 17.7 g/dL    Hematocrit 27.3 (L) 40.0 - 53.0 %    MCV 96 78 - 100 fL    MCH 29.7 26.5 - 33.0 pg    MCHC 31.1 (L) 31.5 - 36.5 g/dL    RDW 16.3 (H) 10.0 - 15.0 %    Platelet Count 533 (H) 150 - 450 10e3/uL   EKG 12 lead     Status: None   Result Value Ref Range    Systolic Blood Pressure  mmHg    Diastolic Blood Pressure  mmHg    Ventricular Rate 94 BPM    Atrial Rate  BPM    RI Interval  ms    QRS Duration 78 ms     ms    QTc 452 ms    P Axis  degrees    R AXIS 51 degrees    T Axis -38 degrees    Interpretation ECG       Atrial fibrillation with premature ventricular or aberrantly conducted complexes  Low voltage QRS  Cannot rule out Anterior infarct , age undetermined  Abnormal ECG  When compared with ECG of 2023 15:41,  Atrial fibrillation has replaced Sinus rhythm  QT has lengthened  Confirmed by GENERATED REPORT, COMPUTER ([id number]),  Wilson Rodriguez ([id number]) on 3/1/2023 2:01:20 AM     Echocardiogram Complete     Status: None   Result Value Ref Range    LVEF  55-60%     [facility]    [id number]  [id number]  [id number]^MATT^DAWNA^THIAGO     [facility]  Echocardiography Laboratory  [address] [place] [address]     Name: KATTY GRUBER  MRN: [id number]  : 1940  Study Date: 2023 03:07 PM  Age: 82 yrs  Gender: Male  Patient Location: [facility]  Reason For Study: Atrial Fibrillation  Ordering Physician: DAWNA GUTIERREZ  Performed By: Marnie Carpio     BSA: 1.8 m2  Height: 70 in  Weight: 150 lb  HR: 100  BP: 97/68 mmHg  ______________________________________________________________________________  Procedure  Complete Portable Echo Adult. Optison (NDC #7380-1813) given intravenously.  ______________________________________________________________________________  Interpretation  Summary     The visual ejection fraction is 55-60%.  No regional wall motion abnormalities noted.  The right ventricular systolic function is mildly reduced.  The right ventricle is moderately dilated.  No significant valvular heart disease.  Very technically challenging study reduces sensitivity and specificity of  findings.  ______________________________________________________________________________  Left Ventricle  The left ventricle is normal in size. There is normal left ventricular wall  thickness. The visual ejection fraction is 55-60%. No regional wall motion  abnormalities noted.     Right Ventricle  The right ventricle is moderately dilated. The right ventricular systolic  function is mildly reduced.     Atria  The left atrium is not well visualized. Right atrial size is normal. There is  no color Doppler evidence of an atrial shunt.     Mitral Valve  The mitral valve leaflets are mildly thickened. There is trace mitral  regurgitation.     Tricuspid Valve  There is trace tricuspid regurgitation. The right ventricular systolic  pressure is approximated at 23.8 mmHg plus the right atrial pressure. IVC  diameter and respiratory changes fall into an intermediate range suggesting an  RA pressure of 8 mmHg.     Aortic Valve  The aortic valve is not well visualized. No aortic regurgitation is present.     Pulmonic Valve  There is trace pulmonic valvular regurgitation.     Vessels  Mild aortic root dilatation. The ascending aorta is Mildly dilated.     Pericardium  Small pericardial effusion.     Rhythm  The rhythm was rapid atrial fibrillation.  ______________________________________________________________________________  MMode/2D Measurements & Calculations     IVSd: 0.98 cm  LVIDd: 4.4 cm  LVIDs: 3.0 cm  LVPWd: 0.96 cm  FS: 30.9 %  LV mass(C)d: 139.9 grams  LV mass(C)dI: 75.8 grams/m2  Ao root diam: 3.7 cm  LA dimension: 3.8 cm  asc Aorta Diam: 3.9 cm  LA/Ao: 1.0  LVOT diam: 2.3 cm  LVOT area: 4.1  cm2  RWT: 0.44     Doppler Measurements & Calculations  TR max mary: 244.1 cm/sec  TR max P.8 mmHg     ______________________________________________________________________________  Report approved by: Fatmata Mcnulty 2023 04:18 PM         Prepare red blood cells (unit)     Status: None (Preliminary result)   Result Value Ref Range    ISSUE DATE AND TIME      Blood Component Type Red Blood Cells     Product Code V3759P27     Unit Status Issued     Unit Number [id number]     UNIT ABO/RH O-     CODING SYSTEM [id number]     UNIT TYPE ISBT 9500    Prepare red blood cells (unit)     Status: None (Preliminary result)   Result Value Ref Range    ISSUE DATE AND TIME      Blood Component Type Red Blood Cells     Product Code N2266Q10     Unit Status Issued     Unit Number [id number]     UNIT ABO/RH O-     CODING SYSTEM [id number]     UNIT TYPE ISBT 9500    Adult Type and Screen     Status: None   Result Value Ref Range    ABO/RH(D) O POS     Antibody Screen Negative Negative    SPECIMEN EXPIRATION DATE     Prepare red blood cells (unit)     Status: None (Preliminary result)   Result Value Ref Range    ISSUE DATE AND TIME      Blood Component Type Red Blood Cells     Product Code E6340I70     Unit Status Returned     Unit Number [id number]     UNIT ABO/RH O-     CODING SYSTEM [id number]     UNIT TYPE ISBT 9500    Prepare red blood cells (unit)     Status: None (Preliminary result)   Result Value Ref Range    ISSUE DATE AND TIME      Blood Component Type Red Blood Cells     Product Code F1668J98     Unit Status Returned     Unit Number [id number]     UNIT ABO/RH O-     CODING SYSTEM [id number]     UNIT TYPE ISBT 9500    Prepare red blood cells (unit)     Status: None   Result Value Ref Range    Blood Component Type Red Blood Cells     Product Code K7877A63     Unit Status Transfused     Unit Number [id number]     CROSSMATCH Compatible      CODING SYSTEM [id number]     ISSUE DATE AND TIME [id number]     UNIT ABO/RH O+     UNIT TYPE ISBT 5100    Prepare red blood cells (unit)     Status: None   Result Value Ref Range    Blood Component Type Red Blood Cells     Product Code L9376Q64     Unit Status Transfused     Unit Number [id number]     CROSSMATCH Compatible     CODING SYSTEM [id number]     ISSUE DATE AND TIME [id number]     UNIT ABO/RH O+     UNIT TYPE ISBT 5100    Blood Culture Arm, Left     Status: Normal    Specimen: Arm, Left; Blood   Result Value Ref Range    Culture No Growth    Blood Culture Arm, Right     Status: Normal    Specimen: Arm, Right; Blood   Result Value Ref Range    Culture No Growth    Urine Culture     Status: None    Specimen: Urine, Catheter   Result Value Ref Range    Culture No Growth    CBC with platelets differential     Status: Abnormal    Narrative    The following orders were created for panel order CBC with platelets differential.  Procedure                               Abnormality         Status                     ---------                               -----------         ------                     CBC with platelets and d...[[id number]]  Abnormal            Final result                 Please view results for these tests on the individual orders.   ABO/Rh type and screen     Status: None    Narrative    The following orders were created for panel order ABO/Rh type and screen.  Procedure                               Abnormality         Status                     ---------                               -----------         ------                     Adult Type and Screen[[id number]]                            Final result                 Please view results for these tests on the individual orders.          Attestation:  I have reviewed today's vital signs, notes, medications, labs and imaging with Dr. Juarez.  Amount of time performed on this consult: 45 minutes.    Patsy Gray PA-C

## 2023-03-06 NOTE — PLAN OF CARE
A&O to self only.VSS on 2L NC.TELE sinus dysrhythmia with PVC. Lung Sounds coarse.Bowel Sounds active, 1 large loose BM today.Voiding adequately,external cath in place. Laceration to posterior head, scattered bruising and scabbing, skin tear to right hand new mepilex placed. Assist of 2 with lift, frequently repositioned.Pt complains of slight headache relieved with tylenol. Tolerating low fat sodium diet, appetite poor.

## 2023-03-06 NOTE — PLAN OF CARE
A&O x 1, self only. Frequent reorientation required, agitated at times d/t wanting to leave . VSS on 1-2 L NC. Tele: SR w/ junctional beats. Assist x2 w/ lift, turn and repo q2. Regular diet diet, tolerating well. PIVx2 SL/CDL. Scattered abrasions w/ scabbing and bruising. Skin tear to R wrist, mepilex in place. New R upper back open skin/skin tear, mepilex placed. Laceration to posterior head, sutures intact. Denies nausea/SOB, c/o of headache PRN tramadol provided. External catheter in place, adequate UOP. BM x2 during shift, loose/brown.     Pt refused morning labs.

## 2023-03-06 NOTE — PROGRESS NOTES
S: Pt. seen at the [facility]. Male. Marina WINKLER RX: Lace up/Cock up wrist splint. DX: Left distal radius Fx..  O:A: Today I F/D a Whitney Gomez Lace up wrist splint left size Medium to support left wrist Fx.. Donning doffing wear and care instructions given.  P: Pt. to be seen as needed.    Calos CARRION,LO

## 2023-03-06 NOTE — PROVIDER NOTIFICATION
MD Notification    Notified Person: MD    Notified Person Name: Kelley Staley     Notification Date/Time: 3/6/23 1353    Notification Interaction: [employer] Messaging    Purpose of Notification: Pt's mag 1.5 on 3/5, no protocols ordered, not replaced. Place order for replacement?    Orders Received: Mag replacement protocol ordered    Comments:

## 2023-03-07 VITALS
HEART RATE: 55 BPM | RESPIRATION RATE: 20 BRPM | BODY MASS INDEX: 22.48 KG/M2 | SYSTOLIC BLOOD PRESSURE: 132 MMHG | WEIGHT: 157 LBS | OXYGEN SATURATION: 96 % | TEMPERATURE: 98.2 F | HEIGHT: 70 IN | DIASTOLIC BLOOD PRESSURE: 72 MMHG

## 2023-03-07 LAB
ANION GAP SERPL CALCULATED.3IONS-SCNC: 11 MMOL/L (ref 7–15)
BUN SERPL-MCNC: 10.6 MG/DL (ref 8–23)
CALCIUM SERPL-MCNC: 8.2 MG/DL (ref 8.8–10.2)
CHLORIDE SERPL-SCNC: 108 MMOL/L (ref 98–107)
CREAT SERPL-MCNC: 1.3 MG/DL (ref 0.67–1.17)
DEPRECATED HCO3 PLAS-SCNC: 22 MMOL/L (ref 22–29)
ERYTHROCYTE [DISTWIDTH] IN BLOOD BY AUTOMATED COUNT: 16.1 % (ref 10–15)
GFR SERPL CREATININE-BSD FRML MDRD: 55 ML/MIN/1.73M2
GLUCOSE SERPL-MCNC: 97 MG/DL (ref 70–99)
HCT VFR BLD AUTO: 25.8 % (ref 40–53)
HGB BLD-MCNC: 8.1 G/DL (ref 13.3–17.7)
MAGNESIUM SERPL-MCNC: 1.6 MG/DL (ref 1.7–2.3)
MCH RBC QN AUTO: 29.6 PG (ref 26.5–33)
MCHC RBC AUTO-ENTMCNC: 31.4 G/DL (ref 31.5–36.5)
MCV RBC AUTO: 94 FL (ref 78–100)
PHOSPHATE SERPL-MCNC: 3.4 MG/DL (ref 2.5–4.5)
PLATELET # BLD AUTO: 521 10E3/UL (ref 150–450)
POTASSIUM SERPL-SCNC: 3.5 MMOL/L (ref 3.4–5.3)
RBC # BLD AUTO: 2.74 10E6/UL (ref 4.4–5.9)
SODIUM SERPL-SCNC: 141 MMOL/L (ref 136–145)
WBC # BLD AUTO: 9.8 10E3/UL (ref 4–11)

## 2023-03-07 PROCEDURE — 83735 ASSAY OF MAGNESIUM: CPT | Performed by: SURGERY

## 2023-03-07 PROCEDURE — 999N000157 HC STATISTIC RCP TIME EA 10 MIN

## 2023-03-07 PROCEDURE — 80048 BASIC METABOLIC PNL TOTAL CA: CPT | Performed by: HOSPITALIST

## 2023-03-07 PROCEDURE — 99239 HOSP IP/OBS DSCHRG MGMT >30: CPT | Performed by: HOSPITALIST

## 2023-03-07 PROCEDURE — 85018 HEMOGLOBIN: CPT | Performed by: HOSPITALIST

## 2023-03-07 PROCEDURE — 250N000013 HC RX MED GY IP 250 OP 250 PS 637: Performed by: SURGERY

## 2023-03-07 PROCEDURE — 84100 ASSAY OF PHOSPHORUS: CPT | Performed by: SURGERY

## 2023-03-07 PROCEDURE — 36415 COLL VENOUS BLD VENIPUNCTURE: CPT | Performed by: HOSPITALIST

## 2023-03-07 PROCEDURE — 94150 VITAL CAPACITY TEST: CPT

## 2023-03-07 PROCEDURE — 250N000013 HC RX MED GY IP 250 OP 250 PS 637: Performed by: INTERNAL MEDICINE

## 2023-03-07 RX ADMIN — PANTOPRAZOLE SODIUM 40 MG: 40 TABLET, DELAYED RELEASE ORAL at 16:01

## 2023-03-07 RX ADMIN — PANTOPRAZOLE SODIUM 40 MG: 40 TABLET, DELAYED RELEASE ORAL at 06:43

## 2023-03-07 RX ADMIN — APIXABAN 5 MG: 5 TABLET, FILM COATED ORAL at 08:39

## 2023-03-07 RX ADMIN — FOLIC ACID TAB 400 MCG 800 MCG: 400 TAB at 08:39

## 2023-03-07 RX ADMIN — ACETAMINOPHEN 975 MG: 325 TABLET, FILM COATED ORAL at 06:43

## 2023-03-07 RX ADMIN — THERA TABS 1 TABLET: TAB at 08:39

## 2023-03-07 RX ADMIN — ROSUVASTATIN CALCIUM 10 MG: 10 TABLET, FILM COATED ORAL at 08:39

## 2023-03-07 RX ADMIN — LIDOCAINE 1 PATCH: 560 PATCH PERCUTANEOUS; TOPICAL; TRANSDERMAL at 08:39

## 2023-03-07 RX ADMIN — SERTRALINE HYDROCHLORIDE 50 MG: 50 TABLET ORAL at 08:39

## 2023-03-07 RX ADMIN — ACETAMINOPHEN 975 MG: 325 TABLET, FILM COATED ORAL at 16:01

## 2023-03-07 ASSESSMENT — ACTIVITIES OF DAILY LIVING (ADL)
ADLS_ACUITY_SCORE: 55

## 2023-03-07 NOTE — PROGRESS NOTES
Care for pt   Oriented to self only, VSS, on 2.5 L oxymask. Strong, congested cough w/o production. Denies pain. Sleeping between cares, tv on. Pt has been calm and cooperative, has quick joke responses to questions. L posterior head laceration WDL, ROGERIO. Mepilex on R wrist and RU posterior CDI. LUE splint applied. . Ext cath in place, changed this shift, AUOP, +BM, +BS. Up w/ assist x 2 + GB/W, not OOB during this time. T/R but pt favors R side.     Plan for possible discharge tomorrow to [facility].

## 2023-03-07 NOTE — PLAN OF CARE
Physical Therapy Discharge Summary    Reason for therapy discharge:    Discharged to transitional care facility.    Progress towards therapy goal(s). See goals on Care Plan in [facility] electronic health record for goal details.  Goals partially met.  Barriers to achieving goals:   discharge from facility.    Therapy recommendation(s):    Continued therapy is recommended.  Rationale/Recommendations:  Continued PT at TCU to improve functional strength and IND with mobility as pt below baseline at this time .

## 2023-03-07 NOTE — PROGRESS NOTES
Oriented to self only, VSS, on 2LNC. CAMARILLO. Tele SR w/ PACs. C/O headache, tramadol given x 1 w/ reported relief. Sleeping between cares. Pt had short episode of agitation/restlessness in AM w/ sitter from 3562-0442. Pt since has been calm and cooperative. L posterior head laceration WDL, ROGERIO. Mepilex on R wrist and RU posterior CDI. LUE cast removed by ortho, stint applied, weight-bearing status ordered. Ext cath in place, AUOP, +BM, +BS. Fair appetite. Up w/ assist x 2 + GB/W. Electrolytes WDL, Hgb stable 8.5. Plan for recheck tomorrow. Plan for possible discharge tomorrow to [facility].

## 2023-03-07 NOTE — PLAN OF CARE
A/O, VSS, O2sats 96% on 2L NC. Tele SD. All discharge instructions, prescriptions, and personal belongings given to pt's daughter Dorcas/EMT All questions answered. [facility]

## 2023-03-07 NOTE — PROGRESS NOTES
Care Management Follow Up    Length of Stay (days): 6    Expected Discharge Date: 03/07/2023     Concerns to be Addressed:       Patient plan of care discussed at interdisciplinary rounds: No    Anticipated Discharge Disposition: Transitional Care     Anticipated Discharge Services:    Anticipated Discharge DME:      Patient/family educated on Medicare website which has current facility and service quality ratings: yes  Education Provided on the Discharge Plan:    Patient/Family in Agreement with the Plan: no    Referrals Placed by CM/SW: Post Acute Facilities  Private pay costs discussed: transportation costs    Additional Information:  JERRICA spoke with [facility], they are able to accept pt in the memory care unit after 1700 today. JERRICA spoke with bedside RN to confirm 2L of oxygen. JERRICA scheduled [facility] stretcher transport ride for 1700. JERRICA spoke with daughter Dorcas to inform of discharge plan. JERRICA informed of stretcher ride and potential cost associated but likelihood it will be covered due to medical necessity with managing oxygen. JERRICA spoke with daughter Clarice who expressed frustration in losing [facility] placement. Clarice worried about the rehab services at [facility] but was ultimately agreeable to the plan. JERRICA completed and faxed PCS form. JERRICA left VM to [facility] with transport time. JERRICA paged MD for orders.     KATY Espinoza  Social Work  [facility]         ALANA Espinoza

## 2023-03-07 NOTE — DISCHARGE SUMMARY
"    Discharge Summary  Hospitalist    Date of Admission:  3/1/2023  Date of Discharge:  3/7/2023  Discharging Provider: Kelley Staley MD  Date of Service (when I saw the patient): 03/07/23      History of Present Illness   Shamir Concepcion is a 82 year old male admitted on 3/1/2023. He presents after being found down    Hospital Course   Shamir Concepcion was admitted on 3/1/2023.  The following problems were addressed during his hospitalization:    Please see discharge order for Dr. Abdullahi, updated, below.    Loss of consciousness, suspected syncope most likley secondary to hypotension   Hypotension, unspecified (hypovolemic vs sepsis), resolved  Atrial fibrillation (new) with recent bradycardia  Hx hypertension  *lives in Senior apt, with multiple falls recently. In ED 1/30 after fall with scalp lac. Plans in place to move to [facility] on Friday; most recently [facility]  *noted bradycardia in clinic to 30s 2/24, was evaluated in ED  *Last spoke with daughter on Monday. Medical-alert was activated, pt found minimally responsive with pools of blood surrounding him with head lac. EMS found BP 82/36.   *In ED hypotensive to 70s systolic, HR 60-80s. Afebrile. Lactic 1.7. WBC 15.5. UA WBC 9.  CT c/a/p with L 11 rib fx, no other acute findings. CT head w/o hemorrhage, note possible NPH, chronic cerebellar infarcts. C-spine w/o fx.   Getting IV fluids since admission.  Stopped IV fluids on 3/4/2023  Blood pressure improved from systolic 70s prior to admission to 110s on 3/2/2023  PTA lisinopril continues to be on hold with acute kidney injury  Chest CT showed bilateral opacities consistent with possible pneumonia, minimally displaced left posterior 11th rib fracture  Cardiology consult \"He was in the ED last week after concern for bradycardia at the clinic however on review of EKG from the clinic he was in SR with PACs with bigeminy. In the ED last week, he was not " bradycardic. Unfortunately, the monitor that was ordered was not yet placed thus he was not wearing this at the time of his recent events. HRs have been stable here. Hypotension has improved with fluid resuscitation. PTA lisinopril is on hold.  They recommended event monitor on discharge[ ordered, see Dr Abdullahi discharge orderes].   Cardiology signed off currently  Echo done on 3/2/2023 showed EF of 55 to 60% 3.  No regional wall motion abnormalities.  Right ventricular systolic function is mildly reduced right ventricle is moderately dilated.  No valvular heart disease  Therapies rec TCU     Acute on chronic anemia most likely secondary to esophageal ulcers in the setting of anticoagulation with Eliquis and baby aspirin;  Patient's hemoglobin on admission was at 8.6  Hemoglobin dropped to 5.5 on 3/2/2023  Eliquis for history of atrial fibrillation on hold since admission.  No sign of GI bleed  Patient was found in a pool of blood after the fall with a head injury but no other significant bleeding noted since admission  2 units PRBCs ordered.  Hemoglobin improved to 7.5 with the blood transfusion and stayed stable 7.5-8.4  Conditional order to transfuse hemoglobin if hemoglobin less than 7 entered  CT chest abdomen pelvis and CT head ruled out acute bleed  Patient was started on Protonix 40 mg IV twice daily switch to p.o. now  GI consultation requested EGD done showed esophageal ulcers, small hiatal hernia, mild Schatzki ring, erosive gastropathy, duodenal erosion. No source of blood loss identified. Colonoscopy done on 3/4/2023 returned normal  Hemoglobin  stable   Discussed with GI with Dr. Solis they are okay with restarting Eliquis on 3/5/2023  Hemoglobin/6/23 8.5 stable  Discontinued  baby aspirin, on Eliquis as above   hgb this AM stable at 8.1; no signs of bleeding; Patient denies pain, n/v. Recheck CBC in TCU         Bilateral pneumonia on the CT chest on admission;  Patient is started on IV Zosyn  empirically on admission  We will switch Zosyn to oral Augmentin on 3/3/2023 5-day course completion of antibiotics on 3/5/2023   procalcitonin returned elevated at 0.31 on admission  Blood cultures NGTD  Urine culture was negative  Wean off of oxygen as tolerated, presently O2 2 L NC; wean as able..     Status post syncope resulting in fall and head injury;  Left posterior 11th rib fracture minimally displaced from the fall;  Trauma surgery consulted and are following  Pain is well controlled currently  Patient had multiple x-rays of his left wrist, x-ray of the right shoulder as per trauma surgery team and all the x-rays returned negative except for left ulnar fracture, see below.  Patient dropped hemoglobin to 5.5 this morning so a CT scan of the chest abdomen pelvis without contrast was done due to the creatinine of 1.8 and showed no acute bleeding, EGD findings as above.  CT head also done without contrast to rule out a subdural and it returned negative except a ventriculomegaly consistent with possible normal pressure hydrocephalus  General neurology consultation requested with his recurrent falls and findings of normal pressure Hydro hydrocephalus on the CT head reviewed previous scans and felt that this is chronic in presentation.  As per neurology: Reviewed imaging noted findings of hydrocephalus in the previous studies which is not new, given the known history of dementia consideration of further intervention such as  shunt can be limited. suggest neuropsych testing as an outpatient fall risk precautions  Occupational and physical therapy recommended TCU     Posterior Head laceration S/p Suturing in ED on 3/1/23 ;  Suture removal in 7days [back of head about 3.5 inches laceration] 3/8/23; after suture removal monitor closely on eliquis      Acute hypoxic respiratory failure  O2 sats in ED initially 89%, improved with 3L O2.   - wean O2 as able currently 2L NC      TAYLOR  CKD  Baseline creatinine ~1.3-1.5.  Creatinine on presentation at 2.04.  Creatinine slightly better at 1.84-1.6 -1.5 on 3/4/2023   -Saline lock IV fluids on 3/4/2023  Encourage oral fluid intake  Avoid nephrotoxins  -BMP Cr stable at  1.3.   Magnesium 1.6, recheck BMP and Mag in TCU     Recent L distal radial fracture  Following with orthopedic through [facility]. Has cast.  Cast removed 3/6/2023, per Ortho: see note:  removable brace placed;Remove brace for hygiene and daily skin checks.    Okay to remove brace to work on ROMAT with therapy.    WBAT with brace applied.   Follow-up at [facility] in 2 weeks          Dementia  Acute encephalopathy most likely metabolic in the setting of pneumonia, GI bleed, hospitalization delirium in the setting of underlying dementia  Notable cognitive impairment in ED, not able to provide much history  - Re-orient as needed  - Maintain normal day/night, sleep wake cycles  - Minimize sedating/altering medications as able  Patient is pleasantly confused.  Needs TCU placement on discharge  -today marked improvement; Patient was up and sitting in bed on AM encounter; conversive, calm cooperative; no c/o; no pain; remains oriented x 1 [dementia]; Nursing notes no behavior issues.      Rheumatoid arthritis  [needs rec- methotrexate 12.5 mg weekly, folic acid]  - restrart methotrexate on discharge, no pain issues follow-up with PCP on TCU discharge or if patient is followed by a rheumatologist.     Hx CVA 2018  Hx cerebellar CVA with occlusion/ dissection.   Restarted Eliquis as per GI on 3/5/2023; hgb monitored daily, stable, recheck in TCU     PAD-Hx R SFA stenting 2013  HLD- resume statin   MDD- resume sertraline 50 mg daily  BPH- monitor    Code Status   DNR / DNI       Primary Care Physician   Physician No Ref-Primary    Physical Exam   Temp: 99  F (37.2  C) Temp src: Oral BP: 132/77 Pulse: 60   Resp: 20 SpO2: 98 % O2 Device: Nasal cannula Oxygen Delivery: 2 LPM  Vitals:    03/02/23 2000 03/04/23 0400 03/05/23 0655   Weight:  72 kg (158 lb 11.7 oz) 71.2 kg (156 lb 15.5 oz) 71.2 kg (157 lb)     Vital Signs with Ranges  Temp:  [98.1  F (36.7  C)-99  F (37.2  C)] 99  F (37.2  C)  Pulse:  [54-60] 60  Resp:  [20] 20  BP: (132-152)/(72-89) 132/77  SpO2:  [96 %-100 %] 98 %  I/O last 3 completed shifts:  In: 1200 [P.O.:1200]  Out: 700 [Urine:700]    Exam 3/7/2023   Constitutional:   NAD, awake, calm, cooperative    Respiratory: Respirations nonlabored O2 2L  Cardiovascular: Regular  GI: Soft, nontender.  No rebound, guarding or other peritoneal signs  Skin/Integumen:   Left hand in laced splint, moves distal digits  Neuro.  Grossly nonfocal,   Psych.  Oriented x1.  Affect,calm    Discharge Disposition   Discharged to short-term care facility  Condition at discharge: Fair    Consultations This Hospital Stay   CARDIOLOGY IP CONSULT  PHYSICAL THERAPY ADULT IP CONSULT  PHYSICAL THERAPY ADULT IP CONSULT  NEUROLOGY IP CONSULT  GASTROENTEROLOGY IP CONSULT  CARE MANAGEMENT / SOCIAL WORK IP CONSULT  CARE MANAGEMENT / SOCIAL WORK IP CONSULT  ORTHOPEDIC SURGERY IP CONSULT  PHYSICAL THERAPY ADULT IP CONSULT  OCCUPATIONAL THERAPY ADULT IP CONSULT  ORTHOSIS EXTREMITY UPPER REFERRAL IP CONSULT  OCCUPATIONAL THERAPY ADULT IP CONSULT    Time Spent on this Encounter   I, Kelley Staley MD, personally saw the patient today and spent greater than 30 minutes discharging this patient including discussions with Nursing and SW, coordinating With Specialties, Neurology, Cardiology regarding discharge recommendations and follow-up, coordinating discharge and transition to TCU, completing discharge orders, medications and follow-up.    Discharge Orders      General info for SNF    Length of Stay Estimate: Short Term Care: Estimated # of Days 31-90  Condition at Discharge: Stable  Level of care:skilled   Rehabilitation Potential: Good  Admission H&P remains valid and up-to-date: Yes  Recent Chemotherapy: N/A  Use Nursing Home Standing Orders: Yes     [facility]  instructions    Give two-step Mantoux (PPD) Per Facility Policy Yes     Follow Up and recommended labs and tests    Follow up with prison physician.  The following labs/tests are recommended: REcheck CBC, BMP in 1week   Suture removal from back of head in 2-3days on 3/8/23 .     Reason for your hospital stay    Syncope with hypotension, Pneumonia and anemia secondary to esophageal ulcers     Activity - Up with nursing assistance     Follow Up and recommended labs and tests    In addition to follow-up appointment ordered per Dr Abdullahi:    Add Magnesium to BMP and CBC ordered for TCU in Wed or Thursday 3/9/23    On TCU discharge arrange follow-up with PCP to consider neuro psychometric testing suggested by Neurology; follow-up with Rheumatology is he is followed by them for his Rheumatoid Arthritis or by PCP.     No CPR- Do NOT Intubate     Physical Therapy Adult Consult    Evaluate and treat as clinically indicated.    Reason:  acute anemia, syncope     Occupational Therapy Adult Consult    Evaluate and treat as clinically indicated.    Reason:  Acute anemia     Oxygen (SNF/TCU) Discharge     Adult Cardiac Event Monitor    With results to PCP for bradycardia evaluation     Fall precautions     Fall precautions     Diet    Follow this diet upon discharge: Orders Placed This Encounter      Low Saturated Fat Na <2400 mg     Discharge Medications   Current Discharge Medication List      START taking these medications    Details   pantoprazole (PROTONIX) 40 MG EC tablet Take 1 tablet (40 mg) by mouth 2 times daily (before meals) Take twice a day for 6weeks then reduce to once daily  Qty: 60 tablet, Refills: 0    Associated Diagnoses: Esophageal ulcer without bleeding         CONTINUE these medications which have NOT CHANGED    Details   acetaminophen (TYLENOL) 325 MG tablet Take 2 tablets (650 mg) by mouth every 4 hours as needed for mild pain    Associated Diagnoses: Pain; Rheumatoid arthritis involving both hands,  unspecified rheumatoid factor presence      alendronate (FOSAMAX) 70 MG tablet Take 70 mg by mouth every 7 days Sunday's      apixaban ANTICOAGULANT (ELIQUIS) 5 MG tablet Take 1 tablet (5 mg) by mouth 2 times daily    Associated Diagnoses: Cerebrovascular accident (CVA), unspecified mechanism (H)      fluticasone (FLONASE) 50 MCG/ACT nasal spray Spray 1 spray into both nostrils daily as needed      folic acid 800 MCG TABS Take 800 mcg by mouth daily    Associated Diagnoses: Rheumatoid arthritis involving both hands, unspecified rheumatoid factor presence      methotrexate 2.5 MG tablet Take 5 tablets (12.5 mg) by mouth every 7 days    Associated Diagnoses: Other specified rheumatoid arthritis, other specified site (H)      multivitamin  with lutein (OCUVITE WITH LUTEIN) CAPS per capsule Take 1 capsule by mouth daily      multivitamin, therapeutic (THERA-VIT) TABS tablet Take 1 tablet by mouth daily      rosuvastatin (CRESTOR) 10 MG tablet Take 10 mg by mouth daily      SENNA-docusate sodium (SENNA S) 8.6-50 MG tablet Take 1 tablet by mouth 2 times daily as needed (while on narcotics to prevent constipation)  Qty: 20 tablet, Refills: 0    Associated Diagnoses: Closed displaced fracture of greater tuberosity of left humerus, initial encounter      sertraline (ZOLOFT) 50 MG tablet Take 50 mg by mouth daily      trospium (SANCTURA) 20 MG tablet Take 20 mg by mouth 2 times daily      Vitamin D3 (CHOLECALCIFEROL) 125 MCG (5000 UT) tablet Take 125 mcg by mouth daily         STOP taking these medications       aspirin 81 MG EC tablet Comments:   Reason for Stopping:         lisinopril (ZESTRIL) 10 MG tablet Comments:   Reason for Stopping:             Allergies   No Known Allergies  Data   Most Recent 3 CBC's:Recent Labs   Lab Test 03/07/23  0538 03/06/23  1110 03/05/23  1145 03/04/23  1121   WBC 9.8 10.7  --  8.8   HGB 8.1* 8.5* 8.4* 7.8*   MCV 94 96  --  96   * 533*  --  440      Most Recent 3 BMP's:  Recent Labs    Lab Test 03/07/23  0538 03/06/23  1110 03/05/23  0542    142 140   POTASSIUM 3.5 3.6 3.6   CHLORIDE 108* 108* 110*   CO2 22 20* 20*   BUN 10.6 11.2 10.9   CR 1.30* 1.31* 1.32*   ANIONGAP 11 14 10   RANJAN 8.2* 8.3* 8.2*   GLC 97 99 95     Most Recent 2 LFT's:  Recent Labs   Lab Test 03/02/23  0535 03/01/23  0107   AST 20 42   ALT 14 19   ALKPHOS 108 132*   BILITOTAL 0.4 0.7

## 2023-03-07 NOTE — PROGRESS NOTES
"[facility]    Hospitalist Progress Note    Date of Service (when I saw the patient): 03/07/2023    Assessment & Plan   Shamir Concepcion is a 82 year old male admitted on 3/1/2023. He presents after being found down     *note: pt is DNR/DNI, no pressors or lines, no ICU. History limited from pt 2/2 dementia     Loss of consciousness, suspected syncope most likley secondary to hypotension   Hypotension, unspecified (hypovolemic vs sepsis vs ?)  Atrial fibrillation (new) with recent bradycardia (? SSS)  Hx hypertension  [needs rec- lisinopril 10 mg daily]  *lives in [facility], with multiple falls recently. In ED 1/30 after fall with scalp lac. [facility] in [facility]; most recently [facility] TCU  *noted bradycardia in clinic to 30s 2/24, was evaluated in ED  *Last spoke with daughter on Monday. Medical-alert was activated, pt found minimally responsive with pools of blood surrounding him with head lac. EMS found BP 82/36.   *In ED hypotensive to 70s systolic, HR 60-80s. Afebrile. Lactic 1.7. WBC 15.5. UA WBC 9.  CT c/a/p with L 11 rib fx, no other acute findings. CT head w/o hemorrhage, note possible NPH, chronic cerebellar infarcts. C-spine w/o fx.   Getting IV fluids since admission.  Stopped IV fluids on 3/4/2023  Blood pressure improved from systolic 70s prior to admission to 110s on 3/2/2023  PTA lisinopril continues to be on hold with acute kidney injury  Chest CT showed bilateral opacities consistent with possible pneumonia, minimally displaced left posterior 11th rib fracture  Cardiology consult \"He was in the ED last week after concern for bradycardia at the clinic however on review of EKG from the clinic he was in SR with PACs with bigeminy. In the ED last week, he was not bradycardic. Unfortunately, the monitor that was ordered was not yet placed thus he was not wearing this at the time of his recent events. HRs have been stable here. Hypotension has " improved with fluid resuscitation. PTA lisinopril is on hold.  They recommended event monitor on discharge.  Heart rate currently improved running in the 70s to 80s.  Cardiology signed off currently  Echo done on 3/2/2023 showed EF of 55 to 60% 3.  No regional wall motion abnormalities.  Right ventricular systolic function is mildly reduced right ventricle is moderately dilated.  No valvular heart disease  Therapies rec TCU    Acute on chronic anemia most likely secondary to esophageal ulcers in the setting of anticoagulation with Eliquis and baby aspirin;  Patient's hemoglobin on admission was at 8.6  Hemoglobin dropped to 5.5 on 3/2/2023  Eliquis for history of atrial fibrillation on hold since admission.  No sign of GI bleed  Patient was found in a pool of blood after the fall with a head injury but no other significant bleeding noted since admission  2 units PRBCs ordered.  Hemoglobin improved to 7.5 with the blood transfusion and stayed stable 7.5-8.4  Conditional order to transfuse hemoglobin if hemoglobin less than 7 entered  CT chest abdomen pelvis and CT head ruled out acute bleed  Patient was started on Protonix 40 mg IV twice daily switch to p.o. now  GI consultation requested EGD done showed esophageal ulcers, small hiatal hernia, mild Schatzki ring, erosive gastropathy, duodenal erosion. No source of blood loss identified. Colonoscopy done on 3/4/2023 returned normal  Hemoglobin  stable   Discussed with GI with Dr. Solis they are okay with restarting Eliquis on 3/5/2023  Hemoglobin/6/23 8.5 stable  Discontinued  baby aspirin, on Eliquis as above   hgb this AM stable at 8.1; no signs of bleeding; Patient denies pain, n/v. Hungry for breakfast.       Bilateral pneumonia on the CT chest on admission;  Patient is started on IV Zosyn empirically on admission  We will switch Zosyn to oral Augmentin on 3/3/2023 5-day course completion of antibiotics on 3/5/2023   procalcitonin returned elevated at 0.31 on  admission  Blood cultures NGTD  Urine culture was negative  Wean off of oxygen as tolerated, presently O2 2 L; wean as able..    Status post syncope resulting in fall and head injury;  Left posterior 11th rib fracture minimally displaced from the fall;  Trauma surgery consulted and are following  Pain is well controlled currently  Patient had multiple x-rays of his left wrist, x-ray of the right shoulder as per trauma surgery team and all the x-rays returned negative except for left ulnar fracture, see below.  Patient dropped hemoglobin to 5.5 this morning so a CT scan of the chest abdomen pelvis without contrast was done due to the creatinine of 1.8 and showed no acute bleeding, EGD findings as above.  CT head also done without contrast to rule out a subdural and it returned negative except a ventriculomegaly consistent with possible normal pressure hydrocephalus  General neurology consultation requested with his recurrent falls and findings of normal pressure Hydro hydrocephalus on the CT head reviewed previous scans and felt that this is chronic in presentation.  As per neurology: Reviewed imaging noted findings of hydrocephalus in the previous studies which is not new, given the known history of dementia consideration of further intervention such as  shunt can be limited. suggest neuropsych testing as an outpatient fall risk precautions  Occupational and physical therapy recommended TCU    Posterior Head laceration S/p Suturing in ED on 3/1/23 ;  Suture removal in 7days [back of head about 3.5 inches laceration]       Acute hypoxic respiratory failure  O2 sats in ED initially 89%, improved with 3L O2.   - wean O2 as able currently 2L       ATYLOR  CKD  Baseline creatinine ~1.3-1.5. Creatinine on presentation at 2.04.  Creatinine slightly better at 1.84-1.6 -1.5 on 3/4/2023   -Saline lock IV fluids on 3/4/2023  Encourage oral fluid intake  Avoid nephrotoxins  -BMP Cr stable at  1.3.   Magnesium 1.6, on magnesium  replacement protocol.     Recent L distal radial fracture  Following with orthopedic through [facility]. Has cast.  Cast removed 3/6/2023, per Ortho: see note:  removable brace placed;Remove brace for hygiene and daily skin checks.    Okay to remove brace to work on ROMAT with therapy.    WBAT with brace applied.   Follow-up at [facility] in 2 weeks        Dementia  Acute encephalopathy most likely metabolic in the setting of pneumonia, GI bleed, hospitalization delirium in the setting of underlying dementia  Notable cognitive impairment in ED, not able to provide much history  - Re-orient as needed  - Maintain normal day/night, sleep wake cycles  - Minimize sedating/altering medications as able  Patient is pleasantly confused.  Needs TCU placement on discharge  -today marked improvement; Patient was up and sitting in bed on AM encounter; conversive, calm cooperative; no c/o; no pain; remains oriented x 1 [dementia]; Nursing notes no behavior issues.      Rheumatoid arthritis  [needs rec- methotrexate 12.5 mg weekly, folic acid]  - hold methotrexate for now; no pain issues     Hx CVA 2018  Hx cerebellar CVA with occlusion/ dissection.   Restarted Eliquis as per GI on 3/5/2023; hgb monitored daily, stable     PAD-Hx R SFA stenting 2013  HLD- resume statin   MDD- resume sertraline 50 mg daily  BPH- monitor      Diet:    Low-salt diet  DVT Prophylaxis: Pneumatic Compression Devices  Michelle Catheter: Not present  Lines: None     Cardiac Monitoring: None  Code Status:   DNR/DNI, no pressors/ lines, no ICU- confirmed with daugnicole      Clinically Significant Risk Factors Present on Admission             # Hypocalcemia: Lowest Ca = 8.4 mg/dL in last 2 days, will monitor and replace as appropriate    # Anion Gap Metabolic Acidosis: Highest Anion Gap = 19 mmol/L in last 2 days, will monitor and treat as appropriate  # Hypoalbuminemia: Lowest albumin = 3.2 g/dL at 3/1/2023  1:07 AM, will monitor as appropriate  # Drug Induced  Coagulation Defect: home medication list includes an anticoagulant medication    # Hypertension: home medication list includes antihypertensive(s)            Expected Discharge Date:  1-2 days pending; safe disposition: TCU     Kelley Staley MD, MD  [phone] (P)    Total time 50 minutes for today 3/7/2023 :  time consisted of the following, examination of patient, review of records including labs, imaging results, medications, interdisciplinary notes and completing documentation and orders.  Care Management included  Coordination of Care time with Nursing re: O2, magnesium replacement; behaviors and Specialists, SW regarding discharge planning    Interval History   On morning encounter significant improvement;  Patient was up and sitting in bed on AM encounter; conversive, calm cooperative; no c/o; no pain; remains oriented x 1 [dementia]; Nursing notes no behavior issues.    -Data reviewed today: I reviewed all new labs and imaging results over the last 24 hours.  Physical Exam   Temp: 98.3  F (36.8  C) Temp src: Oral BP: 136/72 Pulse: 56   Resp: 20 SpO2: 100 % O2 Device: Oxymask Oxygen Delivery: 3 LPM  Vitals:    03/02/23 2000 03/04/23 0400 03/05/23 0655   Weight: 72 kg (158 lb 11.7 oz) 71.2 kg (156 lb 15.5 oz) 71.2 kg (157 lb)       Constitutional:   NAD, awake, calm, cooperative    Respiratory: Respirations nonlabored O2 2L  Cardiovascular: Regular  GI: Soft, nontender.  No rebound, guarding or other peritoneal signs  Skin/Integumen:   Left hand in laced splint, moves distal digits  Neuro.  Grossly nonfocal,   Psych.  Oriented x1.  Affect,calm    Medications       acetaminophen  975 mg Oral Q8H     apixaban ANTICOAGULANT  5 mg Oral BID     folic acid  800 mcg Oral Daily     lidocaine  1 patch Transdermal QAM     lidocaine   Transdermal Q8H GLADIS     multivitamin, therapeutic  1 tablet Oral Daily     pantoprazole  40 mg Oral BID AC     rosuvastatin  10 mg Oral Daily     sertraline  50 mg Oral Daily      sodium chloride (PF)  3 mL Intracatheter Q8H       Data   Recent Labs   Lab 03/07/23  0538 03/06/23  1110 03/05/23  1145 03/05/23  0542 03/04/23  1121 03/02/23  0827 03/02/23  0535 03/01/23  0107   WBC 9.8 10.7  --   --  8.8   < > 10.5 15.5*   HGB 8.1* 8.5* 8.4*  --  7.8*   < > 5.5* 8.6*   MCV 94 96  --   --  96   < > 99 102*   * 533*  --   --  440   < > 376 497*    142  --  140 143   < > 142 137   POTASSIUM 3.5 3.6  --  3.6 3.6   < > 3.8 5.0   CHLORIDE 108* 108*  --  110* 115*   < > 113* 102   CO2 22 20*  --  20* 19*   < > 19* 16*   BUN 10.6 11.2  --  10.9 11.9   < > 24.6* 29.3*   CR 1.30* 1.31*  --  1.32* 1.50*   < > 1.84* 2.04*   ANIONGAP 11 14  --  10 9   < > 10 19*   RANJAN 8.2* 8.3*  --  8.2* 8.2*   < > 7.6* 8.4*   GLC 97 99  --  95 110*   < > 109* 173*   ALBUMIN  --   --   --   --   --   --  2.7* 3.2*   PROTTOTAL  --   --   --   --   --   --  5.2* 6.0*   BILITOTAL  --   --   --   --   --   --  0.4 0.7   ALKPHOS  --   --   --   --   --   --  108 132*   ALT  --   --   --   --   --   --  14 19   AST  --   --   --   --   --   --  20 42    < > = values in this interval not displayed.       Recent Results (from the past 24 hour(s))   XR Wrist Left G/E 3 Views    Narrative    XR WRIST LEFT G/E 3 VIEWS 3/6/2023 11:52 AM     HISTORY: Evaluate fracture out of cast    COMPARISON: 3/1/2023       Impression    IMPRESSION: Fracture of the radial styloid with mild radial  distraction of the fracture present. This is unchanged from the  previous exam. Fracture of the ulnar styloid, also stable. Fractured  radius extends into the articular margin of the wrist joint. Diastasis  of the scapholunate interval recent versus injury to the ligament.  Mild degenerative change of the STT joint and first CMC joint.  Degenerative changes of first MCP joint.    NYDIA BALBUENA MD         SYSTEM ID:  [id number]

## 2023-03-07 NOTE — PLAN OF CARE
[id number]  Orientation: Alert to self   Vitals: VSS on 3L oxymask  Mobility: A2 GBW; pt was not oob and refused repositioning; weight shifting  Diet: tolerating low sodium/sat fat diet  GI/: incontinent BB; external cath in place; no BM  Pain: pt denies pain  Drains/Devices: PIV x2 saline locked  Skin: scattered bruising, blanchable redness on buttocks  Tele: SR w/ frequent PACs

## 2023-03-07 NOTE — PROGRESS NOTES
Care Management Discharge Note    Discharge Date: 03/07/2023       Discharge Disposition: Transitional Care    Discharge Services:      Discharge DME:      Discharge Transportation: [facility]     Private pay costs discussed: transportation costs    PAS Confirmation Code: [id number]  Patient/family educated on Medicare website which has current facility and service quality ratings: yes    Education Provided on the Discharge Plan:    Persons Notified of Discharge Plans: family, MD, Bedside RN, Charge RN, [facility]   Patient/Family in Agreement with the Plan: no    Handoff Referral Completed: No    Additional Information:  JERRICA faxed physician orders to [facility]. Completed PAS, faxed and placed on chart.     PAS-RR    D: Per DHS regulation, SW completed and submitted PAS-RR to [place] [employer] Direct Connect via the Senior LinkAge Line.  PAS-RR confirmation # is : [id number]    I: SW spoke with pt and they are aware a PAS-RR has been submitted.  SW reviewed with pt that they may be contacted for a follow up appointment within 10 days of hospital discharge if their SNF stay is < 30 days.  Contact information for Senior LinkAge Line was also provided.    A: Pt verbalized understanding.    P: Further questions may be directed to [facility] LinkAge Line at #[phone], option #4 for PAS-RR staff.        KATY Espinoza  Social Work  [facility]

## 2023-03-08 ENCOUNTER — LAB REQUISITION (OUTPATIENT)
Dept: LAB | Facility: CLINIC | Age: 83
End: 2023-03-08
Payer: COMMERCIAL

## 2023-03-08 DIAGNOSIS — D64.9 ANEMIA, UNSPECIFIED: ICD-10-CM

## 2023-03-08 DIAGNOSIS — S22.32XA FRACTURE OF ONE RIB, LEFT SIDE, INITIAL ENCOUNTER FOR CLOSED FRACTURE: ICD-10-CM

## 2023-03-09 LAB
ANION GAP SERPL CALCULATED.3IONS-SCNC: 16 MMOL/L (ref 7–15)
BUN SERPL-MCNC: 19.4 MG/DL (ref 8–23)
CALCIUM SERPL-MCNC: 8.9 MG/DL (ref 8.8–10.2)
CHLORIDE SERPL-SCNC: 104 MMOL/L (ref 98–107)
CREAT SERPL-MCNC: 1.65 MG/DL (ref 0.67–1.17)
DEPRECATED HCO3 PLAS-SCNC: 20 MMOL/L (ref 22–29)
ERYTHROCYTE [DISTWIDTH] IN BLOOD BY AUTOMATED COUNT: 16 % (ref 10–15)
GFR SERPL CREATININE-BSD FRML MDRD: 41 ML/MIN/1.73M2
GLUCOSE SERPL-MCNC: 121 MG/DL (ref 70–99)
HCT VFR BLD AUTO: 29.9 % (ref 40–53)
HGB BLD-MCNC: 9.1 G/DL (ref 13.3–17.7)
MAGNESIUM SERPL-MCNC: 2.2 MG/DL (ref 1.7–2.3)
MCH RBC QN AUTO: 29.2 PG (ref 26.5–33)
MCHC RBC AUTO-ENTMCNC: 30.4 G/DL (ref 31.5–36.5)
MCV RBC AUTO: 96 FL (ref 78–100)
PLATELET # BLD AUTO: 766 10E3/UL (ref 150–450)
POTASSIUM SERPL-SCNC: 3.6 MMOL/L (ref 3.4–5.3)
RBC # BLD AUTO: 3.12 10E6/UL (ref 4.4–5.9)
SODIUM SERPL-SCNC: 140 MMOL/L (ref 136–145)
WBC # BLD AUTO: 13.1 10E3/UL (ref 4–11)

## 2023-03-09 PROCEDURE — 83735 ASSAY OF MAGNESIUM: CPT | Mod: ORL | Performed by: INTERNAL MEDICINE

## 2023-03-09 PROCEDURE — 80048 BASIC METABOLIC PNL TOTAL CA: CPT | Mod: ORL | Performed by: INTERNAL MEDICINE

## 2023-03-09 PROCEDURE — 36415 COLL VENOUS BLD VENIPUNCTURE: CPT | Mod: ORL | Performed by: INTERNAL MEDICINE

## 2023-03-09 PROCEDURE — 85027 COMPLETE CBC AUTOMATED: CPT | Mod: ORL | Performed by: INTERNAL MEDICINE

## 2023-03-09 PROCEDURE — P9604 ONE-WAY ALLOW PRORATED TRIP: HCPCS | Mod: ORL | Performed by: INTERNAL MEDICINE

## 2023-03-10 ENCOUNTER — LAB REQUISITION (OUTPATIENT)
Dept: LAB | Facility: CLINIC | Age: 83
End: 2023-03-10
Payer: COMMERCIAL

## 2023-03-10 DIAGNOSIS — N39.0 URINARY TRACT INFECTION, SITE NOT SPECIFIED: ICD-10-CM

## 2023-03-10 LAB
ALBUMIN UR-MCNC: 10 MG/DL
APPEARANCE UR: CLEAR
BILIRUB UR QL STRIP: NEGATIVE
COLOR UR AUTO: YELLOW
GLUCOSE UR STRIP-MCNC: NEGATIVE MG/DL
HGB UR QL STRIP: NEGATIVE
KETONES UR STRIP-MCNC: NEGATIVE MG/DL
LEUKOCYTE ESTERASE UR QL STRIP: NEGATIVE
MUCOUS THREADS #/AREA URNS LPF: PRESENT /LPF
NITRATE UR QL: NEGATIVE
PH UR STRIP: 5.5 [PH] (ref 5–7)
RBC URINE: 3 /HPF
SP GR UR STRIP: 1.02 (ref 1–1.03)
SQUAMOUS EPITHELIAL: 1 /HPF
TRANSITIONAL EPI: <1 /HPF
UROBILINOGEN UR STRIP-MCNC: NORMAL MG/DL
WBC URINE: <1 /HPF

## 2023-03-10 PROCEDURE — 87086 URINE CULTURE/COLONY COUNT: CPT | Mod: ORL | Performed by: INTERNAL MEDICINE

## 2023-03-10 PROCEDURE — 81001 URINALYSIS AUTO W/SCOPE: CPT | Mod: ORL | Performed by: INTERNAL MEDICINE

## 2023-03-10 NOTE — PLAN OF CARE
"Problem: Patient Care Overview  Goal: Plan of Care/Patient Progress Review  OT: Pt participates well in therapy today.  Distressed by loose BM and incontinence of stool in brief.  Pt also states, \"would I have  from my stroke?  I think I would have preferred it that way.  Then I wouldn't have to deal with this.\"  Informed RN, psych consult already in place.  Pt continues with L lean when ambulating/standing, intermittently corrects.       " yes

## 2023-03-11 ENCOUNTER — LAB REQUISITION (OUTPATIENT)
Dept: LAB | Facility: CLINIC | Age: 83
End: 2023-03-11
Payer: COMMERCIAL

## 2023-03-11 DIAGNOSIS — N10 ACUTE PYELONEPHRITIS: ICD-10-CM

## 2023-03-11 DIAGNOSIS — D72.829 ELEVATED WHITE BLOOD CELL COUNT, UNSPECIFIED: ICD-10-CM

## 2023-03-11 DIAGNOSIS — D64.9 ANEMIA, UNSPECIFIED: ICD-10-CM

## 2023-03-11 LAB
ANION GAP SERPL CALCULATED.3IONS-SCNC: 8 MMOL/L (ref 7–15)
BUN SERPL-MCNC: 16.6 MG/DL (ref 8–23)
CALCIUM SERPL-MCNC: 8 MG/DL (ref 8.8–10.2)
CHLORIDE SERPL-SCNC: 105 MMOL/L (ref 98–107)
CREAT SERPL-MCNC: 1.21 MG/DL (ref 0.67–1.17)
DEPRECATED HCO3 PLAS-SCNC: 27 MMOL/L (ref 22–29)
ERYTHROCYTE [DISTWIDTH] IN BLOOD BY AUTOMATED COUNT: 15.9 % (ref 10–15)
GFR SERPL CREATININE-BSD FRML MDRD: 59 ML/MIN/1.73M2
GLUCOSE SERPL-MCNC: 101 MG/DL (ref 70–99)
HCT VFR BLD AUTO: 25.9 % (ref 40–53)
HGB BLD-MCNC: 7.9 G/DL (ref 13.3–17.7)
MCH RBC QN AUTO: 28.9 PG (ref 26.5–33)
MCHC RBC AUTO-ENTMCNC: 30.5 G/DL (ref 31.5–36.5)
MCV RBC AUTO: 95 FL (ref 78–100)
PLATELET # BLD AUTO: 667 10E3/UL (ref 150–450)
POTASSIUM SERPL-SCNC: 4.5 MMOL/L (ref 3.4–5.3)
RBC # BLD AUTO: 2.73 10E6/UL (ref 4.4–5.9)
SODIUM SERPL-SCNC: 140 MMOL/L (ref 136–145)
WBC # BLD AUTO: 10.5 10E3/UL (ref 4–11)

## 2023-03-11 PROCEDURE — 85027 COMPLETE CBC AUTOMATED: CPT | Mod: ORL | Performed by: INTERNAL MEDICINE

## 2023-03-11 PROCEDURE — 36415 COLL VENOUS BLD VENIPUNCTURE: CPT | Mod: ORL | Performed by: INTERNAL MEDICINE

## 2023-03-11 PROCEDURE — 80048 BASIC METABOLIC PNL TOTAL CA: CPT | Mod: ORL | Performed by: INTERNAL MEDICINE

## 2023-03-11 PROCEDURE — P9603 ONE-WAY ALLOW PRORATED MILES: HCPCS | Mod: ORL | Performed by: INTERNAL MEDICINE

## 2023-03-12 ENCOUNTER — HOSPITAL ENCOUNTER (EMERGENCY)
Facility: CLINIC | Age: 83
Discharge: HOME | End: 2023-03-12
Attending: EMERGENCY MEDICINE | Admitting: EMERGENCY MEDICINE
Payer: COMMERCIAL

## 2023-03-12 ENCOUNTER — APPOINTMENT (OUTPATIENT)
Dept: CT IMAGING | Facility: CLINIC | Age: 83
End: 2023-03-12
Attending: EMERGENCY MEDICINE
Payer: COMMERCIAL

## 2023-03-12 VITALS
OXYGEN SATURATION: 92 % | RESPIRATION RATE: 18 BRPM | HEART RATE: 58 BPM | SYSTOLIC BLOOD PRESSURE: 148 MMHG | BODY MASS INDEX: 22.53 KG/M2 | DIASTOLIC BLOOD PRESSURE: 68 MMHG | HEIGHT: 70 IN | TEMPERATURE: 99 F

## 2023-03-12 DIAGNOSIS — S09.90XA CLOSED HEAD INJURY, INITIAL ENCOUNTER: ICD-10-CM

## 2023-03-12 DIAGNOSIS — R29.6 MULTIPLE FALLS: ICD-10-CM

## 2023-03-12 LAB
ALBUMIN UR-MCNC: NEGATIVE MG/DL
ANION GAP SERPL CALCULATED.3IONS-SCNC: 8 MMOL/L (ref 7–15)
APPEARANCE UR: CLEAR
ATRIAL RATE - MUSE: 61 BPM
BACTERIA UR CULT: NO GROWTH
BASOPHILS # BLD AUTO: 0.1 10E3/UL (ref 0–0.2)
BASOPHILS NFR BLD AUTO: 1 %
BILIRUB UR QL STRIP: NEGATIVE
BUN SERPL-MCNC: 16 MG/DL (ref 8–23)
CALCIUM SERPL-MCNC: 8.4 MG/DL (ref 8.8–10.2)
CHLORIDE SERPL-SCNC: 106 MMOL/L (ref 98–107)
COLOR UR AUTO: ABNORMAL
CREAT SERPL-MCNC: 1.42 MG/DL (ref 0.67–1.17)
DEPRECATED HCO3 PLAS-SCNC: 27 MMOL/L (ref 22–29)
DIASTOLIC BLOOD PRESSURE - MUSE: NORMAL MMHG
EOSINOPHIL # BLD AUTO: 0 10E3/UL (ref 0–0.7)
EOSINOPHIL NFR BLD AUTO: 0 %
ERYTHROCYTE [DISTWIDTH] IN BLOOD BY AUTOMATED COUNT: 16 % (ref 10–15)
GFR SERPL CREATININE-BSD FRML MDRD: 49 ML/MIN/1.73M2
GLUCOSE SERPL-MCNC: 105 MG/DL (ref 70–99)
GLUCOSE UR STRIP-MCNC: NEGATIVE MG/DL
HCT VFR BLD AUTO: 26.1 % (ref 40–53)
HGB BLD-MCNC: 7.7 G/DL (ref 13.3–17.7)
HGB BLD-MCNC: 8.2 G/DL (ref 13.3–17.7)
HGB UR QL STRIP: NEGATIVE
IMM GRANULOCYTES # BLD: 0.1 10E3/UL
IMM GRANULOCYTES NFR BLD: 1 %
INTERPRETATION ECG - MUSE: NORMAL
KETONES UR STRIP-MCNC: NEGATIVE MG/DL
LACTATE SERPL-SCNC: 1.1 MMOL/L (ref 0.7–2)
LEUKOCYTE ESTERASE UR QL STRIP: NEGATIVE
LYMPHOCYTES # BLD AUTO: 1.2 10E3/UL (ref 0.8–5.3)
LYMPHOCYTES NFR BLD AUTO: 12 %
MCH RBC QN AUTO: 29.3 PG (ref 26.5–33)
MCHC RBC AUTO-ENTMCNC: 31.4 G/DL (ref 31.5–36.5)
MCV RBC AUTO: 93 FL (ref 78–100)
MONOCYTES # BLD AUTO: 0.7 10E3/UL (ref 0–1.3)
MONOCYTES NFR BLD AUTO: 7 %
NEUTROPHILS # BLD AUTO: 7.7 10E3/UL (ref 1.6–8.3)
NEUTROPHILS NFR BLD AUTO: 79 %
NITRATE UR QL: NEGATIVE
NRBC # BLD AUTO: 0 10E3/UL
NRBC BLD AUTO-RTO: 0 /100
P AXIS - MUSE: 39 DEGREES
PH UR STRIP: 7.5 [PH] (ref 5–7)
PLATELET # BLD AUTO: 648 10E3/UL (ref 150–450)
POTASSIUM SERPL-SCNC: 4.1 MMOL/L (ref 3.4–5.3)
PR INTERVAL - MUSE: 180 MS
QRS DURATION - MUSE: 80 MS
QT - MUSE: 420 MS
QTC - MUSE: 422 MS
R AXIS - MUSE: 45 DEGREES
RBC # BLD AUTO: 2.8 10E6/UL (ref 4.4–5.9)
RBC URINE: 1 /HPF
SODIUM SERPL-SCNC: 141 MMOL/L (ref 136–145)
SP GR UR STRIP: 1.01 (ref 1–1.03)
SYSTOLIC BLOOD PRESSURE - MUSE: NORMAL MMHG
T AXIS - MUSE: -4 DEGREES
UROBILINOGEN UR STRIP-MCNC: NORMAL MG/DL
VENTRICULAR RATE- MUSE: 61 BPM
WBC # BLD AUTO: 9.6 10E3/UL (ref 4–11)
WBC URINE: <1 /HPF

## 2023-03-12 PROCEDURE — 80048 BASIC METABOLIC PNL TOTAL CA: CPT | Performed by: EMERGENCY MEDICINE

## 2023-03-12 PROCEDURE — 96360 HYDRATION IV INFUSION INIT: CPT

## 2023-03-12 PROCEDURE — 99285 EMERGENCY DEPT VISIT HI MDM: CPT | Mod: 25

## 2023-03-12 PROCEDURE — 36415 COLL VENOUS BLD VENIPUNCTURE: CPT | Mod: ORL | Performed by: INTERNAL MEDICINE

## 2023-03-12 PROCEDURE — P9603 ONE-WAY ALLOW PRORATED MILES: HCPCS | Mod: ORL | Performed by: INTERNAL MEDICINE

## 2023-03-12 PROCEDURE — 85025 COMPLETE CBC W/AUTO DIFF WBC: CPT | Mod: ORL | Performed by: INTERNAL MEDICINE

## 2023-03-12 PROCEDURE — 93005 ELECTROCARDIOGRAM TRACING: CPT

## 2023-03-12 PROCEDURE — 87040 BLOOD CULTURE FOR BACTERIA: CPT | Performed by: EMERGENCY MEDICINE

## 2023-03-12 PROCEDURE — 258N000003 HC RX IP 258 OP 636: Performed by: EMERGENCY MEDICINE

## 2023-03-12 PROCEDURE — 96361 HYDRATE IV INFUSION ADD-ON: CPT

## 2023-03-12 PROCEDURE — 70450 CT HEAD/BRAIN W/O DYE: CPT

## 2023-03-12 PROCEDURE — 85018 HEMOGLOBIN: CPT | Performed by: EMERGENCY MEDICINE

## 2023-03-12 PROCEDURE — 81003 URINALYSIS AUTO W/O SCOPE: CPT | Performed by: EMERGENCY MEDICINE

## 2023-03-12 PROCEDURE — 36415 COLL VENOUS BLD VENIPUNCTURE: CPT | Performed by: EMERGENCY MEDICINE

## 2023-03-12 PROCEDURE — 83605 ASSAY OF LACTIC ACID: CPT | Performed by: EMERGENCY MEDICINE

## 2023-03-12 RX ADMIN — SODIUM CHLORIDE 1000 ML: 9 INJECTION, SOLUTION INTRAVENOUS at 10:28

## 2023-03-12 ASSESSMENT — ENCOUNTER SYMPTOMS
HEADACHES: 1
NECK PAIN: 0
VOMITING: 0
ABDOMINAL PAIN: 0
COUGH: 0
FEVER: 0

## 2023-03-12 ASSESSMENT — ACTIVITIES OF DAILY LIVING (ADL)
ADLS_ACUITY_SCORE: 43
ADLS_ACUITY_SCORE: 43
ADLS_ACUITY_SCORE: 39

## 2023-03-12 NOTE — ED TRIAGE NOTES
patient had a witnessed fall from a seated position onto his forehead. Lives at [facility] care facility. Has had 2 recent falls prior. Complained about some dizziness to EMS. BG in EMS was 142. Has history of aggression.

## 2023-03-12 NOTE — ED PROVIDER NOTES
History     Chief Complaint:  Fall on blood thinners    The history is provided by the patient (and the RN).      Shamir Concepcion is a 82 year old male on Eliquis with a history of hypertension and dyslipidemia who presents via EMS with a fall. The patient reports that he had a fall today from a seated position, landing on his forehead He states that he is currently experiencing head pain. The RN reports that the patient has a wrist sprain  from fall two weeks ago and a laceration on the head from a fall one week ago. The patient denies any neck, chest, back, leg, or abdominal pain. He also denies any vomiting, fever, or cough.     Independent Historian:   RN    Review of External Notes: none    ROS:  Review of Systems   Unable to perform ROS: Dementia   Constitutional: Negative for fever.   Respiratory: Negative for cough.    Cardiovascular: Negative for chest pain.   Gastrointestinal: Negative for abdominal pain and vomiting.   Musculoskeletal: Negative for neck pain.   Neurological: Positive for headaches.   All other systems reviewed and are negative.    Allergies:  No Known Allergies     Medications:    Methotrexate  Trospium  Eliquis  Alendronate  Sennosides-docusate  Rosuvastatin  Sertraline    Past Medical History:    Allergic rhinitis  BPH  Cerebral infarction  Colon polyps  Erectile dysfunction  Hypertension  PAD  RA  Mixed Alzheimer's and vascular dementia  Depression  Aneurysm of right common iliac artery  CKD, stage 3A  Osteopenia of multiple sites  Dyslipidemia  Cerebellar stroke-with occlussion/dissec  BPH  Cataract  Chronic antocoagulation  Claudication    Past Surgical History:    IR lower extremity angiogram  T&A  Lumbar laminectomy/discectomy  Open reduction internal fixation, left proximal humerus  Phacoemulsification clear cornea with standard intraocular lens implant, bilateral   Sinus surgery  Vascular surgery  MRA upper extremity WO&W CONT     Family History:   "  Mother-Cancer  Father-Cerebrovascular disease    Social History:  Presents alone.   PCP: Nina Quintero     Physical Exam     Patient Vitals for the past 24 hrs:   BP Temp Temp src Pulse Resp SpO2 Height   03/12/23 1245 -- -- -- -- -- 95 % --   03/12/23 1230 (!) 144/84 -- -- 58 -- 94 % --   03/12/23 1130 -- -- -- -- -- 97 % --   03/12/23 1115 -- -- -- -- -- 97 % --   03/12/23 1100 139/75 -- -- 64 18 90 % --   03/12/23 1024 (!) 148/70 -- -- 60 24 95 % --   03/12/23 0951 (!) 140/76 99  F (37.2  C) Temporal 103 20 94 % 1.778 m (5' 10\")      Physical Exam   Nursing note and vitals reviewed.  Constitutional:  Appears well-developed and well-nourished.   HENT:   Head:   Quarter sized area of redness with slight swelling to the mid upper forehead. Well healing scabbed wound to the occipital scalp without drainage or redness.   Mouth/Throat:   Oropharynx is clear and moist. No oropharyngeal exudate.   Eyes:    Pupils are equal, round, and reactive to light.   Neck:    Non tender cervical spine. Normal range of motion without pain. Neck supple. No tracheal deviation present. No thyromegaly present.   Cardiovascular:  Normal rate, regular rhythm, no murmur   Pulmonary/Chest: Breath sounds are clear and equal without wheezes or crackles. Non tender ribs and clavicles.  Abdominal:   Soft. Bowel sounds are normal. Exhibits no distension and no mass. There is no tenderness. There is no rebound and no guarding.   Musculoskeletal:  Exhibits no edema. Arms, legs, and back non tender. Well healing abrasions to the dorsum of the left hand with a Velcro wrist brace.   Lymphadenopathy:  No cervical adenopathy.   Neurological:   Alert and oriented to person, place. GCS 15.  CN 2-12 intact.  and proximal upper extremity strength strong and equal.  Bilateral lower extremity strength strong and equal, including strong dorsiflexion and plantarflexion strength.  Sensation intact and equal to the face, arms and legs.  No facial droop or " weakness. Normal speech.  Follows commands and answers questions normally.    Skin:    Skin is warm and dry. No rash noted. No pallor.       Emergency Department Course   ECG  ECG taken at 1019 ECG read at 1041  Sinus rhythm with premature atrial complexes  Nonspecific ST abnormality  Abnormal ECG  No significant changes as compared to prior, dated 3/1/23.   Rate 61 bpm. AZ interval 180 ms. QRS duration 80 ms. QT/QTc  420/422 ms. P-R-T axes 39 45 -4 .     Imaging:  Head CT w/o contrast   Final Result   IMPRESSION:   1.  No acute intracranial process.   2.  Stable mild chronic small vessel ischemic disease and moderate to severe disproportionately enlarged subarachnoid space hydrocephalus, suggestive of normal pressure hydrocephalus in the appropriate clinical setting.   3.  Chronic left posteroinferior cerebellar artery territory infarct.               Report per radiology    Laboratory:  Labs Ordered and Resulted from Time of ED Arrival to Time of ED Departure   BASIC METABOLIC PANEL - Abnormal       Result Value    Sodium 141      Potassium 4.1      Chloride 106      Carbon Dioxide (CO2) 27      Anion Gap 8      Urea Nitrogen 16.0      Creatinine 1.42 (*)     Calcium 8.4 (*)     Glucose 105 (*)     GFR Estimate 49 (*)    ROUTINE UA WITH MICROSCOPIC REFLEX TO CULTURE - Abnormal    Color Urine Light Yellow      Appearance Urine Clear      Glucose Urine Negative      Bilirubin Urine Negative      Ketones Urine Negative      Specific Gravity Urine 1.013      Blood Urine Negative      pH Urine 7.5 (*)     Protein Albumin Urine Negative      Urobilinogen Urine Normal      Nitrite Urine Negative      Leukocyte Esterase Urine Negative      RBC Urine 1      WBC Urine <1     CBC WITH PLATELETS AND DIFFERENTIAL - Abnormal    WBC Count 9.6      RBC Count 2.80 (*)     Hemoglobin 8.2 (*)     Hematocrit 26.1 (*)     MCV 93      MCH 29.3      MCHC 31.4 (*)     RDW 16.0 (*)     Platelet Count 648 (*)     % Neutrophils 79      %  Lymphocytes 12      % Monocytes 7      % Eosinophils 0      % Basophils 1      % Immature Granulocytes 1      NRBCs per 100 WBC 0      Absolute Neutrophils 7.7      Absolute Lymphocytes 1.2      Absolute Monocytes 0.7      Absolute Eosinophils 0.0      Absolute Basophils 0.1      Absolute Immature Granulocytes 0.1      Absolute NRBCs 0.0     LACTIC ACID WHOLE BLOOD - Normal    Lactic Acid 1.1     BLOOD CULTURE   BLOOD CULTURE        Emergency Department Course & Assessments:   Interventions:  Medications   0.9% sodium chloride BOLUS (0 mLs Intravenous Stopped 3/12/23 1206)      Independent Interpretation (X-rays, CTs, rhythm strip):  None    Consultations/Discussion of Management or Tests:  None     Assessments:  ED Course as of 03/12/23 1326   Sun Mar 12, 2023   1007 I obtained history and examined the patient as noted above.    1322 I rechecked the patient. I spoke with the patient's daughter and discussed results.      Social Determinants of Health affecting care:   None and Social Connections/Isolation    Disposition:  The patient was discharged to home.     Impression & Plan      Medical Decision Making:  This patient has a history of dementia and has had multiple falls.  He sustained a closed head injury and a fall today and head CT was performed which did not show any intracranial bleed or skull fracture.  He was mildly tachycardic upon arrival with a temperature of 99 degrees so I performed infectious work-up to rule out infectious causes of his recent falls which was negative.  There was no sign of UTI and his white blood cell count and lactic acid are normal.  I feel he can be safely discharged home.  I discussed this with his daughter who agrees.  He is anemic with a hemoglobin of 8.2 which is at his baseline.  Instructions were given to have him follow-up in clinic this week but to return if symptoms worsen.  Clinically he does not have any signs of cervical spine fracture I felt he was alert and  reliable with pain reporting.  Diagnosis:    ICD-10-CM    1. Closed head injury, initial encounter  S09.90XA       2. Multiple falls  R29.6            Scribe Disclosure:  I, ELÍAS AREVALO, am serving as a scribe at 1:26 PM on 3/12/2023 to document services personally performed by Alyce Dickinson MD based on my observations and the provider's statements to me.      3/12/2023   Alyce Dickinson MD [place], Cheri Lee, MD  03/12/23 1829

## 2023-03-14 ENCOUNTER — LAB REQUISITION (OUTPATIENT)
Dept: LAB | Facility: CLINIC | Age: 83
End: 2023-03-14
Payer: COMMERCIAL

## 2023-03-14 DIAGNOSIS — N17.9 ACUTE KIDNEY FAILURE, UNSPECIFIED (H): ICD-10-CM

## 2023-03-15 ENCOUNTER — LAB REQUISITION (OUTPATIENT)
Dept: LAB | Facility: CLINIC | Age: 83
End: 2023-03-15
Payer: COMMERCIAL

## 2023-03-15 DIAGNOSIS — N17.9 ACUTE KIDNEY FAILURE, UNSPECIFIED (H): ICD-10-CM

## 2023-03-17 LAB
BACTERIA BLD CULT: NO GROWTH
BACTERIA BLD CULT: NO GROWTH

## 2023-05-02 ENCOUNTER — LAB REQUISITION (OUTPATIENT)
Dept: LAB | Facility: CLINIC | Age: 83
End: 2023-05-02
Payer: COMMERCIAL

## 2023-05-02 DIAGNOSIS — R35.0 FREQUENCY OF MICTURITION: ICD-10-CM

## 2023-05-02 DIAGNOSIS — R30.9 PAINFUL MICTURITION, UNSPECIFIED: ICD-10-CM

## 2023-05-02 LAB
ALBUMIN UR-MCNC: NEGATIVE MG/DL
APPEARANCE UR: CLEAR
BILIRUB UR QL STRIP: NEGATIVE
COLOR UR AUTO: ABNORMAL
GLUCOSE UR STRIP-MCNC: NEGATIVE MG/DL
HGB UR QL STRIP: ABNORMAL
KETONES UR STRIP-MCNC: NEGATIVE MG/DL
LEUKOCYTE ESTERASE UR QL STRIP: NEGATIVE
NITRATE UR QL: NEGATIVE
PH UR STRIP: 6 [PH] (ref 5–7)
RBC URINE: 8 /HPF
SP GR UR STRIP: 1.02 (ref 1–1.03)
UROBILINOGEN UR STRIP-MCNC: NORMAL MG/DL
WBC URINE: <1 /HPF

## 2023-05-02 PROCEDURE — 87086 URINE CULTURE/COLONY COUNT: CPT | Mod: ORL | Performed by: NURSE PRACTITIONER

## 2023-05-02 PROCEDURE — 81001 URINALYSIS AUTO W/SCOPE: CPT | Mod: ORL | Performed by: NURSE PRACTITIONER

## 2023-05-03 ENCOUNTER — LAB REQUISITION (OUTPATIENT)
Dept: LAB | Facility: CLINIC | Age: 83
End: 2023-05-03
Payer: COMMERCIAL

## 2023-05-03 DIAGNOSIS — D64.9 ANEMIA, UNSPECIFIED: ICD-10-CM

## 2023-05-03 DIAGNOSIS — E86.0 DEHYDRATION: ICD-10-CM

## 2023-05-03 LAB — BACTERIA UR CULT: NORMAL

## 2023-05-04 LAB
ANION GAP SERPL CALCULATED.3IONS-SCNC: 13 MMOL/L (ref 7–15)
BASOPHILS # BLD AUTO: 0 10E3/UL (ref 0–0.2)
BASOPHILS NFR BLD AUTO: 0 %
BUN SERPL-MCNC: 19.6 MG/DL (ref 8–23)
CALCIUM SERPL-MCNC: 8.5 MG/DL (ref 8.8–10.2)
CHLORIDE SERPL-SCNC: 105 MMOL/L (ref 98–107)
CREAT SERPL-MCNC: 1.62 MG/DL (ref 0.67–1.17)
DEPRECATED HCO3 PLAS-SCNC: 22 MMOL/L (ref 22–29)
EOSINOPHIL # BLD AUTO: 0 10E3/UL (ref 0–0.7)
EOSINOPHIL NFR BLD AUTO: 0 %
ERYTHROCYTE [DISTWIDTH] IN BLOOD BY AUTOMATED COUNT: 17.7 % (ref 10–15)
GFR SERPL CREATININE-BSD FRML MDRD: 42 ML/MIN/1.73M2
GLUCOSE SERPL-MCNC: 75 MG/DL (ref 70–99)
HCT VFR BLD AUTO: 30.8 % (ref 40–53)
HGB BLD-MCNC: 8.8 G/DL (ref 13.3–17.7)
IMM GRANULOCYTES # BLD: 0.1 10E3/UL
IMM GRANULOCYTES NFR BLD: 1 %
LYMPHOCYTES # BLD AUTO: 0.9 10E3/UL (ref 0.8–5.3)
LYMPHOCYTES NFR BLD AUTO: 10 %
MCH RBC QN AUTO: 26.2 PG (ref 26.5–33)
MCHC RBC AUTO-ENTMCNC: 28.6 G/DL (ref 31.5–36.5)
MCV RBC AUTO: 92 FL (ref 78–100)
MONOCYTES # BLD AUTO: 0.6 10E3/UL (ref 0–1.3)
MONOCYTES NFR BLD AUTO: 8 %
NEUTROPHILS # BLD AUTO: 7 10E3/UL (ref 1.6–8.3)
NEUTROPHILS NFR BLD AUTO: 81 %
NRBC # BLD AUTO: 0 10E3/UL
NRBC BLD AUTO-RTO: 0 /100
PLATELET # BLD AUTO: 419 10E3/UL (ref 150–450)
POTASSIUM SERPL-SCNC: 4.1 MMOL/L (ref 3.4–5.3)
RBC # BLD AUTO: 3.36 10E6/UL (ref 4.4–5.9)
SODIUM SERPL-SCNC: 140 MMOL/L (ref 136–145)
WBC # BLD AUTO: 8.6 10E3/UL (ref 4–11)

## 2023-05-04 PROCEDURE — 85025 COMPLETE CBC W/AUTO DIFF WBC: CPT | Mod: ORL | Performed by: NURSE PRACTITIONER

## 2023-05-04 PROCEDURE — 36415 COLL VENOUS BLD VENIPUNCTURE: CPT | Mod: ORL | Performed by: NURSE PRACTITIONER

## 2023-05-04 PROCEDURE — 80048 BASIC METABOLIC PNL TOTAL CA: CPT | Mod: ORL | Performed by: NURSE PRACTITIONER

## 2023-05-04 PROCEDURE — P9604 ONE-WAY ALLOW PRORATED TRIP: HCPCS | Mod: ORL | Performed by: NURSE PRACTITIONER

## 2023-07-29 ENCOUNTER — TRANSFERRED RECORDS (OUTPATIENT)
Dept: HEALTH INFORMATION MANAGEMENT | Facility: CLINIC | Age: 83
End: 2023-07-29
Payer: COMMERCIAL

## 2023-08-02 ENCOUNTER — LAB REQUISITION (OUTPATIENT)
Dept: LAB | Facility: CLINIC | Age: 83
End: 2023-08-02
Payer: COMMERCIAL

## 2023-08-02 DIAGNOSIS — M06.9 RHEUMATOID ARTHRITIS, UNSPECIFIED (H): ICD-10-CM

## 2023-08-02 DIAGNOSIS — Z79.899 OTHER LONG TERM (CURRENT) DRUG THERAPY: ICD-10-CM

## 2023-08-03 LAB
AST SERPL W P-5'-P-CCNC: 30 U/L (ref 0–45)
CREAT SERPL-MCNC: 1.48 MG/DL (ref 0.67–1.17)
ERYTHROCYTE [DISTWIDTH] IN BLOOD BY AUTOMATED COUNT: 20.4 % (ref 10–15)
GFR SERPL CREATININE-BSD FRML MDRD: 46 ML/MIN/1.73M2
HCT VFR BLD AUTO: 29.2 % (ref 40–53)
HGB BLD-MCNC: 8.5 G/DL (ref 13.3–17.7)
MCH RBC QN AUTO: 25.1 PG (ref 26.5–33)
MCHC RBC AUTO-ENTMCNC: 29.1 G/DL (ref 31.5–36.5)
MCV RBC AUTO: 86 FL (ref 78–100)
PLATELET # BLD AUTO: 380 10E3/UL (ref 150–450)
RBC # BLD AUTO: 3.39 10E6/UL (ref 4.4–5.9)
WBC # BLD AUTO: 7.9 10E3/UL (ref 4–11)

## 2023-08-03 PROCEDURE — 36415 COLL VENOUS BLD VENIPUNCTURE: CPT | Mod: ORL | Performed by: INTERNAL MEDICINE

## 2023-08-03 PROCEDURE — P9604 ONE-WAY ALLOW PRORATED TRIP: HCPCS | Mod: ORL | Performed by: INTERNAL MEDICINE

## 2023-08-03 PROCEDURE — 84450 TRANSFERASE (AST) (SGOT): CPT | Mod: ORL | Performed by: INTERNAL MEDICINE

## 2023-08-03 PROCEDURE — 82565 ASSAY OF CREATININE: CPT | Mod: ORL | Performed by: INTERNAL MEDICINE

## 2023-08-03 PROCEDURE — 85027 COMPLETE CBC AUTOMATED: CPT | Mod: ORL | Performed by: INTERNAL MEDICINE

## 2023-08-09 ENCOUNTER — LAB REQUISITION (OUTPATIENT)
Dept: LAB | Facility: CLINIC | Age: 83
End: 2023-08-09
Payer: COMMERCIAL

## 2023-08-09 DIAGNOSIS — Z79.899 OTHER LONG TERM (CURRENT) DRUG THERAPY: ICD-10-CM

## 2023-08-09 DIAGNOSIS — M06.9 RHEUMATOID ARTHRITIS, UNSPECIFIED (H): ICD-10-CM

## 2023-08-17 LAB
BASOPHILS # BLD AUTO: 0.1 10E3/UL (ref 0–0.2)
BASOPHILS NFR BLD AUTO: 1 %
EOSINOPHIL # BLD AUTO: 0 10E3/UL (ref 0–0.7)
EOSINOPHIL NFR BLD AUTO: 0 %
ERYTHROCYTE [DISTWIDTH] IN BLOOD BY AUTOMATED COUNT: 20.4 % (ref 10–15)
HCT VFR BLD AUTO: 29.6 % (ref 40–53)
HGB BLD-MCNC: 8.4 G/DL (ref 13.3–17.7)
IMM GRANULOCYTES # BLD: 0 10E3/UL
IMM GRANULOCYTES NFR BLD: 0 %
LYMPHOCYTES # BLD AUTO: 1.3 10E3/UL (ref 0.8–5.3)
LYMPHOCYTES NFR BLD AUTO: 17 %
MCH RBC QN AUTO: 24.3 PG (ref 26.5–33)
MCHC RBC AUTO-ENTMCNC: 28.4 G/DL (ref 31.5–36.5)
MCV RBC AUTO: 86 FL (ref 78–100)
MONOCYTES # BLD AUTO: 0.6 10E3/UL (ref 0–1.3)
MONOCYTES NFR BLD AUTO: 8 %
NEUTROPHILS # BLD AUTO: 5.6 10E3/UL (ref 1.6–8.3)
NEUTROPHILS NFR BLD AUTO: 74 %
NRBC # BLD AUTO: 0 10E3/UL
NRBC BLD AUTO-RTO: 0 /100
PLATELET # BLD AUTO: 377 10E3/UL (ref 150–450)
RBC # BLD AUTO: 3.45 10E6/UL (ref 4.4–5.9)
WBC # BLD AUTO: 7.6 10E3/UL (ref 4–11)

## 2023-08-17 PROCEDURE — P9604 ONE-WAY ALLOW PRORATED TRIP: HCPCS | Mod: ORL | Performed by: INTERNAL MEDICINE

## 2023-08-17 PROCEDURE — 36415 COLL VENOUS BLD VENIPUNCTURE: CPT | Mod: ORL | Performed by: INTERNAL MEDICINE

## 2023-08-17 PROCEDURE — 85025 COMPLETE CBC W/AUTO DIFF WBC: CPT | Mod: ORL | Performed by: INTERNAL MEDICINE

## 2023-10-14 NOTE — PATIENT INSTRUCTIONS
You have higher velocities above the stent in your right leg. This means the artery is narrowed.   We recommend an angiogram to open that artery.  The stent may shut down ( plug up)  over time.    Think about it and discuss with your family.   I will call you in a week and see if you have any questions.  If you don't want to proceed with the angiogram, then we need to see you again in 4 months and look at the artery again where the stent is.   Call Adelita Rios [phone] if questions or you decide to proceed.         room air

## 2023-10-26 ENCOUNTER — TELEPHONE (OUTPATIENT)
Dept: OTHER | Facility: CLINIC | Age: 83
End: 2023-10-26
Payer: COMMERCIAL

## 2023-10-26 NOTE — TELEPHONE ENCOUNTER
Daughter Dorcas called.  She states, her dad has declined and is immobile and spends most of his time in the wheelchair.  He is not having pain in his legs and isn't complaining.  His feet are warm and pink.   She would like to cancel all further follow up on his legs.    Adelita Rios RN  IR nurse clinician  [phone]

## 2023-11-01 ENCOUNTER — LAB REQUISITION (OUTPATIENT)
Dept: LAB | Facility: CLINIC | Age: 83
End: 2023-11-01
Payer: COMMERCIAL

## 2023-11-01 DIAGNOSIS — M06.9 RHEUMATOID ARTHRITIS, UNSPECIFIED (H): ICD-10-CM

## 2023-11-01 DIAGNOSIS — Z79.899 OTHER LONG TERM (CURRENT) DRUG THERAPY: ICD-10-CM

## 2023-11-02 LAB
AST SERPL W P-5'-P-CCNC: 29 U/L (ref 0–45)
ERYTHROCYTE [DISTWIDTH] IN BLOOD BY AUTOMATED COUNT: 21.7 % (ref 10–15)
HCT VFR BLD AUTO: 30.4 % (ref 40–53)
HGB BLD-MCNC: 8.8 G/DL (ref 13.3–17.7)
MCH RBC QN AUTO: 25.3 PG (ref 26.5–33)
MCHC RBC AUTO-ENTMCNC: 28.9 G/DL (ref 31.5–36.5)
MCV RBC AUTO: 87 FL (ref 78–100)
PLATELET # BLD AUTO: 376 10E3/UL (ref 150–450)
RBC # BLD AUTO: 3.48 10E6/UL (ref 4.4–5.9)
WBC # BLD AUTO: 8.4 10E3/UL (ref 4–11)

## 2023-11-02 PROCEDURE — 36415 COLL VENOUS BLD VENIPUNCTURE: CPT | Mod: ORL | Performed by: INTERNAL MEDICINE

## 2023-11-02 PROCEDURE — 84450 TRANSFERASE (AST) (SGOT): CPT | Mod: ORL | Performed by: INTERNAL MEDICINE

## 2023-11-02 PROCEDURE — P9604 ONE-WAY ALLOW PRORATED TRIP: HCPCS | Mod: ORL | Performed by: INTERNAL MEDICINE

## 2023-11-02 PROCEDURE — 85027 COMPLETE CBC AUTOMATED: CPT | Mod: ORL | Performed by: INTERNAL MEDICINE

## 2023-11-15 ENCOUNTER — LAB REQUISITION (OUTPATIENT)
Dept: LAB | Facility: CLINIC | Age: 83
End: 2023-11-15
Payer: COMMERCIAL

## 2023-11-15 DIAGNOSIS — F41.8 OTHER SPECIFIED ANXIETY DISORDERS: ICD-10-CM

## 2023-11-15 DIAGNOSIS — R29.6 REPEATED FALLS: ICD-10-CM

## 2023-11-15 DIAGNOSIS — F02.80 DEMENTIA IN OTHER DISEASES CLASSIFIED ELSEWHERE, UNSPECIFIED SEVERITY, WITHOUT BEHAVIORAL DISTURBANCE, PSYCHOTIC DISTURBANCE, MOOD DISTURBANCE, AND ANXIETY (H): ICD-10-CM

## 2023-11-15 DIAGNOSIS — D64.9 ANEMIA, UNSPECIFIED: ICD-10-CM

## 2023-11-21 LAB
ERYTHROCYTE [DISTWIDTH] IN BLOOD BY AUTOMATED COUNT: 21.4 % (ref 10–15)
FERRITIN SERPL-MCNC: 32 NG/ML (ref 31–409)
FOLATE SERPL-MCNC: >40 NG/ML (ref 4.6–34.8)
HCT VFR BLD AUTO: 32 % (ref 40–53)
HGB BLD-MCNC: 9.5 G/DL (ref 13.3–17.7)
IRON BINDING CAPACITY (ROCHE): 364 UG/DL (ref 240–430)
IRON SATN MFR SERPL: 6 % (ref 15–46)
IRON SERPL-MCNC: 22 UG/DL (ref 61–157)
MCH RBC QN AUTO: 26.1 PG (ref 26.5–33)
MCHC RBC AUTO-ENTMCNC: 29.7 G/DL (ref 31.5–36.5)
MCV RBC AUTO: 88 FL (ref 78–100)
PLATELET # BLD AUTO: 368 10E3/UL (ref 150–450)
RBC # BLD AUTO: 3.64 10E6/UL (ref 4.4–5.9)
VIT B12 SERPL-MCNC: 879 PG/ML (ref 232–1245)
WBC # BLD AUTO: 7.4 10E3/UL (ref 4–11)

## 2023-11-21 PROCEDURE — P9604 ONE-WAY ALLOW PRORATED TRIP: HCPCS | Mod: ORL | Performed by: FAMILY MEDICINE

## 2023-11-21 PROCEDURE — 84443 ASSAY THYROID STIM HORMONE: CPT | Mod: ORL

## 2023-11-21 PROCEDURE — 36415 COLL VENOUS BLD VENIPUNCTURE: CPT | Mod: ORL | Performed by: FAMILY MEDICINE

## 2023-11-21 PROCEDURE — 82607 VITAMIN B-12: CPT | Mod: ORL | Performed by: FAMILY MEDICINE

## 2023-11-21 PROCEDURE — 85027 COMPLETE CBC AUTOMATED: CPT | Mod: ORL | Performed by: FAMILY MEDICINE

## 2023-11-21 PROCEDURE — 82746 ASSAY OF FOLIC ACID SERUM: CPT | Mod: ORL | Performed by: FAMILY MEDICINE

## 2023-11-21 PROCEDURE — 83550 IRON BINDING TEST: CPT | Mod: ORL | Performed by: FAMILY MEDICINE

## 2023-11-21 PROCEDURE — 80048 BASIC METABOLIC PNL TOTAL CA: CPT | Mod: ORL

## 2023-11-21 PROCEDURE — 82728 ASSAY OF FERRITIN: CPT | Mod: ORL | Performed by: FAMILY MEDICINE

## 2023-11-22 LAB
ANION GAP SERPL CALCULATED.3IONS-SCNC: 15 MMOL/L (ref 7–15)
BUN SERPL-MCNC: 25.5 MG/DL (ref 8–23)
CALCIUM SERPL-MCNC: 9.3 MG/DL (ref 8.8–10.2)
CHLORIDE SERPL-SCNC: 107 MMOL/L (ref 98–107)
CREAT SERPL-MCNC: 1.58 MG/DL (ref 0.67–1.17)
DEPRECATED HCO3 PLAS-SCNC: 21 MMOL/L (ref 22–29)
EGFRCR SERPLBLD CKD-EPI 2021: 43 ML/MIN/1.73M2
GLUCOSE SERPL-MCNC: 72 MG/DL (ref 70–99)
POTASSIUM SERPL-SCNC: 4.8 MMOL/L (ref 3.4–5.3)
SODIUM SERPL-SCNC: 143 MMOL/L (ref 135–145)
TSH SERPL DL<=0.005 MIU/L-ACNC: 2.87 UIU/ML (ref 0.3–4.2)

## 2023-11-27 ENCOUNTER — DOCUMENTATION ONLY (OUTPATIENT)
Dept: OTHER | Facility: CLINIC | Age: 83
End: 2023-11-27
Payer: COMMERCIAL

## 2023-12-27 ENCOUNTER — LAB REQUISITION (OUTPATIENT)
Dept: LAB | Facility: CLINIC | Age: 83
End: 2023-12-27
Payer: COMMERCIAL

## 2023-12-27 DIAGNOSIS — D52.9 FOLATE DEFICIENCY ANEMIA, UNSPECIFIED: ICD-10-CM

## 2023-12-27 DIAGNOSIS — N18.9 CHRONIC KIDNEY DISEASE, UNSPECIFIED: ICD-10-CM

## 2023-12-28 ENCOUNTER — LAB REQUISITION (OUTPATIENT)
Dept: LAB | Facility: CLINIC | Age: 83
End: 2023-12-28
Payer: COMMERCIAL

## 2023-12-28 DIAGNOSIS — D52.9 FOLATE DEFICIENCY ANEMIA, UNSPECIFIED: ICD-10-CM

## 2023-12-28 DIAGNOSIS — N18.9 CHRONIC KIDNEY DISEASE, UNSPECIFIED: ICD-10-CM

## 2023-12-28 LAB
ANION GAP SERPL CALCULATED.3IONS-SCNC: 13 MMOL/L (ref 7–15)
BUN SERPL-MCNC: 21.2 MG/DL (ref 8–23)
CALCIUM SERPL-MCNC: 9 MG/DL (ref 8.8–10.2)
CHLORIDE SERPL-SCNC: 106 MMOL/L (ref 98–107)
CREAT SERPL-MCNC: 1.65 MG/DL (ref 0.67–1.17)
DEPRECATED HCO3 PLAS-SCNC: 21 MMOL/L (ref 22–29)
EGFRCR SERPLBLD CKD-EPI 2021: 41 ML/MIN/1.73M2
FOLATE SERPL-MCNC: >40 NG/ML (ref 4.6–34.8)
GLUCOSE SERPL-MCNC: 93 MG/DL (ref 70–99)
POTASSIUM SERPL-SCNC: 4.7 MMOL/L (ref 3.4–5.3)
SODIUM SERPL-SCNC: 140 MMOL/L (ref 135–145)

## 2023-12-28 PROCEDURE — 82746 ASSAY OF FOLIC ACID SERUM: CPT | Mod: ORL

## 2023-12-28 PROCEDURE — 36415 COLL VENOUS BLD VENIPUNCTURE: CPT | Mod: ORL

## 2023-12-28 PROCEDURE — 80048 BASIC METABOLIC PNL TOTAL CA: CPT | Mod: ORL

## 2023-12-28 PROCEDURE — P9604 ONE-WAY ALLOW PRORATED TRIP: HCPCS | Mod: ORL

## 2024-01-31 ENCOUNTER — LAB REQUISITION (OUTPATIENT)
Dept: LAB | Facility: CLINIC | Age: 84
End: 2024-01-31
Payer: COMMERCIAL

## 2024-01-31 DIAGNOSIS — Z79.899 OTHER LONG TERM (CURRENT) DRUG THERAPY: ICD-10-CM

## 2024-01-31 DIAGNOSIS — M06.9 RHEUMATOID ARTHRITIS, UNSPECIFIED (H): ICD-10-CM

## 2024-02-01 LAB
ERYTHROCYTE [DISTWIDTH] IN BLOOD BY AUTOMATED COUNT: 19.6 % (ref 10–15)
HCT VFR BLD AUTO: 30.2 % (ref 40–53)
HGB BLD-MCNC: 9.1 G/DL (ref 13.3–17.7)
MCH RBC QN AUTO: 26 PG (ref 26.5–33)
MCHC RBC AUTO-ENTMCNC: 30.1 G/DL (ref 31.5–36.5)
MCV RBC AUTO: 86 FL (ref 78–100)
PLATELET # BLD AUTO: 420 10E3/UL (ref 150–450)
RBC # BLD AUTO: 3.5 10E6/UL (ref 4.4–5.9)
WBC # BLD AUTO: 5.9 10E3/UL (ref 4–11)

## 2024-02-01 PROCEDURE — 86160 COMPLEMENT ANTIGEN: CPT | Mod: ORL | Performed by: INTERNAL MEDICINE

## 2024-02-01 PROCEDURE — P9604 ONE-WAY ALLOW PRORATED TRIP: HCPCS | Mod: ORL | Performed by: INTERNAL MEDICINE

## 2024-02-01 PROCEDURE — 84450 TRANSFERASE (AST) (SGOT): CPT | Mod: ORL | Performed by: INTERNAL MEDICINE

## 2024-02-01 PROCEDURE — 36415 COLL VENOUS BLD VENIPUNCTURE: CPT | Mod: ORL | Performed by: INTERNAL MEDICINE

## 2024-02-01 PROCEDURE — 85027 COMPLETE CBC AUTOMATED: CPT | Mod: ORL | Performed by: INTERNAL MEDICINE

## 2024-02-02 LAB — AST SERPL W P-5'-P-CCNC: 21 U/L (ref 0–45)

## 2024-02-07 ENCOUNTER — LAB REQUISITION (OUTPATIENT)
Dept: LAB | Facility: CLINIC | Age: 84
End: 2024-02-07
Payer: COMMERCIAL

## 2024-02-07 DIAGNOSIS — F03.90 UNSPECIFIED DEMENTIA, UNSPECIFIED SEVERITY, WITHOUT BEHAVIORAL DISTURBANCE, PSYCHOTIC DISTURBANCE, MOOD DISTURBANCE, AND ANXIETY (H): ICD-10-CM

## 2024-02-07 DIAGNOSIS — D64.9 ANEMIA, UNSPECIFIED: ICD-10-CM

## 2024-02-10 LAB — C2 SERPL-MCNC: 1.6 MG/DL

## 2024-02-16 ENCOUNTER — APPOINTMENT (OUTPATIENT)
Dept: GENERAL RADIOLOGY | Facility: CLINIC | Age: 84
End: 2024-02-16
Attending: EMERGENCY MEDICINE
Payer: COMMERCIAL

## 2024-02-16 ENCOUNTER — HOSPITAL ENCOUNTER (OUTPATIENT)
Facility: CLINIC | Age: 84
Setting detail: OBSERVATION
Discharge: NURSING, REHAB OR LONG-TERM CARE | End: 2024-02-20
Attending: EMERGENCY MEDICINE | Admitting: HOSPITALIST
Payer: COMMERCIAL

## 2024-02-16 ENCOUNTER — APPOINTMENT (OUTPATIENT)
Dept: CT IMAGING | Facility: CLINIC | Age: 84
End: 2024-02-16
Attending: EMERGENCY MEDICINE
Payer: COMMERCIAL

## 2024-02-16 DIAGNOSIS — R56.9 SEIZURE (H): Primary | ICD-10-CM

## 2024-02-16 DIAGNOSIS — K22.10 ESOPHAGEAL ULCER WITHOUT BLEEDING: ICD-10-CM

## 2024-02-16 LAB
ALBUMIN SERPL BCG-MCNC: 3.9 G/DL (ref 3.5–5.2)
ALP SERPL-CCNC: 116 U/L (ref 40–150)
ALT SERPL W P-5'-P-CCNC: 15 U/L (ref 0–70)
ANION GAP SERPL CALCULATED.3IONS-SCNC: 17 MMOL/L (ref 7–15)
AST SERPL W P-5'-P-CCNC: 24 U/L (ref 0–45)
BASOPHILS # BLD AUTO: 0.1 10E3/UL (ref 0–0.2)
BASOPHILS NFR BLD AUTO: 1 %
BILIRUB SERPL-MCNC: 0.3 MG/DL
BUN SERPL-MCNC: 22.4 MG/DL (ref 8–23)
CALCIUM SERPL-MCNC: 8.9 MG/DL (ref 8.8–10.2)
CHLORIDE SERPL-SCNC: 105 MMOL/L (ref 98–107)
CREAT SERPL-MCNC: 1.62 MG/DL (ref 0.67–1.17)
DEPRECATED HCO3 PLAS-SCNC: 17 MMOL/L (ref 22–29)
EGFRCR SERPLBLD CKD-EPI 2021: 42 ML/MIN/1.73M2
EOSINOPHIL # BLD AUTO: 0 10E3/UL (ref 0–0.7)
EOSINOPHIL NFR BLD AUTO: 0 %
ERYTHROCYTE [DISTWIDTH] IN BLOOD BY AUTOMATED COUNT: 18.6 % (ref 10–15)
GLUCOSE SERPL-MCNC: 110 MG/DL (ref 70–99)
HCT VFR BLD AUTO: 29.6 % (ref 40–53)
HGB BLD-MCNC: 9 G/DL (ref 13.3–17.7)
IMM GRANULOCYTES # BLD: 0 10E3/UL
IMM GRANULOCYTES NFR BLD: 0 %
INR PPP: 1.18 (ref 0.85–1.15)
LIPASE SERPL-CCNC: 109 U/L (ref 13–60)
LYMPHOCYTES # BLD AUTO: 0.8 10E3/UL (ref 0.8–5.3)
LYMPHOCYTES NFR BLD AUTO: 7 %
MAGNESIUM SERPL-MCNC: 2.2 MG/DL (ref 1.7–2.3)
MCH RBC QN AUTO: 25.1 PG (ref 26.5–33)
MCHC RBC AUTO-ENTMCNC: 30.4 G/DL (ref 31.5–36.5)
MCV RBC AUTO: 83 FL (ref 78–100)
MONOCYTES # BLD AUTO: 0.5 10E3/UL (ref 0–1.3)
MONOCYTES NFR BLD AUTO: 4 %
NEUTROPHILS # BLD AUTO: 9.6 10E3/UL (ref 1.6–8.3)
NEUTROPHILS NFR BLD AUTO: 88 %
NRBC # BLD AUTO: 0 10E3/UL
NRBC BLD AUTO-RTO: 0 /100
PLATELET # BLD AUTO: 432 10E3/UL (ref 150–450)
POTASSIUM SERPL-SCNC: 3.8 MMOL/L (ref 3.4–5.3)
PROT SERPL-MCNC: 7.4 G/DL (ref 6.4–8.3)
RBC # BLD AUTO: 3.59 10E6/UL (ref 4.4–5.9)
SODIUM SERPL-SCNC: 139 MMOL/L (ref 135–145)
WBC # BLD AUTO: 10.9 10E3/UL (ref 4–11)

## 2024-02-16 PROCEDURE — 80053 COMPREHEN METABOLIC PANEL: CPT | Performed by: EMERGENCY MEDICINE

## 2024-02-16 PROCEDURE — 70450 CT HEAD/BRAIN W/O DYE: CPT

## 2024-02-16 PROCEDURE — 71046 X-RAY EXAM CHEST 2 VIEWS: CPT

## 2024-02-16 PROCEDURE — 85610 PROTHROMBIN TIME: CPT | Performed by: EMERGENCY MEDICINE

## 2024-02-16 PROCEDURE — 84145 PROCALCITONIN (PCT): CPT | Performed by: INTERNAL MEDICINE

## 2024-02-16 PROCEDURE — 36415 COLL VENOUS BLD VENIPUNCTURE: CPT | Performed by: EMERGENCY MEDICINE

## 2024-02-16 PROCEDURE — 99285 EMERGENCY DEPT VISIT HI MDM: CPT | Mod: 25

## 2024-02-16 PROCEDURE — 83735 ASSAY OF MAGNESIUM: CPT | Performed by: EMERGENCY MEDICINE

## 2024-02-16 PROCEDURE — 83690 ASSAY OF LIPASE: CPT | Performed by: EMERGENCY MEDICINE

## 2024-02-16 PROCEDURE — 72170 X-RAY EXAM OF PELVIS: CPT

## 2024-02-16 PROCEDURE — 85049 AUTOMATED PLATELET COUNT: CPT | Performed by: EMERGENCY MEDICINE

## 2024-02-16 PROCEDURE — 72125 CT NECK SPINE W/O DYE: CPT

## 2024-02-16 ASSESSMENT — ACTIVITIES OF DAILY LIVING (ADL): ADLS_ACUITY_SCORE: 43

## 2024-02-17 PROBLEM — R56.9 SEIZURE (H): Status: ACTIVE | Noted: 2024-02-17

## 2024-02-17 LAB
ATRIAL RATE - MUSE: 76 BPM
DIASTOLIC BLOOD PRESSURE - MUSE: NORMAL MMHG
INTERPRETATION ECG - MUSE: NORMAL
P AXIS - MUSE: 58 DEGREES
PR INTERVAL - MUSE: 248 MS
PROCALCITONIN SERPL IA-MCNC: 0.14 NG/ML
QRS DURATION - MUSE: 96 MS
QT - MUSE: 428 MS
QTC - MUSE: 481 MS
R AXIS - MUSE: -2 DEGREES
SYSTOLIC BLOOD PRESSURE - MUSE: NORMAL MMHG
T AXIS - MUSE: 2 DEGREES
VENTRICULAR RATE- MUSE: 76 BPM

## 2024-02-17 PROCEDURE — G0378 HOSPITAL OBSERVATION PER HR: HCPCS

## 2024-02-17 PROCEDURE — 99207 PR NO CHARGE LOS: CPT | Performed by: INTERNAL MEDICINE

## 2024-02-17 PROCEDURE — 96374 THER/PROPH/DIAG INJ IV PUSH: CPT

## 2024-02-17 PROCEDURE — 250N000011 HC RX IP 250 OP 636: Performed by: EMERGENCY MEDICINE

## 2024-02-17 PROCEDURE — 99223 1ST HOSP IP/OBS HIGH 75: CPT | Mod: AI | Performed by: HOSPITALIST

## 2024-02-17 PROCEDURE — 93005 ELECTROCARDIOGRAM TRACING: CPT

## 2024-02-17 RX ORDER — OLANZAPINE 10 MG/2ML
2.5-5 INJECTION, POWDER, FOR SOLUTION INTRAMUSCULAR 2 TIMES DAILY PRN
Status: DISCONTINUED | OUTPATIENT
Start: 2024-02-17 | End: 2024-02-20 | Stop reason: HOSPADM

## 2024-02-17 RX ORDER — MULTIPLE VITAMINS W/ MINERALS TAB 9MG-400MCG
1 TAB ORAL DAILY
COMMUNITY

## 2024-02-17 RX ORDER — ACETAMINOPHEN 325 MG/1
650 TABLET ORAL 2 TIMES DAILY
COMMUNITY

## 2024-02-17 RX ORDER — LORAZEPAM 2 MG/ML
2 INJECTION INTRAMUSCULAR
Status: DISCONTINUED | OUTPATIENT
Start: 2024-02-17 | End: 2024-02-20 | Stop reason: HOSPADM

## 2024-02-17 RX ORDER — GADOBUTROL 604.72 MG/ML
10 INJECTION INTRAVENOUS ONCE
Status: DISCONTINUED | OUTPATIENT
Start: 2024-02-17 | End: 2024-02-20 | Stop reason: HOSPADM

## 2024-02-17 RX ORDER — AMOXICILLIN 250 MG
2 CAPSULE ORAL 2 TIMES DAILY PRN
Status: DISCONTINUED | OUTPATIENT
Start: 2024-02-17 | End: 2024-02-20 | Stop reason: HOSPADM

## 2024-02-17 RX ORDER — AMOXICILLIN 250 MG
1 CAPSULE ORAL 2 TIMES DAILY PRN
COMMUNITY

## 2024-02-17 RX ORDER — FLUTICASONE PROPIONATE 50 MCG
2 SPRAY, SUSPENSION (ML) NASAL DAILY
Status: DISCONTINUED | OUTPATIENT
Start: 2024-02-17 | End: 2024-02-20 | Stop reason: HOSPADM

## 2024-02-17 RX ORDER — LORAZEPAM 2 MG/ML
1 INJECTION INTRAMUSCULAR ONCE
Status: DISCONTINUED | OUTPATIENT
Start: 2024-02-17 | End: 2024-02-17

## 2024-02-17 RX ORDER — ONDANSETRON 2 MG/ML
4 INJECTION INTRAMUSCULAR; INTRAVENOUS EVERY 6 HOURS PRN
Status: DISCONTINUED | OUTPATIENT
Start: 2024-02-17 | End: 2024-02-20 | Stop reason: HOSPADM

## 2024-02-17 RX ORDER — LORAZEPAM 0.5 MG/1
0.5 TABLET ORAL
Status: DISCONTINUED | OUTPATIENT
Start: 2024-02-17 | End: 2024-02-20 | Stop reason: HOSPADM

## 2024-02-17 RX ORDER — ONDANSETRON 4 MG/1
4 TABLET, ORALLY DISINTEGRATING ORAL EVERY 6 HOURS PRN
Status: DISCONTINUED | OUTPATIENT
Start: 2024-02-17 | End: 2024-02-20 | Stop reason: HOSPADM

## 2024-02-17 RX ORDER — ACETAMINOPHEN 325 MG/1
650 TABLET ORAL EVERY 6 HOURS PRN
Status: DISCONTINUED | OUTPATIENT
Start: 2024-02-17 | End: 2024-02-17

## 2024-02-17 RX ORDER — AMOXICILLIN 250 MG
1 CAPSULE ORAL 2 TIMES DAILY PRN
Status: DISCONTINUED | OUTPATIENT
Start: 2024-02-17 | End: 2024-02-20 | Stop reason: HOSPADM

## 2024-02-17 RX ORDER — DIAZEPAM 10 MG/2ML
2.5 INJECTION, SOLUTION INTRAMUSCULAR; INTRAVENOUS ONCE
Status: COMPLETED | OUTPATIENT
Start: 2024-02-17 | End: 2024-02-17

## 2024-02-17 RX ORDER — PANTOPRAZOLE SODIUM 40 MG/1
40 TABLET, DELAYED RELEASE ORAL
Status: DISCONTINUED | OUTPATIENT
Start: 2024-02-17 | End: 2024-02-20 | Stop reason: HOSPADM

## 2024-02-17 RX ORDER — ACETAMINOPHEN 325 MG/1
650 TABLET ORAL EVERY 4 HOURS PRN
Status: DISCONTINUED | OUTPATIENT
Start: 2024-02-17 | End: 2024-02-20 | Stop reason: HOSPADM

## 2024-02-17 RX ORDER — ACETAMINOPHEN 650 MG/1
650 SUPPOSITORY RECTAL EVERY 4 HOURS PRN
Status: DISCONTINUED | OUTPATIENT
Start: 2024-02-17 | End: 2024-02-20 | Stop reason: HOSPADM

## 2024-02-17 RX ORDER — OLANZAPINE 10 MG/2ML
2.5 INJECTION, POWDER, FOR SOLUTION INTRAMUSCULAR DAILY PRN
Status: DISCONTINUED | OUTPATIENT
Start: 2024-02-17 | End: 2024-02-17

## 2024-02-17 RX ADMIN — DIAZEPAM 2.5 MG: 5 INJECTION INTRAMUSCULAR; INTRAVENOUS at 01:24

## 2024-02-17 ASSESSMENT — ACTIVITIES OF DAILY LIVING (ADL)
ADLS_ACUITY_SCORE: 53
ADLS_ACUITY_SCORE: 49
ADLS_ACUITY_SCORE: 43
ADLS_ACUITY_SCORE: 53
ADLS_ACUITY_SCORE: 49
ADLS_ACUITY_SCORE: 43
ADLS_ACUITY_SCORE: 53
ADLS_ACUITY_SCORE: 53
ADLS_ACUITY_SCORE: 49
ADLS_ACUITY_SCORE: 53

## 2024-02-17 NOTE — PLAN OF CARE
Goal Outcome Evaluation:      Plan of Care Reviewed With: patient        Rule out seizures. Neuros - difficult to assess neurologically due to being uncooperative; arouses to voice, appeared to be resting comfortably between cares; answers with one word, declining cares/intake; left upper contracted hand (baseline per daughter) & stiffness in all extremities noted; patient gets agitated with staff interaction (mds made aware). Able to reposition side to side for toileting cares with 2 assist/ceiling lift assisted to boost/patient preferring to curl up in fetal position on his right side. VSS, RA. Regular diet orders/no meds given, patient unable to successfully participate in a speech or dysphagia evaluation. Bedrest with seizure precautions in place.  Incontinent of bladder/no bm today. Does not appear to be in pain, possibly cold but declines & removes hospital gown. Daughters Dorcas & Clarice at bedside visiting this afternoon/supportive. Pt scoring yellow on the Aggression Stop Light Tool due to confusion. EEG and speech evaluation unable to be performed (md updated).

## 2024-02-17 NOTE — PLAN OF CARE
SLP - Attempted to see pt for a SLP bedside swallow evaluation; however, pt was agitated with all cares.  Will follow.

## 2024-02-17 NOTE — ED PROVIDER NOTES
"  History     Chief Complaint:  Seizures       HPI   Shamir Concepcion is a 83 year old male on anticoagulants - Eliquis - with memory issues who presents due to a seizure episode and presents alongside daughter. Patient lives in a memory care-like facility called a care suite. Patient's daughter reports patient had a \"mini-seizure\" last week. After a short period, patient came to. Patient's daughter last saw patient on Sunday, 2/10/24, and patient appeared to be at baseline at the time. Today, patient had a witnessed seizure at assisted living. This seizure lasted about 5 minutes. EMS gave 5 versed. EMS reported full body shakes, and patient bit tongue during seizure. Patient's daughter denies fever, emesis, or pacemaker implant.     Independent Historian:   None - Patient Only    Allergies:   No known allergies    Medications:    Mepilex  Zestril  Roxicodone  Folvite  Certavite senior  Fosamax  Eliquis  Protonix  Crestor  SENNA  Zoloft  sanctura    Past Medical History:    Allergic rhinitis  BPH  Cerebral infarction  Colon polyps  ED  Family history of colon cancer  Hypertension  PAD  Rheumatoid arthritis  Seronegative rheumatoid arthritis.     Past Surgical History:     Angiogram  Colonosocpy x2  ENT surgery  EGD combined  IR lower extremity  Laminect/discectomy  Open reduction internal fixation humerus  Orthopedic surgery  Phacoemulsification clear cornea with standard intraocular lens implant x2  Sinus surgery  Vascular surgery  Upper extremity WO&W cont    Physical Exam   Patient Vitals for the past 24 hrs:   BP Temp Temp src Pulse Resp SpO2   02/17/24 0339 (!) 144/59 98.8  F (37.1  C) Axillary 67 21 98 %   02/17/24 0257 -- -- -- 85 13 98 %   02/17/24 0147 114/66 -- -- 80 15 94 %   02/17/24 0115 -- -- -- -- -- 96 %   02/16/24 2157 113/67 97.6  F (36.4  C) Temporal 86 -- 98 %   02/16/24 2155 -- -- -- -- 22 --   02/16/24 2148 (!) 85/71 -- -- 64 -- 97 %        Physical Exam  General: somnolent, no acute distress "   Head: The scalp, face, and head appear normal  Eyes: The pupils are equal, round, and reactive to light. Conjunctivae and sclerae are normal  ENT: External acoustic canals are normal. The oropharynx is normal without erythema. Uvula is in the midline  Neck: Normal range of motion.   CV: Regular rate and rhythm.   Resp: Lungs are clear without wheezes or rales. No respiratory distress.   GI: Abdomen is soft, no rigidity, guarding, or rebound. No distension. No tenderness to palpation in any quadrant.    MS: Normal tone. Joints grossly normal without effusions. No asymmetric leg swelling, calf or thigh tenderness.    Skin: No rash or lesions noted. Normal capillary refill noted  Neuro: Somnolent.  Reacts to pain in all 4 extremities.    Emergency Department Course   ECG  ECG results from 02/16/24   EKG 12-lead, tracing only     Value    Systolic Blood Pressure     Diastolic Blood Pressure     Ventricular Rate 76    Atrial Rate 76    PA Interval 248    QRS Duration 96        QTc 481    P Axis 58    R AXIS -2    T Axis 2    Interpretation ECG      Sinus rhythm with 1st degree A-V block with Premature atrial complexes in a pattern of bigeminy  Prolonged QT  Abnormal ECG  When compared with ECG of 12-MAR-2023 10:19,  PA interval has increased  Nonspecific T wave abnormality now evident in Anterior leads  QT has lengthened          Imaging:  XR Chest 2 Views   Final Result   IMPRESSION: Mild cardiomegaly with normal pulmonary vascularity. Probable tiny bilateral pleural effusions with some mild bibasilar atelectasis. Mildly calcified thoracic aorta. Old/healed left sixth rib fracture. There is some slight nonspecific    infiltrate seen in the upper/mid lung junction on the right laterally. This could be acute or chronic in nature. This appears slightly more pronounced when compared to 07/21/2022 chest radiograph. Slightly calcified thoracic aorta. Advanced degenerative    changes most marked in the right AC joint.  Prior postoperative changes proximal left humerus. The chest is otherwise negative with no pneumothorax or obvious acute bony fracture.      XR Pelvis 1/2 Views   Final Result   IMPRESSION: No acute bony finding such as fracture or dislocation identified. 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 Advanced degenerative changes most marked in the lower lumbar spine. Advanced vascular    calcification.      CT Head w/o Contrast   Final Result   IMPRESSION:   HEAD CT:   1.  No acute intracranial pathology or calvarial fracture identified.   2.  Chronic changes as above.      CERVICAL SPINE CT:   1.  No CT evidence for acute fracture or post traumatic subluxation.   2.  Degenerative changes as highlighted above.      CT Cervical Spine w/o Contrast   Final Result   IMPRESSION:   HEAD CT:   1.  No acute intracranial pathology or calvarial fracture identified.   2.  Chronic changes as above.      CERVICAL SPINE CT:   1.  No CT evidence for acute fracture or post traumatic subluxation.   2.  Degenerative changes as highlighted above.      MR Brain w/o & w Contrast    (Results Pending)      Laboratory:  Labs Ordered and Resulted from Time of ED Arrival to Time of ED Departure   COMPREHENSIVE METABOLIC PANEL - Abnormal       Result Value    Sodium 139      Potassium 3.8      Carbon Dioxide (CO2) 17 (*)     Anion Gap 17 (*)     Urea Nitrogen 22.4      Creatinine 1.62 (*)     GFR Estimate 42 (*)     Calcium 8.9      Chloride 105      Glucose 110 (*)     Alkaline Phosphatase 116      AST 24      ALT 15      Protein Total 7.4      Albumin 3.9      Bilirubin Total 0.3     LIPASE - Abnormal    Lipase 109 (*)    INR - Abnormal    INR 1.18 (*)    CBC WITH PLATELETS AND DIFFERENTIAL - Abnormal    WBC Count 10.9      RBC Count 3.59 (*)     Hemoglobin 9.0 (*)     Hematocrit 29.6 (*)     MCV 83      MCH 25.1 (*)     MCHC 30.4 (*)     RDW 18.6 (*)     Platelet Count 432      % Neutrophils 88       % Lymphocytes 7      % Monocytes 4      % Eosinophils 0      % Basophils 1      % Immature Granulocytes 0      NRBCs per 100 WBC 0      Absolute Neutrophils 9.6 (*)     Absolute Lymphocytes 0.8      Absolute Monocytes 0.5      Absolute Eosinophils 0.0      Absolute Basophils 0.1      Absolute Immature Granulocytes 0.0      Absolute NRBCs 0.0     MAGNESIUM - Normal    Magnesium 2.2            Emergency Department Course & Assessments:       Interventions:  Medications   gadobutrol (GADAVIST) injection 10 mL (has no administration in time range)   sodium chloride (PF) 0.9% PF flush 10 mL (has no administration in time range)   senna-docusate (SENOKOT-S/PERICOLACE) 8.6-50 MG per tablet 1 tablet (has no administration in time range)     Or   senna-docusate (SENOKOT-S/PERICOLACE) 8.6-50 MG per tablet 2 tablet (has no administration in time range)   ondansetron (ZOFRAN ODT) ODT tab 4 mg (has no administration in time range)     Or   ondansetron (ZOFRAN) injection 4 mg (has no administration in time range)   LORazepam (ATIVAN) injection 2 mg (has no administration in time range)   melatonin tablet 1 mg (has no administration in time range)   acetaminophen (TYLENOL) tablet 650 mg (has no administration in time range)     Or   acetaminophen (TYLENOL) Suppository 650 mg (has no administration in time range)   LORazepam (ATIVAN) tablet 0.5 mg (has no administration in time range)   diazepam (VALIUM) injection 2.5 mg (2.5 mg Intravenous $Given 2/17/24 0124)          Assessments/Consultations/Discussion of Management or Tests:  ED Course as of 02/17/24 0605   Fri Feb 16, 2024 2210 I obtained history and examined the patient as noted above.     2334 I spoke with patient's family and discussed the results. Patient's family is in agreement with taking an MRI.      Spoke with Dr. Colon regarding admission     Social Determinants of Health affecting care:   None    Disposition:  The patient was admitted to the hospital under  the care of Dr. Colon.     Impression & Plan    CMS Diagnoses: None    Medical Decision Making:  Patient is an 83-year-old gentleman with past medical history of dementia who resides in a nursing home who presents emergency department after presumed seizure.  EMS reported to nursing staff the patient had a 5-minute tonic-clonic seizure.  He was given 5 mg of Versed and route but it is unclear whether that he was still actively seizing.  By the time he arrived here he is quite somnolent and unable to participate in physical exam.  However he is hemodynamically stable, afebrile and does not appear in acute distress.  Family notes he had 1 similar episode earlier this week but has not had any further episodes of seizure-like activity throughout his entire life.  He otherwise has been in his normal state of health over the last week.  CT of the head was obtained to rule out both seizure nidus and traumatic pathology from his seizure.  Thankfully CT of the head showed no evidence of clear seizure focus or significant traumatic pathology.  The remainder of his trauma imaging including CT of the cervical spine, chest x-ray and x-ray of the abdomen pelvis were also negative for any signs of significant traumatic pathology.  I discussed goals of care and extent of workup that the patient's daughters would be comfortable with.  They expressed interest and further workup including MRI and neurology consultation.  We attempted to perform MRI but patient would not sit still.  We gave him a little bit more Valium but still he would not sit still during imaging.  Therefore we will forego MRI imaging at this time.  Will admit for further evaluation and neurology consultation for possible seizures.  Will hold off on any antiepileptics this at this juncture.    Diagnosis:    ICD-10-CM    1. Seizure (H)  R56.9            Scribe Disclosure:  Shilpa DENNIS, am serving as a scribe at 10:20 PM on 2/16/2024 to document services  personally performed by Tonny White MD, based on my observations and the provider's statements to me.   2/16/2024   Tonny White MD Battista, Christopher Joseph, MD  02/17/24 0608

## 2024-02-17 NOTE — ED NOTES
[facility]  ED Nurse Handoff Report    ED Chief complaint: Seizures      ED Diagnosis:   Final diagnoses:   None       Code Status: DNR / Selective treatment    Allergies: No Known Allergies    Patient Story: Patient comes from  an assisted living for evaluation of a seizure. Patient was found on the ground having full body shakes for 5 minutes. Hx of dementia and afib on Eliqiuis.   Focused Assessment:  Somnolent, responds to vigorous stimuli, snoring respirations, moving all 4 extremities independently. VSS. Incontinent of urine.     Treatments and/or interventions provided: IV, labs, imaging  Patient's response to treatments and/or interventions: fair     To be done/followed up on inpatient unit:  Patient unable to stay still for MRI, possible re scan later     Does this patient have any cognitive concerns?: Baseline dementia    Activity level - Baseline/Home:  Independent  Activity Level - Current:   Total Care    Patient's Preferred language: English   Needed?: No    Isolation: None  Infection: Not Applicable  Patient tested for COVID 19 prior to admission: NO  Bariatric?: No    Vital Signs:   Vitals:    02/16/24 2148 02/16/24 2155 02/16/24 2157   BP: (!) 85/71  113/67   Pulse: 64  86   Resp:  22    Temp:   97.6  F (36.4  C)   TempSrc:   Temporal   SpO2: 97%  98%       Cardiac Rhythm:Cardiac Rhythm: Atrial fibrillation    Was the PSS-3 completed:   No -Patient unable to answer questions  What interventions are required if any?               Family Comments: Daughters at bedside in ED    For the majority of the shift this patient's behavior was Green.   Behavioral interventions performed were frequent rounding.    ED NURSE PHONE NUMBER: [phone]

## 2024-02-17 NOTE — PROVIDER NOTIFICATION
Writer paged Dr. Louie. Patient has been resistive, not following commands, SLP unable assess swallowing d/t not being cooperative, unable to given oral meds, has scheduled Eliquis. Please advise. Daughter, Giovani at bedside ETA when you'll see patient?

## 2024-02-17 NOTE — H&P
[facility]    History and Physical  Hospitalist     Date of Admission:  2/16/2024    Assessment & Plan   Shamir Concepcion is a 83 year old male admitted to [facility] on February 17, 2024 for seizures.    New onset seizure  Patient had a generalized tonic-clonic seizure event on Friday evening February 16.  The episode lasted 5 minutes.  When EMS arrived they administered 5 mg of Versed.  The patient had a staring spell approximately 1 week ago which may have been a seizure although no workup was performed.  Workup in the emergency room was relatively benign however MRI could not be obtained due to patient agitation.  Patient will be admitted to the neurofloor for further workup.  I will order an MRI with oral sedation for later in the morning hopefully neurology will have a chance to see the patient prior to him receiving sedation.  -Neurochecks every 4  -Seizure precautions  -Ativan as needed for repeat seizures  -Follow-up EEG and MRI  -Follow-up neurology consult    Dementia  The patient is verbal and normally can answer some questions but would not be able to maintain a conversation.    Chronic medical conditions:-Resume PTA meds once med rec is complete    Ckd 3 Stable.   Chronic normocytic anemia -Hb is at his baseline  Afib on eliquis  Bph  Hx of Cva  Htn  Pad  Ra-on methotrexate    DVT ppx: eliquis  Expected length of stay less than 2 days  Code Status: dnr    Primary Care Physician   CARL WILLSON    Chief Complaint   Seizures    History obtained from the medical chart    History of Present Illness   Shamir Concepcion is a 83 year old male with a past medical history of dementia comes from his care facility for seizures.  The patient had a generalized tonic-clonic seizure at his facility lasting approximately 5 minutes on February 16.  EMS was called and by the time they arrived he was postictal but they administered 5 mg of Versed.  On arrival in the emergency  room he was sleepy from his Versed.  The patient was accompanied by his daughters who provided most the history.  They report that he had an episode approximately 1 week ago which was suspicious for seizure and was reported as a staring spell.  No workup was pursued after that episode.  The patient has no prior history of seizures.  Interestingly the patient does have a history of syncopal episodes in the past which certainly could have been seizures.  The patient has been in his usual state of health with no changes in his medications and no recent illnesses.  The patient has dementia at baseline he is A&O x 1 and can answer some questions but would not be able to maintain a conversation.     Past Medical History    I have reviewed this patient's medical history and updated it with pertinent information if needed.   Past Medical History:   Diagnosis Date    Allergic rhinitis     BPH (benign prostatic hyperplasia)     Cerebral infarction (H)     Colon polyps     ED (erectile dysfunction)     Family history of colon cancer     Hypertension     PAD (peripheral artery disease) (H)     stent    RA (rheumatoid arthritis) (H)     Seronegative rheumatoid arthritis (H)        Past Surgical History   I have reviewed this patient's surgical history and updated it with pertinent information if needed.  Past Surgical History:   Procedure Laterality Date    ANGIOGRAM      COLONOSCOPY      polyps    COLONOSCOPY N/A 3/3/2023    Procedure: Colonoscopy;  Surgeon: Eros Ambriz MD;  Location:  GI    ENT SURGERY      T&A    ESOPHAGOSCOPY, GASTROSCOPY, DUODENOSCOPY (EGD), COMBINED N/A 3/2/2023    Procedure: Esophagoscopy, gastroscopy, duodenoscopy (EGD), combined;  Surgeon: Louie Arrington MD;  Location:  GI    IR LOWER EXTREMITY ANGIOGRAM RIGHT  10/12/2022    LAMINECT/DISCECTOMY, LUMBAR      OPEN REDUCTION INTERNAL FIXATION HUMERUS PROXIMAL Left 8/10/2022    Procedure: Left shoulder open reduction and internal fixation,  proximal humerus fracture;  Surgeon: Larry Saxena MD;  Location: [facility] OR    ORTHOPEDIC SURGERY      PHACOEMULSIFICATION CLEAR CORNEA WITH STANDARD INTRAOCULAR LENS IMPLANT Left 4/4/2017    Procedure: PHACOEMULSIFICATION CLEAR CORNEA WITH STANDARD INTRAOCULAR LENS IMPLANT;  Surgeon: Stevie Espinal MD;  Location: [facility]    PHACOEMULSIFICATION CLEAR CORNEA WITH STANDARD INTRAOCULAR LENS IMPLANT Right 5/2/2017    Procedure: PHACOEMULSIFICATION CLEAR CORNEA WITH STANDARD INTRAOCULAR LENS IMPLANT;  RIGHT EYE PHACOEMULSIFICATION CLEAR CORNEA WITH STANDARD INTRAOCULAR LENS IMPLANT;  Surgeon: Stevie Espinal MD;  Location: [facility]    SINUS SURGERY      VASCULAR SURGERY  R SFA stenting  2012    ZZC MRA UPPER EXTREMITY WO&W CONT  stenting right SFA-AK pop       Allergies   No Known Allergies    Social History   I have reviewed this patient's social history and updated it with pertinent information if needed. Shamir Concepcion  reports that he quit smoking about 24 years ago. His smoking use included cigarettes. He has a 15 pack-year smoking history. He has never used smokeless tobacco. He reports that he does not currently use alcohol. He reports that he does not use drugs.    Family History   I have reviewed this patient's family history and updated it with pertinent information if needed.   Family History   Problem Relation Age of Onset    Cancer Mother     Cerebrovascular Disease Father        Physical Exam   Temp: 97.6  F (36.4  C) Temp src: Temporal BP: 113/67 Pulse: 86   Resp: 22 SpO2: 96 %      Vital Signs with Ranges  Temp:  [97.6  F (36.4  C)] 97.6  F (36.4  C)  Pulse:  [64-86] 86  Resp:  [22] 22  BP: ()/(67-71) 113/67  SpO2:  [96 %-98 %] 96 %  0 lbs 0 oz  Physical Exam  Vitals reviewed.   Constitutional:       Comments: Patient seen sleeping in bed in the emergency room in no apparent distress.  He is accompanied by his daughter Trista at bedside.   HENT:      Head: Normocephalic and atraumatic.   Eyes:       Conjunctiva/sclera: Conjunctivae normal.      Pupils: Pupils are equal, round, and reactive to light.   Cardiovascular:      Rate and Rhythm: Normal rate and regular rhythm.   Pulmonary:      Effort: Pulmonary effort is normal.      Breath sounds: Normal breath sounds.      Comments: snoring  Abdominal:      General: Abdomen is flat. Bowel sounds are normal.      Palpations: Abdomen is soft.   Musculoskeletal:         General: No swelling.   Skin:     General: Skin is warm and dry.   Neurological:      Mental Status: He is alert.      Comments: Was sedated from Versed which he received via EMS and was snoring when I entered the room.  He did not wake up to verbal or physical stimuli but did grunt and pulled away for me.        Medical Decision Making       75 MINUTES SPENT BY ME on the date of service doing chart review, history, exam, documentation & further activities per the note.

## 2024-02-17 NOTE — ED NOTES
Bed: ED11  Expected date:   Expected time:   Means of arrival:   Comments:  Sumaya 1 83F fall, possible seizure, versed given eta [id number]

## 2024-02-17 NOTE — PROVIDER NOTIFICATION
Paged Hospitalist re: updates. fyi, no eeg no meds, another daughter now here at bedside, patient refusing oral intake and speech assessment unable to be done.Will kesha meds not given and await advisement.

## 2024-02-17 NOTE — PROGRESS NOTES
RECEIVING UNIT ED HANDOFF REVIEW    ED Nurse Handoff Report was reviewed by: Catia Mcmahan RN on February 17, 2024 at 2:37 AM

## 2024-02-17 NOTE — PROGRESS NOTES
Family requested update.  Met with  two daughters at bedside with RN.  Very agitated again yelling, attempting to  hit and  bite  staff when taking vitals and change brief in the last few minutes.   Has been  unable to tolerate EEG attempts x 2.   Not really eating or drinking; daughters trying to order him some breakfast food now.    Ray keeps pretending to be asleep and then openings eyes and looking a t me to see if I am still present.      Discussed plan of Neurology (was also just in  to round again with family).   IM/PO zyprexa ordered prn  agitation.  Not able to complete EEG or MRI today.   No further seizure like activity noted but will be maintained on seizure precautions tonight.        Also discussed that Neurology may consider empirically treating with anti-seizure  medications if  unable  to complete needed testing.      An  Additional 35 min spent this evening.

## 2024-02-17 NOTE — PROGRESS NOTES
"Pt seen and examined.    Admitted earlier this am -  H&P reviewed.   Last  med (Valium 2.5mg IV)  given  at 0124. No clear seizure activity noted since arrival to the  floor.  Seizure pads and  precautions in place.     Currently asking me \"What?\" Continuously when I am in the room. Awaiting EEG and MRI results and neuro consult.      Will have speech eval for safe swallow.    Was hypotensive on admission but now normotensive.   Has  h/o a fib but is rate-controlled (not on  chronic rate-control meds).   Home med  list reviewed - ok to resume DOAC (no bleeding on head CT) if able to pass dysphagia screen and safely swallow.             "

## 2024-02-17 NOTE — PROVIDER NOTIFICATION
Paged Hospitalist regarding update. fyi, patient not cooperative, able to change his pants this am will check again and try to reassess his neurological status.

## 2024-02-17 NOTE — PLAN OF CARE
Neuro:arouses to voice, oriented to self, not following commands, intermittently agitated  Tele/cardiac: N/A  Respiration: on RA  Activity: A2 with lift, non cooperative with cares  Pain: denies  Drips: PIV SL  Drains/tubes: none  Skin: blanchable redness to coccyx  GI/: incontinent, regular diet  Aggression color: green  Isolation: none  Plan: EEG, MRI, neurology consult

## 2024-02-17 NOTE — PROVIDER NOTIFICATION
Paged Hospitalist re: updates.   able to do toileting cares resistantly, not cooperative and agitated. general neurology at bedside now. uanble to attempt dysphagia screen and speech unable to assess at this time. Will await advisement.

## 2024-02-17 NOTE — CONSULTS
[facility]    Neurology Consultation    Shamir Concepcion MRN# [id number]   YOB: 1940 Age: 83 year old    Code Status:No CPR- Do NOT Intubate   Date of Admission: 2/16/2024  Date of Consult:02/17/2024                                                                                       Assessment and Plan:                                         #.  Seizure  Patient had an episode concerning for a seizure at his facility.  Not sure if he had an event last week as well, nursing who witnessed this is not available for discussion.  There is been report of the either being a staring spell or shaking as well.  He is at risk of seizures given his dementia.  However, infectious workup still is underway as well as urine drug screen still pending.  Patient has been agitated and so it has been difficult to get EEG or MRI done this admission.  Given this is first-time seizure like to rule out infection being a provoking factor, if no evidence of infection could consider starting an antiepileptic medication.  -Would recommend repeating EEG when patient is calm, defer to primary team or psychiatry for assistance in agitation management.  Consider low-dose of Zyprexa/seroquel if needed.   -MRI ordered but not yet obtained due to agitation.       My colleague Dr. Ty will follow-up with the patient tomorrow and follow-up labs and infectious workup.  ----------------------------------------------------------------------------------  ----------------------------------------------------------------------------------  Reason for consult: as above       Chief Complaint:   Seizures           History of Present Illness:   This patient is a 83 year old male who presents with seizures, patient has a medical history of hypertension, CVA, peripheral artery disease, rheumatoid arthritis on methotrexate, A-fib on Eliquis, CKD stage III.  Has history of dementia as well.    Reportedly patient was in his  care facility when he had a event that appeared to be a generalized tonic-clonic seizure lasting about 5 minutes.  EMS was called and patient was confused but they administered 5 mg of Versed.  Prior to this he had 1 episode of staring off 1 week ago.  He has no seizure history.  At baseline he is AO x 1 but can answer some questions but would not be able to maintain conversation.  Labs were notable for white blood count 10.9 (this is within normal limits but on the edge of normal), but elevated absolute neutrophil count of 9.6, CMP was stable compared to his prior labs.  Lipase was elevated at 109.  UA and urine drug screen has not been collected yet.      At bedside patient is agitated, daughter reports that he commonly gets agitated when admitted to the hospital.  Attempt was made to place EEG but given patient's agitation this was not possible.  He is also not been able to undergo an MRI given the agitation.  Discussed with the nurse who saw the event last night, she reports that he had shaking of his right arm and right leg yesterday lasting for about 5 minutes.  She states the episode last week was also him shaking, but she was not present during that time.        MEDICAL DOCUMENTATION REVIEWED: H&P, ED           Past Medical History:     Past Medical History:   Diagnosis Date     Allergic rhinitis      BPH (benign prostatic hyperplasia)      Cerebral infarction (H)      Colon polyps      ED (erectile dysfunction)      Family history of colon cancer      Hypertension      PAD (peripheral artery disease) (H)     stent     RA (rheumatoid arthritis) (H)      Seronegative rheumatoid arthritis (H)          Past Surgical History:     Past Surgical History:   Procedure Laterality Date     ANGIOGRAM       COLONOSCOPY      polyps     COLONOSCOPY N/A 3/3/2023    Procedure: Colonoscopy;  Surgeon: Eros Ambriz MD;  Location:  GI     ENT SURGERY      T&A     ESOPHAGOSCOPY, GASTROSCOPY, DUODENOSCOPY (EGD), COMBINED  N/A 3/2/2023    Procedure: Esophagoscopy, gastroscopy, duodenoscopy (EGD), combined;  Surgeon: Louie Arrington MD;  Location:  GI     IR LOWER EXTREMITY ANGIOGRAM RIGHT  10/12/2022     LAMINECT/DISCECTOMY, LUMBAR       OPEN REDUCTION INTERNAL FIXATION HUMERUS PROXIMAL Left 8/10/2022    Procedure: Left shoulder open reduction and internal fixation, proximal humerus fracture;  Surgeon: Larry Saxena MD;  Location: [facility] OR     ORTHOPEDIC SURGERY       PHACOEMULSIFICATION CLEAR CORNEA WITH STANDARD INTRAOCULAR LENS IMPLANT Left 2017    Procedure: PHACOEMULSIFICATION CLEAR CORNEA WITH STANDARD INTRAOCULAR LENS IMPLANT;  Surgeon: Stevie Espinal MD;  Location:  [facility]     PHACOEMULSIFICATION CLEAR CORNEA WITH STANDARD INTRAOCULAR LENS IMPLANT Right 2017    Procedure: PHACOEMULSIFICATION CLEAR CORNEA WITH STANDARD INTRAOCULAR LENS IMPLANT;  RIGHT EYE PHACOEMULSIFICATION CLEAR CORNEA WITH STANDARD INTRAOCULAR LENS IMPLANT;  Surgeon: Stevie Espinal MD;  Location:  [facility]     SINUS SURGERY       VASCULAR SURGERY  R SFA stenting  2012     ZZC MRA UPPER EXTREMITY WO&W CONT  stenting right SFA-AK pop          Social History:     Social History     Socioeconomic History     Marital status:    Tobacco Use     Smoking status: Former     Packs/day: 0.50     Years: 30.00     Additional pack years: 0.00     Total pack years: 15.00     Types: Cigarettes     Quit date: 3/1/1999     Years since quittin.9     Smokeless tobacco: Never   Substance and Sexual Activity     Alcohol use: Not Currently     Comment: rarely     Drug use: No           Family History:     Family History   Problem Relation Age of Onset     Cancer Mother      Cerebrovascular Disease Father           Home Medications:     Prior to Admission Medications   Prescriptions Last Dose Informant Patient Reported? Taking?   Vitamin D3 (CHOLECALCIFEROL) 125 MCG (5000 UT) tablet 2024 at am  Yes Yes   Sig: Take 125 mcg by mouth daily    acetaminophen (TYLENOL) 325 MG tablet  at PRN  No Yes   Sig: Take 2 tablets (650 mg) by mouth every 4 hours as needed for mild pain   Patient taking differently: Take 650 mg by mouth every 6 hours as needed for mild pain   acetaminophen (TYLENOL) 325 MG tablet 2/16/2024 at pm  Yes Yes   Sig: Take 650 mg by mouth 2 times daily   alendronate (FOSAMAX) 70 MG tablet 2/11/2024 at am  Yes Yes   Sig: Take 70 mg by mouth every 7 days Sunday's   apixaban ANTICOAGULANT (ELIQUIS) 5 MG tablet 2/16/2024 at pm  No Yes   Sig: Take 1 tablet (5 mg) by mouth 2 times daily   fluticasone (FLONASE) 50 MCG/ACT nasal spray 2/16/2024  Yes Yes   Sig: Spray 2 sprays into both nostrils daily   melatonin 1 MG TABS tablet 2/16/2024 at pm  Yes Yes   Sig: Take 2 mg by mouth at bedtime   menthol-zinc oxide (CALMOSEPTINE) 0.44-20.6 % OINT ointment 2/16/2024 at pm  Yes Yes   Sig: Apply topically 2 times daily to gluteal fold   menthol-zinc oxide (CALMOSEPTINE) 0.44-20.6 % OINT ointment  at PRN  Yes Yes   Sig: Apply topically 2 times daily as needed To gluteal fold   methotrexate 2.5 MG tablet 2/14/2024  No Yes   Sig: Take 5 tablets (12.5 mg) by mouth every 7 days   Patient taking differently: Take 10 mg by mouth every 7 days Wednesdays   multivitamin w/minerals (THERA-VIT-M) tablet 2/16/2024  Yes Yes   Sig: Take 1 tablet by mouth daily   pantoprazole (PROTONIX) 40 MG EC tablet 2/16/2024  No Yes   Sig: Take 1 tablet (40 mg) by mouth 2 times daily (before meals) Take twice a day for 6weeks then reduce to once daily   Patient taking differently: Take 40 mg by mouth daily   rosuvastatin (CRESTOR) 10 MG tablet 2/16/2024 at pm  Yes Yes   Sig: Take 10 mg by mouth daily   senna-docusate (SENOKOT-S/PERICOLACE) 8.6-50 MG tablet  at PRN  Yes Yes   Sig: Take 1 tablet by mouth 2 times daily as needed for constipation   sertraline (ZOLOFT) 50 MG tablet 2/16/2024 at am  Yes Yes   Sig: Take 75 mg by mouth daily      Facility-Administered Medications: None          " Allergy:   No Known Allergies       Inpatient Medications:   Scheduled Meds:    apixaban ANTICOAGULANT  5 mg Oral BID     fluticasone  2 spray Both Nostrils Daily     gadobutrol  10 mL Intravenous Once     pantoprazole  40 mg Oral QAM AC     sertraline  75 mg Oral Daily     sodium chloride (PF)  10 mL Intravenous Once     PRN Meds: acetaminophen **OR** acetaminophen, LORazepam, LORazepam, melatonin, ondansetron **OR** ondansetron, senna-docusate **OR** senna-docusate          Physical Exam:   Physical Exam   Vitals:  Height:Data Unavailable  Weight:0 lbs 0 oz   Temp: 98.5  F (36.9  C) Temp src: Axillary BP: 134/84 Pulse: 84   Resp: 18 SpO2: 98 % O2 Device: None (Room air)    General Appearance: No acute distress, normal body habitus  Neuro Exam:  Mental Status Exam: In bed trying to sleep, patient is agitated when awoken and yells \"no\" when asked to do certain instructions.    Cranial Nerves: Face appears symmetric at rest and with activation, but unable to perform a exact exam given his agitation and refusal to open his eyes.  No appreciated dysarthria.   Reflexes: This was deferred due to his agitation, patient trying to kick examiner.    Sensory: Deferred  Coordination: Unable to perform  Gait: Unable to perform  Neck: No nuchal rigidity  Extremities: No clubbing, no cyanosis, no edema          Data:   ROUTINE IP LABS   CBC RESULTS:     Recent Labs   Lab 02/16/24 2217   WBC 10.9   RBC 3.59*   HGB 9.0*   HCT 29.6*        Basic Metabolic Panel:   Recent Labs   Lab Test 02/16/24 2217 12/28/23  1228 11/21/23  1113    140 143   POTASSIUM 3.8 4.7 4.8   CHLORIDE 105 106 107   CO2 17* 21* 21*   BUN 22.4 21.2 25.5*   CR 1.62* 1.65* 1.58*   * 93 72   RANJAN 8.9 9.0 9.3     Liver panel:  Recent Labs   Lab Test 02/16/24 2217 02/01/24  1456 11/02/23  1133 08/03/23  0902 03/02/23  0535 03/01/23  0107 02/24/23  1615 07/21/22  1824   PROTTOTAL 7.4  --   --   --  5.2* 6.0* 7.0 8.2   ALBUMIN 3.9  --   --   --  " 2.7* 3.2* 3.6 4.2   BILITOTAL 0.3  --   --   --  0.4 0.7 0.4 0.5   ALKPHOS 116  --   --   --  108 132* 162* 95   AST 24 21 29 30 20 42 20 23   ALT 15  --   --   --  14 19 16 21     Lipid Profile:  Recent Labs   Lab Test 07/21/18  1133   CHOL 143   HDL 42   LDL 88   TRIG 63     Thyroid Panel:  Recent Labs   Lab Test 11/21/23  1113   TSH 2.87      Vitamin B12:   Recent Labs   Lab Test 11/21/23  1113   B12 879           IMAGING:   Independent interpretation of the following studies by myself as part of today's encounter.   CT head 2/16/2024   --IMPRESSION:  HEAD CT:  1.  No acute intracranial pathology or calvarial fracture identified.  2.  Chronic changes as above.     CERVICAL SPINE CT:  1.  No CT evidence for acute fracture or post traumatic subluxation.  2.  Degenerative changes as highlighted above.    Time:  55 minutes evaluation and management.     Kim Wells M.D.  [facility]  Office [phone]

## 2024-02-17 NOTE — PHARMACY-ADMISSION MEDICATION HISTORY
Pharmacy Intern Admission Medication History    Admission medication history is complete. The information provided in this note is only as accurate as the sources available at the time of the update.    Information Source(s): Facility ([facility] medication list/MAR and CareEverywhere/SureScripts via N/A    Pertinent Information: Pt from [facility] [phone]    Changes made to PTA medication list:  Added: menthol/zinc oxide oint, melatonin, APAP Q6h prn and BID  Deleted: trospium, folic acid  Changed: methotrexate 12.5mg to 10mg weekly, pantoprazole 40mg BID to every day, sertraline 50mg to 75mg QD    Allergies reviewed with patient and updates made in EHR: yes    Medication History Completed By: SARAVANAN FRAGA 2/17/2024 9:52 AM    PTA Med List   Medication Sig Last Dose    acetaminophen (TYLENOL) 325 MG tablet Take 650 mg by mouth 2 times daily 2/16/2024 at pm    acetaminophen (TYLENOL) 325 MG tablet Take 2 tablets (650 mg) by mouth every 4 hours as needed for mild pain (Patient taking differently: Take 650 mg by mouth every 6 hours as needed for mild pain)  at PRN    alendronate (FOSAMAX) 70 MG tablet Take 70 mg by mouth every 7 days Sunday's 2/11/2024 at am    apixaban ANTICOAGULANT (ELIQUIS) 5 MG tablet Take 1 tablet (5 mg) by mouth 2 times daily 2/16/2024 at pm    fluticasone (FLONASE) 50 MCG/ACT nasal spray Spray 2 sprays into both nostrils daily 2/16/2024    melatonin 1 MG TABS tablet Take 2 mg by mouth at bedtime 2/16/2024 at pm    menthol-zinc oxide (CALMOSEPTINE) 0.44-20.6 % OINT ointment Apply topically 2 times daily to gluteal fold 2/16/2024 at pm    menthol-zinc oxide (CALMOSEPTINE) 0.44-20.6 % OINT ointment Apply topically 2 times daily as needed To gluteal fold  at PRN    methotrexate 2.5 MG tablet Take 5 tablets (12.5 mg) by mouth every 7 days (Patient taking differently: Take 10 mg by mouth every 7 days Wednesdays) 2/14/2024    multivitamin w/minerals (THERA-VIT-M) tablet Take 1 tablet  by mouth daily 2/16/2024    pantoprazole (PROTONIX) 40 MG EC tablet Take 1 tablet (40 mg) by mouth 2 times daily (before meals) Take twice a day for 6weeks then reduce to once daily (Patient taking differently: Take 40 mg by mouth daily) 2/16/2024    rosuvastatin (CRESTOR) 10 MG tablet Take 10 mg by mouth daily 2/16/2024 at pm    senna-docusate (SENOKOT-S/PERICOLACE) 8.6-50 MG tablet Take 1 tablet by mouth 2 times daily as needed for constipation  at PRN    sertraline (ZOLOFT) 50 MG tablet Take 75 mg by mouth daily 2/16/2024 at am    Vitamin D3 (CHOLECALCIFEROL) 125 MCG (5000 UT) tablet Take 125 mcg by mouth daily 2/16/2024 at am

## 2024-02-18 ENCOUNTER — APPOINTMENT (OUTPATIENT)
Dept: NEUROLOGY | Facility: CLINIC | Age: 84
End: 2024-02-18
Attending: HOSPITALIST
Payer: COMMERCIAL

## 2024-02-18 LAB
CREAT SERPL-MCNC: 1.37 MG/DL (ref 0.67–1.17)
EGFRCR SERPLBLD CKD-EPI 2021: 51 ML/MIN/1.73M2

## 2024-02-18 PROCEDURE — G0378 HOSPITAL OBSERVATION PER HR: HCPCS

## 2024-02-18 PROCEDURE — 36415 COLL VENOUS BLD VENIPUNCTURE: CPT | Performed by: HOSPITALIST

## 2024-02-18 PROCEDURE — 250N000013 HC RX MED GY IP 250 OP 250 PS 637: Performed by: INTERNAL MEDICINE

## 2024-02-18 PROCEDURE — 82565 ASSAY OF CREATININE: CPT | Performed by: HOSPITALIST

## 2024-02-18 PROCEDURE — 99233 SBSQ HOSP IP/OBS HIGH 50: CPT | Performed by: INTERNAL MEDICINE

## 2024-02-18 PROCEDURE — 250N000013 HC RX MED GY IP 250 OP 250 PS 637: Performed by: PSYCHIATRY & NEUROLOGY

## 2024-02-18 PROCEDURE — 999N000226 HC STATISTIC SLP IP EVAL DEFER: Performed by: SPEECH-LANGUAGE PATHOLOGIST

## 2024-02-18 PROCEDURE — 95813 EEG EXTND MNTR 61-119 MIN: CPT

## 2024-02-18 RX ORDER — LEVETIRACETAM 250 MG/1
250 TABLET ORAL 2 TIMES DAILY
Status: DISCONTINUED | OUTPATIENT
Start: 2024-02-18 | End: 2024-02-20 | Stop reason: HOSPADM

## 2024-02-18 RX ADMIN — PANTOPRAZOLE SODIUM 40 MG: 40 TABLET, DELAYED RELEASE ORAL at 10:10

## 2024-02-18 RX ADMIN — APIXABAN 2.5 MG: 2.5 TABLET, FILM COATED ORAL at 20:39

## 2024-02-18 RX ADMIN — APIXABAN 2.5 MG: 2.5 TABLET, FILM COATED ORAL at 10:09

## 2024-02-18 RX ADMIN — LEVETIRACETAM 250 MG: 250 TABLET, FILM COATED ORAL at 22:00

## 2024-02-18 RX ADMIN — SERTRALINE HYDROCHLORIDE 75 MG: 50 TABLET ORAL at 10:09

## 2024-02-18 ASSESSMENT — ACTIVITIES OF DAILY LIVING (ADL)
ADLS_ACUITY_SCORE: 53
ADLS_ACUITY_SCORE: 61
ADLS_ACUITY_SCORE: 62
ADLS_ACUITY_SCORE: 58
ADLS_ACUITY_SCORE: 53
ADLS_ACUITY_SCORE: 61
ADLS_ACUITY_SCORE: 62
DEPENDENT_IADLS:: CLEANING;COOKING;LAUNDRY;SHOPPING;MEAL PREPARATION;MEDICATION MANAGEMENT;MONEY MANAGEMENT;TRANSPORTATION;INCONTINENCE
ADLS_ACUITY_SCORE: 53
ADLS_ACUITY_SCORE: 62
ADLS_ACUITY_SCORE: 53
ADLS_ACUITY_SCORE: 58
ADLS_ACUITY_SCORE: 53

## 2024-02-18 NOTE — PLAN OF CARE
Goal Outcome Evaluation:    VSS. No seizure activity noted this shift. Continues to be noncompliant with cares. Incontinent of urine. LUE contracted at baseline. No signs of pain noted. Plan to attempt tests/consults today. Will continue to monitor.     BP (!) 159/82   Pulse 75   Temp 97.8  F (36.6  C) (Axillary)   Resp 18   SpO2 96%

## 2024-02-18 NOTE — UTILIZATION REVIEW
"      Concurrent stay review; Secondary Review Determination      Under the authority of the Utilization Management Committee, the utilization review process indicated a secondary review on the above patient. The review outcome is based on review of the medical records, discussions with staff, and applying clinical experience noted on the date of the review.      (x) Observation Status Appropriate - Concurrent stay review      RATIONALE FOR DETERMINATION:  Per provider note:  \"83 year old male admitted to [facility] on February 17, 2024 after having witnessed seizure-like activity at his memory care.\"    PMH:  dementia (memory care resident), CKD, anemia, afib on DOAC    Neurology consulted and recommended brain MRI and EEG but so far unable to obtain either due to agitation in the setting of dementia.  Keppra is being considered for treatement    The patient received a dose of low dose diazepam on 2/17/24.  Otherwise on review or MAR does not appear to have received any other IV medication for agitation management    Pt remains hospitalized to try and facilitate testing.  Continue observation status     Patient is clinically improving and there is no clear indication to change patient's status to inpatient. The severity of illness, intensity of service provided, expected LOS and risk for adverse outcome make the care appropriate for observation.      The information on this document is developed by the utilization review team in order for the business office to ensure compliance. This only denotes the appropriateness of proper admission status and does not reflect the quality of care rendered.   The definitions of Inpatient Status and Observation Status used in making the determination above are those provided in the CMS Coverage Manual, Chapter 1 and Chapter 6, section 70.4.      Sincerely,      Champ Fuentes MD  Utilization Review   Physician Advisor   [facility].    "

## 2024-02-18 NOTE — PLAN OF CARE
Orientation: Alert. Agitated with all cares. Refusing care and meds.   Neuros: difficult to assess. Refusing all care this shift.   Vitals/Tele: VSS RA    IV Access/drains: SL    Diet: Regular    Mobility: Bedrest    GI/: Incontinent B&B    Wound/Skin: Scattered bruising.     Consults: Neuro following    Discharge Plan: Re-attempt EEG and MRI 2/18, discharge pending progress.       See Flow sheets for assessment

## 2024-02-18 NOTE — PROGRESS NOTES
2/16/24 admit, Seizure like activities, witnessed  2/18/24, 7 a to 7 p    Orientation: Unable to assess, inconsistent in following command, resisting cares at times    Vitals/Tele: BP elevated at times, appears to be comfortable and not in pain    IV Access/drains: No IV access    Diet: Regular, thins, takes pills whole, couple at a time    Mobility: can pivot to W/C baseline. W/C bound baseline, here Q 2 T and repo    GI/: Incontinent, No BM    Wound/Skin: scattered bruises, no significant skin issues    Consults: Neurology following    Discharge Plan: TBD, most likely back to prior to living arrangement    Others:    EEG done today, results pending         See Flow sheets for assessment

## 2024-02-18 NOTE — PLAN OF CARE
SLP: RN was consulted this am.  SLP attempted to see pt to assess swallow.  Pt was pleasant, but refused all po intake offered.   Family reported to SLP 2/17 that pt has had no difficulty swallowing prior to admit.  No oral motor or speech difficulty reported.  A regular diet was ordered for pt 2/17.  Plan to defer SLP eval and Tx as pt is not able to cooperate at this time and does not appear to have new swallowing deficits.  Defer discharge recommendations to medical care team.  Will complete orders.  Please re-consult SLP if pt accepts po intake, but aspiration signs are noted.    (Original entry corrected)

## 2024-02-18 NOTE — CONSULTS
Care Management Initial Consult    General Information  Assessment completed with: Children, Other, daughter Jovanna, nurse from [facility]  Type of CM/SW Visit: Initial Assessment    Primary Care Provider verified and updated as needed: Yes (he follows with facility physicianAracelis)   Readmission within the last 30 days: no previous admission in last 30 days      Reason for Consult: discharge planning  Advance Care Planning: Advance Care Planning Reviewed: no concerns identified          Communication Assessment  Patient's communication style: spoken language (English or Bilingual)             Cognitive  Cognitive/Neuro/Behavioral: .WDL except  Level of Consciousness: confused  Arousal Level: opens eyes spontaneously  Orientation: other (see comments) (ALYSON)  Mood/Behavior: calm, cooperative  Best Language: 1 - Mild to moderate  Speech: clear    Living Environment:   People in home: facility resident     Current living Arrangements: assisted living  Name of Facility: [facility]   Able to return to prior arrangements:    Living Arrangement Comments: at the care suites receives personal total cares; daughter  suites are staffed 5:1 patient ratio    Family/Social Support:  Care provided by: other (see comments) (staff)  Provides care for: no one  Marital Status:   Children          Description of Support System:           Current Resources:   Patient receiving home care services: No     Community Resources: None  Equipment currently used at home:    Supplies currently used at home:      Employment/Financial:  Employment Status: retired        Financial Concerns: none           Does the patient's insurance plan have a 3 day qualifying hospital stay waiver?  No    Lifestyle & Psychosocial Needs:  Social Determinants of Health     Food Insecurity: Not on file   Depression: Not on file   Housing Stability: Not on file   Tobacco Use: Medium Risk (3/3/2023)    Patient History      Smoking Tobacco Use: Former     Smokeless Tobacco Use: Never     Passive Exposure: Not on file   Financial Resource Strain: Not on file   Alcohol Use: Not on file   Transportation Needs: Not on file   Physical Activity: Not on file   Interpersonal Safety: Not on file   Stress: Not on file   Social Connections: Not on file       Functional Status:  Prior to admission patient needed assistance:   Dependent ADLs:: Bathing, Dressing, Eating, Grooming, Incontinence, Positioning, Transfers, Wheelchair-with assist, Toileting  Dependent IADLs:: Cleaning, Cooking, Laundry, Shopping, Meal Preparation, Medication Management, Money Management, Transportation, Incontinence       Mental Health Status:          Chemical Dependency Status:                Values/Beliefs:  Spiritual, Cultural Beliefs, Yazidism Practices, Values that affect care: no               Additional Information:  Patient confused currently having EEG; spoke with daughter Dorcas on the phone as well as nurse Michele at the facility.  The Medicare Observation Notice was given to the daughter over the phone and copy left in the room.    Patient resides at [facility].  Daughter states they have a 5:1 patient to CNA ratio so feels he has good care there but are thinking of moving him to memory care as his dementia is worsening    He is wheelchair bound, transfers by pivot, partial lift per daughter.  He is assisted to the dining room.  He is assisted with all cares, medications.  The nurse states he is occasionally has mild agitation but manageable.      The [facility] would like an update on his status prior to returning to ensure he is at baseline or what may have changed in status.  The nurse line for [facility] is [phone].    [facility] physicians follows the patient at the facility.    Meds can be sent to [facility] in [place].  This was changed as his discharge pharmacy.      Kathryn Neville RN  Inpatient [facility]  Coordinator  [facility]

## 2024-02-18 NOTE — PROGRESS NOTES
Observation goals  PRIOR TO DISCHARGE        Comments: -diagnostic tests and consults completed and resulted, not met, waiting for neurology  -vital signs normal or at patient baseline, partially met, BP elevated at times  Nurse to notify provider when observation goals have been met and patient is ready for discharge.

## 2024-02-18 NOTE — PROGRESS NOTES
[phone] Pt here for concerns of seizure. Pt continues to be resistive, uncooperative, agitated, yelling at staff when attempting vital signs, collect UA and with cares of any kind, otherwise patient resting comfortably. MD aware, Dr. Louie spoke to family again this evening. ALYSON assess orientation and difficult to complete ANY Neuro assessment or cares. Able to move extremities spontaneously, LUE slightly contracted baseline per daughter. Nonverbal signs indicating. Reg diet, SLP unable to complete dysphagia evaluation, meds were not given during day shift. Bedrest, refused T&R. Seizure precaution maintained. Incont urine, unable to collect UA. No BM. Pt scoring yellow on Aggression Stop Light tool. Not able to completed MRI and or EEG today, plan is to re attempt tomorrow or consider empirically treating with anti-seizure medications if unable to compete needed test.

## 2024-02-18 NOTE — PROGRESS NOTES
Neurology Daily Note        Admission Date:2/16/2024   Date of service: 02/18/2024   Hospital Day: 3                                                   Assessment and Plan:   #.  Witnessed seizure at [facility] care facility.  Previous event 1 week before involving alteration of consciousness.  Presentation consistent with new onset seizure disorder in the setting of known dementia.  -- EEG-reattempt today.  Inclined to initiate levetiracetam as likely to have risk of seizures recurring in the future.  #.  Encephalopathy/agitation-improved today however records indicate he has periods of agitation at baseline with his dementia     #.  Known diagnosis of dementia     Later reviewed EEG-   Slowing without epileptic abnormality.    As interictal EEG can be neg in patients with known sz disorder, with this hx I am recommending start of ACD.    Ordered lvt  low dose 250mg bid with low GFR.  Will follow tomorrow.          Interval History:   Chart reviewed-  Patient is improved today he is conversant and although hard of hearing and focused on eating his breakfast he is otherwise cooperative.          Medications:   Scheduled Meds:   apixaban ANTICOAGULANT  2.5 mg Oral BID    fluticasone  2 spray Both Nostrils Daily    gadobutrol  10 mL Intravenous Once    pantoprazole  40 mg Oral QAM AC    sertraline  75 mg Oral Daily    sodium chloride (PF)  10 mL Intravenous Once      PRN Meds: acetaminophen **OR** acetaminophen, LORazepam, LORazepam, melatonin, OLANZapine, OLANZapine zydis, ondansetron **OR** ondansetron, senna-docusate **OR** senna-docusate        Physical Exam:   Vitals: Temp: 98.2  F (36.8  C) Temp src: Axillary BP: 120/84 Pulse: 50   Resp: 18 SpO2: 98 % O2 Device: None (Room air)    Vital Signs with Ranges: Temp:  [97.8  F (36.6  C)-99.2  F (37.3  C)] 98.2  F (36.8  C)  Pulse:  [50-98] 50  Resp:  [18] 18  BP: (120-159)/(69-84) 120/84  SpO2:  [94 %-98 %] 98 %     General Appearance:  No acute distress, focused on eating  "his breakfast.  Very hard of hearing  Neuro: Oriented to self \"hospital \", not able to cooperate with orientation to month-states nonsensical word.  Able to follow simple commands with repetition                                       cranial nerves: Grossly intact                                             Motor movement intact x 4 although limited particularly in the left hand because of arthritic changes and?  Dupuytren contracture  Able to lift both legs up off the bed        Remainder of examination limited by focus/hearing  Extremities: No clubbing, no cyanosis, no edema       Data:   ROUTINE IP LABS (Last 3results)  CBC RESULTS:           Recent Labs   Lab Test 02/16/24 2217 02/01/24  1456 11/21/23  1113   WBC 10.9 5.9 7.4   RBC 3.59* 3.50* 3.64*   HGB 9.0* 9.1* 9.5*   HCT 29.6* 30.2* 32.0*    420 368      Basic Metabolic Panel:        Recent Labs   Lab Test 02/16/24 2217 12/28/23  1228 11/21/23  1113    140 143   POTASSIUM 3.8 4.7 4.8   CHLORIDE 105 106 107   CO2 17* 21* 21*   BUN 22.4 21.2 25.5*   CR 1.62* 1.65* 1.58*   * 93 72   RANJAN 8.9 9.0 9.3      Liver panel:             Recent Labs   Lab Test 02/16/24 2217 02/01/24  1456 11/02/23  1133 08/03/23  0902 03/02/23  0535 03/01/23  0107 02/24/23  1615 07/21/22  1824   PROTTOTAL 7.4  --   --   --  5.2* 6.0* 7.0 8.2   ALBUMIN 3.9  --   --   --  2.7* 3.2* 3.6 4.2   BILITOTAL 0.3  --   --   --  0.4 0.7 0.4 0.5   ALKPHOS 116  --   --   --  108 132* 162* 95   AST 24 21 29 30 20 42 20 23   ALT 15  --   --   --  14 19 16 21      Thyroid Panel:      Recent Labs   Lab Test 11/21/23  1113   TSH 2.87      Vitamin B12:       Recent Labs   Lab Test 11/21/23  1113   B12 879      Ammonia: No lab results found.     IMAGING:   Independent interpretation of the following studies by myself as part of today's encounter.-No acute change-significant atrophy  CT HEAD W/O CONTRAST, CT CERVICAL SPINE W/O CONTRAST  LOCATION: [facility]" HOSPITAL  DATE: 2/16/2024     INDICATION: Seizure, head traumaNeck injury  COMPARISON: CT head 03/12/2023 CT cervical spine 03/01/2023   TECHNIQUE:   1) Routine CT Head without IV contrast. Multiplanar reformats. Dose reduction techniques were used.  2) Routine CT Cervical Spine without IV contrast. Multiplanar reformats. Dose reduction techniques were used.     FINDINGS:   HEAD CT:   INTRACRANIAL CONTENTS: No intracranial hemorrhage, extraaxial collection, or mass effect.  No CT evidence of acute infarct. Mild to moderate presumed chronic small vessel ischemic changes. Ventriculomegaly disproportionate to the degree of volume loss.   Correlate for normal pressure hydrocephalus. 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 Small chronic infarct in the right cerebellar hemisphere.      VISUALIZED ORBITS/SINUSES/MASTOIDS: No intraorbital abnormality. Hyperostosis in the maxillary sinuses. Polypoid mucosal thickening in the ethmoid air cells nasal cavity. There is a 2 cm polyp or retention cyst in the right maxillary sinus. Postsurgical   changes of bilateral uncinectomies and medial antrostomies. No middle ear or mastoid effusion.     BONES/SOFT TISSUES: No acute abnormality.     CERVICAL SPINE CT:   VERTEBRA: Cervical vertebra normal in height. Stable mild alterations in the lateral alignment likely relating to degenerative change No fracture or posttraumatic subluxation.      CANAL/FORAMINA: Mild to moderate central canal stenosis at C4 and at C6-C7 and mild central canal stenosis at C4-C5 and C5-C6. High-grade foraminal narrowing on the right at C3-C4 and C4-C5 and on the left at C3-C4, C4-C5 and C6-C7.     PARASPINAL: There are dense atheromatous calcifications at the bifurcations. Mild scarring at the lung apices.                                                                      IMPRESSION:  HEAD CT:  1.  No acute intracranial pathology or calvarial fracture identified.  2.   Chronic changes as above          TIME      35minutes Evaluation/management time      Stefany Ty M.D.  Neurologist  [facility]  Office [phone]

## 2024-02-18 NOTE — PROGRESS NOTES
[facility]    Medicine Progress Note - Hospitalist Service    Date of Admission:  2/16/2024    Assessment & Plan   Shamir Concepcion is a 83 year old male admitted to [facility] on February 17, 2024 after having witnessed seizure-like activity at his memory care.        R/O New onset seizure  Patient had a generalized tonic-clonic seizure event on Friday evening February 16.  The episode lasted 5 minutes.  When EMS arrived they administered 5 mg of Versed.  The patient had a staring spell approximately 1 week ago which may have been a seizure although no workup was performed.  Workup in the emergency room was relatively benign however MRI could not be obtained due to patient agitation.  Patient will be admitted to the neurofloor for further workup.  I will order an MRI with oral sedation for later in the morning hopefully neurology will have a chance to see the patient prior to him receiving sedation.  -Neurochecks every 4 as able  -Seizure precautions remain in place    Neuro consulted and following (appreciated)  2/18/24 - Will re-attempt EEG today as less agitated this am  Neuro has discussed initiating levetiracetam with family d/t his increased, long-term risk of developing seizures with his dementia diagnosis even if unable to get EEG completed ---will see if he is cooperative today with EEG    So far, since admission, has not been able to tolerate EEG and MRI due to agitation and aggressive behavior    Addendum - 1545  Was able to talk with daughter, Dorcas, on the phone this afternoon and update her pending EEG and improved clinical status today.    2. Progressive Dementia    More calm so far this am and allowing me to examine him  No prn zyprexa needed overnight    Has not been cooperative with taking any po meds since admission - will re-attempt medication administration today     3.  Chronic a fib       Chronic anticoagulation    Will re-attempt eliquis today if pt  "is cooperative  HR has been <100    4. Ckd 3     Awaiting repeat creat today  Encourage po intake today and monitor closely  Avoid nephrotoxic agents    Chronic normocytic anemia -Hb is at his baseline  Bph  Hx of Cva  Htn  Pad  Ra-on methotrexate     DVT ppx: eliquis  Code Status: DNR     Disposition - return to [facility] care when ok per neuro        Primary Care Physician     Medical Decision Making       50 MINUTES SPENT BY ME on the date of service doing chart review, history, exam, documentation & further activities per the note.        PPE Worn:  Mask, gloves     Diet: Regular Diet Adult    DVT Prophylaxis: DOAC  Michelle Catheter: Not present  Lines: None     Cardiac Monitoring: None  Code Status: No CPR- Do NOT Intubate      Clinically Significant Risk Factors Present on Admission               # Drug Induced Coagulation Defect: home medication list includes an anticoagulant medication    # Hypertension: Noted on problem list                 Disposition Plan      Expected Discharge Date: 02/20/2024                  Alda Leija DO  Hospitalist Service  [facility]  Securely message with [employer] (more info)  Text page via [employer] Paging/Directory   ______________________________________________________________________    Interval History     Per RN, no seizure activity noted overnight.  Incontinent of urine and resistive to cares.  No fevers or new hypoxia overnight.  Not agitated or yelling out; not restless.  Has not yet had breakfast yet ---answering \"yes, I am\" when asking if he is hungry.    Physical Exam   Vital Signs: Temp: 99.2  F (37.3  C) Temp src: Axillary BP: 132/69 Pulse: 98   Resp: 18 SpO2: 94 % O2 Device: None (Room air)    Weight: 0 lbs 0 oz    GEN:  sleeping when I enter the room but easily arousable and opens eyes.    Appears comfortable currently, no overt distress.  Is not resistive to allowing me a somewhat limited physical exam this am  Hospital gown " currently in place  HEENT:  Normocephalic/atraumatic, no obvious scleral icterus, no nasal discharge, mouth and membranes moist  CV:  somewhat distant but no loud murmur, RRR. S1 + S2 noted  LUNGS:  Clear to auscultation ant/lat bilaterally without rales/rhonchi/wheezing/retractions.  Symmetric chest rise on inhalation noted.  EXT:  No significant pretibial edema bilaterally.  No cyanosis.  No acute joint synovitis noted.  PSYCH:  calm this am; no resisting my basic physical exam for me this am.    Medications      apixaban ANTICOAGULANT  2.5 mg Oral BID    fluticasone  2 spray Both Nostrils Daily    gadobutrol  10 mL Intravenous Once    pantoprazole  40 mg Oral QAM AC    sertraline  75 mg Oral Daily    sodium chloride (PF)  10 mL Intravenous Once       Data     Labs and Imaging results below reviewed today.  Recent Labs   Lab 02/16/24 2217   WBC 10.9   HGB 9.0*   HCT 29.6*   MCV 83        Recent Labs   Lab 02/18/24 1126 02/16/24 2217   NA  --  139   POTASSIUM  --  3.8   CHLORIDE  --  105   CO2  --  17*   ANIONGAP  --  17*   GLC  --  110*   BUN  --  22.4   CR 1.37* 1.62*   GFRESTIMATED 51* 42*   RANJAN  --  8.9     7-Day Micro Results       No results found for the last 168 hours.          Recent Labs   Lab 02/18/24 1126 02/16/24 2217   NA  --  139   POTASSIUM  --  3.8   CHLORIDE  --  105   CO2  --  17*   ANIONGAP  --  17*   GLC  --  110*   BUN  --  22.4   CR 1.37* 1.62*   GFRESTIMATED 51* 42*   RANJAN  --  8.9   MAG  --  2.2   PROTTOTAL  --  7.4   ALBUMIN  --  3.9   BILITOTAL  --  0.3   ALKPHOS  --  116   AST  --  24   ALT  --  15       No results found for this or any previous visit (from the past 24 hour(s)).

## 2024-02-19 LAB
CREAT SERPL-MCNC: 1.4 MG/DL (ref 0.67–1.17)
EGFRCR SERPLBLD CKD-EPI 2021: 50 ML/MIN/1.73M2

## 2024-02-19 PROCEDURE — 82565 ASSAY OF CREATININE: CPT | Performed by: HOSPITALIST

## 2024-02-19 PROCEDURE — 250N000013 HC RX MED GY IP 250 OP 250 PS 637: Performed by: PSYCHIATRY & NEUROLOGY

## 2024-02-19 PROCEDURE — 250N000013 HC RX MED GY IP 250 OP 250 PS 637: Performed by: HOSPITALIST

## 2024-02-19 PROCEDURE — 250N000013 HC RX MED GY IP 250 OP 250 PS 637: Performed by: INTERNAL MEDICINE

## 2024-02-19 PROCEDURE — 36415 COLL VENOUS BLD VENIPUNCTURE: CPT | Performed by: HOSPITALIST

## 2024-02-19 PROCEDURE — G0378 HOSPITAL OBSERVATION PER HR: HCPCS

## 2024-02-19 PROCEDURE — 99232 SBSQ HOSP IP/OBS MODERATE 35: CPT | Performed by: STUDENT IN AN ORGANIZED HEALTH CARE EDUCATION/TRAINING PROGRAM

## 2024-02-19 RX ORDER — PANTOPRAZOLE SODIUM 40 MG/1
40 TABLET, DELAYED RELEASE ORAL DAILY
COMMUNITY
Start: 2024-02-19

## 2024-02-19 RX ORDER — LEVETIRACETAM 250 MG/1
250 TABLET ORAL 2 TIMES DAILY
Qty: 60 TABLET | Refills: 0 | Status: SHIPPED | OUTPATIENT
Start: 2024-02-19 | End: 2024-03-20

## 2024-02-19 RX ADMIN — LEVETIRACETAM 250 MG: 250 TABLET, FILM COATED ORAL at 11:40

## 2024-02-19 RX ADMIN — SERTRALINE HYDROCHLORIDE 75 MG: 50 TABLET ORAL at 11:40

## 2024-02-19 RX ADMIN — APIXABAN 5 MG: 5 TABLET, FILM COATED ORAL at 11:40

## 2024-02-19 RX ADMIN — PANTOPRAZOLE SODIUM 40 MG: 40 TABLET, DELAYED RELEASE ORAL at 11:51

## 2024-02-19 ASSESSMENT — ACTIVITIES OF DAILY LIVING (ADL)
ADLS_ACUITY_SCORE: 63
ADLS_ACUITY_SCORE: 62
ADLS_ACUITY_SCORE: 63
ADLS_ACUITY_SCORE: 63
ADLS_ACUITY_SCORE: 62
ADLS_ACUITY_SCORE: 63
ADLS_ACUITY_SCORE: 62
ADLS_ACUITY_SCORE: 63

## 2024-02-19 NOTE — DISCHARGE INSTRUCTIONS
ACTIONS if seizure occurs:  1. Stay close to the patient and remove all the sharp objects.   2. Time beginning and end of the seizure.   3. Administer nothing  4. Notify primary provide at facility.   CALL 911 if:  1 - seizure lasts more than  10 minutes or 2 or more in 24 hours.  2 -difficulties breathing or abnormal blood pressure or pulse  3 -patient is not interacting like he normally does after seizure has stopped  4 - patient has sustained injury with the seizure   ----------------------------------------------------------------

## 2024-02-19 NOTE — PROGRESS NOTES
Observation Note:     -Diagnostic tests and consults completed and resulted: Not Met  -Vital signs normal or at patient baseline: Met     Dot Zuleta RN on 2/19/2024 at 12:00 AM

## 2024-02-19 NOTE — PROGRESS NOTES
Neurology Daily Note      Admission Date:2/16/2024   Date of service: 02/19/2024   Hospital Day: 4                                                   Assessment and Plan:   #.  Witnessed seizure at [facility].  Previous event 1 week before involving alteration of consciousness.  Presentation consistent with new onset seizure disorder in the setting of known dementia.  -- EEG-yesterday revealed nonspecific slowingAs interictal EEG can be neg in patients with known sz disorder, with this hx I am recommending start of ACD.    Ordered lvt  low dose 250mg bid with low GFR.  Daughter reports that inhouse doctor at facility usually handles medical issues.  If they are comfortable doing so, only needs follow up with general neurology (Dr. Wells) prn.  SEizure protocol recommended:  Seizure Protocol.    Symptoms of seizure: convulsion, involuntary movement of extremities, loss of awareness/consciousness   ACTIONS if seizure occurs:  1. Stay close to the patient and remove all the sharp objects.   2. Time beginning and end of the seizure.   3. Administer nothing  4. Notify primary provide at facility.   CALL 911 if:  1 - seizure lasts more than  10 minutes or 2 or more in 24 hours.  2 -difficulties breathing or abnormal blood pressure or pulse  3 -patient is not interacting like he normally does after seizure has stopped  4 - patient has sustained injury with the seizure   ------------------------------------------------------------------------------------------------------------------          #.  Encephalopathy/agitation- records indicate he has periods of agitation at baseline with his dementia    #.  Known diagnosis of dementia    Discussed plan with daughter.          General Neurology service will sign off as no additional neurologic evaluation or management from our service is required at this time.  Please contact General Neurology service if questions related to neurologic status or follow up needed during  this hospitalization.        Interval History:   Periods of increased agitation/uncooperativeness at times.             Medications:   Scheduled Meds:   apixaban ANTICOAGULANT  5 mg Oral BID    fluticasone  2 spray Both Nostrils Daily    gadobutrol  10 mL Intravenous Once    levETIRAcetam  250 mg Oral BID    pantoprazole  40 mg Oral QAM AC    sertraline  75 mg Oral Daily    sodium chloride (PF)  10 mL Intravenous Once     PRN Meds: acetaminophen **OR** acetaminophen, LORazepam, LORazepam, melatonin, OLANZapine, OLANZapine zydis, ondansetron **OR** ondansetron, senna-docusate **OR** senna-docusate        Physical Exam:   Vitals: Temp: 97.8  F (36.6  C) Temp src: Axillary BP: (!) 161/89 Pulse: 62   Resp: 16 SpO2: 96 % O2 Device: None (Room air)    Vital Signs with Ranges: Temp:  [97.8  F (36.6  C)-98.6  F (37  C)] 97.8  F (36.6  C)  Pulse:  [54-62] 62  Resp:  [16-20] 16  BP: (158-161)/(69-89) 161/89  SpO2:  [96 %-97 %] 96 %    General Appearance:  No acute distress, lying in bed, resting.  Less communicative/recent agitiation.   Remainder of examination limited by focus/hearing  Extremities: No clubbing, no cyanosis, no edema       Data:   ROUTINE IP LABS (Last 3results)  CBC RESULTS:     Recent Labs   Lab Test 02/16/24 2217 02/01/24  1456 11/21/23  1113   WBC 10.9 5.9 7.4   RBC 3.59* 3.50* 3.64*   HGB 9.0* 9.1* 9.5*   HCT 29.6* 30.2* 32.0*    420 368     Basic Metabolic Panel:  Recent Labs   Lab Test 02/19/24  1321 02/18/24  1126 02/16/24 2217 12/28/23  1228 11/21/23  1113   NA  --   --  139 140 143   POTASSIUM  --   --  3.8 4.7 4.8   CHLORIDE  --   --  105 106 107   CO2  --   --  17* 21* 21*   BUN  --   --  22.4 21.2 25.5*   CR 1.40* 1.37* 1.62* 1.65* 1.58*   GLC  --   --  110* 93 72   RANJAN  --   --  8.9 9.0 9.3     Liver panel:  Recent Labs   Lab Test 02/16/24  2217 02/01/24  1456 11/02/23  1133 08/03/23  0902 03/02/23  0535 03/01/23  0107 02/24/23  1615 07/21/22  1824   PROTTOTAL 7.4  --   --   --  5.2*  6.0* 7.0 8.2   ALBUMIN 3.9  --   --   --  2.7* 3.2* 3.6 4.2   BILITOTAL 0.3  --   --   --  0.4 0.7 0.4 0.5   ALKPHOS 116  --   --   --  108 132* 162* 95   AST 24 21 29 30 20 42 20 23   ALT 15  --   --   --  14 19 16 21     Thyroid Panel:  Recent Labs   Lab Test 11/21/23  1113   TSH 2.87      Vitamin B12:   Recent Labs   Lab Test 11/21/23  1113   B12 879        IMAGING:   Prior imaging.   CT HEAD W/O CONTRAST, CT CERVICAL SPINE W/O CONTRAST  LOCATION: [facility]  DATE: 2/16/2024     INDICATION: Seizure, head traumaNeck injury  COMPARISON: CT head 03/12/2023 CT cervical spine 03/01/2023   TECHNIQUE:   1) Routine CT Head without IV contrast. Multiplanar reformats. Dose reduction techniques were used.  2) Routine CT Cervical Spine without IV contrast. Multiplanar reformats. Dose reduction techniques were used.     FINDINGS:   HEAD CT:   INTRACRANIAL CONTENTS: No intracranial hemorrhage, extraaxial collection, or mass effect.  No CT evidence of acute infarct. Mild to moderate presumed chronic small vessel ischemic changes. Ventriculomegaly disproportionate to the degree of volume loss.   Correlate for normal pressure hydrocephalus. 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 Small chronic infarct in the right cerebellar hemisphere.      VISUALIZED ORBITS/SINUSES/MASTOIDS: No intraorbital abnormality. Hyperostosis in the maxillary sinuses. Polypoid mucosal thickening in the ethmoid air cells nasal cavity. There is a 2 cm polyp or retention cyst in the right maxillary sinus. Postsurgical   changes of bilateral uncinectomies and medial antrostomies. No middle ear or mastoid effusion.     BONES/SOFT TISSUES: No acute abnormality.     CERVICAL SPINE CT:   VERTEBRA: Cervical vertebra normal in height. Stable mild alterations in the lateral alignment likely relating to degenerative change No fracture or posttraumatic subluxation.      CANAL/FORAMINA: Mild to moderate  central canal stenosis at C4 and at C6-C7 and mild central canal stenosis at C4-C5 and C5-C6. High-grade foraminal narrowing on the right at C3-C4 and C4-C5 and on the left at C3-C4, C4-C5 and C6-C7.     PARASPINAL: There are dense atheromatous calcifications at the bifurcations. Mild scarring at the lung apices.                                                                      IMPRESSION:  HEAD CT:  1.  No acute intracranial pathology or calvarial fracture identified.  2.  Chronic changes as above        TIME     35minutes Evaluation/management time     Stefany Ty M.D.  Neurologist  [facility]  Office [phone]

## 2024-02-19 NOTE — PROGRESS NOTES
Care Management Follow Up    Length of Stay (days): 0    Expected Discharge Date: 02/20/2024     Concerns to be Addressed: discharge planning     Patient plan of care discussed at interdisciplinary rounds: Yes    Anticipated Discharge Disposition:  Return to [facility]  WILLY Giron can be reached at [phone].  Fax is [phone]. Discharge orders will need to be faxed prior to discharge; 9:30am.     Anticipated Discharge Services:    Anticipated Discharge DME:      Patient/family educated on Medicare website which has current facility and service quality ratings:    Education Provided on the Discharge Plan:  yes  JERRICA contacted [facility] at [phone] and updated that pt is ready medically to return. [facility]ree indicates that they are able to accept pt tomorrow am. They will not have an RN on staff this afternoon to do required assessment.     Patient/Family in Agreement with the Plan:  yes  JERRICA contacted pt's daughter, Dorcas and provided update. She is in agreement with plan to discharge tomorrow and indicates that she will visit pt at the [facility] .    Referrals Placed by CM/JERRICA:  [facility] transport, [facility]  Private pay costs discussed: n/a    Additional Information:  JERRICA called and arranged for stretcher transport between 11:40am and 12:20 pm on Tuesday 2/10/24.  PCS completed, faxed and placed on pt chart.  JERRICA updated PA-C of discharge plan tomorrow.    KATY Gonzalez

## 2024-02-19 NOTE — PLAN OF CARE
Goal Outcome Evaluation: Progressing    Reason for Admission: New onset seizure    Evaluation of Goal:   Unable to complete full neurological exam due to patient being uncooperative and refusing cares at times. On initial assessment, patient alert and oriented to time and self and participating in some assessments - see flowsheets for detailed neurological assessment. Vitals are stable on RA (refusing vital signs at midnight and morning check along with majority of assessments). Patient able to shift weight independently but intermittently helped with repositioning. Patient adequate urine output overnight. Sleep Quality moderate. Patient scoring green on the Aggression Stoplight Tool. Call light within reach, bed alarm on for safety. Discharge disposition is pending.     Dot Zuleta RN on 2/19/2024 at 6:46 AM

## 2024-02-19 NOTE — PROGRESS NOTES
Observation Note:     -Diagnostic tests and consults completed and resulted: Not Met  -Vital signs normal or at patient baseline: Met     Dot Zuleta RN on 2/19/2024 at 10:00 PM

## 2024-02-19 NOTE — PROGRESS NOTES
[facility]    Medicine Progress Note - Hospitalist Service    Date of Admission:  2/16/2024    Assessment & Plan    Shamir Concepcion is a 83 year old male with PMHx of dementia who resides in memory care. Patient presented to [facility] on 2/17 after a witnessed seizure-like activity at his memory care unit.    # New onset seizures  Patient with generalized tonic-clonic seizure event the evening of 2/16 that lasted approximately 5 minutes.  EMS arrived and administered 5 mg of Versed.  Memory care staff also notes patient with staring spell approximately 1 week ago, felt may be seizure-like activity but was not worked up.  Benign workup in the ER, however, MRI unable to be obtained due to patient agitation.  Also difficult to obtain EEG given underlying dementia and agitation, however, was notable for nonspecific slowing.  -Appreciate assistance from neurology (please see their notes for full report)  -EEG with nonspecific slowing.  interictal EEG can be neg in patients with known sz disorder.  -Given patient's dementia he is at increased risk of developing overt seizure disorder  -Started on Keppra 250 mg twice daily  - Follow-up with general neurology, Dr. Wells, as needed. Otherwise, seizure medication can be managed by provider at memory care facility   - 2/19: Pt overall stable, doing well today.  - Medically ready for discharge. Appreciate social work for arranging a ride for tomorrow     #Dementia  History noted.  Calm during exam today.  -Plan to return to memory care tomorrow  - I did call and speak with patient's daughter, Dorcas, who is agreeable with plan    #Chronic A-fib on anticoagulation  - PTA Eliquis    #CKD 3  Creatinine at 1-3 to 1-4, at baseline       Diet: Regular Diet Adult    DVT Prophylaxis: DOAC  Michelle Catheter: Not present  Lines: None     Cardiac Monitoring: None  Code Status: No CPR- Do NOT Intubate      Disposition Plan      Expected Discharge Date: 02/20/2024, 11:40  AM  Discharge Delays: *Medically Ready for Discharge  Destination: other (comment) (return to Care Suites)          The patient's care was discussed with staff physician, Bedside Nurse, Care Coordinator/, Patient, and Patient's Family.    Nicola Sheppard PA-C  Hospitalist Service  [facility]  Securely message with [employer] (more info)  Text page via [employer] Paging/Directory     Clinically Significant Risk Factors Present on Admission               # Drug Induced Coagulation Defect: home medication list includes an anticoagulant medication    # Hypertension: Noted on problem list          # Financial/Environmental Concerns: none         ______________________________________________________________________    Interval History   Patient appears to be doing well today.  Demented at baseline.  Has no acute concerns based on conversation with    Data reviewed today: I reviewed all medications, new labs and imaging results over the last 24 hours. Please see discussion of these results in the A/P.    Physical Exam     Vital Signs: Temp: 97.8  F (36.6  C) Temp src: Axillary BP: (!) 161/89 Pulse: 62     Resp: 16 SpO2: 96 % O2 Device: None (Room air)    Weight: 0 lbs 0 oz    Constitutional: awake, alert, cooperative, in no acute distress. Demented at baseline   Eyes: EOM, PERRLA. Sclera clear, conjunctiva normal. Anicteric   HENT: Normocephalic, without obvious abnormality, atraumatic.   Respiratory: No increased work of breathing.   GI: Soft, non-tender without rebound or guarding.   Musculoskeletal:  Full range of motion noted.    Neurologic: Cranial nerves II-XII are grossly intact.   Neuropsychiatric: General: normal, calm and normal eye contact. Normal affect          Data   Recent Labs   Lab 02/19/24  1321 02/18/24  1126 02/16/24  2217   WBC  --   --  10.9   HGB  --   --  9.0*   MCV  --   --  83   PLT  --   --  432   INR  --   --  1.18*   NA  --   --  139   POTASSIUM  --   --  3.8    CHLORIDE  --   --  105   CO2  --   --  17*   BUN  --   --  22.4   CR 1.40* 1.37* 1.62*   ANIONGAP  --   --  17*   RANJAN  --   --  8.9   GLC  --   --  110*   ALBUMIN  --   --  3.9   PROTTOTAL  --   --  7.4   BILITOTAL  --   --  0.3   ALKPHOS  --   --  116   ALT  --   --  15   AST  --   --  24   LIPASE  --   --  109*       No results found for this or any previous visit (from the past 24 hour(s)).

## 2024-02-19 NOTE — PROGRESS NOTES
Observation Note:     -Diagnostic tests and consults completed and resulted: Not Met  -Vital signs normal or at patient baseline: Met     Dot Zuleta RN on 2/19/2024 at 6:53 AM

## 2024-02-20 VITALS
RESPIRATION RATE: 18 BRPM | SYSTOLIC BLOOD PRESSURE: 130 MMHG | OXYGEN SATURATION: 96 % | HEART RATE: 63 BPM | TEMPERATURE: 98.1 F | DIASTOLIC BLOOD PRESSURE: 68 MMHG

## 2024-02-20 LAB
CREAT SERPL-MCNC: 1.58 MG/DL (ref 0.67–1.17)
EGFRCR SERPLBLD CKD-EPI 2021: 43 ML/MIN/1.73M2

## 2024-02-20 PROCEDURE — 82565 ASSAY OF CREATININE: CPT | Performed by: HOSPITALIST

## 2024-02-20 PROCEDURE — 250N000013 HC RX MED GY IP 250 OP 250 PS 637: Performed by: HOSPITALIST

## 2024-02-20 PROCEDURE — 99239 HOSP IP/OBS DSCHRG MGMT >30: CPT | Performed by: STUDENT IN AN ORGANIZED HEALTH CARE EDUCATION/TRAINING PROGRAM

## 2024-02-20 PROCEDURE — 36415 COLL VENOUS BLD VENIPUNCTURE: CPT | Performed by: HOSPITALIST

## 2024-02-20 PROCEDURE — 250N000013 HC RX MED GY IP 250 OP 250 PS 637: Performed by: PSYCHIATRY & NEUROLOGY

## 2024-02-20 PROCEDURE — G0378 HOSPITAL OBSERVATION PER HR: HCPCS

## 2024-02-20 PROCEDURE — 250N000013 HC RX MED GY IP 250 OP 250 PS 637: Performed by: INTERNAL MEDICINE

## 2024-02-20 RX ADMIN — SERTRALINE HYDROCHLORIDE 75 MG: 50 TABLET ORAL at 09:21

## 2024-02-20 RX ADMIN — PANTOPRAZOLE SODIUM 40 MG: 40 TABLET, DELAYED RELEASE ORAL at 06:33

## 2024-02-20 RX ADMIN — LEVETIRACETAM 250 MG: 250 TABLET, FILM COATED ORAL at 00:17

## 2024-02-20 RX ADMIN — APIXABAN 5 MG: 5 TABLET, FILM COATED ORAL at 00:17

## 2024-02-20 RX ADMIN — LEVETIRACETAM 250 MG: 250 TABLET, FILM COATED ORAL at 09:21

## 2024-02-20 RX ADMIN — APIXABAN 5 MG: 5 TABLET, FILM COATED ORAL at 09:21

## 2024-02-20 ASSESSMENT — ACTIVITIES OF DAILY LIVING (ADL)
ADLS_ACUITY_SCORE: 63

## 2024-02-20 NOTE — PROGRESS NOTES
[facility] neuros. No PIV. VSS on RA. Sleeping in between cares. Incontinent of bowel and bladder. Changed x2 this shift, BM x2. Regular diet, takes pills whole with water. Discharging today around noon back to [facility].

## 2024-02-20 NOTE — PROGRESS NOTES
Care Management Discharge Note    Discharge Date: 02/20/2024       Discharge Disposition:  home to [facility]   Discharge Services:  transportation  Discharge DME:  none  Discharge Transportation:   Health stretcher     Private pay costs discussed: Not applicable    Does the patient's insurance plan have a 3 day qualifying hospital stay waiver?  Yes   Which insurance plan 3 day waiver is available? Alternative insurance waiver  Will the waiver be used for post-acute placement? No    Education Provided on the Discharge Plan:  yes  Persons Notified of Discharge Plans: daughter, facility   Patient/Family in Agreement with the Plan:  yes    Handoff Referral Completed: No    Additional Information:  SW contacted daughter and confirmed discharge plan for today.  Health stretcher is set to pick patient up between [id number] to bring patient home to [facility]. Discharge orders faxed. Called and updated Bree on discharge plan via voicemail. PAS not needed.    Rose Godinez, MSW, SW   Social Work   [facility]

## 2024-02-20 NOTE — PLAN OF CARE
2/16/24 admit, Seizure like activities, witnessed  2/19/24, 7 a to 7 p     Orientation: Unable to assess, inconsistent in following command, resisting cares at times     Vitals/Tele: BP elevated at times, appears to be comfortable and not in pain     IV Access/drains: No IV access     Diet: Regular, thins, takes pills whole, couple at a time     Mobility: can pivot to W/C baseline. W/C bound baseline, here Q 2 T and repo     GI/: Incontinent,1 BM     Wound/Skin: scattered bruises, no significant skin issues     Consults: Neurology following     Discharge Plan: discharging tomorrow, back to facility at 11.40 am, orders to be faxed to facilty at 9.30 am, please see note of SW     Others:     EEG done today, results pending

## 2024-02-20 NOTE — PROGRESS NOTES
Discharge time/date: 2/20/24, 1215  Walked or Wheelchair: By stretcher with EMS  PIV removed: No PIV  Reviewed AVS with patient: NA  Medication due times added to AVS in EPIC: JORDAN  Verbalized understanding of discharge with teachback: JORDNA  Medications retrieved from pharmacy: JORDAN  Supplies sent home: JORDAN  Belongings from security with patient: Yes    Pt discharged back to [facility] via stretcher with EMS.

## 2024-02-20 NOTE — PLAN OF CARE
2/19/24 [id number]  Summary: Seizure concerns  Orientation: Unable to assess; pt is combative and uncooperative, and resisted cares throughout shift.  Vitals/Tele: VSS on RA. Was snoring overnight and appeared to have periodic/apneic breathing.   IV Access/drains: No IV access; MD aware  Diet: Regular diet, upright, pills whole with water.   Mobility: A2/Lift; baseline wheelchair  GI/: Incontinent of B&B; 4 soft BM's overnight.  Wound/Skin: Scattered bruising  Discharge Plan: discharging to facility 2/20-ride arriving at 11:40am. Fax updates to facility at 9:30AM      See Flow sheets for assessment

## 2024-02-20 NOTE — DISCHARGE SUMMARY
[facility]  Hospitalist Discharge Summary      Date of Admission:  2/16/2024  Date of Discharge:  2/20/2024  Discharging Provider: Nicola Sheppard PA-C  Discharge Service: Hospitalist Service    Discharge Diagnoses   # New onset seizures   #Dementia   #Chronic A-fib on anticoagulation   #CKD 3     Follow-ups Needed After Discharge   Follow-up Appointments     Follow-up and recommended labs and tests       1) follow-up with physician at memory care unit for ongoing medical   needs.  This physician should be able to manage Keppra  2) follow-up with neurology, Dr. Wells, if needed            Discharge Disposition   Discharged to memory care   Condition at discharge: Stable    Hospital Course   Shamir Concepcion is a 83 year old male with PMHx of dementia who resides in memory care. Patient presented to [facility] on 2/17 after a witnessed seizure-like activity at his memory care unit.     # New onset seizures  Patient with generalized tonic-clonic seizure event the evening of 2/16 that lasted approximately 5 minutes.  EMS arrived and administered 5 mg of Versed.  Memory care staff also notes patient with staring spell approximately 1 week ago, felt may be seizure-like activity but was not worked up.  Benign workup in the ER, however, MRI unable to be obtained due to patient agitation.  Initially difficult to obtain EEG given underlying dementia and agitation, however, once obtained it was notable for nonspecific slowing. Per neuro, interictal EEG can be neg in patients with known sz disorder. Given patient's dementia he is at increased risk of developing overt seizure disorder. He was started on keppra 250 mg BID. Pt's daughter was in agreement with this  - Continue Keppra 250 mg BID   - Discharge back to memory care unit  - Follow-up with general neurology, Dr. Wells, as needed. Otherwise, seizure medication can be managed by provider at memory care facility      #Dementia  History noted.  Calm during  exam today.  -Plan to return to memory care tomorrow  - I did call and speak with patient's daughter, Dorcas, who is agreeable with plan     #Chronic A-fib on anticoagulation  - PTA Eliquis     #CKD 3  Creatinine at 1-3 to 1-4, at baseline      Consultations This Hospital Stay   NEUROLOGY IP CONSULT  SPEECH LANGUAGE PATH ADULT IP CONSULT  CARE MANAGEMENT / SOCIAL WORK IP CONSULT    Code Status   No CPR- Do NOT Intubate    Time Spent on this Encounter   I, Nicola Sheppard PA-C, personally saw the patient today and spent greater than 30 minutes discharging this patient.       Nicola Sheppard PA-C  [facility]  [address] [address]  Phone: [phone]  ______________________________________________________________________    Physical Exam   Vital Signs: Temp: 98.6  F (37  C) Temp src: Axillary BP: 132/63 Pulse: 62   Resp: 20 SpO2: 97 % O2 Device: None (Room air)    Weight: 0 lbs 0 oz  Constitutional: awake, alert, cooperative, in no acute distress. Demented at baseline   Eyes: EOM, PERRLA. Sclera clear, conjunctiva normal. Anicteric   HENT: Normocephalic, without obvious abnormality, atraumatic.   Respiratory: No increased work of breathing.     Primary Care Physician   CARL WILLSON    Discharge Orders      Reason for your hospital stay    Shamir was admitted for concern of new onset seizures.  His EEG study did have some abnormalities concerning for this.  In the setting of his progressive dementia he is at high risk for seizures and thus neurology weighed in and started him on Keppra which is an antiseizure medication.     Follow-up and recommended labs and tests     1) follow-up with physician at memory care unit for ongoing medical needs.  This physician should be able to manage Keppra  2) follow-up with neurology, Dr. Wells, if needed     Activity    Your activity upon discharge: activity as tolerated     Diet    Regular Diet Adult       Significant Results and  Procedures   Most Recent 3 CBC's:  Recent Labs   Lab Test 02/16/24 2217 02/01/24  1456 11/21/23  1113   WBC 10.9 5.9 7.4   HGB 9.0* 9.1* 9.5*   MCV 83 86 88    420 368     Most Recent 3 BMP's:  Recent Labs   Lab Test 02/19/24  1321 02/18/24  1126 02/16/24 2217 12/28/23  1228 11/21/23  1113   NA  --   --  139 140 143   POTASSIUM  --   --  3.8 4.7 4.8   CHLORIDE  --   --  105 106 107   CO2  --   --  17* 21* 21*   BUN  --   --  22.4 21.2 25.5*   CR 1.40* 1.37* 1.62* 1.65* 1.58*   ANIONGAP  --   --  17* 13 15   RANJAN  --   --  8.9 9.0 9.3   GLC  --   --  110* 93 72     Most Recent 3 INR's:  Recent Labs   Lab Test 02/16/24  2217 10/12/22  0807 05/19/19  0738   INR 1.18* 1.15 1.23*   ,   Results for orders placed or performed during the hospital encounter of 02/16/24   CT Head w/o Contrast    Narrative    EXAM: CT HEAD W/O CONTRAST, CT CERVICAL SPINE W/O CONTRAST  LOCATION: [facility]  DATE: 2/16/2024    INDICATION: Seizure, head traumaNeck injury  COMPARISON: CT head 03/12/2023 CT cervical spine 03/01/2023   TECHNIQUE:   1) Routine CT Head without IV contrast. Multiplanar reformats. Dose reduction techniques were used.  2) Routine CT Cervical Spine without IV contrast. Multiplanar reformats. Dose reduction techniques were used.    FINDINGS:   HEAD CT:   INTRACRANIAL CONTENTS: No intracranial hemorrhage, extraaxial collection, or mass effect.  No CT evidence of acute infarct. Mild to moderate presumed chronic small vessel ischemic changes. Ventriculomegaly disproportionate to the degree of volume loss.   Correlate for normal pressure hydrocephalus. 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 Small chronic infarct in the right cerebellar hemisphere.     VISUALIZED ORBITS/SINUSES/MASTOIDS: No intraorbital abnormality. Hyperostosis in the maxillary sinuses. Polypoid mucosal thickening in the ethmoid air cells nasal cavity. There is a 2 cm polyp or retention  cyst in the right maxillary sinus. Postsurgical   changes of bilateral uncinectomies and medial antrostomies. No middle ear or mastoid effusion.    BONES/SOFT TISSUES: No acute abnormality.    CERVICAL SPINE CT:   VERTEBRA: Cervical vertebra normal in height. Stable mild alterations in the lateral alignment likely relating to degenerative change No fracture or posttraumatic subluxation.     CANAL/FORAMINA: Mild to moderate central canal stenosis at C4 and at C6-C7 and mild central canal stenosis at C4-C5 and C5-C6. High-grade foraminal narrowing on the right at C3-C4 and C4-C5 and on the left at C3-C4, C4-C5 and C6-C7.    PARASPINAL: There are dense atheromatous calcifications at the bifurcations. Mild scarring at the lung apices.      Impression    IMPRESSION:  HEAD CT:  1.  No acute intracranial pathology or calvarial fracture identified.  2.  Chronic changes as above.    CERVICAL SPINE CT:  1.  No CT evidence for acute fracture or post traumatic subluxation.  2.  Degenerative changes as highlighted above.   CT Cervical Spine w/o Contrast    Narrative    EXAM: CT HEAD W/O CONTRAST, CT CERVICAL SPINE W/O CONTRAST  LOCATION: [facility]  DATE: 2/16/2024    INDICATION: Seizure, head traumaNeck injury  COMPARISON: CT head 03/12/2023 CT cervical spine 03/01/2023   TECHNIQUE:   1) Routine CT Head without IV contrast. Multiplanar reformats. Dose reduction techniques were used.  2) Routine CT Cervical Spine without IV contrast. Multiplanar reformats. Dose reduction techniques were used.    FINDINGS:   HEAD CT:   INTRACRANIAL CONTENTS: No intracranial hemorrhage, extraaxial collection, or mass effect.  No CT evidence of acute infarct. Mild to moderate presumed chronic small vessel ischemic changes. Ventriculomegaly disproportionate to the degree of volume loss.   Correlate for normal pressure hydrocephalus. 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 Small chronic  infarct in the right cerebellar hemisphere.     VISUALIZED ORBITS/SINUSES/MASTOIDS: No intraorbital abnormality. Hyperostosis in the maxillary sinuses. Polypoid mucosal thickening in the ethmoid air cells nasal cavity. There is a 2 cm polyp or retention cyst in the right maxillary sinus. Postsurgical   changes of bilateral uncinectomies and medial antrostomies. No middle ear or mastoid effusion.    BONES/SOFT TISSUES: No acute abnormality.    CERVICAL SPINE CT:   VERTEBRA: Cervical vertebra normal in height. Stable mild alterations in the lateral alignment likely relating to degenerative change No fracture or posttraumatic subluxation.     CANAL/FORAMINA: Mild to moderate central canal stenosis at C4 and at C6-C7 and mild central canal stenosis at C4-C5 and C5-C6. High-grade foraminal narrowing on the right at C3-C4 and C4-C5 and on the left at C3-C4, C4-C5 and C6-C7.    PARASPINAL: There are dense atheromatous calcifications at the bifurcations. Mild scarring at the lung apices.      Impression    IMPRESSION:  HEAD CT:  1.  No acute intracranial pathology or calvarial fracture identified.  2.  Chronic changes as above.    CERVICAL SPINE CT:  1.  No CT evidence for acute fracture or post traumatic subluxation.  2.  Degenerative changes as highlighted above.   XR Chest 2 Views    Narrative    EXAM: XR CHEST 2 VIEWS  LOCATION: [facility]  DATE: 2/16/2024    INDICATION: Fall  COMPARISON: 07/21/2021       Impression    IMPRESSION: Mild cardiomegaly with normal pulmonary vascularity. Probable tiny bilateral pleural effusions with some mild bibasilar atelectasis. Mildly calcified thoracic aorta. Old/healed left sixth rib fracture. There is some slight nonspecific   infiltrate seen in the upper/mid lung junction on the right laterally. This could be acute or chronic in nature. This appears slightly more pronounced when compared to 07/21/2022 chest radiograph. Slightly calcified thoracic aorta.  Advanced degenerative   changes most marked in the right AC joint. Prior postoperative changes proximal left humerus. The chest is otherwise negative with no pneumothorax or obvious acute bony fracture.   XR Pelvis 1/2 Views    Narrative    EXAM: XR PELVIS 1/2 VIEWS  LOCATION: [facility]  DATE: 2/16/2024    INDICATION: fall  COMPARISON: None.      Impression    IMPRESSION: No acute bony finding such as fracture or dislocation identified. 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 Advanced degenerative changes most marked in the lower lumbar spine. Advanced vascular   calcification.       Discharge Medications   Current Discharge Medication List        START taking these medications    Details   levETIRAcetam (KEPPRA) 250 MG tablet Take 1 tablet (250 mg) by mouth 2 times daily for 30 days  Qty: 60 tablet, Refills: 0    Associated Diagnoses: Seizure (H)           CONTINUE these medications which have CHANGED    Details   pantoprazole (PROTONIX) 40 MG EC tablet Take 1 tablet (40 mg) by mouth daily    Associated Diagnoses: Esophageal ulcer without bleeding           CONTINUE these medications which have NOT CHANGED    Details   !! acetaminophen (TYLENOL) 325 MG tablet Take 650 mg by mouth 2 times daily      !! acetaminophen (TYLENOL) 325 MG tablet Take 2 tablets (650 mg) by mouth every 4 hours as needed for mild pain    Associated Diagnoses: Pain; Rheumatoid arthritis involving both hands, unspecified rheumatoid factor presence      alendronate (FOSAMAX) 70 MG tablet Take 70 mg by mouth every 7 days Sunday's      apixaban ANTICOAGULANT (ELIQUIS) 5 MG tablet Take 1 tablet (5 mg) by mouth 2 times daily    Associated Diagnoses: Cerebrovascular accident (CVA), unspecified mechanism (H)      fluticasone (FLONASE) 50 MCG/ACT nasal spray Spray 2 sprays into both nostrils daily      melatonin 1 MG TABS tablet Take 2 mg by mouth at bedtime      !! menthol-zinc  oxide (CALMOSEPTINE) 0.44-20.6 % OINT ointment Apply topically 2 times daily to gluteal fold      !! menthol-zinc oxide (CALMOSEPTINE) 0.44-20.6 % OINT ointment Apply topically 2 times daily as needed To gluteal fold      methotrexate 2.5 MG tablet Take 5 tablets (12.5 mg) by mouth every 7 days    Associated Diagnoses: Other specified rheumatoid arthritis, other specified site (H)      multivitamin w/minerals (THERA-VIT-M) tablet Take 1 tablet by mouth daily      rosuvastatin (CRESTOR) 10 MG tablet Take 10 mg by mouth daily      senna-docusate (SENOKOT-S/PERICOLACE) 8.6-50 MG tablet Take 1 tablet by mouth 2 times daily as needed for constipation      sertraline (ZOLOFT) 50 MG tablet Take 75 mg by mouth daily      Vitamin D3 (CHOLECALCIFEROL) 125 MCG (5000 UT) tablet Take 125 mcg by mouth daily       !! - Potential duplicate medications found. Please discuss with provider.        Allergies   No Known Allergies

## 2024-02-22 ENCOUNTER — DOCUMENTATION ONLY (OUTPATIENT)
Dept: OTHER | Facility: CLINIC | Age: 84
End: 2024-02-22
Payer: COMMERCIAL

## 2024-05-08 ENCOUNTER — LAB REQUISITION (OUTPATIENT)
Dept: LAB | Facility: CLINIC | Age: 84
End: 2024-05-08
Payer: COMMERCIAL

## 2024-05-08 DIAGNOSIS — G40.909 EPILEPSY, UNSPECIFIED, NOT INTRACTABLE, WITHOUT STATUS EPILEPTICUS (H): ICD-10-CM

## 2024-05-08 DIAGNOSIS — N18.30 CHRONIC KIDNEY DISEASE, STAGE 3 UNSPECIFIED (H): ICD-10-CM

## 2024-05-08 DIAGNOSIS — E55.9 VITAMIN D DEFICIENCY, UNSPECIFIED: ICD-10-CM

## 2024-05-08 DIAGNOSIS — D64.9 ANEMIA, UNSPECIFIED: ICD-10-CM

## 2024-05-09 LAB — HGB BLD-MCNC: 9.8 G/DL (ref 13.3–17.7)

## 2024-05-09 PROCEDURE — 80048 BASIC METABOLIC PNL TOTAL CA: CPT | Mod: ORL

## 2024-05-09 PROCEDURE — 85018 HEMOGLOBIN: CPT | Mod: ORL

## 2024-05-09 PROCEDURE — 80177 DRUG SCRN QUAN LEVETIRACETAM: CPT | Mod: ORL

## 2024-05-09 PROCEDURE — P9603 ONE-WAY ALLOW PRORATED MILES: HCPCS | Mod: ORL

## 2024-05-09 PROCEDURE — 82306 VITAMIN D 25 HYDROXY: CPT | Mod: ORL

## 2024-05-09 PROCEDURE — 36415 COLL VENOUS BLD VENIPUNCTURE: CPT | Mod: ORL

## 2024-05-10 LAB
ANION GAP SERPL CALCULATED.3IONS-SCNC: 11 MMOL/L (ref 7–15)
BUN SERPL-MCNC: 26.3 MG/DL (ref 8–23)
CALCIUM SERPL-MCNC: 9.2 MG/DL (ref 8.8–10.2)
CHLORIDE SERPL-SCNC: 106 MMOL/L (ref 98–107)
CREAT SERPL-MCNC: 1.68 MG/DL (ref 0.67–1.17)
DEPRECATED HCO3 PLAS-SCNC: 22 MMOL/L (ref 22–29)
EGFRCR SERPLBLD CKD-EPI 2021: 40 ML/MIN/1.73M2
GLUCOSE SERPL-MCNC: 93 MG/DL (ref 70–99)
LEVETIRACETAM SERPL-MCNC: 13.9 ΜG/ML (ref 10–40)
POTASSIUM SERPL-SCNC: 4.3 MMOL/L (ref 3.4–5.3)
SODIUM SERPL-SCNC: 139 MMOL/L (ref 135–145)
VIT D+METAB SERPL-MCNC: 74 NG/ML (ref 20–50)

## 2024-08-12 ENCOUNTER — LAB REQUISITION (OUTPATIENT)
Dept: LAB | Facility: CLINIC | Age: 84
End: 2024-08-12
Payer: COMMERCIAL

## 2024-08-12 DIAGNOSIS — G40.909 EPILEPSY, UNSPECIFIED, NOT INTRACTABLE, WITHOUT STATUS EPILEPTICUS (H): ICD-10-CM

## 2024-08-12 DIAGNOSIS — N18.30 CHRONIC KIDNEY DISEASE, STAGE 3 UNSPECIFIED (H): ICD-10-CM

## 2024-08-12 DIAGNOSIS — D64.9 ANEMIA, UNSPECIFIED: ICD-10-CM

## 2024-08-15 ENCOUNTER — LAB REQUISITION (OUTPATIENT)
Dept: LAB | Facility: CLINIC | Age: 84
End: 2024-08-15
Payer: COMMERCIAL

## 2024-08-15 DIAGNOSIS — D64.9 ANEMIA, UNSPECIFIED: ICD-10-CM

## 2024-08-15 DIAGNOSIS — N18.30 CHRONIC KIDNEY DISEASE, STAGE 3 UNSPECIFIED (H): ICD-10-CM

## 2024-08-15 DIAGNOSIS — G40.909 EPILEPSY, UNSPECIFIED, NOT INTRACTABLE, WITHOUT STATUS EPILEPTICUS (H): ICD-10-CM

## 2024-08-15 LAB
ANION GAP SERPL CALCULATED.3IONS-SCNC: 16 MMOL/L (ref 7–15)
BASOPHILS # BLD AUTO: 0 10E3/UL (ref 0–0.2)
BASOPHILS NFR BLD AUTO: 0 %
BUN SERPL-MCNC: 27 MG/DL (ref 8–23)
CALCIUM SERPL-MCNC: 9.6 MG/DL (ref 8.8–10.4)
CHLORIDE SERPL-SCNC: 104 MMOL/L (ref 98–107)
CREAT SERPL-MCNC: 1.87 MG/DL (ref 0.67–1.17)
EGFRCR SERPLBLD CKD-EPI 2021: 35 ML/MIN/1.73M2
EOSINOPHIL # BLD AUTO: 0 10E3/UL (ref 0–0.7)
EOSINOPHIL NFR BLD AUTO: 0 %
ERYTHROCYTE [DISTWIDTH] IN BLOOD BY AUTOMATED COUNT: 20 % (ref 10–15)
GLUCOSE SERPL-MCNC: 125 MG/DL (ref 70–99)
HCO3 SERPL-SCNC: 21 MMOL/L (ref 22–29)
HCT VFR BLD AUTO: 35.4 % (ref 40–53)
HGB BLD-MCNC: 10.5 G/DL (ref 13.3–17.7)
IMM GRANULOCYTES # BLD: 0.1 10E3/UL
IMM GRANULOCYTES NFR BLD: 1 %
LYMPHOCYTES # BLD AUTO: 0.6 10E3/UL (ref 0.8–5.3)
LYMPHOCYTES NFR BLD AUTO: 7 %
MCH RBC QN AUTO: 26.6 PG (ref 26.5–33)
MCHC RBC AUTO-ENTMCNC: 29.7 G/DL (ref 31.5–36.5)
MCV RBC AUTO: 90 FL (ref 78–100)
MONOCYTES # BLD AUTO: 0.6 10E3/UL (ref 0–1.3)
MONOCYTES NFR BLD AUTO: 6 %
NEUTROPHILS # BLD AUTO: 8 10E3/UL (ref 1.6–8.3)
NEUTROPHILS NFR BLD AUTO: 86 %
NRBC # BLD AUTO: 0 10E3/UL
NRBC BLD AUTO-RTO: 0 /100
PLATELET # BLD AUTO: 420 10E3/UL (ref 150–450)
POTASSIUM SERPL-SCNC: 4.1 MMOL/L (ref 3.4–5.3)
RBC # BLD AUTO: 3.94 10E6/UL (ref 4.4–5.9)
SODIUM SERPL-SCNC: 141 MMOL/L (ref 135–145)
WBC # BLD AUTO: 9.3 10E3/UL (ref 4–11)

## 2024-08-15 PROCEDURE — P9604 ONE-WAY ALLOW PRORATED TRIP: HCPCS | Mod: ORL

## 2024-08-15 PROCEDURE — 80048 BASIC METABOLIC PNL TOTAL CA: CPT | Mod: ORL

## 2024-08-15 PROCEDURE — 85025 COMPLETE CBC W/AUTO DIFF WBC: CPT | Mod: ORL

## 2024-08-15 PROCEDURE — 36415 COLL VENOUS BLD VENIPUNCTURE: CPT | Mod: ORL

## 2024-08-19 ENCOUNTER — LAB REQUISITION (OUTPATIENT)
Dept: LAB | Facility: CLINIC | Age: 84
End: 2024-08-19
Payer: COMMERCIAL

## 2024-08-19 DIAGNOSIS — G40.909 EPILEPSY, UNSPECIFIED, NOT INTRACTABLE, WITHOUT STATUS EPILEPTICUS (H): ICD-10-CM

## 2024-08-29 LAB — LEVETIRACETAM SERPL-MCNC: 10.4 ΜG/ML (ref 10–40)

## 2024-08-29 PROCEDURE — 36415 COLL VENOUS BLD VENIPUNCTURE: CPT | Mod: ORL

## 2024-08-29 PROCEDURE — 80177 DRUG SCRN QUAN LEVETIRACETAM: CPT | Mod: ORL

## 2024-08-29 PROCEDURE — P9604 ONE-WAY ALLOW PRORATED TRIP: HCPCS | Mod: ORL

## 2024-09-17 ENCOUNTER — LAB REQUISITION (OUTPATIENT)
Dept: LAB | Facility: CLINIC | Age: 84
End: 2024-09-17
Payer: COMMERCIAL

## 2024-09-17 DIAGNOSIS — N18.30 CHRONIC KIDNEY DISEASE, STAGE 3 UNSPECIFIED (H): ICD-10-CM

## 2024-09-17 DIAGNOSIS — D64.9 ANEMIA, UNSPECIFIED: ICD-10-CM

## 2024-09-17 DIAGNOSIS — E03.9 HYPOTHYROIDISM, UNSPECIFIED: ICD-10-CM

## 2024-09-19 LAB
ANION GAP SERPL CALCULATED.3IONS-SCNC: 16 MMOL/L (ref 7–15)
BUN SERPL-MCNC: 35.6 MG/DL (ref 8–23)
CALCIUM SERPL-MCNC: 9 MG/DL (ref 8.8–10.4)
CHLORIDE SERPL-SCNC: 107 MMOL/L (ref 98–107)
CREAT SERPL-MCNC: 2.05 MG/DL (ref 0.67–1.17)
EGFRCR SERPLBLD CKD-EPI 2021: 31 ML/MIN/1.73M2
ERYTHROCYTE [DISTWIDTH] IN BLOOD BY AUTOMATED COUNT: 21.4 % (ref 10–15)
GLUCOSE SERPL-MCNC: 122 MG/DL (ref 70–99)
HCO3 SERPL-SCNC: 20 MMOL/L (ref 22–29)
HCT VFR BLD AUTO: 33.1 % (ref 40–53)
HGB BLD-MCNC: 10 G/DL (ref 13.3–17.7)
MCH RBC QN AUTO: 27.8 PG (ref 26.5–33)
MCHC RBC AUTO-ENTMCNC: 30.2 G/DL (ref 31.5–36.5)
MCV RBC AUTO: 92 FL (ref 78–100)
NRBC # BLD AUTO: 0 10E3/UL
NRBC BLD AUTO-RTO: 0 /100
PLATELET # BLD AUTO: 408 10E3/UL (ref 150–450)
POTASSIUM SERPL-SCNC: 4.3 MMOL/L (ref 3.4–5.3)
RBC # BLD AUTO: 3.6 10E6/UL (ref 4.4–5.9)
SODIUM SERPL-SCNC: 143 MMOL/L (ref 135–145)
TSH SERPL DL<=0.005 MIU/L-ACNC: 3.09 UIU/ML (ref 0.3–4.2)
WBC # BLD AUTO: 31.5 10E3/UL (ref 4–11)

## 2024-09-19 PROCEDURE — 85027 COMPLETE CBC AUTOMATED: CPT | Mod: ORL

## 2024-09-19 PROCEDURE — 84443 ASSAY THYROID STIM HORMONE: CPT | Mod: ORL

## 2024-09-19 PROCEDURE — 85007 BL SMEAR W/DIFF WBC COUNT: CPT | Mod: ORL

## 2024-09-19 PROCEDURE — 80048 BASIC METABOLIC PNL TOTAL CA: CPT | Mod: ORL

## 2024-09-19 PROCEDURE — 36415 COLL VENOUS BLD VENIPUNCTURE: CPT | Mod: ORL

## 2024-09-19 PROCEDURE — P9603 ONE-WAY ALLOW PRORATED MILES: HCPCS | Mod: ORL

## 2024-09-20 LAB
ACANTHOCYTES BLD QL SMEAR: SLIGHT
BASOPHILS # BLD MANUAL: 0 10E3/UL (ref 0–0.2)
BASOPHILS NFR BLD MANUAL: 0 %
DACRYOCYTES BLD QL SMEAR: SLIGHT
ELLIPTOCYTES BLD QL SMEAR: ABNORMAL
EOSINOPHIL # BLD MANUAL: 0 10E3/UL (ref 0–0.7)
EOSINOPHIL NFR BLD MANUAL: 0 %
FRAGMENTS BLD QL SMEAR: ABNORMAL
LYMPHOCYTES # BLD MANUAL: 1.3 10E3/UL (ref 0.8–5.3)
LYMPHOCYTES NFR BLD MANUAL: 4 %
MONOCYTES # BLD MANUAL: 4.7 10E3/UL (ref 0–1.3)
MONOCYTES NFR BLD MANUAL: 15 %
NEUTROPHILS # BLD MANUAL: 25.5 10E3/UL (ref 1.6–8.3)
NEUTROPHILS NFR BLD MANUAL: 81 %
PATH REV: ABNORMAL
PLAT MORPH BLD: ABNORMAL
RBC MORPH BLD: ABNORMAL

## 2025-01-20 ENCOUNTER — LAB REQUISITION (OUTPATIENT)
Dept: LAB | Facility: CLINIC | Age: 85
End: 2025-01-20
Payer: COMMERCIAL

## 2025-01-20 DIAGNOSIS — D64.9 ANEMIA, UNSPECIFIED: ICD-10-CM

## 2025-01-20 DIAGNOSIS — R94.6 ABNORMAL RESULTS OF THYROID FUNCTION STUDIES: ICD-10-CM

## 2025-01-20 DIAGNOSIS — Z13.228 ENCOUNTER FOR SCREENING FOR OTHER METABOLIC DISORDERS: ICD-10-CM

## 2025-01-23 LAB
ALBUMIN SERPL BCG-MCNC: 3.8 G/DL (ref 3.5–5.2)
ALP SERPL-CCNC: 98 U/L (ref 40–150)
ALT SERPL W P-5'-P-CCNC: 12 U/L (ref 0–70)
ANION GAP SERPL CALCULATED.3IONS-SCNC: 11 MMOL/L (ref 7–15)
AST SERPL W P-5'-P-CCNC: 27 U/L (ref 0–45)
BASOPHILS # BLD AUTO: 0.1 10E3/UL (ref 0–0.2)
BASOPHILS NFR BLD AUTO: 1 %
BILIRUB SERPL-MCNC: 0.4 MG/DL
BUN SERPL-MCNC: 21.8 MG/DL (ref 8–23)
CALCIUM SERPL-MCNC: 9.2 MG/DL (ref 8.8–10.4)
CHLORIDE SERPL-SCNC: 107 MMOL/L (ref 98–107)
CREAT SERPL-MCNC: 1.44 MG/DL (ref 0.67–1.17)
EGFRCR SERPLBLD CKD-EPI 2021: 48 ML/MIN/1.73M2
EOSINOPHIL # BLD AUTO: 0 10E3/UL (ref 0–0.7)
EOSINOPHIL NFR BLD AUTO: 0 %
ERYTHROCYTE [DISTWIDTH] IN BLOOD BY AUTOMATED COUNT: 17.7 % (ref 10–15)
GLUCOSE SERPL-MCNC: 100 MG/DL (ref 70–99)
HCO3 SERPL-SCNC: 24 MMOL/L (ref 22–29)
HCT VFR BLD AUTO: 31.9 % (ref 40–53)
HGB BLD-MCNC: 9.9 G/DL (ref 13.3–17.7)
IMM GRANULOCYTES # BLD: 0 10E3/UL
IMM GRANULOCYTES NFR BLD: 0 %
LYMPHOCYTES # BLD AUTO: 1.1 10E3/UL (ref 0.8–5.3)
LYMPHOCYTES NFR BLD AUTO: 15 %
MCH RBC QN AUTO: 28.9 PG (ref 26.5–33)
MCHC RBC AUTO-ENTMCNC: 31 G/DL (ref 31.5–36.5)
MCV RBC AUTO: 93 FL (ref 78–100)
MONOCYTES # BLD AUTO: 0.7 10E3/UL (ref 0–1.3)
MONOCYTES NFR BLD AUTO: 10 %
NEUTROPHILS # BLD AUTO: 5.5 10E3/UL (ref 1.6–8.3)
NEUTROPHILS NFR BLD AUTO: 74 %
NRBC # BLD AUTO: 0 10E3/UL
NRBC BLD AUTO-RTO: 0 /100
PLATELET # BLD AUTO: 319 10E3/UL (ref 150–450)
POTASSIUM SERPL-SCNC: 4.6 MMOL/L (ref 3.4–5.3)
PROT SERPL-MCNC: 7 G/DL (ref 6.4–8.3)
RBC # BLD AUTO: 3.42 10E6/UL (ref 4.4–5.9)
SODIUM SERPL-SCNC: 142 MMOL/L (ref 135–145)
TSH SERPL DL<=0.005 MIU/L-ACNC: 2.5 UIU/ML (ref 0.3–4.2)
WBC # BLD AUTO: 7.4 10E3/UL (ref 4–11)

## 2025-01-23 PROCEDURE — 84443 ASSAY THYROID STIM HORMONE: CPT | Mod: ORL

## 2025-01-23 PROCEDURE — 85025 COMPLETE CBC W/AUTO DIFF WBC: CPT | Mod: ORL

## 2025-01-23 PROCEDURE — 80053 COMPREHEN METABOLIC PANEL: CPT | Mod: ORL

## 2025-01-23 PROCEDURE — 36415 COLL VENOUS BLD VENIPUNCTURE: CPT | Mod: ORL

## 2025-01-23 PROCEDURE — P9603 ONE-WAY ALLOW PRORATED MILES: HCPCS | Mod: ORL

## (undated) DEVICE — EYE KNIFE CRESCENT 55DEG 373807

## (undated) DEVICE — LINEN FULL SHEET 5511

## (undated) DEVICE — EYE PACK CUSTOM ANTERIOR 30DEG TIP CENTURION PPK6682-04

## (undated) DEVICE — SUCTION TIP YANKAUER W/O VENT K86

## (undated) DEVICE — GLOVE PROTEXIS MICRO 6.0  2D73PM60

## (undated) DEVICE — PACK CATARACT CUSTOM SO DALE SEY32CTFCX

## (undated) DEVICE — APPLICATOR COTTON TIP 6"X2 STERILE LF 6012

## (undated) DEVICE — SU NDL MAYO 1824-6

## (undated) DEVICE — GLOVE PROTEXIS MICRO 7.0  2D73PM70

## (undated) DEVICE — MANIFOLD NEPTUNE 4 PORT 700-20

## (undated) DEVICE — DRSG AQUACEL AG HYDROFIBER 3.5X6" 422604

## (undated) DEVICE — Device

## (undated) DEVICE — DRAPE SHOULDER PACK SPLITS 29365

## (undated) DEVICE — GLOVE PROTEXIS MICRO 8.5  2D73PM85

## (undated) DEVICE — DRSG STERI STRIP 1/2X4" R1547

## (undated) DEVICE — LINEN TOWEL PACK X5 5464

## (undated) DEVICE — PREP CHLORAPREP 26ML TINTED HI-LITE ORANGE 930815

## (undated) DEVICE — GLOVE PROTEXIS BLUE W/NEU-THERA 8.5  2D73EB85

## (undated) DEVICE — ESU GROUND PAD ADULT W/CORD E7507

## (undated) DEVICE — SU NDL MAYO 1824-4

## (undated) DEVICE — GLOVE PROTEXIS MICRO 7.5  2D73PM75

## (undated) DEVICE — SU FIBERWIRE 2 38"  AR-7200

## (undated) DEVICE — TAPE SILICONE KIND REMOVAL 2" 2770S-2

## (undated) DEVICE — DRAPE X-RAY TUBE 00-901169-01-OEC

## (undated) DEVICE — SU VICRYL 2-0 CT-1 36" UND J945H

## (undated) DEVICE — SU VICRYL 0 CT-1 36" J346H

## (undated) DEVICE — SPONGE LAP 18X18" X8435

## (undated) DEVICE — DRAPE CONVERTORS U-DRAPE 60X72" 8476

## (undated) DEVICE — VESSEL LOOPS RED MAXI

## (undated) DEVICE — LINEN ORTHO ACL PACK 5447

## (undated) DEVICE — EYE PACK BVI READYPAK KIT #2

## (undated) DEVICE — LINEN HALF SHEET 5512

## (undated) DEVICE — GLOVE PROTEXIS POWDER FREE 6.5 ORTHOPEDIC 2D73ET65

## (undated) DEVICE — ESU PENCIL W/HOLSTER E2350H

## (undated) DEVICE — SU MONOCRYL 4-0 PS-2 18" UND Y496G

## (undated) DEVICE — DRAPE STERI TOWEL LG 1010

## (undated) DEVICE — BAG CLEAR TRASH 1.3M 39X33" P4040C

## (undated) DEVICE — EYE SHIELD PLASTIC

## (undated) DEVICE — DRAPE C-ARM 60X42" 1013

## (undated) DEVICE — GLOVE PROTEXIS POWDER FREE 8.5 ORTHOPEDIC 2D73ET85

## (undated) DEVICE — DRAPE IOBAN INCISE 23X17" 6650EZ

## (undated) DEVICE — SYR BULB IRRIG 50ML LATEX FREE 0035280

## (undated) DEVICE — 2.8 DRILL

## (undated) DEVICE — EYE SOL BSS 500ML

## (undated) DEVICE — EYE KNIFE SLIT XSTAR VISITEC 2.4MM 45DEG BEVEL UP 373724

## (undated) DEVICE — PACK SHOULDER RIDGES

## (undated) DEVICE — GLOVE PROTEXIS BLUE W/NEU-THERA 6.5  2D73EB65

## (undated) RX ORDER — FENTANYL CITRATE 50 UG/ML
INJECTION, SOLUTION INTRAMUSCULAR; INTRAVENOUS
Status: DISPENSED
Start: 2022-10-12

## (undated) RX ORDER — LIDOCAINE HYDROCHLORIDE 10 MG/ML
INJECTION, SOLUTION EPIDURAL; INFILTRATION; INTRACAUDAL; PERINEURAL
Status: DISPENSED
Start: 2017-04-04

## (undated) RX ORDER — NEOSTIGMINE METHYLSULFATE 1 MG/ML
VIAL (ML) INJECTION
Status: DISPENSED
Start: 2022-08-10

## (undated) RX ORDER — LIDOCAINE HYDROCHLORIDE 10 MG/ML
INJECTION, SOLUTION EPIDURAL; INFILTRATION; INTRACAUDAL; PERINEURAL
Status: DISPENSED
Start: 2017-05-02

## (undated) RX ORDER — GLYCOPYRROLATE 0.2 MG/ML
INJECTION INTRAMUSCULAR; INTRAVENOUS
Status: DISPENSED
Start: 2022-08-10

## (undated) RX ORDER — TRANEXAMIC ACID 650 MG/1
TABLET ORAL
Status: DISPENSED
Start: 2022-08-10

## (undated) RX ORDER — FENTANYL CITRATE 50 UG/ML
INJECTION, SOLUTION INTRAMUSCULAR; INTRAVENOUS
Status: DISPENSED
Start: 2022-08-10

## (undated) RX ORDER — BUPIVACAINE HYDROCHLORIDE AND EPINEPHRINE 2.5; 5 MG/ML; UG/ML
INJECTION, SOLUTION EPIDURAL; INFILTRATION; INTRACAUDAL; PERINEURAL
Status: DISPENSED
Start: 2022-08-10

## (undated) RX ORDER — HEPARIN SODIUM 1000 [USP'U]/ML
INJECTION, SOLUTION INTRAVENOUS; SUBCUTANEOUS
Status: DISPENSED
Start: 2022-10-12

## (undated) RX ORDER — FENTANYL CITRATE 50 UG/ML
INJECTION, SOLUTION INTRAMUSCULAR; INTRAVENOUS
Status: DISPENSED
Start: 2023-03-03

## (undated) RX ORDER — ONDANSETRON 2 MG/ML
INJECTION INTRAMUSCULAR; INTRAVENOUS
Status: DISPENSED
Start: 2022-08-10

## (undated) RX ORDER — ACETAMINOPHEN 325 MG/1
TABLET ORAL
Status: DISPENSED
Start: 2022-10-12

## (undated) RX ORDER — EPHEDRINE SULFATE 50 MG/ML
INJECTION, SOLUTION INTRAMUSCULAR; INTRAVENOUS; SUBCUTANEOUS
Status: DISPENSED
Start: 2022-08-10

## (undated) RX ORDER — CEFAZOLIN SODIUM/WATER 2 G/20 ML
SYRINGE (ML) INTRAVENOUS
Status: DISPENSED
Start: 2022-08-10

## (undated) RX ORDER — FENTANYL CITRATE-0.9 % NACL/PF 10 MCG/ML
PLASTIC BAG, INJECTION (ML) INTRAVENOUS
Status: DISPENSED
Start: 2022-08-10

## (undated) RX ORDER — FENTANYL CITRATE 50 UG/ML
INJECTION, SOLUTION INTRAMUSCULAR; INTRAVENOUS
Status: DISPENSED
Start: 2023-03-02

## (undated) RX ORDER — CLOPIDOGREL 300 MG/1
TABLET, FILM COATED ORAL
Status: DISPENSED
Start: 2022-10-12